# Patient Record
Sex: MALE | Race: WHITE | Employment: OTHER | URBAN - METROPOLITAN AREA
[De-identification: names, ages, dates, MRNs, and addresses within clinical notes are randomized per-mention and may not be internally consistent; named-entity substitution may affect disease eponyms.]

---

## 2017-01-10 ENCOUNTER — ANTICOAGULATION THERAPY VISIT (OUTPATIENT)
Dept: NURSING | Facility: CLINIC | Age: 82
End: 2017-01-10
Payer: COMMERCIAL

## 2017-01-10 DIAGNOSIS — I26.99 PE (PULMONARY THROMBOEMBOLISM) (H): ICD-10-CM

## 2017-01-10 DIAGNOSIS — Z79.01 LONG-TERM (CURRENT) USE OF ANTICOAGULANTS: Primary | ICD-10-CM

## 2017-01-10 LAB — INR POINT OF CARE: 2.9 (ref 0.86–1.14)

## 2017-01-10 PROCEDURE — 85610 PROTHROMBIN TIME: CPT | Mod: QW

## 2017-01-10 PROCEDURE — 36416 COLLJ CAPILLARY BLOOD SPEC: CPT

## 2017-01-10 PROCEDURE — 99207 ZZC NO CHARGE NURSE ONLY: CPT

## 2017-01-10 NOTE — MR AVS SNAPSHOT
Dimitri Neves   1/10/2017 10:15 AM   Anticoagulation Therapy Visit    Description:  81 year old male   Provider:  BE ANTI COAG   Department:  Be Nurse           INR as of 1/10/2017     Selected INR 2.9 (1/10/2017)      Anticoagulation Summary as of 1/10/2017     INR goal 2.0-3.0   Selected INR 2.9 (1/10/2017)   Full instructions 5 mg on Sun, Tue, Thu; 2.5 mg all other days   Next INR check 2/7/2017    Indications   Long-term (current) use of anticoagulants [Z79.01] [Z79.01]  PE (pulmonary thromboembolism) (H) [I26.99]         Your next Anticoagulation Clinic appointment(s)     Dedrick 10, 2017 10:15 AM   Anticoagulation Visit with BE ANTI COAG   [facility] ([facility] [place])    [address]  [place] [address]   [phone]            Feb 07, 2017  9:30 AM   Anticoagulation Visit with BE ANTI COAG   [facility] ([facility] [place])    [address]  [place] [address]   [phone]              Contact Numbers     [facility]  Please call [phone] with any problems or questions regarding your therapy, or to cancel or reschedule your appointment.          January 2017 Details    Sun Mon Tue Wed Thu Fri Sat     1               2               3               4               5               6               7                 8               9               10      5 mg   See details      11      2.5 mg         12      5 mg         13      2.5 mg         14      2.5 mg           15      5 mg         16      2.5 mg         17      5 mg         18      2.5 mg         19      5 mg         20      2.5 mg         21      2.5 mg           22      5 mg         23      2.5 mg         24      5 mg         25      2.5 mg         26      5 mg         27      2.5 mg         28      2.5 mg           29      5 mg         30      2.5 mg         31      5 mg              Date Details   01/10 This INR check               How to take your warfarin dose     To  take:  2.5 mg Take 0.5 of a 5 mg tablet.    To take:  5 mg Take 1 of the 5 mg tablets.           February 2017 Details    Sun Mon Tue Wed Thu Fri Sat        1      2.5 mg         2      5 mg         3      2.5 mg         4      2.5 mg           5      5 mg         6      2.5 mg         7            8               9               10               11                 12               13               14               15               16               17               18                 19               20               21               22               23               24               25                 26               27               28                    Date Details   No additional details    Date of next INR:  2/7/2017         How to take your warfarin dose     To take:  2.5 mg Take 0.5 of a 5 mg tablet.    To take:  5 mg Take 1 of the 5 mg tablets.

## 2017-01-10 NOTE — PROGRESS NOTES
ANTICOAGULATION FOLLOW-UP CLINIC VISIT    Patient Name:  Dimitri Neves  Date:  1/10/2017  Contact Type:  Face to Face    SUBJECTIVE:     Patient Findings     Positives No Problem Findings           OBJECTIVE    INR PROTIME   Date Value Ref Range Status   01/10/2017 2.9* 0.86 - 1.14 Final       ASSESSMENT / PLAN  INR assessment THER    Recheck INR In: 4 WEEKS    INR Location Clinic      Anticoagulation Summary as of 1/10/2017     INR goal 2.0-3.0   Selected INR 2.9 (1/10/2017)   Maintenance plan 5 mg (5 mg x 1) on Sun, Tue, Thu; 2.5 mg (5 mg x 0.5) all other days   Full instructions 5 mg on Sun, Tue, Thu; 2.5 mg all other days   Weekly total 25 mg   No change documented Madiha Souza   Plan last modified Madiha Souza (11/22/2016)   Next INR check 2/7/2017   Target end date Indefinite    Indications   Long-term (current) use of anticoagulants [Z79.01] [Z79.01]  PE (pulmonary thromboembolism) (H) [I26.99]         Anticoagulation Episode Summary     INR check location Clinic Lab    Preferred lab     Send INR reminders to [facility]    Comments       Anticoagulation Care Providers     Provider Role Specialty Phone number    Dennis Tejada MD [facility] Internal Medicine [phone]            See the Encounter Report to view Anticoagulation Flowsheet and Dosing Calendar (Go to Encounters tab in chart review, and find the Anticoagulation Therapy Visit)        Madiha Souza

## 2017-01-12 ENCOUNTER — OFFICE VISIT (OUTPATIENT)
Dept: INTERNAL MEDICINE | Facility: CLINIC | Age: 82
End: 2017-01-12
Payer: COMMERCIAL

## 2017-01-12 VITALS
SYSTOLIC BLOOD PRESSURE: 117 MMHG | OXYGEN SATURATION: 96 % | HEIGHT: 72 IN | BODY MASS INDEX: 25.47 KG/M2 | DIASTOLIC BLOOD PRESSURE: 68 MMHG | WEIGHT: 188 LBS | HEART RATE: 72 BPM | TEMPERATURE: 97.6 F

## 2017-01-12 DIAGNOSIS — Z79.01 LONG-TERM (CURRENT) USE OF ANTICOAGULANTS: ICD-10-CM

## 2017-01-12 DIAGNOSIS — C61 PROSTATE CANCER (H): ICD-10-CM

## 2017-01-12 DIAGNOSIS — I26.99 PE (PULMONARY THROMBOEMBOLISM) (H): ICD-10-CM

## 2017-01-12 DIAGNOSIS — E78.5 HYPERLIPIDEMIA LDL GOAL <130: ICD-10-CM

## 2017-01-12 DIAGNOSIS — I10 HTN (HYPERTENSION), BENIGN: ICD-10-CM

## 2017-01-12 DIAGNOSIS — G47.33 OSA (OBSTRUCTIVE SLEEP APNEA): ICD-10-CM

## 2017-01-12 PROCEDURE — 99214 OFFICE O/P EST MOD 30 MIN: CPT | Performed by: INTERNAL MEDICINE

## 2017-01-12 NOTE — PATIENT INSTRUCTIONS
- try Icy hot or Bengay or Aspercream to affected area/ knee for pain  - Over the counter  - try icing the area off-on

## 2017-01-12 NOTE — NURSING NOTE
Chief Complaint   Patient presents with     Hypertension     recheck       Initial /68 mmHg  Pulse 72  Temp(Src) 97.6  F (36.4  C) (Oral)  Ht 6' (1.829 m)  Wt 188 lb (85.276 kg)  BMI 25.49 kg/m2  SpO2 96% Estimated body mass index is 25.49 kg/(m^2) as calculated from the following:    Height as of this encounter: 6' (1.829 m).    Weight as of this encounter: 188 lb (85.276 kg).  BP completed using cuff size: emanuel Boland LPN

## 2017-01-12 NOTE — PROGRESS NOTES
INTERNAL MEDICINE PROGRESS NOTE    HISTORY OF PRESENT ILLNESS:                                                    Dimitri Neves is a 79 year old male, with history of hypertension, hyperlipidemia, HUDSON using CPAP, chronic back pain after injury in 1978 with spinal cord stimulator implant and total 4 back surgeries, prostate cancer with possible bony metastases diagnosis in Dec 2014, on Leuprolide, presents to clinic today with concerns  For: constipation, hypertension, PE , hyperlipidemia, meds/labs review    On coumadin and INR is therapeutic   Breathing is better, able to clear his driveway with snowblower    constipation has resolved with PRN OTC dulcolax   Losing weight gradually   Had cold symptoms about 2 weeks ago, feels better     Additional history: as documented     Labs reviewed in EPIC    OBJECTIVE:                                                    Vitals: /68 mmHg  Pulse 72  Temp(Src) 97.6  F (36.4  C) (Oral)  Ht 6' (1.829 m)  Wt 188 lb (85.276 kg)  BMI 25.49 kg/m2  SpO2 96% BMI= Body mass index is 25.49 kg/(m^2).  GENERAL: pleasant, healthy, alert and no distress  EYES: Eyes grossly normal to inspection, and conjunctivae and sclerae normal  RESP: lungs clear to auscultation - no rales, rhonchi or wheezes  CV: regular rate and rhythm, normal S1 S2, no murmur, right leg is mildly edematous  ABDOMEN: soft, nontender, no hepatosplenomegaly, no masses and bowel sounds normal  MS: no gross musculoskeletal defects noted, no edema  SKIN: no rashes   NEURO: Normal strength and tone, mentation intact and speech normal  PSYCH: mentation appears normal, affect normal    Labs: reviewed      ASSESSMENT AND PLAN:                                                      1. Pulmonary embolism ( 7/12/16) - symptomatically better  - cardiac stress test - negative  - CT chest PE protocol - 2 PE - right lung new compared to CT chest in Nov 2015   - stop Lovenox today and continue coumadin. INR today 3.1  INR  clinic referral. Target INR 2-3  - second episode of PE, once post back surgery many years ago  - PFT 8/10/16    The FEV1 and FVC are reduced, the FEV1/FVC ratio is reduced. The inspiratory flow rates are within normal limits.  Lung volumes are within normal limits.  TLC is low-normal. Following administration of bronchodilators, there is no significant response.    The diffusing capacity is normal.  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     - albuterol inhaler PRN   - Plan: to repeat CT in 6 months after therapy and consider 6 months therapy as per hematologist Dr Campbell's suggestion. But, also informed patient that cancer can increase risk of thrombosis      2. Hypertension - goal < 140/90   - cut down on salt, advised on  heart healthy diet   - advise to check BP and weight regularly     3. Right knee post RKA 10/13/16   - right leg - edematous   - minimal bleeding   - no signs/symptoms of infection   - has appt with ortho today     4. Hyperlipidemia LDL < 130   - controlled, on simvastatin     5. Obstructive sleep apnea - uses CPAP regularly     6. Prostate cancer with possible bony metastases  - Prostatic adenocarcinoma Scotland score: 8   - CT abd/pelvise 12/29/14 - No evidence of metastatic disease in the abdomen or pelvis. Several basilar pulmonary nodules measuring up to 5 mm as described  above. Comparison with prior studies, or followup to document 2 years of stability is recommended per Fleischner criteria. Prostatomegaly. Diverticulosis.  - Bone scan 12/29/14 - Metastatic foci in the upper cervical vertebrae on the left, right posterior 3rd rib and right ischium. There is also focal uptake in the posterior left 11th rib with corresponding lytic expansile appearance on CT, suspicious for metastasis.   - was on Casodex for 30 days then switched to Leuprolide injection ( had received 1 dose of 45 mg)   , on Eligard   -  follows with Dr Taylor (Urologist)     7.  Chronic pain - mainly in lower back   - controlled   - involved in an accident in 1978 s/p 4 back surgeries and has a spinal cord stimulator implant   - takes oxicodone PRN (very rarely)   - follows with pain specialist     8. Colonoscopy -  in 2009    - diverticulosis - asymptomatic     9. Lung nodules   - Several basilar pulmonary nodules. 5 mm lingular(series 3, image 54) 4 mm left lower lobe (series 3, image 4) 3 mm right middle lobe (series 3, image 21)   - CT chest 11/4/15 - 1. Stable pulmonary nodules previously visualized by CT abdomen on 12/29/2014. An additional nodule at the right lower lobe is noted on this exam that was not included for imaging. If there are risk factors for pulmonary malignancy, one year followup CT is recommended. 2. No acute finding.  - CT chest angio 7/12/16 - Small pulmonary embolus right midlung zone axial image 93. Probable right middle lobe pulmonary embolus axial image 102 as well. No left pulmonary emboli. These are in secondary branch vessels. No main pulmonary artery emboli. Prominent right anterior pleural calcification likely related to previous asbestos exposure. This is unchanged. Mild right middle lobe and right lower lobe atelectasis in the right lung base. Very small 3 mm nodular density anterior right lower lobe unchanged on axial series 9 image 78. No other lung nodules identified. No mediastinal or hilar adenopathy.                                                                 IMPRESSION:  1. Two pulmonary emboli identified in the right mid and lower lung. No central pulmonary emboli.  2. Prominent anterior pleural calcification consistent with previous asbestos exposure.    - annual CT     9. Arthritis   - bilateral knee- Right worse than left, plan for surgery later this year     10. Bleeding /bruises - improved , no recurrence  - aspirin discontinued   - now on coumadin     12. History of daily alcohol use   - quit alcohol     13.  Constipation   - improved .  stool softener , miralax daily and dulcolax OTC PRN      14. Hyperglycemia  -  HbA1c = 5.5 (7/19/16)       16. Hypoalbuminemia  - monitor  - increase protein/ nutritional supplement  intake     Discussed management plan and recommends that he establishes care with an internal medicine physician after I leave this practice on Jan 12,2017   Reviewed meds/labs     Dennis Tejada MD, M.Med.Sc.   INTERNAL MEDICINE  [facility]

## 2017-01-12 NOTE — MR AVS SNAPSHOT
After Visit Summary   1/12/2017    Dimitri Neves    MRN: [id number]           Patient Information     Date Of Birth          1935        Visit Information        Provider Department      1/12/2017 9:15 AM Dennis Tejada MD [facility]        Care Instructions    - try Icy hot or Bengay or Aspercream to affected area/ knee for pain  - Over the counter  - try icing the area off-on           Follow-ups after your visit        Your next 10 appointments already scheduled     Feb 07, 2017  9:30 AM   Anticoagulation Visit with BE ANTI COAG   [facility] ([facility])    [address] [address]   [phone]            Feb 14, 2017  9:00 AM   Return Visit with Senthil Taylor MD   [facility]    [address] [place] [address]   [phone]            Jun 14, 2017  9:15 AM   Return Visit with Senthil Jhaveri MD   [facility]    [address] [address]   [phone]              Who to contact     Normal or non-critical lab and imaging results will be communicated to you by MyChart, letter or phone within 4 business days after the clinic has received the results. If you do not hear from us within 7 days, please contact the clinic through MyChart or phone. If you have a critical or abnormal lab result, we will notify you by phone as soon as possible.  Submit refill requests through [employer] or call your pharmacy and they will forward the refill request to us. Please allow 3 business days for your refill to be completed.          If you need to speak with a  for additional information , please call: [phone]             Additional Information About Your Visit        [employer] Information     [employer] gives you secure access to your electronic health record. If you see a primary care provider, you can  also send messages to your care team and make appointments. If you have questions, please call your primary care clinic.  If you do not have a primary care provider, please call [phone] and they will assist you.        Care EveryWhere ID     This is your Care EveryWhere ID. This could be used by other organizations to access your [facility] medical records  [id number]        Your Vitals Were     Pulse Temperature Height BMI (Body Mass Index) Pulse Oximetry       72 97.6  F (36.4  C) (Oral) 6' (1.829 m) 25.49 kg/m2 96%        Blood Pressure from Last 3 Encounters:   01/12/17 117/68   11/08/16 104/70   10/04/16 128/74    Weight from Last 3 Encounters:   01/12/17 188 lb (85.276 kg)   11/08/16 199 lb (90.266 kg)   10/07/16 209 lb (94.802 kg)              Today, you had the following     No orders found for display       Primary Care Provider    None Specified       No primary provider on file.        Thank you!     Thank you for choosing [facility]  for your care. Our goal is always to provide you with excellent care. Hearing back from our patients is one way we can continue to improve our services. Please take a few minutes to complete the written survey that you may receive in the mail after your visit with us. Thank you!             Your Updated Medication List - Protect others around you: Learn how to safely use, store and throw away your medicines at www.disposemymeds.org.          This list is accurate as of: 1/12/17  9:57 AM.  Always use your most recent med list.                   Brand Name Dispense Instructions for use    B-12 1000 MCG Tbcr     30 tablet    Take 1,000 mcg by mouth daily       leuprolide 45 MG kit    ELIGARD     Inject 45 mg Subcutaneous every 6 months       metoprolol 25 MG 24 hr tablet    TOPROL-XL    90 tablet    Take 1 tablet (25 mg) by mouth daily New dose 8/25/11       oxyCODONE 5 MG IR tablet    ROXICODONE    30 tablet    Take 1 tablet by mouth every 6 hours as needed  for pain.       quinapril 40 MG Tab    ACCUPRIL    90 tablet    Take 1 tablet (40 mg) by mouth daily       sildenafil 100 MG cap/tab    REVATIO/VIAGRA    10 tablet    Take 1 tablet (100 mg) by mouth daily as needed for erectile dysfunction       simvastatin 40 MG tablet    ZOCOR    45 tablet    Take 0.5 tablets (20 mg) by mouth daily       vitamin D 2000 UNITS tablet      Take 1 tablet by mouth daily       warfarin 5 MG tablet    COUMADIN    30 tablet    Take 1 tablet (5 mg) by mouth daily

## 2017-02-06 DIAGNOSIS — R79.89 LOW VITAMIN B12 LEVEL: ICD-10-CM

## 2017-02-06 DIAGNOSIS — I10 HTN (HYPERTENSION), BENIGN: Primary | ICD-10-CM

## 2017-02-06 RX ORDER — QUINAPRIL 40 MG/1
40 TABLET ORAL DAILY
Qty: 90 TABLET | Refills: 0 | Status: SHIPPED | OUTPATIENT
Start: 2017-02-06 | End: 2017-05-04

## 2017-02-06 RX ORDER — METOPROLOL SUCCINATE 25 MG/1
25 TABLET, EXTENDED RELEASE ORAL DAILY
Qty: 90 TABLET | Refills: 0 | Status: SHIPPED | OUTPATIENT
Start: 2017-02-06 | End: 2017-05-04

## 2017-02-07 ENCOUNTER — ANTICOAGULATION THERAPY VISIT (OUTPATIENT)
Dept: NURSING | Facility: CLINIC | Age: 82
End: 2017-02-07
Payer: COMMERCIAL

## 2017-02-07 DIAGNOSIS — C61 MALIGNANT NEOPLASM OF PROSTATE (H): ICD-10-CM

## 2017-02-07 DIAGNOSIS — Z79.01 LONG-TERM (CURRENT) USE OF ANTICOAGULANTS: Primary | ICD-10-CM

## 2017-02-07 DIAGNOSIS — I26.99 PE (PULMONARY THROMBOEMBOLISM) (H): ICD-10-CM

## 2017-02-07 LAB
INR POINT OF CARE: 1.7 (ref 0.86–1.14)
PSA SERPL-MCNC: 19.33 UG/L (ref 0–4)

## 2017-02-07 PROCEDURE — 84153 ASSAY OF PSA TOTAL: CPT | Performed by: UROLOGY

## 2017-02-07 PROCEDURE — 85610 PROTHROMBIN TIME: CPT | Mod: QW

## 2017-02-07 PROCEDURE — 99207 ZZC NO CHARGE NURSE ONLY: CPT

## 2017-02-07 PROCEDURE — 36416 COLLJ CAPILLARY BLOOD SPEC: CPT

## 2017-02-07 NOTE — MR AVS SNAPSHOT
Dimitri Neves   2/7/2017 9:30 AM   Anticoagulation Therapy Visit    Description:  81 year old male   Provider:  BE ANTI COAG   Department:  Be Nurse           INR as of 2/7/2017     Selected INR 1.7! (2/7/2017)      Anticoagulation Summary as of 2/7/2017     INR goal 2.0-3.0   Selected INR 1.7! (2/7/2017)   Full instructions 5 mg on Sun, Tue, Thu; 2.5 mg all other days   Next INR check 2/21/2017    Indications   Long-term (current) use of anticoagulants [Z79.01] [Z79.01]  PE (pulmonary thromboembolism) (H) [I26.99]         Your next Anticoagulation Clinic appointment(s)     Feb 07, 2017  9:30 AM   Anticoagulation Visit with BE ANTI COAG   [facility] [place] ([facility])    [address]  [place] [address]   [phone]            Feb 21, 2017  9:30 AM   Anticoagulation Visit with BE ANTI COAG   [facility] [place] ([facility] [place])    [address]  [place] [address]   [phone]              Contact Numbers     [facility]  Please call [phone] with any problems or questions regarding your therapy, or to cancel or reschedule your appointment.          February 2017 Details    Sun Mon Tue Wed Thu Fri Sat        1               2               3               4                 5               6               7      5 mg   See details      8      2.5 mg         9      5 mg         10      2.5 mg         11      2.5 mg           12      5 mg         13      2.5 mg         14      5 mg         15      2.5 mg         16      5 mg         17      2.5 mg         18      2.5 mg           19      5 mg         20      2.5 mg         21            22               23               24               25                 26               27               28                    Date Details   02/07 This INR check       Date of next INR:  2/21/2017         How to take your warfarin dose     To take:  2.5 mg Take 0.5 of a 5 mg tablet.    To take:  5 mg Take 1  of the 5 mg tablets.

## 2017-02-07 NOTE — PROGRESS NOTES
ANTICOAGULATION FOLLOW-UP CLINIC VISIT    Patient Name:  Dimitri Neves  Date:  2/7/2017  Contact Type:  Face to Face    SUBJECTIVE:     Patient Findings     Positives No Problem Findings           OBJECTIVE    INR PROTIME   Date Value Ref Range Status   02/07/2017 1.7* 0.86 - 1.14 Final       ASSESSMENT / PLAN  INR assessment SUB    Recheck INR In: 2 WEEKS    INR Location Clinic      Anticoagulation Summary as of 2/7/2017     INR goal 2.0-3.0   Selected INR 1.7! (2/7/2017)   Maintenance plan 5 mg (5 mg x 1) on Sun, Tue, Thu; 2.5 mg (5 mg x 0.5) all other days   Full instructions 5 mg on Sun, Tue, Thu; 2.5 mg all other days   Weekly total 25 mg   No change documented Madiha Souza   Plan last modified Madiha Souza (11/22/2016)   Next INR check 2/21/2017   Target end date Indefinite    Indications   Long-term (current) use of anticoagulants [Z79.01] [Z79.01]  PE (pulmonary thromboembolism) (H) [I26.99]         Anticoagulation Episode Summary     INR check location Clinic Lab    Preferred lab     Send INR reminders to [facility]    Comments       Anticoagulation Care Providers     Provider Role Specialty Phone number    Dennis Tejada MD [facility] Internal Medicine [phone]            See the Encounter Report to view Anticoagulation Flowsheet and Dosing Calendar (Go to Encounters tab in chart review, and find the Anticoagulation Therapy Visit)        Madiha Souza

## 2017-02-08 DIAGNOSIS — C61 PROSTATE CANCER (H): Primary | ICD-10-CM

## 2017-02-09 DIAGNOSIS — C61 PROSTATE CANCER (H): ICD-10-CM

## 2017-02-09 PROCEDURE — 36415 COLL VENOUS BLD VENIPUNCTURE: CPT | Performed by: UROLOGY

## 2017-02-09 PROCEDURE — 84270 ASSAY OF SEX HORMONE GLOBUL: CPT | Performed by: UROLOGY

## 2017-02-09 PROCEDURE — 84403 ASSAY OF TOTAL TESTOSTERONE: CPT | Performed by: UROLOGY

## 2017-02-10 LAB
SHBG SERPL-SCNC: 34 NMOL/L (ref 11–80)
TESTOST FREE SERPL-MCNC: 0.15 NG/DL (ref 4.7–24.4)
TESTOST SERPL-MCNC: 9 NG/DL (ref 240–950)

## 2017-02-14 ENCOUNTER — OFFICE VISIT (OUTPATIENT)
Dept: UROLOGY | Facility: CLINIC | Age: 82
End: 2017-02-14
Payer: COMMERCIAL

## 2017-02-14 VITALS — HEART RATE: 68 BPM | RESPIRATION RATE: 16 BRPM | DIASTOLIC BLOOD PRESSURE: 60 MMHG | SYSTOLIC BLOOD PRESSURE: 110 MMHG

## 2017-02-14 DIAGNOSIS — C61 MALIGNANT NEOPLASM OF PROSTATE (H): Primary | ICD-10-CM

## 2017-02-14 PROCEDURE — 96402 CHEMO HORMON ANTINEOPL SQ/IM: CPT | Performed by: UROLOGY

## 2017-02-14 RX ORDER — BICALUTAMIDE 50 MG/1
50 TABLET, FILM COATED ORAL DAILY
Qty: 90 TABLET | Refills: 3 | Status: SHIPPED | OUTPATIENT
Start: 2017-02-14 | End: 2017-10-24

## 2017-02-14 NOTE — NURSING NOTE
Chief Complaint   Patient presents with     RECHECK     recheck PSA       Initial /60 (Cuff Size: Adult Regular)  Pulse 68  Resp 16 Estimated body mass index is 25.5 kg/(m^2) as calculated from the following:    Height as of 1/12/17: 1.829 m (6').    Weight as of 1/12/17: 85.3 kg (188 lb).  Medication Reconciliation: complete   Pooja Fatima, CMA

## 2017-02-14 NOTE — MR AVS SNAPSHOT
After Visit Summary   2/14/2017    Dimitri Neves    MRN: [id number]           Patient Information     Date Of Birth          1935        Visit Information        Provider Department      2/14/2017 9:00 AM Senthil Taylor MD [facility]        Today's Diagnoses     Malignant neoplasm of prostate (H)    -  1       Follow-ups after your visit        Follow-up notes from your care team     Return in about 3 months (around 5/14/2017) for Lab Work, Routine Visit.      Your next 10 appointments already scheduled     Feb 21, 2017  9:30 AM CST   Anticoagulation Visit with BE ANTI COAG   [facility] [place] ([facility])    [address]  [place] [address] [phone]            Feb 27, 2017 10:30 AM CST   Office Visit with Reginaldo Muir MD   [facility] [place] ([facility] [place])    [address]  [place] [address] [phone]           Bring a current list of meds and any records pertaining to this visit.  For Physicals, please bring immunization records and any forms needing to be filled out.  Please arrive 10 minutes early to complete paperwork.            Jun 14, 2017  9:15 AM CDT   Return Visit with Senthil Jhaveri MD   [facility]    [address] [place] [address]   [phone]              Future tests that were ordered for you today     Open Future Orders        Priority Expected Expires Ordered    PSA tumor marker Routine 5/16/2017 2/15/2018 2/14/2017    Liver Panel ([facility]) Routine 5/14/2017 2/14/2018 2/14/2017            Who to contact     If you have questions or need follow up information about today's clinic visit or your schedule please contact [facility] [place] directly at [phone].  Normal or non-critical lab and imaging results will be communicated to you by MyChart, letter or phone within 4 business days after the clinic has received  the results. If you do not hear from us within 7 days, please contact the clinic through [facility] or phone. If you have a critical or abnormal lab result, we will notify you by phone as soon as possible.  Submit refill requests through [facility] or call your pharmacy and they will forward the refill request to us. Please allow 3 business days for your refill to be completed.          Additional Information About Your Visit        [employer] Information     [facility] gives you secure access to your electronic health record. If you see a primary care provider, you can also send messages to your care team and make appointments. If you have questions, please call your primary care clinic.  If you do not have a primary care provider, please call [phone] and they will assist you.        Care EveryWhere ID     This is your Care EveryWhere ID. This could be used by other organizations to access your [place] medical records  [id number]        Your Vitals Were     Pulse Respirations                68 16           Blood Pressure from Last 3 Encounters:   02/14/17 110/60   01/12/17 117/68   11/08/16 104/70    Weight from Last 3 Encounters:   01/12/17 85.3 kg (188 lb)   11/08/16 90.3 kg (199 lb)   10/07/16 94.8 kg (209 lb)              We Performed the Following     INJECTION INTRAMUSCULAR OR SUB-Q     LEUPROLIDE ACETATE 7.5MG          Today's Medication Changes          These changes are accurate as of: 2/14/17  9:23 AM.  If you have any questions, ask your nurse or doctor.               Start taking these medicines.        Dose/Directions    bicalutamide 50 MG tablet   Commonly known as:  CASODEX   Used for:  Malignant neoplasm of prostate (H)   Started by:  Senthil Taylor MD        Dose:  50 mg   Take 1 tablet (50 mg) by mouth daily   Quantity:  90 tablet   Refills:  3            Where to get your medicines      These medications were sent to [facility]pharmacy #[id number] - [place], [place] - [address],   [address] , [place] [address]     Phone:  [phone]     bicalutamide 50 MG tablet                Primary Care Provider    None Specified       No primary provider on file.        Thank you!     Thank you for choosing [facility] [place]  for your care. Our goal is always to provide you with excellent care. Hearing back from our patients is one way we can continue to improve our services. Please take a few minutes to complete the written survey that you may receive in the mail after your visit with us. Thank you!             Your Updated Medication List - Protect others around you: Learn how to safely use, store and throw away your medicines at www.disposemymeds.org.          This list is accurate as of: 2/14/17  9:23 AM.  Always use your most recent med list.                   Brand Name Dispense Instructions for use    B-12 1000 MCG Tbcr     30 tablet    Take 1,000 mcg by mouth daily       bicalutamide 50 MG tablet    CASODEX    90 tablet    Take 1 tablet (50 mg) by mouth daily       leuprolide 45 MG kit    ELIGARD     Inject 45 mg Subcutaneous every 6 months       metoprolol 25 MG 24 hr tablet    TOPROL-XL    90 tablet    Take 1 tablet (25 mg) by mouth daily       oxyCODONE 5 MG IR tablet    ROXICODONE    30 tablet    Take 1 tablet by mouth every 6 hours as needed for pain.       quinapril 40 MG Tab    ACCUPRIL    90 tablet    Take 1 tablet (40 mg) by mouth daily - NEEDS TO FOLLOW UP       sildenafil 100 MG cap/tab    REVATIO/VIAGRA    10 tablet    Take 1 tablet (100 mg) by mouth daily as needed for erectile dysfunction       simvastatin 40 MG tablet    ZOCOR    45 tablet    Take 0.5 tablets (20 mg) by mouth daily       vitamin D 2000 UNITS tablet      Take 1 tablet by mouth daily       warfarin 5 MG tablet    COUMADIN    30 tablet    Take 1 tablet (5 mg) by mouth daily

## 2017-02-14 NOTE — PROGRESS NOTES
Chief Complaint   Patient presents with     RECHECK     recheck PSA       Dimirti Neves is a 81 year old male who presents today for follow up of   Chief Complaint   Patient presents with     RECHECK     recheck PSA   f/u for prostate cancer eileen 8.  Bone scan showed some mets.  He was started on eligard.  He feels fine.  His energy level is lower but he maintains an active life style.  He has been on calcium/vit D.  He denies any bone pain.  Hot flashes are tolerable.  Recent psa is 19's.  Current Outpatient Prescriptions   Medication Sig Dispense Refill     bicalutamide (CASODEX) 50 MG tablet Take 1 tablet (50 mg) by mouth daily 90 tablet 3     quinapril (ACCUPRIL) 40 MG Tab Take 1 tablet (40 mg) by mouth daily - NEEDS TO FOLLOW UP 90 tablet 0     metoprolol (TOPROL-XL) 25 MG 24 hr tablet Take 1 tablet (25 mg) by mouth daily 90 tablet 0     Cyanocobalamin (B-12) 1000 MCG TBCR Take 1,000 mcg by mouth daily 30 tablet 0     warfarin (COUMADIN) 5 MG tablet Take 1 tablet (5 mg) by mouth daily 30 tablet 3     simvastatin (ZOCOR) 40 MG tablet Take 0.5 tablets (20 mg) by mouth daily 45 tablet 3     sildenafil (VIAGRA) 100 MG tablet Take 1 tablet (100 mg) by mouth daily as needed for erectile dysfunction 10 tablet 3     Cholecalciferol (VITAMIN D) 2000 UNITS tablet Take 1 tablet by mouth daily       leuprolide (ELIGARD) 45 MG injection Inject 45 mg Subcutaneous every 6 months       oxyCODONE (ROXICODONE) 5 MG immediate release tablet Take 1 tablet by mouth every 6 hours as needed for pain. 30 tablet 0     Allergies   Allergen Reactions     Morphine Sulfate GI Disturbance     vomiting     Vicodin [Hydrocodone-Acetaminophen] Nausea and Vomiting      Past Medical History   Diagnosis Date     Actinic keratosis      AK (actinic keratosis) 7/9/2012     Cataract cortical, senile right eye 4/17/2012     DDD (degenerative disc disease), lumbar 1979     s/p 4 back surgeries, spinal cord stimulator implant      Diverticulosis       ED (erectile dysfunction) 2009     GERD (gastroesophageal reflux disease) ~1980     HTN (hypertension), benign ~2000     Macular degeneration, dry, mild, ou 7/23/2016     Multiple lung nodules      Several basilar pulmonary nodules <5 mm     HUDSON (obstructive sleep apnea) 10/2005     on CPAP     Other abnormal glucose 01/14/2009     PE (pulmonary thromboembolism) (H) 1981     after back surgery      Pure hypercholesterolemia ~2000     Squamous cell carcinoma (H) 07/2012     L frontal scalp     Past Surgical History   Procedure Laterality Date     C lumbar spine fusion,anter apprch  8/2007     four times total, latest 8/07     Rotator cuff repair rt/lt  ~1995     right     Arthroscopy knee rt/lt  ~1995     Right     Phacoemulsification clear cornea with standard intraocular lens implant  6-18-12/7-30-12     right/left eye     Cataract iol, rt/lt       Laser yag capsulotomy       both eyes     Family History   Problem Relation Age of Onset     CANCER Father      Lung 64y     DIABETES Brother      Hypertension Brother      CANCER Mother      Liver     CEREBROVASCULAR DISEASE Mother      Eye Disorder Mother      Glaucoma Mother      CEREBROVASCULAR DISEASE Maternal Grandmother      C.A.D. No family hx of      Thyroid Disease No family hx of      Macular Degeneration No family hx of      History     Social History     Marital Status:      Spouse Name: Tatiana     Number of Children: 2     Years of Education: N/A     Occupational History      Retired     Social History Main Topics     Smoking status: Former Smoker -- 1.00 packs/day for 25 years     Types: Cigarettes     Quit date: 01/02/1976     Smokeless tobacco: Never Used      Comment: lives in smoke free household     Alcohol Use: 7.0 oz/week     14 drink(s) per week      Comment: 2-3 drinks every night     Drug Use: No      Comment: tatoos- sterile     Sexual Activity:     Partners: Female     Other Topics Concern      Service Yes     [employer]  x8 years     Blood Transfusions No     Caffeine Concern No     Hobby Hazards No     Sleep Concern No     Stress Concern No     Weight Concern Yes     Special Diet No     Back Care Yes     Exercise Yes     Seat Belt Yes     Self-Exams No     Parent/Sibling W/ Cabg, Mi Or Angioplasty Before 65f 55m? No     Social History Narrative       REVIEW OF SYSTEMS  =================  C: NEGATIVE for fever, chills, change in weight  I: NEGATIVE for worrisome rashes, moles or lesions  E/M: NEGATIVE for ear, mouth and throat problems  R: NEGATIVE for significant cough or SHORTNESS OF BREATH,   CV: NEGATIVE for chest pain, palpitations or peripheral edema  GI: NEGATIVE for nausea, abdominal pain, heartburn, or change in bowel habits  NEURO: NEGATIVE any motor/sensory changes  PSYCH: NEGATIVE for recent mood disorder    Physical Exam:  /60 (Cuff Size: Adult Regular)  Pulse 68  Resp 16   Patient is pleasant, in no acute distress, good general condition.  Lung: no evidence of respiratory distress    Abdomen: Soft, nondistended, non tender. No masses. No rebound or guarding.   Exam: no cva tenderness  Skin: Warm and dry.  No redness.  Psych: normal mood and affect  Neuro: alert and oriented  CT abdomen and pelvis with contrast 12/29/2014    Comparison: None    History: Prostate cancer    Technique: Volumetric CT acquisition through the abdomen and pelvis:  The administration of 123 mL Isovue-370 intravenous contrast,  reformatted in axial, coronal and sagittal planes.    Findings: Liver, spleen and pancreas are unremarkable.  Cholecystectomy. Mild nodular thickening of both adrenal glands.    Bilateral renal cortical cysts measuring up to 2.2 cm on the right. No  hydronephrosis or stone. Bladder is unremarkable. Prostate is  heterogeneous and enlarged measuring 4.2 x 5.1 cm. No free fluid in  the pelvis.    Sigmoid diverticulosis without diverticulitis. No abnormal bowel wall  thickening or enhancement. Moderate stool  burden.    No lymphadenopathy in the abdomen or pelvis.    Mild atherosclerotic calcifications of the aorta and iliofemoral  arteries which are all normal in caliber. Other major vasculature is  patent and normal in caliber.    Several basilar pulmonary nodules. For example:  5 mm lingular(series 3, image 54)  4 mm left lower lobe (series 3, image 4)  3 mm right middle lobe (series 3, image 21)    Bibasilar fibroatelectasis. No pleural effusion or consolidation.    Posterior instrumentation of L2-3. No suspicious osseous lesions.          Impression           Impression:  1. No evidence of metastatic disease in the abdomen or pelvis.  2. Several basilar pulmonary nodules measuring up to 5 mm as described  above. Comparison with prior studies, or followup to document 2 years  of stability is recommended per Fleischner criteria.  3. Prostatomegaly.  4. Diverticulosis.        EXAMINATION: NM BONE SCAN WHOLE BODY  Whole-body bone scan, 12/29/2014 3:02 PM     HISTORY: Malignant neoplasm of prostate     ADDITIONAL INFORMATION: none    COMPARISON: Same day CT of the abdomen and pelvis.    TECHNIQUE: The patient received 25.6 mCi of Tc-99m MDP intravenously.  Whole body bone images were obtained at 3 hours.    FINDINGS: Foci of radiotracer uptake in the upper cervical vertebrae  on the left, right posterior 3rd rib and right ischium suggestive of  osteoblastic metastases. Another focus of radiotracer uptake noted in  the posterior left 11th rib. There is corresponding focal expansile  lytic lesion on CT images. Also seen are degenerative changes in the  bilateral shoulder, wrist and knee joints as well as the lumbar  vertebrae.          Impression           IMPRESSION: Metastatic foci in the upper cervical vertebrae on the  left, right posterior 3rd rib and right ischium. There is also focal  uptake in the posterior left 11th rib with corresponding lytic  expansile appearance on CT, suspicious for metastasis.     I have  personally reviewed the examination and initial interpretation  and I agree with the findings.    TREMAINE ROSSI MD          Assessment/Plan:   (185) Malignant neoplasm of prostate  (primary encounter diagnosis)  Comment: psa elevation    Plan: add casodex            Cont with negin           Recheck psa/LFTs in 3 months

## 2017-02-15 ENCOUNTER — MYC MEDICAL ADVICE (OUTPATIENT)
Dept: UROLOGY | Facility: CLINIC | Age: 82
End: 2017-02-15

## 2017-02-15 NOTE — TELEPHONE ENCOUNTER
Called and spoke to patient.  Patient requesting results from previous bone scan.  Sent via my chart. Lisa Esquivel RN

## 2017-02-21 ENCOUNTER — ANTICOAGULATION THERAPY VISIT (OUTPATIENT)
Dept: NURSING | Facility: CLINIC | Age: 82
End: 2017-02-21
Payer: COMMERCIAL

## 2017-02-21 DIAGNOSIS — Z79.01 LONG-TERM (CURRENT) USE OF ANTICOAGULANTS: ICD-10-CM

## 2017-02-21 DIAGNOSIS — I26.99 PE (PULMONARY THROMBOEMBOLISM) (H): ICD-10-CM

## 2017-02-21 LAB — INR POINT OF CARE: 2.4 (ref 0.86–1.14)

## 2017-02-21 PROCEDURE — 36416 COLLJ CAPILLARY BLOOD SPEC: CPT

## 2017-02-21 PROCEDURE — 85610 PROTHROMBIN TIME: CPT | Mod: QW

## 2017-02-21 PROCEDURE — 99207 ZZC NO CHARGE NURSE ONLY: CPT

## 2017-02-21 NOTE — MR AVS SNAPSHOT
Dimitri Neves   2/21/2017 9:30 AM   Anticoagulation Therapy Visit    Description:  81 year old male   Provider:  SANDRA ANTI COAG   Department:  [facility] Nurse           INR as of 2/21/2017     Today's INR 2.4      Anticoagulation Summary as of 2/21/2017     INR goal 2.0-3.0   Today's INR 2.4   Full instructions 5 mg on Sun, Tue, Thu; 2.5 mg all other days   Next INR check 3/21/2017    Indications   Long-term (current) use of anticoagulants [Z79.01] [Z79.01]  PE (pulmonary thromboembolism) (H) [I26.99]         Your next Anticoagulation Clinic appointment(s)     Mar 21, 2017  9:30 AM CDT   Anticoagulation Visit with SANDRA ANTI JOSE LUIS   [facility] ([facility] [place])    [address]  [place] [address] [phone]              Contact Numbers     [facility]  Please call [phone] with any problems or questions regarding your therapy, or to cancel or reschedule your appointment.          February 2017 Details    Sun Mon Tue Wed Thu Fri Sat        1               2               3               4                 5               6               7               8               9               10               11                 12               13               14               15               16               17               18                 19               20               21      5 mg   See details      22      2.5 mg         23      5 mg         24      2.5 mg         25      2.5 mg           26      5 mg         27      2.5 mg         28      5 mg              Date Details   02/21 This INR check               How to take your warfarin dose     To take:  2.5 mg Take 0.5 of a 5 mg tablet.    To take:  5 mg Take 1 of the 5 mg tablets.           March 2017 Details    Sun Mon Tue Wed Thu Fri Sat        1      2.5 mg         2      5 mg         3      2.5 mg         4      2.5 mg           5      5 mg         6      2.5 mg         7      5 mg         8      2.5 mg         9      5  mg         10      2.5 mg         11      2.5 mg           12      5 mg         13      2.5 mg         14      5 mg         15      2.5 mg         16      5 mg         17      2.5 mg         18      2.5 mg           19      5 mg         20      2.5 mg         21            22               23               24               25                 26               27               28               29               30               31                 Date Details   No additional details    Date of next INR:  3/21/2017         How to take your warfarin dose     To take:  2.5 mg Take 0.5 of a 5 mg tablet.    To take:  5 mg Take 1 of the 5 mg tablets.

## 2017-02-21 NOTE — PROGRESS NOTES
ANTICOAGULATION FOLLOW-UP CLINIC VISIT    Patient Name:  Dimitri Neves  Date:  2/21/2017  Contact Type:  Face to Face    SUBJECTIVE:     Patient Findings     Positives No Problem Findings           OBJECTIVE    INR Protime   Date Value Ref Range Status   02/21/2017 2.4 (A) 0.86 - 1.14 Final       ASSESSMENT / PLAN  INR assessment THER    Recheck INR In: 4 WEEKS    INR Location Clinic      Anticoagulation Summary as of 2/21/2017     INR goal 2.0-3.0   Today's INR 2.4   Maintenance plan 5 mg (5 mg x 1) on Sun, Tue, Thu; 2.5 mg (5 mg x 0.5) all other days   Full instructions 5 mg on Sun, Tue, Thu; 2.5 mg all other days   Weekly total 25 mg   No change documented Madiha Souza   Plan last modified Madiha Souza (11/22/2016)   Next INR check 3/21/2017   Target end date Indefinite    Indications   Long-term (current) use of anticoagulants [Z79.01] [Z79.01]  PE (pulmonary thromboembolism) (H) [I26.99]         Anticoagulation Episode Summary     INR check location Clinic Lab    Preferred lab     Send INR reminders to [facility]    Comments       Anticoagulation Care Providers     Provider Role Specialty Phone number    Dennis Tejada MD [facility] Internal Medicine [phone]            See the Encounter Report to view Anticoagulation Flowsheet and Dosing Calendar (Go to Encounters tab in chart review, and find the Anticoagulation Therapy Visit)        Madiha Souza

## 2017-02-27 ENCOUNTER — OFFICE VISIT (OUTPATIENT)
Dept: FAMILY MEDICINE | Facility: CLINIC | Age: 82
End: 2017-02-27
Payer: COMMERCIAL

## 2017-02-27 VITALS
OXYGEN SATURATION: 95 % | SYSTOLIC BLOOD PRESSURE: 128 MMHG | BODY MASS INDEX: 26.01 KG/M2 | HEIGHT: 72 IN | HEART RATE: 80 BPM | TEMPERATURE: 97.5 F | DIASTOLIC BLOOD PRESSURE: 66 MMHG | WEIGHT: 192 LBS

## 2017-02-27 DIAGNOSIS — C61 PROSTATE CANCER (H): ICD-10-CM

## 2017-02-27 DIAGNOSIS — G47.33 OSA (OBSTRUCTIVE SLEEP APNEA): ICD-10-CM

## 2017-02-27 DIAGNOSIS — I10 HTN (HYPERTENSION), BENIGN: Primary | ICD-10-CM

## 2017-02-27 DIAGNOSIS — I26.99 PE (PULMONARY THROMBOEMBOLISM) (H): ICD-10-CM

## 2017-02-27 DIAGNOSIS — Z79.01 LONG-TERM (CURRENT) USE OF ANTICOAGULANTS: ICD-10-CM

## 2017-02-27 PROCEDURE — 99213 OFFICE O/P EST LOW 20 MIN: CPT | Performed by: INTERNAL MEDICINE

## 2017-02-27 NOTE — PATIENT INSTRUCTIONS
[facility]    If you have any questions regarding to your visit please contact your care team:     Team Pink:   Clinic Hours Telephone Number   Internal Medicine:  Dr. Deborah Taylor NP       7am-7pm  Monday - Thursday   7am-5pm  Fridays  [phone]  (Appointment scheduling available 24/7)    Questions about your visit?  Team Line  [phone]   Urgent Care - [place] [facility] - 11am-9pm Monday-Friday Saturday-Sunday- 9am-5pm   [place] - 5pm-9pm Monday-Friday Saturday-Sunday- 9am-5pm  [phone] - [place]   [phone] - [place]       What options do I have for visits at the clinic other than the traditional office visit?  To expand how we care for you, many of our providers are utilizing electronic visits (e-visits) and telephone visits, when medically appropriate, for interactions with their patients rather than a visit in the clinic.   We also offer nurse visits for many medical concerns. Just like any other service, we will bill your insurance company for this type of visit based on time spent on the phone with your provider. Not all insurance companies cover these visits. Please check with your medical insurance if this type of visit is covered. You will be responsible for any charges that are not paid by your insurance.      E-visits via [employer]:  generally incur a $35.00 fee.  Telephone visits:  Time spent on the phone: *charged based on time that is spent on the phone in increments of 10 minutes. Estimated cost:   5-10 mins $30.00   11-20 mins. $59.00   21-30 mins. $85.00   Use [employer] (secure email communication and access to your chart) to send your primary care provider a message or make an appointment. Ask someone on your Team how to sign up for [employer].    For a Price Quote for your services, please call our Consumer Price Line at [phone].    As always, Thank you for trusting us with your health care  needs!    Discharged by Anaid PERSAUD CMA ([facility])

## 2017-02-27 NOTE — NURSING NOTE
Chief Complaint   Patient presents with     Establish Care     Referral     CT scan       Initial /66 (BP Location: Left arm, Patient Position: Chair, Cuff Size: Adult Regular)  Pulse 80  Temp 97.5  F (36.4  C) (Oral)  Ht 6' (1.829 m)  Wt 192 lb (87.1 kg)  SpO2 95%  BMI 26.04 kg/m2 Estimated body mass index is 26.04 kg/(m^2) as calculated from the following:    Height as of this encounter: 6' (1.829 m).    Weight as of this encounter: 192 lb (87.1 kg).  Medication Reconciliation: complete   Anaid PERSAUD CMA ([facility])

## 2017-02-27 NOTE — PROGRESS NOTES
SUBJECTIVE:                                                    Dimitri Neves is a 81 year old male who presents to clinic today for the following health issues:      Patient presents with:  Establish Care  Referral: CT scan    This is a new patient to me. It's a get acquainted visit with a new patient appointment today. He had a refill of all medications and isn't due for anything today beyond a meet and greet.    Dr. Vidal was his first [facility] doctor until roughly 2002 then Dr Juan Diego Pizarro here at [facility] and then followed him up to [facility] and then on to Dr. Tejada, who has recently left her practice. Seeing me today as patient likes an general internal medicine MD !    He had a right total knee replacement October 2016 and this has been slow to recover but generally doing well with this.    Has prostate cancer  And has ongoing follow up with Dr. Senthil Taylor with [facility]     He has a history of 2 pulmonary embolisms, 1979 and then a year ago and is on coumadin longterm. He asks does he need a redo CT pulmonary angiogram or other evaluations to see if the pulmonary embolism has resolved or not ? But he has no particular pulmonary symptoms at this point and we wouldn't change his coumadin prescription whether or not there were scars or other evidence of old pulmonary embolism found with a CT pulmonary angiogram , etc. So further follow up with radiographic images is not really indicated in my opinion.    No other history of lung disease. Quit smoking in the mid 1970's     He has chronic pain with a history of 4 different back surgeries and currently has a  neuro-stimulator device in place, chronic pain issues . Medications for chronic pain , oxyCODONE (ROXICODONE) for knee pain, because of the neuro-stimulator device has used percocet 5/325 tablets , but this is more of a back up plan , 1-2 times a week. He's not asking for  medications today.    We discussed in detail his history with some problems of the Enoxaparin (Lovenox) dosing around the time of his surgery, saw Dr. Dipesh Campbell with [facility] at one point but this is now straightened out.    His orthopedist surgeon has been Dr. Jb Weeks with [facility].    He's on hormone replacement therapy with ELIGARD (leuprolide acetate for injectable suspension) 6 months apart. Prostate specific antigen being followed. The initial diagnosis was 2014. No history of prostatectomy. No radiation therapy , just the hormone replacement therapy at this point along with CASODEX  (bicalutamide) .      Problem list and histories reviewed & adjusted, as indicated.  Additional history: as documented    Patient Active Problem List   Diagnosis     HTN (Hypertension), Benign goal <140/90     Pure hypercholesterolemia     Narcotic dependence, episodic use (H)     ED (erectile dysfunction)     HUDSON (obstructive sleep apnea)     PE (pulmonary thromboembolism) (H)     HYPERLIPIDEMIA LDL GOAL <130     Advanced directives, counseling/discussion     Abnormal glucose     AK (actinic keratosis)     SNHL (sensorineural hearing loss)     Endolymphatic hydrops     History of squamous cell carcinoma     Pseudophakia, Yag Caps, ou     Venous (peripheral) insufficiency     Malignant neoplasm of prostate (H)     Prostate cancer (H)     Dyspnea on exertion     Long-term (current) use of anticoagulants [Z79.01]     Posterior vitreous detachment, bilateral     Iris nevus, ou     Dry eye syndrome, bilateral     Macular degeneration, dry, mild, ou     Past Surgical History   Procedure Laterality Date     C lumbar spine fusion,anter apprch  8/2007     four times total, latest 8/07     Rotator cuff repair rt/lt  ~1995     right     Arthroscopy knee rt/lt  ~1995     Right     Phacoemulsification clear cornea with standard intraocular lens implant  6-18-12/7-30-12     right/left eye     Cataract iol, rt/lt        Laser yag capsulotomy       both eyes       Social History   Substance Use Topics     Smoking status: Former Smoker     Packs/day: 1.00     Years: 25.00     Types: Cigarettes     Quit date: 1/2/1976     Smokeless tobacco: Never Used      Comment: lives in smoke free household     Alcohol use 7.0 oz/week     14 Standard drinks or equivalent per week      Comment: 2-3 drinks every night     Family History   Problem Relation Age of Onset     CANCER Father      Lung 64y     DIABETES Brother      Hypertension Brother      CANCER Mother      Liver     CEREBROVASCULAR DISEASE Mother      Eye Disorder Mother      Glaucoma Mother      CEREBROVASCULAR DISEASE Maternal Grandmother      C.A.D. No family hx of      Thyroid Disease No family hx of      Macular Degeneration No family hx of          Current Outpatient Prescriptions   Medication Sig Dispense Refill     bicalutamide (CASODEX) 50 MG tablet Take 1 tablet (50 mg) by mouth daily 90 tablet 3     quinapril (ACCUPRIL) 40 MG Tab Take 1 tablet (40 mg) by mouth daily - NEEDS TO FOLLOW UP 90 tablet 0     metoprolol (TOPROL-XL) 25 MG 24 hr tablet Take 1 tablet (25 mg) by mouth daily 90 tablet 0     Cyanocobalamin (B-12) 1000 MCG TBCR Take 1,000 mcg by mouth daily 30 tablet 0     warfarin (COUMADIN) 5 MG tablet Take 1 tablet (5 mg) by mouth daily 30 tablet 3     simvastatin (ZOCOR) 40 MG tablet Take 0.5 tablets (20 mg) by mouth daily 45 tablet 3     sildenafil (VIAGRA) 100 MG tablet Take 1 tablet (100 mg) by mouth daily as needed for erectile dysfunction 10 tablet 3     Cholecalciferol (VITAMIN D) 2000 UNITS tablet Take 1 tablet by mouth daily       leuprolide (ELIGARD) 45 MG injection Inject 45 mg Subcutaneous every 6 months       oxyCODONE (ROXICODONE) 5 MG immediate release tablet Take 1 tablet by mouth every 6 hours as needed for pain. 30 tablet 0     Allergies   Allergen Reactions     Morphine Sulfate GI Disturbance     vomiting     Vicodin [Hydrocodone-Acetaminophen]  Nausea and Vomiting     Recent Labs   Lab Test  10/04/16   0950  07/19/16   0817  06/13/16   1607  04/21/16   0851  04/07/15   0914   05/29/12   1328   04/05/11   0846  06/16/10   1338   A1C   --   5.5   --    --    --    --   5.4   --    --   5.6   LDL   --   86   --   78  61   < >   --    < >  96   --    HDL   --   71   --   93  55   < >   --    < >  49   --    TRIG   --   98   --   71  92   < >   --    < >  73   --    ALT  25  43  32  40   --    --   25   < >  24   --    CR  0.84  0.90   --   0.96  0.98   < >  0.90   < >  1.05  0.99   GFRESTIMATED  88  81   --   76  74   < >  83   < >  69  74   GFRESTBLACK  >90   GFR Calc    >90   GFR Calc     --   >90   GFR Calc    90   < >  >90   < >  84  90   POTASSIUM  4.3  4.4   --   4.5  4.3   < >  4.1   < >  4.2  4.2   TSH   --    --   1.60   --    --    --    --    --   3.73   --     < > = values in this interval not displayed.      BP Readings from Last 3 Encounters:   02/27/17 128/66   02/14/17 110/60   01/12/17 117/68    Wt Readings from Last 3 Encounters:   02/27/17 192 lb (87.1 kg)   01/12/17 188 lb (85.3 kg)   11/08/16 199 lb (90.3 kg)                  Labs reviewed in EPIC  Problem list, Medication list, Allergies, and Medical/Social/Surgical histories reviewed in [facility] and updated as appropriate.    ROS:  Constitutional, HEENT, cardiovascular, pulmonary, gi and gu systems are negative, except as otherwise noted.    OBJECTIVE:                                                    /66 (BP Location: Left arm, Patient Position: Chair, Cuff Size: Adult Regular)  Pulse 80  Temp 97.5  F (36.4  C) (Oral)  Ht 6' (1.829 m)  Wt 192 lb (87.1 kg)  SpO2 95%  BMI 26.04 kg/m2  Body mass index is 26.04 kg/(m^2).  GENERAL APPEARANCE: healthy, alert and no distress  EYES: Eyes grossly normal to inspection, PERRL and conjunctivae and sclerae normal  RESP: lungs clear to auscultation - no rales, rhonchi or wheezes  CV: regular rates  and rhythm, normal S1 S2, no S3 or S4 and no murmur, click or rub  NEURO: Normal strength and tone, mentation intact and speech normal  PSYCH: mentation appears normal and affect normal/bright    Diagnostic test results:  Diagnostic Test Results:  No orders of the defined types were placed in this encounter.         ASSESSMENT/PLAN:                                                      (I10) HTN (Hypertension), Benign goal <140/90  (primary encounter diagnosis)  Comment: a get acquainted visit with a new patient , problems reviewed and plan of follow up care arranged.  Plan: things are in a stable phase of chronic illness and biannual office visits are recommended. Patient follow up in 6 months     (G47.33) HUDSON (obstructive sleep apnea)  Comment: a get acquainted visit with a new patient , problems reviewed and plan of follow up care arranged.  Plan: things are in a stable phase of chronic illness and biannual office visits are recommended. Patient follow up in 6 months     (C61) Prostate cancer (H)  Comment: a get acquainted visit with a new patient , problems reviewed and plan of follow up care arranged.  Plan: things are in a stable phase of chronic illness and biannual office visits are recommended. Patient follow up in 6 months     (Z79.01) Long-term (current) use of anticoagulants [Z79.01]  Comment: a get acquainted visit with a new patient , problems reviewed and plan of follow up care arranged.  Plan: things are in a stable phase of chronic illness and biannual office visits are recommended. Patient follow up in 6 months     (I26.99) PE (pulmonary thromboembolism) (H)  Comment: a get acquainted visit with a new patient , problems reviewed and plan of follow up care arranged.  Plan: things are in a stable phase of chronic illness and biannual office visits are recommended. Patient follow up in 6 months     Follow up with Provider - as above      Reginaldo Muir MD  [facility]

## 2017-02-27 NOTE — MR AVS SNAPSHOT
After Visit Summary   2/27/2017    Dimitri Neves    MRN: [id number]           Patient Information     Date Of Birth          1935        Visit Information        Provider Department      2/27/2017 10:30 AM Reginaldo Muir MD [facility] Instructions    [facility]    If you have any questions regarding to your visit please contact your care team:     Team Pink:   Clinic Hours Telephone Number   Internal Medicine:  Dr. Deborah Taylor NP       7am-7pm  Monday - Thursday   7am-5pm  Fridays  [phone]  (Appointment scheduling available 24/7)    Questions about your visit?  Team Line  [phone]   Urgent Care - [place] and [place] - 11am-9pm Monday-Friday Saturday-Sunday- 9am-5pm   [place] - 5pm-9pm Monday-Friday Saturday-Sunday- 9am-5pm  [phone] - [place]   [phone] - [place]       What options do I have for visits at the clinic other than the traditional office visit?  To expand how we care for you, many of our providers are utilizing electronic visits (e-visits) and telephone visits, when medically appropriate, for interactions with their patients rather than a visit in the clinic.   We also offer nurse visits for many medical concerns. Just like any other service, we will bill your insurance company for this type of visit based on time spent on the phone with your provider. Not all insurance companies cover these visits. Please check with your medical insurance if this type of visit is covered. You will be responsible for any charges that are not paid by your insurance.      E-visits via [employer]:  generally incur a $35.00 fee.  Telephone visits:  Time spent on the phone: *charged based on time that is spent on the phone in increments of 10 minutes. Estimated cost:   5-10 mins $30.00   11-20 mins. $59.00   21-30 mins. $85.00   Use [employer] (secure email communication and access to your  chart) to send your primary care provider a message or make an appointment. Ask someone on your Team how to sign up for [employer].    For a Price Quote for your services, please call our Consumer Price Line at [phone].    As always, Thank you for trusting us with your health care needs!    Discharged by Anaid PERSAUD CMA ([facility])          Follow-ups after your visit        Your next 10 appointments already scheduled     Mar 21, 2017  9:30 AM CDT   Anticoagulation Visit with BE ANTI COAG   [facility] [place] ([facility])    [address]  [place] [id number] [phone]            May 19, 2017  9:30 AM CDT   Return Visit with Senthil Taylor MD   [facility]    [address] [place] [id number] [phone]            Jun 14, 2017  9:15 AM CDT   Return Visit with Senthil Jhaveri MD   [facility]    [address] [address] [phone]              Who to contact     If you have questions or need follow up information about today's clinic visit or your schedule please contact [facility] directly at [phone].  Normal or non-critical lab and imaging results will be communicated to you by [employer], letter or phone within 4 business days after the clinic has received the results. If you do not hear from us within 7 days, please contact the clinic through MyChart or phone. If you have a critical or abnormal lab result, we will notify you by phone as soon as possible.  Submit refill requests through [employer] or call your pharmacy and they will forward the refill request to us. Please allow 3 business days for your refill to be completed.          Additional Information About Your Visit        [employer] Information     [employer] gives you secure access to your electronic health record. If you see a primary care provider, you can also send messages to your  care team and make appointments. If you have questions, please call your primary care clinic.  If you do not have a primary care provider, please call [phone] and they will assist you.        Care EveryWhere ID     This is your Care EveryWhere ID. This could be used by other organizations to access your [place] medical records  [id number]        Your Vitals Were     Pulse Temperature Height Pulse Oximetry BMI (Body Mass Index)       80 97.5  F (36.4  C) (Oral) 6' (1.829 m) 95% 26.04 kg/m2        Blood Pressure from Last 3 Encounters:   02/27/17 128/66   02/14/17 110/60   01/12/17 117/68    Weight from Last 3 Encounters:   02/27/17 192 lb (87.1 kg)   01/12/17 188 lb (85.3 kg)   11/08/16 199 lb (90.3 kg)              Today, you had the following     No orders found for display       Primary Care Provider    None Specified       No primary provider on file.        Thank you!     Thank you for choosing [facility] [place]  for your care. Our goal is always to provide you with excellent care. Hearing back from our patients is one way we can continue to improve our services. Please take a few minutes to complete the written survey that you may receive in the mail after your visit with us. Thank you!             Your Updated Medication List - Protect others around you: Learn how to safely use, store and throw away your medicines at www.disposemymeds.org.          This list is accurate as of: 2/27/17 10:59 AM.  Always use your most recent med list.                   Brand Name Dispense Instructions for use    B-12 1000 MCG Tbcr     30 tablet    Take 1,000 mcg by mouth daily       bicalutamide 50 MG tablet    CASODEX    90 tablet    Take 1 tablet (50 mg) by mouth daily       leuprolide 45 MG kit    ELIGARD     Inject 45 mg Subcutaneous every 6 months       metoprolol 25 MG 24 hr tablet    TOPROL-XL    90 tablet    Take 1 tablet (25 mg) by mouth daily       oxyCODONE 5 MG IR tablet    ROXICODONE    30 tablet     Take 1 tablet by mouth every 6 hours as needed for pain.       quinapril 40 MG Tab    ACCUPRIL    90 tablet    Take 1 tablet (40 mg) by mouth daily - NEEDS TO FOLLOW UP       sildenafil 100 MG cap/tab    REVATIO/VIAGRA    10 tablet    Take 1 tablet (100 mg) by mouth daily as needed for erectile dysfunction       simvastatin 40 MG tablet    ZOCOR    45 tablet    Take 0.5 tablets (20 mg) by mouth daily       vitamin D 2000 UNITS tablet      Take 1 tablet by mouth daily       warfarin 5 MG tablet    COUMADIN    30 tablet    Take 1 tablet (5 mg) by mouth daily

## 2017-03-06 DIAGNOSIS — R79.89 LOW VITAMIN B12 LEVEL: ICD-10-CM

## 2017-03-06 NOTE — TELEPHONE ENCOUNTER
Routing refill request to provider for review/approval because:  Patient of Dr Tejada who no longer works for [employer].  Patient has established care with Dr Muir.  Will send to his team to address.

## 2017-03-06 NOTE — TELEPHONE ENCOUNTER
VITAMIN B-12 [facility]      Last Written Prescription Date: 2-6-17  Last Fill Quantity: 30,  # refills: 0   Last Office Visit with G, P or [facility] prescribing provider: 2-27-16                                         Next 5 appointments (look out 90 days)     May 19, 2017  9:30 AM CDT   Return Visit with Senthil Taylor MD   [facility]    [address] [place] [address]  [place] [id number] [phone]

## 2017-03-06 NOTE — TELEPHONE ENCOUNTER
Routing refill request to provider for review/approval because:  Shante given x1 and patient did not follow up, please advise  Patient needs to be seen because it has been more than 1 year since last office visit.    John RICHARD RN, BSN

## 2017-03-21 ENCOUNTER — ANTICOAGULATION THERAPY VISIT (OUTPATIENT)
Dept: NURSING | Facility: CLINIC | Age: 82
End: 2017-03-21
Payer: COMMERCIAL

## 2017-03-21 DIAGNOSIS — Z79.01 LONG-TERM (CURRENT) USE OF ANTICOAGULANTS: ICD-10-CM

## 2017-03-21 DIAGNOSIS — I26.99 PE (PULMONARY THROMBOEMBOLISM) (H): ICD-10-CM

## 2017-03-21 LAB — INR POINT OF CARE: 2.6 (ref 0.86–1.14)

## 2017-03-21 PROCEDURE — 85610 PROTHROMBIN TIME: CPT | Mod: QW

## 2017-03-21 PROCEDURE — 36416 COLLJ CAPILLARY BLOOD SPEC: CPT

## 2017-03-21 PROCEDURE — 99207 ZZC NO CHARGE NURSE ONLY: CPT

## 2017-03-21 NOTE — MR AVS SNAPSHOT
Dimitri Neves   3/21/2017 9:30 AM   Anticoagulation Therapy Visit    Description:  81 year old male   Provider:  BE ANTI COAG   Department:  Be Nurse           INR as of 3/21/2017     Today's INR 2.6      Anticoagulation Summary as of 3/21/2017     INR goal 2.0-3.0   Today's INR 2.6   Full instructions 5 mg on Sun, Tue, Thu; 2.5 mg all other days   Next INR check 4/18/2017    Indications   Long-term (current) use of anticoagulants [Z79.01] [Z79.01]  PE (pulmonary thromboembolism) (H) [I26.99]         Your next Anticoagulation Clinic appointment(s)     Mar 21, 2017  9:30 AM CDT   Anticoagulation Visit with BE ANTI COAG   [facility] [place] ([facility])    [address]  [place] [address]   [phone]            Apr 18, 2017  9:30 AM CDT   Anticoagulation Visit with BE ANTI COAG   [facility] [place] ([facility])    [address]  [place] [address]   [phone]              Contact Numbers     [facility]  Please call [phone] with any problems or questions regarding your therapy, or to cancel or reschedule your appointment.          March 2017 Details    Sun Mon Tue Wed Thu Fri Sat        1               2               3               4                 5               6               7               8               9               10               11                 12               13               14               15               16               17               18                 19               20               21      5 mg   See details      22      2.5 mg         23      5 mg         24      2.5 mg         25      2.5 mg           26      5 mg         27      2.5 mg         28      5 mg         29      2.5 mg         30      5 mg         31      2.5 mg           Date Details   03/21 This INR check               How to take your warfarin dose     To take:  2.5 mg Take 0.5 of a 5 mg tablet.    To take:  5 mg Take 1 of the 5 mg  tablets.           April 2017 Details    Sun Mon Tue Wed Thu Fri Sat           1      2.5 mg           2      5 mg         3      2.5 mg         4      5 mg         5      2.5 mg         6      5 mg         7      2.5 mg         8      2.5 mg           9      5 mg         10      2.5 mg         11      5 mg         12      2.5 mg         13      5 mg         14      2.5 mg         15      2.5 mg           16      5 mg         17      2.5 mg         18            19               20               21               22                 23               24               25               26               27               28               29                 30                      Date Details   No additional details    Date of next INR:  4/18/2017         How to take your warfarin dose     To take:  2.5 mg Take 0.5 of a 5 mg tablet.    To take:  5 mg Take 1 of the 5 mg tablets.

## 2017-03-21 NOTE — PROGRESS NOTES
ANTICOAGULATION FOLLOW-UP CLINIC VISIT    Patient Name:  Dimitri Neves  Date:  3/21/2017  Contact Type:  Face to Face    SUBJECTIVE:     Patient Findings     Positives No Problem Findings           OBJECTIVE    INR Protime   Date Value Ref Range Status   03/21/2017 2.6 (A) 0.86 - 1.14 Final       ASSESSMENT / PLAN  INR assessment THER    Recheck INR In: 4 WEEKS    INR Location Clinic      Anticoagulation Summary as of 3/21/2017     INR goal 2.0-3.0   Today's INR 2.6   Maintenance plan 5 mg (5 mg x 1) on Sun, Tue, Thu; 2.5 mg (5 mg x 0.5) all other days   Full instructions 5 mg on Sun, Tue, Thu; 2.5 mg all other days   Weekly total 25 mg   No change documented Madiha Souza   Plan last modified Madiha Souza (11/22/2016)   Next INR check 4/18/2017   Target end date Indefinite    Indications   Long-term (current) use of anticoagulants [Z79.01] [Z79.01]  PE (pulmonary thromboembolism) (H) [I26.99]         Anticoagulation Episode Summary     INR check location Clinic Lab    Preferred lab     Send INR reminders to [facility] CLINIC    Comments       Anticoagulation Care Providers     Provider Role Specialty Phone number    Dennis Tejada MD [facility] [phone]            See the Encounter Report to view Anticoagulation Flowsheet and Dosing Calendar (Go to Encounters tab in chart review, and find the Anticoagulation Therapy Visit)        Madiha Souza

## 2017-03-27 ENCOUNTER — TRANSFERRED RECORDS (OUTPATIENT)
Dept: HEALTH INFORMATION MANAGEMENT | Facility: CLINIC | Age: 82
End: 2017-03-27

## 2017-04-10 ENCOUNTER — TRANSFERRED RECORDS (OUTPATIENT)
Dept: HEALTH INFORMATION MANAGEMENT | Facility: CLINIC | Age: 82
End: 2017-04-10

## 2017-04-10 ENCOUNTER — TELEPHONE (OUTPATIENT)
Dept: FAMILY MEDICINE | Facility: CLINIC | Age: 82
End: 2017-04-10

## 2017-04-10 NOTE — TELEPHONE ENCOUNTER
Patient notified of Provider's message as written.  Patient verbalized understanding.  He will go to [facility]  Pablo Cheatham RN

## 2017-04-10 NOTE — TELEPHONE ENCOUNTER
I think patient needs to be seen today and I think that the ED or UC would be most appropriate, as I think he will need urgent imaging.    April Taylor, CNP

## 2017-04-10 NOTE — TELEPHONE ENCOUNTER
RN spoke with patient and states he would prefer to be seen today instead of tomorrow because his flickering in the eyes is getting worst and he continues to bradshaw and puff.  Patient states his eyes flickering like he is watching a real movie.   Patient states he had blood clots issues in his lungs and he is wondering if his huffing and puffing has to do with that.  Patient would like to know if April Taylor CNP will be able to see him today instead of tomorrow afternoon.  RN advised patient to be seen over UC/ER with worsening symptoms, but patient prefers to be seen in the clinic today instead of tomorrow.  Please review and advise.     John RICHARD RN, BSN

## 2017-04-10 NOTE — TELEPHONE ENCOUNTER
Reason for Call:  Other appointment    Detailed comments: Patient is scheduled for 04/11/17 but would like to schedule an appointment to be seen today due to flickering in the eyes, please contact the patient to discuss his next steps    Phone Number Patient can be reached at: Home number on file [phone] (home)    Best Time: today    Can we leave a detailed message on this number? YES    Call taken on 4/10/2017 at 9:00 AM by Chente Casillas

## 2017-04-11 ENCOUNTER — TELEPHONE (OUTPATIENT)
Dept: FAMILY MEDICINE | Facility: CLINIC | Age: 82
End: 2017-04-11

## 2017-04-11 NOTE — TELEPHONE ENCOUNTER
Patient called.  Dr. Muir is out of the office until Thursday.  Appointment scheduled for Thursday, April 13th at 3:10 p.m. Yuli Michael,

## 2017-04-11 NOTE — TELEPHONE ENCOUNTER
Patient called requesting a call back to schedule a hospital follow up(was at [facility]) with , no one else, due to shortness of breath. Patient has not had any loss of consciousness, no falls/head injuries, no chest pain or dizziness. He was scanned for blood clots and his lungs were clear. He was instructed to follow up with  as soon as possible.    Please call the patient at home. Thank you.

## 2017-04-12 ENCOUNTER — OFFICE VISIT (OUTPATIENT)
Dept: OPHTHALMOLOGY | Facility: CLINIC | Age: 82
End: 2017-04-12
Payer: COMMERCIAL

## 2017-04-12 DIAGNOSIS — H04.123 DRY EYE SYNDROME, BILATERAL: ICD-10-CM

## 2017-04-12 DIAGNOSIS — H53.123 TRANSIENT VISUAL LOSS, BILATERAL: Primary | ICD-10-CM

## 2017-04-12 DIAGNOSIS — H43.813 POSTERIOR VITREOUS DETACHMENT, BILATERAL: ICD-10-CM

## 2017-04-12 DIAGNOSIS — H35.30 MACULAR DEGENERATION: ICD-10-CM

## 2017-04-12 DIAGNOSIS — D31.40 IRIS NEVUS, UNSPECIFIED LATERALITY: ICD-10-CM

## 2017-04-12 DIAGNOSIS — Z96.1 PSEUDOPHAKIA: ICD-10-CM

## 2017-04-12 PROCEDURE — 92014 COMPRE OPH EXAM EST PT 1/>: CPT | Performed by: OPHTHALMOLOGY

## 2017-04-12 ASSESSMENT — TONOMETRY
IOP_METHOD: APPLANATION
OS_IOP_MMHG: 16
OD_IOP_MMHG: 16

## 2017-04-12 ASSESSMENT — VISUAL ACUITY
OS_CC: 20/25
OD_CC: 20/25+2
OD_CC: 20/25-2
METHOD: SNELLEN - LINEAR
OS_CC: 20/40-1

## 2017-04-12 ASSESSMENT — SLIT LAMP EXAM - LIDS
COMMENTS: 2+ SCLERAL SHOW
COMMENTS: 2+ SCLERAL SHOW

## 2017-04-12 ASSESSMENT — CUP TO DISC RATIO
OD_RATIO: 0.0
OS_RATIO: 0.0

## 2017-04-12 NOTE — PROGRESS NOTES
Current Eye Medications:  no     Subjective:  Flickering light both eyes    Pt reports has had 2- 3 episodes a day for the past 6 day of  this flickering light that will obscure his center vision for a few minutes.    Episodes last 30-60 seconds.    Objective:  See Ophthalmology Exam.       Assessment:  Transient visual disturbances of indeterminate etiology.  Possibly spasm of periocular or extraocular muscles versus central etiology.      ICD-10-CM    1. Transient visual loss, bilateral H53.123    2. Pseudophakia, Yag Caps, ou Z96.1    3. Posterior vitreous detachment, bilateral H43.813    4. Macular degeneration, dry, mild, ou H35.30    5. Dry eye syndrome, bilateral H04.123    6. 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 Muir  If episodes persist might consider brain MRI.  Keep scheduled appointment in June 2017    Josiah Alicea M.D.

## 2017-04-12 NOTE — MR AVS SNAPSHOT
After Visit Summary   4/12/2017    Dimitri Neves    MRN: [id number]           Patient Information     Date Of Birth          1935        Visit Information        Provider Department      4/12/2017 10:45 AM Josiah Alicea MD [facility]        Today's Diagnoses     Pseudophakia, Yag Caps, ou    -  1    Posterior vitreous detachment, bilateral        Macular degeneration, dry, mild, ou        Dry eye syndrome, bilateral        Iris nevus, ou          Care Instructions    Follow up with Dr. Muir  Keep scheduled appointment in June 2017    Josiah Alicea M.D.          Follow-ups after your visit        Your next 10 appointments already scheduled     Apr 13, 2017  3:10 PM CDT   Office Visit with Reginaldo Muir MD   [facility] ([facility])    [address] [address]   [phone]           Bring a current list of meds and any records pertaining to this visit.  For Physicals, please bring immunization records and any forms needing to be filled out.  Please arrive 10 minutes early to complete paperwork.            Apr 18, 2017  9:30 AM CDT   Anticoagulation Visit with BE ANTI COAG   [facility]    [address] [address]   [phone]            May 19, 2017  9:30 AM CDT   Return Visit with Senthil Taylor MD   [facility] ([facility])    [address] [address]   [phone]            Jun 09, 2017  9:00 AM CDT   New Visit with Josiah Alicea MD   [facility] ([facility])    [address] [address]   [phone]            Jun 14, 2017  9:15 AM CDT   Return Visit with Senthil Jhaveri MD   [facility]    [address] [place] [address]   [phone]              Who to contact     If you have questions or need  follow up information about today's clinic visit or your schedule please contact [facility] directly at [phone].  Normal or non-critical lab and imaging results will be communicated to you by [employer], letter or phone within 4 business days after the clinic has received the results. If you do not hear from us within 7 days, please contact the clinic through [employer] or phone. If you have a critical or abnormal lab result, we will notify you by phone as soon as possible.  Submit refill requests through [employer] or call your pharmacy and they will forward the refill request to us. Please allow 3 business days for your refill to be completed.          Additional Information About Your Visit        [employer] Information     [employer] gives you secure access to your electronic health record. If you see a primary care provider, you can also send messages to your care team and make appointments. If you have questions, please call your primary care clinic.  If you do not have a primary care provider, please call [phone] and they will assist you.        Care EveryWhere ID     This is your Care EveryWhere ID. This could be used by other organizations to access your [facility] medical records  [id number]         Blood Pressure from Last 3 Encounters:   02/27/17 128/66   02/14/17 110/60   01/12/17 117/68    Weight from Last 3 Encounters:   02/27/17 87.1 kg (192 lb)   01/12/17 85.3 kg (188 lb)   11/08/16 90.3 kg (199 lb)              Today, you had the following     No orders found for display       Primary Care Provider    None Specified       No primary provider on file.        Thank you!     Thank you for choosing [facility]  for your care. Our goal is always to provide you with excellent care. Hearing back from our patients is one way we can continue to improve our services. Please take a few minutes to complete the written survey that you may receive in the mail after your visit with us. Thank  you!             Your Updated Medication List - Protect others around you: Learn how to safely use, store and throw away your medicines at www.disposemymeds.org.          This list is accurate as of: 4/12/17 11:55 AM.  Always use your most recent med list.                   Brand Name Dispense Instructions for use    B-12 1000 MCG Tbcr     30 tablet    Take 1,000 mcg by mouth daily       bicalutamide 50 MG tablet    CASODEX    90 tablet    Take 1 tablet (50 mg) by mouth daily       leuprolide 45 MG kit    ELIGARD     Inject 45 mg Subcutaneous every 6 months       metoprolol 25 MG 24 hr tablet    TOPROL-XL    90 tablet    Take 1 tablet (25 mg) by mouth daily       oxyCODONE 5 MG IR tablet    ROXICODONE    30 tablet    Take 1 tablet by mouth every 6 hours as needed for pain.       quinapril 40 MG Tab    ACCUPRIL    90 tablet    Take 1 tablet (40 mg) by mouth daily - NEEDS TO FOLLOW UP       sildenafil 100 MG cap/tab    REVATIO/VIAGRA    10 tablet    Take 1 tablet (100 mg) by mouth daily as needed for erectile dysfunction       simvastatin 40 MG tablet    ZOCOR    45 tablet    Take 0.5 tablets (20 mg) by mouth daily       vitamin D 2000 UNITS tablet      Take 1 tablet by mouth daily       warfarin 5 MG tablet    COUMADIN    30 tablet    Take 1 tablet (5 mg) by mouth daily

## 2017-04-13 ENCOUNTER — OFFICE VISIT (OUTPATIENT)
Dept: FAMILY MEDICINE | Facility: CLINIC | Age: 82
End: 2017-04-13
Payer: COMMERCIAL

## 2017-04-13 VITALS
BODY MASS INDEX: 25.06 KG/M2 | OXYGEN SATURATION: 98 % | WEIGHT: 185 LBS | RESPIRATION RATE: 14 BRPM | HEART RATE: 58 BPM | DIASTOLIC BLOOD PRESSURE: 70 MMHG | TEMPERATURE: 98.3 F | HEIGHT: 72 IN | SYSTOLIC BLOOD PRESSURE: 144 MMHG

## 2017-04-13 DIAGNOSIS — R53.81 PHYSICAL DECONDITIONING: ICD-10-CM

## 2017-04-13 DIAGNOSIS — G47.33 OSA (OBSTRUCTIVE SLEEP APNEA): ICD-10-CM

## 2017-04-13 DIAGNOSIS — C61 MALIGNANT NEOPLASM OF PROSTATE (H): ICD-10-CM

## 2017-04-13 DIAGNOSIS — Z96.651 STATUS POST TOTAL KNEE REPLACEMENT, RIGHT: ICD-10-CM

## 2017-04-13 DIAGNOSIS — H53.9 VISUAL DISTURBANCE: ICD-10-CM

## 2017-04-13 DIAGNOSIS — I26.99 PE (PULMONARY THROMBOEMBOLISM) (H): ICD-10-CM

## 2017-04-13 DIAGNOSIS — R06.09 DYSPNEA ON EXERTION: Primary | ICD-10-CM

## 2017-04-13 LAB
FEF 25/75: NORMAL
FEV-1: NORMAL
FEV1/FVC: NORMAL
FVC: NORMAL

## 2017-04-13 PROCEDURE — 94010 BREATHING CAPACITY TEST: CPT | Performed by: INTERNAL MEDICINE

## 2017-04-13 PROCEDURE — 99214 OFFICE O/P EST MOD 30 MIN: CPT | Mod: 25 | Performed by: INTERNAL MEDICINE

## 2017-04-13 RX ORDER — GABAPENTIN 300 MG/1
900 CAPSULE ORAL AT BEDTIME
COMMUNITY

## 2017-04-13 ASSESSMENT — PAIN SCALES - GENERAL: PAINLEVEL: NO PAIN (0)

## 2017-04-13 NOTE — PATIENT INSTRUCTIONS
We will follow up with you if we feel you need to have more testing    I will also follow up with you regarding pulmonary function test (spirometry)    Work on reconditioning post-knee replacement, consider physical therapy    Consider following up with allergy specialist or pulmonology      [facility]    If you have any questions regarding to your visit please contact your care team:     Team Pink:   Clinic Hours Telephone Number   Internal Medicine:  Dr. Deborah Taylor, NP       7am-7pm  Monday - Thursday   7am-5pm  Fridays  [phone]  (Appointment scheduling available 24/7)    Questions about your visit?  Team Line  [phone]   [facility]gent [facility] - [place] - 11am-9pm Monday-Friday Saturday-Sunday- 9am-5pm   [place] - 5pm-9pm Monday-Friday Saturday-Sunday- 9am-5pm  [phone] - [place]   [phone] - [place]       What options do I have for visits at the clinic other than the traditional office visit?  To expand how we care for you, many of our providers are utilizing electronic visits (e-visits) and telephone visits, when medically appropriate, for interactions with their patients rather than a visit in the clinic.   We also offer nurse visits for many medical concerns. Just like any other service, we will bill your insurance company for this type of visit based on time spent on the phone with your provider. Not all insurance companies cover these visits. Please check with your medical insurance if this type of visit is covered. You will be responsible for any charges that are not paid by your insurance.      E-visits via [employer]:  generally incur a $35.00 fee.  Telephone visits:  Time spent on the phone: *charged based on time that is spent on the phone in increments of 10 minutes. Estimated cost:   5-10 mins $30.00   11-20 mins. $59.00   21-30 mins. $85.00   Use [employer] (secure email communication and access to your  chart) to send your primary care provider a message or make an appointment. Ask someone on your Team how to sign up for in2appshart.    For a Price Quote for your services, please call our Consumer Price Line at [phone].    As always, Thank you for trusting us with your health care needs!    Modesta Laguna CMA

## 2017-04-13 NOTE — PROGRESS NOTES
"  SUBJECTIVE:                                                    Dimitri Neves is a 81 year old male who presents to clinic today for the following health issues:    ED/UC Followup:    Facility:  [facility]   Date of visit: 4/10/17  Reason for visit: SOB  Current Status: still concerned     Dyspnea -- Dimitri reports persistent \"huffing and puffing\" with an onset of a year ago, which has been increasing steadily. No hx of intrinsic lung disease. Dimitri stated that the last time this happened, he had blood clots. 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 He remains on coumadin     He stated that he used to frequent the gym, up to 3 times a week. He stated that after right total knee replacement 10/13/16 , this has caused him to not be able to exercise, and maybe goes once a week.     Urology (Taylor) -- Injection every 6 months -- casodex and Eligard, told to recheck PSA/LFTs in 3 months.  He has prostate cancer      A-fib/Cardiology -- PAF only, BNP was normal at recent emergency room evaluation and felt to have no evidence of congestive heart failure / cardiomyopathy     Vision -- Dimitri reported brief changes in vision while driving recently, that he described as \"like fluttering 8 mm film\" -- not sure if one eye or both. Exam was done, and no ophthalmalgic reasoning for symptoms per Dr. Josiah Alicea with [facility]      Anxiety -- Dimitri denied any possibility of anxiety that could be causing his symptoms.     CBC -- In ER, Hgb was 11.2 g/dL and so his anemia is technically unexplained although I would be most suspicious of anemia of chronic disease   Hemoglobin   Date Value Ref Range Status   12/23/2016 11.3 (L) 13.3 - 17.7 g/dL Final   10/04/2016 12.4 (L) 13.3 - 17.7 g/dL Final       ED Note 4/10/17   HPI   Dimitri Neves is a 81 year old male with a history of PE currently on coumadin, prostate cancer, and atrial fibrillation who presents to the " "emergency department today for evaluation of shortness of breath and vision changes. The patient reports about a one year history of shortness of breath that has recently worsened. He additionally reports a three day history of intermittent episodes of visual \"light flickering\" with a clicking sound in his right ear. His visual flickering occurred while driving and playing solitaire on his phone. Of note, the patient had knee surgery done in October 2016, and was administering 6 tablets Oxycodone per day. He has been weaning himself off, and currently is administering 1 tablet Oxycodone. The patient denies headache, congestion, eye pain, chest pain, leg swelling, or dizziness. No further complaints or concerns are voiced at this time.     Impression and Plan:  Dimitri Neves is a 81 year old male presenting to the emergency department concerned about what he describes as \"huffing and puffing\" for over a year that seems to be noticeable as well as over the last three days some episodes of flashing lights and clicking when he has bright lights exposed. No other vision changes, no headaches, or neurological changes. Today he is primarily concerned that he might have another blood clot given his symptoms. He is on coumadin, and reportedly was therapeutic two weeks ago. Today, his INR was 1.9. So a repeat chest CT was done that was negative for any PE. He will follow up with a coumadin clinic to get his coumadin adjusted to become therapeutic again. Also I stressed the need for follow up with opthalmology for his vision changes today. No visual field cuts. Visual acuity is normal. tonometry is normal and given his symptoms, I do not have concern for central process and do not feel advanced imaging is needed. He will also speak to his primary care about his long term symtpoms of huffing and puffing and may need to see pulmonary, as he may have underlying lung disease. Discharged home.       Problem list and histories " reviewed & adjusted, as indicated.  Additional history: as documented    Patient Active Problem List   Diagnosis     HTN (Hypertension), Benign goal <140/90     Pure hypercholesterolemia     Narcotic dependence, episodic use (H)     ED (erectile dysfunction)     HUDSON (obstructive sleep apnea)     PE (pulmonary thromboembolism) (H)     HYPERLIPIDEMIA LDL GOAL <130     Advanced directives, counseling/discussion     Abnormal glucose     AK (actinic keratosis)     SNHL (sensorineural hearing loss)     Endolymphatic hydrops     History of squamous cell carcinoma     Pseudophakia, Yag Caps, ou     Venous (peripheral) insufficiency     Malignant neoplasm of prostate (H)     Prostate cancer (H)     Dyspnea on exertion     Long-term (current) use of anticoagulants [Z79.01]     Posterior vitreous detachment, bilateral     Iris nevus, ou     Dry eye syndrome, bilateral     Macular degeneration, dry, mild, ou     Past Surgical History:   Procedure Laterality Date     ARTHROSCOPY KNEE RT/LT  ~1995    Right     C LUMBAR SPINE FUSION,ANTER APPRCH  8/2007    four times total, latest 8/07     CATARACT IOL, RT/LT       LASER YAG CAPSULOTOMY      both eyes     PHACOEMULSIFICATION CLEAR CORNEA WITH STANDARD INTRAOCULAR LENS IMPLANT  6-18-12/7-30-12    right/left eye     ROTATOR CUFF REPAIR RT/LT  ~1995    right       Social History   Substance Use Topics     Smoking status: Former Smoker     Packs/day: 1.00     Years: 25.00     Types: Cigarettes     Quit date: 1/2/1976     Smokeless tobacco: Never Used      Comment: lives in smoke free household     Alcohol use 7.0 oz/week     14 Standard drinks or equivalent per week      Comment: 2-3 drinks every night     Family History   Problem Relation Age of Onset     CANCER Father      Lung 64y     DIABETES Brother      Hypertension Brother      CANCER Mother      Liver     CEREBROVASCULAR DISEASE Mother      Eye Disorder Mother      Glaucoma Mother      CEREBROVASCULAR DISEASE Maternal  Grandmother      ELVIE No family hx of      Thyroid Disease No family hx of      Macular Degeneration No family hx of          Current Outpatient Prescriptions   Medication Sig Dispense Refill     gabapentin (NEURONTIN) 300 MG capsule Take 900 mg by mouth At Bedtime       Cyanocobalamin (B-12) 1000 MCG TBCR Take 1,000 mcg by mouth daily 30 tablet 5     bicalutamide (CASODEX) 50 MG tablet Take 1 tablet (50 mg) by mouth daily 90 tablet 3     quinapril (ACCUPRIL) 40 MG Tab Take 1 tablet (40 mg) by mouth daily - NEEDS TO FOLLOW UP 90 tablet 0     metoprolol (TOPROL-XL) 25 MG 24 hr tablet Take 1 tablet (25 mg) by mouth daily 90 tablet 0     warfarin (COUMADIN) 5 MG tablet Take 1 tablet (5 mg) by mouth daily 30 tablet 3     simvastatin (ZOCOR) 40 MG tablet Take 0.5 tablets (20 mg) by mouth daily 45 tablet 3     sildenafil (VIAGRA) 100 MG tablet Take 1 tablet (100 mg) by mouth daily as needed for erectile dysfunction 10 tablet 3     Cholecalciferol (VITAMIN D) 2000 UNITS tablet Take 1 tablet by mouth daily       leuprolide (ELIGARD) 45 MG injection Inject 45 mg Subcutaneous every 6 months       oxyCODONE (ROXICODONE) 5 MG immediate release tablet Take 1 tablet by mouth every 6 hours as needed for pain. 30 tablet 0       Reviewed and updated as needed this visit by clinical staff  Tobacco  Allergies  Meds  Med Hx  Surg Hx  Fam Hx  Soc Hx      Reviewed and updated as needed this visit by Provider         ROS:  Constitutional, HEENT, cardiovascular, pulmonary, GI, , musculoskeletal, neuro, skin, endocrine and psych systems are negative, except as otherwise noted.    This document serves as a record of the services and decisions personally performed and made by Reginaldo Muir MD. It was created on their behalf by Carlos A Parsons, a trained medical scribe. The creation of this document is based the provider's statements to the medical scribe.  Carlos A Parsons April 13, 2017 3:09 PM      OBJECTIVE:                                                     /70  Pulse 58  Temp 98.3  F (36.8  C) (Oral)  Resp 14  Ht 1.829 m (6')  Wt 83.9 kg (185 lb)  SpO2 98%  BMI 25.09 kg/m2  Body mass index is 25.09 kg/(m^2).  GENERAL: healthy, alert and no distress  EYES: Eyes grossly normal to inspection, PERRL and conjunctivae and sclerae normal  RESP: lungs clear to auscultation - no rales, rhonchi or wheezes  CV: regular rate and rhythm, normal S1 S2, no S3 or S4, no murmur, click or rub, no peripheral edema and peripheral pulses strong  SKIN: no suspicious lesions or rashes to visible skin  NEURO: mentation intact and speech normal  PSYCH: mentation appears normal, affect normal/bright/anxious    Diagnostic Test Results:  Results for orders placed or performed in visit on 03/21/17   INR point of care   Result Value Ref Range    INR Protime 2.6 (A) 0.86 - 1.14     Impression Chest CT 4/10/17 --  Impression:  1. No evidence of pulmonary embolus.  2. No focal airspace consolidation. There are some areas of scarring and focal calcified pleural plaquing with some dependent microatelectasis.       ASSESSMENT/PLAN:                                                      (R06.09) Dyspnea on exertion  (primary encounter diagnosis)  Comment: fundamentally his dyspnea on exertion remains unexplained but from the sound of things I suspect aging and muscle deconditioning   Plan: one could pursue cardiopulmonary stress testing (CPX) or a pulmonologist , patient agrees with a wait and see approach and a spirometry as a logical starting point , further follow up not entirely clear to me as of yet     (Z96.651) Status post total knee replacement, right  Comment: noted as a point of historical importance   Plan: was not currently in problem list until today     (H53.9) Visual disturbance  Comment: serious eye pathological process ruled out    Plan: the possibility of a brain MRI was discussed . Evidence for this is limited but will review with Dr. Alicea    (G47.33)  HUDSON (obstructive sleep apnea)  Comment: this is noted as a point of historical importance   Plan: Spirometry, Breathing Capacity: Normal Order,         Clinic Performed            (I26.99) PE (pulmonary thromboembolism) (H)  Comment: as above - no pulmonary embolism at this point   Plan: Spirometry, Breathing Capacity: Normal Order,         Clinic Performed            (C61) Malignant neoplasm of prostate (H)  Comment: noted as a point of historical importance   Plan: see office visit notes with urology     (R53.81) Physical deconditioning  Comment: consideration towards [facility] referral , patient declines so far  Plan: Spirometry, Breathing Capacity: Normal Order,         Clinic Performed          Anemia - based on review of available data including care everywhere with [facility], [employer] location data , a recheck hemoglobin is suggested in 3 months       Patient Instructions   We will follow up with you if we feel you need to have more testing    I will also follow up with you regarding pulmonary function test (spirometry)    Work on reconditioning post-knee replacement, consider physical therapy    Consider following up with allergy specialist or pulmonology      The information in this document, created by the medical scribe for me, accurately reflects the services I personally performed and the decisions made by me. I have reviewed and approved this document for accuracy.   MD Reginaldo Méndez MD  [facility]

## 2017-04-13 NOTE — MR AVS SNAPSHOT
After Visit Summary   4/13/2017    Dimitri Neves    MRN: [id number]           Patient Information     Date Of Birth          1935        Visit Information        Provider Department      4/13/2017 3:10 PM Reginaldo Muir MD [facility]        Today's Diagnoses     Dyspnea on exertion    -  1    Status post total knee replacement, right        Visual disturbance        HUDSON (obstructive sleep apnea)        PE (pulmonary thromboembolism) (H)        Malignant neoplasm of prostate (H)        Physical deconditioning          Care Instructions    We will follow up with you if we feel you need to have more testing    I will also follow up with you regarding pulmonary function test (spirometry)    Work on reconditioning post-knee replacement, consider physical therapy    Consider following up with allergy specialist or pulmonology      [facility]    If you have any questions regarding to your visit please contact your care team:     Team Pink:   Clinic Hours Telephone Number   Internal Medicine:  Dr. Deborah Taylor NP       7am-7pm  Monday - Thursday   7am-5pm  Fridays  [phone]  (Appointment scheduling available 24/7)    Questions about your visit?  Team Line  [phone]   Urgent Care - [place] - 11am-9pm Monday-Friday Saturday-Sunday- 9am-5pm   [place] - 5pm-9pm Monday-Friday Saturday-Sunday- 9am-5pm  [phone] - [place]   [phone] - [place]       What options do I have for visits at the clinic other than the traditional office visit?  To expand how we care for you, many of our providers are utilizing electronic visits (e-visits) and telephone visits, when medically appropriate, for interactions with their patients rather than a visit in the clinic.   We also offer nurse visits for many medical concerns. Just like any other service, we will bill your insurance company for this type of visit  based on time spent on the phone with your provider. Not all insurance companies cover these visits. Please check with your medical insurance if this type of visit is covered. You will be responsible for any charges that are not paid by your insurance.      E-visits via [employer]:  generally incur a $35.00 fee.  Telephone visits:  Time spent on the phone: *charged based on time that is spent on the phone in increments of 10 minutes. Estimated cost:   5-10 mins $30.00   11-20 mins. $59.00   21-30 mins. $85.00   Use [employer] (secure email communication and access to your chart) to send your primary care provider a message or make an appointment. Ask someone on your Team how to sign up for [employer]enc[employer].    For a Price Quote for your services, please call our [employer] Price Line at [phone].    As always, Thank you for trusting us with your health care needs!    Modesta Laguna CMA          Follow-ups after your visit        Your next 10 appointments already scheduled     Apr 18, 2017  9:30 AM CDT   Anticoagulation Visit with SANDRA ANTI COAG   [facility]    [address] [place] [address]   [phone]            May 19, 2017  9:30 AM CDT   Return Visit with Senthil Taylor MD   [facility]    [address] [address]   [phone]            Jun 09, 2017  9:00 AM CDT   New Visit with [facility]a MD   [facility]    [address] [address]   [phone]            Jun 14, 2017  9:15 AM CDT   Return Visit with Senthil Jhaveri MD   [facility]    [address] [address] [phone]              Who to contact     If you have questions or need follow up information about today's clinic visit or your schedule please contact [facility] directly at  [phone].  Normal or non-critical lab and imaging results will be communicated to you by Hot Hotelshart, letter or phone within 4 business days after the clinic has received the results. If you do not hear from us within 7 days, please contact the clinic through Hot Hotelshart or phone. If you have a critical or abnormal lab result, we will notify you by phone as soon as possible.  Submit refill requests through Vine or call your pharmacy and they will forward the refill request to us. Please allow 3 business days for your refill to be completed.          Additional Information About Your Visit        Hot Hotelshart Information     Vine gives you secure access to your electronic health record. If you see a primary care provider, you can also send messages to your care team and make appointments. If you have questions, please call your primary care clinic.  If you do not have a primary care provider, please call [phone] and they will assist you.        Care EveryWhere ID     This is your Care EveryWhere ID. This could be used by other organizations to access your [place] medical records  [id number]        Your Vitals Were     Pulse Temperature Respirations Height Pulse Oximetry BMI (Body Mass Index)    58 98.3  F (36.8  C) (Oral) 14 6' (1.829 m) 98% 25.09 kg/m2       Blood Pressure from Last 3 Encounters:   04/13/17 144/70   02/27/17 128/66   02/14/17 110/60    Weight from Last 3 Encounters:   04/13/17 185 lb (83.9 kg)   02/27/17 192 lb (87.1 kg)   01/12/17 188 lb (85.3 kg)              We Performed the Following     Spirometry, Breathing Capacity: Normal Order, Clinic Performed        Primary Care Provider    None Specified       No primary provider on file.        Thank you!     Thank you for choosing [facility]  for your care. Our goal is always to provide you with excellent care. Hearing back from our patients is one way we can continue to improve our services. Please take a few minutes to complete the  written survey that you may receive in the mail after your visit with us. Thank you!             Your Updated Medication List - Protect others around you: Learn how to safely use, store and throw away your medicines at www.disposemymeds.org.          This list is accurate as of: 4/13/17  3:42 PM.  Always use your most recent med list.                   Brand Name Dispense Instructions for use    B-12 1000 MCG Tbcr     30 tablet    Take 1,000 mcg by mouth daily       bicalutamide 50 MG tablet    CASODEX    90 tablet    Take 1 tablet (50 mg) by mouth daily       gabapentin 300 MG capsule    NEURONTIN     Take 900 mg by mouth At Bedtime       leuprolide 45 MG kit    ELIGARD     Inject 45 mg Subcutaneous every 6 months       metoprolol 25 MG 24 hr tablet    TOPROL-XL    90 tablet    Take 1 tablet (25 mg) by mouth daily       oxyCODONE 5 MG IR tablet    ROXICODONE    30 tablet    Take 1 tablet by mouth every 6 hours as needed for pain.       quinapril 40 MG Tab    ACCUPRIL    90 tablet    Take 1 tablet (40 mg) by mouth daily - NEEDS TO FOLLOW UP       sildenafil 100 MG cap/tab    REVATIO/VIAGRA    10 tablet    Take 1 tablet (100 mg) by mouth daily as needed for erectile dysfunction       simvastatin 40 MG tablet    ZOCOR    45 tablet    Take 0.5 tablets (20 mg) by mouth daily       vitamin D 2000 UNITS tablet      Take 1 tablet by mouth daily       warfarin 5 MG tablet    COUMADIN    30 tablet    Take 1 tablet (5 mg) by mouth daily

## 2017-04-13 NOTE — NURSING NOTE
Chief Complaint   Patient presents with     Hospital F/U     [place] 4/10/17       Initial /70  Pulse 58  Temp 98.3  F (36.8  C) (Oral)  Resp 14  Ht 6' (1.829 m)  Wt 185 lb (83.9 kg)  SpO2 98%  BMI 25.09 kg/m2 Estimated body mass index is 25.09 kg/(m^2) as calculated from the following:    Height as of this encounter: 6' (1.829 m).    Weight as of this encounter: 185 lb (83.9 kg).  Medication Reconciliation: complete   Dunia Merrill CMA (AAMA)

## 2017-04-14 ENCOUNTER — TELEPHONE (OUTPATIENT)
Dept: FAMILY MEDICINE | Facility: CLINIC | Age: 82
End: 2017-04-14

## 2017-04-14 DIAGNOSIS — R06.09 DYSPNEA ON EXERTION: Primary | ICD-10-CM

## 2017-04-14 RX ORDER — ALBUTEROL SULFATE 90 UG/1
2 AEROSOL, METERED RESPIRATORY (INHALATION) EVERY 6 HOURS PRN
Qty: 1 INHALER | Refills: 3 | Status: SHIPPED | OUTPATIENT
Start: 2017-04-14 | End: 2017-07-13

## 2017-04-14 NOTE — TELEPHONE ENCOUNTER
Patient notified of Provider's message as written.  Patient verbalized understanding.    He agreed to try the albuterol inhaler  Advised that he ask pharmacist how to use the inhaler or schedule with an RN for inhaler teaching if needed.  He agreed    Result note below does not have a strength or dose instructions.  Dr. Muir, please clarify or send in a prescription    Pharmacy: [facility]    Patient does not need a return call    Pablo Cheatham RN

## 2017-04-18 ENCOUNTER — ANTICOAGULATION THERAPY VISIT (OUTPATIENT)
Dept: NURSING | Facility: CLINIC | Age: 82
End: 2017-04-18
Payer: COMMERCIAL

## 2017-04-18 DIAGNOSIS — I26.99 PE (PULMONARY THROMBOEMBOLISM) (H): ICD-10-CM

## 2017-04-18 DIAGNOSIS — Z79.01 LONG-TERM (CURRENT) USE OF ANTICOAGULANTS: ICD-10-CM

## 2017-04-18 DIAGNOSIS — M25.269: Primary | ICD-10-CM

## 2017-04-18 LAB
CRP SERPL-MCNC: 14.1 MG/L (ref 0–8)
ERYTHROCYTE [SEDIMENTATION RATE] IN BLOOD BY WESTERGREN METHOD: 47 MM/H (ref 0–20)
INR POINT OF CARE: 2.2 (ref 0.86–1.14)

## 2017-04-18 PROCEDURE — 36416 COLLJ CAPILLARY BLOOD SPEC: CPT

## 2017-04-18 PROCEDURE — 86140 C-REACTIVE PROTEIN: CPT | Performed by: ORTHOPAEDIC SURGERY

## 2017-04-18 PROCEDURE — 85610 PROTHROMBIN TIME: CPT | Mod: QW

## 2017-04-18 PROCEDURE — 85652 RBC SED RATE AUTOMATED: CPT | Performed by: ORTHOPAEDIC SURGERY

## 2017-04-18 PROCEDURE — 99207 ZZC NO CHARGE NURSE ONLY: CPT

## 2017-04-18 NOTE — MR AVS SNAPSHOT
Dimitri Neves   4/18/2017 9:30 AM   Anticoagulation Therapy Visit    Description:  81 year old male   Provider:  BE ANTI COAG   Department:  Be Nurse           INR as of 4/18/2017     Today's INR 2.2      Anticoagulation Summary as of 4/18/2017     INR goal 2.0-3.0   Today's INR 2.2   Full instructions 5 mg on Sun, Tue, Thu; 2.5 mg all other days   Next INR check 5/16/2017    Indications   Long-term (current) use of anticoagulants [Z79.01] [Z79.01]  PE (pulmonary thromboembolism) (H) [I26.99]         Your next Anticoagulation Clinic appointment(s)     Apr 18, 2017  9:30 AM CDT   Anticoagulation Visit with BE ANTI COAG   [facility] [place] ([facility]    [address]  [place] [address] [phone]            May 16, 2017  9:30 AM CDT   Anticoagulation Visit with BE ANTI COAG   [facility] [place] ([facility]    [address]  [place] [address]   [phone]              Contact Numbers     [facility]  Please call [phone] with any problems or questions regarding your therapy, or to cancel or reschedule your appointment.          April 2017 Details    Sun Mon Tue Wed Thu Fri Sat           1                 2               3               4               5               6               7               8                 9               10               11               12               13               14               15                 16               17               18      5 mg   See details      19      2.5 mg         20      5 mg         21      2.5 mg         22      2.5 mg           23      5 mg         24      2.5 mg         25      5 mg         26      2.5 mg         27      5 mg         28      2.5 mg         29      2.5 mg           30      5 mg                Date Details   04/18 This INR check               How to take your warfarin dose     To take:  2.5 mg Take 0.5 of a 5 mg tablet.    To take:  5 mg Take 1 of the 5  mg tablets.           May 2017 Details    Sun Mon Tue Wed Thu Fri Sat      1      2.5 mg         2      5 mg         3      2.5 mg         4      5 mg         5      2.5 mg         6      2.5 mg           7      5 mg         8      2.5 mg         9      5 mg         10      2.5 mg         11      5 mg         12      2.5 mg         13      2.5 mg           14      5 mg         15      2.5 mg         16            17               18               19               20                 21               22               23               24               25               26               27                 28               29               30               31                   Date Details   No additional details    Date of next INR:  5/16/2017         How to take your warfarin dose     To take:  2.5 mg Take 0.5 of a 5 mg tablet.    To take:  5 mg Take 1 of the 5 mg tablets.

## 2017-04-18 NOTE — PROGRESS NOTES
ANTICOAGULATION FOLLOW-UP CLINIC VISIT    Patient Name:  Dimitri Neves  Date:  4/18/2017  Contact Type:  Face to Face    SUBJECTIVE:     Patient Findings     Positives No Problem Findings           OBJECTIVE    INR Protime   Date Value Ref Range Status   04/18/2017 2.2 (A) 0.86 - 1.14 Final       ASSESSMENT / PLAN  INR assessment THER    Recheck INR In: 4 WEEKS    INR Location Clinic      Anticoagulation Summary as of 4/18/2017     INR goal 2.0-3.0   Today's INR 2.2   Maintenance plan 5 mg (5 mg x 1) on Sun, Tue, Thu; 2.5 mg (5 mg x 0.5) all other days   Full instructions 5 mg on Sun, Tue, Thu; 2.5 mg all other days   Weekly total 25 mg   No change documented Madiha Souza   Plan last modified Madiha Souza (11/22/2016)   Next INR check 5/16/2017   Target end date Indefinite    Indications   Long-term (current) use of anticoagulants [Z79.01] [Z79.01]  PE (pulmonary thromboembolism) (H) [I26.99]         Anticoagulation Episode Summary     INR check location Clinic Lab    Preferred lab     Send INR reminders to [facility]    Comments       Anticoagulation Care Providers     Provider Role Specialty Phone number    Dennis Tejada MD [facility] Internal Medicine [phone]            See the Encounter Report to view Anticoagulation Flowsheet and Dosing Calendar (Go to Encounters tab in chart review, and find the Anticoagulation Therapy Visit)        Madiha Souza

## 2017-04-24 ENCOUNTER — MEDICAL CORRESPONDENCE (OUTPATIENT)
Dept: HEALTH INFORMATION MANAGEMENT | Facility: CLINIC | Age: 82
End: 2017-04-24

## 2017-05-04 DIAGNOSIS — I10 HTN (HYPERTENSION), BENIGN: ICD-10-CM

## 2017-05-04 RX ORDER — METOPROLOL SUCCINATE 25 MG/1
TABLET, EXTENDED RELEASE ORAL
Qty: 90 TABLET | Refills: 0 | Status: SHIPPED | OUTPATIENT
Start: 2017-05-04 | End: 2017-12-26

## 2017-05-04 RX ORDER — QUINAPRIL 40 MG/1
TABLET ORAL
Qty: 90 TABLET | Refills: 0 | Status: SHIPPED | OUTPATIENT
Start: 2017-05-04 | End: 2017-07-13

## 2017-05-04 NOTE — TELEPHONE ENCOUNTER
Routing refill request to provider for review/approval because:  Can these 2 meds be signed under Dr. Muir's? Since patient have been seeing Dr. Muir the past two visit.    John RICHARD RN, BSN

## 2017-05-04 NOTE — TELEPHONE ENCOUNTER
metoprolol      Last Written Prescription Date: 2/6/17  Last Fill Quantity: 90, # refills: 0    Last Office Visit with [facility], [facility] or [facility] prescribing provider:  4/13/17   Future Office Visit:    Next 5 appointments (look out 90 days)     May 08, 2017  2:10 PM CDT   Pre-Op physical with Reginaldo [facility]ir [facility]   [facility]    [address] [address]   [phone]            Jun 14, 2017  9:15 AM CDT   Return Visit with Senthil Jhaveri MD   [facility]    [address] [address]   [phone]            Jun 16, 2017 10:00 AM CDT   Return Visit with Senthil Taylor MD   [facility]    [address] [address]   [phone]                    BP Readings from Last 3 Encounters:   04/13/17 144/70   02/27/17 128/66   02/14/17 110/60     quinapril      Last Written Prescription Date: 2/6/17  Last Fill Quantity: 90, # refills: 0  Last Office Visit with [facility], [facility] or [facility] prescribing provider: 4/13/17  Next 5 appointments (look out 90 days)     May 08, 2017  2:10 PM CDT   Pre-Op physical with Reginaldo Muir MD   [facility]    [address] [address]   [phone]            Jun 14, 2017  9:15 AM CDT   Return Visit with Senthil Jhaveri MD   [facility]    [address] [address]   [phone]            Jun 16, 2017 10:00 AM CDT   Return Visit with Senthil Taylor MD   [facility]    [address] [address]   [phone]                   Potassium   Date Value Ref Range Status   10/04/2016 4.3 3.4 - 5.3 mmol/L Final     Creatinine   Date Value Ref Range Status   10/04/2016 0.84 0.66 - 1.25 mg/dL Final     BP Readings from Last 3  Encounters:   04/13/17 144/70   02/27/17 128/66   02/14/17 110/60

## 2017-05-04 NOTE — TELEPHONE ENCOUNTER
Looks to have established care with Dr. Muir at [facility] on 2/27. Will route to the [facility].  Cherelle Burkett RN

## 2017-05-07 DIAGNOSIS — I10 HTN (HYPERTENSION), BENIGN: ICD-10-CM

## 2017-05-07 NOTE — TELEPHONE ENCOUNTER
Metoprolol      Last Written Prescription Date: 02/06/17  Last Fill Quantity: 90, # refills: 0    Last Office Visit with [facility], [facility] or [facility] prescribing provider:  01/12/17   Future Office Visit:    Next 5 appointments (look out 90 days)     May 08, 2017  2:10 PM CDT   Pre-Op physical with Reginaldo Muir MD   [facility]    [address] [address]   [phone]            Jun 14, 2017  9:15 AM CDT   Return Visit with Senthil Jhaveri MD   [facility]    [address] [address]   [phone]            Jun 16, 2017 10:00 AM CDT   Return Visit with Senthil Taylor MD   [facility]    [address] [address]   [phone]                    BP Readings from Last 3 Encounters:   04/13/17 144/70   02/27/17 128/66   02/14/17 110/60         Quinapril      Last Written Prescription Date: 02/06/17  Last Fill Quantity: 90, # refills: 0  Last Office Visit with [facility], [facility] or [facility] prescribing provider: 01/12/17  Next 5 appointments (look out 90 days)     May 08, 2017  2:10 PM CDT   Pre-Op physical with Reginaldo Muir MD   [facility]    [address] [address]   [phone]            Jun 14, 2017  9:15 AM CDT   Return Visit with Senthil Jhaveri MD   [facility]    [address] [address]   [phone]            Jun 16, 2017 10:00 AM CDT   Return Visit with Senthil Taylor MD   [facility]    [address] [address]   [phone]                   Potassium   Date Value Ref Range Status   10/04/2016 4.3 3.4 - 5.3 mmol/L Final     Creatinine   Date Value Ref Range Status   10/04/2016 0.84 0.66 - 1.25 mg/dL Final     BP Readings from Last  3 Encounters:   04/13/17 144/70   02/27/17 128/66   02/14/17 110/60

## 2017-05-08 ENCOUNTER — OFFICE VISIT (OUTPATIENT)
Dept: FAMILY MEDICINE | Facility: CLINIC | Age: 82
End: 2017-05-08
Payer: COMMERCIAL

## 2017-05-08 VITALS
WEIGHT: 183 LBS | HEIGHT: 72 IN | OXYGEN SATURATION: 97 % | TEMPERATURE: 98 F | SYSTOLIC BLOOD PRESSURE: 122 MMHG | DIASTOLIC BLOOD PRESSURE: 66 MMHG | RESPIRATION RATE: 14 BRPM | BODY MASS INDEX: 24.79 KG/M2 | HEART RATE: 69 BPM

## 2017-05-08 DIAGNOSIS — I26.99 PE (PULMONARY THROMBOEMBOLISM) (H): ICD-10-CM

## 2017-05-08 DIAGNOSIS — Z01.818 PREOP GENERAL PHYSICAL EXAM: Primary | ICD-10-CM

## 2017-05-08 PROCEDURE — 93000 ELECTROCARDIOGRAM COMPLETE: CPT | Performed by: INTERNAL MEDICINE

## 2017-05-08 PROCEDURE — 99215 OFFICE O/P EST HI 40 MIN: CPT | Performed by: INTERNAL MEDICINE

## 2017-05-08 RX ORDER — QUINAPRIL 40 MG/1
TABLET ORAL
Qty: 90 TABLET | Refills: 0 | Status: SHIPPED | OUTPATIENT
Start: 2017-05-08 | End: 2017-08-17

## 2017-05-08 RX ORDER — METOPROLOL SUCCINATE 25 MG/1
TABLET, EXTENDED RELEASE ORAL
Qty: 90 TABLET | Refills: 0 | Status: SHIPPED | OUTPATIENT
Start: 2017-05-08 | End: 2017-08-26

## 2017-05-08 ASSESSMENT — PAIN SCALES - GENERAL: PAINLEVEL: MODERATE PAIN (4)

## 2017-05-08 NOTE — PATIENT INSTRUCTIONS
- Hold Coumadin 5 days prior to surgery    Before Your Surgery      Call your surgeon if there is any change in your health. This includes signs of a cold or flu (such as a sore throat, runny nose, cough, rash or fever).    Do not smoke, drink alcohol or take over the counter medicine (unless your surgeon or primary care doctor tells you to) for the 24 hours before and after surgery.    If you take prescribed drugs: Follow your doctor s orders about which medicines to take and which to stop until after surgery.    Eating and drinking prior to surgery: follow the instructions from your surgeon    Take a shower or bath the night before surgery. Use the soap your surgeon gave you to gently clean your skin. If you do not have soap from your surgeon, use your regular soap. Do not shave or scrub the surgery site.  Wear clean pajamas and have clean sheets on your bed.     [facility]    If you have any questions regarding to your visit please contact your care team:     Team Pink:   Clinic Hours Telephone Number   Internal Medicine:  Dr. Deborah Taylor NP       7am-7pm  Monday - Thursday   7am-5pm  Fridays  [phone]  (Appointment scheduling available 24/7)    Questions about your visit?  Team Line  [phone]   Urgent Care - [place] - 11am-9pm Monday-Friday Saturday-Sunday- 9am-5pm   [place] - 5pm-9pm Monday-Friday Saturday-Sunday- 9am-5pm  [phone] - [place]   [phone] - [place]       What options do I have for visits at the clinic other than the traditional office visit?  To expand how we care for you, many of our providers are utilizing electronic visits (e-visits) and telephone visits, when medically appropriate, for interactions with their patients rather than a visit in the clinic.   We also offer nurse visits for many medical concerns. Just like any other service, we will bill your insurance company for this type of  visit based on time spent on the phone with your provider. Not all insurance companies cover these visits. Please check with your medical insurance if this type of visit is covered. You will be responsible for any charges that are not paid by your insurance.      E-visits via [employer]:  generally incur a $35.00 fee.  Telephone visits:  Time spent on the phone: *charged based on time that is spent on the phone in increments of 10 minutes. Estimated cost:   5-10 mins $30.00   11-20 mins. $59.00   21-30 mins. $85.00   Use [employer] (secure email communication and access to your chart) to send your primary care provider a message or make an appointment. Ask someone on your Team how to sign up for [employer].    For a Price Quote for your services, please call our Consumer Price Line at [phone].    As always, Thank you for trusting us with your health care needs!    Discharged by JAME Nguyễn

## 2017-05-08 NOTE — MR AVS SNAPSHOT
After Visit Summary   5/8/2017    Dimitri Neves    MRN: [id number]           Patient Information     Date Of Birth          1935        Visit Information        Provider Department      5/8/2017 2:10 PM Reginaldo Muir MD [facility]        Today's Diagnoses     Preop general physical exam    -  1    PE (pulmonary thromboembolism) (H)          Care Instructions    - Hold Coumadin 5 days prior to surgery    Before Your Surgery      Call your surgeon if there is any change in your health. This includes signs of a cold or flu (such as a sore throat, runny nose, cough, rash or fever).    Do not smoke, drink alcohol or take over the counter medicine (unless your surgeon or primary care doctor tells you to) for the 24 hours before and after surgery.    If you take prescribed drugs: Follow your doctor s orders about which medicines to take and which to stop until after surgery.    Eating and drinking prior to surgery: follow the instructions from your surgeon    Take a shower or bath the night before surgery. Use the soap your surgeon gave you to gently clean your skin. If you do not have soap from your surgeon, use your regular soap. Do not shave or scrub the surgery site.  Wear clean pajamas and have clean sheets on your bed.     [facility]    If you have any questions regarding to your visit please contact your care team:     Team Pink:   Clinic Hours Telephone Number   Internal Medicine:  Dr. Deborah Taylor NP       7am-7pm  Monday - Thursday   7am-5pm  Fridays  [phone]  (Appointment scheduling available 24/7)    Questions about your visit?  Team Line  [phone]   Urgent Care - [place] - 11am-9pm Monday-Friday Saturday-Sunday- 9am-5pm   [place] - 5pm-9pm Monday-Friday Saturday-Sunday- 9am-5pm  [phone] - Alma NGUYỄN  [phone] - [place] G       What options do I have for visits at the  clinic other than the traditional office visit?  To expand how we care for you, many of our providers are utilizing electronic visits (e-visits) and telephone visits, when medically appropriate, for interactions with their patients rather than a visit in the clinic.   We also offer nurse visits for many medical concerns. Just like any other service, we will bill your insurance company for this type of visit based on time spent on the phone with your provider. Not all insurance companies cover these visits. Please check with your medical insurance if this type of visit is covered. You will be responsible for any charges that are not paid by your insurance.      E-visits via [employer]:  generally incur a $35.00 fee.  Telephone visits:  Time spent on the phone: *charged based on time that is spent on the phone in increments of 10 minutes. Estimated cost:   5-10 mins $30.00   11-20 mins. $59.00   21-30 mins. $85.00   Use [employer] (secure email communication and access to your chart) to send your primary care provider a message or make an appointment. Ask someone on your Team how to sign up for [employer].    For a Price Quote for your services, please call our Consumer Price Line at [phone].    As always, Thank you for trusting us with your health care needs!    Discharged by JAME Nguyễn          Follow-ups after your visit        Your next 10 appointments already scheduled     May 16, 2017  9:30 AM CDT   Anticoagulation Visit with BE ANTI COAG   [facility]    [address] [place] [address]   [phone]            Jun 09, 2017  9:00 AM CDT   New Visit with Josiah Alicea MD   [facility]    [address] [place] [place]  [place] [address]   [phone]            Jun 14, 2017  9:15 AM CDT   Return Visit with Senthil Jhaveri MD   [facility]    [address] [place]  [place] [address]   [phone]            Jun 16, 2017 10:00 AM CDT   Return Visit with Senthil Taylor MD   [facility] [place] [facility]  [place] [id number] [phone]              Who to contact     If you have questions or need follow up information about today's clinic visit or your schedule please contact [facility] [place] directly at [phone].  Normal or non-critical lab and imaging results will be communicated to you by [employer], letter or phone within 4 business days after the clinic has received the results. If you do not hear from us within 7 days, please contact the clinic through [employer] or phone. If you have a critical or abnormal lab result, we will notify you by phone as soon as possible.  Submit refill requests through [employer] or call your pharmacy and they will forward the refill request to us. Please allow 3 business days for your refill to be completed.          Additional Information About Your Visit        [employer] Information     [employer] gives you secure access to your electronic health record. If you see a primary care provider, you can also send messages to your care team and make appointments. If you have questions, please call your primary care clinic.  If you do not have a primary care provider, please call [phone] and they will assist you.        Care EveryWhere ID     This is your Care EveryWhere ID. This could be used by other organizations to access your [facility] medical records  [id number]        Your Vitals Were     Pulse Temperature Respirations Height Pulse Oximetry BMI (Body Mass Index)    69 98  F (36.7  C) (Oral) 14 6' (1.829 m) 97% 24.82 kg/m2       Blood Pressure from Last 3 Encounters:   05/08/17 122/66   04/13/17 144/70   02/27/17 128/66    Weight from Last 3 Encounters:   05/08/17 183 lb (83 kg)   04/13/17 185 lb (83.9 kg)   02/27/17 192 lb (87.1 kg)              We Performed the Following     EKG  12-lead complete w/read - Clinics        Primary Care Provider    None Specified       No primary provider on file.        Thank you!     Thank you for choosing [facility]  for your care. Our goal is always to provide you with excellent care. Hearing back from our patients is one way we can continue to improve our services. Please take a few minutes to complete the written survey that you may receive in the mail after your visit with us. Thank you!             Your Updated Medication List - Protect others around you: Learn how to safely use, store and throw away your medicines at www.disposemymeds.org.          This list is accurate as of: 5/8/17  3:01 PM.  Always use your most recent med list.                   Brand Name Dispense Instructions for use    albuterol 108 (90 BASE) MCG/ACT Inhaler    PROAIR HFA/PROVENTIL HFA/VENTOLIN HFA    1 Inhaler    Inhale 2 puffs into the lungs every 6 hours as needed for shortness of breath / dyspnea or wheezing       B-12 1000 MCG Tbcr     30 tablet    Take 1,000 mcg by mouth daily       bicalutamide 50 MG tablet    CASODEX    90 tablet    Take 1 tablet (50 mg) by mouth daily       gabapentin 300 MG capsule    NEURONTIN     Take 900 mg by mouth At Bedtime       leuprolide 45 MG kit    ELIGARD     Inject 45 mg Subcutaneous every 6 months       metoprolol 25 MG 24 hr tablet    TOPROL-XL    90 tablet    TAKE 1 TABLET (25 MG) BY MOUTH DAILY       oxyCODONE 5 MG IR tablet    ROXICODONE    30 tablet    Take 1 tablet by mouth every 6 hours as needed for pain.       quinapril 40 MG Tab    ACCUPRIL    90 tablet    TAKE 1 TABLET (40 MG) BY MOUTH DAILY - NEEDS TO FOLLOW UP       sildenafil 100 MG cap/tab    REVATIO/VIAGRA    10 tablet    Take 1 tablet (100 mg) by mouth daily as needed for erectile dysfunction       simvastatin 40 MG tablet    ZOCOR    45 tablet    Take 0.5 tablets (20 mg) by mouth daily       vitamin D 2000 UNITS tablet      Take 1 tablet by mouth daily        warfarin 5 MG tablet    COUMADIN    30 tablet    Take 1 tablet (5 mg) by mouth daily

## 2017-05-08 NOTE — NURSING NOTE
Chief Complaint   Patient presents with     Pre-Op Exam       Initial /66  Pulse 69  Temp 98  F (36.7  C) (Oral)  Resp 14  Ht 6' (1.829 m)  Wt 183 lb (83 kg)  SpO2 97%  BMI 24.82 kg/m2 Estimated body mass index is 24.82 kg/(m^2) as calculated from the following:    Height as of this encounter: 6' (1.829 m).    Weight as of this encounter: 183 lb (83 kg).  Medication Reconciliation: complete   Dunia Merrill CMA (AAMA)

## 2017-05-08 NOTE — PROGRESS NOTES
[facility]  [address] [place] [address]  [place] [address]  [phone]  Dept: [phone]    PRE-OP EVALUATION:  Today's date: 2017    Dimitri Neves (: 1935) presents for pre-operative evaluation assessment as requested by Dr. Weeks.  He requires evaluation and anesthesia risk assessment prior to undergoing surgery/procedure for treatment of right knee .  Proposed procedure: right total knee replacement     Date of Surgery/ Procedure: 17  Time of Surgery/ Procedure: 3:15pm  Hospital/Surgical Facility: [facility]  Primary Physician: No primary care provider on file.  Type of Anesthesia Anticipated: to be determined    Patient has a Health Care Directive or Living Will:  YES at home    1. NO - Do you have a history of heart attack, stroke, stent, bypass or surgery on an artery in the head, neck, heart or legs?  2. NO - Do you ever have any pain or discomfort in your chest?  3. NO - Do you have a history of  Heart Failure?  4. YES - ARE YOUR TROUBLED BY SHORTNESS OF BREATH WHEN WALKING ON THE LEVEL, UP A SLIGHT HILL OR AT NIGHT? Dyspnea on exertion, stable  5. NO - Do you currently have a cold, bronchitis or other respiratory infection?  6. NO - Do you have a cough, shortness of breath or wheezing?  7. NO - Do you sometimes get pains in the calves of your legs when you walk?  8. YES - DO YOU OR ANYONE IN YOUR FAMILY HAVE PREVIOUS HISTORY OF BLOOD CLOTS? Pulmonary thromboembolism, DVT and currently treated with coumadin   9. NO - Do you or does anyone in your family have a serious bleeding problem such as prolonged bleeding following surgeries or cuts?  10. NO - Have you ever had problems with anemia or been told to take iron pills?  11. NO - Have you had any abnormal blood loss such as black, tarry or bloody stools, or abnormal vaginal bleeding?  12. NO - Have you ever had a blood transfusion?  13. NO - Have you or any of your relatives ever had problems with anesthesia?  14.  YES - DO YOU HAVE SLEEP APNEA, EXCESSIVE SNORING OR DAYTIME DROWSINESS? Sleep apnea  15. NO - Do you have any prosthetic heart valves?  16. YES - DO YOU HAVE PROSTHETIC JOINTS? Right knee arthoplasty  17. NO - Is there any chance that you may be pregnant?    HPI:                                                      Brief HPI related to upcoming procedure: Dimitri Neves is a 81 year old male who presents to the clinic for a pre op exam for a right sided total knee replacement. He states he saw Dr. Weeks and had x-rays of his knee. They found synovial calcifications and soft tissue calcifications. He notes he has SOB.    We need to Notify Dr. Weeks about Coumadin having only been stopped only 3 days before surgery, no coumadin taken today Monday, tomorrow Teusday , and next day Wednesday , with surgery planned for Thursday     See problem list for active medical problems.  Problems all longstanding and stable, except as noted/documented.  See ROS for pertinent symptoms related to these conditions.                                                                                                  .    MEDICAL HISTORY:                                                      Patient Active Problem List    Diagnosis Date Noted     Iris nevus, ou 07/23/2016     Priority: Medium     Dry eye syndrome, bilateral 07/23/2016     Priority: Medium     Macular degeneration, dry, mild, ou 07/23/2016     Priority: Medium     Posterior vitreous detachment, bilateral 07/22/2016     Priority: Medium     Long-term (current) use of anticoagulants [Z79.01] 07/15/2016     Priority: Medium     Dyspnea on exertion 07/12/2016     Priority: Medium     Prostate cancer (H) 06/22/2016     Priority: Medium     HTN (Hypertension), Benign goal <140/90      Priority: Medium     Narcotic dependence, episodic use (H)      Priority: Medium     HUDSON (obstructive sleep apnea) 10/01/2005     Priority: Medium     on CPAP       Advanced directives,  counseling/discussion 04/24/2012     Priority: Low     Patient states has Advance Directive and will bring in a copy to clinic. 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 Provider to review       HYPERLIPIDEMIA LDL GOAL <130 10/31/2010     Pure hypercholesterolemia      ED (erectile dysfunction)      PE (pulmonary thromboembolism) (H)      1980s after back surgery        Past Medical History:   Diagnosis Date     Actinic keratosis      AK (actinic keratosis) 7/9/2012     Cataract cortical, senile right eye 4/17/2012     DDD (degenerative disc disease), lumbar 1979    s/p 4 back surgeries, spinal cord stimulator implant      Diverticulosis      ED (erectile dysfunction) 2009     GERD (gastroesophageal reflux disease) ~1980     HTN (hypertension), benign ~2000     Macular degeneration, dry, mild, ou 7/23/2016     Multiple lung nodules     Several basilar pulmonary nodules <5 mm     HUDSON (obstructive sleep apnea) 10/2005    on CPAP     Other abnormal glucose 01/14/2009     PE (pulmonary thromboembolism) (H) 1981    after back surgery      Pure hypercholesterolemia ~2000     Squamous cell carcinoma (H) 07/2012    L frontal scalp     Past Surgical History:   Procedure Laterality Date     ARTHROSCOPY KNEE RT/LT  ~1995    Right     C LUMBAR SPINE FUSION,ANTER APPRCH  8/2007    four times total, latest 8/07     CATARACT IOL, RT/LT       LASER YAG CAPSULOTOMY      both eyes     PHACOEMULSIFICATION CLEAR CORNEA WITH STANDARD INTRAOCULAR LENS IMPLANT  6-18-12/7-30-12    right/left eye     ROTATOR CUFF REPAIR RT/LT  ~1995    right     Current Outpatient  Prescriptions   Medication Sig Dispense Refill     metoprolol (TOPROL-XL) 25 MG 24 hr tablet TAKE 1 TABLET (25 MG) BY MOUTH DAILY 90 tablet 0     quinapril (ACCUPRIL) 40 MG Tab TAKE 1 TABLET (40 MG) BY MOUTH DAILY - NEEDS TO FOLLOW UP 90 tablet 0     albuterol (PROAIR HFA/PROVENTIL HFA/VENTOLIN HFA) 108 (90 BASE) MCG/ACT Inhaler Inhale 2 puffs into the lungs every 6 hours as needed for shortness of breath / dyspnea or wheezing 1 Inhaler 3     gabapentin (NEURONTIN) 300 MG capsule Take 900 mg by mouth At Bedtime       Cyanocobalamin (B-12) 1000 MCG TBCR Take 1,000 mcg by mouth daily 30 tablet 5     bicalutamide (CASODEX) 50 MG tablet Take 1 tablet (50 mg) by mouth daily 90 tablet 3     warfarin (COUMADIN) 5 MG tablet Take 1 tablet (5 mg) by mouth daily 30 tablet 3     simvastatin (ZOCOR) 40 MG tablet Take 0.5 tablets (20 mg) by mouth daily 45 tablet 3     sildenafil (VIAGRA) 100 MG tablet Take 1 tablet (100 mg) by mouth daily as needed for erectile dysfunction 10 tablet 3     Cholecalciferol (VITAMIN D) 2000 UNITS tablet Take 1 tablet by mouth daily       leuprolide (ELIGARD) 45 MG injection Inject 45 mg Subcutaneous every 6 months       oxyCODONE (ROXICODONE) 5 MG immediate release tablet Take 1 tablet by mouth every 6 hours as needed for pain. 30 tablet 0     metoprolol (TOPROL-XL) 25 MG 24 hr tablet TAKE 1 TABLET (25 MG) BY MOUTH DAILY 90 tablet 0     quinapril (ACCUPRIL) 40 MG Tab TAKE 1 TABLET (40 MG) BY MOUTH DAILY - NEEDS TO FOLLOW UP 90 tablet 0     OTC products: None, except as noted above    Allergies   Allergen Reactions     Morphine Sulfate GI Disturbance     vomiting     Vicodin [Hydrocodone-Acetaminophen] Nausea and Vomiting      Latex Allergy: NO    Social History   Substance Use Topics     Smoking status: Former Smoker     Packs/day: 1.00     Years: 25.00     Types: Cigarettes     Quit date: 1/2/1976     Smokeless tobacco: Never Used      Comment: lives in smoke free household     Alcohol use 7.0  oz/week     14 Standard drinks or equivalent per week      Comment: 2-3 drinks every night     History   Drug Use No     Comment: tatoos- sterile     REVIEW OF SYSTEMS:                                                    Constitutional, HEENT, cardiovascular, pulmonary, GI, , musculoskeletal, neuro, skin, endocrine and psych systems are negative, except as in HPI or otherwise noted     This document serves as a record of the services and decisions personally performed and made by Reginaldo Muir MD. It was created on their behalf by Senthil Guardado, a trained medical scribe. The creation of this document is based the provider's statements to the medical scribe.  Senthil Guardado May 8, 2017 2:36 PM    EXAM:                                                    /66  Pulse 69  Temp 98  F (36.7  C) (Oral)  Resp 14  Ht 1.829 m (6')  Wt 83 kg (183 lb)  SpO2 97%  BMI 24.82 kg/m2    GENERAL APPEARANCE: healthy, alert and no distress     EYES: EOMI,  PERRL     HENT: ear canals and TM's normal and nose and mouth without ulcers or lesions     NECK: no adenopathy, no asymmetry, masses, or scars and thyroid normal to palpation     RESP: lungs clear to auscultation - no rales, rhonchi or wheezes     CV: regular rates and rhythm, normal S1 S2, no S3 or S4 and no murmur, click or rub     ABDOMEN:  soft, nontender, no HSM or masses and bowel sounds normal     MS: extremities normal- no gross deformities noted, 1-2+ bilateral lowe extremity edema     SKIN: no suspicious lesions or rashes to visible skin     NEURO: mentation intact and speech normal     PSYCH: mentation appears normal. and affect normal/bright     LYMPHATICS: No axillary, cervical, or supraclavicular nodes    DIAGNOSTICS:                                                      Orders Placed This Encounter   Procedures     EKG 12-lead complete w/read - Clinics     Laboratory studies measured at [facility] 4/10/2017  Sodium 143  Potassium 4.5  Chloride  107  Bicarbonate 27  Anion gap 9  Glucose [ sugar test ] 98  Calcium 9.2  Bun 14  Creatinine 0.79  Uric acid level 4.8    And from 5/1/2017    Hemoglobin 11.9  Hematocrit 36.4  Platelet count 228  White blood cell count 5.6      Recent Labs   Lab Test 04/18/17 03/21/17 12/23/16   1417   10/04/16   0950   07/19/16   0817   05/29/12   1328   HGB   --    --    --   11.3*   --   12.4*   < >  14.2   < >  14.3   PLT   --    --    --   337   --   188   --   222   < >  256   INR  2.2*  2.6*   < >   --    < >   --    < >   --    < >   --    NA   --    --    --    --    --   139   --   140   < >  137   POTASSIUM   --    --    --    --    --   4.3   --   4.4   < >  4.1   CR   --    --    --    --    --   0.84   --   0.90   < >  0.90   A1C   --    --    --    --    --    --    --   5.5   --   5.4    < > = values in this interval not displayed.     IMPRESSION:                                                    Reason for surgery/procedure: right knee calcifcations  Diagnosis/reason for consult: assess and minimize preoperative risk per consulting surgeon     The proposed surgical procedure is considered INTERMEDIATE risk.    REVISED CARDIAC RISK INDEX  The patient has the following serious cardiovascular risks for perioperative complications such as (MI, PE, VFib and 3  AV Block):  No serious cardiac risks  INTERPRETATION: 1 risks: Class II (low risk - 0.9% complication rate)    The patient has the following additional risks for perioperative complications:  No identified additional risks      ICD-10-CM    1. Preop general physical exam Z01.818 EKG 12-lead complete w/read - Clinics   2. PE (pulmonary thromboembolism) (H) I26.99        RECOMMENDATIONS:                                                      --Consult hospital rounder / IM to assist post-op medical management  --Because of DVT or PE history, patient should be on low molecular weight heparin and wear compression hose after surgery; Enoxaparin (Lovenox) 1 milligram /  kilogram 2 times a day until INR is 2.0    --Patient is to take all scheduled medications on the day of surgery EXCEPT for modifications listed below.    Anticoagulant or Antiplatelet Medication Use  WARFARIN: to be held prior to surgery; * it will only have been 3 days without coumadin as of the date of the planned surgery         APPROVAL GIVEN to proceed with proposed procedure, without further diagnostic evaluation, given agreement on coumadin by Dr. bJ Weeks with [facility]      The information in this document, created by the medical scribe for me, accurately reflects the services I personally performed and the decisions made by me. I have reviewed and approved this document for accuracy.   Reginaldo Muir MD     Signed Electronically by: Reginaldo Muir MD    Copy of this evaluation report is provided to requesting physician.    [facility] Preop Guidelines

## 2017-05-09 ENCOUNTER — TELEPHONE (OUTPATIENT)
Dept: FAMILY MEDICINE | Facility: CLINIC | Age: 82
End: 2017-05-09

## 2017-05-09 NOTE — TELEPHONE ENCOUNTER
We need to notify Dr. Weeks about Coumadin having only been stopped only 3 days before surgery, no coumadin taken today Monday, tomorrow Tuesday , and next day Wednesday , with surgery planned for Thursday.  Reginaldo Muir MD

## 2017-05-09 NOTE — TELEPHONE ENCOUNTER
Called [facility] and spoke with Dr. Micky Sun's .  Updated her with Dr. Muir's message as written below.   She will relay message to Dr. Weeks and reschedule surgery if needed.     Pablo Cheatham RN

## 2017-05-09 NOTE — TELEPHONE ENCOUNTER
Spoke with pt after reviewing note from visit yesterday. Note did not mention having to get INR checked here pre surgery. Informed pt that they usually do that right before surgery at the hospital. Only orders were to hold coumadin until surgery (3 days). See Below.  Dimitri verbalized understanding and appreciation for the call.     --Consult hospital rounder / IM to assist post-op medical management  --Because of DVT or PE history, patient should be on low molecular weight heparin and wear compression hose after surgery; Enoxaparin (Lovenox) 1 milligram / kilogram 2 times a day until INR is 2.0   --Patient is to take all scheduled medications on the day of surgery EXCEPT for modifications listed below.   Anticoagulant or Antiplatelet Medication Use  WARFARIN: to be held prior to surgery; * it will only have been 3 days without coumadin as of the date of the planned surgery     APPROVAL GIVEN to proceed with proposed procedure, without further diagnostic evaluation, given agreement on coumadin by Dr. Jb Weeks with [facility]     The information in this document, created by the medical scribe for me, accurately reflects the services I personally performed and the decisions made by me. I have reviewed and approved this document for accuracy.   Reginaldo Muir MD    Signed Electronically by: Reginaldo Muir MD

## 2017-05-09 NOTE — TELEPHONE ENCOUNTER
Patient states he is having knee surgery at [facility] this Thursday at 3:15 and would like to know if he should have an INR first.  Please call.    Thank you.

## 2017-05-15 ENCOUNTER — TELEPHONE (OUTPATIENT)
Dept: FAMILY MEDICINE | Facility: CLINIC | Age: 82
End: 2017-05-15

## 2017-05-15 NOTE — TELEPHONE ENCOUNTER
Patient notified of Provider's message as written.  Patient verbalized understanding.  He will monitor BP at home and call in 2-4 days    Pablo Cheatham RN

## 2017-05-15 NOTE — TELEPHONE ENCOUNTER
If there's a question about low blood pressure,     1. See if there's any clear reason for this [ was he sick recently, decreased PO intake or diarrhea / increased sweating ?]  2. Lets stay off all blood pressure medications and recheck him in 2-4 days     Reginaldo Muir MD

## 2017-05-15 NOTE — TELEPHONE ENCOUNTER
Per patient, his BP was low while in the hospital yesterday.   See readings below.  Patient stated that he was advised to hold his BP meds this morning  He has skipped his Metoprolol this morning and usually takes Quinapril in the evening.  Asymptomatic    Advised patient to monitor for symptoms, continue to hold off on BP meds until further advised.  Please advise further  Should he skip the quinapril this evening as well?  Any med changes?    Pablo Cheatham RN

## 2017-05-15 NOTE — TELEPHONE ENCOUNTER
Left message on voicemail for patient to return call to RN hotline at # [phone].  Which BP meds is he still taking?  Does he have a pulse reading?  Any symptoms?    Pablo Cheatham RN

## 2017-05-15 NOTE — TELEPHONE ENCOUNTER
Reason for Call:  Other     Detailed comments:Per patient his blood pressure was taken yesterday at [facility] and the reading the 1st time was 90/50, and second time it read 66/44. However patient states today it reads 121/55.  Patient would like a call back for next steps. Patient also states the earlier reading was prior to his infusion.     Phone Number Patient can be reached at: Home number on file [phone] (home)    Best Time: anytime    Can we leave a detailed message on this number? YES    Call taken on 5/15/2017 at 9:03 AM by Margie Reyes

## 2017-05-18 ENCOUNTER — ANTICOAGULATION THERAPY VISIT (OUTPATIENT)
Dept: NURSING | Facility: CLINIC | Age: 82
End: 2017-05-18
Payer: COMMERCIAL

## 2017-05-18 DIAGNOSIS — Z79.01 LONG-TERM (CURRENT) USE OF ANTICOAGULANTS: ICD-10-CM

## 2017-05-18 DIAGNOSIS — I26.99 PE (PULMONARY THROMBOEMBOLISM) (H): ICD-10-CM

## 2017-05-18 DIAGNOSIS — I26.99 PE (PULMONARY THROMBOEMBOLISM) (H): Primary | ICD-10-CM

## 2017-05-18 LAB — INR POINT OF CARE: 1.3 (ref 0.86–1.14)

## 2017-05-18 PROCEDURE — 99207 ZZC NO CHARGE NURSE ONLY: CPT

## 2017-05-18 PROCEDURE — 36416 COLLJ CAPILLARY BLOOD SPEC: CPT

## 2017-05-18 PROCEDURE — 85610 PROTHROMBIN TIME: CPT | Mod: QW

## 2017-05-18 NOTE — PROGRESS NOTES
ANTICOAGULATION FOLLOW-UP CLINIC VISIT    Patient Name:  Dimitri Neves  Date:  5/18/2017  Contact Type:  Face to Face    SUBJECTIVE:     Patient Findings     Positives Intentional hold of therapy (had knee surgery)           OBJECTIVE    INR Protime   Date Value Ref Range Status   05/18/2017 1.3 (A) 0.86 - 1.14 Final       ASSESSMENT / PLAN  INR assessment SUB intentional hold, back on coumadin x 7 days per pt   Recheck INR In: 4 DAYS    INR Location Clinic      Anticoagulation Summary as of 5/18/2017     INR goal 2.0-3.0   Today's INR 1.3!   Maintenance plan 5 mg (5 mg x 1) on Sun, Tue, Thu; 2.5 mg (5 mg x 0.5) all other days   Full instructions 5/18: 7.5 mg; 5/19: 5 mg; 5/20: 5 mg; Otherwise 5 mg on Sun, Tue, Thu; 2.5 mg all other days   Weekly total 25 mg   Plan last modified Madiha Souza (11/22/2016)   Next INR check 5/22/2017   Target end date Indefinite    Indications   Long-term (current) use of anticoagulants [Z79.01] [Z79.01]  PE (pulmonary thromboembolism) (H) [I26.99]         Anticoagulation Episode Summary     INR check location Clinic Lab    Preferred lab     Send INR reminders to [facility]    Comments       Anticoagulation Care Providers     Provider Role Specialty Phone number    Reginaldo Muir MD Referring Internal Medicine [phone]    Dennis Tejada MD Responsible Internal Medicine [phone]            See the Encounter Report to view Anticoagulation Flowsheet and Dosing Calendar (Go to Encounters tab in chart review, and find the Anticoagulation Therapy Visit)    See telephone encounter, per pt and from hospital record, patient was given Lovenox x 4 days, but INR today below goal, surgeon in [facility] system,   INR RN did given dosing bumps to get INR in goal per protocol. INR recheck on 5/22/17  Sent telephone call to PCP asking if patient should stay on Lovenox until in goal range? Script pended for providers approval.  Patient will call surgeon's office and PCP to ask if  he is to continue the Lovenox? So all bases will be covered.    RODERICK CHAPARRO

## 2017-05-18 NOTE — MR AVS SNAPSHOT
Dimitri Neves   5/18/2017 2:30 PM   Anticoagulation Therapy Visit    Description:  81 year old male   Provider:  SANDRA ANTI COAG   Department:  Be Nurse           INR as of 5/18/2017     Today's INR 1.3!      Anticoagulation Summary as of 5/18/2017     INR goal 2.0-3.0   Today's INR 1.3!   Full instructions 5/18: 7.5 mg; 5/19: 5 mg; 5/20: 5 mg; Otherwise 5 mg on Sun, Tue, Thu; 2.5 mg all other days   Next INR check 5/22/2017    Indications   Long-term (current) use of anticoagulants [Z79.01] [Z79.01]  PE (pulmonary thromboembolism) (H) [I26.99]         Your next Anticoagulation Clinic appointment(s)     May 22, 2017  2:45 PM CDT   Anticoagulation Visit with BE ANTI COAG   [place] [facility] ([facility] [place])    [address]  [place] [id number] [phone]              Contact Numbers     [facility]  Please call [phone] with any problems or questions regarding your therapy, or to cancel or reschedule your appointment.          May 2017 Details    Sun Mon Tue Wed Thu Fri Sat      1               2               3               4               5               6                 7               8               9               10               11               12               13                 14               15               16               17               18      7.5 mg   See details      19      5 mg         20      5 mg           21      5 mg         22            23               24               25               26               27                 28               29               30               31                   Date Details   05/18 This INR check       Date of next INR:  5/22/2017         How to take your warfarin dose     To take:  2.5 mg Take 0.5 of a 5 mg tablet.    To take:  5 mg Take 1 of the 5 mg tablets.    To take:  7.5 mg Take 1.5 of the 5 mg tablets.

## 2017-05-18 NOTE — TELEPHONE ENCOUNTER
Patient was in for INR today.  Surgery 17. Per patient he was given Lovenox to take for 4 days, then stop per hospital.  INR=1.3 today, should patient stay on Lovenox until back in goal range?  He is out of lovenox, he would need a script.  He will call his surgeon ([facility] system) to check on continuing lovenox and he will also call PCP care team.  Below is copy from hospital record on his dose.  Script pended for approval if provider feels should continue Lovenox until at goal    enoxaparin (LOVENOX) 60 mg/0.6 mL injection    Indications: History of pulmonary embolism Inject 60 mg subcutaneous every 12 hours for 4 days. 4.8 mL   0 2017

## 2017-05-18 NOTE — TELEPHONE ENCOUNTER
Lets put him back on enoxaparin (LOVENOX) for 3 more days , at the very least until his INR is up to 1.8    Reginaldo Muir MD

## 2017-05-19 NOTE — TELEPHONE ENCOUNTER
Spoke with patient and  Gave below information and instructions, he did  the lovenox last night, so he is taking lovenox, but he only took 5 mg coumadin last night, so he will take 7.5 mg today, switching the 5/18 dose with the 5/19 dose.  He repeated instructions correctly. Will RTC on 5/22/17 for INR recheck.  Madonna Snow RN

## 2017-05-22 ENCOUNTER — ANTICOAGULATION THERAPY VISIT (OUTPATIENT)
Dept: NURSING | Facility: CLINIC | Age: 82
End: 2017-05-22
Payer: COMMERCIAL

## 2017-05-22 DIAGNOSIS — I26.99 PE (PULMONARY THROMBOEMBOLISM) (H): ICD-10-CM

## 2017-05-22 DIAGNOSIS — Z79.01 LONG-TERM (CURRENT) USE OF ANTICOAGULANTS: ICD-10-CM

## 2017-05-22 LAB — INR POINT OF CARE: 2.2 (ref 0.86–1.14)

## 2017-05-22 PROCEDURE — 99207 ZZC NO CHARGE NURSE ONLY: CPT

## 2017-05-22 PROCEDURE — 85610 PROTHROMBIN TIME: CPT | Mod: QW

## 2017-05-22 PROCEDURE — 36416 COLLJ CAPILLARY BLOOD SPEC: CPT

## 2017-05-22 NOTE — PROGRESS NOTES
ANTICOAGULATION FOLLOW-UP CLINIC VISIT    Patient Name:  Dimitri Neves  Date:  5/22/2017  Contact Type:  Face to Face    SUBJECTIVE:     Patient Findings     Positives No Problem Findings (ok to stop lovenox)           OBJECTIVE    INR Protime   Date Value Ref Range Status   05/22/2017 2.2 (A) 0.86 - 1.14 Final       ASSESSMENT / PLAN  INR assessment THER    Recheck INR In: 2 WEEKS    INR Location Clinic      Anticoagulation Summary as of 5/22/2017     INR goal 2.0-3.0   Today's INR 2.2   Maintenance plan 5 mg (5 mg x 1) on Sun, Tue, Thu; 2.5 mg (5 mg x 0.5) all other days   Full instructions 5 mg on Sun, Tue, Thu; 2.5 mg all other days   Weekly total 25 mg   No change documented Madonna Snow   Plan last modified Madiha Souza (11/22/2016)   Next INR check 6/7/2017   Target end date Indefinite    Indications   Long-term (current) use of anticoagulants [Z79.01] [Z79.01]  PE (pulmonary thromboembolism) (H) [I26.99]         Anticoagulation Episode Summary     INR check location Clinic Lab    Preferred lab     Send INR reminders to [facility]    Comments       Anticoagulation Care Providers     Provider Role Specialty Phone number    Reginaldo Muir MD Responsible Internal Medicine [phone]            See the Encounter Report to view Anticoagulation Flowsheet and Dosing Calendar (Go to Encounters tab in chart review, and find the Anticoagulation Therapy Visit)        MADONNA SNOW

## 2017-06-07 ENCOUNTER — ANTICOAGULATION THERAPY VISIT (OUTPATIENT)
Dept: NURSING | Facility: CLINIC | Age: 82
End: 2017-06-07
Payer: COMMERCIAL

## 2017-06-07 DIAGNOSIS — C61 MALIGNANT NEOPLASM OF PROSTATE (H): ICD-10-CM

## 2017-06-07 DIAGNOSIS — I26.99 PE (PULMONARY THROMBOEMBOLISM) (H): ICD-10-CM

## 2017-06-07 DIAGNOSIS — Z79.01 LONG-TERM (CURRENT) USE OF ANTICOAGULANTS: ICD-10-CM

## 2017-06-07 DIAGNOSIS — I26.99 OTHER ACUTE PULMONARY EMBOLISM WITHOUT ACUTE COR PULMONALE (H): ICD-10-CM

## 2017-06-07 LAB
ALBUMIN SERPL-MCNC: 3.3 G/DL (ref 3.4–5)
ALP SERPL-CCNC: 84 U/L (ref 40–150)
ALT SERPL W P-5'-P-CCNC: 16 U/L (ref 0–70)
AST SERPL W P-5'-P-CCNC: 18 U/L (ref 0–45)
BILIRUB DIRECT SERPL-MCNC: <0.1 MG/DL (ref 0–0.2)
BILIRUB SERPL-MCNC: 0.3 MG/DL (ref 0.2–1.3)
INR POINT OF CARE: 1.8 (ref 0.86–1.14)
PROT SERPL-MCNC: 6.7 G/DL (ref 6.8–8.8)
PSA SERPL-MCNC: 43.9 UG/L (ref 0–4)

## 2017-06-07 PROCEDURE — 99207 ZZC NO CHARGE NURSE ONLY: CPT

## 2017-06-07 PROCEDURE — 36416 COLLJ CAPILLARY BLOOD SPEC: CPT

## 2017-06-07 PROCEDURE — 80076 HEPATIC FUNCTION PANEL: CPT | Performed by: UROLOGY

## 2017-06-07 PROCEDURE — 85610 PROTHROMBIN TIME: CPT | Mod: QW

## 2017-06-07 PROCEDURE — 84153 ASSAY OF PSA TOTAL: CPT | Performed by: UROLOGY

## 2017-06-07 RX ORDER — WARFARIN SODIUM 5 MG/1
TABLET ORAL
Qty: 70 TABLET | Refills: 0 | Status: SHIPPED | OUTPATIENT
Start: 2017-06-07 | End: 2017-08-21

## 2017-06-07 NOTE — TELEPHONE ENCOUNTER
Warfarin    Last Written Prescription Date: 04/28/17  Last Fill Qty: 30, # refills: 0  Last Office Visit with G, P or [facility] prescribing provider: 05/08/17  Next 5 appointments (look out 90 days)     Jun 14, 2017  9:15 AM CDT   Return Visit with Senthil Jhaveri MD   [facility]    [address] [address]   [phone]            Jun 16, 2017 10:00 AM CDT   Return Visit with Senthil Taylor MD   [facility] ([facility])    [address] [place] [address]   [phone]                   Date and Result of Last PT/INR:   Lab Results   Component Value Date    INR 1.8 06/07/2017    INR 2.2 05/22/2017    INR 0.92 06/10/2016

## 2017-06-07 NOTE — PROGRESS NOTES
ANTICOAGULATION FOLLOW-UP CLINIC VISIT    Patient Name:  Dimitri Neves  Date:  6/7/2017  Contact Type:  Face to Face    SUBJECTIVE:     Patient Findings     Comments No longer on IV antibiotic's.  Now on Keflex orally for the next month.           OBJECTIVE    INR Protime   Date Value Ref Range Status   06/07/2017 1.8 (A) 0.86 - 1.14 Final       ASSESSMENT / PLAN  INR assessment SUB    Recheck INR In: 2 WEEKS    INR Location Clinic      Anticoagulation Summary as of 6/7/2017     INR goal 2.0-3.0   Today's INR 1.8!   Maintenance plan 5 mg (5 mg x 1) on Sun, Tue, Thu; 2.5 mg (5 mg x 0.5) all other days   Full instructions 6/7: 5 mg; Otherwise 5 mg on Sun, Tue, Thu; 2.5 mg all other days   Weekly total 25 mg   Plan last modified Madiha Souza (11/22/2016)   Next INR check 6/21/2017   Target end date Indefinite    Indications   Long-term (current) use of anticoagulants [Z79.01] [Z79.01]  PE (pulmonary thromboembolism) (H) [I26.99]         Anticoagulation Episode Summary     INR check location Clinic Lab    Preferred lab     Send INR reminders to [facility]    Comments       Anticoagulation Care Providers     Provider Role Specialty Phone number    Reginaldo Muir MD [facility] Internal Medicine [phone]            See the Encounter Report to view Anticoagulation Flowsheet and Dosing Calendar (Go to Encounters tab in chart review, and find the Anticoagulation Therapy Visit)        Nadya Martinez RN

## 2017-06-07 NOTE — MR AVS SNAPSHOT
Dimitri Neves   6/7/2017 1:30 PM   Anticoagulation Therapy Visit    Description:  81 year old male   Provider:  SANDRA ANTI COAG   Department:  Be Nurse           INR as of 6/7/2017     Today's INR 1.8!      Anticoagulation Summary as of 6/7/2017     INR goal 2.0-3.0   Today's INR 1.8!   Full instructions 6/7: 5 mg; Otherwise 5 mg on Sun, Tue, Thu; 2.5 mg all other days   Next INR check 6/21/2017    Indications   Long-term (current) use of anticoagulants [Z79.01] [Z79.01]  PE (pulmonary thromboembolism) (H) [I26.99]         Description     Will do a slight bump tonight for INR 1.8.      Your next Anticoagulation Clinic appointment(s)     Jun 21, 2017 10:45 AM CDT   Anticoagulation Visit with BE ANTI COAG   [place] [facility] ([facility] [place])    [address]  [place] [address] [phone]              Contact Numbers     [facility] ([facility])  Please call [phone] with any problems or questions regarding your therapy, or to cancel or reschedule your appointment.          June 2017 Details    Sun Mon Tue Wed Thu Fri Sat         1               2               3                 4               5               6               7      5 mg   See details      8      5 mg         9      2.5 mg         10      2.5 mg           11      5 mg         12      2.5 mg         13      5 mg         14      2.5 mg         15      5 mg         16      2.5 mg         17      2.5 mg           18      5 mg         19      2.5 mg         20      5 mg         21            22               23               24                 25               26               27               28               29               30                 Date Details   06/07 This INR check       Date of next INR:  6/21/2017         How to take your warfarin dose     To take:  2.5 mg Take 0.5 of a 5 mg tablet.    To take:  5 mg Take 1 of the 5 mg tablets.

## 2017-06-09 ENCOUNTER — OFFICE VISIT (OUTPATIENT)
Dept: OPHTHALMOLOGY | Facility: CLINIC | Age: 82
End: 2017-06-09
Payer: COMMERCIAL

## 2017-06-09 DIAGNOSIS — Z96.1 PSEUDOPHAKIA: ICD-10-CM

## 2017-06-09 DIAGNOSIS — H35.30 MACULAR DEGENERATION: ICD-10-CM

## 2017-06-09 DIAGNOSIS — D31.40 IRIS NEVUS, UNSPECIFIED LATERALITY: ICD-10-CM

## 2017-06-09 DIAGNOSIS — H52.4 PRESBYOPIA: ICD-10-CM

## 2017-06-09 DIAGNOSIS — H43.813 POSTERIOR VITREOUS DETACHMENT, BILATERAL: ICD-10-CM

## 2017-06-09 DIAGNOSIS — Z01.01 ENCOUNTER FOR EXAMINATION OF EYES AND VISION WITH ABNORMAL FINDINGS: Primary | ICD-10-CM

## 2017-06-09 DIAGNOSIS — H04.123 DRY EYE SYNDROME, BILATERAL: ICD-10-CM

## 2017-06-09 DIAGNOSIS — H35.81 COTTON WOOL SPOTS: ICD-10-CM

## 2017-06-09 PROCEDURE — 92014 COMPRE OPH EXAM EST PT 1/>: CPT | Performed by: OPHTHALMOLOGY

## 2017-06-09 PROCEDURE — 92015 DETERMINE REFRACTIVE STATE: CPT | Performed by: OPHTHALMOLOGY

## 2017-06-09 ASSESSMENT — CONF VISUAL FIELD
OD_NORMAL: 1
OS_NORMAL: 1

## 2017-06-09 ASSESSMENT — VISUAL ACUITY
OS_CC: 8
CORRECTION_TYPE: GLASSES
OS_PH_CC: 40-1
OD_CC: 20/30
METHOD: SNELLEN - LINEAR
OS_CC: 20/60
OD_CC: 8
OD_CC+: -1

## 2017-06-09 ASSESSMENT — REFRACTION_MANIFEST
OS_CYLINDER: +0.50
OS_SPHERE: PLANO
OS_ADD: +2.75
OS_AXIS: 083
OD_CYLINDER: +0.50
OD_AXIS: 168
OD_SPHERE: +0.50
OD_ADD: +2.75

## 2017-06-09 ASSESSMENT — TONOMETRY
IOP_METHOD: APPLANATION
OS_IOP_MMHG: 14
OD_IOP_MMHG: 15

## 2017-06-09 ASSESSMENT — REFRACTION_WEARINGRX
OS_SPHERE: -0.50
OD_CYLINDER: +0.25
OD_ADD: +3.00
SPECS_TYPE: PAL
OD_SPHERE: PLANO
OS_CYLINDER: +0.25
OS_AXIS: 049
OS_ADD: +3.00
OD_AXIS: 050

## 2017-06-09 ASSESSMENT — CUP TO DISC RATIO
OD_RATIO: 0.0
OS_RATIO: 0.0

## 2017-06-09 ASSESSMENT — SLIT LAMP EXAM - LIDS
COMMENTS: 2+ SCLERAL SHOW
COMMENTS: 2+ SCLERAL SHOW

## 2017-06-09 NOTE — MR AVS SNAPSHOT
"              After Visit Summary   6/9/2017    Dimitri Neves    MRN: [id number]           Patient Information     Date Of Birth          1935        Visit Information        Provider Department      6/9/2017 9:00 AM Josiah Alicea MD [facility]        Today's Diagnoses     Encounter for examination of eyes and vision with abnormal findings    -  1    Presbyopia        Astigmatism, bilateral        Hypermetropia, right        Iris nevus, ou        Dry eye syndrome, bilateral        Macular degeneration, dry, mild, ou        Posterior vitreous detachment, bilateral        Pseudophakia, Yag Caps, ou        Cotton wool spots, od          Care Instructions    Glasses Rx given - optional   Use artificial tears up to 4 times daily both eyes. (Refresh Tears or Systane Ultra/Balance)   Take a multiple vitamin or \"eye vitamin\" daily.  Protect your eyes outdoors from ultraviolet rays with sunglasses and/or brimmed hat.  Have spinach (cooked or raw), colorful fruits, walnuts, hazelnuts, almonds in your diet.  Monitor the vision in each eye weekly - call if any sudden persistent changes.   Call in February 2018 for an appointment in June 2018 for Complete Exam    Dr. Alicea [phone]          Follow-ups after your visit        Your next 10 appointments already scheduled     Jun 14, 2017  9:15 AM CDT   Return Visit with Senthil Jhaveri MD   [facility]    [address] [address]   [phone]            Jun 16, 2017 10:00 AM CDT   Return Visit with Senthil Taylor MD   [facility] ([facility]    [address] [place] [address]  [place] [address] [phone]            Jun 21, 2017 10:45 AM CDT   Anticoagulation Visit with BE ANTI COAG   [facility] [place] ([facility] [place])    [address]  [place] [address] [phone]              Who to contact     If you have " questions or need follow up information about today's clinic visit or your schedule please contact [facility] directly at [phone].  Normal or non-critical lab and imaging results will be communicated to you by MyChart, letter or phone within 4 business days after the clinic has received the results. If you do not hear from us within 7 days, please contact the clinic through [employer] or phone. If you have a critical or abnormal lab result, we will notify you by phone as soon as possible.  Submit refill requests through [employer] or call your pharmacy and they will forward the refill request to us. Please allow 3 business days for your refill to be completed.          Additional Information About Your Visit        [employer] Information     [employer] gives you secure access to your electronic health record. If you see a primary care provider, you can also send messages to your care team and make appointments. If you have questions, please call your primary care clinic.  If you do not have a primary care provider, please call [phone] and they will assist you.        Care EveryWhere ID     This is your Care EveryWhere ID. This could be used by other organizations to access your [facility] medical records  [id number]         Blood Pressure from Last 3 Encounters:   05/08/17 122/66   04/13/17 144/70   02/27/17 128/66    Weight from Last 3 Encounters:   05/08/17 83 kg (183 lb)   04/13/17 83.9 kg (185 lb)   02/27/17 87.1 kg (192 lb)              We Performed the Following     EYE EXAM (SIMPLE-NONBILLABLE)     REFRACTIVE STATUS        Primary Care Provider Office Phone # Fax #    Reginaldo Muir [phone]       [facility] [address] [address]        Thank you!     Thank you for choosing [facility]  for your care. Our goal is always to provide you with excellent care. Hearing back from our patients is one way we can continue to improve our  services. Please take a few minutes to complete the written survey that you may receive in the mail after your visit with us. Thank you!             Your Updated Medication List - Protect others around you: Learn how to safely use, store and throw away your medicines at www.disposemymeds.org.          This list is accurate as of: 6/9/17 10:01 AM.  Always use your most recent med list.                   Brand Name Dispense Instructions for use    albuterol 108 (90 BASE) MCG/ACT Inhaler    PROAIR HFA/PROVENTIL HFA/VENTOLIN HFA    1 Inhaler    Inhale 2 puffs into the lungs every 6 hours as needed for shortness of breath / dyspnea or wheezing       B-12 1000 MCG Tbcr     30 tablet    Take 1,000 mcg by mouth daily       bicalutamide 50 MG tablet    CASODEX    90 tablet    Take 1 tablet (50 mg) by mouth daily       enoxaparin 60 MG/0.6ML injection    LOVENOX    6 Syringe    Inject 0.6 mLs (60 mg) Subcutaneous every 12 hours       gabapentin 300 MG capsule    NEURONTIN     Take 900 mg by mouth At Bedtime       leuprolide 45 MG kit    ELIGARD     Inject 45 mg Subcutaneous every 6 months       * metoprolol 25 MG 24 hr tablet    TOPROL-XL    90 tablet    TAKE 1 TABLET (25 MG) BY MOUTH DAILY       * metoprolol 25 MG 24 hr tablet    TOPROL-XL    90 tablet    TAKE 1 TABLET (25 MG) BY MOUTH DAILY       oxyCODONE 5 MG IR tablet    ROXICODONE    30 tablet    Take 1 tablet by mouth every 6 hours as needed for pain.       * quinapril 40 MG Tab    ACCUPRIL    90 tablet    TAKE 1 TABLET (40 MG) BY MOUTH DAILY - NEEDS TO FOLLOW UP       * quinapril 40 MG Tab    ACCUPRIL    90 tablet    TAKE 1 TABLET (40 MG) BY MOUTH DAILY - NEEDS TO FOLLOW UP       sildenafil 100 MG cap/tab    REVATIO/VIAGRA    10 tablet    Take 1 tablet (100 mg) by mouth daily as needed for erectile dysfunction       simvastatin 40 MG tablet    ZOCOR    45 tablet    Take 0.5 tablets (20 mg) by mouth daily       vitamin D 2000 UNITS tablet      Take 1 tablet by mouth  daily       warfarin 5 MG tablet    COUMADIN    70 tablet    Take 1 tablet (5 mg) on Sunday, Tuesday, Thursdays and 1/2 tablet (2.5 mg) all other days OR as directed by INR clinic       * Notice:  This list has 4 medication(s) that are the same as other medications prescribed for you. Read the directions carefully, and ask your doctor or other care provider to review them with you.

## 2017-06-09 NOTE — PROGRESS NOTES
" Current Eye Medications:  none     Subjective:  Comprehensive Eye Exam.  He is still seeing flickering lights in his vision, but is less frequent.  He feels his vision overall is getting worse, distance and near.  When watching TV, his vision is clearer without correction.       Objective:  See Ophthalmology Exam.       Assessment:  Stable eye exam.  Cotton wool spot of indeterminate etiology right eye - possibly from hypertension.      ICD-10-CM    1. Encounter for examination of eyes and vision with abnormal findings Z01.01 REFRACTIVE STATUS   2. Presbyopia H52.4 REFRACTIVE STATUS   3. Pseudophakia, Yag Caps, ou Z96.1 EYE EXAM (SIMPLE-NONBILLABLE)   4. Macular degeneration, dry, mild, ou H35.30    5. Dry eye syndrome, bilateral H04.123    6. Cotton wool spots, od H35.81    7. Iris nevus, ou D31.40    8. Posterior vitreous detachment, bilateral H43.813         Plan: Glasses Rx given - optional   Use artificial tears up to 4 times daily both eyes. (Refresh Tears or Systane Ultra/Balance)   Take a multiple vitamin or \"eye vitamin\" daily.  Protect your eyes outdoors from ultraviolet rays with sunglasses and/or brimmed hat.  Have spinach (cooked or raw), colorful fruits, walnuts, hazelnuts, almonds in your diet.  Monitor the vision in each eye weekly - call if any sudden persistent changes.   Call in February 2018 for an appointment in June 2018 for Complete Exam    Dr. Alicea [phone]         "

## 2017-06-09 NOTE — PATIENT INSTRUCTIONS
"Glasses Rx given - optional   Use artificial tears up to 4 times daily both eyes. (Refresh Tears or Systane Ultra/Balance)   Take a multiple vitamin or \"eye vitamin\" daily.  Protect your eyes outdoors from ultraviolet rays with sunglasses and/or brimmed hat.  Have spinach (cooked or raw), colorful fruits, walnuts, hazelnuts, almonds in your diet.  Monitor the vision in each eye weekly - call if any sudden persistent changes.   Call in February 2018 for an appointment in June 2018 for Complete Exam    Dr. Alicea [phone]  "

## 2017-06-14 ENCOUNTER — OFFICE VISIT (OUTPATIENT)
Dept: DERMATOLOGY | Facility: CLINIC | Age: 82
End: 2017-06-14
Payer: COMMERCIAL

## 2017-06-14 VITALS — HEART RATE: 67 BPM | DIASTOLIC BLOOD PRESSURE: 57 MMHG | SYSTOLIC BLOOD PRESSURE: 124 MMHG | OXYGEN SATURATION: 96 %

## 2017-06-14 DIAGNOSIS — L82.1 SK (SEBORRHEIC KERATOSIS): ICD-10-CM

## 2017-06-14 DIAGNOSIS — Z85.828 HISTORY OF SKIN CANCER: Primary | ICD-10-CM

## 2017-06-14 DIAGNOSIS — L57.0 AK (ACTINIC KERATOSIS): ICD-10-CM

## 2017-06-14 DIAGNOSIS — C44.42 SQUAMOUS CELL CARCINOMA, SCALP/NECK: ICD-10-CM

## 2017-06-14 DIAGNOSIS — L81.4 LENTIGO: ICD-10-CM

## 2017-06-14 PROCEDURE — 17312 MOHS ADDL STAGE: CPT | Performed by: DERMATOLOGY

## 2017-06-14 PROCEDURE — 17311 MOHS 1 STAGE H/N/HF/G: CPT | Mod: 59 | Performed by: DERMATOLOGY

## 2017-06-14 PROCEDURE — 11101 ZZHC BIOPSY SKIN/SUBQ/MUC MEM, EACH ADDTL LESION: CPT | Performed by: DERMATOLOGY

## 2017-06-14 PROCEDURE — 99213 OFFICE O/P EST LOW 20 MIN: CPT | Mod: 25 | Performed by: DERMATOLOGY

## 2017-06-14 PROCEDURE — 17003 DESTRUCT PREMALG LES 2-14: CPT | Performed by: DERMATOLOGY

## 2017-06-14 PROCEDURE — 88331 PATH CONSLTJ SURG 1 BLK 1SPC: CPT | Mod: 59 | Performed by: DERMATOLOGY

## 2017-06-14 PROCEDURE — 17000 DESTRUCT PREMALG LESION: CPT | Performed by: DERMATOLOGY

## 2017-06-14 PROCEDURE — 17311 MOHS 1 STAGE H/N/HF/G: CPT | Performed by: DERMATOLOGY

## 2017-06-14 PROCEDURE — 11100 ZZHC BIOPSY SKIN/SUBQ/MUC MEM, SINGLE LESION: CPT | Mod: 59 | Performed by: DERMATOLOGY

## 2017-06-14 NOTE — PROGRESS NOTES
Dimitri Neves is a 81 year old year old male patient here today for f/u hx of nmcs.  Today he notes rough spot son face, and growths on scalp.  Scalp lesions have been treated with cryo multiple times and keep coming back.   .  Patient states this has been present for months.  Patient reports the following symptoms:  painful.  Patient reports the following previous treatments none.  Patient reports the following modifying factors none.  Associated symptoms: none.  Patient has no other skin complaints today.  Remainder of the HPI, Meds, PMH, Allergies, FH, and SH was reviewed in chart.    Pertinent Hx:   Non-melanoma skin cancer   Past Medical History:   Diagnosis Date     Actinic keratosis      AK (actinic keratosis) 7/9/2012     Cataract cortical, senile right eye 4/17/2012     DDD (degenerative disc disease), lumbar 1979    s/p 4 back surgeries, spinal cord stimulator implant      Diverticulosis      ED (erectile dysfunction) 2009     GERD (gastroesophageal reflux disease) ~1980     HTN (hypertension), benign ~2000     Macular degeneration, dry, mild, ou 7/23/2016     Multiple lung nodules     Several basilar pulmonary nodules <5 mm     HUDSON (obstructive sleep apnea) 10/2005    on CPAP     Other abnormal glucose 01/14/2009     PE (pulmonary thromboembolism) (H) 1981    after back surgery      Pure hypercholesterolemia ~2000     Squamous cell carcinoma (H) 07/2012    L frontal scalp       Past Surgical History:   Procedure Laterality Date     ARTHROSCOPY KNEE RT/LT  ~1995    Right     C LUMBAR SPINE FUSION,ANTER APPRCH  8/2007    four times total, latest 8/07     CATARACT IOL, RT/LT       LASER YAG CAPSULOTOMY      both eyes     PHACOEMULSIFICATION CLEAR CORNEA WITH STANDARD INTRAOCULAR LENS IMPLANT  6-18-12/7-30-12    right/left eye     ROTATOR CUFF REPAIR RT/LT  ~1995    right        Family History   Problem Relation Age of Onset     CANCER Father      Lung 64y     DIABETES Brother      Hypertension Brother       CANCER Mother      Liver     CEREBROVASCULAR DISEASE Mother      Eye Disorder Mother      Glaucoma Mother      CEREBROVASCULAR DISEASE Maternal Grandmother      C.A.D. No family hx of      Thyroid Disease No family hx of      Macular Degeneration No family hx of        Social History     Social History     Marital status:      Spouse name: Tatiana     Number of children: 2     Years of education: N/A     Occupational History      Retired     Social History Main Topics     Smoking status: Former Smoker     Packs/day: 1.00     Years: 25.00     Types: Cigarettes     Quit date: 1/2/1976     Smokeless tobacco: Never Used      Comment: lives in smoke free household     Alcohol use 7.0 oz/week     14 Standard drinks or equivalent per week      Comment: 2-3 drinks every night     Drug use: No      Comment: tatoos- sterile     Sexual activity: Yes     Partners: Female     Other Topics Concern      Service Yes     [employer] x8 years     Blood Transfusions No     Caffeine Concern No     Hobby Hazards No     Sleep Concern No     Stress Concern No     Weight Concern Yes     Special Diet No     Back Care Yes     Exercise Yes     Seat Belt Yes     Self-Exams No     Parent/Sibling W/ Cabg, Mi Or Angioplasty Before 65f 55m? No     Social History Narrative       Outpatient Encounter Prescriptions as of 6/14/2017   Medication Sig Dispense Refill     warfarin (COUMADIN) 5 MG tablet Take 1 tablet (5 mg) on Sunday, Tuesday, Thursdays and 1/2 tablet (2.5 mg) all other days OR as directed by INR clinic 70 tablet 0     enoxaparin (LOVENOX) 60 MG/0.6ML injection Inject 0.6 mLs (60 mg) Subcutaneous every 12 hours 6 Syringe 0     metoprolol (TOPROL-XL) 25 MG 24 hr tablet TAKE 1 TABLET (25 MG) BY MOUTH DAILY 90 tablet 0     quinapril (ACCUPRIL) 40 MG Tab TAKE 1 TABLET (40 MG) BY MOUTH DAILY - NEEDS TO FOLLOW UP 90 tablet 0     metoprolol (TOPROL-XL) 25 MG 24 hr tablet TAKE 1 TABLET (25 MG) BY MOUTH DAILY 90 tablet 0      quinapril (ACCUPRIL) 40 MG Tab TAKE 1 TABLET (40 MG) BY MOUTH DAILY - NEEDS TO FOLLOW UP 90 tablet 0     albuterol (PROAIR HFA/PROVENTIL HFA/VENTOLIN HFA) 108 (90 BASE) MCG/ACT Inhaler Inhale 2 puffs into the lungs every 6 hours as needed for shortness of breath / dyspnea or wheezing 1 Inhaler 3     gabapentin (NEURONTIN) 300 MG capsule Take 900 mg by mouth At Bedtime       Cyanocobalamin (B-12) 1000 MCG TBCR Take 1,000 mcg by mouth daily 30 tablet 5     bicalutamide (CASODEX) 50 MG tablet Take 1 tablet (50 mg) by mouth daily 90 tablet 3     simvastatin (ZOCOR) 40 MG tablet Take 0.5 tablets (20 mg) by mouth daily 45 tablet 3     sildenafil (VIAGRA) 100 MG tablet Take 1 tablet (100 mg) by mouth daily as needed for erectile dysfunction 10 tablet 3     Cholecalciferol (VITAMIN D) 2000 UNITS tablet Take 1 tablet by mouth daily       leuprolide (ELIGARD) 45 MG injection Inject 45 mg Subcutaneous every 6 months       oxyCODONE (ROXICODONE) 5 MG immediate release tablet Take 1 tablet by mouth every 6 hours as needed for pain. 30 tablet 0     No facility-administered encounter medications on file as of 6/14/2017.              Review Of Systems  Skin: As above  Eyes: negative  Ears/Nose/Throat: negative  Respiratory: No shortness of breath, dyspnea on exertion, cough, or hemoptysis  Cardiovascular: negative  Gastrointestinal: negative  Genitourinary: negative  Musculoskeletal: negative  Neurologic: negative  Psychiatric: negative  Hematologic/Lymphatic/Immunologic: negative  Endocrine: negative      O:   NAD, WDWN, Alert & Oriented, Mood & Affect wnl, Vitals stable   Here today alone   /57  Pulse 67  SpO2 96%   General appearance normal   Vitals stable   Alert, oriented and in no acute distress      Following lymph nodes palpated: Occipital, Cervical, Supraclavicular no lad   Gritty papules on temples   rR knee swollen from knee joint infection   Stuck on papules and brown macules on trunk and ext    R frontal scalp  post 9mm scaly papule    R frontal scalp anterior 1.1cm scaly papule    Mid frontal scalp 1.2cm scaly papule    L frontal scalp 6mm scaly papule    L frontal scalp lateral 1.3cm scaly papule         The remainder of the full exam was unremarkable; the following areas were examined:  conjunctiva/lids, oral mucosa, neck, peripheral vascular system, abdomen, lymph nodes, digits/nails, eccrine and apocrine glands, scalp/hair, face, neck, chest, abdomen, buttocks, back, RUE, LUE, RLE, LLE       Eyes: Conjunctivae/lids:Normal     ENT: Lips, buccal mucosa, tongue: normal    MSK:Normal    Cardiovascular: peripheral edema none    Pulm: Breathing Normal    Lymph Nodes: No Head and Neck Lymphadenopathy     Neuro/Psych: Orientation:Normal; Mood/Affect:Normal      MICRO:    R frontal scalp post 9mm scaly papule :There is hyperkeratosis & parakeratosis of the epidermis, with full thickness epidermal involvement by atypical keratinocytes with rare pale vacuolated cells invading dermis.      R frontal scalp anterior 1.1cm scaly papule :There is hyperkeratosis & parakeratosis of the epidermis, with full thickness epidermal involvement by atypical keratinocytes with rare pale vacuolated cells invading dermis.      Mid frontal scalp 1.2cm scaly papule :There is hyperkeratosis & parakeratosis of the epidermis, with full thickness epidermal involvement by atypical keratinocytes with rare pale vacuolated cells invading dermis.      L frontal scalp 6mm scaly papule :There is hyperkeratosis & parakeratosis of the epidermis, with full thickness epidermal involvement by atypical keratinocytes with rare pale vacuolated cells invading dermis.      L frontal scalp lateral 1.3cm scaly papule  :There is hyperkeratosis & parakeratosis of the epidermis, with full thickness epidermal involvement by atypical keratinocytes with rare pale vacuolated cells invading dermis.    A/P:  1. Actinic keratosis   R temple x3, L templex2  LN2:  Treated with LN2  for 5s for 1-2 cycles. Warned risks of blistering, pain, pigment change, scarring, and incomplete resolution.  Advised patient to return if lesions do not completely resolve.  Wound care sheet given.  2. Hx of non-melanoma skin cancer, seborrheic keratosis, lentigo  3. R/o squamous cell carcinoma   TANGENTIAL BIOPSY IN HOUSE:  After consent, anesthesia with LEC and prep, tangential excision performed and dx above confirmed with frozen section histology.  No complications and routine wound care.  Patient told result   R frontal scalp post 9mm scaly papule squamous cell carcinoma   R frontal scalp anterior 1.1cm scaly papule squamous cell carcinoma   Mid frontal scalp 1.2cm scaly papule squamous cell carcinoma   L frontal scalp 6mm scaly papule squamous cell carcinoma   L frontal scalp lateral 1.3cm scaly papule squamous cell carcinoma     BENIGN LESIONS DISCUSSED WITH PATIENT:  I discussed the specifics of tumor, prognosis, and genetics of benign lesions.  I explained that treatment of these lesions would be purely cosmetic and not medically neccessary.  I discussed with patient different removal options including excision, cautery and /or laser.      Nature and genetics of benign skin lesions dicussed with patient.  Signs and Symptoms of skin cancer discussed with patient.  Patient encouraged to perform monthly skin exams.  UV precautions reviewed with patient.  Skin care regimen reviewed with patient: Eliminate harsh soaps, i.e. Dial, zest, irsih spring; Mild soaps such as Cetaphil or Dove sensitive skin, avoid hot or cold showers, aggressive use of emollients including vanicream, cetaphil or cerave discussed with patient.    Risks of non-melanoma skin cancer discussed with patient   Return to clinic 6 months    PROCEDURE NOTE      R frontal scalp post 9mm scaly papule squamous cell carcinoma   MOHS:   Location    After PGACAC discussed with patient, decision for Mohs surgery was made. Indication for Mohs was  Location. Patient confirmed the site with Dr. Jhaveri.  After anesthesia with LEC, the tumor was excised using standard Mohs technique in 1 stages(s).  CLEAR MARGINS OBTAINED and Final defect size was 1.4 cm.       REPAIR SECOND INTENT: We discussed the options for wound management in full with the patient including risks/benefits/possible outcomes. Decision made to allow the wound to heal by second intention. EBL minimal; complications none; wound care routine.  The patient was discharged in good condition and will return in one month or prn for wound evaluation.    R frontal scalp anterior 1.1cm scaly papule squamous cell carcinoma   MOHS:   Location    After PGACAC discussed with patient, decision for Mohs surgery was made. Indication for Mohs was Location. Patient confirmed the site with Dr. Jhaveri.  After anesthesia with LEC, the tumor was excised using standard Mohs technique in 1 stages(s).  CLEAR MARGINS OBTAINED and Final defect size was 1.5 cm.       REPAIR SECOND INTENT: We discussed the options for wound management in full with the patient including risks/benefits/possible outcomes. Decision made to allow the wound to heal by second intention. EBL minimal; complications none; wound care routine.  The patient was discharged in good condition and will return in one month or prn for wound evaluation.    Mid frontal scalp 1.2cm scaly papule squamous cell carcinoma   MOHS:   Location    After PGACAC discussed with patient, decision for Mohs surgery was made. Indication for Mohs was Location. Patient confirmed the site with Dr. Jhaveri.  After anesthesia with LEC, the tumor was excised using standard Mohs technique in 1 stages(s).  CLEAR MARGINS OBTAINED and Final defect size was 1.5 cm.       REPAIR SECOND INTENT: We discussed the options for wound management in full with the patient including risks/benefits/possible outcomes. Decision made to allow the wound to heal by second intention. EBL minimal;  complications none; wound care routine.  The patient was discharged in good condition and will return in one month or prn for wound evaluation.    L frontal scalp 6mm scaly papule squamous cell carcinoma   MOHS:   Location    After PGACAC discussed with patient, decision for Mohs surgery was made. Indication for Mohs was Location. Patient confirmed the site with Dr. Jhaveri.  After anesthesia with LEC, the tumor was excised using standard Mohs technique in 1 stages(s).  CLEAR MARGINS OBTAINED and Final defect size was 1 cm.       REPAIR SECOND INTENT: We discussed the options for wound management in full with the patient including risks/benefits/possible outcomes. Decision made to allow the wound to heal by second intention. EBL minimal; complications none; wound care routine.  The patient was discharged in good condition and will return in one month or prn for wound evaluation.      L frontal scalp lateral 1.3cm scaly papule squamous cell carcinoma   MOHS:   Location    After PGACAC discussed with patient, decision for Mohs surgery was made. Indication for Mohs was Location. Patient confirmed the site with Dr. Jhaveri.  After anesthesia with LEC, the tumor was excised using standard Mohs technique in 2 stages(s).  CLEAR MARGINS OBTAINED and Final defect size was 2cm.       REPAIR SECOND INTENT: We discussed the options for wound management in full with the patient including risks/benefits/possible outcomes. Decision made to allow the wound to heal by second intention. EBL minimal; complications none; wound care routine.  The patient was discharged in good condition and will return in one month or prn for wound evaluation.

## 2017-06-14 NOTE — MR AVS SNAPSHOT
After Visit Summary   2017    Dimitri Neves    MRN: [id number]           Patient Information     Date Of Birth          1935        Visit Information        Provider Department      2017 9:15 AM Senthil Jhaveri MD [facility]        Care Instructions    Open Wound Care     for ____Scalp/Forehead__________        ? No strenuous activity for 48 hours    ? Take Tylenol as needed for discomfort.                                                .         ? Do not drink alcoholic beverages for 48 hours.    ? Keep the pressure bandage in place for 24 hours. If the bandage becomes blood tinged or loose, reinforce it with gauze and tape.        (Refer to the reverse side of this page for management of bleeding).    ? Remove bandage in 24 hours and begin wound care as follows:     1. Clean area with tap water using a Q tip or gauze pad, (shower / bathe normally)  2. Dry wound with Q tip or gauze pad  3. Apply Aquaphor, Vaseline, Polysporin or Bacitracin Ointment with a Q tip    Do NOT use Neosporin Ointment *  4. Cover the wound with a band-aid or nonstick gauze pad and paper tape.  5. Repeat wound care once a day until wound is completely healed.    It is an old wives tale that a wound heals better when it is exposed to air and allowed to dry out. The wound will heal faster with a better cosmetic result if it is kept moist with ointment and covered with a bandage.  Do not let the wound dry out.      Supplies Needed:                Qtips or gauze pads                Polysporin or Bacitracin Ointment                Bandaids or nonstick gauze pads and paper tape    Wound care kits and brown paper tape are available for purchase at   the pharmacy.    BLEEDIN. Use tightly rolled up gauze or cloth to apply direct pressure over the bandage for 20   minutes.  2. Reapply pressure for an additional 20 minutes if necessary  3. Call the office or go to the nearest emergency room  if pressure fails to stop the bleeding.  4. Use additional gauze and tape to maintain pressure once the bleeding has stopped.  5. Begin wound care 24 hours after surgery as directed.                  WOUND HEALING    1. One week after surgery a pink / red halo will form around the outside of the wound.   This is new skin.  2. The center of the wound will appear yellowish white and produce some drainage.  3. The pink halo will slowly migrate in toward the center of the wound until the wound is covered with new shiny pink skin.  4. There will be no more drainage when the wound is completely healed.  5. It will take six months to one year for the redness to fade.  6. The scar may be itchy, tight and sensitive to extreme temperatures for a year after the surgery.  7. Massaging the area several times a day for several minutes after the wound is completely healed will help the scar soften and normalize faster. Begin massage only after healing is complete.      In case of emergency call: Dr Jhaveri: [phone]     [facility]: [phone]    [facility]: [phone]              Follow-ups after your visit        Follow-up notes from your care team     Return in about 6 months (around 2017) for Skin Check.      Your next 10 appointments already scheduled     2017 10:00 AM CDT   Return Visit with Senthil Taylor MD   [facility]    [address] [place] [address] [place] [address] [phone]            2017 10:45 AM CDT   Anticoagulation Visit with BE ANTI COAG   [facility]    [address] [facility] [place] [id number] [phone]              Who to contact     If you have questions or need follow up information about today's clinic visit or your schedule please contact [facility] directly at [phone].  Normal or non-critical lab and imaging results will be communicated to  you by MyChart, letter or phone within 4 business days after the clinic has received the results. If you do not hear from us within 7 days, please contact the clinic through [facility] or phone. If you have a critical or abnormal lab result, we will notify you by phone as soon as possible.  Submit refill requests through [facility] or call your pharmacy and they will forward the refill request to us. Please allow 3 business days for your refill to be completed.          Additional Information About Your Visit        [facility] Records Information     [facility] gives you secure access to your electronic health record. If you see a primary care provider, you can also send messages to your care team and make appointments. If you have questions, please call your primary care clinic.  If you do not have a primary care provider, please call [phone] and they will assist you.        Care EveryWhere ID     This is your Care EveryWhere ID. This could be used by other organizations to access your [facility] medical records  [id number]        Your Vitals Were     Pulse Pulse Oximetry                67 96%           Blood Pressure from Last 3 Encounters:   06/14/17 124/57   05/08/17 122/66   04/13/17 144/70    Weight from Last 3 Encounters:   05/08/17 83 kg (183 lb)   04/13/17 83.9 kg (185 lb)   02/27/17 87.1 kg (192 lb)              Today, you had the following     No orders found for display       Primary Care Provider Office Phone # Fax #    Reginaldo Muir [phone]       [address] [address]        Thank you!     Thank you for choosing [facility]  for your care. Our goal is always to provide you with excellent care. Hearing back from our patients is one way we can continue to improve our services. Please take a few minutes to complete the written survey that you may receive in the mail after your visit with us. Thank you!             Your Updated Medication List -  Protect others around you: Learn how to safely use, store and throw away your medicines at www.disposemymeds.org.          This list is accurate as of: 6/14/17 10:52 AM.  Always use your most recent med list.                   Brand Name Dispense Instructions for use    albuterol 108 (90 BASE) MCG/ACT Inhaler    PROAIR HFA/PROVENTIL HFA/VENTOLIN HFA    1 Inhaler    Inhale 2 puffs into the lungs every 6 hours as needed for shortness of breath / dyspnea or wheezing       B-12 1000 MCG Tbcr     30 tablet    Take 1,000 mcg by mouth daily       bicalutamide 50 MG tablet    CASODEX    90 tablet    Take 1 tablet (50 mg) by mouth daily       enoxaparin 60 MG/0.6ML injection    LOVENOX    6 Syringe    Inject 0.6 mLs (60 mg) Subcutaneous every 12 hours       gabapentin 300 MG capsule    NEURONTIN     Take 900 mg by mouth At Bedtime       leuprolide 45 MG kit    ELIGARD     Inject 45 mg Subcutaneous every 6 months       * metoprolol 25 MG 24 hr tablet    TOPROL-XL    90 tablet    TAKE 1 TABLET (25 MG) BY MOUTH DAILY       * metoprolol 25 MG 24 hr tablet    TOPROL-XL    90 tablet    TAKE 1 TABLET (25 MG) BY MOUTH DAILY       oxyCODONE 5 MG IR tablet    ROXICODONE    30 tablet    Take 1 tablet by mouth every 6 hours as needed for pain.       * quinapril 40 MG Tab    ACCUPRIL    90 tablet    TAKE 1 TABLET (40 MG) BY MOUTH DAILY - NEEDS TO FOLLOW UP       * quinapril 40 MG Tab    ACCUPRIL    90 tablet    TAKE 1 TABLET (40 MG) BY MOUTH DAILY - NEEDS TO FOLLOW UP       sildenafil 100 MG cap/tab    REVATIO/VIAGRA    10 tablet    Take 1 tablet (100 mg) by mouth daily as needed for erectile dysfunction       simvastatin 40 MG tablet    ZOCOR    45 tablet    Take 0.5 tablets (20 mg) by mouth daily       vitamin D 2000 UNITS tablet      Take 1 tablet by mouth daily       warfarin 5 MG tablet    COUMADIN    70 tablet    Take 1 tablet (5 mg) on Sunday, Tuesday, Thursdays and 1/2 tablet (2.5 mg) all other days OR as directed by INR clinic        * Notice:  This list has 4 medication(s) that are the same as other medications prescribed for you. Read the directions carefully, and ask your doctor or other care provider to review them with you.

## 2017-06-14 NOTE — NURSING NOTE
Chief Complaint   Patient presents with     Derm Problem     skin check       Vitals:    06/14/17 0907   BP: (!) 130/100   Pulse: 67   SpO2: 96%     Wt Readings from Last 1 Encounters:   05/08/17 83 kg (183 lb)       Nishi Calderon LPN.................6/14/2017

## 2017-06-14 NOTE — PATIENT INSTRUCTIONS
Open Wound Care     for ____Scalp/Forehead__________        ? No strenuous activity for 48 hours    ? Take Tylenol as needed for discomfort.                                                .         ? Do not drink alcoholic beverages for 48 hours.    ? Keep the pressure bandage in place for 24 hours. If the bandage becomes blood tinged or loose, reinforce it with gauze and tape.        (Refer to the reverse side of this page for management of bleeding).    ? Remove bandage in 24 hours and begin wound care as follows:     1. Clean area with tap water using a Q tip or gauze pad, (shower / bathe normally)  2. Dry wound with Q tip or gauze pad  3. Apply Aquaphor, Vaseline, Polysporin or Bacitracin Ointment with a Q tip    Do NOT use Neosporin Ointment *  4. Cover the wound with a band-aid or nonstick gauze pad and paper tape.  5. Repeat wound care once a day until wound is completely healed.    It is an old wives tale that a wound heals better when it is exposed to air and allowed to dry out. The wound will heal faster with a better cosmetic result if it is kept moist with ointment and covered with a bandage.  Do not let the wound dry out.      Supplies Needed:                Qtips or gauze pads                Polysporin or Bacitracin Ointment                Bandaids or nonstick gauze pads and paper tape    Wound care kits and brown paper tape are available for purchase at   the pharmacy.    BLEEDIN. Use tightly rolled up gauze or cloth to apply direct pressure over the bandage for 20   minutes.  2. Reapply pressure for an additional 20 minutes if necessary  3. Call the office or go to the nearest emergency room if pressure fails to stop the bleeding.  4. Use additional gauze and tape to maintain pressure once the bleeding has stopped.  5. Begin wound care 24 hours after surgery as directed.                  WOUND HEALING    1. One week after surgery a pink / red halo will form around the outside of the wound.    This is new skin.  2. The center of the wound will appear yellowish white and produce some drainage.  3. The pink halo will slowly migrate in toward the center of the wound until the wound is covered with new shiny pink skin.  4. There will be no more drainage when the wound is completely healed.  5. It will take six months to one year for the redness to fade.  6. The scar may be itchy, tight and sensitive to extreme temperatures for a year after the surgery.  7. Massaging the area several times a day for several minutes after the wound is completely healed will help the scar soften and normalize faster. Begin massage only after healing is complete.      In case of emergency call: Dr Jhaveri: [phone]     [facility]: [phone]    [facility]: [phone]

## 2017-06-16 ENCOUNTER — OFFICE VISIT (OUTPATIENT)
Dept: UROLOGY | Facility: CLINIC | Age: 82
End: 2017-06-16
Payer: COMMERCIAL

## 2017-06-16 VITALS — SYSTOLIC BLOOD PRESSURE: 132 MMHG | HEART RATE: 64 BPM | RESPIRATION RATE: 16 BRPM | DIASTOLIC BLOOD PRESSURE: 60 MMHG

## 2017-06-16 DIAGNOSIS — C61 MALIGNANT NEOPLASM OF PROSTATE (H): Primary | ICD-10-CM

## 2017-06-16 PROCEDURE — 99214 OFFICE O/P EST MOD 30 MIN: CPT | Performed by: UROLOGY

## 2017-06-16 NOTE — MR AVS SNAPSHOT
After Visit Summary   6/16/2017    Dimitri Neves    MRN: [id number]           Patient Information     Date Of Birth          1935        Visit Information        Provider Department      6/16/2017 10:00 AM Senthil Taylor MD [facility]        Today's Diagnoses     Malignant neoplasm of prostate (H)    -  1       Follow-ups after your visit        Additional Services     ONC/HEME ADULT REFERRAL       Your provider has referred you to: [facility]: [facility] - [place] [phone]   [email or web address]    Please be aware that coverage of these services is subject to the terms and limitations of your health insurance plan.  Call member services at your health plan with any benefit or coverage questions.      Please bring the following with you to your appointment:    (1) Any X-Rays, CTs or MRIs which have been performed.  Contact the facility where they were done to arrange for  prior to your scheduled appointment.   (2) List of current medications  (3) This referral request   (4) Any documents/labs given to you for this referral                  Your next 10 appointments already scheduled     Jun 21, 2017 10:45 AM CDT   Anticoagulation Visit with BE ANTI COAG   [facility] [place] ([facility])    [address] [address] [address] [phone]            Dec 11, 2017 11:00 AM CST   Return Visit with Senthil Jhaveri MD   [facility]    [address] [address] [phone]              Who to contact     If you have questions or need follow up information about today's clinic visit or your schedule please contact [facility] directly at [phone].  Normal or non-critical lab and imaging results will be communicated to you by MyChart, letter or phone within 4 business  days after the clinic has received the results. If you do not hear from us within 7 days, please contact the clinic through Carma or phone. If you have a critical or abnormal lab result, we will notify you by phone as soon as possible.  Submit refill requests through Carma or call your pharmacy and they will forward the refill request to us. Please allow 3 business days for your refill to be completed.          Additional Information About Your Visit        Carma Information     Carma gives you secure access to your electronic health record. If you see a primary care provider, you can also send messages to your care team and make appointments. If you have questions, please call your primary care clinic.  If you do not have a primary care provider, please call [phone] and they will assist you.        Care EveryWhere ID     This is your Care EveryWhere ID. This could be used by other organizations to access your [facility] medical records  [id number]        Your Vitals Were     Pulse Respirations                64 16           Blood Pressure from Last 3 Encounters:   06/16/17 132/60   06/14/17 124/57   05/08/17 122/66    Weight from Last 3 Encounters:   05/08/17 83 kg (183 lb)   04/13/17 83.9 kg (185 lb)   02/27/17 87.1 kg (192 lb)              We Performed the Following     ONC/HEME ADULT REFERRAL        Primary Care Provider Office Phone # Fax #    Reginaldo Muir [phone]       [address] [address]        Thank you!     Thank you for choosing [facility]  for your care. Our goal is always to provide you with excellent care. Hearing back from our patients is one way we can continue to improve our services. Please take a few minutes to complete the written survey that you may receive in the mail after your visit with us. Thank you!             Your Updated Medication List - Protect others around you: Learn how to safely use, store  and throw away your medicines at www.disposemymeds.org.          This list is accurate as of: 6/16/17 10:45 AM.  Always use your most recent med list.                   Brand Name Dispense Instructions for use    albuterol 108 (90 BASE) MCG/ACT Inhaler    PROAIR HFA/PROVENTIL HFA/VENTOLIN HFA    1 Inhaler    Inhale 2 puffs into the lungs every 6 hours as needed for shortness of breath / dyspnea or wheezing       B-12 1000 MCG Tbcr     30 tablet    Take 1,000 mcg by mouth daily       bicalutamide 50 MG tablet    CASODEX    90 tablet    Take 1 tablet (50 mg) by mouth daily       enoxaparin 60 MG/0.6ML injection    LOVENOX    6 Syringe    Inject 0.6 mLs (60 mg) Subcutaneous every 12 hours       gabapentin 300 MG capsule    NEURONTIN     Take 900 mg by mouth At Bedtime       leuprolide 45 MG kit    ELIGARD     Inject 45 mg Subcutaneous every 6 months       * metoprolol 25 MG 24 hr tablet    TOPROL-XL    90 tablet    TAKE 1 TABLET (25 MG) BY MOUTH DAILY       * metoprolol 25 MG 24 hr tablet    TOPROL-XL    90 tablet    TAKE 1 TABLET (25 MG) BY MOUTH DAILY       oxyCODONE 5 MG IR tablet    ROXICODONE    30 tablet    Take 1 tablet by mouth every 6 hours as needed for pain.       * quinapril 40 MG Tab    ACCUPRIL    90 tablet    TAKE 1 TABLET (40 MG) BY MOUTH DAILY - NEEDS TO FOLLOW UP       * quinapril 40 MG Tab    ACCUPRIL    90 tablet    TAKE 1 TABLET (40 MG) BY MOUTH DAILY - NEEDS TO FOLLOW UP       sildenafil 100 MG cap/tab    REVATIO/VIAGRA    10 tablet    Take 1 tablet (100 mg) by mouth daily as needed for erectile dysfunction       simvastatin 40 MG tablet    ZOCOR    45 tablet    Take 0.5 tablets (20 mg) by mouth daily       vitamin D 2000 UNITS tablet      Take 1 tablet by mouth daily       warfarin 5 MG tablet    COUMADIN    70 tablet    Take 1 tablet (5 mg) on Sunday, Tuesday, Thursdays and 1/2 tablet (2.5 mg) all other days OR as directed by INR clinic       * Notice:  This list has 4 medication(s) that are the  same as other medications prescribed for you. Read the directions carefully, and ask your doctor or other care provider to review them with you.

## 2017-06-16 NOTE — PROGRESS NOTES
Chief Complaint   Patient presents with     PSA Recheck     psa       Dimitri Neves is a 81 year old male who presents today for follow up of   Chief Complaint   Patient presents with     PSA Recheck     psa   f/u for prostate cancer eileen 8.  Bone scan showed some mets.  He was started on eligard.  He feels fine.  His energy level is lower but he maintains an active life style.  He has been on calcium/vit D.  He denies any bone pain.  Hot flashes are tolerable.  Recent psa has gone up again despite starting on casodex previously.  He has right knee infection and is getting abx for it now.  Current Outpatient Prescriptions   Medication Sig Dispense Refill     warfarin (COUMADIN) 5 MG tablet Take 1 tablet (5 mg) on Sunday, Tuesday, Thursdays and 1/2 tablet (2.5 mg) all other days OR as directed by INR clinic 70 tablet 0     enoxaparin (LOVENOX) 60 MG/0.6ML injection Inject 0.6 mLs (60 mg) Subcutaneous every 12 hours 6 Syringe 0     metoprolol (TOPROL-XL) 25 MG 24 hr tablet TAKE 1 TABLET (25 MG) BY MOUTH DAILY 90 tablet 0     quinapril (ACCUPRIL) 40 MG Tab TAKE 1 TABLET (40 MG) BY MOUTH DAILY - NEEDS TO FOLLOW UP 90 tablet 0     metoprolol (TOPROL-XL) 25 MG 24 hr tablet TAKE 1 TABLET (25 MG) BY MOUTH DAILY 90 tablet 0     quinapril (ACCUPRIL) 40 MG Tab TAKE 1 TABLET (40 MG) BY MOUTH DAILY - NEEDS TO FOLLOW UP 90 tablet 0     albuterol (PROAIR HFA/PROVENTIL HFA/VENTOLIN HFA) 108 (90 BASE) MCG/ACT Inhaler Inhale 2 puffs into the lungs every 6 hours as needed for shortness of breath / dyspnea or wheezing 1 Inhaler 3     gabapentin (NEURONTIN) 300 MG capsule Take 900 mg by mouth At Bedtime       Cyanocobalamin (B-12) 1000 MCG TBCR Take 1,000 mcg by mouth daily 30 tablet 5     bicalutamide (CASODEX) 50 MG tablet Take 1 tablet (50 mg) by mouth daily 90 tablet 3     simvastatin (ZOCOR) 40 MG tablet Take 0.5 tablets (20 mg) by mouth daily 45 tablet 3     sildenafil (VIAGRA) 100 MG tablet Take 1 tablet (100 mg) by mouth  daily as needed for erectile dysfunction 10 tablet 3     Cholecalciferol (VITAMIN D) 2000 UNITS tablet Take 1 tablet by mouth daily       leuprolide (ELIGARD) 45 MG injection Inject 45 mg Subcutaneous every 6 months       oxyCODONE (ROXICODONE) 5 MG immediate release tablet Take 1 tablet by mouth every 6 hours as needed for pain. 30 tablet 0     Allergies   Allergen Reactions     Morphine Sulfate GI Disturbance     vomiting     Vicodin [Hydrocodone-Acetaminophen] Nausea and Vomiting      Past Medical History:   Diagnosis Date     Actinic keratosis      AK (actinic keratosis) 7/9/2012     Cataract cortical, senile right eye 4/17/2012     DDD (degenerative disc disease), lumbar 1979    s/p 4 back surgeries, spinal cord stimulator implant      Diverticulosis      ED (erectile dysfunction) 2009     GERD (gastroesophageal reflux disease) ~1980     HTN (hypertension), benign ~2000     Macular degeneration, dry, mild, ou 7/23/2016     Multiple lung nodules     Several basilar pulmonary nodules <5 mm     HUDSON (obstructive sleep apnea) 10/2005    on CPAP     Other abnormal glucose 01/14/2009     PE (pulmonary thromboembolism) (H) 1981    after back surgery      Pure hypercholesterolemia ~2000     Squamous cell carcinoma (H) 07/2012    L frontal scalp     Past Surgical History:   Procedure Laterality Date     ARTHROSCOPY KNEE RT/LT  ~1995    Right     C LUMBAR SPINE FUSION,ANTER APPRCH  8/2007    four times total, latest 8/07     CATARACT IOL, RT/LT       LASER YAG CAPSULOTOMY      both eyes     PHACOEMULSIFICATION CLEAR CORNEA WITH STANDARD INTRAOCULAR LENS IMPLANT  6-18-12/7-30-12    right/left eye     ROTATOR CUFF REPAIR RT/LT  ~1995    right     Family History   Problem Relation Age of Onset     CANCER Father      Lung 64y     DIABETES Brother      Hypertension Brother      CANCER Mother      Liver     CEREBROVASCULAR DISEASE Mother      Eye Disorder Mother      Glaucoma Mother      CEREBROVASCULAR DISEASE Maternal  Grandmother      ELVIE No family hx of      Thyroid Disease No family hx of      Macular Degeneration No family hx of      History     Social History     Marital Status:      Spouse Name: Tatiana     Number of Children: 2     Years of Education: N/A     Occupational History      Retired     Social History Main Topics     Smoking status: Former Smoker -- 1.00 packs/day for 25 years     Types: Cigarettes     Quit date: 01/02/1976     Smokeless tobacco: Never Used      Comment: lives in smoke free household     Alcohol Use: 7.0 oz/week     14 drink(s) per week      Comment: 2-3 drinks every night     Drug Use: No      Comment: tatoos- sterile     Sexual Activity:     Partners: Female     Other Topics Concern      Service Yes     [employer] x8 years     Blood Transfusions No     Caffeine Concern No     Hobby Hazards No     Sleep Concern No     Stress Concern No     Weight Concern Yes     Special Diet No     Back Care Yes     Exercise Yes     Seat Belt Yes     Self-Exams No     Parent/Sibling W/ Cabg, Mi Or Angioplasty Before 65f 55m? No     Social History Narrative       REVIEW OF SYSTEMS  =================  C: NEGATIVE for fever, chills, change in weight  I: NEGATIVE for worrisome rashes, moles or lesions  E/M: NEGATIVE for ear, mouth and throat problems  R: NEGATIVE for significant cough or SHORTNESS OF BREATH,   CV: NEGATIVE for chest pain, palpitations or peripheral edema  GI: NEGATIVE for nausea, abdominal pain, heartburn, or change in bowel habits  NEURO: NEGATIVE any motor/sensory changes  PSYCH: NEGATIVE for recent mood disorder    Physical Exam:  /60 (BP Location: Right arm, Patient Position: Chair, Cuff Size: Adult Regular)  Pulse 64  Resp 16   Patient is pleasant, in no acute distress, good general condition.  Lung: no evidence of respiratory distress    Abdomen: Soft, nondistended, non tender. No masses. No rebound or guarding.   Exam: no cva tenderness  Skin: Warm and dry.  No  redness.  Psych: normal mood and affect  Neuro: alert and oriented  CT abdomen and pelvis with contrast 12/29/2014    Comparison: None    History: Prostate cancer    Technique: Volumetric CT acquisition through the abdomen and pelvis:  The administration of 123 mL Isovue-370 intravenous contrast,  reformatted in axial, coronal and sagittal planes.    Findings: Liver, spleen and pancreas are unremarkable.  Cholecystectomy. Mild nodular thickening of both adrenal glands.    Bilateral renal cortical cysts measuring up to 2.2 cm on the right. No  hydronephrosis or stone. Bladder is unremarkable. Prostate is  heterogeneous and enlarged measuring 4.2 x 5.1 cm. No free fluid in  the pelvis.    Sigmoid diverticulosis without diverticulitis. No abnormal bowel wall  thickening or enhancement. Moderate stool burden.    No lymphadenopathy in the abdomen or pelvis.    Mild atherosclerotic calcifications of the aorta and iliofemoral  arteries which are all normal in caliber. Other major vasculature is  patent and normal in caliber.    Several basilar pulmonary nodules. For example:  5 mm lingular(series 3, image 54)  4 mm left lower lobe (series 3, image 4)  3 mm right middle lobe (series 3, image 21)    Bibasilar fibroatelectasis. No pleural effusion or consolidation.    Posterior instrumentation of L2-3. No suspicious osseous lesions.          Impression           Impression:  1. No evidence of metastatic disease in the abdomen or pelvis.  2. Several basilar pulmonary nodules measuring up to 5 mm as described  above. Comparison with prior studies, or followup to document 2 years  of stability is recommended per Fleischner criteria.  3. Prostatomegaly.  4. Diverticulosis.        EXAMINATION: NM BONE SCAN WHOLE BODY  Whole-body bone scan, 12/29/2014 3:02 PM     HISTORY: Malignant neoplasm of prostate     ADDITIONAL INFORMATION: none    COMPARISON: Same day CT of the abdomen and pelvis.    TECHNIQUE: The patient received 25.6 mCi  of Tc-99m MDP intravenously.  Whole body bone images were obtained at 3 hours.    FINDINGS: Foci of radiotracer uptake in the upper cervical vertebrae  on the left, right posterior 3rd rib and right ischium suggestive of  osteoblastic metastases. Another focus of radiotracer uptake noted in  the posterior left 11th rib. There is corresponding focal expansile  lytic lesion on CT images. Also seen are degenerative changes in the  bilateral shoulder, wrist and knee joints as well as the lumbar  vertebrae.          Impression           IMPRESSION: Metastatic foci in the upper cervical vertebrae on the  left, right posterior 3rd rib and right ischium. There is also focal  uptake in the posterior left 11th rib with corresponding lytic  expansile appearance on CT, suspicious for metastasis.     I have personally reviewed the examination and initial interpretation  and I agree with the findings.    TREMAINE ROSSI MD          Assessment/Plan:   (185) Malignant neoplasm of prostate  (primary encounter diagnosis)  Comment:    Plan: cancer progressing - d/c casodex           Discussed eligard - referral made to Dr. Valenzuela           Will discuss with his ID MD about using zytiga/prednisone with ongoing knee infection.  xtendi is another option but recent data showed it might not be as good as zytiga.

## 2017-06-16 NOTE — NURSING NOTE
Chief Complaint   Patient presents with     PSA Recheck     psa       Initial /60 (BP Location: Right arm, Patient Position: Chair, Cuff Size: Adult Regular)  Pulse 64  Resp 16 Estimated body mass index is 24.82 kg/(m^2) as calculated from the following:    Height as of 5/8/17: 1.829 m (6').    Weight as of 5/8/17: 83 kg (183 lb).  Medication Reconciliation: complete   Pooja Fatima, CMA

## 2017-06-17 ENCOUNTER — MYC MEDICAL ADVICE (OUTPATIENT)
Dept: UROLOGY | Facility: CLINIC | Age: 82
End: 2017-06-17

## 2017-06-17 DIAGNOSIS — C61 MALIGNANT NEOPLASM OF PROSTATE (H): Primary | ICD-10-CM

## 2017-06-19 DIAGNOSIS — E78.5 HYPERLIPIDEMIA LDL GOAL <130: ICD-10-CM

## 2017-06-19 RX ORDER — ABIRATERONE ACETATE 250 MG/1
1000 TABLET ORAL DAILY
Qty: 120 TABLET | Refills: 11 | Status: SHIPPED | OUTPATIENT
Start: 2017-06-19 | End: 2017-12-11

## 2017-06-19 RX ORDER — PREDNISONE 5 MG/1
5 TABLET ORAL 2 TIMES DAILY
Qty: 60 TABLET | Refills: 11 | Status: SHIPPED | OUTPATIENT
Start: 2017-06-19 | End: 2017-12-11

## 2017-06-19 NOTE — TELEPHONE ENCOUNTER
Simvastatin     Last Written Prescription Date: 03/19/17  Last Fill Quantity: 45, # refills: 2  Last Office Visit with [facility] or [facility] prescribing provider: 05/08/17       Lab Results   Component Value Date    CHOL 177 07/19/2016     Lab Results   Component Value Date    HDL 71 07/19/2016     Lab Results   Component Value Date    LDL 86 07/19/2016     Lab Results   Component Value Date    TRIG 98 07/19/2016     Lab Results   Component Value Date    CHOLHDLRATIO 2.4 04/07/2015

## 2017-06-19 NOTE — TELEPHONE ENCOUNTER
Called and spoke to patient. Updated about result numbers. As well as information about Zytiga. Lisa Esquivel RN

## 2017-06-20 RX ORDER — SIMVASTATIN 40 MG
20 TABLET ORAL DAILY
Qty: 45 TABLET | Refills: 0 | Status: SHIPPED | OUTPATIENT
Start: 2017-06-20 | End: 2017-09-17

## 2017-06-20 NOTE — TELEPHONE ENCOUNTER
Medication is being filled for 1 time refill only due to:  Patient needs labs FLP. Future labs ordered FLP.     Signed Prescriptions:                        Disp   Refills    simvastatin (ZOCOR) 40 MG tablet           45 tab*0        Sig: Take 0.5 tablets (20 mg) by mouth daily  Authorizing Provider: BLANE ZHENG  Ordering User: CONRADO ABURTO, RN, BSN

## 2017-06-21 ENCOUNTER — TELEPHONE (OUTPATIENT)
Dept: FAMILY MEDICINE | Facility: CLINIC | Age: 82
End: 2017-06-21

## 2017-06-21 ENCOUNTER — ANTICOAGULATION THERAPY VISIT (OUTPATIENT)
Dept: NURSING | Facility: CLINIC | Age: 82
End: 2017-06-21
Payer: COMMERCIAL

## 2017-06-21 ENCOUNTER — TELEPHONE (OUTPATIENT)
Dept: UROLOGY | Facility: CLINIC | Age: 82
End: 2017-06-21

## 2017-06-21 DIAGNOSIS — Z79.01 LONG-TERM (CURRENT) USE OF ANTICOAGULANTS: ICD-10-CM

## 2017-06-21 DIAGNOSIS — I26.99 PE (PULMONARY THROMBOEMBOLISM) (H): ICD-10-CM

## 2017-06-21 LAB — INR POINT OF CARE: 2 (ref 0.86–1.14)

## 2017-06-21 PROCEDURE — 99207 ZZC NO CHARGE NURSE ONLY: CPT

## 2017-06-21 PROCEDURE — 36416 COLLJ CAPILLARY BLOOD SPEC: CPT

## 2017-06-21 PROCEDURE — 85610 PROTHROMBIN TIME: CPT | Mod: QW

## 2017-06-21 NOTE — PROGRESS NOTES
ANTICOAGULATION FOLLOW-UP CLINIC VISIT    Patient Name:  Dimitri Neves  Date:  6/21/2017  Contact Type:  Face to Face    SUBJECTIVE:     Patient Findings     Positives No Problem Findings    Comments Will be starting new med's for prostate, one med being prednisone.  Will bring back in 10 days.           OBJECTIVE    INR Protime   Date Value Ref Range Status   06/21/2017 2.0 (A) 0.86 - 1.14 Final       ASSESSMENT / PLAN  INR assessment THER    Recheck INR In: 10 DAYS starting prednisone   INR Location Clinic      Anticoagulation Summary as of 6/21/2017     INR goal 2.0-3.0   Today's INR 2.0   Maintenance plan 5 mg (5 mg x 1) on Sun, Tue, Thu; 2.5 mg (5 mg x 0.5) all other days   Full instructions 5 mg on Sun, Tue, Thu; 2.5 mg all other days   Weekly total 25 mg   No change documented Nadya Martinez, RN   Plan last modified Madiha Souza (11/22/2016)   Next INR check 7/3/2017   Target end date Indefinite    Indications   Long-term (current) use of anticoagulants [Z79.01] [Z79.01]  PE (pulmonary thromboembolism) (H) [I26.99]         Anticoagulation Episode Summary     INR check location Clinic Lab    Preferred lab     Send INR reminders to [facility]    Comments       Anticoagulation Care Providers     Provider Role Specialty Phone number    Reginaldo Muir MD [facility] [phone]            See the Encounter Report to view Anticoagulation Flowsheet and Dosing Calendar (Go to Encounters tab in chart review, and find the Anticoagulation Therapy Visit)        Nadya Martinez RN

## 2017-06-21 NOTE — MR AVS SNAPSHOT
Dimitri Neves   6/21/2017 10:45 AM   Anticoagulation Therapy Visit    Description:  81 year old male   Provider:  SANDRA ANTI COAG   Department:  [facility]           INR as of 6/21/2017     Today's INR 2.0      Anticoagulation Summary as of 6/21/2017     INR goal 2.0-3.0   Today's INR 2.0   Full instructions 5 mg on Sun, Tue, Thu; 2.5 mg all other days   Next INR check 7/3/2017    Indications   Long-term (current) use of anticoagulants [Z79.01] [Z79.01]  PE (pulmonary thromboembolism) (H) [I26.99]         Your next Anticoagulation Clinic appointment(s)     Jul 03, 2017  9:30 AM CDT   Anticoagulation Visit with SANDRA ANTI JOSE LUIS   [place] [facility] ([facility] [place])    [address]  [place] [id number] [phone]              Contact Numbers     [facility]  Please call [phone] with any problems or questions regarding your therapy, or to cancel or reschedule your appointment.          June 2017 Details    Sun Mon Tue Wed Thu Fri Sat         1               2               3                 4               5               6               7               8               9               10                 11               12               13               14               15               16               17                 18               19               20               21      2.5 mg   See details      22      5 mg         23      2.5 mg         24      2.5 mg           25      5 mg         26      2.5 mg         27      5 mg         28      2.5 mg         29      5 mg         30      2.5 mg           Date Details   06/21 This INR check               How to take your warfarin dose     To take:  2.5 mg Take 0.5 of a 5 mg tablet.    To take:  5 mg Take 1 of the 5 mg tablets.           July 2017 Details    Sun Mon Tue Wed Thu Fri Sat           1      2.5 mg           2      5 mg         3            4               5               6               7               8                 9                10               11               12               13               14               15                 16               17               18               19               20               21               22                 23               24               25               26               27               28               29                 30               31                     Date Details   No additional details    Date of next INR:  7/3/2017         How to take your warfarin dose     To take:  2.5 mg Take 0.5 of a 5 mg tablet.    To take:  5 mg Take 1 of the 5 mg tablets.

## 2017-06-21 NOTE — TELEPHONE ENCOUNTER
Reason for call:  Please call with information for drug program  Phone number to reach patient:  Other phone number:  [phone]    Best Time:  anytime    Can we leave a detailed message on this number?  YES

## 2017-06-21 NOTE — TELEPHONE ENCOUNTER
Reason for Call:  Other prescription    Detailed comments:  Patient calling. He was given a rx of zytiga. It is very expensive. Would like to discuss it further. Please call to discuss. He does not see the dr at the  for 3 weeks. What to do in the meantime?     Phone Number Patient can be reached at: Home number on file [phone] (home)    Best Time:  Any     Can we leave a detailed message on this number? YES    Call taken on 6/21/2017 at 3:51 PM by Brittani Lyons

## 2017-06-21 NOTE — TELEPHONE ENCOUNTER
This should have gone to Dr. Taylor but called pharmacy and gave them his NPI and diagnoses code.    Modesta Laguna, CMA

## 2017-06-22 NOTE — TELEPHONE ENCOUNTER
Called and spoke to patient.  Patient is questioning weather or not Dr. Valenzuela will want him to be taking Zytiga.  Explained that more then likely yes, however if he wants to wait until his up coming appointment that would be fine.  Patient agrees. Lisa Esquivel RN

## 2017-06-26 NOTE — TELEPHONE ENCOUNTER
Prudence with [employer] called to confirm if we have received Care of Benefits form . Just an FYI..

## 2017-06-30 NOTE — TELEPHONE ENCOUNTER
Paperwork received. Provider is working on it and will fax back when complete.  Pooja Fatima, CMA

## 2017-07-03 ENCOUNTER — ANTICOAGULATION THERAPY VISIT (OUTPATIENT)
Dept: NURSING | Facility: CLINIC | Age: 82
End: 2017-07-03
Payer: COMMERCIAL

## 2017-07-03 DIAGNOSIS — I26.99 PE (PULMONARY THROMBOEMBOLISM) (H): ICD-10-CM

## 2017-07-03 DIAGNOSIS — Z79.01 LONG-TERM (CURRENT) USE OF ANTICOAGULANTS: ICD-10-CM

## 2017-07-03 LAB — INR POINT OF CARE: 1.7 (ref 0.86–1.14)

## 2017-07-03 PROCEDURE — 36416 COLLJ CAPILLARY BLOOD SPEC: CPT

## 2017-07-03 PROCEDURE — 99207 ZZC NO CHARGE NURSE ONLY: CPT

## 2017-07-03 PROCEDURE — 85610 PROTHROMBIN TIME: CPT | Mod: QW

## 2017-07-03 NOTE — MR AVS SNAPSHOT
Dimitri Neves   7/3/2017 9:30 AM   Anticoagulation Therapy Visit    Description:  81 year old male   Provider:  SANDRA ANTI COAG   Department:  Be Nurse           INR as of 7/3/2017     Today's INR 1.7!      Anticoagulation Summary as of 7/3/2017     INR goal 2.0-3.0   Today's INR 1.7!   Full instructions 7/3: 5 mg; Otherwise 2.5 mg on Mon, Wed, Sat; 5 mg all other days   Next INR check 7/17/2017    Indications   Long-term (current) use of anticoagulants [Z79.01] [Z79.01]  PE (pulmonary thromboembolism) (H) [I26.99]         Your next Anticoagulation Clinic appointment(s)     Jul 17, 2017  9:15 AM CDT   Anticoagulation Visit with BE ANTI COAG   [facility] [place])    [address] [facility]  [place] [id number] [phone]              Contact Numbers     [facility]  Please call [phone] with any problems or questions regarding your therapy, or to cancel or reschedule your appointment.          July 2017 Details    Sun Mon Tue Wed Thu Fri Sat           1                 2               3      5 mg   See details      4      5 mg         5      2.5 mg         6      5 mg         7      5 mg         8      2.5 mg           9      5 mg         10      2.5 mg         11      5 mg         12      2.5 mg         13      5 mg         14      5 mg         15      2.5 mg           16      5 mg         17            18               19               20               21               22                 23               24               25               26               27               28               29                 30               31                     Date Details   07/03 This INR check       Date of next INR:  7/17/2017         How to take your warfarin dose     To take:  2.5 mg Take 0.5 of a 5 mg tablet.    To take:  5 mg Take 1 of the 5 mg tablets.

## 2017-07-03 NOTE — PROGRESS NOTES
ANTICOAGULATION FOLLOW-UP CLINIC VISIT    Patient Name:  Dimitri Neves  Date:  7/3/2017  Contact Type:  Face to Face    SUBJECTIVE:     Patient Findings     Positives Unexplained INR or factor level change           OBJECTIVE    INR Protime   Date Value Ref Range Status   07/03/2017 1.7 (A) 0.86 - 1.14 Final       ASSESSMENT / PLAN  INR assessment SUB    Recheck INR In: 2 WEEKS    INR Location Clinic      Anticoagulation Summary as of 7/3/2017     INR goal 2.0-3.0   Today's INR 1.7!   Maintenance plan 2.5 mg (5 mg x 0.5) on Mon, Wed, Sat; 5 mg (5 mg x 1) all other days   Full instructions 7/3: 5 mg; Otherwise 2.5 mg on Mon, Wed, Sat; 5 mg all other days   Weekly total 27.5 mg   Plan last modified Roderick Chaparro (7/3/2017)   Next INR check 7/17/2017   Target end date Indefinite    Indications   Long-term (current) use of anticoagulants [Z79.01] [Z79.01]  PE (pulmonary thromboembolism) (H) [I26.99]         Anticoagulation Episode Summary     INR check location Clinic Lab    Preferred lab     Send INR reminders to [facility]AG CLINIC    Comments       Anticoagulation Care Providers     Provider Role Specialty Phone number    Reginaldo Muir MD [facility] Internal Medicine [phone]            See the Encounter Report to view Anticoagulation Flowsheet and Dosing Calendar (Go to Encounters tab in chart review, and find the Anticoagulation Therapy Visit)        RODERICK CHAPARRO

## 2017-07-10 ENCOUNTER — TELEPHONE (OUTPATIENT)
Dept: PEDIATRICS | Facility: CLINIC | Age: 82
End: 2017-07-10

## 2017-07-10 ENCOUNTER — ONCOLOGY VISIT (OUTPATIENT)
Dept: ONCOLOGY | Facility: CLINIC | Age: 82
End: 2017-07-10
Payer: COMMERCIAL

## 2017-07-10 ENCOUNTER — DOCUMENTATION ONLY (OUTPATIENT)
Dept: PHARMACY | Facility: CLINIC | Age: 82
End: 2017-07-10

## 2017-07-10 VITALS
RESPIRATION RATE: 16 BRPM | DIASTOLIC BLOOD PRESSURE: 67 MMHG | TEMPERATURE: 97.2 F | HEART RATE: 59 BPM | WEIGHT: 175.3 LBS | SYSTOLIC BLOOD PRESSURE: 126 MMHG | HEIGHT: 72 IN | BODY MASS INDEX: 23.74 KG/M2

## 2017-07-10 DIAGNOSIS — C79.51 BONE METASTASIS: ICD-10-CM

## 2017-07-10 DIAGNOSIS — C79.51 BONE METASTASIS: Primary | ICD-10-CM

## 2017-07-10 DIAGNOSIS — C61 MALIGNANT NEOPLASM OF PROSTATE (H): ICD-10-CM

## 2017-07-10 DIAGNOSIS — C61 MALIGNANT NEOPLASM OF PROSTATE (H): Primary | ICD-10-CM

## 2017-07-10 PROCEDURE — 99205 OFFICE O/P NEW HI 60 MIN: CPT | Performed by: INTERNAL MEDICINE

## 2017-07-10 RX ORDER — ZOLEDRONIC ACID 0.04 MG/ML
4 INJECTION, SOLUTION INTRAVENOUS ONCE
Status: CANCELLED | OUTPATIENT
Start: 2018-06-28 | End: 2017-10-30

## 2017-07-10 RX ORDER — ZOLEDRONIC ACID 0.04 MG/ML
4 INJECTION, SOLUTION INTRAVENOUS ONCE
Status: CANCELLED | OUTPATIENT
Start: 2018-08-09 | End: 2017-11-27

## 2017-07-10 RX ORDER — PROCHLORPERAZINE MALEATE 10 MG
5 TABLET ORAL EVERY 6 HOURS PRN
Qty: 30 TABLET | Refills: 2 | Status: SHIPPED | OUTPATIENT
Start: 2017-07-10 | End: 2017-09-20

## 2017-07-10 RX ORDER — ZOLEDRONIC ACID 0.04 MG/ML
4 INJECTION, SOLUTION INTRAVENOUS ONCE
Status: CANCELLED | OUTPATIENT
Start: 2017-11-27 | End: 2017-10-02

## 2017-07-10 RX ORDER — ABIRATERONE ACETATE 250 MG/1
1000 TABLET ORAL DAILY
Qty: 120 TABLET | Refills: 0 | Status: SHIPPED | OUTPATIENT
Start: 2017-07-10 | End: 2017-08-09

## 2017-07-10 RX ORDER — ZOLEDRONIC ACID 0.04 MG/ML
4 INJECTION, SOLUTION INTRAVENOUS ONCE
Status: CANCELLED | OUTPATIENT
Start: 2017-08-10 | End: 2017-08-07

## 2017-07-10 RX ORDER — LORAZEPAM 0.5 MG/1
0.5 TABLET ORAL EVERY 4 HOURS PRN
Qty: 30 TABLET | Refills: 2 | Status: SHIPPED | OUTPATIENT
Start: 2017-07-10 | End: 2018-04-10

## 2017-07-10 RX ORDER — PREDNISONE 5 MG/1
5 TABLET ORAL 2 TIMES DAILY
Qty: 60 TABLET | Refills: 0 | Status: SHIPPED | OUTPATIENT
Start: 2017-07-10 | End: 2017-08-31

## 2017-07-10 RX ORDER — ZOLEDRONIC ACID 0.04 MG/ML
4 INJECTION, SOLUTION INTRAVENOUS ONCE
Status: CANCELLED | OUTPATIENT
Start: 2017-10-16 | End: 2017-09-04

## 2017-07-10 RX ORDER — ZOLEDRONIC ACID 0.04 MG/ML
4 INJECTION, SOLUTION INTRAVENOUS ONCE
Status: CANCELLED | OUTPATIENT
Start: 2017-07-13 | End: 2017-07-10

## 2017-07-10 NOTE — MR AVS SNAPSHOT
After Visit Summary   7/10/2017    Dimitri Neves    MRN: [id number]           Patient Information     Date Of Birth          1935        Visit Information        Provider Department      7/10/2017 11:00 AM Rafael Valenzuela MD [facility]        Today's Diagnoses     Malignant neoplasm of prostate (H)    -  1    Bone metastasis (H)           Follow-ups after your visit        Your next 10 appointments already scheduled     Jul 13, 2017  9:30 AM CDT   LAB with LAB ONC [facility])    [address] [facility] [address] [phone]           Patient must bring picture ID.  Patient should be prepared to give a urine specimen  Please do not eat 10-12 hours before your appointment if you are coming in fasting for labs on lipids, cholesterol, or glucose (sugar).  Pregnant women should follow their Care Team instructions. Water with medications is okay. Do not drink coffee or other fluids.   If you have concerns about taking  your medications, please ask at office or if scheduling via [employer], send a message by clicking on Secure Messaging, Message Your Care Team.            Jul 13, 2017 10:00 AM CDT   Level O with BAY 7 INFUSION   [facility])    [address] [facility] [id number] [phone]            Jul 17, 2017  9:00 AM CDT   NM INJECTION with MGNMINJ1   [facility])    [address] [facility] [id number] [phone]            Jul 17, 2017  9:15 AM CDT   Anticoagulation Visit with BE ANTI COAG   [facility] [place] ([facility] [place])    [address] [facility] [address] [facility]  [place] [address] [phone]            Jul 17, 2017 10:30 AM CDT   CT CHEST/ABDOMEN/PELVIS W CONTRAST with MGCT1   [facility])    [address] [facility]  [place] [id number] [phone]           Please bring any scans or X-rays taken at other hospitals, if similar tests were done. Also bring a list of your medicines, including vitamins, minerals and over-the-counter drugs. It is safest to leave personal items at home.  Be sure to tell your doctor:   If you have any allergies.   If there s any chance you are pregnant.   If you are breastfeeding.   If you have any special needs.  You may have contrast for this exam. To prepare:   Do not eat or drink for 2 hours before your exam. If you need to take medicine, you may take it with small sips of water. (We may ask you to take liquid medicine as well.)   The day before your exam, drink extra fluids at least six 8-ounce glasses (unless your doctor tells you to restrict your fluids).  Patients over 70 or patients with diabetes or kidney problems:   If you haven t had a blood test (creatinine test) within the last 30 days, go to your clinic or Diagnostic Imaging Department for this test.  If you have diabetes:   If your kidney function is normal, continue taking your metformin (Avandamet, Glucophage, Glucovance, Metaglip) on the day of your exam.   If your kidney function is abnormal, wait 48 hours before restarting this medicine.  You will have oral contrast for this exam:   You will drink the contrast at home. Get this from your clinic or Diagnostic Imaging Department. Please follow the directions given.  Please wear loose clothing, such as a sweat suit or jogging clothes. Avoid snaps, zippers and other metal. We may ask you to undress and put on a hospital gown.  If you have any questions, please call the Imaging Department where you will have your exam.            Jul 17, 2017 12:00 PM CDT   NM BONE SCAN WHOLE BODY with MGNM1   [facility])    [address] [facility] [address] [phone]           Please bring a list of your medicines to the exam. (Include  vitamins, minerals and over-the-counter drugs.) You should wear comfortable clothes. Leave your valuables at home. Please bring related prior results and films. Tell your doctor:   If you are breastfeeding or may be pregnant.   If you have had a barium test within the past few days. Barium may change the results of certain exams.   If you think you may need sedation (medicine to help you relax).  You may eat and drink as normal.  Drink plenty of fluids and empty bladder frequently between injection and scans.            Aug 21, 2017  9:15 AM CDT   LAB with LAB ONC [facility] ([facility])    [address] [place] [id number] [phone]           Patient must bring picture ID.  Patient should be prepared to give a urine specimen  Please do not eat 10-12 hours before your appointment if you are coming in fasting for labs on lipids, cholesterol, or glucose (sugar).  Pregnant women should follow their Care Team instructions. Water with medications is okay. Do not drink coffee or other fluids.   If you have concerns about taking  your medications, please ask at office or if scheduling via [employer], send a message by clicking on Secure Messaging, Message Your Care Team.            Aug 21, 2017 10:00 AM CDT   Return Visit with Rafael Valenzuela MD   [facility] ([facility])    [address] [place] [id number] [phone]            Dec 11, 2017 11:00 AM CST   Return Visit with Senthil Jhaveri MD   [facility] ([facility])    [address] [place] [address] [phone]              Future tests that were ordered for you today     Open Standing Orders        Priority Remaining Interval Expires Ordered    CBC with platelets differential Routine 25/25  7/10/2018 7/10/2017    Comprehensive metabolic panel Routine 25/25  7/10/2018 7/10/2017    PSA tumor marker  Routine 25/25  7/10/2018 7/10/2017          Open Future Orders        Priority Expected Expires Ordered    CT Chest/Abdomen/Pelvis w Contrast Routine 7/10/2017 8/10/2017 7/10/2017    NM Bone Scan Whole Body Routine 7/10/2017 8/10/2017 7/10/2017            Who to contact     If you have questions or need follow up information about today's clinic visit or your schedule please contact [facility] directly at [phone].  Normal or non-critical lab and imaging results will be communicated to you by [employer]ile[employer], letter or phone within 4 business days after the clinic has received the results. If you do not hear from us within 7 days, please contact the clinic through [employer] or phone. If you have a critical or abnormal lab result, we will notify you by phone as soon as possible.  Submit refill requests through [employer] or call your pharmacy and they will forward the refill request to us. Please allow 3 business days for your refill to be completed.          Additional Information About Your Visit        [employer]ile[employer] Information     [employer] gives you secure access to your electronic health record. If you see a primary care provider, you can also send messages to your care team and make appointments. If you have questions, please call your primary care clinic.  If you do not have a primary care provider, please call [phone] and they will assist you.      [employer] is an electronic gateway that provides easy, online access to your medical records. With [employer], you can request a clinic appointment, read your test results, renew a prescription or communicate with your care team.     To access your existing account, please contact your [facility] or call [phone] for assistance.        Care EveryWhere ID     This is your Care EveryWhere ID. This could be used by other organizations to access your [place] medical records  [id number]        Your Vitals Were     Pulse  Temperature Respirations Height BMI (Body Mass Index)       59 97.2  F (36.2  C) (Oral) 16 1.829 m (6') 23.77 kg/m2        Blood Pressure from Last 3 Encounters:   07/10/17 126/67   06/16/17 132/60   06/14/17 124/57    Weight from Last 3 Encounters:   07/10/17 79.5 kg (175 lb 4.8 oz)   05/08/17 83 kg (183 lb)   04/13/17 83.9 kg (185 lb)               Primary Care Provider    Aliya Olvera, RN       No address on file        Equal Access to Services     [facility]: Hadii nahid martins hadasho Soomaali, waaxda luqadaha, qaybta kaalmada kishan, naty willis . So [facility] [phone].    ATENCIÓN: Si habla español, tiene a roberts disposición servicios gratuitos de asistencia lingüística. Llame al [phone].    We comply with applicable federal civil rights laws and [place] laws. We do not discriminate on the basis of race, color, national origin, age, disability sex, sexual orientation or gender identity.            Thank you!     Thank you for choosing [facility]  for your care. Our goal is always to provide you with excellent care. Hearing back from our patients is one way we can continue to improve our services. Please take a few minutes to complete the written survey that you may receive in the mail after your visit with us. Thank you!             Your Updated Medication List - Protect others around you: Learn how to safely use, store and throw away your medicines at www.disposemymeds.org.          This list is accurate as of: 7/10/17  2:04 PM.  Always use your most recent med list.                   Brand Name Dispense Instructions for use Diagnosis    abiraterone 250 MG tablet    ZYTIGA    120 tablet    Take 4 tablets (1,000 mg) by mouth daily    Malignant neoplasm of prostate (H)       albuterol 108 (90 BASE) MCG/ACT Inhaler    PROAIR HFA/PROVENTIL HFA/VENTOLIN HFA    1 Inhaler    Inhale 2 puffs into the lungs every 6 hours as needed for shortness of breath / dyspnea or  wheezing    Dyspnea on exertion       B-12 1000 MCG Tbcr     30 tablet    Take 1,000 mcg by mouth daily    Low vitamin B12 level       bicalutamide 50 MG tablet    CASODEX    90 tablet    Take 1 tablet (50 mg) by mouth daily    Malignant neoplasm of prostate (H)       enoxaparin 60 MG/0.6ML injection    LOVENOX    6 Syringe    Inject 0.6 mLs (60 mg) Subcutaneous every 12 hours    Long-term (current) use of anticoagulants, PE (pulmonary thromboembolism) (H)       gabapentin 300 MG capsule    NEURONTIN     Take 900 mg by mouth At Bedtime        leuprolide 45 MG kit    ELIGARD     Inject 45 mg Subcutaneous every 6 months        * metoprolol 25 MG 24 hr tablet    TOPROL-XL    90 tablet    TAKE 1 TABLET (25 MG) BY MOUTH DAILY    HTN (hypertension), benign       * metoprolol 25 MG 24 hr tablet    TOPROL-XL    90 tablet    TAKE 1 TABLET (25 MG) BY MOUTH DAILY    HTN (hypertension), benign       oxyCODONE 5 MG IR tablet    ROXICODONE    30 tablet    Take 1 tablet by mouth every 6 hours as needed for pain.    Narcotic dependence, episodic use (H)       predniSONE 5 MG tablet    DELTASONE    60 tablet    Take 1 tablet (5 mg) by mouth 2 times daily    Malignant neoplasm of prostate (H)       * quinapril 40 MG Tab    ACCUPRIL    90 tablet    TAKE 1 TABLET (40 MG) BY MOUTH DAILY - NEEDS TO FOLLOW UP    HTN (hypertension), benign       * quinapril 40 MG Tab    ACCUPRIL    90 tablet    TAKE 1 TABLET (40 MG) BY MOUTH DAILY - NEEDS TO FOLLOW UP    HTN (hypertension), benign       sildenafil 100 MG cap/tab    REVATIO/VIAGRA    10 tablet    Take 1 tablet (100 mg) by mouth daily as needed for erectile dysfunction    ED (erectile dysfunction)       simvastatin 40 MG tablet    ZOCOR    45 tablet    Take 0.5 tablets (20 mg) by mouth daily    Hyperlipidemia LDL goal <130       vitamin D 2000 UNITS tablet      Take 1 tablet by mouth daily        warfarin 5 MG tablet    COUMADIN    70 tablet    Take 1 tablet (5 mg) on Sunday, Tuesday,  Thursdays and 1/2 tablet (2.5 mg) all other days OR as directed by INR clinic    Other acute pulmonary embolism without acute cor pulmonale (H)       * Notice:  This list has 4 medication(s) that are the same as other medications prescribed for you. Read the directions carefully, and ask your doctor or other care provider to review them with you.
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
## 2017-07-10 NOTE — TELEPHONE ENCOUNTER
Hello,    Prior Authorization Retail Medication Request  Medication/Dose: ativan   Diagnosis and ICD code: C79.51  New/Renewal/Insurance Change PA:NEW    Insurance ID (if provided): [id number]  Insurance Phone (if provided):[phone]  Any additional info from fax request:     If you received a fax notification from an outside Pharmacy:  Pharmacy Name:[facility]   Pharmacy #:[phone]  Pharmacy Fax:[phone]    Thank you,    Gerri Mcdowell  [facility]   [phone]
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
## 2017-07-10 NOTE — NURSING NOTE
Oncology Rooming Note    July 10, 2017 11:24 AM   Dimitri Neves is a 81 year old male who presents for:    Chief Complaint   Patient presents with     Oncology Clinic Visit     Initial Vitals: /67 (BP Location: Left arm, Patient Position: Chair, Cuff Size: Adult Regular)  Pulse 59  Temp 97.2  F (36.2  C) (Oral)  Resp 16  Ht 1.829 m (6')  Wt 79.5 kg (175 lb 4.8 oz)  BMI 23.77 kg/m2 Estimated body mass index is 23.77 kg/(m^2) as calculated from the following:    Height as of this encounter: 1.829 m (6').    Weight as of this encounter: 79.5 kg (175 lb 4.8 oz). Body surface area is 2.01 meters squared.  Data Unavailable Comment: Data Unavailable   No LMP for male patient.  Allergies reviewed: Yes  Medications reviewed: Yes    Medications: Medication refills not needed today.  Pharmacy name entered into [employer]: [id number] - [place], [place] - [address] [facility]    Clinical concerns: Here to discuss options for metastatic prostate cancer - referred by Dr. Taylor, Urologist.  Patient was switched to Zytiga/Prednisone but couldn't affort the $3,000/month payment.      10  minutes for nursing intake (face to face time)     Aliya Olvera RN

## 2017-07-10 NOTE — PROGRESS NOTES
Oral Chemotherapy Monitoring Program    Primary Oncologist: Dr. Rafael Valenzuela  Primary Oncology Clinic: [facility]  Cancer Diagnosis: Metastatic Prostate Cancer    Abiraterone 1000 mg PO daily with Prednisone 5 mg PO BID  Start Date: TBD, pending insurance coverage    Drug Interaction Assessment:     Abiraterone will decrease Warfarin metabolism, potentially raising INR; patient is aware and will inquire about more frequent INR monitoring at the start of therapy    Abiraterone inhibits Metoprolol metabolism with a slight risk of causing bradycardia    Lab Monitoring Plan:  C1D1+   CMP, BP C2D1+ Call, CMP, BP C3D1+ Call, CMP, BP C4D1+ Call, CMP, BP C5D1+ Call, CMP, BP C6D1+ Call, CMP, BP   C1D8+  C2D8+  C3D8+  C4D8+  C5D8+  C6D8+    C1D15+ Call, CMP C2D15+ Call, CMP C3D15+  C4D15+  C5D15+  C6D15+    C1D22+  C2D22+  C3D22+  C4D22+  C5D22+  C6D22+        Subjective/Objective:  Dimitri Neves is a 81 year old male seen in clinic to initiate Zytiga/Prednisone, Zometa, and possible continuation of Lupron (pending decision about surgery as an alternative).    Vitals  BP:   BP Readings from Last 1 Encounters:   07/10/17 126/67     Wt Readings from Last 1 Encounters:   07/10/17 79.5 kg (175 lb 4.8 oz)     Estimated body surface area is 2.01 meters squared as calculated from the following:    Height as of an earlier encounter on 7/10/17: 1.829 m (6').    Weight as of an earlier encounter on 7/10/17: 79.5 kg (175 lb 4.8 oz).    Labs (Obtained from Care Everywhere)          Liver Function Studies  Recent Labs   Lab Test  06/07/17   1341   PROTTOTAL  6.7*   ALBUMIN  3.3*   BILITOTAL  0.3   ALKPHOS  84   AST  18   ALT  16       Assessment/Plan:  Starting Zytiga/Prednisone. I counseled Mr. Neves and his wife on this new medication and provided them with materials for Zometa, as well. He was prescribed Zytiga by Dr. Senthil Taylor (Urologist) earlier this month-- the copay was over $3,000/month. He will start taking  Zytiga/Prednisone with a new prescription written by Dr. Valenzuela, filled through [facility], as soon as an affordable copay is arranged.    Follow-Up:  7/17/2017 at 9:15 AM: INR/Anticoagulation appointment  7/17/2017 at 12:00 PM: CT Chest/Abdomen/Pelvis and Bone Scan  8/21/2017 at 10:00 AM: Follow-up appointment with Dr. Valenzuela    Thank you for the opportunity to participate in this patient's care.  Amber Evangelista, PharmD, pager #[phone]  PGY-2 Heme/Onc Pharmacy Resident

## 2017-07-13 ENCOUNTER — INFUSION THERAPY VISIT (OUTPATIENT)
Dept: INFUSION THERAPY | Facility: CLINIC | Age: 82
End: 2017-07-13
Payer: COMMERCIAL

## 2017-07-13 VITALS
RESPIRATION RATE: 18 BRPM | TEMPERATURE: 96.9 F | WEIGHT: 177.8 LBS | HEART RATE: 55 BPM | BODY MASS INDEX: 24.11 KG/M2 | SYSTOLIC BLOOD PRESSURE: 137 MMHG | OXYGEN SATURATION: 97 % | DIASTOLIC BLOOD PRESSURE: 70 MMHG

## 2017-07-13 DIAGNOSIS — C61 MALIGNANT NEOPLASM OF PROSTATE (H): ICD-10-CM

## 2017-07-13 DIAGNOSIS — C79.51 BONE METASTASIS: ICD-10-CM

## 2017-07-13 DIAGNOSIS — C79.51 BONE METASTASIS: Primary | ICD-10-CM

## 2017-07-13 LAB
ALBUMIN SERPL-MCNC: 3.1 G/DL (ref 3.4–5)
ALP SERPL-CCNC: 82 U/L (ref 40–150)
ALT SERPL W P-5'-P-CCNC: 23 U/L (ref 0–70)
ANION GAP SERPL CALCULATED.3IONS-SCNC: 5 MMOL/L (ref 3–14)
AST SERPL W P-5'-P-CCNC: 20 U/L (ref 0–45)
BASOPHILS # BLD AUTO: 0 10E9/L (ref 0–0.2)
BASOPHILS NFR BLD AUTO: 0.2 %
BILIRUB SERPL-MCNC: 0.3 MG/DL (ref 0.2–1.3)
BUN SERPL-MCNC: 12 MG/DL (ref 7–30)
CALCIUM SERPL-MCNC: 9.1 MG/DL (ref 8.5–10.1)
CHLORIDE SERPL-SCNC: 107 MMOL/L (ref 94–109)
CO2 SERPL-SCNC: 30 MMOL/L (ref 20–32)
CREAT SERPL-MCNC: 0.71 MG/DL (ref 0.66–1.25)
DIFFERENTIAL METHOD BLD: ABNORMAL
EOSINOPHIL # BLD AUTO: 0.1 10E9/L (ref 0–0.7)
EOSINOPHIL NFR BLD AUTO: 1.1 %
ERYTHROCYTE [DISTWIDTH] IN BLOOD BY AUTOMATED COUNT: 14.3 % (ref 10–15)
GFR SERPL CREATININE-BSD FRML MDRD: ABNORMAL ML/MIN/1.7M2
GLUCOSE SERPL-MCNC: 96 MG/DL (ref 70–99)
HCT VFR BLD AUTO: 35.8 % (ref 40–53)
HGB BLD-MCNC: 11.6 G/DL (ref 13.3–17.7)
LYMPHOCYTES # BLD AUTO: 1.7 10E9/L (ref 0.8–5.3)
LYMPHOCYTES NFR BLD AUTO: 35.7 %
MCH RBC QN AUTO: 29.6 PG (ref 26.5–33)
MCHC RBC AUTO-ENTMCNC: 32.4 G/DL (ref 31.5–36.5)
MCV RBC AUTO: 91 FL (ref 78–100)
MONOCYTES # BLD AUTO: 0.5 10E9/L (ref 0–1.3)
MONOCYTES NFR BLD AUTO: 9.7 %
NEUTROPHILS # BLD AUTO: 2.5 10E9/L (ref 1.6–8.3)
NEUTROPHILS NFR BLD AUTO: 53.3 %
PLATELET # BLD AUTO: 201 10E9/L (ref 150–450)
POTASSIUM SERPL-SCNC: 4.6 MMOL/L (ref 3.4–5.3)
PROT SERPL-MCNC: 6.6 G/DL (ref 6.8–8.8)
PSA SERPL-MCNC: 64 UG/L (ref 0–4)
RBC # BLD AUTO: 3.92 10E12/L (ref 4.4–5.9)
SODIUM SERPL-SCNC: 142 MMOL/L (ref 133–144)
WBC # BLD AUTO: 4.6 10E9/L (ref 4–11)

## 2017-07-13 PROCEDURE — 85025 COMPLETE CBC W/AUTO DIFF WBC: CPT | Performed by: INTERNAL MEDICINE

## 2017-07-13 PROCEDURE — 96374 THER/PROPH/DIAG INJ IV PUSH: CPT | Performed by: NURSE PRACTITIONER

## 2017-07-13 PROCEDURE — 84153 ASSAY OF PSA TOTAL: CPT | Performed by: INTERNAL MEDICINE

## 2017-07-13 PROCEDURE — 80053 COMPREHEN METABOLIC PANEL: CPT | Performed by: INTERNAL MEDICINE

## 2017-07-13 PROCEDURE — 99207 ZZC NO CHARGE LOS: CPT

## 2017-07-13 PROCEDURE — 36415 COLL VENOUS BLD VENIPUNCTURE: CPT | Performed by: INTERNAL MEDICINE

## 2017-07-13 RX ORDER — ZOLEDRONIC ACID 0.04 MG/ML
4 INJECTION, SOLUTION INTRAVENOUS ONCE
Status: COMPLETED | OUTPATIENT
Start: 2017-07-13 | End: 2017-07-13

## 2017-07-13 RX ADMIN — ZOLEDRONIC ACID 4 MG: 0.04 INJECTION, SOLUTION INTRAVENOUS at 10:43

## 2017-07-13 RX ADMIN — Medication 100 ML: at 10:42

## 2017-07-13 NOTE — PROGRESS NOTES
Infusion Nursing Note:  Dimitri Neves presents today for Zometa.    Patient seen by provider today: No   present during visit today: Not Applicable.    Note: N/A.    Intravenous Access:  Peripheral IV placed.    Treatment Conditions:  Lab Results   Component Value Date     07/13/2017                   Lab Results   Component Value Date    POTASSIUM 4.6 07/13/2017           No results found for: MAG         Lab Results   Component Value Date    CR 0.71 07/13/2017                   Lab Results   Component Value Date    ALETHA 9.1 07/13/2017                Lab Results   Component Value Date    BILITOTAL 0.3 07/13/2017           Lab Results   Component Value Date    ALBUMIN 3.1 07/13/2017                    Lab Results   Component Value Date    ALT 23 07/13/2017           Lab Results   Component Value Date    AST 20 07/13/2017         Post Infusion Assessment:  Patient tolerated infusion without incident.  Site patent and intact, free from redness, edema or discomfort.  Access discontinued per protocol.    Discharge Plan:   Copy of AVS reviewed with patient and/or family.  Patient will return 8/10/17 for next appointment.  Patient discharged in stable condition accompanied by: wife.  Departure Mode: Ambulatory.    Olga Lidia Bonilla RN

## 2017-07-13 NOTE — MR AVS SNAPSHOT
After Visit Summary   7/13/2017    Dimitri Neves    MRN: [id number]           Patient Information     Date Of Birth          1935        Visit Information        Provider Department      7/13/2017 10:00 AM [place] [facility]        Today's Diagnoses     Bone metastasis (H)    -  1    Malignant neoplasm of prostate (H)           Follow-ups after your visit        Your next 10 appointments already scheduled     Jul 17, 2017  9:00 AM CDT   NM INJECTION with [id number]   [facility])    [address] [place] [address]   [phone]            Jul 17, 2017 10:30 AM CDT   CT CHEST/ABDOMEN/PELVIS W CONTRAST with MGCT1   [facility])    [address] [place] [address]   [phone]           Please bring any scans or X-rays taken at other hospitals, if similar tests were done. Also bring a list of your medicines, including vitamins, minerals and over-the-counter drugs. It is safest to leave personal items at home.  Be sure to tell your doctor:   If you have any allergies.   If there s any chance you are pregnant.   If you are breastfeeding.   If you have any special needs.  You may have contrast for this exam. To prepare:   Do not eat or drink for 2 hours before your exam. If you need to take medicine, you may take it with small sips of water. (We may ask you to take liquid medicine as well.)   The day before your exam, drink extra fluids at least six 8-ounce glasses (unless your doctor tells you to restrict your fluids).  Patients over 70 or patients with diabetes or kidney problems:   If you haven t had a blood test (creatinine test) within the last 30 days, go to your clinic or Diagnostic Imaging Department for this test.  If you have diabetes:   If your kidney function is normal, continue taking your metformin (Avandamet, Glucophage, Glucovance,  Metaglip) on the day of your exam.   If your kidney function is abnormal, wait 48 hours before restarting this medicine.  You will have oral contrast for this exam:   You will drink the contrast at home. Get this from your clinic or Diagnostic Imaging Department. Please follow the directions given.  Please wear loose clothing, such as a sweat suit or jogging clothes. Avoid snaps, zippers and other metal. We may ask you to undress and put on a hospital gown.  If you have any questions, please call the Imaging Department where you will have your exam.            Jul 17, 2017 12:00 PM CDT   NM BONE SCAN WHOLE BODY with [facility]   [facility]    [address] [place] [address] [phone]           Please bring a list of your medicines to the exam. (Include vitamins, minerals and over-the-counter drugs.) You should wear comfortable clothes. Leave your valuables at home. Please bring related prior results and films. Tell your doctor:   If you are breastfeeding or may be pregnant.   If you have had a barium test within the past few days. Barium may change the results of certain exams.   If you think you may need sedation (medicine to help you relax).  You may eat and drink as normal.  Drink plenty of fluids and empty bladder frequently between injection and scans.            Jul 18, 2017  9:15 AM CDT   Anticoagulation Visit with BE ANTI COAG   [facility] [place] ([facility] [place])    [address] [facility]  [place] [address] [phone]            Aug 10, 2017 10:00 AM CDT   LAB with LAB ONC [facility] ([facility])    [address] [place] [address] [phone]           Patient must bring picture ID.  Patient should be prepared to give a urine specimen  Please do not eat 10-12 hours before your appointment if you are coming in fasting for labs on lipids, cholesterol, or glucose  (sugar).  Pregnant women should follow their Care Team instructions. Water with medications is okay. Do not drink coffee or other fluids.   If you have concerns about taking  your medications, please ask at office or if scheduling via [email or web address], send a message by clicking on Secure Messaging, Message Your Care Team.            Aug 10, 2017 10:30 AM CDT   Level O with BAY 9 [facility] ([facility])    [address] [facility] [address] [phone]            Aug 21, 2017  9:15 AM CDT   LAB with LAB ONC [facility] ([facility])    [address] [facility] [address] [phone]           Patient must bring picture ID.  Patient should be prepared to give a urine specimen  Please do not eat 10-12 hours before your appointment if you are coming in fasting for labs on lipids, cholesterol, or glucose (sugar).  Pregnant women should follow their Care Team instructions. Water with medications is okay. Do not drink coffee or other fluids.   If you have concerns about taking  your medications, please ask at office or if scheduling via [email or web address], send a message by clicking on Secure Messaging, Message Your Care Team.            Aug 21, 2017 10:00 AM CDT   Return Visit with Rafael Valenzuela MD   [facility])    [address] [facility] [address]   [phone]            Sep 07, 2017  9:00 AM CDT   LAB with LAB ONC [facility] ([facility])    [address] [facility] [address] [phone]           Patient must bring picture ID.  Patient should be prepared to give a urine specimen  Please do not eat 10-12 hours before your appointment if you are coming in fasting for labs on lipids, cholesterol, or glucose (sugar).  Pregnant women should follow their Care Team instructions. Water with medications is  okay. Do not drink coffee or other fluids.   If you have concerns about taking  your medications, please ask at office or if scheduling via [employer], send a message by clicking on Secure Messaging, Message Your Care Team.            Sep 07, 2017  9:30 AM CDT   Level O with BAY 9 INFUSION   [facility] ([facility])    [address] [place] [address] [phone]              Who to contact     If you have questions or need follow up information about today's clinic visit or your schedule please contact [facility] directly at [phone].  Normal or non-critical lab and imaging results will be communicated to you by [employer], letter or phone within 4 business days after the clinic has received the results. If you do not hear from us within 7 days, please contact the clinic through [employer] or phone. If you have a critical or abnormal lab result, we will notify you by phone as soon as possible.  Submit refill requests through [employer] or call your pharmacy and they will forward the refill request to us. Please allow 3 business days for your refill to be completed.          Additional Information About Your Visit        Ubersnap Information     U[employer]nap gives you secure access to your electronic health record. If you see a primary care provider, you can also send messages to your care team and make appointments. If you have questions, please call your primary care clinic.  If you do not have a primary care provider, please call [phone] and they will assist you.      U[employer]nap is an electronic gateway that provides easy, online access to your medical records. With [employer]nap, you can request a clinic appointment, read your test results, renew a prescription or communicate with your care team.     To access your existing account, please contact your [facility] or call [phone] for assistance.        Care EveryWhere ID      This is your Care EveryWhere ID. This could be used by other organizations to access your [place] medical records  [id number]        Your Vitals Were     Pulse Temperature Respirations Pulse Oximetry BMI (Body Mass Index)       55 96.9  F (36.1  C) (Oral) 18 97% 24.11 kg/m2        Blood Pressure from Last 3 Encounters:   07/13/17 137/70   07/10/17 126/67   06/16/17 132/60    Weight from Last 3 Encounters:   07/13/17 80.6 kg (177 lb 12.8 oz)   07/10/17 79.5 kg (175 lb 4.8 oz)   05/08/17 83 kg (183 lb)              Today, you had the following     No orders found for display       Primary Care Provider Office Phone # Fax #    Reginaldo Muir [phone]       [address] [address]        Equal Access to Services     DAVIDSON LEBLANC : Hadii aad ku hadasho Soomaali, waaxda luqadaha, qaybta kaalmada adeegyada, waxay kelseyin hayanjanan ashlyn willis . So [facility] [phone].    ATENCIÓN: Si habla español, tiene a roberts disposición servicios gratuitos de asistencia lingüística. Llame al [phone].    We comply with applicable federal civil rights laws and [place] laws. We do not discriminate on the basis of race, color, national origin, age, disability sex, sexual orientation or gender identity.            Thank you!     Thank you for choosing [facility] [[email or web address]]  for your care. Our goal is always to provide you with excellent care. Hearing back from our patients is one way we can continue to improve our services. Please take a few minutes to complete the written survey that you may receive in the mail after your visit with us. Thank you!             Your Updated Medication List - Protect others around you: Learn how to safely use, store and throw away your medicines at www.disposemymeds.org.          This list is accurate as of: 7/13/17  4:41 PM.  Always use your most recent med list.                   Brand Name Dispense Instructions for use Diagnosis     * abiraterone 250 MG tablet    ZYTIGA    120 tablet    Take 4 tablets (1,000 mg) by mouth daily    Malignant neoplasm of prostate (H)       * abiraterone 250 MG tablet    ZYTIGA    120 tablet    Take 4 tablets (1,000 mg) by mouth daily for 30 doses Take on empty stomach.    Bone metastasis (H), Malignant neoplasm of prostate (H)       B-12 1000 MCG Tbcr     30 tablet    Take 1,000 mcg by mouth daily    Low vitamin B12 level       bicalutamide 50 MG tablet    CASODEX    90 tablet    Take 1 tablet (50 mg) by mouth daily    Malignant neoplasm of prostate (H)       gabapentin 300 MG capsule    NEURONTIN     Take 900 mg by mouth At Bedtime        leuprolide 45 MG kit    ELIGARD     Inject 45 mg Subcutaneous every 6 months        LORazepam 0.5 MG tablet    ATIVAN    30 tablet    Take 1 tablet (0.5 mg) by mouth every 4 hours as needed (Anxiety, Nausea/Vomiting or Sleep)    Bone metastasis (H), Malignant neoplasm of prostate (H)       * metoprolol 25 MG 24 hr tablet    TOPROL-XL    90 tablet    TAKE 1 TABLET (25 MG) BY MOUTH DAILY    HTN (hypertension), benign       * metoprolol 25 MG 24 hr tablet    TOPROL-XL    90 tablet    TAKE 1 TABLET (25 MG) BY MOUTH DAILY    HTN (hypertension), benign       oxyCODONE 5 MG IR tablet    ROXICODONE    30 tablet    Take 1 tablet by mouth every 6 hours as needed for pain.    Narcotic dependence, episodic use (H)       * predniSONE 5 MG tablet    DELTASONE    60 tablet    Take 1 tablet (5 mg) by mouth 2 times daily    Malignant neoplasm of prostate (H)       * predniSONE 5 MG tablet    DELTASONE    60 tablet    Take 1 tablet (5 mg) by mouth 2 times daily    Bone metastasis (H), Malignant neoplasm of prostate (H)       prochlorperazine 10 MG tablet    COMPAZINE    30 tablet    Take 0.5 tablets (5 mg) by mouth every 6 hours as needed (Nausea/Vomiting)    Bone metastasis (H), Malignant neoplasm of prostate (H)       quinapril 40 MG Tab    ACCUPRIL    90 tablet    TAKE 1 TABLET (40 MG) BY  MOUTH DAILY - NEEDS TO FOLLOW UP    HTN (hypertension), benign       sildenafil 100 MG cap/tab    REVATIO/VIAGRA    10 tablet    Take 1 tablet (100 mg) by mouth daily as needed for erectile dysfunction    ED (erectile dysfunction)       simvastatin 40 MG tablet    ZOCOR    45 tablet    Take 0.5 tablets (20 mg) by mouth daily    Hyperlipidemia LDL goal <130       vitamin D 2000 UNITS tablet      Take 1 tablet by mouth daily        warfarin 5 MG tablet    COUMADIN    70 tablet    Take 1 tablet (5 mg) on Sunday, Tuesday, Thursdays and 1/2 tablet (2.5 mg) all other days OR as directed by INR clinic    Other acute pulmonary embolism without acute cor pulmonale (H)       * Notice:  This list has 6 medication(s) that are the same as other medications prescribed for you. Read the directions carefully, and ask your doctor or other care provider to review them with you.

## 2017-07-14 NOTE — TELEPHONE ENCOUNTER
[facility] Prior Authorization Team   Phone: [phone]  Fax: [phone]    PA Initiation    Medication: ativan   Insurance Company: [employer] - Phone [phone] Fax [phone]  Pharmacy Filling the Rx: [place], [place] [facility] [address] [address]  Filling Pharmacy Phone: [phone]  Filling Pharmacy Fax: [phone]  Start Date: 7/14/2017

## 2017-07-14 NOTE — TELEPHONE ENCOUNTER
Insurance called with additional questions, these were answered and PA is being sent for further review. Will document when a response is received.

## 2017-07-14 NOTE — TELEPHONE ENCOUNTER
Prior Authorization Approval    Authorization Effective Date: 6/14/2017  Authorization Expiration Date: 7/14/2018  Medication: ativan - approved  Approved Dose/Quantity:   Reference #: [id number]   Insurance Company: [employer] - Phone [phone] Fax [phone]  Expected CoPay: n/a     CoPay Card Available:      Foundation Assistance Needed:    Which Pharmacy is filling the prescription (Not needed for infusion/clinic administered): [facility] - [place], [place] - [address], [address]  Pharmacy Notified: Yes  Patient Notified: Yes      Pharmacy was not able to process script at the moment. They will call back with the co-pay

## 2017-07-17 ENCOUNTER — RADIANT APPOINTMENT (OUTPATIENT)
Dept: NUCLEAR MEDICINE | Facility: CLINIC | Age: 82
End: 2017-07-17
Attending: INTERNAL MEDICINE
Payer: COMMERCIAL

## 2017-07-17 ENCOUNTER — RADIANT APPOINTMENT (OUTPATIENT)
Dept: GENERAL RADIOLOGY | Facility: CLINIC | Age: 82
End: 2017-07-17
Payer: COMMERCIAL

## 2017-07-17 ENCOUNTER — RADIANT APPOINTMENT (OUTPATIENT)
Dept: CT IMAGING | Facility: CLINIC | Age: 82
End: 2017-07-17
Attending: INTERNAL MEDICINE
Payer: COMMERCIAL

## 2017-07-17 DIAGNOSIS — C61 MALIGNANT NEOPLASM OF PROSTATE (H): ICD-10-CM

## 2017-07-17 DIAGNOSIS — C79.51 BONE METASTASIS: ICD-10-CM

## 2017-07-17 LAB
CREAT BLD-MCNC: 0.7 MG/DL (ref 0.66–1.25)
GFR SERPL CREATININE-BSD FRML MDRD: >90 ML/MIN/1.7M2

## 2017-07-17 PROCEDURE — 82565 ASSAY OF CREATININE: CPT | Performed by: FAMILY MEDICINE

## 2017-07-17 PROCEDURE — 78306 BONE IMAGING WHOLE BODY: CPT

## 2017-07-17 PROCEDURE — A9503 TC99M MEDRONATE: HCPCS

## 2017-07-17 PROCEDURE — 74177 CT ABD & PELVIS W/CONTRAST: CPT | Performed by: RADIOLOGY

## 2017-07-17 PROCEDURE — 73560 X-RAY EXAM OF KNEE 1 OR 2: CPT | Mod: RT | Performed by: RADIOLOGY

## 2017-07-17 PROCEDURE — 36415 COLL VENOUS BLD VENIPUNCTURE: CPT | Performed by: FAMILY MEDICINE

## 2017-07-17 PROCEDURE — 71260 CT THORAX DX C+: CPT | Performed by: RADIOLOGY

## 2017-07-17 RX ORDER — IOPAMIDOL 755 MG/ML
108 INJECTION, SOLUTION INTRAVASCULAR ONCE
Status: COMPLETED | OUTPATIENT
Start: 2017-07-17 | End: 2017-07-17

## 2017-07-17 RX ORDER — TC 99M MEDRONATE 20 MG/10ML
20-30 INJECTION, POWDER, LYOPHILIZED, FOR SOLUTION INTRAVENOUS ONCE
Status: COMPLETED | OUTPATIENT
Start: 2017-07-17 | End: 2017-07-17

## 2017-07-17 RX ADMIN — TC 99M MEDRONATE 25.5 MCI.: 20 INJECTION, POWDER, LYOPHILIZED, FOR SOLUTION INTRAVENOUS at 09:04

## 2017-07-17 RX ADMIN — IOPAMIDOL 108 ML: 755 INJECTION, SOLUTION INTRAVASCULAR at 09:28

## 2017-07-18 ENCOUNTER — ANTICOAGULATION THERAPY VISIT (OUTPATIENT)
Dept: NURSING | Facility: CLINIC | Age: 82
End: 2017-07-18
Payer: COMMERCIAL

## 2017-07-18 ENCOUNTER — TELEPHONE (OUTPATIENT)
Dept: FAMILY MEDICINE | Facility: CLINIC | Age: 82
End: 2017-07-18

## 2017-07-18 DIAGNOSIS — I26.99 PE (PULMONARY THROMBOEMBOLISM) (H): ICD-10-CM

## 2017-07-18 DIAGNOSIS — Z79.01 LONG-TERM (CURRENT) USE OF ANTICOAGULANTS: ICD-10-CM

## 2017-07-18 LAB — INR POINT OF CARE: 2.2 (ref 0.86–1.14)

## 2017-07-18 PROCEDURE — 36416 COLLJ CAPILLARY BLOOD SPEC: CPT

## 2017-07-18 PROCEDURE — 99207 ZZC NO CHARGE NURSE ONLY: CPT

## 2017-07-18 PROCEDURE — 85610 PROTHROMBIN TIME: CPT | Mod: QW

## 2017-07-18 NOTE — TELEPHONE ENCOUNTER
Spoke with pharmacist Gavino at [facility].  Gavino states there is a minor drug interaction/ almost zero interaction.  Nothing to worry about.  Noted patient is also on prednisone which can interact.  Patient scheduled to follow up on 7-25-17 one week.

## 2017-07-18 NOTE — PROGRESS NOTES
ANTICOAGULATION FOLLOW-UP CLINIC VISIT    Patient Name:  Dimitri Neves  Date:  7/18/2017  Contact Type:  Face to Face    SUBJECTIVE:     Patient Findings     Positives No Problem Findings           OBJECTIVE    INR Protime   Date Value Ref Range Status   07/18/2017 2.2 (A) 0.86 - 1.14 Final       ASSESSMENT / PLAN  INR assessment THER    Recheck INR In: 4 WEEKS    INR Location Clinic      Anticoagulation Summary as of 7/18/2017     INR goal 2.0-3.0   Today's INR 2.2   Maintenance plan 2.5 mg (5 mg x 0.5) on Mon, Wed, Sat; 5 mg (5 mg x 1) all other days   Full instructions 2.5 mg on Mon, Wed, Sat; 5 mg all other days   Weekly total 27.5 mg   No change documented Roderick Chaparro   Plan last modified Roderick Chaparro (7/3/2017)   Next INR check 8/14/2017   Target end date Indefinite    Indications   Long-term (current) use of anticoagulants [Z79.01] [Z79.01]  PE (pulmonary thromboembolism) (H) [I26.99]         Anticoagulation Episode Summary     INR check location Clinic Lab    Preferred lab     Send INR reminders to [facility]    Comments       Anticoagulation Care Providers     Provider Role Specialty Phone number    Reginaldo Muir MD [facility] Internal Medicine [phone]            See the Encounter Report to view Anticoagulation Flowsheet and Dosing Calendar (Go to Encounters tab in chart review, and find the Anticoagulation Therapy Visit)        RODERICK CHAPARRO

## 2017-07-18 NOTE — MR AVS SNAPSHOT
Dimitri Neves   7/18/2017 9:15 AM   Anticoagulation Therapy Visit    Description:  81 year old male   Provider:  SANDRA ANTI COAG   Department:  Be Nurse           INR as of 7/18/2017     Today's INR 2.2      Anticoagulation Summary as of 7/18/2017     INR goal 2.0-3.0   Today's INR 2.2   Full instructions 2.5 mg on Mon, Wed, Sat; 5 mg all other days   Next INR check 8/14/2017    Indications   Long-term (current) use of anticoagulants [Z79.01] [Z79.01]  PE (pulmonary thromboembolism) (H) [I26.99]         Your next Anticoagulation Clinic appointment(s)     Aug 14, 2017  9:00 AM CDT   Anticoagulation Visit with SANDRA ANTI JOSE LUIS   [facility] [place] ([facility] [place])    [address] [facility]  [place] [address] [phone]              Contact Numbers     [facility] ([facility])  Please call [phone] with any problems or questions regarding your therapy, or to cancel or reschedule your appointment.          July 2017 Details    Sun Mon Tue Wed Thu Fri Sat           1                 2               3               4               5               6               7               8                 9               10               11               12               13               14               15                 16               17               18      5 mg   See details      19      2.5 mg         20      5 mg         21      5 mg         22      2.5 mg           23      5 mg         24      2.5 mg         25      5 mg         26      2.5 mg         27      5 mg         28      5 mg         29      2.5 mg           30      5 mg         31      2.5 mg               Date Details   07/18 This INR check               How to take your warfarin dose     To take:  2.5 mg Take 0.5 of a 5 mg tablet.    To take:  5 mg Take 1 of the 5 mg tablets.           August 2017 Details    Sun Mon Tue Wed Thu Fri Sat       1      5 mg         2      2.5 mg         3      5 mg         4      5 mg         5      2.5 mg            6      5 mg         7      2.5 mg         8      5 mg         9      2.5 mg         10      5 mg         11      5 mg         12      2.5 mg           13      5 mg         14            15               16               17               18               19                 20               21               22               23               24               25               26                 27               28               29               30               31                  Date Details   No additional details    Date of next INR:  8/14/2017         How to take your warfarin dose     To take:  2.5 mg Take 0.5 of a 5 mg tablet.    To take:  5 mg Take 1 of the 5 mg tablets.

## 2017-07-18 NOTE — TELEPHONE ENCOUNTER
Patient is starting a new prescription  tomorrow called Zytiga for prostate cancer. He was told to check with his INR nurse to see if his warfarin needs adjusting.

## 2017-07-25 ENCOUNTER — ANTICOAGULATION THERAPY VISIT (OUTPATIENT)
Dept: NURSING | Facility: CLINIC | Age: 82
End: 2017-07-25
Payer: COMMERCIAL

## 2017-07-25 DIAGNOSIS — I26.99 PE (PULMONARY THROMBOEMBOLISM) (H): ICD-10-CM

## 2017-07-25 DIAGNOSIS — Z79.01 LONG-TERM (CURRENT) USE OF ANTICOAGULANTS: ICD-10-CM

## 2017-07-25 LAB — INR POINT OF CARE: 1.6 (ref 0.86–1.14)

## 2017-07-25 PROCEDURE — 85610 PROTHROMBIN TIME: CPT | Mod: QW

## 2017-07-25 PROCEDURE — 36416 COLLJ CAPILLARY BLOOD SPEC: CPT

## 2017-07-25 PROCEDURE — 99207 ZZC NO CHARGE NURSE ONLY: CPT

## 2017-07-25 NOTE — PROGRESS NOTES
ANTICOAGULATION FOLLOW-UP CLINIC VISIT    Patient Name:  Dimitri Neves  Date:  7/25/2017  Contact Type:  Face to Face    SUBJECTIVE:     Patient Findings     Positives Unexplained INR or factor level change           OBJECTIVE    INR Protime   Date Value Ref Range Status   07/25/2017 1.6 (A) 0.86 - 1.14 Final       ASSESSMENT / PLAN  INR assessment SUB    Recheck INR In: 10 DAYS on new med and prednisone   INR Location Clinic      Anticoagulation Summary as of 7/25/2017     INR goal 2.0-3.0   Today's INR 1.6!   Maintenance plan 2.5 mg (5 mg x 0.5) on Mon, Wed, Sat; 5 mg (5 mg x 1) all other days   Full instructions 7/25: 7.5 mg; Otherwise 2.5 mg on Mon, Wed, Sat; 5 mg all other days   Weekly total 27.5 mg   Plan last modified Roderick Chaparro (7/3/2017)   Next INR check 8/3/2017   Target end date Indefinite    Indications   Long-term (current) use of anticoagulants [Z79.01] [Z79.01]  PE (pulmonary thromboembolism) (H) [I26.99]         Anticoagulation Episode Summary     INR check location Clinic Lab    Preferred lab     Send INR reminders to [facility]    Comments       Anticoagulation Care Providers     Provider Role Specialty Phone number    Reginaldo Muir MD [facility] Internal Medicine [phone]            See the Encounter Report to view Anticoagulation Flowsheet and Dosing Calendar (Go to Encounters tab in chart review, and find the Anticoagulation Therapy Visit)        RODERICK CHAPARRO

## 2017-07-25 NOTE — MR AVS SNAPSHOT
Dimitri Neves   7/25/2017 8:45 AM   Anticoagulation Therapy Visit    Description:  81 year old male   Provider:  BE ANTI COAG   Department:  Be Nurse           INR as of 7/25/2017     Today's INR 1.6!      Anticoagulation Summary as of 7/25/2017     INR goal 2.0-3.0   Today's INR 1.6!   Full instructions 7/25: 7.5 mg; Otherwise 2.5 mg on Mon, Wed, Sat; 5 mg all other days   Next INR check 8/3/2017    Indications   Long-term (current) use of anticoagulants [Z79.01] [Z79.01]  PE (pulmonary thromboembolism) (H) [I26.99]         Your next Anticoagulation Clinic appointment(s)     Aug 03, 2017  8:45 AM CDT   Anticoagulation Visit with BE ANTI COAG   [facility] ([facility] [place])    [address] [facility]  [place] [address]   [phone]            Aug 14, 2017  9:00 AM CDT   Anticoagulation Visit with BE ANTI COAG   [facility] ([facility] [place])    [address] [facility]  [place] [address]   [phone]              Contact Numbers     [facility]  Please call [phone] with any problems or questions regarding your therapy, or to cancel or reschedule your appointment.          July 2017 Details    Sun Mon Tue Wed Thu Fri Sat           1                 2               3               4               5               6               7               8                 9               10               11               12               13               14               15                 16               17               18               19               20               21               22                 23               24               25      7.5 mg   See details      26      2.5 mg         27      5 mg         28      5 mg         29      2.5 mg           30      5 mg         31      2.5 mg               Date Details   07/25 This INR check               How to take your warfarin dose     To take:  2.5 mg Take 0.5 of a 5 mg tablet.    To take:  5 mg Take  1 of the 5 mg tablets.    To take:  7.5 mg Take 1.5 of the 5 mg tablets.           August 2017 Details    Sun Mon Tue Wed Thu Fri Sat       1      5 mg         2      2.5 mg         3            4               5                 6               7               8               9               10               11               12                 13               14               15               16               17               18               19                 20               21               22               23               24               25               26                 27               28               29               30               31                  Date Details   No additional details    Date of next INR:  8/3/2017         How to take your warfarin dose     To take:  2.5 mg Take 0.5 of a 5 mg tablet.    To take:  5 mg Take 1 of the 5 mg tablets.

## 2017-07-26 ENCOUNTER — TELEPHONE (OUTPATIENT)
Dept: DERMATOLOGY | Facility: CLINIC | Age: 82
End: 2017-07-26

## 2017-07-26 DIAGNOSIS — C61 PROSTATE CANCER (H): Primary | ICD-10-CM

## 2017-07-26 NOTE — PROGRESS NOTES
Oral Chemotherapy Monitoring Program     Primary Oncologist: Dr. Rafael Valenzuela  Primary Oncology Clinic: [facility]  Cancer Diagnosis: Metastatic Prostate Cancer     Abiraterone 1000 mg PO daily with Prednisone 5 mg PO BID  Start Date: 7/19/17     Drug Interaction Assessment:     Abiraterone will decrease Warfarin metabolism, potentially raising INR; patient is aware and will inquire about more frequent INR monitoring at the start of therapy    Abiraterone inhibits Metoprolol metabolism with a slight risk of causing bradycardia     Lab Monitoring Plan:  C1D1+   CMP, BP C2D1+ Call, CMP, BP C3D1+ Call, CMP, BP C4D1+ Call, CMP, BP C5D1+ Call, CMP, BP C6D1+ Call, CMP, BP   C1D8+   C2D8+   C3D8+   C4D8+   C5D8+   C6D8+     C1D15+ Call, CMP C2D15+ Call, CMP C3D15+   C4D15+   C5D15+   C6D15+     C1D22+   C2D22+   C3D22+   C4D22+   C5D22+   C6D22+           Subjective/Objective:  Dimitri Neves is a 81 year old male called from clinic, for follow up  Zytiga/Prednisone. Dimitri verified that he started his Zytiga on 7/19/17.  He has had no issues at this time      ORAL CHEMOTHERAPY 7/26/2017   Drug Name Zytiga (Abiraterone)   Current Dosage 1000mg   Current Schedule Daily   Cycle Details Continuous   Start Date of Last Cycle 7/19/2017   Doses missed in last 2 weeks 0   Adherence Assessment Adherent   Adverse Effects No AE identified during assessment   Is the dose as ordered appropriate for the patient? Yes   Is the patient currently in pain? Assessed in last 30 days.   Has the patient been assessed within the past 6 months for depression? Yes   Has the patient missed any days of school, work, or other routine activity? No       Vitals:  BP:   BP Readings from Last 1 Encounters:   07/13/17 137/70     Wt Readings from Last 1 Encounters:   07/13/17 80.6 kg (177 lb 12.8 oz)     Estimated body surface area is 2.02 meters squared as calculated from the following:    Height as of 7/10/17: 1.829 m (6').    Weight as  of 7/13/17: 80.6 kg (177 lb 12.8 oz).    Labs:  Lab Results   Component Value Date     07/13/2017      Lab Results   Component Value Date    POTASSIUM 4.6 07/13/2017     Lab Results   Component Value Date    ALETHA 9.1 07/13/2017     Lab Results   Component Value Date    ALBUMIN 3.1 07/13/2017     No results found for: MAG  Lab Results   Component Value Date    PHOS 3.1 12/28/2011     Lab Results   Component Value Date    BUN 12 07/13/2017     Lab Results   Component Value Date    CR 0.71 07/13/2017       Lab Results   Component Value Date    AST 20 07/13/2017     Lab Results   Component Value Date    ALT 23 07/13/2017     Lab Results   Component Value Date    BILITOTAL 0.3 07/13/2017       Lab Results   Component Value Date    WBC 4.6 07/13/2017     Lab Results   Component Value Date    HGB 11.6 07/13/2017     Lab Results   Component Value Date     07/13/2017     Lab Results   Component Value Date    ANEU 2.5 07/13/2017     Assessment/Plan:  Continue same dose as ordered    Follow-Up:  Labs and Zometa infusion 8/10/17    Refill Due:  8/10/17 [facility]    Dipesh Pepe, PharmD  July 26, 2017

## 2017-07-26 NOTE — TELEPHONE ENCOUNTER
Spoke with pt and he reported that he had spots still that are scabbed from his biopsy sites on 6/14. Reviewed open wound care with pt and he had not been keeping them moist. Discussed using bacitracin on them and keeping them moist. Pt also reported slight redness around 3 spots. He denied fever, drainage, or warmth to the touch. Again reviewed discharge instructions and wound care. Pt verbalized understanding and stated he would keep the sites moist with Vaseline and call if signs of infections.   Jennie MAGDALENO RN BSN PHN  Specialty Clinics

## 2017-07-26 NOTE — TELEPHONE ENCOUNTER
Reason for Call:  Other call back    Detailed comments: pt calling stating had biopsies done on 7/14, still has some scabs would like to now if there is any way to help heal them faster?    Phone Number Patient can be reached at: Home number on file [phone] (home)    Best Time: any     Can we leave a detailed message on this number? YES    Call taken on 7/26/2017 at 11:48 AM by Makeda Hernandez

## 2017-08-03 ENCOUNTER — ANTICOAGULATION THERAPY VISIT (OUTPATIENT)
Dept: NURSING | Facility: CLINIC | Age: 82
End: 2017-08-03
Payer: COMMERCIAL

## 2017-08-03 ENCOUNTER — TELEPHONE (OUTPATIENT)
Dept: NURSING | Facility: CLINIC | Age: 82
End: 2017-08-03

## 2017-08-03 DIAGNOSIS — Z79.01 LONG-TERM (CURRENT) USE OF ANTICOAGULANTS: Primary | ICD-10-CM

## 2017-08-03 DIAGNOSIS — I26.99 PE (PULMONARY THROMBOEMBOLISM) (H): ICD-10-CM

## 2017-08-03 DIAGNOSIS — Z79.01 LONG-TERM (CURRENT) USE OF ANTICOAGULANTS: ICD-10-CM

## 2017-08-03 LAB — INR POINT OF CARE: 1.3 (ref 0.86–1.14)

## 2017-08-03 PROCEDURE — 85610 PROTHROMBIN TIME: CPT | Mod: QW

## 2017-08-03 PROCEDURE — 99207 ZZC NO CHARGE NURSE ONLY: CPT

## 2017-08-03 PROCEDURE — 36416 COLLJ CAPILLARY BLOOD SPEC: CPT

## 2017-08-03 NOTE — TELEPHONE ENCOUNTER
Inr clinic referral signed. Please change primary care physician designation to Reginaldo Muir MD

## 2017-08-03 NOTE — PROGRESS NOTES
ANTICOAGULATION FOLLOW-UP CLINIC VISIT    Patient Name:  Dimitri Neves  Date:  8/3/2017  Contact Type:  Face to Face    SUBJECTIVE:     Patient Findings     Positives Missed doses           OBJECTIVE    INR Protime   Date Value Ref Range Status   08/03/2017 1.3 (A) 0.86 - 1.14 Final       ASSESSMENT / PLAN  INR assessment SUB 1 missed dose, but still trending down, maintenence increased   Recheck INR In: 10 DAYS    INR Location Clinic      Anticoagulation Summary as of 8/3/2017     INR goal 2.0-3.0   Today's INR 1.3!   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

## 2017-08-03 NOTE — MR AVS SNAPSHOT
Dimitri Neves   8/3/2017 8:45 AM   Anticoagulation Therapy Visit    Description:  81 year old male   Provider:  SANDRA ANTI COAG   Department:  Be Nurse           INR as of 8/3/2017     Today's INR 1.3!      Anticoagulation Summary as of 8/3/2017     INR goal 2.0-3.0   Today's INR 1.3!   Full instructions 8/3: 7.5 mg; Otherwise 2.5 mg on Mon, Wed; 5 mg all other days   Next INR check 8/14/2017    Indications   Long-term (current) use of anticoagulants [Z79.01] [Z79.01]  PE (pulmonary thromboembolism) (H) [I26.99]         Your next Anticoagulation Clinic appointment(s)     Aug 14, 2017  9:00 AM CDT   Anticoagulation Visit with BE ANTI COAG   [facility] ([facility] [place])    [address] [facility]  [place] [address] [phone]              Contact Numbers     [facility]  Please call [phone] with any problems or questions regarding your therapy, or to cancel or reschedule your appointment.          August 2017 Details    Sun Mon Tue Wed Thu Fri Sat       1               2               3      7.5 mg   See details      4      5 mg         5      5 mg           6      5 mg         7      2.5 mg         8      5 mg         9      2.5 mg         10      5 mg         11      5 mg         12      5 mg           13      5 mg         14            15               16               17               18               19                 20               21               22               23               24               25               26                 27               28               29               30               31                  Date Details   08/03 This INR check       Date of next INR:  8/14/2017         How to take your warfarin dose     To take:  2.5 mg Take 0.5 of a 5 mg tablet.    To take:  5 mg Take 1 of the 5 mg tablets.    To take:  7.5 mg Take 1.5 of the 5 mg tablets.

## 2017-08-10 ENCOUNTER — INFUSION THERAPY VISIT (OUTPATIENT)
Dept: INFUSION THERAPY | Facility: CLINIC | Age: 82
End: 2017-08-10
Payer: COMMERCIAL

## 2017-08-10 VITALS
RESPIRATION RATE: 16 BRPM | OXYGEN SATURATION: 97 % | HEART RATE: 51 BPM | SYSTOLIC BLOOD PRESSURE: 125 MMHG | DIASTOLIC BLOOD PRESSURE: 63 MMHG | TEMPERATURE: 97 F

## 2017-08-10 DIAGNOSIS — C79.51 BONE METASTASIS: ICD-10-CM

## 2017-08-10 DIAGNOSIS — C61 MALIGNANT NEOPLASM OF PROSTATE (H): ICD-10-CM

## 2017-08-10 DIAGNOSIS — C61 MALIGNANT NEOPLASM OF PROSTATE (H): Primary | ICD-10-CM

## 2017-08-10 DIAGNOSIS — C79.51 BONE METASTASIS: Primary | ICD-10-CM

## 2017-08-10 LAB
ALBUMIN SERPL-MCNC: 2.8 G/DL (ref 3.4–5)
CALCIUM SERPL-MCNC: 9.4 MG/DL (ref 8.5–10.1)
CREAT SERPL-MCNC: 0.75 MG/DL (ref 0.66–1.25)
GFR SERPL CREATININE-BSD FRML MDRD: NORMAL ML/MIN/1.7M2

## 2017-08-10 PROCEDURE — 96374 THER/PROPH/DIAG INJ IV PUSH: CPT | Performed by: NURSE PRACTITIONER

## 2017-08-10 PROCEDURE — 99207 ZZC NO CHARGE LOS: CPT

## 2017-08-10 PROCEDURE — 36415 COLL VENOUS BLD VENIPUNCTURE: CPT | Performed by: NURSE PRACTITIONER

## 2017-08-10 PROCEDURE — 82040 ASSAY OF SERUM ALBUMIN: CPT | Performed by: INTERNAL MEDICINE

## 2017-08-10 PROCEDURE — 82310 ASSAY OF CALCIUM: CPT | Performed by: NURSE PRACTITIONER

## 2017-08-10 PROCEDURE — 82565 ASSAY OF CREATININE: CPT | Performed by: INTERNAL MEDICINE

## 2017-08-10 RX ORDER — ZOLEDRONIC ACID 0.04 MG/ML
4 INJECTION, SOLUTION INTRAVENOUS ONCE
Status: COMPLETED | OUTPATIENT
Start: 2017-08-10 | End: 2017-08-10

## 2017-08-10 RX ADMIN — Medication 250 ML: at 11:16

## 2017-08-10 RX ADMIN — ZOLEDRONIC ACID 4 MG: 0.04 INJECTION, SOLUTION INTRAVENOUS at 11:16

## 2017-08-10 NOTE — MR AVS SNAPSHOT
After Visit Summary   8/10/2017    Dimitri Neves    MRN: [id number]           Patient Information     Date Of Birth          1935        Visit Information        Provider Department      8/10/2017 10:30 AM [address]         Follow-ups after your visit        Your next 10 appointments already scheduled     Aug 10, 2017 10:00 AM CDT   LAB with LAB ONC [facility] ([facility])    [address] [place] [address]   [phone]           Patient must bring picture ID. Patient should be prepared to give a urine specimen  Please do not eat 10-12 hours before your appointment if you are coming in fasting for labs on lipids, cholesterol, or glucose (sugar). Pregnant women should follow their Care Team instructions. Water with medications is okay. Do not drink coffee or other fluids. If you have concerns about taking  your medications, please ask at office or if scheduling via [employer], send a message by clicking on Secure Messaging, Message Your Care Team.            Aug 10, 2017 10:30 AM CDT   Level O with [address] ([facility])    [address] [place] [address]   [phone]            Aug 14, 2017  9:00 AM CDT   Anticoagulation Visit with BE ANTI COAG   [facility] [place] ([facility])    [address] [place] [address]   [phone]            Aug 21, 2017  9:15 AM CDT   LAB with LAB ONC [facility])    [address] [place] [address]   [phone]           Patient must bring picture ID. Patient should be prepared to give a urine specimen  Please do not eat 10-12 hours before your appointment if you are coming in fasting for labs on lipids, cholesterol, or glucose (sugar). Pregnant women should follow their Care Team instructions. Water  with medications is okay. Do not drink coffee or other fluids. If you have concerns about taking  your medications, please ask at office or if scheduling via [employer], send a message by clicking on Secure Messaging, Message Your Care Team.            Aug 21, 2017 10:00 AM CDT   Return Visit with Rafael Valenzuela MD   [facility]    [address] [place] [address]   [phone]            Aug 25, 2017  9:00 AM CDT   New Sleep Patient with SAMANTA Booth   [facility] ([facility])    [address] [facility] [address]  [facility] [address] [phone]            Sep 07, 2017  9:00 AM CDT   LAB with LAB ONC [facility] ([facility])    [address] [place] [address] [phone]           Patient must bring picture ID. Patient should be prepared to give a urine specimen  Please do not eat 10-12 hours before your appointment if you are coming in fasting for labs on lipids, cholesterol, or glucose (sugar). Pregnant women should follow their Care Team instructions. Water with medications is okay. Do not drink coffee or other fluids. If you have concerns about taking  your medications, please ask at office or if scheduling via [employer], send a message by clicking on Secure Messaging, Message Your Care Team.            Sep 07, 2017  9:30 AM CDT   Level O with [address] INFUSION   [facility] ([facility])    [address] [place] [id number] [phone]            Dec 11, 2017 11:00 AM CST   Return Visit with Senthil Jhaveri MD   [facility] ([facility])    [address] [facility] [address] [phone]              Who to contact     If you have questions or need follow up information about today's clinic visit or your schedule please contact [facility] directly at [phone].  Normal or non-critical lab and imaging results will be communicated to you by [employer], letter or phone within 4 business days after the clinic has received the results. If you do not hear from us within 7 days, please contact the clinic through [employer] or phone. If you have a critical or abnormal lab result, we will notify you by phone as soon as possible.  Submit refill requests through [employer] or call your pharmacy and they will forward the refill request to us. Please allow 3 business days for your refill to be completed.          Additional Information About Your Visit        [employer] Information     [employer] gives you secure access to your electronic health record. If you see a primary care provider, you can also send messages to your care team and make appointments. If you have questions, please call your primary care clinic.  If you do not have a primary care provider, please call [phone] and they will assist you.      [employer] is an electronic gateway that provides easy, online access to your medical records. With [employer], you can request a clinic appointment, read your test results, renew a prescription or communicate with your care team.     To access your existing account, please contact your [facility] or call [phone] for assistance.        Care EveryWhere ID     This is your Care EveryWhere ID. This could be used by other organizations to access your [facility] medical records  [id number]         Blood Pressure from Last 3 Encounters:   07/13/17 137/70   07/10/17 126/67   06/16/17 132/60    Weight from Last 3 Encounters:   07/13/17 80.6 kg (177 lb 12.8 oz)   07/10/17 79.5 kg (175 lb 4.8 oz)   05/08/17 83 kg (183 lb)              Today, you had the following     No orders found for display       Primary Care Provider Office Phone # Fax #    Reginaldo Muir [phone] [address]  [place]  [place] [address]        Equal Access to Services     [facility] : Hadii aad eliezer tomas Rodriguez, watimada luqadaha, qaybta kaalmada ashlynrebeccalester, naty jacobsrileyofelia russo. So [facility] [phone].    ATENCIÓN: Si habla español, tiene a roberts disposición servicios gratuitos de asistencia lingüística. Rosalindame al [phone].    We comply with applicable federal civil rights laws and [place] laws. We do not discriminate on the basis of race, color, national origin, age, disability sex, sexual orientation or gender identity.            Thank you!     Thank you for choosing [facility]  for your care. Our goal is always to provide you with excellent care. Hearing back from our patients is one way we can continue to improve our services. Please take a few minutes to complete the written survey that you may receive in the mail after your visit with us. Thank you!             Your Updated Medication List - Protect others around you: Learn how to safely use, store and throw away your medicines at www.disposemymeds.org.          This list is accurate as of: 8/10/17  9:30 AM.  Always use your most recent med list.                   Brand Name Dispense Instructions for use Diagnosis    abiraterone 250 MG tablet    ZYTIGA    120 tablet    Take 4 tablets (1,000 mg) by mouth daily    Malignant neoplasm of prostate (H)       B-12 1000 MCG Tbcr     30 tablet    Take 1,000 mcg by mouth daily    Low vitamin B12 level       bicalutamide 50 MG tablet    CASODEX    90 tablet    Take 1 tablet (50 mg) by mouth daily    Malignant neoplasm of prostate (H)       gabapentin 300 MG capsule    NEURONTIN     Take 900 mg by mouth At Bedtime        leuprolide 45 MG kit    ELIGARD     Inject 45 mg Subcutaneous every 6 months        LORazepam 0.5 MG tablet    ATIVAN    30 tablet    Take 1 tablet (0.5 mg) by mouth every 4 hours as needed (Anxiety, Nausea/Vomiting or Sleep)    Bone metastasis (H), Malignant neoplasm of prostate (H)        * metoprolol 25 MG 24 hr tablet    TOPROL-XL    90 tablet    TAKE 1 TABLET (25 MG) BY MOUTH DAILY    HTN (hypertension), benign       * metoprolol 25 MG 24 hr tablet    TOPROL-XL    90 tablet    TAKE 1 TABLET (25 MG) BY MOUTH DAILY    HTN (hypertension), benign       oxyCODONE 5 MG IR tablet    ROXICODONE    30 tablet    Take 1 tablet by mouth every 6 hours as needed for pain.    Narcotic dependence, episodic use (H)       * predniSONE 5 MG tablet    DELTASONE    60 tablet    Take 1 tablet (5 mg) by mouth 2 times daily    Malignant neoplasm of prostate (H)       * predniSONE 5 MG tablet    DELTASONE    60 tablet    Take 1 tablet (5 mg) by mouth 2 times daily    Bone metastasis (H), Malignant neoplasm of prostate (H)       prochlorperazine 10 MG tablet    COMPAZINE    30 tablet    Take 0.5 tablets (5 mg) by mouth every 6 hours as needed (Nausea/Vomiting)    Bone metastasis (H), Malignant neoplasm of prostate (H)       quinapril 40 MG Tab    ACCUPRIL    90 tablet    TAKE 1 TABLET (40 MG) BY MOUTH DAILY - NEEDS TO FOLLOW UP    HTN (hypertension), benign       sildenafil 100 MG cap/tab    REVATIO/VIAGRA    10 tablet    Take 1 tablet (100 mg) by mouth daily as needed for erectile dysfunction    ED (erectile dysfunction)       simvastatin 40 MG tablet    ZOCOR    45 tablet    Take 0.5 tablets (20 mg) by mouth daily    Hyperlipidemia LDL goal <130       vitamin D 2000 UNITS tablet      Take 1 tablet by mouth daily        warfarin 5 MG tablet    COUMADIN    70 tablet    Take 1 tablet (5 mg) on Sunday, Tuesday, Thursdays and 1/2 tablet (2.5 mg) all other days OR as directed by INR clinic    Other acute pulmonary embolism without acute cor pulmonale (H)       * Notice:  This list has 4 medication(s) that are the same as other medications prescribed for you. Read the directions carefully, and ask your doctor or other care provider to review them with you.

## 2017-08-10 NOTE — PROGRESS NOTES
"Infusion Nursing Note:  Dimitri SAMIA Neves presents today for ***.    Patient seen by provider today: {YES (EXPLAIN)/NO:836401}   present during visit today: {UMHLANGUAGE:787431}    Note: {Not Applicable or free text:156425:s:\"N/A\"}.    Intravenous Access:  {UMHIVACCESS:911451}    Treatment Conditions:  {UMHTXCONDITIONS:965968}      Post Infusion Assessment:  {UMHPOSTINFUSION:756323}    Discharge Plan:   {UMHDISCHARGE:914501}    Romy Méndez RN                        "

## 2017-08-14 ENCOUNTER — ANTICOAGULATION THERAPY VISIT (OUTPATIENT)
Dept: NURSING | Facility: CLINIC | Age: 82
End: 2017-08-14
Payer: COMMERCIAL

## 2017-08-14 DIAGNOSIS — I26.99 PE (PULMONARY THROMBOEMBOLISM) (H): ICD-10-CM

## 2017-08-14 DIAGNOSIS — Z79.01 LONG-TERM (CURRENT) USE OF ANTICOAGULANTS: ICD-10-CM

## 2017-08-14 LAB — INR POINT OF CARE: 1.6 (ref 0.86–1.14)

## 2017-08-14 PROCEDURE — 36416 COLLJ CAPILLARY BLOOD SPEC: CPT

## 2017-08-14 PROCEDURE — 85610 PROTHROMBIN TIME: CPT | Mod: QW

## 2017-08-14 PROCEDURE — 99207 ZZC NO CHARGE NURSE ONLY: CPT

## 2017-08-14 NOTE — MR AVS SNAPSHOT
Dimitri Neves   8/14/2017 9:00 AM   Anticoagulation Therapy Visit    Description:  82 year old male   Provider:  SANDRA ANTI COAG   Department:  Be Nurse           INR as of 8/14/2017     Today's INR 1.6!      Anticoagulation Summary as of 8/14/2017     INR goal 2.0-3.0   Today's INR 1.6!   Full instructions 5 mg every day   Next INR check 8/28/2017    Indications   Long-term (current) use of anticoagulants [Z79.01] [Z79.01]  PE (pulmonary thromboembolism) (H) [I26.99]         Your next Anticoagulation Clinic appointment(s)     Aug 28, 2017  9:45 AM CDT   Anticoagulation Visit with BE ANTI COAG   [facility] [place] [facility])    [address] [facility]  [place] [address] [phone]              Contact Numbers     [facility]  Please call [phone] with any problems or questions regarding your therapy, or to cancel or reschedule your appointment.          August 2017 Details    Sun Mon Tue Wed Thu Fri Sat       1               2               3               4               5                 6               7               8               9               10               11               12                 13               14      5 mg   See details      15      5 mg         16      5 mg         17      5 mg         18      5 mg         19      5 mg           20      5 mg         21      5 mg         22      5 mg         23      5 mg         24      5 mg         25      5 mg         26      5 mg           27      5 mg         28            29               30               31                  Date Details   08/14 This INR check       Date of next INR:  8/28/2017         How to take your warfarin dose     To take:  5 mg Take 1 of the 5 mg tablets.

## 2017-08-14 NOTE — PROGRESS NOTES
ANTICOAGULATION FOLLOW-UP CLINIC VISIT    Patient Name:  Dimitri Neves  Date:  8/14/2017  Contact Type:  Face to Face    SUBJECTIVE:     Patient Findings     Positives No Problem Findings, Unexplained INR or factor level change           OBJECTIVE    INR Protime   Date Value Ref Range Status   08/14/2017 1.6 (A) 0.86 - 1.14 Final       ASSESSMENT / PLAN  INR assessment SUB unknown   Recheck INR In: 2 WEEKS    INR Location Clinic      Anticoagulation Summary as of 8/14/2017     INR goal 2.0-3.0   Today's INR 1.6!   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

## 2017-08-16 DIAGNOSIS — C61 MALIGNANT NEOPLASM OF PROSTATE (H): ICD-10-CM

## 2017-08-16 DIAGNOSIS — C79.51 BONE METASTASIS: Primary | ICD-10-CM

## 2017-08-16 RX ORDER — PREDNISONE 5 MG/1
5 TABLET ORAL 2 TIMES DAILY
Qty: 60 TABLET | Refills: 0 | Status: SHIPPED | OUTPATIENT
Start: 2017-08-16 | End: 2017-09-15

## 2017-08-16 RX ORDER — ABIRATERONE ACETATE 250 MG/1
1000 TABLET ORAL DAILY
Qty: 120 TABLET | Refills: 0 | Status: SHIPPED | OUTPATIENT
Start: 2017-08-16 | End: 2017-09-15

## 2017-08-17 DIAGNOSIS — I10 HTN (HYPERTENSION), BENIGN: ICD-10-CM

## 2017-08-17 RX ORDER — QUINAPRIL 40 MG/1
TABLET ORAL
Qty: 90 TABLET | Refills: 0 | Status: SHIPPED | OUTPATIENT
Start: 2017-08-17 | End: 2017-11-13

## 2017-08-17 NOTE — TELEPHONE ENCOUNTER
Routing refill request to provider for review/approval because:  Will send to PCP to sign, does not see Cinthya Cole at the [facility].

## 2017-08-17 NOTE — TELEPHONE ENCOUNTER
QUINAPRIL      Last Written Prescription Date: 5-8-17  Last Fill Quantity: 90, # refills: 0  Last Office Visit with YURI, GALO or [facility] prescribing provider: 6-16-17  Next 5 appointments (look out 90 days)     Aug 21, 2017 10:00 AM CDT   Return Visit with Rafael Valenzuela MD   [facility]    [address] [place] [address] [phone]                   Potassium   Date Value Ref Range Status   07/13/2017 4.6 3.4 - 5.3 mmol/L Final     Creatinine   Date Value Ref Range Status   08/10/2017 0.75 0.66 - 1.25 mg/dL Final     BP Readings from Last 3 Encounters:   08/10/17 125/63   07/13/17 137/70   07/10/17 126/67

## 2017-08-21 ENCOUNTER — DOCUMENTATION ONLY (OUTPATIENT)
Dept: PHARMACY | Facility: CLINIC | Age: 82
End: 2017-08-21

## 2017-08-21 ENCOUNTER — ONCOLOGY VISIT (OUTPATIENT)
Dept: ONCOLOGY | Facility: CLINIC | Age: 82
End: 2017-08-21
Payer: COMMERCIAL

## 2017-08-21 VITALS
BODY MASS INDEX: 24.11 KG/M2 | DIASTOLIC BLOOD PRESSURE: 69 MMHG | RESPIRATION RATE: 15 BRPM | SYSTOLIC BLOOD PRESSURE: 146 MMHG | TEMPERATURE: 97.3 F | OXYGEN SATURATION: 98 % | WEIGHT: 178 LBS | HEART RATE: 56 BPM | HEIGHT: 72 IN

## 2017-08-21 DIAGNOSIS — C79.51 BONE METASTASIS: ICD-10-CM

## 2017-08-21 DIAGNOSIS — C61 MALIGNANT NEOPLASM OF PROSTATE (H): ICD-10-CM

## 2017-08-21 DIAGNOSIS — C61 MALIGNANT NEOPLASM OF PROSTATE (H): Primary | ICD-10-CM

## 2017-08-21 LAB
ALBUMIN SERPL-MCNC: 3.1 G/DL (ref 3.4–5)
ALP SERPL-CCNC: 76 U/L (ref 40–150)
ALT SERPL W P-5'-P-CCNC: 17 U/L (ref 0–70)
ANION GAP SERPL CALCULATED.3IONS-SCNC: 4 MMOL/L (ref 3–14)
AST SERPL W P-5'-P-CCNC: 16 U/L (ref 0–45)
BASOPHILS # BLD AUTO: 0 10E9/L (ref 0–0.2)
BASOPHILS NFR BLD AUTO: 0 %
BILIRUB SERPL-MCNC: 0.5 MG/DL (ref 0.2–1.3)
BUN SERPL-MCNC: 13 MG/DL (ref 7–30)
CALCIUM SERPL-MCNC: 9 MG/DL (ref 8.5–10.1)
CHLORIDE SERPL-SCNC: 104 MMOL/L (ref 94–109)
CO2 SERPL-SCNC: 31 MMOL/L (ref 20–32)
CREAT SERPL-MCNC: 0.69 MG/DL (ref 0.66–1.25)
DIFFERENTIAL METHOD BLD: ABNORMAL
EOSINOPHIL # BLD AUTO: 0.1 10E9/L (ref 0–0.7)
EOSINOPHIL NFR BLD AUTO: 1.1 %
ERYTHROCYTE [DISTWIDTH] IN BLOOD BY AUTOMATED COUNT: 15.9 % (ref 10–15)
GFR SERPL CREATININE-BSD FRML MDRD: >90 ML/MIN/1.7M2
GLUCOSE SERPL-MCNC: 94 MG/DL (ref 70–99)
HCT VFR BLD AUTO: 38.4 % (ref 40–53)
HGB BLD-MCNC: 12.6 G/DL (ref 13.3–17.7)
LYMPHOCYTES # BLD AUTO: 1.9 10E9/L (ref 0.8–5.3)
LYMPHOCYTES NFR BLD AUTO: 34.8 %
MCH RBC QN AUTO: 29.7 PG (ref 26.5–33)
MCHC RBC AUTO-ENTMCNC: 32.8 G/DL (ref 31.5–36.5)
MCV RBC AUTO: 91 FL (ref 78–100)
MONOCYTES # BLD AUTO: 0.5 10E9/L (ref 0–1.3)
MONOCYTES NFR BLD AUTO: 10.2 %
NEUTROPHILS # BLD AUTO: 2.9 10E9/L (ref 1.6–8.3)
NEUTROPHILS NFR BLD AUTO: 53.9 %
PLATELET # BLD AUTO: 239 10E9/L (ref 150–450)
POTASSIUM SERPL-SCNC: 4.1 MMOL/L (ref 3.4–5.3)
PROT SERPL-MCNC: 6.8 G/DL (ref 6.8–8.8)
PSA SERPL-MCNC: 30.8 UG/L (ref 0–4)
RBC # BLD AUTO: 4.24 10E12/L (ref 4.4–5.9)
SODIUM SERPL-SCNC: 139 MMOL/L (ref 133–144)
WBC # BLD AUTO: 5.3 10E9/L (ref 4–11)

## 2017-08-21 PROCEDURE — 36415 COLL VENOUS BLD VENIPUNCTURE: CPT | Performed by: INTERNAL MEDICINE

## 2017-08-21 PROCEDURE — 84153 ASSAY OF PSA TOTAL: CPT | Performed by: INTERNAL MEDICINE

## 2017-08-21 PROCEDURE — 85025 COMPLETE CBC W/AUTO DIFF WBC: CPT | Performed by: INTERNAL MEDICINE

## 2017-08-21 PROCEDURE — 99215 OFFICE O/P EST HI 40 MIN: CPT | Performed by: INTERNAL MEDICINE

## 2017-08-21 PROCEDURE — 80053 COMPREHEN METABOLIC PANEL: CPT | Performed by: INTERNAL MEDICINE

## 2017-08-21 ASSESSMENT — PAIN SCALES - GENERAL: PAINLEVEL: MILD PAIN (2)

## 2017-08-21 NOTE — NURSING NOTE
"Oncology Rooming Note    August 21, 2017 9:57 AM   Dimitri Neves is a 82 year old male who presents for:    Chief Complaint   Patient presents with     Oncology Clinic Visit     follow up      Initial Vitals: /69  Pulse 56  Temp 97.3  F (36.3  C)  Resp 15  Ht 1.829 m (6' 0.01\")  Wt 80.7 kg (178 lb)  SpO2 98%  BMI 24.14 kg/m2 Estimated body mass index is 24.14 kg/(m^2) as calculated from the following:    Height as of this encounter: 1.829 m (6' 0.01\").    Weight as of this encounter: 80.7 kg (178 lb). Body surface area is 2.02 meters squared.  Mild Pain (2) Comment: Right knee pain from knee surgery.    No LMP for male patient.  Allergies reviewed: Yes  Medications reviewed: Yes    Medications: Medication refills not needed today.  Pharmacy name entered into [employer]:    [id number] - [place], [place] - [address] [facility] - [place], [place] - [address] [place] [address] [address] [facility] [place], [place]        5 minutes for nursing intake (face to face time)     Cheryl Rodriguez LPN              "

## 2017-08-21 NOTE — PROGRESS NOTES
[facility]  HEMATOLOGY AND ONCOLOGY    FOLLOW-UP VISIT NOTE    PATIENT NAME: Dimitri Neves MRN # [id number]  DATE OF VISIT: Aug 21, 2017 YOB: 1935    REFERRING PROVIDER: No referring provider defined for this encounter.    CANCER TYPE: Prostate adenocarcinoma  STAGE: IV    TREATMENT SUMMARY:  Dimitri was followed by his PCP on 6/13/13 and was elevated at 32 as noted below. He was referred to Dr. Taylor. He was treated with a 10 day course of ciprofloxacin and followed again in 6 weeks on 8/10. His PSA drawn prior to this visit was unchanged and remained elevated at 32. He had a TRUS biopsy on 8/25/14 which was negative for prostate adenocarcinoma. His PSA was repeated in 3 months and now increased to 67.9. He had a repeat biopsy of prostate on 12/16/14 which now confirmed prostate adenocarcinoma with eileen 4+4 disease involving 5 of the cores and Corona 3+3 disease involving remainder of cores. He was staged with a CT scan and bone scan done on 12/29/14 which revealed metastatic foci in the upper cervical vertebrae on the left, right posterior 3rd rib and right ischium, focal uptake in the posterior left 11th rib with corresponding lytic expansile appearance on CT, suspicious for metastasis.            4/5/2011 08:46 6/13/2014 08:03 7/25/2014 09:47 12/5/2014 09:00 4/7/2015 09:14   PSA 2.77 32.70 (H) 32.00 (H) 67.88 (H) 1.92      He was started on androgen deprivation therapy in Jan 2015 with a good initial response. His PSA has been increasing recently and he was started on bicalutamide in February with no response. He has continued to have rising PSA and was prescribed abiraterone with prednisone. He had a huge copay with this and has been referred to Medical Oncology to review other options.     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 He has a list of question for me. He wanted to understand his scans. He is little concerned about the cost of his abiraterone. He is travelling to [place] in January for 2 months. He is little concerned about ability to get medications including - his zometa and abiraterone.     He is tolerating his abiraterone without any difficulty. He is recovering from an infection in his knee.       PAST MEDICAL HISTORY     Past Medical History:   Diagnosis Date     Actinic keratosis      AK (actinic keratosis) 7/9/2012     Cataract cortical, senile right eye 4/17/2012     DDD (degenerative disc disease), lumbar 1979    s/p 4 back surgeries, spinal cord stimulator implant      Diverticulosis      ED (erectile dysfunction) 2009     GERD (gastroesophageal reflux disease) ~1980     HTN (hypertension), benign ~2000     Macular degeneration, dry, mild, ou 7/23/2016     Multiple lung nodules     Several basilar pulmonary nodules <5 mm     HUDSON (obstructive sleep apnea) 10/2005    on CPAP     Other abnormal glucose 01/14/2009     PE (pulmonary thromboembolism) (H) 1981    after back surgery      Pure hypercholesterolemia ~2000     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         CURRENT OUTPATIENT MEDICATIONS     Current Outpatient Prescriptions   Medication Sig     CEPHALEXIN PO      quinapril (ACCUPRIL) 40 MG Tab TAKE 1 TABLET (40 MG) BY MOUTH DAILY - NEEDS TO FOLLOW UP     predniSONE (DELTASONE) 5 MG tablet Take 1 tablet (5 mg) by mouth 2 times daily     abiraterone (ZYTIGA) 250 MG tablet Take 4 tablets (1,000 mg) by mouth daily for 30 doses Take on empty stomach.     prochlorperazine (COMPAZINE) 10 MG tablet Take 0.5 tablets (5 mg) by mouth every 6 hours as needed (Nausea/Vomiting)     LORazepam (ATIVAN) 0.5 MG tablet Take 1 tablet (0.5 mg) by mouth every 4 hours as needed (Anxiety, Nausea/Vomiting or Sleep)     predniSONE  "(DELTASONE) 5 MG tablet Take 1 tablet (5 mg) by mouth 2 times daily     simvastatin (ZOCOR) 40 MG tablet Take 0.5 tablets (20 mg) by mouth daily     abiraterone (ZYTIGA) 250 MG tablet Take 4 tablets (1,000 mg) by mouth daily     predniSONE (DELTASONE) 5 MG tablet Take 1 tablet (5 mg) by mouth 2 times daily     warfarin (COUMADIN) 5 MG tablet Take 1 tablet (5 mg) on Sunday, Tuesday, Thursdays and 1/2 tablet (2.5 mg) all other days OR as directed by INR clinic     metoprolol (TOPROL-XL) 25 MG 24 hr tablet TAKE 1 TABLET (25 MG) BY MOUTH DAILY     metoprolol (TOPROL-XL) 25 MG 24 hr tablet TAKE 1 TABLET (25 MG) BY MOUTH DAILY     gabapentin (NEURONTIN) 300 MG capsule Take 900 mg by mouth At Bedtime     Cyanocobalamin (B-12) 1000 MCG TBCR Take 1,000 mcg by mouth daily     sildenafil (VIAGRA) 100 MG tablet Take 1 tablet (100 mg) by mouth daily as needed for erectile dysfunction     Cholecalciferol (VITAMIN D) 2000 UNITS tablet Take 1 tablet by mouth daily     leuprolide (ELIGARD) 45 MG injection Inject 45 mg Subcutaneous every 6 months     oxyCODONE (ROXICODONE) 5 MG immediate release tablet Take 1 tablet by mouth every 6 hours as needed for pain.     bicalutamide (CASODEX) 50 MG tablet Take 1 tablet (50 mg) by mouth daily (Patient not taking: Reported on 8/21/2017)     No current facility-administered medications for this visit.         ALLERGIES      Allergies   Allergen Reactions     Morphine Sulfate GI Disturbance     vomiting     Vicodin [Hydrocodone-Acetaminophen] Nausea and Vomiting        REVIEW OF SYSTEMS   As above in the HPI, o/w complete 12-point ROS was negative.     PHYSICAL EXAM   /69  Pulse 56  Temp 97.3  F (36.3  C)  Resp 15  Ht 1.829 m (6' 0.01\")  Wt 80.7 kg (178 lb)  SpO2 98%  BMI 24.14 kg/m2  GEN: NAD  HEENT: PERRL, EOMI, no icterus, injection or pallor. Oropharynx is clear.  NECK: no cervical or supraclavicular lymphadenopathy  LUNGS: clear bilaterally  CV: regular, no murmurs, rubs, or " gallops  ABDOMEN: soft, non-tender, non-distended, normal bowel sounds, no hepatosplenomegaly by percussion or palpation  EXT: warm, well perfused, no edema  NEURO: alert  SKIN: no rashes     LABORATORY AND IMAGING STUDIES     Recent Labs   Lab Test  08/21/17   0911  08/10/17   1010  07/13/17   0934  10/04/16   0950  07/19/16   0817  04/21/16   0851   NA  139   --   142  139  140  142   POTASSIUM  4.1   --   4.6  4.3  4.4  4.5   CHLORIDE  104   --   107  106  105  104   CO2  31   --   30  26  26  32   ANIONGAP  4   --   5  7  9  6   BUN  13   --   12  16  16  19   CR  0.69  0.75  0.71  0.84  0.90  0.96   GLC  94   --   96  97  114*  114*   ALETHA  9.0  9.4  9.1  9.1  9.7  9.7     Recent Labs   Lab Test  12/28/11   0857  04/05/11   0846  06/16/10   1338  01/05/10   1134  06/15/09   0759   PHOS  3.1  3.4  2.5  2.2*  2.6     Recent Labs   Lab Test  08/21/17   0911  07/13/17   0934  12/23/16   1417  10/04/16   0950  08/10/16   1141  07/19/16   0817   06/13/16   1607   WBC  5.3  4.6  6.2  4.6   --   5.9   < >  5.0   HGB  12.6*  11.6*  11.3*  12.4*  13.2*  14.2   < >  12.8*   PLT  239  201  337  188   --   222   < >  161   MCV  91  91  94  100   --   100   < >  99   NEUTROPHIL  53.9  53.3  62.9   --    --   52.0   --   58.7    < > = values in this interval not displayed.     Recent Labs   Lab Test  08/21/17   0911  08/10/17   1010  07/13/17   0934  06/07/17   1341   BILITOTAL  0.5   --   0.3  0.3   ALKPHOS  76   --   82  84   ALT  17   --   23  16   AST  16   --   20  18   ALBUMIN  3.1*  2.8*  3.1*  3.3*     TSH   Date Value Ref Range Status   06/13/2016 1.60 0.40 - 4.00 mU/L Final   04/05/2011 3.73 0.4 - 5.0 mU/L Final     Recent Labs   Lab Test  08/21/17   0911  07/13/17   0934  06/07/17   1341  02/09/17   0823  02/07/17   0851  07/12/16   0844   PSA  30.80*  64.00*  43.90*   --   19.33*  2.41   TESTOSTTOTAL   --    --    --   9*   --    --           ASSESSMENT AND PLAN   1. High grade (eileen 4+4) prostate adenocarcinoma  - castration resistant progressing within 2 years of androgen deprivation  2. No response to addition of bicalutamide  3. PE diagnosed few weeks after initiation of megestrol acetate for hot flashes on GnRH agonist  4. No significant medical comorbidities    I had a lengthy discussion with patient in presence of his wife where we reviewed a number of topic including typical disease course, prognosis, treatment options and answered several specific questions from him.     I ended up again reviewing things reviewed at the last visit. Abiraterone inhibits CYP 17 alpha hydroxylase:lyase enzyme which is a rate limiting (crucial enzyme) in steroid and therefore androgen biosynthesis. Abiraterone inhibits androgen including testosterone synthesis in adrenal glands, testes and possibly also at tumor site. Prednisone is administered as a physiologic replacement as abiraterone suppresses endogenous steroid synthesis.     Zometa (zoledronic acid) is a calcium bisphosphonate with a different structure as compared to endogenous calcium phosphate which binds in the bone (in place of endogenous calcium phosphate). Bisphosphonates cannot be removed from the bone by our osteoclast cells unlike calcium phosphate. This might impair bone healing where dead and dying bone tissue has to be chewed off by osteoclasts. The tumor cells use our osteoclasts to make room for it to grow and this is interfered by bisphosphonate use.     He had questions on prognosis.  He is being treated as per the COUGAR II study ( (N Engl J Med. Dedrick 10, 2013; 368(2): 138-148)) where chemotherapy naive patients were treated with abiraterone with prednisone. The survival in placebo arm was 27 months and clearly not reached in the treatment arm. Since then 4 additional therapies with clear survival advantage have been approved which were not available for patients on the COUGAR studies. I would expect him to easily make it to 3 yrs and preferably beyond 5 yrs.      He would like to travel to [place] on a trip in February and it should not be a problem at all. I would fill in his prescription prior to his trip if he continues to respond and he could continue.     I will hold his zometa at this time given his right knee infection which continues to heal. I would consider treatment only if there is no evidence of wound infection and all necessary healing has occurred.      I again reviewed option of surgical castration as an alternative to chemical castration with GnRH agonist. Surgical castration is effective and less expensive. I do encourage my patients to encourage this option. I explained him surgical castration and he had questions and concerns with this procedure. It is very well tolerated and would not have a prolonged healing time unlike his knee surgery. We do not have to take decision any time soon. He would not need GnRH agonist during abiraterone therapy as above.     He had a provoked clot few weeks after starting megestrol acetate. I would be open to the idea of holding his anticoagulation as he has been anticoagulated for over a year now. This should make it easier for minimizing postoperative bleeding risk. I would encourage early ambulation and DVT prophylaxis post procedures. Usually orchiectomy is a brief outpatient procedure.     Over 45 min of direct face to face time spent with patient with more than 50% time spent in counseling and coordinating care.

## 2017-08-21 NOTE — PROGRESS NOTES
Oral Chemotherapy Monitoring Program      Primary Oncologist: Dr. Rafael Valenzuela  Primary Oncology Clinic: [facility]  Cancer Diagnosis: Metastatic Prostate Cancer      Abiraterone 1000 mg PO daily with Prednisone 5 mg PO BID  Start Date: 8/19/17      Drug Interaction Assessment:     Abiraterone will decrease Warfarin metabolism, potentially raising INR; patient is aware and will inquire about more frequent INR monitoring at the start of therapy    Abiraterone inhibits Metoprolol metabolism with a slight risk of causing bradycardia      Lab Monitoring Plan:  C1D1+   CMP, BP C2D1+ Call, CMP, BP C3D1+ Call, CMP, BP C4D1+ Call, CMP, BP C5D1+ Call, CMP, BP C6D1+ Call, CMP, BP   C1D8+    C2D8+    C3D8+    C4D8+    C5D8+    C6D8+      C1D15+ Call, CMP C2D15+ Call, CMP C3D15+    C4D15+    C5D15+    C6D15+      C1D22+    C2D22+    C3D22+    C4D22+    C5D22+    C6D22+          Subjective/Objective:  Dimitri Neves is a 82 year old male seen in clinic for a follow-up visit for oral chemotherapy.  Dimitri started a new cycle on ~8/19/17 per his recollection. He has not missed any doses since starting the medication. He has noticed some increased fatigue but he is continuing with his normal activity and exercise as able with his right knee infection.    ORAL CHEMOTHERAPY 7/26/2017 8/21/2017   Drug Name Zytiga (Abiraterone) Zytiga (Abiraterone)   Current Dosage 1000mg 1000mg   Current Schedule Daily Daily   Cycle Details Continuous Continuous   Start Date of Last Cycle 7/19/2017 8/19/2017   Doses missed in last 2 weeks 0 0   Adherence Assessment Adherent Adherent   Adverse Effects No AE identified during assessment No AE identified during assessment   Any new drug interactions? - No   Is the dose as ordered appropriate for the patient? Yes Yes   Is the patient currently in pain? Assessed in last 30 days. Assessed in last 30 days.   Has the patient been assessed within the past 6 months for depression? Yes Yes  "  Has the patient missed any days of school, work, or other routine activity? No No       Vitals:  BP:   BP Readings from Last 1 Encounters:   08/21/17 146/69     Wt Readings from Last 1 Encounters:   08/21/17 80.7 kg (178 lb)     Estimated body surface area is 2.02 meters squared as calculated from the following:    Height as of an earlier encounter on 8/21/17: 1.829 m (6' 0.01\").    Weight as of an earlier encounter on 8/21/17: 80.7 kg (178 lb).    Labs:  Lab Results   Component Value Date     08/21/2017      Lab Results   Component Value Date    POTASSIUM 4.1 08/21/2017     Lab Results   Component Value Date    ALETHA 9.0 08/21/2017     Lab Results   Component Value Date    ALBUMIN 3.1 08/21/2017     No results found for: MAG  Lab Results   Component Value Date    PHOS 3.1 12/28/2011     Lab Results   Component Value Date    BUN 13 08/21/2017     Lab Results   Component Value Date    CR 0.69 08/21/2017       Lab Results   Component Value Date    AST 16 08/21/2017     Lab Results   Component Value Date    ALT 17 08/21/2017     Lab Results   Component Value Date    BILITOTAL 0.5 08/21/2017       Lab Results   Component Value Date    WBC 5.3 08/21/2017     Lab Results   Component Value Date    HGB 12.6 08/21/2017     Lab Results   Component Value Date     08/21/2017     Lab Results   Component Value Date    ANEU 2.9 08/21/2017       Assessment:  Dimitri is tolerating the medication well without noticeable side effects. He has some fatigue at baseline but noticed it increased slightly with starting the Zytiga. It does not interfere with his activity of daily living.     Plan:  Continue zytiga    Follow-Up:  Dimitri needs a CMP in one month. We will plan to call after that is complete to discuss any changes with his medications.     Refill Due:  9/18/2017    Rohit Hardwick, ManishaD, MS  PGY2 Oncology/Hematology Pharmacy Resident      "

## 2017-08-21 NOTE — MR AVS SNAPSHOT
After Visit Summary   8/21/2017    Dimitri Neves    MRN: [id number]           Patient Information     Date Of Birth          1935        Visit Information        Provider Department      8/21/2017 10:00 AM Rafael Valenzuela MD [facility]         Follow-ups after your visit        Your next 10 appointments already scheduled     Oct 16, 2017  8:15 AM CDT   LAB with LAB ONC [facility] ([facility])    [address] [address] [place] [id number] [phone]           Patient must bring picture ID. Patient should be prepared to give a urine specimen  Please do not eat 10-12 hours before your appointment if you are coming in fasting for labs on lipids, cholesterol, or glucose (sugar). Pregnant women should follow their Care Team instructions. Water with medications is okay. Do not drink coffee or other fluids. If you have concerns about taking  your medications, please ask at office or if scheduling via [employer], send a message by clicking on Secure Messaging, Message Your Care Team.            Oct 16, 2017  9:00 AM CDT   Return Visit with Rafael Valenzuela MD   [facility]    [address] [place] [id number] [phone]            Oct 16, 2017  9:30 AM CDT   Level O with [address] ([facility])    [address] [place] [id number] [phone]            Dec 11, 2017 11:00 AM CST   Return Visit with Senthil Jhaveri MD   [facility] ([facility])    [address] [place] [id number] [phone]              Who to contact     If you have questions or need follow up information about today's clinic visit or your schedule please contact [facility] directly at [phone].  Normal or non-critical lab and imaging results will be  "communicated to you by [employer], letter or phone within 4 business days after the clinic has received the results. If you do not hear from us within 7 days, please contact the clinic through [employer] or phone. If you have a critical or abnormal lab result, we will notify you by phone as soon as possible.  Submit refill requests through [employer] or call your pharmacy and they will forward the refill request to us. Please allow 3 business days for your refill to be completed.          Additional Information About Your Visit        [employer] Information     [employer] gives you secure access to your electronic health record. If you see a primary care provider, you can also send messages to your care team and make appointments. If you have questions, please call your primary care clinic.  If you do not have a primary care provider, please call [phone] and they will assist you.      [employer] is an electronic gateway that provides easy, online access to your medical records. With [employer], you can request a clinic appointment, read your test results, renew a prescription or communicate with your care team.     To access your existing account, please contact your [facility] or call [phone] for assistance.        Care EveryWhere ID     This is your Care EveryWhere ID. This could be used by other organizations to access your [place] medical records  [id number]        Your Vitals Were     Pulse Temperature Respirations Height Pulse Oximetry BMI (Body Mass Index)    56 97.3  F (36.3  C) 15 1.829 m (6' 0.01\") 98% 24.14 kg/m2       Blood Pressure from Last 3 Encounters:   08/21/17 146/69   08/10/17 125/63   07/13/17 137/70    Weight from Last 3 Encounters:   08/21/17 80.7 kg (178 lb)   07/13/17 80.6 kg (177 lb 12.8 oz)   07/10/17 79.5 kg (175 lb 4.8 oz)              Today, you had the following     No orders found for display         Today's Medication Changes          These changes are accurate " as of: 8/21/17 11:04 AM.  If you have any questions, ask your nurse or doctor.               Stop taking these medicines if you haven't already. Please contact your care team if you have questions.     warfarin 5 MG tablet   Commonly known as:  COUMADIN   Stopped by:  Rafael Valenzuela MD                    Primary Care Provider Office Phone # Fax #    Reginaldo Muir [phone] [address] [facility] [address]        Equal Access to Services     [facility] AH: Hadii aad ku hadasho Soomaali, waaxda luqadaha, qaybta kaalmada adeegyada, waxay idiin hayaan adeeg khmaxi willis . So [facility] [phone].    ATENCIÓN: Si habla español, tiene a roberts disposición servicios gratuitos de asistencia lingüística. Llame al [phone].    We comply with applicable federal civil rights laws and [place] laws. We do not discriminate on the basis of race, color, national origin, age, disability sex, sexual orientation or gender identity.            Thank you!     Thank you for choosing [facility]  for your care. Our goal is always to provide you with excellent care. Hearing back from our patients is one way we can continue to improve our services. Please take a few minutes to complete the written survey that you may receive in the mail after your visit with us. Thank you!             Your Updated Medication List - Protect others around you: Learn how to safely use, store and throw away your medicines at www.disposemymeds.org.          This list is accurate as of: 8/21/17 11:04 AM.  Always use your most recent med list.                   Brand Name Dispense Instructions for use Diagnosis    * abiraterone 250 MG tablet    ZYTIGA    120 tablet    Take 4 tablets (1,000 mg) by mouth daily    Malignant neoplasm of prostate (H)       * abiraterone 250 MG tablet    ZYTIGA    120 tablet    Take 4 tablets (1,000 mg) by mouth daily for 30 doses Take on empty stomach.    Bone metastasis (H),  Malignant neoplasm of prostate (H)       B-12 1000 MCG Tbcr     30 tablet    Take 1,000 mcg by mouth daily    Low vitamin B12 level       bicalutamide 50 MG tablet    CASODEX    90 tablet    Take 1 tablet (50 mg) by mouth daily    Malignant neoplasm of prostate (H)       CEPHALEXIN PO           gabapentin 300 MG capsule    NEURONTIN     Take 900 mg by mouth At Bedtime        leuprolide 45 MG kit    ELIGARD     Inject 45 mg Subcutaneous every 6 months        LORazepam 0.5 MG tablet    ATIVAN    30 tablet    Take 1 tablet (0.5 mg) by mouth every 4 hours as needed (Anxiety, Nausea/Vomiting or Sleep)    Bone metastasis (H), Malignant neoplasm of prostate (H)       * metoprolol 25 MG 24 hr tablet    TOPROL-XL    90 tablet    TAKE 1 TABLET (25 MG) BY MOUTH DAILY    HTN (hypertension), benign       * metoprolol 25 MG 24 hr tablet    TOPROL-XL    90 tablet    TAKE 1 TABLET (25 MG) BY MOUTH DAILY    HTN (hypertension), benign       oxyCODONE 5 MG IR tablet    ROXICODONE    30 tablet    Take 1 tablet by mouth every 6 hours as needed for pain.    Narcotic dependence, episodic use (H)       * predniSONE 5 MG tablet    DELTASONE    60 tablet    Take 1 tablet (5 mg) by mouth 2 times daily    Malignant neoplasm of prostate (H)       * predniSONE 5 MG tablet    DELTASONE    60 tablet    Take 1 tablet (5 mg) by mouth 2 times daily    Bone metastasis (H), Malignant neoplasm of prostate (H)       * predniSONE 5 MG tablet    DELTASONE    60 tablet    Take 1 tablet (5 mg) by mouth 2 times daily    Bone metastasis (H), Malignant neoplasm of prostate (H)       prochlorperazine 10 MG tablet    COMPAZINE    30 tablet    Take 0.5 tablets (5 mg) by mouth every 6 hours as needed (Nausea/Vomiting)    Bone metastasis (H), Malignant neoplasm of prostate (H)       quinapril 40 MG Tab    ACCUPRIL    90 tablet    TAKE 1 TABLET (40 MG) BY MOUTH DAILY - NEEDS TO FOLLOW UP    HTN (hypertension), benign       sildenafil 100 MG cap/tab    REVATIO/VIAGRA     10 tablet    Take 1 tablet (100 mg) by mouth daily as needed for erectile dysfunction    ED (erectile dysfunction)       simvastatin 40 MG tablet    ZOCOR    45 tablet    Take 0.5 tablets (20 mg) by mouth daily    Hyperlipidemia LDL goal <130       vitamin D 2000 UNITS tablet      Take 1 tablet by mouth daily        * Notice:  This list has 7 medication(s) that are the same as other medications prescribed for you. Read the directions carefully, and ask your doctor or other care provider to review them with you.

## 2017-08-23 ENCOUNTER — TRANSFERRED RECORDS (OUTPATIENT)
Dept: HEALTH INFORMATION MANAGEMENT | Facility: CLINIC | Age: 82
End: 2017-08-23

## 2017-08-26 DIAGNOSIS — I10 HTN (HYPERTENSION), BENIGN: ICD-10-CM

## 2017-08-27 NOTE — TELEPHONE ENCOUNTER
Metoprolol      Last Written Prescription Date: 05/08/17  Last Fill Quantity: 90, # refills: 0    Last Office Visit with [facility], [facility] or [facility] prescribing provider:  ?   Future Office Visit:    Next 5 appointments (look out 90 days)     Sep 18, 2017 10:15 AM CDT   Return Visit with [facility]   [facility] ([facility])    [address] [place] [address] [id number] [phone]            Oct 16, 2017  9:00 AM CDT   Return Visit with Rafael Valenzuela MD   [facility] ([facility])    [address] [place] [address] [phone]                    BP Readings from Last 3 Encounters:   08/21/17 146/69   08/10/17 125/63   07/13/17 137/70

## 2017-08-28 RX ORDER — METOPROLOL SUCCINATE 25 MG/1
TABLET, EXTENDED RELEASE ORAL
Qty: 90 TABLET | Refills: 1 | Status: SHIPPED | OUTPATIENT
Start: 2017-08-28 | End: 2017-09-15

## 2017-08-28 NOTE — TELEPHONE ENCOUNTER
Routing refill request to provider for review/approval because:  Will route to PCP, does not see Cinthya arce [place].

## 2017-08-31 ENCOUNTER — OFFICE VISIT (OUTPATIENT)
Dept: PODIATRY | Facility: CLINIC | Age: 82
End: 2017-08-31
Payer: COMMERCIAL

## 2017-08-31 VITALS — SYSTOLIC BLOOD PRESSURE: 126 MMHG | DIASTOLIC BLOOD PRESSURE: 68 MMHG | BODY MASS INDEX: 24.41 KG/M2 | WEIGHT: 180 LBS

## 2017-08-31 DIAGNOSIS — B35.1 DERMATOPHYTOSIS OF NAIL: Primary | ICD-10-CM

## 2017-08-31 DIAGNOSIS — M79.675 PAIN OF TOE OF LEFT FOOT: ICD-10-CM

## 2017-08-31 PROCEDURE — 11720 DEBRIDE NAIL 1-5: CPT | Performed by: PODIATRIST

## 2017-08-31 PROCEDURE — 99203 OFFICE O/P NEW LOW 30 MIN: CPT | Mod: 25 | Performed by: PODIATRIST

## 2017-08-31 NOTE — MR AVS SNAPSHOT
After Visit Summary   8/31/2017    Dimitri Neves    MRN: [id number]           Patient Information     Date Of Birth          1935        Visit Information        Provider Department      8/31/2017 4:30 PM Piotr Hassan DPM [facility]        Care Instructions    We wish you continued good healing. If you have any questions or concerns, please do not hesitate to contact us at [phone]      Please remember to call and schedule a follow up appointment if one was recommended at your earliest convenience.   PODIATRY CLINIC HOURS  TELEPHONE NUMBER    Dr. Piotr WHITEPLEONARDA FAC FAS    Clinics:  [facility]        Shanti Stone MA  Medical Assistant  Tuesday 1PM-6PM  [place]  Wednesday 7AM-2PM  [place]  Thursday 10AM-6PM  [place]  Friday 7AM-345PM  [place]  Specialty schedulers:   [phone] to make an appointment with any Specialty Provider.        Urgent Care locations:    [facility] Monday-Friday 5 pm - 9 pm. Saturday-Sunday 9 am -5pm    Monday-Friday 11 am - 9 pm Saturday 9 am - 5 pm     Monday-Sunday 12 noon-8PM [phone]     If you need a medication refill, please contact us you may need lab work and/or a follow up visit prior to your refill (i.e. Antifungal medications).    Poshmarkhart (secure e-mail communication and access to your chart) to send a message or to make an appointment.    If MRI needed please call [place] [facility] at [phone]        Weight management plan: Patient was referred to their PCP to discuss a diet and exercise plan.            Follow-ups after your visit        Your next 10 appointments already scheduled     Aug 31, 2017  4:30 PM CDT   New Visit with Piotr Hassan DPM   [facility] ([facility])    [address] [facility] [phone]             Sep 12, 2017  2:45 PM CDT   Return Visit with Senthil Taylor MD   [facility] ([facility])    [address] [place] [address]  [place] [address]   [phone]            Sep 18, 2017 10:00 AM CDT   LAB with LAB ONC [facility] ([facility])    [address] [place] [address] [phone]           Patient must bring picture ID. Patient should be prepared to give a urine specimen  Please do not eat 10-12 hours before your appointment if you are coming in fasting for labs on lipids, cholesterol, or glucose (sugar). Pregnant women should follow their Care Team instructions. Water with medications is okay. Do not drink coffee or other fluids. If you have concerns about taking  your medications, please ask at office or if scheduling via CellSpin, send a message by clicking on Secure Messaging, Message Your Care Team.            Sep 18, 2017 10:15 AM CDT   Return Visit with [facility] [place] [facility]   [facility] ([facility])    [address] [place] [address]   [phone]            Oct 16, 2017  8:15 AM CDT   LAB with LAB ONC [facility] ([facility])    [address] [place] [address] [id number] [phone]           Patient must bring picture ID. Patient should be prepared to give a urine specimen  Please do not eat 10-12 hours before your appointment if you are coming in fasting for labs on lipids, cholesterol, or glucose (sugar). Pregnant women should follow their Care Team instructions. Water with medications is okay. Do not drink coffee or other fluids. If you have concerns about taking  your medications, please ask at office or if scheduling via CellSpin, send a message by clicking on Secure Messaging, Message Your Care Team.            Oct 16, 2017  9:00 AM CDT   Return Visit with Rafael Valenzuela MD   [facility] ([facility])    [address] [facility] [address]   [phone]            Oct 16, 2017  9:30 AM CDT   Level O with BAY 9 INFUSION   [facility] ([facility])    [address] [place] [address]   [phone]            Dec 11, 2017 11:00 AM CST   Return Visit with Senthil Jhaveri MD   [facility] ([facility])    [address] [facility] [address] [phone]              Who to contact     If you have questions or need follow up information about today's clinic visit or your schedule please contact [facility] directly at [phone].  Normal or non-critical lab and imaging results will be communicated to you by MyChart, letter or phone within 4 business days after the clinic has received the results. If you do not hear from us within 7 days, please contact the clinic through Eventfulhart or phone. If you have a critical or abnormal lab result, we will notify you by phone as soon as possible.  Submit refill requests through Mixpanel or call your pharmacy and they will forward the refill request to us. Please allow 3 business days for your refill to be completed.          Additional Information About Your Visit        MyChart Information     Mixpanel gives you secure access to your electronic health record. If you see a primary care provider, you can also send messages to your care team and make appointments. If you have questions, please call your primary care clinic.  If you do not have a primary care provider, please call [phone] and they will assist you.        Care EveryWhere ID     This is your Care EveryWhere ID. This could be used by other organizations to access your [place] medical records  [id number]        Your Vitals Were     BMI (Body Mass Index)                   24.41 kg/m2            Blood Pressure from Last 3 Encounters:   08/31/17 126/68    08/21/17 146/69   08/10/17 125/63    Weight from Last 3 Encounters:   08/31/17 81.6 kg (180 lb)   08/21/17 80.7 kg (178 lb)   07/13/17 80.6 kg (177 lb 12.8 oz)              Today, you had the following     No orders found for display       Primary Care Provider Office Phone # Fax #    Reginaldo Muir [phone] [address] [facility] [place] [facility] [address]        Equal Access to Services     [facility]: Hadii aad ku hadasho Soomaali, waaxda luqadaha, qaybta kaalmada adeegyada, waxroselia rocha haykemal willis . So [facility] [phone].    ATENCIÓN: Si habla español, tiene a roberts disposición servicios gratuitos de asistencia lingüística. [facility] [phone].    We comply with applicable federal civil rights laws and [place] laws. We do not discriminate on the basis of race, color, national origin, age, disability sex, sexual orientation or gender identity.            Thank you!     Thank you for choosing [facility]  for your care. Our goal is always to provide you with excellent care. Hearing back from our patients is one way we can continue to improve our services. Please take a few minutes to complete the written survey that you may receive in the mail after your visit with us. Thank you!             Your Updated Medication List - Protect others around you: Learn how to safely use, store and throw away your medicines at www.disposemymeds.org.          This list is accurate as of: 8/31/17  4:30 PM.  Always use your most recent med list.                   Brand Name Dispense Instructions for use Diagnosis    * abiraterone 250 MG tablet    ZYTIGA    120 tablet    Take 4 tablets (1,000 mg) by mouth daily    Malignant neoplasm of prostate (H)       * abiraterone 250 MG tablet    ZYTIGA    120 tablet    Take 4 tablets (1,000 mg) by mouth daily for 30 doses Take on empty stomach.    Bone metastasis (H), Malignant neoplasm of prostate (H)       B-12 1000 MCG Tbcr     30 tablet     Take 1,000 mcg by mouth daily    Low vitamin B12 level       bicalutamide 50 MG tablet    CASODEX    90 tablet    Take 1 tablet (50 mg) by mouth daily    Malignant neoplasm of prostate (H)       CEPHALEXIN PO           gabapentin 300 MG capsule    NEURONTIN     Take 900 mg by mouth At Bedtime        leuprolide 45 MG kit    ELIGARD     Inject 45 mg Subcutaneous every 6 months        LORazepam 0.5 MG tablet    ATIVAN    30 tablet    Take 1 tablet (0.5 mg) by mouth every 4 hours as needed (Anxiety, Nausea/Vomiting or Sleep)    Bone metastasis (H), Malignant neoplasm of prostate (H)       * metoprolol 25 MG 24 hr tablet    TOPROL-XL    90 tablet    TAKE 1 TABLET (25 MG) BY MOUTH DAILY    HTN (hypertension), benign       * metoprolol 25 MG 24 hr tablet    TOPROL-XL    90 tablet    TAKE 1 TABLET (25 MG) BY MOUTH DAILY    HTN (hypertension), benign       oxyCODONE 5 MG IR tablet    ROXICODONE    30 tablet    Take 1 tablet by mouth every 6 hours as needed for pain.    Narcotic dependence, episodic use (H)       * predniSONE 5 MG tablet    DELTASONE    60 tablet    Take 1 tablet (5 mg) by mouth 2 times daily    Malignant neoplasm of prostate (H)       * predniSONE 5 MG tablet    DELTASONE    60 tablet    Take 1 tablet (5 mg) by mouth 2 times daily    Bone metastasis (H), Malignant neoplasm of prostate (H)       prochlorperazine 10 MG tablet    COMPAZINE    30 tablet    Take 0.5 tablets (5 mg) by mouth every 6 hours as needed (Nausea/Vomiting)    Bone metastasis (H), Malignant neoplasm of prostate (H)       quinapril 40 MG Tab    ACCUPRIL    90 tablet    TAKE 1 TABLET (40 MG) BY MOUTH DAILY - NEEDS TO FOLLOW UP    HTN (hypertension), benign       sildenafil 100 MG cap/tab    REVATIO/VIAGRA    10 tablet    Take 1 tablet (100 mg) by mouth daily as needed for erectile dysfunction    ED (erectile dysfunction)       simvastatin 40 MG tablet    ZOCOR    45 tablet    Take 0.5 tablets (20 mg) by mouth daily    Hyperlipidemia LDL goal  <130       vitamin D 2000 UNITS tablet      Take 1 tablet by mouth daily        * Notice:  This list has 6 medication(s) that are the same as other medications prescribed for you. Read the directions carefully, and ask your doctor or other care provider to review them with you.

## 2017-08-31 NOTE — PATIENT INSTRUCTIONS
We wish you continued good healing. If you have any questions or concerns, please do not hesitate to contact us at [phone]      Please remember to call and schedule a follow up appointment if one was recommended at your earliest convenience.   PODIATRY CLINIC HOURS  TELEPHONE NUMBER    Dr. Piotr Hassan D.P.[facility]    Clinics:  [facility]        Shanti Stone MA  Medical Assistant  Tuesday 1PM-6PM  [facility]  Wednesday 7AM-2PM  [place]  Thursday 10AM-6PM  [place] Friday 7AM-345PM  [place]  Specialty schedulers:   [phone] to make an appointment with any Specialty Provider.        Urgent Care locations:    [facility] Monday-Friday 5 pm - 9 pm. Saturday-Sunday 9 am -5pm    Monday-Friday 11 am - 9 pm Saturday 9 am - 5 pm     Monday-Sunday 12 noon-8PM [phone]     If you need a medication refill, please contact us you may need lab work and/or a follow up visit prior to your refill (i.e. Antifungal medications).    Healthboxt (secure e-mail communication and access to your chart) to send a message or to make an appointment.    If MRI needed please call [facility] at [phone]        Weight management plan: Patient was referred to their PCP to discuss a diet and exercise plan.

## 2017-08-31 NOTE — LETTER
8/31/2017         RE: Dimitri Neves  [address] [place] [address]        Dear Colleague,    Thank you for referring your patient, Dimitri Neves, to the [facility]. Please see a copy of my visit note below.    Patient complains of thick nail on left fifth toe.  Has had this for 2 months .  It is slowly getting worse.  Has had pain in the past which is aggravated by activity and relieved by rest.  Describes as a burning pain.  Hard to walk in shoes now because of the pain. Tried over the counter medications without success.  Does not like the look of the nail and is wondering about options.    ROS:  No hx of erythema, edema, drainage.  Denies numbness or tingling in feet.       Allergies   Allergen Reactions     Morphine Sulfate GI Disturbance     vomiting     Vicodin [Hydrocodone-Acetaminophen] Nausea and Vomiting       Current Outpatient Prescriptions   Medication Sig Dispense Refill     metoprolol (TOPROL-XL) 25 MG 24 hr tablet TAKE 1 TABLET (25 MG) BY MOUTH DAILY 90 tablet 1     CEPHALEXIN PO        quinapril (ACCUPRIL) 40 MG Tab TAKE 1 TABLET (40 MG) BY MOUTH DAILY - NEEDS TO FOLLOW UP 90 tablet 0     predniSONE (DELTASONE) 5 MG tablet Take 1 tablet (5 mg) by mouth 2 times daily 60 tablet 0     abiraterone (ZYTIGA) 250 MG tablet Take 4 tablets (1,000 mg) by mouth daily for 30 doses Take on empty stomach. 120 tablet 0     prochlorperazine (COMPAZINE) 10 MG tablet Take 0.5 tablets (5 mg) by mouth every 6 hours as needed (Nausea/Vomiting) 30 tablet 2     LORazepam (ATIVAN) 0.5 MG tablet Take 1 tablet (0.5 mg) by mouth every 4 hours as needed (Anxiety, Nausea/Vomiting or Sleep) 30 tablet 2     simvastatin (ZOCOR) 40 MG tablet Take 0.5 tablets (20 mg) by mouth daily 45 tablet 0     abiraterone (ZYTIGA) 250 MG tablet Take 4 tablets (1,000 mg) by mouth daily 120 tablet 11     predniSONE (DELTASONE) 5 MG tablet Take 1 tablet (5 mg) by mouth 2 times daily 60 tablet 11     metoprolol  (TOPROL-XL) 25 MG 24 hr tablet TAKE 1 TABLET (25 MG) BY MOUTH DAILY 90 tablet 0     gabapentin (NEURONTIN) 300 MG capsule Take 900 mg by mouth At Bedtime       Cyanocobalamin (B-12) 1000 MCG TBCR Take 1,000 mcg by mouth daily 30 tablet 5     bicalutamide (CASODEX) 50 MG tablet Take 1 tablet (50 mg) by mouth daily 90 tablet 3     sildenafil (VIAGRA) 100 MG tablet Take 1 tablet (100 mg) by mouth daily as needed for erectile dysfunction 10 tablet 3     Cholecalciferol (VITAMIN D) 2000 UNITS tablet Take 1 tablet by mouth daily       leuprolide (ELIGARD) 45 MG injection Inject 45 mg Subcutaneous every 6 months       oxyCODONE (ROXICODONE) 5 MG immediate release tablet Take 1 tablet by mouth every 6 hours as needed for pain. 30 tablet 0       Patient Active Problem List   Diagnosis     HTN (Hypertension), Benign goal <140/90     Narcotic dependence, episodic use (H)     ED (erectile dysfunction)     HUDSON (obstructive sleep apnea)     PE (pulmonary thromboembolism) (H)     HYPERLIPIDEMIA LDL GOAL <130     Advanced directives, counseling/discussion     Abnormal glucose     AK (actinic keratosis)     SNHL (sensorineural hearing loss)     Endolymphatic hydrops     History of squamous cell carcinoma     Pseudophakia, Yag Caps, ou     Venous (peripheral) insufficiency     Malignant neoplasm of prostate (H)     Prostate cancer (H)     Dyspnea on exertion     Long-term (current) use of anticoagulants [Z79.01]     Posterior vitreous detachment, bilateral     Iris nevus, ou     Dry eye syndrome, bilateral     Macular degeneration, dry, mild, ou     Cotton wool spots, od     Bone metastasis (H)       Past Medical History:   Diagnosis Date     Actinic keratosis      AK (actinic keratosis) 7/9/2012     Cataract cortical, senile right eye 4/17/2012     DDD (degenerative disc disease), lumbar 1979    s/p 4 back surgeries, spinal cord stimulator implant      Diverticulosis      ED (erectile dysfunction) 2009     GERD (gastroesophageal  reflux disease) ~1980     HTN (hypertension), benign ~2000     Macular degeneration, dry, mild, ou 7/23/2016     Multiple lung nodules     Several basilar pulmonary nodules <5 mm     HUDSON (obstructive sleep apnea) 10/2005    on CPAP     Other abnormal glucose 01/14/2009     PE (pulmonary thromboembolism) (H) 1981    after back surgery      Pure hypercholesterolemia ~2000     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       Past Surgical History:   Procedure Laterality Date     ARTHROSCOPY KNEE RT/LT  ~1995    Right     C LUMBAR SPINE FUSION,ANTER APPRCH  8/2007    four times total, latest 8/07     CATARACT IOL, RT/LT       LASER YAG CAPSULOTOMY      both eyes     PHACOEMULSIFICATION CLEAR CORNEA WITH STANDARD INTRAOCULAR LENS IMPLANT  6-18-12/7-30-12    right/left eye     ROTATOR CUFF REPAIR RT/LT  ~1995    right       Family History   Problem Relation Age of Onset     CANCER Father      Lung 64y     DIABETES Brother      Hypertension Brother      CANCER Mother      Liver     CEREBROVASCULAR DISEASE Mother      Eye Disorder Mother      Glaucoma Mother      CEREBROVASCULAR DISEASE Maternal Grandmother      MARIA ALEJANDRAAUZAIR No family hx of      Thyroid Disease No family hx of      Macular Degeneration No family hx of        Social History   Substance Use Topics     Smoking status: Former Smoker     Packs/day: 1.00     Years: 25.00     Types: Cigarettes     Quit date: 1/2/1976     Smokeless tobacco: Never Used      Comment: lives in smoke free household     Alcohol use 7.0 oz/week     14 Standard drinks or equivalent per week      Comment: 2-3 drinks every night         /68 (BP Location: Right arm, Patient Position: Sitting, Cuff Size: Adult Regular)  Wt 81.6 kg (180 lb)  BMI 24.41 kg/m2.  A&O X 3.  Good historian.  Pulses DP, PT 2/4 b/l.  CRT < 3 seconds X 10 digits.  Bilateral ankle edema or varicosities noted.  Sensation to light touch intact b/l.  Reflexes 2/4 b/l.  Skin has normal texture and turgor b/l.   Normal arch with weightbearing.  No forefoot or rear foot deformities noted.  MS 5/5 all compartments.  Normal ROM all fore foot and rearfoot joints.  No equinus.  left fifth nail thickened, elongated, discolored, with subungual debris.  No erythema, edema, drainage, pain on palpation.  No signs of subungual masses or exostosis and nailbed healthy.    A/P  Onychomycosis          pain    Discussed all options with patient.  Mycotic nail manually debrided with a .      Patient will keep this debrided/filed down.  Discussed with patient that medicare will not pay for this service in the future and that I do not do this in my practice unless patient at significant risk of limb loss.  Will refer to [facility].  RETURN TO CLINIC prn.    Piotr Hassan DPM, FACFAS      Again, thank you for allowing me to participate in the care of your patient.        Sincerely,        Piotr Hassan DPM

## 2017-09-01 NOTE — PROGRESS NOTES
Patient complains of thick nail on left fifth toe.  Has had this for 2 months .  It is slowly getting worse.  Has had pain in the past which is aggravated by activity and relieved by rest.  Describes as a burning pain.  Hard to walk in shoes now because of the pain. Tried over the counter medications without success.  Does not like the look of the nail and is wondering about options.    ROS:  No hx of erythema, edema, drainage.  Denies numbness or tingling in feet.       Allergies   Allergen Reactions     Morphine Sulfate GI Disturbance     vomiting     Vicodin [Hydrocodone-Acetaminophen] Nausea and Vomiting       Current Outpatient Prescriptions   Medication Sig Dispense Refill     metoprolol (TOPROL-XL) 25 MG 24 hr tablet TAKE 1 TABLET (25 MG) BY MOUTH DAILY 90 tablet 1     CEPHALEXIN PO        quinapril (ACCUPRIL) 40 MG Tab TAKE 1 TABLET (40 MG) BY MOUTH DAILY - NEEDS TO FOLLOW UP 90 tablet 0     predniSONE (DELTASONE) 5 MG tablet Take 1 tablet (5 mg) by mouth 2 times daily 60 tablet 0     abiraterone (ZYTIGA) 250 MG tablet Take 4 tablets (1,000 mg) by mouth daily for 30 doses Take on empty stomach. 120 tablet 0     prochlorperazine (COMPAZINE) 10 MG tablet Take 0.5 tablets (5 mg) by mouth every 6 hours as needed (Nausea/Vomiting) 30 tablet 2     LORazepam (ATIVAN) 0.5 MG tablet Take 1 tablet (0.5 mg) by mouth every 4 hours as needed (Anxiety, Nausea/Vomiting or Sleep) 30 tablet 2     simvastatin (ZOCOR) 40 MG tablet Take 0.5 tablets (20 mg) by mouth daily 45 tablet 0     abiraterone (ZYTIGA) 250 MG tablet Take 4 tablets (1,000 mg) by mouth daily 120 tablet 11     predniSONE (DELTASONE) 5 MG tablet Take 1 tablet (5 mg) by mouth 2 times daily 60 tablet 11     metoprolol (TOPROL-XL) 25 MG 24 hr tablet TAKE 1 TABLET (25 MG) BY MOUTH DAILY 90 tablet 0     gabapentin (NEURONTIN) 300 MG capsule Take 900 mg by mouth At Bedtime       Cyanocobalamin (B-12) 1000 MCG TBCR Take 1,000 mcg by mouth daily 30 tablet 5      bicalutamide (CASODEX) 50 MG tablet Take 1 tablet (50 mg) by mouth daily 90 tablet 3     sildenafil (VIAGRA) 100 MG tablet Take 1 tablet (100 mg) by mouth daily as needed for erectile dysfunction 10 tablet 3     Cholecalciferol (VITAMIN D) 2000 UNITS tablet Take 1 tablet by mouth daily       leuprolide (ELIGARD) 45 MG injection Inject 45 mg Subcutaneous every 6 months       oxyCODONE (ROXICODONE) 5 MG immediate release tablet Take 1 tablet by mouth every 6 hours as needed for pain. 30 tablet 0       Patient Active Problem List   Diagnosis     HTN (Hypertension), Benign goal <140/90     Narcotic dependence, episodic use (H)     ED (erectile dysfunction)     HUDSON (obstructive sleep apnea)     PE (pulmonary thromboembolism) (H)     HYPERLIPIDEMIA LDL GOAL <130     Advanced directives, counseling/discussion     Abnormal glucose     AK (actinic keratosis)     SNHL (sensorineural hearing loss)     Endolymphatic hydrops     History of squamous cell carcinoma     Pseudophakia, Yag Caps, ou     Venous (peripheral) insufficiency     Malignant neoplasm of prostate (H)     Prostate cancer (H)     Dyspnea on exertion     Long-term (current) use of anticoagulants [Z79.01]     Posterior vitreous detachment, bilateral     Iris nevus, ou     Dry eye syndrome, bilateral     Macular degeneration, dry, mild, ou     Cotton wool spots, od     Bone metastasis (H)       Past Medical History:   Diagnosis Date     Actinic keratosis      AK (actinic keratosis) 7/9/2012     Cataract cortical, senile right eye 4/17/2012     DDD (degenerative disc disease), lumbar 1979    s/p 4 back surgeries, spinal cord stimulator implant      Diverticulosis      ED (erectile dysfunction) 2009     GERD (gastroesophageal reflux disease) ~1980     HTN (hypertension), benign ~2000     Macular degeneration, dry, mild, ou 7/23/2016     Multiple lung nodules     Several basilar pulmonary nodules <5 mm     HUDSON (obstructive sleep apnea) 10/2005    on CPAP     Other  abnormal glucose 01/14/2009     PE (pulmonary thromboembolism) (H) 1981    after back surgery      Pure hypercholesterolemia ~2000     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       Past Surgical History:   Procedure Laterality Date     ARTHROSCOPY KNEE RT/LT  ~1995    Right     C LUMBAR SPINE FUSION,ANTER APPRCH  8/2007    four times total, latest 8/07     CATARACT IOL, RT/LT       LASER YAG CAPSULOTOMY      both eyes     PHACOEMULSIFICATION CLEAR CORNEA WITH STANDARD INTRAOCULAR LENS IMPLANT  6-18-12/7-30-12    right/left eye     ROTATOR CUFF REPAIR RT/LT  ~1995    right       Family History   Problem Relation Age of Onset     CANCER Father      Lung 64y     DIABETES Brother      Hypertension Brother      CANCER Mother      Liver     CEREBROVASCULAR DISEASE Mother      Eye Disorder Mother      Glaucoma Mother      CEREBROVASCULAR DISEASE Maternal Grandmother      C.A.D. No family hx of      Thyroid Disease No family hx of      Macular Degeneration No family hx of        Social History   Substance Use Topics     Smoking status: Former Smoker     Packs/day: 1.00     Years: 25.00     Types: Cigarettes     Quit date: 1/2/1976     Smokeless tobacco: Never Used      Comment: lives in smoke free household     Alcohol use 7.0 oz/week     14 Standard drinks or equivalent per week      Comment: 2-3 drinks every night         /68 (BP Location: Right arm, Patient Position: Sitting, Cuff Size: Adult Regular)  Wt 81.6 kg (180 lb)  BMI 24.41 kg/m2.  A&O X 3.  Good historian.  Pulses DP, PT 2/4 b/l.  CRT < 3 seconds X 10 digits.  Bilateral ankle edema or varicosities noted.  Sensation to light touch intact b/l.  Reflexes 2/4 b/l.  Skin has normal texture and turgor b/l.  Normal arch with weightbearing.  No forefoot or rear foot deformities noted.  MS 5/5 all compartments.  Normal ROM all fore foot and rearfoot joints.  No equinus.  left fifth nail thickened, elongated, discolored, with subungual debris.  No  erythema, edema, drainage, pain on palpation.  No signs of subungual masses or exostosis and nailbed healthy.    A/P  Onychomycosis          pain    Discussed all options with patient.  Mycotic nail manually debrided with a .      Patient will keep this debrided/filed down.  Discussed with patient that medicare will not pay for this service in the future and that I do not do this in my practice unless patient at significant risk of limb loss.  Will [facility] to [facility].  RETURN TO CLINIC prn.    Piotr Hassan DPM, FACFAS

## 2017-09-07 DIAGNOSIS — C79.51 BONE METASTASIS: Primary | ICD-10-CM

## 2017-09-07 DIAGNOSIS — C61 MALIGNANT NEOPLASM OF PROSTATE (H): ICD-10-CM

## 2017-09-07 RX ORDER — ABIRATERONE ACETATE 250 MG/1
1000 TABLET ORAL DAILY
Qty: 120 TABLET | Refills: 0 | Status: SHIPPED | OUTPATIENT
Start: 2017-09-07 | End: 2017-10-07

## 2017-09-07 RX ORDER — PREDNISONE 5 MG/1
5 TABLET ORAL 2 TIMES DAILY
Qty: 60 TABLET | Refills: 0 | Status: SHIPPED | OUTPATIENT
Start: 2017-09-07 | End: 2017-09-15

## 2017-09-12 ENCOUNTER — TELEPHONE (OUTPATIENT)
Dept: FAMILY MEDICINE | Facility: CLINIC | Age: 82
End: 2017-09-12

## 2017-09-12 ENCOUNTER — TELEPHONE (OUTPATIENT)
Dept: NURSING | Facility: CLINIC | Age: 82
End: 2017-09-12

## 2017-09-12 ENCOUNTER — OFFICE VISIT (OUTPATIENT)
Dept: UROLOGY | Facility: CLINIC | Age: 82
End: 2017-09-12
Payer: COMMERCIAL

## 2017-09-12 VITALS — RESPIRATION RATE: 16 BRPM | SYSTOLIC BLOOD PRESSURE: 128 MMHG | HEART RATE: 56 BPM | DIASTOLIC BLOOD PRESSURE: 68 MMHG

## 2017-09-12 DIAGNOSIS — C61 MALIGNANT NEOPLASM OF PROSTATE (H): Primary | ICD-10-CM

## 2017-09-12 DIAGNOSIS — I10 HTN (HYPERTENSION), BENIGN: ICD-10-CM

## 2017-09-12 DIAGNOSIS — R79.89 LOW VITAMIN B12 LEVEL: ICD-10-CM

## 2017-09-12 PROCEDURE — 99213 OFFICE O/P EST LOW 20 MIN: CPT | Performed by: UROLOGY

## 2017-09-12 RX ORDER — METOPROLOL SUCCINATE 25 MG/1
TABLET, EXTENDED RELEASE ORAL
Qty: 90 TABLET | Refills: 0
Start: 2017-09-12

## 2017-09-12 RX ORDER — OMEGA-3/DHA/EPA/FISH OIL 35-113.5MG
TABLET,CHEWABLE ORAL
Qty: 30 TABLET | Refills: 5 | Status: SHIPPED | OUTPATIENT
Start: 2017-09-12 | End: 2018-03-20

## 2017-09-12 NOTE — PROGRESS NOTES
Chief Complaint   Patient presents with     RECHECK     PSA follow up       Dimitri Neves is a 82 year old male who presents today for follow up of   Chief Complaint   Patient presents with     RECHECK     PSA follow up    f/u to discuss orchiectomy as option instead of GnRH agonist.  He is currently being treated by Dr. Valenzuela for his mets prostate cancer.    Current Outpatient Prescriptions   Medication Sig Dispense Refill     [facility] VITAMIN  B12 1000 MCG TABS TAKE 1 TABLET BY MOUTH EVERY DAY 30 tablet 5     predniSONE (DELTASONE) 5 MG tablet Take 1 tablet (5 mg) by mouth 2 times daily 60 tablet 0     abiraterone (ZYTIGA) 250 MG tablet Take 4 tablets (1,000 mg) by mouth daily for 30 doses Take on empty stomach. 120 tablet 0     metoprolol (TOPROL-XL) 25 MG 24 hr tablet TAKE 1 TABLET (25 MG) BY MOUTH DAILY 90 tablet 1     CEPHALEXIN PO        quinapril (ACCUPRIL) 40 MG Tab TAKE 1 TABLET (40 MG) BY MOUTH DAILY - NEEDS TO FOLLOW UP 90 tablet 0     predniSONE (DELTASONE) 5 MG tablet Take 1 tablet (5 mg) by mouth 2 times daily 60 tablet 0     abiraterone (ZYTIGA) 250 MG tablet Take 4 tablets (1,000 mg) by mouth daily for 30 doses Take on empty stomach. 120 tablet 0     prochlorperazine (COMPAZINE) 10 MG tablet Take 0.5 tablets (5 mg) by mouth every 6 hours as needed (Nausea/Vomiting) 30 tablet 2     LORazepam (ATIVAN) 0.5 MG tablet Take 1 tablet (0.5 mg) by mouth every 4 hours as needed (Anxiety, Nausea/Vomiting or Sleep) 30 tablet 2     simvastatin (ZOCOR) 40 MG tablet Take 0.5 tablets (20 mg) by mouth daily 45 tablet 0     abiraterone (ZYTIGA) 250 MG tablet Take 4 tablets (1,000 mg) by mouth daily 120 tablet 11     predniSONE (DELTASONE) 5 MG tablet Take 1 tablet (5 mg) by mouth 2 times daily 60 tablet 11     metoprolol (TOPROL-XL) 25 MG 24 hr tablet TAKE 1 TABLET (25 MG) BY MOUTH DAILY 90 tablet 0     gabapentin (NEURONTIN) 300 MG capsule Take 900 mg by mouth At Bedtime       bicalutamide (CASODEX) 50 MG tablet Take 1  tablet (50 mg) by mouth daily 90 tablet 3     sildenafil (VIAGRA) 100 MG tablet Take 1 tablet (100 mg) by mouth daily as needed for erectile dysfunction 10 tablet 3     Cholecalciferol (VITAMIN D) 2000 UNITS tablet Take 1 tablet by mouth daily       leuprolide (ELIGARD) 45 MG injection Inject 45 mg Subcutaneous every 6 months       oxyCODONE (ROXICODONE) 5 MG immediate release tablet Take 1 tablet by mouth every 6 hours as needed for pain. 30 tablet 0     Allergies   Allergen Reactions     Morphine Sulfate GI Disturbance     vomiting     Vicodin [Hydrocodone-Acetaminophen] Nausea and Vomiting      Past Medical History:   Diagnosis Date     Actinic keratosis      AK (actinic keratosis) 7/9/2012     Cataract cortical, senile right eye 4/17/2012     DDD (degenerative disc disease), lumbar 1979    s/p 4 back surgeries, spinal cord stimulator implant      Diverticulosis      ED (erectile dysfunction) 2009     GERD (gastroesophageal reflux disease) ~1980     HTN (hypertension), benign ~2000     Macular degeneration, dry, mild, ou 7/23/2016     Multiple lung nodules     Several basilar pulmonary nodules <5 mm     HUDSON (obstructive sleep apnea) 10/2005    on CPAP     Other abnormal glucose 01/14/2009     PE (pulmonary thromboembolism) (H) 1981    after back surgery      Pure hypercholesterolemia ~2000     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     Past Surgical History:   Procedure Laterality Date     ARTHROSCOPY KNEE RT/LT  ~1995    Right     C LUMBAR SPINE FUSION,ANTER APPRCH  8/2007    four times total, latest 8/07     CATARACT IOL, RT/LT       LASER YAG CAPSULOTOMY      both eyes     PHACOEMULSIFICATION CLEAR CORNEA WITH STANDARD INTRAOCULAR LENS IMPLANT  6-18-12/7-30-12    right/left eye     ROTATOR CUFF REPAIR RT/LT  ~1995    right     Family History   Problem Relation Age of Onset     CANCER Father      Lung 64y     DIABETES Brother      Hypertension Brother      CANCER Mother      Liver      CEREBROVASCULAR DISEASE Mother      Eye Disorder Mother      Glaucoma Mother      CEREBROVASCULAR DISEASE Maternal Grandmother      C.A.D. No family hx of      Thyroid Disease No family hx of      Macular Degeneration No family hx of      Social History     Social History     Marital status:      Spouse name: Tatiana     Number of children: 2     Years of education: N/A     Occupational History      Retired     Social History Main Topics     Smoking status: Former Smoker     Packs/day: 1.00     Years: 25.00     Types: Cigarettes     Quit date: 1/2/1976     Smokeless tobacco: Never Used      Comment: lives in smoke free household     Alcohol use 7.0 oz/week     14 Standard drinks or equivalent per week      Comment: 2-3 drinks every night     Drug use: No      Comment: tatoos- sterile     Sexual activity: Yes     Partners: Female     Other Topics Concern      Service Yes     [employer] x8 years     Blood Transfusions No     Caffeine Concern No     Hobby Hazards No     Sleep Concern No     Stress Concern No     Weight Concern Yes     Special Diet No     Back Care Yes     Exercise Yes     Seat Belt Yes     Self-Exams No     Parent/Sibling W/ Cabg, Mi Or Angioplasty Before 65f 55m? No     Social History Narrative       REVIEW OF SYSTEMS  =================  C: NEGATIVE for fever, chills, change in weight  I: NEGATIVE for worrisome rashes, moles or lesions  E/M: NEGATIVE for ear, mouth and throat problems  R: NEGATIVE for significant cough or SHORTNESS OF BREATH,   CV: NEGATIVE for chest pain, palpitations or peripheral edema  GI: NEGATIVE for nausea, abdominal pain, heartburn, or change in bowel habits  NEURO: NEGATIVE any motor/sensory changes  PSYCH: NEGATIVE for recent mood disorder    Physical Exam:  /68 (BP Location: Right arm, Patient Position: Chair, Cuff Size: Adult Regular)  Pulse 56  Resp 16   Patient is pleasant, in no acute distress, good general condition.  Lung: no evidence of  respiratory distress    Abdomen: Soft, nondistended, non tender. No masses. No rebound or guarding.   Exam: no cva tenderness  Skin: Warm and dry.  No redness.  Psych: normal mood and affect  Neuro: alert and oriented    Assessment/Plan:   (C61) Malignant neoplasm of prostate (H)  (primary encounter diagnosis)  Comment:    Plan:  Pros and cons discussed             Schedule for elective bilateral simple orchiectomy

## 2017-09-12 NOTE — TELEPHONE ENCOUNTER
Called and verified with pharmacy on duplicate prescription. Please disregard. Nishi Francois MA

## 2017-09-12 NOTE — MR AVS SNAPSHOT
After Visit Summary   9/12/2017    Dimitri Neves    MRN: [id number]           Patient Information     Date Of Birth          1935        Visit Information        Provider Department      9/12/2017 2:45 PM Senthil Taylor MD [facility]        Today's Diagnoses     Malignant neoplasm of prostate (H)    -  1       Follow-ups after your visit        Your next 10 appointments already scheduled     Sep 18, 2017 10:00 AM CDT   LAB with LAB ONC [facility] ([facility])    [address] [facility] [id number] [phone]           Patient must bring picture ID. Patient should be prepared to give a urine specimen  Please do not eat 10-12 hours before your appointment if you are coming in fasting for labs on lipids, cholesterol, or glucose (sugar). Pregnant women should follow their Care Team instructions. Water with medications is okay. Do not drink coffee or other fluids. If you have concerns about taking  your medications, please ask at office or if scheduling via [employer], send a message by clicking on Secure Messaging, Message Your Care Team.            Sep 18, 2017 10:15 AM CDT   Return Visit with NURSE ONLY CANCER CENTER   [facility] ([facility]    [address] [facility] [address] [phone]            Oct 16, 2017  8:15 AM CDT   LAB with LAB ONC [facility])    [address] [facility] [address] [phone]           Patient must bring picture ID. Patient should be prepared to give a urine specimen  Please do not eat 10-12 hours before your appointment if you are coming in fasting for labs on lipids, cholesterol, or glucose (sugar). Pregnant women should follow their Care Team instructions. Water with medications is okay. Do not drink coffee or other fluids. If you have concerns about taking  your  medications, please ask at office or if scheduling via [employer], send a message by clicking on Secure Messaging, Message Your Care Team.            Oct 16, 2017  9:00 AM CDT   Return Visit with Rafael Valenzuela MD   [facility] ([facility]    [address] [place] [id number] [phone]            Oct 16, 2017  9:30 AM CDT   Level O with BAY 9 INFUSION   [facility] ([facility])    [address] [address] [place] [address]   [phone]            Dec 11, 2017 11:00 AM CST   Return Visit with Senthil Jhaveri MD   [facility] ([facility])    [address] [facility] [address] [phone]              Who to contact     If you have questions or need follow up information about today's clinic visit or your schedule please contact [facility] directly at [phone].  Normal or non-critical lab and imaging results will be communicated to you by [employer], letter or phone within 4 business days after the clinic has received the results. If you do not hear from us within 7 days, please contact the clinic through eZWayt or phone. If you have a critical or abnormal lab result, we will notify you by phone as soon as possible.  Submit refill requests through Rocker[employer] or call your pharmacy and they will forward the refill request to us. Please allow 3 business days for your refill to be completed.          Additional Information About Your Visit        [employer] Information     [employer] gives you secure access to your electronic health record. If you see a primary care provider, you can also send messages to your care team and make appointments. If you have questions, please call your primary care clinic.  If you do not have a primary care provider, please call [phone] and they will assist you.        Care EveryWhere ID     This is your Care EveryWhere ID. This could be  used by other organizations to access your [place] medical records  [id number]        Your Vitals Were     Pulse Respirations                56 16           Blood Pressure from Last 3 Encounters:   09/12/17 128/68   08/31/17 126/68   08/21/17 146/69    Weight from Last 3 Encounters:   08/31/17 81.6 kg (180 lb)   08/21/17 80.7 kg (178 lb)   07/13/17 80.6 kg (177 lb 12.8 oz)              Today, you had the following     No orders found for display         Today's Medication Changes          These changes are accurate as of: 9/12/17  3:12 PM.  If you have any questions, ask your nurse or doctor.               Start taking these medicines.        Dose/Directions    [facility]S vitamin  B12 1000 MCG Tabs   Used for:  Low vitamin B12 level   Generic drug:  cyanocobalamin   Replaces:  B-12 1000 MCG Tbcr   Started by:  Reginaldo Muir MD        TAKE 1 TABLET BY MOUTH EVERY DAY   Quantity:  30 tablet   Refills:  5         Stop taking these medicines if you haven't already. Please contact your care team if you have questions.     B-12 1000 MCG Tbcr   Replaced by:  [facility]S vitamin  B12 1000 MCG Tabs   Stopped by:  Reginaldo Muir MD                Where to get your medicines      These medications were sent to [facility]/pharmacy #[id number] - [place], [place] - [address] [address],   [address] [address], , [facility] [address]     Phone:  [phone]     [facility]S vitamin  B12 1000 MCG Tabs                Primary Care Provider Office Phone # Fax #    Reginaldo Muir [phone] [address] [facility] [address]        Equal Access to Services     [facility] AH: Hadii aad ku hadasho Soomaali, waaxda luqadaha, qaybta kaalmada adeegeloise, naty russo. So [facility] [phone].    ATENCIÓN: Si habla español, tiene a roberts disposición servicios gratuitos de asistencia lingüística. Llame al [phone].    We comply with applicable federal civil rights laws and [place] laws. We do not discriminate on  the basis of race, color, national origin, age, disability sex, sexual orientation or gender identity.            Thank you!     Thank you for choosing [facility]  for your care. Our goal is always to provide you with excellent care. Hearing back from our patients is one way we can continue to improve our services. Please take a few minutes to complete the written survey that you may receive in the mail after your visit with us. Thank you!             Your Updated Medication List - Protect others around you: Learn how to safely use, store and throw away your medicines at www.disposemymeds.org.          This list is accurate as of: 9/12/17  3:12 PM.  Always use your most recent med list.                   Brand Name Dispense Instructions for use Diagnosis    * abiraterone 250 MG tablet    ZYTIGA    120 tablet    Take 4 tablets (1,000 mg) by mouth daily    Malignant neoplasm of prostate (H)       * abiraterone 250 MG tablet    ZYTIGA    120 tablet    Take 4 tablets (1,000 mg) by mouth daily for 30 doses Take on empty stomach.    Bone metastasis (H), Malignant neoplasm of prostate (H)       * abiraterone 250 MG tablet    ZYTIGA    120 tablet    Take 4 tablets (1,000 mg) by mouth daily for 30 doses Take on empty stomach.    Bone metastasis (H), Malignant neoplasm of prostate (H)       bicalutamide 50 MG tablet    CASODEX    90 tablet    Take 1 tablet (50 mg) by mouth daily    Malignant neoplasm of prostate (H)       CEPHALEXIN PO           CVS vitamin  B12 1000 MCG Tabs   Generic drug:  cyanocobalamin     30 tablet    TAKE 1 TABLET BY MOUTH EVERY DAY    Low vitamin B12 level       gabapentin 300 MG capsule    NEURONTIN     Take 900 mg by mouth At Bedtime        leuprolide 45 MG kit    ELIGARD     Inject 45 mg Subcutaneous every 6 months        LORazepam 0.5 MG tablet    ATIVAN    30 tablet    Take 1 tablet (0.5 mg) by mouth every 4 hours as needed (Anxiety, Nausea/Vomiting or Sleep)    Bone metastasis (H),  Malignant neoplasm of prostate (H)       * metoprolol 25 MG 24 hr tablet    TOPROL-XL    90 tablet    TAKE 1 TABLET (25 MG) BY MOUTH DAILY    HTN (hypertension), benign       * metoprolol 25 MG 24 hr tablet    TOPROL-XL    90 tablet    TAKE 1 TABLET (25 MG) BY MOUTH DAILY    HTN (hypertension), benign       oxyCODONE 5 MG IR tablet    ROXICODONE    30 tablet    Take 1 tablet by mouth every 6 hours as needed for pain.    Narcotic dependence, episodic use (H)       * predniSONE 5 MG tablet    DELTASONE    60 tablet    Take 1 tablet (5 mg) by mouth 2 times daily    Malignant neoplasm of prostate (H)       * predniSONE 5 MG tablet    DELTASONE    60 tablet    Take 1 tablet (5 mg) by mouth 2 times daily    Bone metastasis (H), Malignant neoplasm of prostate (H)       * predniSONE 5 MG tablet    DELTASONE    60 tablet    Take 1 tablet (5 mg) by mouth 2 times daily    Bone metastasis (H), Malignant neoplasm of prostate (H)       prochlorperazine 10 MG tablet    COMPAZINE    30 tablet    Take 0.5 tablets (5 mg) by mouth every 6 hours as needed (Nausea/Vomiting)    Bone metastasis (H), Malignant neoplasm of prostate (H)       quinapril 40 MG Tab    ACCUPRIL    90 tablet    TAKE 1 TABLET (40 MG) BY MOUTH DAILY - NEEDS TO FOLLOW UP    HTN (hypertension), benign       sildenafil 100 MG cap/tab    REVATIO/VIAGRA    10 tablet    Take 1 tablet (100 mg) by mouth daily as needed for erectile dysfunction    ED (erectile dysfunction)       simvastatin 40 MG tablet    ZOCOR    45 tablet    Take 0.5 tablets (20 mg) by mouth daily    Hyperlipidemia LDL goal <130       vitamin D 2000 UNITS tablet      Take 1 tablet by mouth daily        * Notice:  This list has 8 medication(s) that are the same as other medications prescribed for you. Read the directions carefully, and ask your doctor or other care provider to review them with you.

## 2017-09-12 NOTE — NURSING NOTE
"Chief Complaint   Patient presents with     RECHECK     PSA follow up       Initial /68 (BP Location: Right arm, Patient Position: Chair, Cuff Size: Adult Regular)  Pulse 56  Resp 16 Estimated body mass index is 24.41 kg/(m^2) as calculated from the following:    Height as of 8/21/17: 1.829 m (6' 0.01\").    Weight as of 8/31/17: 81.6 kg (180 lb).  Medication Reconciliation: complete   Joey Whatley CMA      "

## 2017-09-12 NOTE — TELEPHONE ENCOUNTER
Prescription approved per [facility] Refill Protocol.  Pablo Cheatham RN

## 2017-09-12 NOTE — TELEPHONE ENCOUNTER
Cyanocobalamin (B-12) 1000 MCG TBCR    Last Written Prescription Date: 03/06/2017  Last Fill Quantity: 30,    # refills: 5  Last Office Visit with [facility], P or [facility] prescribing provider:  05/08/2017   Next 5 appointments (look out 90 days)     Sep 12, 2017  2:45 PM CDT   Return Visit with Senthil Taylor MD   [facility] ([address] [address]   [phone]            Sep 18, 2017 10:15 AM CDT   Return Visit with [facility]   [facility] ([facility])    [id number] [place] [id number] [phone]            Oct 16, 2017  9:00 AM CDT   Return Visit with Rafael Valenzuela MD   [facility] ([facility])    [id number] [place] [id number] [phone]            Dec 11, 2017 11:00 AM CST   Return Visit with Senthil Jhaveri MD   [facility] ([facility])    [address] [place] [id number] [phone]                   Lab Results   Component Value Date    WBC 5.3 08/21/2017     Lab Results   Component Value Date    RBC 4.24 08/21/2017     Lab Results   Component Value Date    HGB 12.6 08/21/2017     Lab Results   Component Value Date    HCT 38.4 08/21/2017     No components found for: MCT  Lab Results   Component Value Date    MCV 91 08/21/2017     Lab Results   Component Value Date    MCH 29.7 08/21/2017     Lab Results   Component Value Date    MCHC 32.8 08/21/2017     Lab Results   Component Value Date    RDW 15.9 08/21/2017     Lab Results   Component Value Date     08/21/2017     Lab Results   Component Value Date    AST 16 08/21/2017     Lab Results   Component Value Date    ALT 17 08/21/2017     Creatinine   Date Value Ref Range Status   08/21/2017 0.69 0.66 - 1.25 mg/dL Final     Nishi Francois MA

## 2017-09-12 NOTE — TELEPHONE ENCOUNTER
Per patient, oncology took him off coumadin, see 8/21/17 visit.  OK to discharge patient from INR clinic.  Madonna Snow RN

## 2017-09-12 NOTE — TELEPHONE ENCOUNTER
METOPROLOL      Last Written Prescription Date: 5-8-17  Last Fill Quantity: 90, # refills: 0    Last Office Visit with G, P or [facility] prescribing provider:  9-7-17   Future Office Visit:    Next 5 appointments (look out 90 days)     Sep 12, 2017  2:45 PM CDT   Return Visit with Senthil Taylor MD   [facility]    [address] [place] [id number]   [phone]            Sep 18, 2017 10:15 AM CDT   Return Visit with [facility]   [facility] ([facility]    [address] [place] [id number] [phone]            Oct 16, 2017  9:00 AM CDT   Return Visit with Rafael Valenzuela MD   [facility] ([facility])    [address] [place] [id number] [phone]            Dec 11, 2017 11:00 AM CST   Return Visit with Senthil Jhaveri MD   [facility] ([facility])    [address] [place] [address]   [phone]                    BP Readings from Last 3 Encounters:   08/31/17 126/68   08/21/17 146/69   08/10/17 125/63

## 2017-09-14 NOTE — PROGRESS NOTES
[address] [address]  [phone]  Dept: [phone]    PRE-OP EVALUATION:  Today's date: 9/15/2017    Dimitri Neves (: 1935) presents for pre-operative evaluation assessment as requested by Dr. Senthil Taylor.  He requires evaluation and anesthesia risk assessment prior to undergoing surgery/procedure for treatment of testicles.  Proposed procedure: Bilateral orchiectomy scrotal approach    Date of Surgery/ Procedure: 2017  Time of Surgery/ Procedure: 12:30 pm  Hospital/Surgical Facility: [facility]  Fax number for surgical facility:   Primary Physician: Reginaldo Muir  Type of Anesthesia Anticipated: General    Patient has a Health Care Directive or Living Will:  YES    Preop Questions 9/15/2017   1.  Do you have a history of heart attack, stroke, stent, bypass or surgery on an artery in the head, neck, heart or legs? No   2.  Do you ever have any pain or discomfort in your chest? No   3.  Do you have a history of  Heart Failure? No   4.   Are you troubled by shortness of breath when:  walking on a level surface, or up a slight hill, or at night? No   5.  Do you currently have a cold, bronchitis or other respiratory infection? No   6.  Do you have a cough, shortness of breath, or wheezing? No   7.  Do you sometimes get pains in the calves of your legs when you walk? No   8. Do you or anyone in your family have previous history of blood clots? YES - Patient has a history of 2 blood clots. He was prescribed warfarin after his second blood clot about 2 years ago. His cancer specialist has since taken him off his warfarin medication. He has not been on Warfarin for a little over a year. He was given apparently the same recommendations by a second oncologist about a year ago   9.  Do you or does anyone in your family have a serious bleeding problem such as prolonged bleeding following surgeries or cuts? No   10. Have you ever had  problems with anemia or been told to take iron pills? No   11. Have you had any abnormal blood loss such as black, tarry or bloody stools? No   12. Have you ever had a blood transfusion? YES    13. Have you or any of your relatives ever had problems with anesthesia? No   14. Do you have sleep apnea, excessive snoring or daytime drowsiness? YES    15. Do you have any prosthetic heart valves? No   16. Do you have prosthetic joints? YES - His right knee was replaced.          HPI:                                                      Brief HPI related to upcoming procedure: Dimitri Neves is a 82 year old male who presents to the clinic for a preoperative exam prior to a  Bilateral orchiectomy scrotal approach.      See problem list for active medical problems.  Problems all longstanding and stable, except as noted/documented.  See ROS for pertinent symptoms related to these conditions.                                                                                                  .    MEDICAL HISTORY:                                                    Patient Active Problem List    Diagnosis Date Noted     Bone metastasis (H) 07/10/2017     Priority: Medium     Cotton wool spots, od 06/09/2017     Priority: Medium     Iris nevus, ou 07/23/2016     Priority: Medium     Dry eye syndrome, bilateral 07/23/2016     Priority: Medium     Macular degeneration, dry, mild, ou 07/23/2016     Priority: Medium     Posterior vitreous detachment, bilateral 07/22/2016     Priority: Medium     Dyspnea on exertion 07/12/2016     Priority: Medium     Prostate cancer (H) 06/22/2016     Priority: Medium     Malignant neoplasm of prostate (H) 01/05/2015     Priority: Medium     Venous (peripheral) insufficiency 06/19/2014     Priority: Medium     Pseudophakia, Yag Caps, ou 08/07/2013     Priority: Medium     History of squamous cell carcinoma 12/10/2012     Priority: Medium     SNHL (sensorineural hearing loss) 10/16/2012     Priority:  Medium     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 Provider to review       HYPERLIPIDEMIA LDL GOAL <130 10/31/2010     Priority: Medium     HTN (Hypertension), Benign goal <140/90      Priority: Medium     Narcotic dependence, episodic use (H)      Priority: Medium     ED (erectile dysfunction)      Priority: Medium     PE (pulmonary thromboembolism) (H)      Priority: Medium     1980s after back surgery       HUDSON (obstructive sleep apnea) 10/01/2005     Priority: Medium     on CPAP       Advanced directives, counseling/discussion 04/24/2012     Priority: Low     Patient states has Advance Directive and will bring in a copy to clinic. 4/24/2012           Past Medical History:   Diagnosis Date     Actinic keratosis      AK (actinic keratosis) 7/9/2012     Cataract cortical, senile right eye 4/17/2012     DDD (degenerative disc disease), lumbar 1979    s/p 4 back surgeries, spinal cord stimulator implant      Diverticulosis      ED (erectile dysfunction) 2009     GERD (gastroesophageal reflux disease) ~1980     HTN (hypertension), benign ~2000     Macular degeneration, dry, mild, ou 7/23/2016     Multiple lung nodules     Several basilar pulmonary nodules <5 mm     HUDSON (obstructive sleep apnea) 10/2005    on CPAP     Other abnormal glucose 01/14/2009     PE (pulmonary thromboembolism) (H) 1981    after back surgery      Pure hypercholesterolemia ~2000     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     Past Surgical History:   Procedure Laterality Date     ARTHROSCOPY KNEE RT/LT  ~1995    Right     C LUMBAR SPINE FUSION,ANTER APPRCH  8/2007    four times total, latest 8/07     CATARACT IOL, RT/LT       LASER YAG CAPSULOTOMY      both eyes     PHACOEMULSIFICATION CLEAR CORNEA WITH STANDARD INTRAOCULAR LENS IMPLANT  6-18-12/7-30-12     right/left eye     ROTATOR CUFF REPAIR RT/LT  ~1995    right     Current Outpatient Prescriptions   Medication Sig Dispense Refill     Calcium Carbonate (CALCIUM 600 PO)        [facility] VITAMIN  B12 1000 MCG TABS TAKE 1 TABLET BY MOUTH EVERY DAY 30 tablet 5     abiraterone (ZYTIGA) 250 MG tablet Take 4 tablets (1,000 mg) by mouth daily for 30 doses Take on empty stomach. 120 tablet 0     CEPHALEXIN PO        quinapril (ACCUPRIL) 40 MG Tab TAKE 1 TABLET (40 MG) BY MOUTH DAILY - NEEDS TO FOLLOW UP 90 tablet 0     abiraterone (ZYTIGA) 250 MG tablet Take 4 tablets (1,000 mg) by mouth daily for 30 doses Take on empty stomach. 120 tablet 0     prochlorperazine (COMPAZINE) 10 MG tablet Take 0.5 tablets (5 mg) by mouth every 6 hours as needed (Nausea/Vomiting) 30 tablet 2     LORazepam (ATIVAN) 0.5 MG tablet Take 1 tablet (0.5 mg) by mouth every 4 hours as needed (Anxiety, Nausea/Vomiting or Sleep) 30 tablet 2     simvastatin (ZOCOR) 40 MG tablet Take 0.5 tablets (20 mg) by mouth daily 45 tablet 0     abiraterone (ZYTIGA) 250 MG tablet Take 4 tablets (1,000 mg) by mouth daily 120 tablet 11     predniSONE (DELTASONE) 5 MG tablet Take 1 tablet (5 mg) by mouth 2 times daily 60 tablet 11     metoprolol (TOPROL-XL) 25 MG 24 hr tablet TAKE 1 TABLET (25 MG) BY MOUTH DAILY 90 tablet 0     gabapentin (NEURONTIN) 300 MG capsule Take 900 mg by mouth At Bedtime       bicalutamide (CASODEX) 50 MG tablet Take 1 tablet (50 mg) by mouth daily 90 tablet 3     sildenafil (VIAGRA) 100 MG tablet Take 1 tablet (100 mg) by mouth daily as needed for erectile dysfunction 10 tablet 3     Cholecalciferol (VITAMIN D) 2000 UNITS tablet Take 1 tablet by mouth daily       oxyCODONE (ROXICODONE) 5 MG immediate release tablet Take 1 tablet by mouth every 6 hours as needed for pain. 30 tablet 0     [DISCONTINUED] metoprolol (TOPROL-XL) 25 MG 24 hr tablet TAKE 1 TABLET (25 MG) BY MOUTH DAILY (Patient not taking: Reported on 9/15/2017) 90 tablet 1     OTC  products: None, except as noted above    Allergies   Allergen Reactions     Morphine Sulfate GI Disturbance     vomiting     Vicodin [Hydrocodone-Acetaminophen] Nausea and Vomiting      Latex Allergy: NO    Social History   Substance Use Topics     Smoking status: Former Smoker     Packs/day: 1.00     Years: 25.00     Types: Cigarettes     Quit date: 1/2/1976     Smokeless tobacco: Never Used      Comment: lives in smoke free household     Alcohol use 7.0 oz/week     14 Standard drinks or equivalent per week      Comment: 2-3 drinks every night     History   Drug Use No     Comment: tatoos- sterile       REVIEW OF SYSTEMS:                                                      ROS:  Constitutional, HEENT, cardiovascular, pulmonary, GI, , musculoskeletal, neuro, skin, endocrine and psych systems are negative, except as otherwise noted.    This document serves as a record of the services and decisions personally performed and made by Reginaldo Muir MD. It was created on their behalf by Radha Cazares, a trained medical scribe. The creation of this document is based the provider's statements to the medical scribe.  Radha Cazares  September 15, 2017 1:25 PM    EXAM:                                                    /60 (BP Location: Left arm, Cuff Size: Adult Regular)  Pulse 75  Temp 97  F (36.1  C) (Oral)  Wt 82.1 kg (181 lb)  SpO2 98%  BMI 24.54 kg/m2    GENERAL APPEARANCE: healthy, alert and no distress     EYES: EOMI,  PERRL     HENT: ear canals and TM's normal and nose and mouth without ulcers or lesions     NECK: no adenopathy, no asymmetry, masses, or scars and thyroid normal to palpation     RESP: lungs clear to auscultation - no rales, rhonchi or wheezes     CV: regular rates and rhythm, normal S1 S2, no S3 or S4 and no murmur, click or rub     ABDOMEN:  soft, nontender, no HSM or masses and bowel sounds normal     MS: no gross deformities noted,  FROM in all extremities. 2+ pitting edema       SKIN: no suspicious lesions or rashes to visible skin     NEURO: Normal strength and tone, sensory exam grossly normal, mentation intact and speech normal     PSYCH: mentation appears normal. and affect normal/bright     LYMPHATICS: No axillary, cervical, or supraclavicular nodes    DIAGNOSTICS:                                                      Orders Placed This Encounter   Procedures     PAF COMPLETED     Lipid panel reflex to direct LDL     Hemoglobin A1c     Basic metabolic panel     CBC with platelets differential       Recent Labs   Lab Test  08/21/17   0911 08/14/17  08/10/17   1010 08/03/17 07/13/17   0934   07/19/16   0817   05/29/12   1328   HGB  12.6*   --    --    --    --   11.6*   < >  14.2   < >  14.3   PLT  239   --    --    --    --   201   < >  222   < >  256   INR   --   1.6*   --   1.3*   < >   --    < >   --    < >   --    NA  139   --    --    --    --   142   < >  140   < >  137   POTASSIUM  4.1   --    --    --    --   4.6   < >  4.4   < >  4.1   CR  0.69   --   0.75   --    --   0.71   < >  0.90   < >  0.90   A1C   --    --    --    --    --    --    --   5.5   --   5.4    < > = values in this interval not displayed.        IMPRESSION:                                                    Reason for surgery/procedure: Treatment for testicles.  Diagnosis/reason for consult: Assess and minimize preoperative risk per consulting surgeon     The proposed surgical procedure is considered LOW risk.    REVISED CARDIAC RISK INDEX  The patient has the following serious cardiovascular risks for perioperative complications such as (MI, PE, VFib and 3  AV Block):  No serious cardiac risks  INTERPRETATION: 0 risks: Class I (very low risk - 0.4% complication rate)    The patient has the following additional risks for perioperative complications:  No identified additional risks      ICD-10-CM    1. Preop general physical exam Z01.818 PAF COMPLETED   2. Need for prophylactic vaccination and inoculation  against influenza Z23    3. Malignant neoplasm of prostate (H) C61    4. HTN (Hypertension), Benign goal <140/90 I10    5. PE (pulmonary thromboembolism) (H) I26.99    6. Hyperlipidemia LDL goal <130 E78.5 Lipid panel reflex to direct LDL   7. Abnormal glucose R73.09        RECOMMENDATIONS:                                                        --Patient is to take all scheduled medications on the day of surgery EXCEPT for modifications listed below.    APPROVAL GIVEN to proceed with proposed procedure, without further diagnostic evaluation     The information in this document, created by the medical scribe for me, accurately reflects the services I personally performed and the decisions made by me. I have reviewed and approved this document for accuracy.   Reginaldo Muir MD     Signed Electronically by: Reginaldo Muir MD    Copy of this evaluation report is provided to requesting physician.    [facility] Preop Guidelines

## 2017-09-14 NOTE — PATIENT INSTRUCTIONS
Before Your Surgery      Call your surgeon if there is any change in your health. This includes signs of a cold or flu (such as a sore throat, runny nose, cough, rash or fever).    Do not smoke, drink alcohol or take over the counter medicine (unless your surgeon or primary care doctor tells you to) for the 24 hours before and after surgery.    If you take prescribed drugs: Follow your doctor s orders about which medicines to take and which to stop until after surgery.    Eating and drinking prior to surgery: follow the instructions from your surgeon    Take a shower or bath the night before surgery. Use the soap your surgeon gave you to gently clean your skin. If you do not have soap from your surgeon, use your regular soap. Do not shave or scrub the surgery site.  Wear clean pajamas and have clean sheets on your bed.     --- Take all medication as usual. Take medication with water on the morning of surgery     [facility]    If you have any questions regarding to your visit please contact your care team:     Team Pink:   Clinic Hours Telephone Number   Internal Medicine:  Dr. Deborah Taylor NP       7am-7pm  Monday - Thursday   7am-5pm  Fridays  [phone]  (Appointment scheduling available 24/7)    Questions about your visit?  Team Line  [phone]   [facility] [place] [facility] [place] [facility] - 11am-9pm Monday-Friday Saturday-Sunday- 9am-5pm   [place] - 5pm-9pm Monday-Friday Saturday-Sunday- 9am-5pm  [phone] - [place]   [phone] - [place]       What options do I have for visits at the clinic other than the traditional office visit?  To expand how we care for you, many of our providers are utilizing electronic visits (e-visits) and telephone visits, when medically appropriate, for interactions with their patients rather than a visit in the clinic.   We also offer nurse visits for many medical concerns. Just like any other service,  we will bill your insurance company for this type of visit based on time spent on the phone with your provider. Not all insurance companies cover these visits. Please check with your medical insurance if this type of visit is covered. You will be responsible for any charges that are not paid by your insurance.      E-visits via [employer]:  generally incur a $35.00 fee.  Telephone visits:  Time spent on the phone: *charged based on time that is spent on the phone in increments of 10 minutes. Estimated cost:   5-10 mins $30.00   11-20 mins. $59.00   21-30 mins. $85.00   Use [employer] (secure email communication and access to your chart) to send your primary care provider a message or make an appointment. Ask someone on your Team how to sign up for [employer].    For a Price Quote for your services, please call our Consumer Price Line at [phone].    As always, Thank you for trusting us with your health care needs!    Modesta Laguna, CMA

## 2017-09-15 ENCOUNTER — OFFICE VISIT (OUTPATIENT)
Dept: INTERNAL MEDICINE | Facility: CLINIC | Age: 82
End: 2017-09-15
Payer: COMMERCIAL

## 2017-09-15 VITALS
BODY MASS INDEX: 24.54 KG/M2 | OXYGEN SATURATION: 98 % | DIASTOLIC BLOOD PRESSURE: 60 MMHG | SYSTOLIC BLOOD PRESSURE: 102 MMHG | TEMPERATURE: 97 F | WEIGHT: 181 LBS | HEART RATE: 75 BPM

## 2017-09-15 DIAGNOSIS — Z23 NEED FOR PROPHYLACTIC VACCINATION AND INOCULATION AGAINST INFLUENZA: ICD-10-CM

## 2017-09-15 DIAGNOSIS — I10 HTN (HYPERTENSION), BENIGN: ICD-10-CM

## 2017-09-15 DIAGNOSIS — C61 MALIGNANT NEOPLASM OF PROSTATE (H): ICD-10-CM

## 2017-09-15 DIAGNOSIS — Z01.818 PREOP GENERAL PHYSICAL EXAM: Primary | ICD-10-CM

## 2017-09-15 DIAGNOSIS — E78.5 HYPERLIPIDEMIA LDL GOAL <130: ICD-10-CM

## 2017-09-15 DIAGNOSIS — R73.09 ABNORMAL GLUCOSE: ICD-10-CM

## 2017-09-15 DIAGNOSIS — I26.99 PE (PULMONARY THROMBOEMBOLISM) (H): ICD-10-CM

## 2017-09-15 LAB
ANION GAP SERPL CALCULATED.3IONS-SCNC: 7 MMOL/L (ref 3–14)
BASOPHILS # BLD AUTO: 0 10E9/L (ref 0–0.2)
BASOPHILS NFR BLD AUTO: 0.2 %
BUN SERPL-MCNC: 16 MG/DL (ref 7–30)
CALCIUM SERPL-MCNC: 8.6 MG/DL (ref 8.5–10.1)
CHLORIDE SERPL-SCNC: 104 MMOL/L (ref 94–109)
CHOLEST SERPL-MCNC: 145 MG/DL
CO2 SERPL-SCNC: 27 MMOL/L (ref 20–32)
CREAT SERPL-MCNC: 0.78 MG/DL (ref 0.66–1.25)
DIFFERENTIAL METHOD BLD: ABNORMAL
EOSINOPHIL # BLD AUTO: 0.1 10E9/L (ref 0–0.7)
EOSINOPHIL NFR BLD AUTO: 0.8 %
ERYTHROCYTE [DISTWIDTH] IN BLOOD BY AUTOMATED COUNT: 16.3 % (ref 10–15)
GFR SERPL CREATININE-BSD FRML MDRD: >90 ML/MIN/1.7M2
GLUCOSE SERPL-MCNC: 130 MG/DL (ref 70–99)
HBA1C MFR BLD: 5.7 % (ref 4.3–6)
HCT VFR BLD AUTO: 35.1 % (ref 40–53)
HDLC SERPL-MCNC: 54 MG/DL
HGB BLD-MCNC: 11.6 G/DL (ref 13.3–17.7)
LDLC SERPL CALC-MCNC: 61 MG/DL
LYMPHOCYTES # BLD AUTO: 1.5 10E9/L (ref 0.8–5.3)
LYMPHOCYTES NFR BLD AUTO: 17 %
MCH RBC QN AUTO: 30.9 PG (ref 26.5–33)
MCHC RBC AUTO-ENTMCNC: 33 G/DL (ref 31.5–36.5)
MCV RBC AUTO: 93 FL (ref 78–100)
MONOCYTES # BLD AUTO: 0.6 10E9/L (ref 0–1.3)
MONOCYTES NFR BLD AUTO: 7.4 %
NEUTROPHILS # BLD AUTO: 6.4 10E9/L (ref 1.6–8.3)
NEUTROPHILS NFR BLD AUTO: 74.6 %
NONHDLC SERPL-MCNC: 91 MG/DL
PLATELET # BLD AUTO: 229 10E9/L (ref 150–450)
POTASSIUM SERPL-SCNC: 4.1 MMOL/L (ref 3.4–5.3)
RBC # BLD AUTO: 3.76 10E12/L (ref 4.4–5.9)
SODIUM SERPL-SCNC: 138 MMOL/L (ref 133–144)
TRIGL SERPL-MCNC: 150 MG/DL
WBC # BLD AUTO: 8.6 10E9/L (ref 4–11)

## 2017-09-15 PROCEDURE — 83036 HEMOGLOBIN GLYCOSYLATED A1C: CPT | Performed by: INTERNAL MEDICINE

## 2017-09-15 PROCEDURE — 80048 BASIC METABOLIC PNL TOTAL CA: CPT | Performed by: INTERNAL MEDICINE

## 2017-09-15 PROCEDURE — 36415 COLL VENOUS BLD VENIPUNCTURE: CPT | Performed by: INTERNAL MEDICINE

## 2017-09-15 PROCEDURE — 85025 COMPLETE CBC W/AUTO DIFF WBC: CPT | Performed by: INTERNAL MEDICINE

## 2017-09-15 PROCEDURE — 99214 OFFICE O/P EST MOD 30 MIN: CPT | Performed by: INTERNAL MEDICINE

## 2017-09-15 PROCEDURE — 99207 C PAF COMPLETED  NO CHARGE: CPT | Performed by: INTERNAL MEDICINE

## 2017-09-15 PROCEDURE — 80061 LIPID PANEL: CPT | Performed by: INTERNAL MEDICINE

## 2017-09-15 ASSESSMENT — PAIN SCALES - GENERAL: PAINLEVEL: SEVERE PAIN (6)

## 2017-09-15 NOTE — NURSING NOTE
"Chief Complaint   Patient presents with     Pre-Op Exam     testicle removed, 9/27/2017     Health Maintenance     PHQ-2 Fall risk, lipid, flu vaccine       Initial /60 (BP Location: Left arm, Cuff Size: Adult Regular)  Pulse 75  Temp 97  F (36.1  C) (Oral)  Wt 181 lb (82.1 kg)  SpO2 98%  BMI 24.54 kg/m2 Estimated body mass index is 24.54 kg/(m^2) as calculated from the following:    Height as of 8/21/17: 6' 0.01\" (1.829 m).    Weight as of this encounter: 181 lb (82.1 kg).  Medication Reconciliation: complete       Modesta Laguna, JAME      "

## 2017-09-15 NOTE — LETTER
[address] [place] [address]    September 19, 2017    Dimitri Neves  [address]  [place] [address]          Dear Dimitri,    All of these tests are within acceptable limits , things are stable !     Results for orders placed or performed in visit on 09/15/17   Lipid panel reflex to direct LDL   Result Value Ref Range    Cholesterol 145 <200 mg/dL    Triglycerides 150 (H) <150 mg/dL    HDL Cholesterol 54 >39 mg/dL    LDL Cholesterol Calculated 61 <100 mg/dL    Non HDL Cholesterol 91 <130 mg/dL   Hemoglobin A1c   Result Value Ref Range    Hemoglobin A1C 5.7 4.3 - 6.0 %   Basic metabolic panel   Result Value Ref Range    Sodium 138 133 - 144 mmol/L    Potassium 4.1 3.4 - 5.3 mmol/L    Chloride 104 94 - 109 mmol/L    Carbon Dioxide 27 20 - 32 mmol/L    Anion Gap 7 3 - 14 mmol/L    Glucose 130 (H) 70 - 99 mg/dL    Urea Nitrogen 16 7 - 30 mg/dL    Creatinine 0.78 0.66 - 1.25 mg/dL    GFR Estimate >90 >60 mL/min/1.7m2    GFR Estimate If Black >90 >60 mL/min/1.7m2    Calcium 8.6 8.5 - 10.1 mg/dL   CBC with platelets differential   Result Value Ref Range    WBC 8.6 4.0 - 11.0 10e9/L    RBC Count 3.76 (L) 4.4 - 5.9 10e12/L    Hemoglobin 11.6 (L) 13.3 - 17.7 g/dL    Hematocrit 35.1 (L) 40.0 - 53.0 %    MCV 93 78 - 100 fl    MCH 30.9 26.5 - 33.0 pg    MCHC 33.0 31.5 - 36.5 g/dL    RDW 16.3 (H) 10.0 - 15.0 %    Platelet Count 229 150 - 450 10e9/L    Diff Method Automated Method     % Neutrophils 74.6 %    % Lymphocytes 17.0 %    % Monocytes 7.4 %    % Eosinophils 0.8 %    % Basophils 0.2 %    Absolute Neutrophil 6.4 1.6 - 8.3 10e9/L    Absolute Lymphocytes 1.5 0.8 - 5.3 10e9/L    Absolute Monocytes 0.6 0.0 - 1.3 10e9/L    Absolute Eosinophils 0.1 0.0 - 0.7 10e9/L    Absolute Basophils 0.0 0.0 - 0.2 10e9/L       If you have any questions or concerns, please me or my clinic team at [phone].      Sincerely,        Reginaldo  Isadora COLLIER/bt

## 2017-09-15 NOTE — MR AVS SNAPSHOT
After Visit Summary   9/15/2017    Dimitri Neves    MRN: [id number]           Patient Information     Date Of Birth          1935        Visit Information        Provider Department      9/15/2017 10:50 AM Reginaldo Muir MD [facility]        Today's Diagnoses     Preop general physical exam    -  1    Need for prophylactic vaccination and inoculation against influenza        Malignant neoplasm of prostate (H)        HTN (Hypertension), Benign goal <140/90        PE (pulmonary thromboembolism) (H)        Hyperlipidemia LDL goal <130        Abnormal glucose          Care Instructions        Before Your Surgery      Call your surgeon if there is any change in your health. This includes signs of a cold or flu (such as a sore throat, runny nose, cough, rash or fever).    Do not smoke, drink alcohol or take over the counter medicine (unless your surgeon or primary care doctor tells you to) for the 24 hours before and after surgery.    If you take prescribed drugs: Follow your doctor s orders about which medicines to take and which to stop until after surgery.    Eating and drinking prior to surgery: follow the instructions from your surgeon    Take a shower or bath the night before surgery. Use the soap your surgeon gave you to gently clean your skin. If you do not have soap from your surgeon, use your regular soap. Do not shave or scrub the surgery site.  Wear clean pajamas and have clean sheets on your bed.     --- Take all medication as usual. Take medication with water on the morning of surgery     [facility]    If you have any questions regarding to your visit please contact your care team:     Team Pink:   Clinic Hours Telephone Number   Internal Medicine:  Dr. Deborah Taylor NP       7am-7pm  Monday - Thursday   7am-5pm  Fridays  [phone]  (Appointment scheduling available 24/7)    Questions about your visit?  Team Line  ([phone])  [phone]   Urgent Care - [place] - 11am-9pm Monday-Friday Saturday-Sunday- 9am-5pm   [place] - 5pm-9pm Monday-Friday Saturday-Sunday- 9am-5pm  [phone] - [place]  [phone] - [place]       What options do I have for visits at the clinic other than the traditional office visit?  To expand how we care for you, many of our providers are utilizing electronic visits (e-visits) and telephone visits, when medically appropriate, for interactions with their patients rather than a visit in the clinic.   We also offer nurse visits for many medical concerns. Just like any other service, we will bill your insurance company for this type of visit based on time spent on the phone with your provider. Not all insurance companies cover these visits. Please check with your medical insurance if this type of visit is covered. You will be responsible for any charges that are not paid by your insurance.      E-visits via [employer]:  generally incur a $35.00 fee.  Telephone visits:  Time spent on the phone: *charged based on time that is spent on the phone in increments of 10 minutes. Estimated cost:   5-10 mins $30.00   11-20 mins. $59.00   21-30 mins. $85.00   Use [employer] (secure email communication and access to your chart) to send your primary care provider a message or make an appointment. Ask someone on your Team how to sign up for [employer].    For a Price Quote for your services, please call our Consumer Price Line at [phone].    As always, Thank you for trusting us with your health care needs!    Modesta Laguna CMA            Follow-ups after your visit        Your next 10 appointments already scheduled     Sep 18, 2017 10:00 AM CDT   LAB with LAB ONC [facility] ([facility])    [address] [place] [address] [phone]           Patient must bring picture ID. Patient should be prepared to give a urine specimen  Please do not  eat 10-12 hours before your appointment if you are coming in fasting for labs on lipids, cholesterol, or glucose (sugar). Pregnant women should follow their Care Team instructions. Water with medications is okay. Do not drink coffee or other fluids. If you have concerns about taking  your medications, please ask at office or if scheduling via ByAllAccountshart, send a message by clicking on Secure Messaging, Message Your Care Team.            Sep 18, 2017 10:15 AM CDT   Return Visit with NURSE ONLY CANCER CENTER   [facility]    [address] [facility] [address] [phone]            Sep 27, 2017   Procedure with Senthil Taylor MD   [facility] (--)    [address] [facility] [address] [phone]            Oct 16, 2017  8:15 AM CDT   LAB with LAB ONC [facility])    [address] [facility] [address] [phone]           Patient must bring picture ID. Patient should be prepared to give a urine specimen  Please do not eat 10-12 hours before your appointment if you are coming in fasting for labs on lipids, cholesterol, or glucose (sugar). Pregnant women should follow their Care Team instructions. Water with medications is okay. Do not drink coffee or other fluids. If you have concerns about taking  your medications, please ask at office or if scheduling via [employer], send a message by clicking on Secure Messaging, Message Your Care Team.            Oct 16, 2017  9:00 AM CDT   Return Visit with Rafael Valenzuela MD   [facility])    [address] [facility] [address] [phone]            Oct 16, 2017  9:30 AM CDT   Level O with [address] 9 INFUSION   [facility])    [address] [facility] [address] [phone]            Oct 24, 2017 11:15  AM CDT   [facility] Visit with Senthil Taylor MD   [facility]    [address] [facility]  [place] [address] [phone]            Dec 11, 2017 11:00 AM CST   Return Visit with Senthil Jhaveri MD   [facility] ([facility])    [address]  [facility] [address] [phone]              Who to contact     If you have questions or need follow up information about today's clinic visit or your schedule please contact [facility] directly at [phone].  Normal or non-critical lab and imaging results will be communicated to you by MyChart, letter or phone within 4 business days after the clinic has received the results. If you do not hear from us within 7 days, please contact the clinic through Attune Systemshart or phone. If you have a critical or abnormal lab result, we will notify you by phone as soon as possible.  Submit refill requests through Nopsec or call your pharmacy and they will forward the refill request to us. Please allow 3 business days for your refill to be completed.          Additional Information About Your Visit        MyChart Information     Nopsec gives you secure access to your electronic health record. If you see a primary care provider, you can also send messages to your care team and make appointments. If you have questions, please call your primary care clinic.  If you do not have a primary care provider, please call [phone] and they will assist you.        Care EveryWhere ID     This is your Care EveryWhere ID. This could be used by other organizations to access your [place] medical records  [id number]        Your Vitals Were     Pulse Temperature Pulse Oximetry BMI (Body Mass Index)          75 97  F (36.1  C) (Oral) 98% 24.54 kg/m2         Blood Pressure from Last 3 Encounters:   09/15/17 102/60   09/12/17 128/68   08/31/17 126/68    Weight from Last 3 Encounters:   09/15/17 181 lb  (82.1 kg)   08/31/17 180 lb (81.6 kg)   08/21/17 178 lb (80.7 kg)              We Performed the Following     Basic metabolic panel     CBC with platelets differential     Hemoglobin A1c     Lipid panel reflex to direct LDL     PAF COMPLETED        Primary Care Provider Office Phone # Fax #    Reginaldo Muir [phone] [address] [facility] [address]        Equal Access to Services     DAVIDSON LEBLANC : Hadii aad ku hadasho Soomaali, waaxda luqadaha, qaybta kaalmada adeegyada, waxay idiin hayaan adeeg kharash lamagdalena . So [facility] [phone].    ATENCIÓN: Si habla español, tiene a roberts disposición servicios gratuitos de asistencia lingüística. Llame al [phone].    We comply with applicable federal civil rights laws and [place] laws. We do not discriminate on the basis of race, color, national origin, age, disability sex, sexual orientation or gender identity.            Thank you!     Thank you for choosing [facility]  for your care. Our goal is always to provide you with excellent care. Hearing back from our patients is one way we can continue to improve our services. Please take a few minutes to complete the written survey that you may receive in the mail after your visit with us. Thank you!             Your Updated Medication List - Protect others around you: Learn how to safely use, store and throw away your medicines at www.disposemymeds.org.          This list is accurate as of: 9/15/17 11:28 AM.  Always use your most recent med list.                   Brand Name Dispense Instructions for use Diagnosis    * abiraterone 250 MG tablet    ZYTIGA    120 tablet    Take 4 tablets (1,000 mg) by mouth daily    Malignant neoplasm of prostate (H)       * abiraterone 250 MG tablet    ZYTIGA    120 tablet    Take 4 tablets (1,000 mg) by mouth daily for 30 doses Take on empty stomach.    Bone metastasis (H), Malignant neoplasm of prostate (H)       * abiraterone 250 MG tablet     ZYTIGA    120 tablet    Take 4 tablets (1,000 mg) by mouth daily for 30 doses Take on empty stomach.    Bone metastasis (H), Malignant neoplasm of prostate (H)       bicalutamide 50 MG tablet    CASODEX    90 tablet    Take 1 tablet (50 mg) by mouth daily    Malignant neoplasm of prostate (H)       CALCIUM 600 PO           CEPHALEXIN PO           CV[employer] vitamin  B12 1000 MCG Tabs   Generic drug:  cyanocobalamin     30 tablet    TAKE 1 TABLET BY MOUTH EVERY DAY    Low vitamin B12 level       gabapentin 300 MG capsule    NEURONTIN     Take 900 mg by mouth At Bedtime        LORazepam 0.5 MG tablet    ATIVAN    30 tablet    Take 1 tablet (0.5 mg) by mouth every 4 hours as needed (Anxiety, Nausea/Vomiting or Sleep)    Bone metastasis (H), Malignant neoplasm of prostate (H)       metoprolol 25 MG 24 hr tablet    TOPROL-XL    90 tablet    TAKE 1 TABLET (25 MG) BY MOUTH DAILY    HTN (hypertension), benign       oxyCODONE 5 MG IR tablet    ROXICODONE    30 tablet    Take 1 tablet by mouth every 6 hours as needed for pain.    Narcotic dependence, episodic use (H)       predniSONE 5 MG tablet    DELTASONE    60 tablet    Take 1 tablet (5 mg) by mouth 2 times daily    Malignant neoplasm of prostate (H)       prochlorperazine 10 MG tablet    COMPAZINE    30 tablet    Take 0.5 tablets (5 mg) by mouth every 6 hours as needed (Nausea/Vomiting)    Bone metastasis (H), Malignant neoplasm of prostate (H)       quinapril 40 MG Tab    ACCUPRIL    90 tablet    TAKE 1 TABLET (40 MG) BY MOUTH DAILY - NEEDS TO FOLLOW UP    HTN (hypertension), benign       sildenafil 100 MG tablet    VIAGRA    10 tablet    Take 1 tablet (100 mg) by mouth daily as needed for erectile dysfunction    ED (erectile dysfunction)       simvastatin 40 MG tablet    ZOCOR    45 tablet    Take 0.5 tablets (20 mg) by mouth daily    Hyperlipidemia LDL goal <130       vitamin D 2000 UNITS tablet      Take 1 tablet by mouth daily        * Notice:  This list has 3  medication(s) that are the same as other medications prescribed for you. Read the directions carefully, and ask your doctor or other care provider to review them with you.

## 2017-09-17 DIAGNOSIS — E78.5 HYPERLIPIDEMIA LDL GOAL <130: ICD-10-CM

## 2017-09-18 ENCOUNTER — MEDICAL CORRESPONDENCE (OUTPATIENT)
Dept: HEALTH INFORMATION MANAGEMENT | Facility: CLINIC | Age: 82
End: 2017-09-18

## 2017-09-18 ENCOUNTER — TELEPHONE (OUTPATIENT)
Dept: PHARMACY | Facility: CLINIC | Age: 82
End: 2017-09-18

## 2017-09-18 ENCOUNTER — ONCOLOGY VISIT (OUTPATIENT)
Dept: ONCOLOGY | Facility: CLINIC | Age: 82
End: 2017-09-18
Payer: COMMERCIAL

## 2017-09-18 VITALS — DIASTOLIC BLOOD PRESSURE: 58 MMHG | SYSTOLIC BLOOD PRESSURE: 128 MMHG

## 2017-09-18 DIAGNOSIS — C61 MALIGNANT NEOPLASM OF PROSTATE (H): Primary | ICD-10-CM

## 2017-09-18 DIAGNOSIS — C61 MALIGNANT NEOPLASM OF PROSTATE (H): ICD-10-CM

## 2017-09-18 DIAGNOSIS — C79.51 BONE METASTASIS: ICD-10-CM

## 2017-09-18 LAB
ALBUMIN SERPL-MCNC: 3 G/DL (ref 3.4–5)
ALP SERPL-CCNC: 69 U/L (ref 40–150)
ALT SERPL W P-5'-P-CCNC: 15 U/L (ref 0–70)
ANION GAP SERPL CALCULATED.3IONS-SCNC: 4 MMOL/L (ref 3–14)
AST SERPL W P-5'-P-CCNC: 16 U/L (ref 0–45)
BASOPHILS # BLD AUTO: 0 10E9/L (ref 0–0.2)
BASOPHILS NFR BLD AUTO: 0.2 %
BILIRUB SERPL-MCNC: 0.5 MG/DL (ref 0.2–1.3)
BUN SERPL-MCNC: 12 MG/DL (ref 7–30)
CALCIUM SERPL-MCNC: 8.8 MG/DL (ref 8.5–10.1)
CHLORIDE SERPL-SCNC: 104 MMOL/L (ref 94–109)
CO2 SERPL-SCNC: 32 MMOL/L (ref 20–32)
CREAT SERPL-MCNC: 0.76 MG/DL (ref 0.66–1.25)
DIFFERENTIAL METHOD BLD: ABNORMAL
EOSINOPHIL # BLD AUTO: 0.1 10E9/L (ref 0–0.7)
EOSINOPHIL NFR BLD AUTO: 0.9 %
ERYTHROCYTE [DISTWIDTH] IN BLOOD BY AUTOMATED COUNT: 16.1 % (ref 10–15)
GFR SERPL CREATININE-BSD FRML MDRD: >90 ML/MIN/1.7M2
GLUCOSE SERPL-MCNC: 103 MG/DL (ref 70–99)
HCT VFR BLD AUTO: 35.3 % (ref 40–53)
HGB BLD-MCNC: 11.7 G/DL (ref 13.3–17.7)
LYMPHOCYTES # BLD AUTO: 1.7 10E9/L (ref 0.8–5.3)
LYMPHOCYTES NFR BLD AUTO: 26.2 %
MCH RBC QN AUTO: 30 PG (ref 26.5–33)
MCHC RBC AUTO-ENTMCNC: 33.1 G/DL (ref 31.5–36.5)
MCV RBC AUTO: 91 FL (ref 78–100)
MONOCYTES # BLD AUTO: 0.6 10E9/L (ref 0–1.3)
MONOCYTES NFR BLD AUTO: 8.6 %
NEUTROPHILS # BLD AUTO: 4.2 10E9/L (ref 1.6–8.3)
NEUTROPHILS NFR BLD AUTO: 64.1 %
PLATELET # BLD AUTO: 219 10E9/L (ref 150–450)
POTASSIUM SERPL-SCNC: 4.2 MMOL/L (ref 3.4–5.3)
PROT SERPL-MCNC: 6.5 G/DL (ref 6.8–8.8)
PSA SERPL-MCNC: 18.9 UG/L (ref 0–4)
RBC # BLD AUTO: 3.9 10E12/L (ref 4.4–5.9)
SODIUM SERPL-SCNC: 140 MMOL/L (ref 133–144)
WBC # BLD AUTO: 6.5 10E9/L (ref 4–11)

## 2017-09-18 PROCEDURE — 85025 COMPLETE CBC W/AUTO DIFF WBC: CPT | Performed by: INTERNAL MEDICINE

## 2017-09-18 PROCEDURE — 36415 COLL VENOUS BLD VENIPUNCTURE: CPT | Performed by: INTERNAL MEDICINE

## 2017-09-18 PROCEDURE — 99207 ZZC NO CHARGE NURSE ONLY: CPT

## 2017-09-18 PROCEDURE — 80053 COMPREHEN METABOLIC PANEL: CPT | Performed by: INTERNAL MEDICINE

## 2017-09-18 PROCEDURE — 84153 ASSAY OF PSA TOTAL: CPT | Performed by: INTERNAL MEDICINE

## 2017-09-18 NOTE — MR AVS SNAPSHOT
After Visit Summary   9/18/2017    Dimitri Neves    MRN: [id number]           Patient Information     Date Of Birth          1935        Visit Information        Provider Department      9/18/2017 10:15 AM NURSE ONLY CANCER CENTER [facility]        Today's Diagnoses     Malignant neoplasm of prostate (H)    -  1       Follow-ups after your visit        Your next 10 appointments already scheduled     Sep 27, 2017   Procedure with Senthil Taylor MD   [facility] (--)    [address] [facility] [address] [phone]            Oct 16, 2017  8:15 AM CDT   LAB with LAB ONC [facility] ([facility])    [address] [facility] [address] [phone]           Patient must bring picture ID. Patient should be prepared to give a urine specimen  Please do not eat 10-12 hours before your appointment if you are coming in fasting for labs on lipids, cholesterol, or glucose (sugar). Pregnant women should follow their Care Team instructions. Water with medications is okay. Do not drink coffee or other fluids. If you have concerns about taking  your medications, please ask at office or if scheduling via [employer]omming[employer], send a message by clicking on Secure Messaging, Message Your Care Team.            Oct 16, 2017  9:00 AM CDT   Return Visit with Rafael Valenzuela MD   [facility] ([facility])    [address] [facility] [address] [phone]            Oct 16, 2017  9:30 AM CDT   Level O with [place] [facility] ([facility])    [address] [facility] [address] [phone]            Oct 24, 2017 11:15 AM CDT   Return Visit with Senthil Taylor MD   [facility] ([facility])    [address] [place] [address]  [place] [address]   [phone]            Dec 11, 2017 11:00 AM CST    Return Visit with Senthil [facility]i MD   [facility]    [address] [facility] [address] [phone]              Who to contact     If you have questions or need follow up information about today's clinic visit or your schedule please contact [facility] directly at [phone].  Normal or non-critical lab and imaging results will be communicated to you by Estechhart, letter or phone within 4 business days after the clinic has received the results. If you do not hear from us within 7 days, please contact the clinic through Estechhart or phone. If you have a critical or abnormal lab result, we will notify you by phone as soon as possible.  Submit refill requests through Zaplee or call your pharmacy and they will forward the refill request to us. Please allow 3 business days for your refill to be completed.          Additional Information About Your Visit        Estechhart Information     Zaplee gives you secure access to your electronic health record. If you see a primary care provider, you can also send messages to your care team and make appointments. If you have questions, please call your primary care clinic.  If you do not have a primary care provider, please call [phone] and they will assist you.      Zaplee is an electronic gateway that provides easy, online access to your medical records. With Zaplee, you can request a clinic appointment, read your test results, renew a prescription or communicate with your care team.     To access your existing account, please contact your [facility] or call [phone] for assistance.        Care EveryWhere ID     This is your Care EveryWhere ID. This could be used by other organizations to access your [place] medical records  [id number]         Blood Pressure from Last 3 Encounters:   09/18/17 128/58   09/15/17 102/60   09/12/17 128/68    Weight from Last  3 Encounters:   09/15/17 82.1 kg (181 lb)   08/31/17 81.6 kg (180 lb)   08/21/17 80.7 kg (178 lb)              Today, you had the following     No orders found for display       Primary Care Provider Office Phone # Fax #    Reginaldo Muir [phone] [address] [facility] [address]        Equal Access to Services     DAVIDSON LEBLANC : Hadii aad ku hadasho Soomaali, waaxda luqadaha, qaybta kaalmada adeegyada, waxay idiin hayaan adeeg kharash lamagdalena . So [facility] [phone].    ATENCIÓN: Si habla español, tiene a roberts disposición servicios gratuitos de asistencia lingüística. Llame al [phone].    We comply with applicable federal civil rights laws and [place] laws. We do not discriminate on the basis of race, color, national origin, age, disability sex, sexual orientation or gender identity.            Thank you!     Thank you for choosing [facility]  for your care. Our goal is always to provide you with excellent care. Hearing back from our patients is one way we can continue to improve our services. Please take a few minutes to complete the written survey that you may receive in the mail after your visit with us. Thank you!             Your Updated Medication List - Protect others around you: Learn how to safely use, store and throw away your medicines at www.disposemymeds.org.          This list is accurate as of: 9/18/17 10:24 AM.  Always use your most recent med list.                   Brand Name Dispense Instructions for use Diagnosis    * abiraterone 250 MG tablet    ZYTIGA    120 tablet    Take 4 tablets (1,000 mg) by mouth daily    Malignant neoplasm of prostate (H)       * abiraterone 250 MG tablet    ZYTIGA    120 tablet    Take 4 tablets (1,000 mg) by mouth daily for 30 doses Take on empty stomach.    Bone metastasis (H), Malignant neoplasm of prostate (H)       bicalutamide 50 MG tablet    CASODEX    90 tablet    Take 1 tablet (50 mg) by mouth daily     Malignant neoplasm of prostate (H)       CALCIUM 600 PO           CEPHALEXIN PO           CVS vitamin  B12 1000 MCG Tabs   Generic drug:  cyanocobalamin     30 tablet    TAKE 1 TABLET BY MOUTH EVERY DAY    Low vitamin B12 level       gabapentin 300 MG capsule    NEURONTIN     Take 900 mg by mouth At Bedtime        LORazepam 0.5 MG tablet    ATIVAN    30 tablet    Take 1 tablet (0.5 mg) by mouth every 4 hours as needed (Anxiety, Nausea/Vomiting or Sleep)    Bone metastasis (H), Malignant neoplasm of prostate (H)       metoprolol 25 MG 24 hr tablet    TOPROL-XL    90 tablet    TAKE 1 TABLET (25 MG) BY MOUTH DAILY    HTN (hypertension), benign       oxyCODONE 5 MG IR tablet    ROXICODONE    30 tablet    Take 1 tablet by mouth every 6 hours as needed for pain.    Narcotic dependence, episodic use (H)       predniSONE 5 MG tablet    DELTASONE    60 tablet    Take 1 tablet (5 mg) by mouth 2 times daily    Malignant neoplasm of prostate (H)       prochlorperazine 10 MG tablet    COMPAZINE    30 tablet    Take 0.5 tablets (5 mg) by mouth every 6 hours as needed (Nausea/Vomiting)    Bone metastasis (H), Malignant neoplasm of prostate (H)       quinapril 40 MG Tab    ACCUPRIL    90 tablet    TAKE 1 TABLET (40 MG) BY MOUTH DAILY - NEEDS TO FOLLOW UP    HTN (hypertension), benign       sildenafil 100 MG tablet    VIAGRA    10 tablet    Take 1 tablet (100 mg) by mouth daily as needed for erectile dysfunction    ED (erectile dysfunction)       simvastatin 40 MG tablet    ZOCOR    45 tablet    Take 0.5 tablets (20 mg) by mouth daily    Hyperlipidemia LDL goal <130       vitamin D 2000 UNITS tablet      Take 1 tablet by mouth daily        * Notice:  This list has 2 medication(s) that are the same as other medications prescribed for you. Read the directions carefully, and ask your doctor or other care provider to review them with you.

## 2017-09-18 NOTE — PROGRESS NOTES
Pt here today for lab draw and blood pressure check. Denies low blood pressure or dizziness. States he is unsure why he need a BP check. He has an orchiectomy scheduled for 9/27/17 with Dr. Muir. Xochilt Ruiz RN

## 2017-09-18 NOTE — TELEPHONE ENCOUNTER
simvastatin (ZOCOR) 40 MG tablet     Last Written Prescription Date: 6/20/2017  Last Fill Quantity: 45, # refills: 0  Last Office Visit with [facility], [facility] or [place] prescribing provider: 9/15/2017  Next 5 appointments (look out 90 days)     Sep 18, 2017 10:15 AM CDT   Return Visit with NURSE ONLY CANCER CENTER   [facility]    [address] [place] [address]   [phone]            Oct 16, 2017  9:00 AM CDT   Return Visit with Rafael Valenzuela MD   [facility] ([facility])    [address] [place] [address]   [phone]            Oct 24, 2017 11:15 AM CDT   Return Visit with Senthil Taylor MD   [facility] ([address] [address]   [phone]            Dec 11, 2017 11:00 AM CST   Return Visit with Senthil Jhaveri MD   [facility] ([facility])    [address] [place] [address]   [phone]                   Lab Results   Component Value Date    CHOL 145 09/15/2017     Lab Results   Component Value Date    HDL 54 09/15/2017     Lab Results   Component Value Date    LDL 61 09/15/2017     Lab Results   Component Value Date    TRIG 150 09/15/2017     Lab Results   Component Value Date    CHOLHDLRATIO 2.4 04/07/2015

## 2017-09-18 NOTE — TELEPHONE ENCOUNTER
Oral Chemotherapy Monitoring Program      Primary Oncologist: Dr. Rafael Valenzuela  Primary Oncology Clinic: [facility]  Cancer Diagnosis: Metastatic Prostate Cancer      Abiraterone 1000 mg PO daily with Prednisone 5 mg PO BID  Start Date: 8/19/17  C2 9/19/17      Drug Interaction Assessment:     Abiraterone will decrease Warfarin metabolism, potentially raising INR; patient is aware and will inquire about more frequent INR monitoring at the start of therapy    Abiraterone inhibits Metoprolol metabolism with a slight risk of causing bradycardia      Lab Monitoring Plan:  C1D1+   CMP, BP C2D1+ Call, CMP, BP C3D1+ Call, CMP, BP C4D1+ Call, CMP, BP C5D1+ Call, CMP, BP C6D1+ Call, CMP, BP   C1D8+    C2D8+    C3D8+    C4D8+    C5D8+    C6D8+      C1D15+ Call, CMP C2D15+ Call, CMP C3D15+    C4D15+    C5D15+    C6D15+      C1D22+    C2D22+    C3D22+    C4D22+    C5D22+    C6D22+          Subjective/Objective:  Dimitri Neves is a 82 year old male contacted by phone for a follow-up visit for oral chemotherapy Abiraterone/Prednisone.  He had labs done today and I did give him the results.   He is doing weill with no new complaints. He has not missed any doses. He has surgery scheduled on 9/27/17. BP checked today.     ORAL CHEMOTHERAPY 7/26/2017 8/21/2017 9/18/2017   Drug Name Zytiga (Abiraterone) Zytiga (Abiraterone) Zytiga (Abiraterone)   Current Dosage 1000mg 1000mg 1000mg   Current Schedule Daily Daily Daily   Cycle Details Continuous Continuous Continuous   Start Date of Last Cycle 7/19/2017 8/19/2017 9/19/2017   Doses missed in last 2 weeks 0 0 0   Adherence Assessment Adherent Adherent Adherent   Adverse Effects No AE identified during assessment No AE identified during assessment No AE identified during assessment   Any new drug interactions? - No No   Is the dose as ordered appropriate for the patient? Yes Yes Yes   Is the patient currently in pain? Assessed in last 30 days. Assessed in last 30 days.  "Assessed in last 30 days.   Has the patient been assessed within the past 6 months for depression? Yes Yes Yes   Has the patient missed any days of school, work, or other routine activity? No No No     Vitals:  BP:   BP Readings from Last 1 Encounters:   09/18/17 128/58     Wt Readings from Last 1 Encounters:   09/15/17 82.1 kg (181 lb)     Estimated body surface area is 2.04 meters squared as calculated from the following:    Height as of 8/21/17: 1.829 m (6' 0.01\").    Weight as of 9/15/17: 82.1 kg (181 lb).    Labs:  Lab Results   Component Value Date     09/18/2017      Lab Results   Component Value Date    POTASSIUM 4.2 09/18/2017     Lab Results   Component Value Date    ALETHA 8.8 09/18/2017     Lab Results   Component Value Date    ALBUMIN 3.0 09/18/2017     No results found for: MAG  Lab Results   Component Value Date    PHOS 3.1 12/28/2011     Lab Results   Component Value Date    BUN 12 09/18/2017     Lab Results   Component Value Date    CR 0.76 09/18/2017       Lab Results   Component Value Date    AST 16 09/18/2017     Lab Results   Component Value Date    ALT 15 09/18/2017     Lab Results   Component Value Date    BILITOTAL 0.5 09/18/2017       Lab Results   Component Value Date    WBC 6.5 09/18/2017     Lab Results   Component Value Date    HGB 11.7 09/18/2017     Lab Results   Component Value Date     09/18/2017     Lab Results   Component Value Date    ANEU 4.2 09/18/2017     PSA 18.9    Assessment:  Dimitri is tolerating regimen well. BP good at 128/58 and PSA is down     Plan:  Continue therapy    Follow-Up:  10/16 at 9AM [facility] with labs and Zometa to follow.     Refill Due:  10/11 to [facility]  Rebecca J. Fahrenbruch, PharmD, BCOP  September 18, 2017      "

## 2017-09-19 RX ORDER — SIMVASTATIN 40 MG
TABLET ORAL
Qty: 45 TABLET | Refills: 3 | Status: SHIPPED | OUTPATIENT
Start: 2017-09-19 | End: 2018-09-24

## 2017-09-19 NOTE — TELEPHONE ENCOUNTER
Prescription approved per [facility] Refill Protocol.  Pablo Cheatham RN

## 2017-09-20 ENCOUNTER — TELEPHONE (OUTPATIENT)
Dept: UROLOGY | Facility: CLINIC | Age: 82
End: 2017-09-20

## 2017-09-20 NOTE — TELEPHONE ENCOUNTER
Reason for Call:  Other    Detailed comments: [facility] states patient is closing his account with them and switching to [facility].     Phone Number Patient can be reached at: Other phone number: [phone]    Best Time: any    Can we leave a detailed message on this number? YES    Call taken on 9/20/2017 at 8:39 AM by Randolph Walker

## 2017-09-26 ENCOUNTER — ANESTHESIA EVENT (OUTPATIENT)
Dept: SURGERY | Facility: AMBULATORY SURGERY CENTER | Age: 82
End: 2017-09-26

## 2017-09-27 ENCOUNTER — ANESTHESIA (OUTPATIENT)
Dept: SURGERY | Facility: AMBULATORY SURGERY CENTER | Age: 82
End: 2017-09-27

## 2017-09-27 ENCOUNTER — HOSPITAL ENCOUNTER (OUTPATIENT)
Facility: AMBULATORY SURGERY CENTER | Age: 82
Discharge: HOME | End: 2017-09-27
Attending: UROLOGY | Admitting: UROLOGY
Payer: COMMERCIAL

## 2017-09-27 VITALS
SYSTOLIC BLOOD PRESSURE: 131 MMHG | TEMPERATURE: 97.5 F | RESPIRATION RATE: 18 BRPM | DIASTOLIC BLOOD PRESSURE: 54 MMHG | OXYGEN SATURATION: 97 %

## 2017-09-27 DIAGNOSIS — F11.20 NARCOTIC DEPENDENCE, EPISODIC USE (H): ICD-10-CM

## 2017-09-27 DIAGNOSIS — G89.18 POST-OP PAIN: Primary | ICD-10-CM

## 2017-09-27 PROCEDURE — 88302 TISSUE EXAM BY PATHOLOGIST: CPT | Performed by: UROLOGY

## 2017-09-27 PROCEDURE — 54520 REMOVAL OF TESTIS: CPT | Mod: LT

## 2017-09-27 PROCEDURE — G8918 PT W/O PREOP ORDER IV AB PRO: HCPCS

## 2017-09-27 PROCEDURE — 54520 REMOVAL OF TESTIS: CPT | Mod: 50 | Performed by: UROLOGY

## 2017-09-27 PROCEDURE — G8907 PT DOC NO EVENTS ON DISCHARG: HCPCS

## 2017-09-27 RX ORDER — MEPERIDINE HYDROCHLORIDE 25 MG/ML
12.5 INJECTION INTRAMUSCULAR; INTRAVENOUS; SUBCUTANEOUS
Status: DISCONTINUED | OUTPATIENT
Start: 2017-09-27 | End: 2017-09-28 | Stop reason: HOSPADM

## 2017-09-27 RX ORDER — NALOXONE HYDROCHLORIDE 0.4 MG/ML
.1-.4 INJECTION, SOLUTION INTRAMUSCULAR; INTRAVENOUS; SUBCUTANEOUS
Status: DISCONTINUED | OUTPATIENT
Start: 2017-09-27 | End: 2017-09-28 | Stop reason: HOSPADM

## 2017-09-27 RX ORDER — ONDANSETRON 4 MG/1
4 TABLET, ORALLY DISINTEGRATING ORAL EVERY 30 MIN PRN
Status: DISCONTINUED | OUTPATIENT
Start: 2017-09-27 | End: 2017-09-28 | Stop reason: HOSPADM

## 2017-09-27 RX ORDER — FENTANYL CITRATE 50 UG/ML
25-50 INJECTION, SOLUTION INTRAMUSCULAR; INTRAVENOUS
Status: DISCONTINUED | OUTPATIENT
Start: 2017-09-27 | End: 2017-09-28 | Stop reason: HOSPADM

## 2017-09-27 RX ORDER — ACETAMINOPHEN 325 MG/1
975 TABLET ORAL ONCE
Status: COMPLETED | OUTPATIENT
Start: 2017-09-27 | End: 2017-09-27

## 2017-09-27 RX ORDER — HYDROMORPHONE HYDROCHLORIDE 1 MG/ML
.3-.5 INJECTION, SOLUTION INTRAMUSCULAR; INTRAVENOUS; SUBCUTANEOUS EVERY 10 MIN PRN
Status: DISCONTINUED | OUTPATIENT
Start: 2017-09-27 | End: 2017-09-28 | Stop reason: HOSPADM

## 2017-09-27 RX ORDER — PHYSOSTIGMINE SALICYLATE 1 MG/ML
1.2 INJECTION INTRAVENOUS
Status: DISCONTINUED | OUTPATIENT
Start: 2017-09-27 | End: 2017-09-28 | Stop reason: HOSPADM

## 2017-09-27 RX ORDER — ALBUTEROL SULFATE 0.83 MG/ML
2.5 SOLUTION RESPIRATORY (INHALATION) EVERY 4 HOURS PRN
Status: DISCONTINUED | OUTPATIENT
Start: 2017-09-27 | End: 2017-09-28 | Stop reason: HOSPADM

## 2017-09-27 RX ORDER — PROPOFOL 10 MG/ML
INJECTION, EMULSION INTRAVENOUS PRN
Status: DISCONTINUED | OUTPATIENT
Start: 2017-09-27 | End: 2017-09-27

## 2017-09-27 RX ORDER — SODIUM CHLORIDE, SODIUM LACTATE, POTASSIUM CHLORIDE, CALCIUM CHLORIDE 600; 310; 30; 20 MG/100ML; MG/100ML; MG/100ML; MG/100ML
INJECTION, SOLUTION INTRAVENOUS CONTINUOUS
Status: DISCONTINUED | OUTPATIENT
Start: 2017-09-27 | End: 2017-09-28 | Stop reason: HOSPADM

## 2017-09-27 RX ORDER — GINSENG 100 MG
CAPSULE ORAL PRN
Status: DISCONTINUED | OUTPATIENT
Start: 2017-09-27 | End: 2017-09-27 | Stop reason: HOSPADM

## 2017-09-27 RX ORDER — ONDANSETRON 2 MG/ML
INJECTION INTRAMUSCULAR; INTRAVENOUS PRN
Status: DISCONTINUED | OUTPATIENT
Start: 2017-09-27 | End: 2017-09-27

## 2017-09-27 RX ORDER — OXYCODONE HYDROCHLORIDE 5 MG/1
5 TABLET ORAL EVERY 6 HOURS PRN
Qty: 30 TABLET | Refills: 0 | Status: SHIPPED | OUTPATIENT
Start: 2017-09-27 | End: 2019-01-01

## 2017-09-27 RX ORDER — DEXAMETHASONE SODIUM PHOSPHATE 4 MG/ML
4 INJECTION, SOLUTION INTRA-ARTICULAR; INTRALESIONAL; INTRAMUSCULAR; INTRAVENOUS; SOFT TISSUE EVERY 10 MIN PRN
Status: DISCONTINUED | OUTPATIENT
Start: 2017-09-27 | End: 2017-09-28 | Stop reason: HOSPADM

## 2017-09-27 RX ORDER — METOPROLOL TARTRATE 1 MG/ML
1-2 INJECTION, SOLUTION INTRAVENOUS EVERY 5 MIN PRN
Status: DISCONTINUED | OUTPATIENT
Start: 2017-09-27 | End: 2017-09-28 | Stop reason: HOSPADM

## 2017-09-27 RX ORDER — CEFAZOLIN SODIUM 1 G/3ML
1 INJECTION, POWDER, FOR SOLUTION INTRAMUSCULAR; INTRAVENOUS SEE ADMIN INSTRUCTIONS
Status: DISCONTINUED | OUTPATIENT
Start: 2017-09-27 | End: 2017-09-28 | Stop reason: HOSPADM

## 2017-09-27 RX ORDER — HYDRALAZINE HYDROCHLORIDE 20 MG/ML
2.5-5 INJECTION INTRAMUSCULAR; INTRAVENOUS EVERY 10 MIN PRN
Status: DISCONTINUED | OUTPATIENT
Start: 2017-09-27 | End: 2017-09-28 | Stop reason: HOSPADM

## 2017-09-27 RX ORDER — LIDOCAINE 40 MG/G
CREAM TOPICAL
Status: DISCONTINUED | OUTPATIENT
Start: 2017-09-27 | End: 2017-09-28 | Stop reason: HOSPADM

## 2017-09-27 RX ORDER — CEFAZOLIN SODIUM 2 G/100ML
2 INJECTION, SOLUTION INTRAVENOUS
Status: COMPLETED | OUTPATIENT
Start: 2017-09-27 | End: 2017-09-27

## 2017-09-27 RX ORDER — BUPIVACAINE HYDROCHLORIDE AND EPINEPHRINE 2.5; 5 MG/ML; UG/ML
INJECTION, SOLUTION INFILTRATION; PERINEURAL PRN
Status: DISCONTINUED | OUTPATIENT
Start: 2017-09-27 | End: 2017-09-27 | Stop reason: HOSPADM

## 2017-09-27 RX ORDER — EPHEDRINE SULFATE 50 MG/ML
INJECTION, SOLUTION INTRAMUSCULAR; INTRAVENOUS; SUBCUTANEOUS PRN
Status: DISCONTINUED | OUTPATIENT
Start: 2017-09-27 | End: 2017-09-27

## 2017-09-27 RX ORDER — OXYCODONE HYDROCHLORIDE 5 MG/1
5 TABLET ORAL EVERY 4 HOURS PRN
Status: DISCONTINUED | OUTPATIENT
Start: 2017-09-27 | End: 2017-09-28 | Stop reason: HOSPADM

## 2017-09-27 RX ORDER — FENTANYL CITRATE 50 UG/ML
INJECTION, SOLUTION INTRAMUSCULAR; INTRAVENOUS PRN
Status: DISCONTINUED | OUTPATIENT
Start: 2017-09-27 | End: 2017-09-27

## 2017-09-27 RX ORDER — LIDOCAINE HYDROCHLORIDE 20 MG/ML
INJECTION, SOLUTION INFILTRATION; PERINEURAL PRN
Status: DISCONTINUED | OUTPATIENT
Start: 2017-09-27 | End: 2017-09-27

## 2017-09-27 RX ORDER — ONDANSETRON 2 MG/ML
4 INJECTION INTRAMUSCULAR; INTRAVENOUS EVERY 30 MIN PRN
Status: DISCONTINUED | OUTPATIENT
Start: 2017-09-27 | End: 2017-09-28 | Stop reason: HOSPADM

## 2017-09-27 RX ORDER — GABAPENTIN 300 MG/1
300 CAPSULE ORAL ONCE
Status: COMPLETED | OUTPATIENT
Start: 2017-09-27 | End: 2017-09-27

## 2017-09-27 RX ADMIN — FENTANYL CITRATE 25 MCG: 50 INJECTION, SOLUTION INTRAMUSCULAR; INTRAVENOUS at 12:51

## 2017-09-27 RX ADMIN — SODIUM CHLORIDE, SODIUM LACTATE, POTASSIUM CHLORIDE, CALCIUM CHLORIDE: 600; 310; 30; 20 INJECTION, SOLUTION INTRAVENOUS at 12:47

## 2017-09-27 RX ADMIN — EPHEDRINE SULFATE 5 MG: 50 INJECTION, SOLUTION INTRAMUSCULAR; INTRAVENOUS; SUBCUTANEOUS at 13:02

## 2017-09-27 RX ADMIN — GABAPENTIN 300 MG: 300 CAPSULE ORAL at 11:45

## 2017-09-27 RX ADMIN — ONDANSETRON 4 MG: 2 INJECTION INTRAMUSCULAR; INTRAVENOUS at 13:29

## 2017-09-27 RX ADMIN — PROPOFOL 50 MG: 10 INJECTION, EMULSION INTRAVENOUS at 12:53

## 2017-09-27 RX ADMIN — LIDOCAINE HYDROCHLORIDE 60 MG: 20 INJECTION, SOLUTION INFILTRATION; PERINEURAL at 12:51

## 2017-09-27 RX ADMIN — PROPOFOL 100 MG: 10 INJECTION, EMULSION INTRAVENOUS at 12:51

## 2017-09-27 RX ADMIN — CEFAZOLIN SODIUM 2 G: 2 INJECTION, SOLUTION INTRAVENOUS at 12:53

## 2017-09-27 RX ADMIN — ACETAMINOPHEN 975 MG: 325 TABLET ORAL at 11:45

## 2017-09-27 NOTE — ANESTHESIA CARE TRANSFER NOTE
Patient: Dimitri Neves    Procedure(s):  Bilateral orchiectomy scrotal approach - Wound Class: I-Clean    Diagnosis: prostate cancer  Diagnosis Additional Information: No value filed.    Anesthesia Type:   General, LMA     Note:  Airway :Nasal Cannula  Patient transferred to:PACU  Comments: To PACU, awake, comfortable, exchanging well, sats 100%, NC, Report to RN.      Vitals: (Last set prior to Anesthesia Care Transfer)    CRNA VITALS  9/27/2017 1303 - 9/27/2017 1337      9/27/2017             Pulse: 59    SpO2: 99 %    Resp Rate (observed): (!)  7                Electronically Signed By: NI Villa CRNA  September 27, 2017  1:37 PM

## 2017-09-27 NOTE — ANESTHESIA POSTPROCEDURE EVALUATION
Patient: Dimitri Neves    Procedure(s):  Bilateral orchiectomy scrotal approach - Wound Class: I-Clean    Diagnosis:prostate cancer  Diagnosis Additional Information: No value filed.    Anesthesia Type:  General, LMA    Note:  Anesthesia Post Evaluation    Patient location during evaluation: PACU  Patient participation: Able to fully participate in evaluation  Level of consciousness: awake  Pain management: adequate  Airway patency: patent  Cardiovascular status: acceptable  Respiratory status: acceptable  Hydration status: acceptable  PONV: none     Anesthetic complications: None    Comments: Awake and alert.  Stable recovery noted.        Last vitals:  Vitals:    09/27/17 1345 09/27/17 1350 09/27/17 1355   BP: 118/64 108/61 131/54   Resp: 19 18 18   Temp:   97.5  F (36.4  C)   SpO2:  97% 97%         Electronically Signed By: Dipesh Queen MD  September 27, 2017  2:01 PM

## 2017-09-27 NOTE — IP AVS SNAPSHOT
MRN:[id number]                      After Visit Summary   9/27/2017    Dimitri Neves    MRN: [id number]           Thank you!     Thank you for choosing [facility] for your care. Our goal is always to provide you with excellent care. Hearing back from our patients is one way we can continue to improve our services. Please take a few minutes to complete the written survey that you may receive in the mail after you visit with us. Thank you!        Patient Information     Date Of Birth          1935        About your hospital stay     You were admitted on:  September 27, 2017 You last received care in the:  [facility]    You were discharged on:  September 27, 2017       Who to Call     For medical emergencies, please call 911.  For non-urgent questions about your medical care, please call your primary care provider or clinic, [phone]  For questions related to your surgery, please call your surgery clinic        Attending Provider     Provider Senthil Webster MD Urology       Primary Care Provider Office Phone # Fax #    Reginaldo Muir [phone]      After Care Instructions     Diet Instructions       Resume pre procedure diet            Dressing       Keep dressing clean and dry, change as instructed by Surgeon or RN            No Alcohol       for 24 hours post procedure            No driving or operating machinery        until the day after procedure                  Your next 10 appointments already scheduled     Oct 16, 2017  8:15 AM CDT   LAB with LAB ONC Replaced by [facility] ([facility])    [id number] [place] [address] [phone]           Patient must bring picture ID. Patient should be prepared to give a urine specimen  Please do not eat 10-12 hours before your appointment if you are coming in fasting for labs on lipids, cholesterol, or glucose (sugar). Pregnant women should  follow their Care Team instructions. Water with medications is okay. Do not drink coffee or other fluids. If you have concerns about taking  your medications, please ask at office or if scheduling via [employer], send a message by clicking on Secure Messaging, Message Your Care Team.            Oct 16, 2017  9:00 AM CDT   Return Visit with Rafael Valenzuela MD   [facility] ([facility])    [address] [facility] [address]   [phone]            Oct 16, 2017  9:30 AM CDT   Level O with BAY 9 INFUSION   [facility] ([facility])    [address] [facility] [address]   [phone]            Oct 24, 2017 11:15 AM CDT   Return Visit with Senthil Taylor MD   [facility] ([facility])    [address] [place] [address]   [phone]            Dec 11, 2017 11:00 AM CST   Return Visit with Senthil Jhaveri MD   [facility] ([facility]    [address] [facility] [address]   [phone]              Further instructions from your care team       [facility]  Same-Day Surgery   Adult Discharge Orders & Instructions   For 24 hours after surgery  1. Get plenty of rest.  A responsible adult must stay with you for at least 24 hours after you leave the hospital.   2. Do not drive or use heavy equipment.  If you have weakness or tingling, don't drive or use heavy equipment until this feeling goes away.  3. Do not drink alcohol.  4. Avoid strenuous or risky activities.  Ask for help when climbing stairs.   5. You may feel lightheaded.  IF so, sit for a few minutes before standing.  Have someone help you get up.   6. If you have nausea (feel sick to your stomach): Drink only clear liquids such as apple juice, ginger ale, broth or 7-Up.  Rest may also help.  Be sure to drink enough fluids.  Move to a regular diet as you  feel able.  7. You may have a slight fever. Call the doctor if your fever is over 100 F (37.7 C) (taken under the tongue) or lasts longer than 24 hours.  8. You may have a dry mouth, a sore throat, muscle aches or trouble sleeping.  These should go away after 24 hours.  9. Do not make important or legal decisions.   Call your doctor for any of the followin.  Signs of infection (fever, growing tenderness at the surgery site, a large amount of drainage or bleeding, severe pain, foul-smelling drainage, redness, swelling).    2. It has been over 8 to 10 hours since surgery and you are still not able to urinate (pass water).    3.  Headache for over 24 hours.      To contact a doctor call:  [phone]      Tylenol was given at 11:45 am.      Remove dressing tomorrow am      Pending Results     Date and Time Order Name Status Description    2017 1315 Surgical pathology exam In process             Admission Information     Date & Time Provider Department Dept. Phone    2017 Senthil Taylor MD [facility] [phone]      Your Vitals Were     Blood Pressure Temperature Respirations Pulse Oximetry          106/58 97.2  F (36.2  C) (Temporal) 18 97%        MyChart Information     [employer] gives you secure access to your electronic health record. If you see a primary care provider, you can also send messages to your care team and make appointments. If you have questions, please call your primary care clinic.  If you do not have a primary care provider, please call [phone] and they will assist you.        Care EveryWhere ID     This is your Care EveryWhere ID. This could be used by other organizations to access your [facility] medical records  [id number]        Equal Access to Services     CARLITA LEBLANC : Ramso Rodriguez, hugh mendez, naty garner. So [facility] [phone].    ATENCIÓN: adela Lima roberts  disposición servicios gratuitos de asistencia lingüística. Kelsey romero [phone].    We comply with applicable federal civil rights laws and [place] laws. We do not discriminate on the basis of race, color, national origin, age, disability sex, sexual orientation or gender identity.               Review of your medicines      UNREVIEWED medicines. Ask your doctor about these medicines        Dose / Directions    * abiraterone 250 MG tablet   Commonly known as:  ZYTIGA   Used for:  Malignant neoplasm of prostate (H)        Dose:  1000 mg   Take 4 tablets (1,000 mg) by mouth daily   Quantity:  120 tablet   Refills:  11       * abiraterone 250 MG tablet   Commonly known as:  ZYTIGA   Used for:  Bone metastasis (H), Malignant neoplasm of prostate (H)        Dose:  1000 mg   Take 4 tablets (1,000 mg) by mouth daily for 30 doses Take on empty stomach.   Quantity:  120 tablet   Refills:  0       bicalutamide 50 MG tablet   Commonly known as:  CASODEX   Used for:  Malignant neoplasm of prostate (H)        Dose:  50 mg   Take 1 tablet (50 mg) by mouth daily   Quantity:  90 tablet   Refills:  3       CALCIUM 600 PO        Refills:  0       CEPHALEXIN PO        Refills:  0       [employer] vitamin  B12 1000 MCG Tabs   Used for:  Low vitamin B12 level   Generic drug:  cyanocobalamin        TAKE 1 TABLET BY MOUTH EVERY DAY   Quantity:  30 tablet   Refills:  5       gabapentin 300 MG capsule   Commonly known as:  NEURONTIN        Dose:  900 mg   Take 900 mg by mouth At Bedtime   Refills:  0       LORazepam 0.5 MG tablet   Commonly known as:  ATIVAN   Used for:  Bone metastasis (H), Malignant neoplasm of prostate (H)        Dose:  0.5 mg   Take 1 tablet (0.5 mg) by mouth every 4 hours as needed (Anxiety, Nausea/Vomiting or Sleep)   Quantity:  30 tablet   Refills:  2       metoprolol 25 MG 24 hr tablet   Commonly known as:  TOPROL-XL   Used for:  HTN (hypertension), benign        TAKE 1 TABLET (25 MG) BY MOUTH DAILY   Quantity:  90  tablet   Refills:  0       predniSONE 5 MG tablet   Commonly known as:  DELTASONE   Used for:  Malignant neoplasm of prostate (H)        Dose:  5 mg   Take 1 tablet (5 mg) by mouth 2 times daily   Quantity:  60 tablet   Refills:  11       quinapril 40 MG Tab   Commonly known as:  ACCUPRIL   Used for:  HTN (hypertension), benign        TAKE 1 TABLET (40 MG) BY MOUTH DAILY - NEEDS TO FOLLOW UP   Quantity:  90 tablet   Refills:  0       sildenafil 100 MG tablet   Commonly known as:  VIAGRA   Used for:  ED (erectile dysfunction)        Dose:  100 mg   Take 1 tablet (100 mg) by mouth daily as needed for erectile dysfunction   Quantity:  10 tablet   Refills:  3       simvastatin 40 MG tablet   Commonly known as:  ZOCOR   Used for:  Hyperlipidemia LDL goal <130        TAKE 0.5 TABLETS (20 MG) BY MOUTH DAILY   Quantity:  45 tablet   Refills:  3       vitamin D 2000 UNITS tablet        Dose:  1 tablet   Take 1 tablet by mouth daily   Refills:  0       * Notice:  This list has 2 medication(s) that are the same as other medications prescribed for you. Read the directions carefully, and ask your doctor or other care provider to review them with you.      CONTINUE these medicines which have NOT CHANGED        Dose / Directions    oxyCODONE 5 MG IR tablet   Commonly known as:  ROXICODONE   Used for:  Narcotic dependence, episodic use (H)        Dose:  5 mg   Take 1 tablet (5 mg) by mouth every 6 hours as needed for pain   Quantity:  30 tablet   Refills:  0            Where to get your medicines      Some of these will need a paper prescription and others can be bought over the counter. Ask your nurse if you have questions.     Bring a paper prescription for each of these medications     oxyCODONE 5 MG IR tablet                Protect others around you: Learn how to safely use, store and throw away your medicines at www.disposemymeds.org.             Medication List: This is a list of all your medications and when to take them.  Check marks below indicate your daily home schedule. Keep this list as a reference.      Medications           Morning Afternoon Evening Bedtime As Needed    * abiraterone 250 MG tablet   Commonly known as:  ZYTIGA   Take 4 tablets (1,000 mg) by mouth daily                                * abiraterone 250 MG tablet   Commonly known as:  ZYTIGA   Take 4 tablets (1,000 mg) by mouth daily for 30 doses Take on empty stomach.                                bicalutamide 50 MG tablet   Commonly known as:  CASODEX   Take 1 tablet (50 mg) by mouth daily                                CALCIUM 600 PO                                CEPHALEXIN PO                                [employer] vitamin  B12 1000 MCG Tabs   TAKE 1 TABLET BY MOUTH EVERY DAY   Generic drug:  cyanocobalamin                                gabapentin 300 MG capsule   Commonly known as:  NEURONTIN   Take 900 mg by mouth At Bedtime   Last time this was given:  300 mg on 9/27/2017 11:45 AM                                LORazepam 0.5 MG tablet   Commonly known as:  ATIVAN   Take 1 tablet (0.5 mg) by mouth every 4 hours as needed (Anxiety, Nausea/Vomiting or Sleep)                                metoprolol 25 MG 24 hr tablet   Commonly known as:  TOPROL-XL   TAKE 1 TABLET (25 MG) BY MOUTH DAILY                                oxyCODONE 5 MG IR tablet   Commonly known as:  ROXICODONE   Take 1 tablet (5 mg) by mouth every 6 hours as needed for pain                                predniSONE 5 MG tablet   Commonly known as:  DELTASONE   Take 1 tablet (5 mg) by mouth 2 times daily                                quinapril 40 MG Tab   Commonly known as:  ACCUPRIL   TAKE 1 TABLET (40 MG) BY MOUTH DAILY - NEEDS TO FOLLOW UP                                sildenafil 100 MG tablet   Commonly known as:  VIAGRA   Take 1 tablet (100 mg) by mouth daily as needed for erectile dysfunction                                simvastatin 40 MG tablet   Commonly known as:  ZOCOR   TAKE 0.5  TABLETS (20 MG) BY MOUTH DAILY                                vitamin D 2000 UNITS tablet   Take 1 tablet by mouth daily                                * Notice:  This list has 2 medication(s) that are the same as other medications prescribed for you. Read the directions carefully, and ask your doctor or other care provider to review them with you.

## 2017-09-27 NOTE — IP AVS SNAPSHOT
[facility]    [address] [place] [address]    Phone:  [phone]                                       After Visit Summary   9/27/2017    Dimitri Neves    MRN: [id number]           After Visit Summary Signature Page     I have received my discharge instructions, and my questions have been answered. I have discussed any challenges I see with this plan with the nurse or doctor.    ..........................................................................................................................................  Patient/Patient Representative Signature      ..........................................................................................................................................  Patient Representative Print Name and Relationship to Patient    ..................................................               ................................................  Date                                            Time    ..........................................................................................................................................  Reviewed by Signature/Title    ...................................................              ..............................................  Date                                                            Time

## 2017-09-27 NOTE — DISCHARGE INSTRUCTIONS
[facility]  Same-Day Surgery   Adult Discharge Orders & Instructions   For 24 hours after surgery  1. Get plenty of rest.  A responsible adult must stay with you for at least 24 hours after you leave the hospital.   2. Do not drive or use heavy equipment.  If you have weakness or tingling, don't drive or use heavy equipment until this feeling goes away.  3. Do not drink alcohol.  4. Avoid strenuous or risky activities.  Ask for help when climbing stairs.   5. You may feel lightheaded.  IF so, sit for a few minutes before standing.  Have someone help you get up.   6. If you have nausea (feel sick to your stomach): Drink only clear liquids such as apple juice, ginger ale, broth or 7-Up.  Rest may also help.  Be sure to drink enough fluids.  Move to a regular diet as you feel able.  7. You may have a slight fever. Call the doctor if your fever is over 100 F (37.7 C) (taken under the tongue) or lasts longer than 24 hours.  8. You may have a dry mouth, a sore throat, muscle aches or trouble sleeping.  These should go away after 24 hours.  9. Do not make important or legal decisions.   Call your doctor for any of the followin.  Signs of infection (fever, growing tenderness at the surgery site, a large amount of drainage or bleeding, severe pain, foul-smelling drainage, redness, swelling).    2. It has been over 8 to 10 hours since surgery and you are still not able to urinate (pass water).    3.  Headache for over 24 hours.      To contact a doctor call:  [phone]      Tylenol was given at 11:45 am.      Remove dressing tomorrow am

## 2017-09-27 NOTE — BRIEF OP NOTE
[facility]  Brief Operative Note    Pre-operative diagnosis: Mets prostate cancer   Post-operative diagnosis same   Procedure: Procedure(s):  ORCHIECTOMY SCROTAL   Surgeon: Senthil Taylor MD   Assistants(s): None   Anesthesia: General    Estimated blood loss: minimal                   Specimens: sent   Implants: None       Complications: None   Condition on discharge: Stable   Comments: See dictated operative report for full details

## 2017-09-28 NOTE — OP NOTE
DATE OF PROCEDURE:  2017      PREOPERATIVE DIAGNOSIS:  Metastatic prostate cancer.      POSTOPERATIVE DIAGNOSIS:  Metastatic prostate cancer.      PROCEDURE:  Bilateral simple orchiectomy.      DESCRIPTION OF PROCEDURE:  Dimitri Neves was brought to the operating room and after general anesthesia was induced, he was placed supine.  He was draped and prepped in the usual surgical fashion.  Preop antibiotic was given.  0.25% Marcaine with epinephrine was injected to the median raphe of the scrotum consistent with it made over the median raphe through the dartos fascia until the testis was identified.  The right testis was identified first.  This was preop from surrounding tissue.  The vas deferens was dissected off from the spermatic cord and ligated tied off with 0 silk suture.  The spermatic cord was then ligated and the testes sent to surgical pathology.  This was tied using a 2-0 silk suture and also tied off using 0 silk suture.  The same thing was done on the left side.  After the testes had been removed all bleeding was then controlled.  The dartos fascia was then reapproximated using 3-0 chromic suture.  The skin edges were also reapproximated using 3-0 chromic suture.  Bacitracin was applied on top of the incision followed by surgical dressing.  The patient tolerated the procedure well.  There were no complications identified during the procedure.  There was minimal bleeding during the operation.         PANTERA OKEEFE MD             D: 2017 13:36   T: 2017 21:52   MT: [id number]      Name:     DIMITRI NEVES   MRN:      [id number]        Account:        [id number]   :      1935           Procedure Date: 2017      Document: [id number]

## 2017-10-05 LAB — COPATH REPORT: NORMAL

## 2017-10-09 ENCOUNTER — ALLIED HEALTH/NURSE VISIT (OUTPATIENT)
Dept: NURSING | Facility: CLINIC | Age: 82
End: 2017-10-09
Payer: COMMERCIAL

## 2017-10-09 DIAGNOSIS — Z23 NEED FOR PROPHYLACTIC VACCINATION AND INOCULATION AGAINST INFLUENZA: Primary | ICD-10-CM

## 2017-10-09 PROCEDURE — 99207 ZZC NO CHARGE NURSE ONLY: CPT

## 2017-10-09 PROCEDURE — G0008 ADMIN INFLUENZA VIRUS VAC: HCPCS

## 2017-10-09 PROCEDURE — 90662 IIV NO PRSV INCREASED AG IM: CPT

## 2017-10-09 NOTE — MR AVS SNAPSHOT
After Visit Summary   10/9/2017    Dimitri Neves    MRN: [id number]           Patient Information     Date Of Birth          1935        Visit Information        Provider Department      10/9/2017 10:30 AM BE ANCILLARY [facility]        Today's Diagnoses     Need for prophylactic vaccination and inoculation against influenza    -  1       Follow-ups after your visit        Your next 10 appointments already scheduled     Oct 16, 2017  8:15 AM CDT   LAB with LAB ONC [facility] ([facility])    [address] [facility] [address] [phone]           Patient must bring picture ID. Patient should be prepared to give a urine specimen  Please do not eat 10-12 hours before your appointment if you are coming in fasting for labs on lipids, cholesterol, or glucose (sugar). Pregnant women should follow their Care Team instructions. Water with medications is okay. Do not drink coffee or other fluids. If you have concerns about taking  your medications, please ask at office or if scheduling via [employer], send a message by clicking on Secure Messaging, Message Your Care Team.            Oct 16, 2017  9:00 AM CDT   Return Visit with Rafael Valenzuela MD   [facility] ([facility])    [address] [facility] [address] [phone]            Oct 16, 2017  9:30 AM CDT   Level O with [address] ([facility])    [address] [facility] [address] [phone]            Oct 24, 2017 11:15 AM CDT   Return Visit with Senthil Taylor MD   [facility] ([facility])    [address] [place] [address]   [phone]            Nov 07, 2017 10:30 AM CST   Return Visit with Senthil Jhaveri MD   [facility] ([facility])    [address] [place] [address]  [address]   [phone]            Dec 11, 2017 11:00 AM CST   Return Visit with Senthil Jhaveri MD   [facility] ([facility])    [address] [place]  [facility] [address]   [phone]              Who to contact     If you have questions or need follow up information about today's clinic visit or your schedule please contact [facility] directly at [phone].  Normal or non-critical lab and imaging results will be communicated to you by [employer], letter or phone within 4 business days after the clinic has received the results. If you do not hear from us within 7 days, please contact the clinic through [employer] or phone. If you have a critical or abnormal lab result, we will notify you by phone as soon as possible.  Submit refill requests through [employer] or call your pharmacy and they will forward the refill request to us. Please allow 3 business days for your refill to be completed.          Additional Information About Your Visit        [employer] Information     [employer] gives you secure access to your electronic health record. If you see a primary care provider, you can also send messages to your care team and make appointments. If you have questions, please call your primary care clinic.  If you do not have a primary care provider, please call [phone] and they will assist you.        Care EveryWhere ID     This is your Care EveryWhere ID. This could be used by other organizations to access your [facility] medical records  [id number]         Blood Pressure from Last 3 Encounters:   09/27/17 131/54   09/18/17 128/58   09/15/17 102/60    Weight from Last 3 Encounters:   09/15/17 181 lb (82.1 kg)   08/31/17 180 lb (81.6 kg)   08/21/17 178 lb (80.7 kg)              We Performed the Following     ADMIN INFLUENZA (For MEDICARE Patients ONLY) []     FLU VACCINE, INCREASED ANTIGEN, PRESV FREE, AGE 65+ [[address]]        Primary Care Provider Office Phone # Fax #     Reginaldo Muir [phone]       [address] [facility]  [place] [address]        Equal Access to Services     YESENIADAVIDSON DEANA : Ramos nahid martins tomas Rubioali, watimada bernardhay, qakrystalta kajeisonda kishan, naty kelseyin hayaan miltongraham michaels layulisylvia karan. So [facility] [phone].    ATENCIÓN: Si habla español, tiene a roberts disposición servicios gratuitos de asistencia lingüística. Llame al [phone].    We comply with applicable federal civil rights laws and [place] laws. We do not discriminate on the basis of race, color, national origin, age, disability, sex, sexual orientation, or gender identity.            Thank you!     Thank you for choosing [facility]  for your care. Our goal is always to provide you with excellent care. Hearing back from our patients is one way we can continue to improve our services. Please take a few minutes to complete the written survey that you may receive in the mail after your visit with us. Thank you!             Your Updated Medication List - Protect others around you: Learn how to safely use, store and throw away your medicines at www.disposemymeds.org.          This list is accurate as of: 10/9/17 10:39 AM.  Always use your most recent med list.                   Brand Name Dispense Instructions for use Diagnosis    abiraterone 250 MG tablet    ZYTIGA    120 tablet    Take 4 tablets (1,000 mg) by mouth daily    Malignant neoplasm of prostate (H)       bicalutamide 50 MG tablet    CASODEX    90 tablet    Take 1 tablet (50 mg) by mouth daily    Malignant neoplasm of prostate (H)       CALCIUM 600 PO           CEPHALEXIN PO           [facility] vitamin  B12 1000 MCG Tabs   Generic drug:  cyanocobalamin     30 tablet    TAKE 1 TABLET BY MOUTH EVERY DAY    Low vitamin B12 level       gabapentin 300 MG capsule    NEURONTIN     Take 900 mg by mouth At Bedtime        LORazepam 0.5 MG tablet    ATIVAN    30 tablet    Take 1 tablet (0.5 mg) by mouth every 4 hours as needed  (Anxiety, Nausea/Vomiting or Sleep)    Bone metastasis (H), Malignant neoplasm of prostate (H)       metoprolol 25 MG 24 hr tablet    TOPROL-XL    90 tablet    TAKE 1 TABLET (25 MG) BY MOUTH DAILY    HTN (hypertension), benign       oxyCODONE 5 MG IR tablet    ROXICODONE    30 tablet    Take 1 tablet (5 mg) by mouth every 6 hours as needed for pain    Narcotic dependence, episodic use (H)       predniSONE 5 MG tablet    DELTASONE    60 tablet    Take 1 tablet (5 mg) by mouth 2 times daily    Malignant neoplasm of prostate (H)       quinapril 40 MG Tab    ACCUPRIL    90 tablet    TAKE 1 TABLET (40 MG) BY MOUTH DAILY - NEEDS TO FOLLOW UP    HTN (hypertension), benign       sildenafil 100 MG tablet    VIAGRA    10 tablet    Take 1 tablet (100 mg) by mouth daily as needed for erectile dysfunction    ED (erectile dysfunction)       simvastatin 40 MG tablet    ZOCOR    45 tablet    TAKE 0.5 TABLETS (20 MG) BY MOUTH DAILY    Hyperlipidemia LDL goal <130       vitamin D 2000 UNITS tablet      Take 1 tablet by mouth daily

## 2017-10-09 NOTE — PROGRESS NOTES
Injectable Influenza Immunization Documentation    1.  Is the person to be vaccinated sick today?   No    2. Does the person to be vaccinated have an allergy to a component   of the vaccine?   No    3. Has the person to be vaccinated ever had a serious reaction   to influenza vaccine in the past?   No    4. Has the person to be vaccinated ever had Guillain-Barré syndrome?   No    Form completed by Estefani Rubalcava [facility]

## 2017-10-11 DIAGNOSIS — C61 MALIGNANT NEOPLASM OF PROSTATE (H): ICD-10-CM

## 2017-10-11 DIAGNOSIS — C79.51 BONE METASTASIS: Primary | ICD-10-CM

## 2017-10-11 RX ORDER — ABIRATERONE ACETATE 250 MG/1
1000 TABLET ORAL DAILY
Qty: 120 TABLET | Refills: 0 | Status: SHIPPED | OUTPATIENT
Start: 2017-10-11 | End: 2017-10-30

## 2017-10-11 RX ORDER — PREDNISONE 5 MG/1
5 TABLET ORAL 2 TIMES DAILY
Qty: 60 TABLET | Refills: 0 | Status: SHIPPED | OUTPATIENT
Start: 2017-10-11 | End: 2017-10-30

## 2017-10-16 ENCOUNTER — ONCOLOGY VISIT (OUTPATIENT)
Dept: ONCOLOGY | Facility: CLINIC | Age: 82
End: 2017-10-16
Payer: COMMERCIAL

## 2017-10-16 ENCOUNTER — TELEPHONE (OUTPATIENT)
Dept: PHARMACY | Facility: CLINIC | Age: 82
End: 2017-10-16

## 2017-10-16 ENCOUNTER — INFUSION THERAPY VISIT (OUTPATIENT)
Dept: INFUSION THERAPY | Facility: CLINIC | Age: 82
End: 2017-10-16
Payer: COMMERCIAL

## 2017-10-16 VITALS
DIASTOLIC BLOOD PRESSURE: 75 MMHG | TEMPERATURE: 97.7 F | SYSTOLIC BLOOD PRESSURE: 146 MMHG | HEIGHT: 72 IN | OXYGEN SATURATION: 98 % | RESPIRATION RATE: 15 BRPM | WEIGHT: 186 LBS | BODY MASS INDEX: 25.19 KG/M2 | HEART RATE: 62 BPM

## 2017-10-16 DIAGNOSIS — C61 MALIGNANT NEOPLASM OF PROSTATE (H): ICD-10-CM

## 2017-10-16 DIAGNOSIS — C79.51 BONE METASTASIS: Primary | ICD-10-CM

## 2017-10-16 DIAGNOSIS — C79.51 BONE METASTASIS: ICD-10-CM

## 2017-10-16 DIAGNOSIS — C61 MALIGNANT NEOPLASM OF PROSTATE (H): Primary | ICD-10-CM

## 2017-10-16 LAB
ALBUMIN SERPL-MCNC: 3 G/DL (ref 3.4–5)
ALP SERPL-CCNC: 71 U/L (ref 40–150)
ALT SERPL W P-5'-P-CCNC: 16 U/L (ref 0–70)
ANION GAP SERPL CALCULATED.3IONS-SCNC: 4 MMOL/L (ref 3–14)
AST SERPL W P-5'-P-CCNC: 19 U/L (ref 0–45)
BASOPHILS # BLD AUTO: 0 10E9/L (ref 0–0.2)
BASOPHILS NFR BLD AUTO: 0.2 %
BILIRUB SERPL-MCNC: 0.5 MG/DL (ref 0.2–1.3)
BUN SERPL-MCNC: 10 MG/DL (ref 7–30)
CALCIUM SERPL-MCNC: 8.2 MG/DL (ref 8.5–10.1)
CHLORIDE SERPL-SCNC: 108 MMOL/L (ref 94–109)
CO2 SERPL-SCNC: 28 MMOL/L (ref 20–32)
CREAT SERPL-MCNC: 0.71 MG/DL (ref 0.66–1.25)
DIFFERENTIAL METHOD BLD: ABNORMAL
EOSINOPHIL # BLD AUTO: 0.1 10E9/L (ref 0–0.7)
EOSINOPHIL NFR BLD AUTO: 1.9 %
ERYTHROCYTE [DISTWIDTH] IN BLOOD BY AUTOMATED COUNT: 15.7 % (ref 10–15)
GFR SERPL CREATININE-BSD FRML MDRD: >90 ML/MIN/1.7M2
GLUCOSE SERPL-MCNC: 100 MG/DL (ref 70–99)
HCT VFR BLD AUTO: 36.7 % (ref 40–53)
HGB BLD-MCNC: 12.1 G/DL (ref 13.3–17.7)
LYMPHOCYTES # BLD AUTO: 1.6 10E9/L (ref 0.8–5.3)
LYMPHOCYTES NFR BLD AUTO: 32.8 %
MCH RBC QN AUTO: 31.3 PG (ref 26.5–33)
MCHC RBC AUTO-ENTMCNC: 33 G/DL (ref 31.5–36.5)
MCV RBC AUTO: 95 FL (ref 78–100)
MONOCYTES # BLD AUTO: 0.5 10E9/L (ref 0–1.3)
MONOCYTES NFR BLD AUTO: 11.2 %
NEUTROPHILS # BLD AUTO: 2.6 10E9/L (ref 1.6–8.3)
NEUTROPHILS NFR BLD AUTO: 53.9 %
PLATELET # BLD AUTO: 211 10E9/L (ref 150–450)
POTASSIUM SERPL-SCNC: 3.9 MMOL/L (ref 3.4–5.3)
PROT SERPL-MCNC: 6.5 G/DL (ref 6.8–8.8)
PSA SERPL-MCNC: 11.9 UG/L (ref 0–4)
RBC # BLD AUTO: 3.87 10E12/L (ref 4.4–5.9)
SODIUM SERPL-SCNC: 140 MMOL/L (ref 133–144)
WBC # BLD AUTO: 4.8 10E9/L (ref 4–11)

## 2017-10-16 PROCEDURE — 36415 COLL VENOUS BLD VENIPUNCTURE: CPT | Performed by: INTERNAL MEDICINE

## 2017-10-16 PROCEDURE — 99207 ZZC NO CHARGE LOS: CPT

## 2017-10-16 PROCEDURE — 85025 COMPLETE CBC W/AUTO DIFF WBC: CPT | Performed by: INTERNAL MEDICINE

## 2017-10-16 PROCEDURE — 96374 THER/PROPH/DIAG INJ IV PUSH: CPT | Performed by: INTERNAL MEDICINE

## 2017-10-16 PROCEDURE — 99214 OFFICE O/P EST MOD 30 MIN: CPT | Mod: 25 | Performed by: INTERNAL MEDICINE

## 2017-10-16 PROCEDURE — 84153 ASSAY OF PSA TOTAL: CPT | Performed by: INTERNAL MEDICINE

## 2017-10-16 PROCEDURE — 80053 COMPREHEN METABOLIC PANEL: CPT | Performed by: INTERNAL MEDICINE

## 2017-10-16 RX ORDER — ZOLEDRONIC ACID 0.04 MG/ML
4 INJECTION, SOLUTION INTRAVENOUS ONCE
Status: COMPLETED | OUTPATIENT
Start: 2017-10-16 | End: 2017-10-16

## 2017-10-16 RX ORDER — TAMSULOSIN HYDROCHLORIDE 0.4 MG/1
0.4 CAPSULE ORAL DAILY
Qty: 30 CAPSULE | Refills: 1 | Status: SHIPPED | OUTPATIENT
Start: 2017-10-16 | End: 2017-12-26

## 2017-10-16 RX ADMIN — Medication 250 ML: at 10:22

## 2017-10-16 RX ADMIN — ZOLEDRONIC ACID 4 MG: 0.04 INJECTION, SOLUTION INTRAVENOUS at 10:22

## 2017-10-16 ASSESSMENT — PAIN SCALES - GENERAL: PAINLEVEL: MILD PAIN (3)

## 2017-10-16 NOTE — NURSING NOTE
"Oncology Rooming Note    October 16, 2017 9:03 AM   Dimitri Neves is a 82 year old male who presents for:    Chief Complaint   Patient presents with     Oncology Clinic Visit     follow up      Initial Vitals: /75  Pulse 62  Temp 97.7  F (36.5  C)  Resp 15  Ht 1.829 m (6' 0.01\")  Wt 84.4 kg (186 lb)  SpO2 98%  BMI 25.22 kg/m2 Estimated body mass index is 25.22 kg/(m^2) as calculated from the following:    Height as of this encounter: 1.829 m (6' 0.01\").    Weight as of this encounter: 84.4 kg (186 lb). Body surface area is 2.07 meters squared.  Mild Pain (3) Comment: Right knee pain. Oxy as needed.    No LMP for male patient.  Allergies reviewed: Yes  Medications reviewed: Yes    Medications: Medication refills not needed today.  Pharmacy name entered into [employer]:    [id number] - [place], [place] - [address], [facility] - [place], [place] - [address] [place] [address] [facility] [place], [place]      5 minutes for nursing intake (face to face time)     Cheryl Rodriguez LPN              "

## 2017-10-16 NOTE — PROGRESS NOTES
[facility]  HEMATOLOGY AND ONCOLOGY    FOLLOW-UP VISIT NOTE    PATIENT NAME: Dimitri Neves MRN # [id number]  DATE OF VISIT: Oct 16, 2017 YOB: 1935    REFERRING PROVIDER: No referring provider defined for this encounter.    CANCER TYPE: Prostate adenocarcinoma  STAGE: IV    TREATMENT SUMMARY:  Dimitri was followed by his PCP on 6/13/13 and was elevated at 32 as noted below. He was referred to Dr. Taylor. He was treated with a 10 day course of ciprofloxacin and followed again in 6 weeks on 8/10. His PSA drawn prior to this visit was unchanged and remained elevated at 32. He had a TRUS biopsy on 8/25/14 which was negative for prostate adenocarcinoma. His PSA was repeated in 3 months and now increased to 67.9. He had a repeat biopsy of prostate on 12/16/14 which now confirmed prostate adenocarcinoma with eileen 4+4 disease involving 5 of the cores and [place] 3+3 disease involving remainder of cores. He was staged with a CT scan and bone scan done on 12/29/14 which revealed metastatic foci in the upper cervical vertebrae on the left, right posterior 3rd rib and right ischium, focal uptake in the posterior left 11th rib with corresponding lytic expansile appearance on CT, suspicious for metastasis.            4/5/2011 08:46 6/13/2014 08:03 7/25/2014 09:47 12/5/2014 09:00 4/7/2015 09:14   PSA 2.77 32.70 (H) 32.00 (H) 67.88 (H) 1.92      He was started on androgen deprivation therapy in Jan 2015 with a good initial response. His PSA has been increasing recently and he was started on bicalutamide in February with no response. He has continued to have rising PSA and was prescribed abiraterone with prednisone. He had a huge copay with this and has been referred to Medical Oncology to review other options.     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 He is tolerating his abiraterone without any difficulty. He is still recovering from an infection in his knee. He has been antibiotics for nearly a year and is fed up with this. He notes that he is going to bring this up with his infectious disease specialist that he is done taking antibiotics 3 times a day.     He does have hot flushes, fatigue, mood swings on androgen deprivation therapy.     He did have bilateral orchiectomies since last visit.       PAST MEDICAL HISTORY     Past Medical History:   Diagnosis Date     Actinic keratosis      AK (actinic keratosis) 7/9/2012     Cataract cortical, senile right eye 4/17/2012     DDD (degenerative disc disease), lumbar 1979    s/p 4 back surgeries, spinal cord stimulator implant      Diverticulosis      ED (erectile dysfunction) 2009     GERD (gastroesophageal reflux disease) ~1980     HTN (hypertension), benign ~2000     Macular degeneration, dry, mild, ou 7/23/2016     Multiple lung nodules     Several basilar pulmonary nodules <5 mm     HUDSON (obstructive sleep apnea) 10/2005    on CPAP     Other abnormal glucose 01/14/2009     PE (pulmonary thromboembolism) (H) 1981    after back surgery      Pure hypercholesterolemia ~2000     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         CURRENT OUTPATIENT MEDICATIONS     Current Outpatient Prescriptions   Medication Sig     abiraterone (ZYTIGA) 250 MG tablet Take 4 tablets (1,000 mg) by mouth daily for 30 doses Take on empty stomach.     predniSONE (DELTASONE) 5 MG tablet Take 1 tablet (5 mg) by mouth 2 times daily     oxyCODONE (ROXICODONE) 5 MG IR tablet Take 1 tablet (5 mg) by mouth every 6 hours as needed for pain     simvastatin (ZOCOR) 40 MG tablet TAKE 0.5 TABLETS (20 MG) BY MOUTH DAILY     Calcium Carbonate (CALCIUM 600 PO)      [facility]S VITAMIN  B12 1000 MCG  "TABS TAKE 1 TABLET BY MOUTH EVERY DAY     CEPHALEXIN PO      quinapril (ACCUPRIL) 40 MG Tab TAKE 1 TABLET (40 MG) BY MOUTH DAILY - NEEDS TO FOLLOW UP     LORazepam (ATIVAN) 0.5 MG tablet Take 1 tablet (0.5 mg) by mouth every 4 hours as needed (Anxiety, Nausea/Vomiting or Sleep)     abiraterone (ZYTIGA) 250 MG tablet Take 4 tablets (1,000 mg) by mouth daily     predniSONE (DELTASONE) 5 MG tablet Take 1 tablet (5 mg) by mouth 2 times daily     metoprolol (TOPROL-XL) 25 MG 24 hr tablet TAKE 1 TABLET (25 MG) BY MOUTH DAILY     gabapentin (NEURONTIN) 300 MG capsule Take 900 mg by mouth At Bedtime     Cholecalciferol (VITAMIN D) 2000 UNITS tablet Take 1 tablet by mouth daily     bicalutamide (CASODEX) 50 MG tablet Take 1 tablet (50 mg) by mouth daily (Patient not taking: Reported on 10/16/2017)     sildenafil (VIAGRA) 100 MG tablet Take 1 tablet (100 mg) by mouth daily as needed for erectile dysfunction (Patient not taking: Reported on 10/16/2017)     No current facility-administered medications for this visit.         ALLERGIES      Allergies   Allergen Reactions     Morphine Sulfate GI Disturbance     vomiting     Vicodin [Hydrocodone-Acetaminophen] Nausea and Vomiting        REVIEW OF SYSTEMS   As above in the HPI, o/w complete 12-point ROS was negative.     PHYSICAL EXAM   /75  Pulse 62  Temp 97.7  F (36.5  C)  Resp 15  Ht 1.829 m (6' 0.01\")  Wt 84.4 kg (186 lb)  SpO2 98%  BMI 25.22 kg/m2  GEN: NAD  HEENT: PERRL, EOMI, no icterus, injection or pallor. Oropharynx is clear.  NECK: no cervical or supraclavicular lymphadenopathy  LUNGS: clear bilaterally  CV: regular, no murmurs, rubs, or gallops  ABDOMEN: soft, non-tender, non-distended, normal bowel sounds, no hepatosplenomegaly by percussion or palpation  EXT: warm, well perfused, no edema  NEURO: alert  SKIN: no rashes     LABORATORY AND IMAGING STUDIES     Labs remain stable. Corrected calcium is within the normal range or low normal  Mild normocytic " anemia     Recent Labs   Lab Test  10/16/17   0840  09/18/17   0949  09/15/17   1131  08/21/17   0911  08/10/17   1010  07/13/17   0934   NA  140  140  138  139   --   142   POTASSIUM  3.9  4.2  4.1  4.1   --   4.6   CHLORIDE  108  104  104  104   --   107   CO2  28  32  27  31   --   30   ANIONGAP  4  4  7  4   --   5   BUN  10  12  16  13   --   12   CR  0.71  0.76  0.78  0.69  0.75  0.71   GLC  100*  103*  130*  94   --   96   ALETHA  8.2*  8.8  8.6  9.0  9.4  9.1     Recent Labs   Lab Test  12/28/11   0857  04/05/11   0846  06/16/10   1338  01/05/10   1134   PHOS  3.1  3.4  2.5  2.2*     Recent Labs   Lab Test  10/16/17   0840  09/18/17   0949  09/15/17   1131  08/21/17   0911  07/13/17   0934   WBC  4.8  6.5  8.6  5.3  4.6   HGB  12.1*  11.7*  11.6*  12.6*  11.6*   PLT  211  219  229  239  201   MCV  95  91  93  91  91   NEUTROPHIL  53.9  64.1  74.6  53.9  53.3     Recent Labs   Lab Test  10/16/17   0840  09/18/17   0949  08/21/17   0911   BILITOTAL  0.5  0.5  0.5   ALKPHOS  71  69  76   ALT  16  15  17   AST  19  16  16   ALBUMIN  3.0*  3.0*  3.1*     TSH   Date Value Ref Range Status   06/13/2016 1.60 0.40 - 4.00 mU/L Final   04/05/2011 3.73 0.4 - 5.0 mU/L Final     Recent Labs   Lab Test  10/16/17   0840  09/18/17   0949  08/21/17   0911  07/13/17   0934  06/07/17   1341  02/09/17   0823   PSA  11.90*  18.90*  30.80*  64.00*  43.90*   --    TESTOSTTOTAL   --    --    --    --    --   9*         ASSESSMENT AND PLAN   1. 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 No response to addition of bicalutamide  3. PE diagnosed few weeks after initiation of megestrol acetate for hot flashes on GnRH agonist  4. No significant medical comorbidities    He is tolerating his abiraterone well and has no complains. His PSA has been declining nicely as expected. We would plan to continue as current. There are no immediate concerns.     He did get  bilateral orchiectomies done last month 9/27/17. He would not need any more lupron or other GnRH directed therapies.     He has hot flashes, mood swings, fatigue - from androgen deprivation. These are no different between either orchiectomy or GnRH therapy as they come with low levels of testosterone. There is no good way around.     He has nocturia. I will try tamsulosin for this. I have prescribed 0.4 mg qhs.     We would continue with zometa every 6-8 weeks. I could see him in 3 months. But if he wants we could meet every 6-8 weeks when he is here for his zometa.     Over 25 min of direct face to face time spent with patient with more than 50% time spent in counseling and coordinating care.

## 2017-10-16 NOTE — MR AVS SNAPSHOT
After Visit Summary   10/16/2017    Dimitri Neves    MRN: [id number]           Patient Information     Date Of Birth          1935        Visit Information        Provider Department      10/16/2017 9:30 AM [address]        Today's Diagnoses     Bone metastasis (H)    -  1    Malignant neoplasm of prostate (H)           Follow-ups after your visit        Your next 10 appointments already scheduled     Oct 24, 2017 11:15 AM CDT   Return Visit with Senthil Taylor MD   [facility] ([address] [address]   [phone]            Nov 07, 2017 10:30 AM CST   Return Visit with Senthil Jhaveri MD   [facility] ([facility])    [address] [place] [address]   [phone]            Nov 27, 2017  9:00 AM CST   LAB with LAB ONC [facility] ([facility])    [address] [place] [id number] [phone]           Patient must bring picture ID. Patient should be prepared to give a urine specimen  Please do not eat 10-12 hours before your appointment if you are coming in fasting for labs on lipids, cholesterol, or glucose (sugar). Pregnant women should follow their Care Team instructions. Water with medications is okay. Do not drink coffee or other fluids. If you have concerns about taking  your medications, please ask at office or if scheduling via [employer], send a message by clicking on Secure Messaging, Message Your Care Team.            Nov 27, 2017  9:30 AM CST   Level O with [address] ([facility])    [address] [address] [place] [id number] [phone]            Dec 11, 2017 11:00 AM CST   Return Visit with Senthil Jhaveri MD   [facility] ([facility])    [address] [place] [address]  [address]   [phone]            Dec 18, 2017  9:15 AM CST   LAB with LAB ONC [facility] ([facility])    [address] [address] [place] [address] [phone]           Patient must bring picture ID. Patient should be prepared to give a urine specimen  Please do not eat 10-12 hours before your appointment if you are coming in fasting for labs on lipids, cholesterol, or glucose (sugar). Pregnant women should follow their Care Team instructions. Water with medications is okay. Do not drink coffee or other fluids. If you have concerns about taking  your medications, please ask at office or if scheduling via [employer], send a message by clicking on Secure Messaging, Message Your Care Team.            Dec 18, 2017 10:00 AM CST   Return Visit with Rafael Valenzuela MD   [facility] ([facility])    [address] [address] [place] [address] [phone]              Who to contact     If you have questions or need follow up information about today's clinic visit or your schedule please contact [facility] directly at [phone].  Normal or non-critical lab and imaging results will be communicated to you by MyChart, letter or phone within 4 business days after the clinic has received the results. If you do not hear from us within 7 days, please contact the clinic through [employer] or phone. If you have a critical or abnormal lab result, we will notify you by phone as soon as possible.  Submit refill requests through [employer] or call your pharmacy and they will forward the refill request to us. Please allow 3 business days for your refill to be completed.          Additional Information About Your Visit        [employer] Information     [employer] gives you secure access to your electronic health record. If you see a primary care provider, you can also send messages to your care team and make appointments. If you have  questions, please call your primary care clinic.  If you do not have a primary care provider, please call [phone] and they will assist you.      [employer] is an electronic gateway that provides easy, online access to your medical records. With [employer], you can request a clinic appointment, read your test results, renew a prescription or communicate with your care team.     To access your existing account, please contact your [facility] or call [phone] for assistance.        Care EveryWhere ID     This is your Care EveryWhere ID. This could be used by other organizations to access your [place] medical records  [id number]         Blood Pressure from Last 3 Encounters:   10/16/17 146/75   09/27/17 131/54   09/18/17 128/58    Weight from Last 3 Encounters:   10/16/17 84.4 kg (186 lb)   09/15/17 82.1 kg (181 lb)   08/31/17 81.6 kg (180 lb)              Today, you had the following     No orders found for display         Today's Medication Changes          These changes are accurate as of: 10/16/17 12:32 PM.  If you have any questions, ask your nurse or doctor.               Start taking these medicines.        Dose/Directions    tamsulosin 0.4 MG capsule   Commonly known as:  FLOMAX   Used for:  Malignant neoplasm of prostate (H)   Started by:  Rafael Valenzuela MD        Dose:  0.4 mg   Take 1 capsule (0.4 mg) by mouth daily   Quantity:  30 capsule   Refills:  1            Where to get your medicines      These medications were sent to [facility] - [place], [place] - [address] [place], [place] [address]   [address] [facility] [address]    Hours:  Mon-Fri 8:30am-5:00pm Toll Free [phone] Phone:  [phone]     tamsulosin 0.4 MG capsule                Primary Care Provider Office Phone # Fax #    Reginaldo Muir [phone] [address] [facility] [place]  [facility] [address]        Equal Access to Services     CARLITA LEBLANC AH: Ramos martins  tomas Rodriguez, waaxda luqadaha, qaybta kaalmada kishan, naty yeung miltongraham arlenemaxi layulisylvia karan. So [facility] [phone].    ATENCIÓN: Si habla ted, tiene a roberts disposición servicios gratuitos de asistencia lingüística. Kelsey al [phone].    We comply with applicable federal civil rights laws and [place] laws. We do not discriminate on the basis of race, color, national origin, age, disability, sex, sexual orientation, or gender identity.            Thank you!     Thank you for choosing [facility]  for your care. Our goal is always to provide you with excellent care. Hearing back from our patients is one way we can continue to improve our services. Please take a few minutes to complete the written survey that you may receive in the mail after your visit with us. Thank you!             Your Updated Medication List - Protect others around you: Learn how to safely use, store and throw away your medicines at www.disposemymeds.org.          This list is accurate as of: 10/16/17 12:32 PM.  Always use your most recent med list.                   Brand Name Dispense Instructions for use Diagnosis    * abiraterone 250 MG tablet    ZYTIGA    120 tablet    Take 4 tablets (1,000 mg) by mouth daily    Malignant neoplasm of prostate (H)       * abiraterone 250 MG tablet    ZYTIGA    120 tablet    Take 4 tablets (1,000 mg) by mouth daily for 30 doses Take on empty stomach.    Bone metastasis (H), Malignant neoplasm of prostate (H)       bicalutamide 50 MG tablet    CASODEX    90 tablet    Take 1 tablet (50 mg) by mouth daily    Malignant neoplasm of prostate (H)       CALCIUM 600 PO           CEPHALEXIN PO           [facility] vitamin  B12 1000 MCG Tabs   Generic drug:  cyanocobalamin     30 tablet    TAKE 1 TABLET BY MOUTH EVERY DAY    Low vitamin B12 level       gabapentin 300 MG capsule    NEURONTIN     Take 900 mg by mouth At Bedtime        LORazepam 0.5 MG tablet    ATIVAN    30 tablet    Take 1  tablet (0.5 mg) by mouth every 4 hours as needed (Anxiety, Nausea/Vomiting or Sleep)    Bone metastasis (H), Malignant neoplasm of prostate (H)       metoprolol 25 MG 24 hr tablet    TOPROL-XL    90 tablet    TAKE 1 TABLET (25 MG) BY MOUTH DAILY    HTN (hypertension), benign       oxyCODONE 5 MG IR tablet    ROXICODONE    30 tablet    Take 1 tablet (5 mg) by mouth every 6 hours as needed for pain    Narcotic dependence, episodic use (H)       * predniSONE 5 MG tablet    DELTASONE    60 tablet    Take 1 tablet (5 mg) by mouth 2 times daily    Malignant neoplasm of prostate (H)       * predniSONE 5 MG tablet    DELTASONE    60 tablet    Take 1 tablet (5 mg) by mouth 2 times daily    Bone metastasis (H), Malignant neoplasm of prostate (H)       quinapril 40 MG Tab    ACCUPRIL    90 tablet    TAKE 1 TABLET (40 MG) BY MOUTH DAILY - NEEDS TO FOLLOW UP    HTN (hypertension), benign       sildenafil 100 MG tablet    VIAGRA    10 tablet    Take 1 tablet (100 mg) by mouth daily as needed for erectile dysfunction    ED (erectile dysfunction)       simvastatin 40 MG tablet    ZOCOR    45 tablet    TAKE 0.5 TABLETS (20 MG) BY MOUTH DAILY    Hyperlipidemia LDL goal <130       tamsulosin 0.4 MG capsule    FLOMAX    30 capsule    Take 1 capsule (0.4 mg) by mouth daily    Malignant neoplasm of prostate (H)       vitamin D 2000 UNITS tablet      Take 1 tablet by mouth daily        * Notice:  This list has 4 medication(s) that are the same as other medications prescribed for you. Read the directions carefully, and ask your doctor or other care provider to review them with you.

## 2017-10-16 NOTE — TELEPHONE ENCOUNTER
Oral Chemotherapy Monitoring Program      Primary Oncologist: Dr. Rafael Valenzuela  Primary Oncology Clinic: [facility]  Cancer Diagnosis: Metastatic Prostate Cancer      Abiraterone 1000 mg PO daily with Prednisone 5 mg PO BID  Start Date: 8/19/17  C2 9/19/17  C3 10/16/17      Drug Interaction Assessment:     Abiraterone will decrease Warfarin metabolism, potentially raising INR; patient is aware and will inquire about more frequent INR monitoring at the start of therapy    Abiraterone inhibits Metoprolol metabolism with a slight risk of causing bradycardia      Lab Monitoring Plan:  C1D1+   CMP, BP C2D1+ Call, CMP, BP C3D1+ Call, CMP, BP C4D1+ Call, CMP, BP C5D1+ Call, CMP, BP C6D1+ Call, CMP, BP   C1D8+    C2D8+    C3D8+    C4D8+    C5D8+    C6D8+      C1D15+ Call, CMP C2D15+ Call, CMP C3D15+    C4D15+    C5D15+    C6D15+      C1D22+    C2D22+    C3D22+    C4D22+    C5D22+    C6D22+          Subjective/Objective:  Dimitri Neves is a 82 year old male seen in clinic for a follow-up visit for oral chemotherapy Abiraterone/Prednisone.     He is doing weill with no new complaints. He has not missed any doses.        ORAL CHEMOTHERAPY 7/26/2017 8/21/2017 9/18/2017 10/16/2017   Drug Name Zytiga (Abiraterone) Zytiga (Abiraterone) Zytiga (Abiraterone) Zytiga (Abiraterone)   Current Dosage 1000mg 1000mg 1000mg 1000mg   Current Schedule Daily Daily Daily Daily   Cycle Details Continuous Continuous Continuous Continuous   Start Date of Last Cycle 7/19/2017 8/19/2017 9/19/2017 10/16/2017   Doses missed in last 2 weeks 0 0 0 0   Adherence Assessment Adherent Adherent Adherent Adherent   Adverse Effects No AE identified during assessment No AE identified during assessment No AE identified during assessment No AE identified during assessment   Any new drug interactions? - No No No   Is the dose as ordered appropriate for the patient? Yes Yes Yes Yes   Is the patient currently in pain? Assessed in last 30 days.  "Assessed in last 30 days. Assessed in last 30 days. Assessed in last 30 days.   Has the patient been assessed within the past 6 months for depression? Yes Yes Yes Yes   Has the patient missed any days of school, work, or other routine activity? No No No No       Vitals:  BP:   BP Readings from Last 1 Encounters:   10/16/17 146/75     Wt Readings from Last 1 Encounters:   10/16/17 84.4 kg (186 lb)     Estimated body surface area is 2.07 meters squared as calculated from the following:    Height as of an earlier encounter on 10/16/17: 1.829 m (6' 0.01\").    Weight as of an earlier encounter on 10/16/17: 84.4 kg (186 lb).    Labs:  Lab Results   Component Value Date     10/16/2017      Lab Results   Component Value Date    POTASSIUM 3.9 10/16/2017     Lab Results   Component Value Date    ALETHA 8.2 10/16/2017     Lab Results   Component Value Date    ALBUMIN 3.0 10/16/2017     No results found for: MAG  Lab Results   Component Value Date    PHOS 3.1 12/28/2011     Lab Results   Component Value Date    BUN 10 10/16/2017     Lab Results   Component Value Date    CR 0.71 10/16/2017       Lab Results   Component Value Date    AST 19 10/16/2017     Lab Results   Component Value Date    ALT 16 10/16/2017     Lab Results   Component Value Date    BILITOTAL 0.5 10/16/2017       Lab Results   Component Value Date    WBC 4.8 10/16/2017     Lab Results   Component Value Date    HGB 12.1 10/16/2017     Lab Results   Component Value Date     10/16/2017     Lab Results   Component Value Date    ANEU 2.6 10/16/2017     Assessment/Plan:  Continue same dose as ordered    Follow-Up:  11/27/17- with Zometa infusion    Refill Due:  11/10/17 [facility]    Dipesh Pepe, PharmD  October 16, 2017    "

## 2017-10-16 NOTE — PROGRESS NOTES
Infusion Nursing Note:  Dimitri SAMIA Neves presents today for Zometa.    Patient seen by provider today: Yes:   Dr. Valenzuela   present during visit today: Not Applicable.    Note: Xometa stopped at 1038.    Intravenous Access:  Peripheral IV placed.    Treatment Conditions:  Lab Results   Component Value Date    HGB 12.1 10/16/2017     Lab Results   Component Value Date    WBC 4.8 10/16/2017      Lab Results   Component Value Date    ANEU 2.6 10/16/2017     Lab Results   Component Value Date     10/16/2017      Lab Results   Component Value Date     10/16/2017                   Lab Results   Component Value Date    POTASSIUM 3.9 10/16/2017           No results found for: MAG         Lab Results   Component Value Date    CR 0.71 10/16/2017                   Lab Results   Component Value Date    ALETHA 8.2 10/16/2017                Lab Results   Component Value Date    BILITOTAL 0.5 10/16/2017           Lab Results   Component Value Date    ALBUMIN 3.0 10/16/2017                    Lab Results   Component Value Date    ALT 16 10/16/2017           Lab Results   Component Value Date    AST 19 10/16/2017     Results reviewed, labs MET treatment parameters, ok to proceed with treatment.    Post Infusion Assessment:  Patient tolerated infusion without incident.  Blood return noted pre and post infusion.  Site patent and intact, free from redness, edema or discomfort.  Access discontinued per protocol.    Discharge Plan:   Patient will return 11/27/17 for next appointment.   Patient discharged in stable condition accompanied by: wife.  Departure Mode: Ambulatory.    Jyoti Sanchez RN

## 2017-10-16 NOTE — MR AVS SNAPSHOT
After Visit Summary   10/16/2017    Dimitri Neves    MRN: [id number]           Patient Information     Date Of Birth          1935        Visit Information        Provider Department      10/16/2017 9:00 AM Rafael Valenzuela MD [facility]        Today's Diagnoses     Malignant neoplasm of prostate (H)    -  1       Follow-ups after your visit        Your next 10 appointments already scheduled     Oct 24, 2017 11:15 AM CDT   Return Visit with Senthil Taylor MD   [facility] ([address] [address]   [phone]            Nov 07, 2017 10:30 AM CST   Return Visit with Senthil Jhaveri MD   [facility] ([facility])    [address] [place] [address]   [phone]            Nov 27, 2017  9:00 AM CST   LAB with LAB ONC [facility])    [address] [place] [id number] [phone]           Patient must bring picture ID. Patient should be prepared to give a urine specimen  Please do not eat 10-12 hours before your appointment if you are coming in fasting for labs on lipids, cholesterol, or glucose (sugar). Pregnant women should follow their Care Team instructions. Water with medications is okay. Do not drink coffee or other fluids. If you have concerns about taking  your medications, please ask at office or if scheduling via [employer], send a message by clicking on Secure Messaging, Message Your Care Team.            Nov 27, 2017  9:30 AM CST   Level O with [address] ([facility])    [address] [place] [address] [phone]            Dec 11, 2017 11:00 AM CST   Return Visit with Senthil Jhaveri MD   [facility] ([facility])    [address] [place] [address]   [phone]             Dec 18, 2017  9:15 AM CST   LAB with LAB ONC [facility] ([facility])    [address] [address] [place] [address] [phone]           Patient must bring picture ID. Patient should be prepared to give a urine specimen  Please do not eat 10-12 hours before your appointment if you are coming in fasting for labs on lipids, cholesterol, or glucose (sugar). Pregnant women should follow their Care Team instructions. Water with medications is okay. Do not drink coffee or other fluids. If you have concerns about taking  your medications, please ask at office or if scheduling via [employer], send a message by clicking on Secure Messaging, Message Your Care Team.            Dec 18, 2017 10:00 AM CST   Return Visit with Rafael Valenzuela MD   [facility] ([facility])    [address] [address] [facility] [id number] [phone]              Who to contact     If you have questions or need follow up information about today's clinic visit or your schedule please contact [facility] directly at [phone].  Normal or non-critical lab and imaging results will be communicated to you by MyChart, letter or phone within 4 business days after the clinic has received the results. If you do not hear from us within 7 days, please contact the clinic through [employer] or phone. If you have a critical or abnormal lab result, we will notify you by phone as soon as possible.  Submit refill requests through [employer] or call your pharmacy and they will forward the refill request to us. Please allow 3 business days for your refill to be completed.          Additional Information About Your Visit        [employer] Information     [employer] gives you secure access to your electronic health record. If you see a primary care provider, you can also send messages to your care team and make appointments. If you have questions, please call your  "primary care clinic.  If you do not have a primary care provider, please call [phone] and they will assist you.      upad is an electronic gateway that provides easy, online access to your medical records. With upad, you can request a clinic appointment, read your test results, renew a prescription or communicate with your care team.     To access your existing account, please contact your [facility] or call [phone] for assistance.        Care EveryWhere ID     This is your Care EveryWhere ID. This could be used by other organizations to access your [facility] medical records  [id number]        Your Vitals Were     Pulse Temperature Respirations Height Pulse Oximetry BMI (Body Mass Index)    62 97.7  F (36.5  C) 15 1.829 m (6' 0.01\") 98% 25.22 kg/m2       Blood Pressure from Last 3 Encounters:   10/16/17 146/75   09/27/17 131/54   09/18/17 128/58    Weight from Last 3 Encounters:   10/16/17 84.4 kg (186 lb)   09/15/17 82.1 kg (181 lb)   08/31/17 81.6 kg (180 lb)              Today, you had the following     No orders found for display         Today's Medication Changes          These changes are accurate as of: 10/16/17 10:54 AM.  If you have any questions, ask your nurse or doctor.               Start taking these medicines.        Dose/Directions    tamsulosin 0.4 MG capsule   Commonly known as:  FLOMAX   Used for:  Malignant neoplasm of prostate (H)   Started by:  Rafael Valenzuela MD        Dose:  0.4 mg   Take 1 capsule (0.4 mg) by mouth daily   Quantity:  30 capsule   Refills:  1            Where to get your medicines      These medications were sent to [facility] - [place], [place] - [address] [place] [address]  [address] [facility], [facility] [address]    Hours:  Mon-Fri 8:30am-5:00pm Toll Free [phone] Phone:  [phone]     tamsulosin 0.4 MG capsule                Primary Care Provider Office Phone # Fax #    Reginaldo Muir MD " [phone]       [address] [facility]  [place] [address]        Equal Access to Services     YESENIADAVIDSON DEANA : Hadii nahid martins tomas Rodriguez, watimada luqhay, qakrystalta kaalmada kishan, naty russo. So [facility] [phone].    ATENCIÓN: Si habla español, tiene a roberts disposición servicios gratuitos de asistencia lingüística. Llame al [phone].    We comply with applicable federal civil rights laws and [place] laws. We do not discriminate on the basis of race, color, national origin, age, disability, sex, sexual orientation, or gender identity.            Thank you!     Thank you for choosing [facility]  for your care. Our goal is always to provide you with excellent care. Hearing back from our patients is one way we can continue to improve our services. Please take a few minutes to complete the written survey that you may receive in the mail after your visit with us. Thank you!             Your Updated Medication List - Protect others around you: Learn how to safely use, store and throw away your medicines at www.disposemymeds.org.          This list is accurate as of: 10/16/17 10:54 AM.  Always use your most recent med list.                   Brand Name Dispense Instructions for use Diagnosis    * abiraterone 250 MG tablet    ZYTIGA    120 tablet    Take 4 tablets (1,000 mg) by mouth daily    Malignant neoplasm of prostate (H)       * abiraterone 250 MG tablet    ZYTIGA    120 tablet    Take 4 tablets (1,000 mg) by mouth daily for 30 doses Take on empty stomach.    Bone metastasis (H), Malignant neoplasm of prostate (H)       bicalutamide 50 MG tablet    CASODEX    90 tablet    Take 1 tablet (50 mg) by mouth daily    Malignant neoplasm of prostate (H)       CALCIUM 600 PO           CEPHALEXIN PO           [facility] vitamin  B12 1000 MCG Tabs   Generic drug:  cyanocobalamin     30 tablet    TAKE 1 TABLET BY MOUTH EVERY DAY    Low vitamin B12 level        gabapentin 300 MG capsule    NEURONTIN     Take 900 mg by mouth At Bedtime        LORazepam 0.5 MG tablet    ATIVAN    30 tablet    Take 1 tablet (0.5 mg) by mouth every 4 hours as needed (Anxiety, Nausea/Vomiting or Sleep)    Bone metastasis (H), Malignant neoplasm of prostate (H)       metoprolol 25 MG 24 hr tablet    TOPROL-XL    90 tablet    TAKE 1 TABLET (25 MG) BY MOUTH DAILY    HTN (hypertension), benign       oxyCODONE 5 MG IR tablet    ROXICODONE    30 tablet    Take 1 tablet (5 mg) by mouth every 6 hours as needed for pain    Narcotic dependence, episodic use (H)       * predniSONE 5 MG tablet    DELTASONE    60 tablet    Take 1 tablet (5 mg) by mouth 2 times daily    Malignant neoplasm of prostate (H)       * predniSONE 5 MG tablet    DELTASONE    60 tablet    Take 1 tablet (5 mg) by mouth 2 times daily    Bone metastasis (H), Malignant neoplasm of prostate (H)       quinapril 40 MG Tab    ACCUPRIL    90 tablet    TAKE 1 TABLET (40 MG) BY MOUTH DAILY - NEEDS TO FOLLOW UP    HTN (hypertension), benign       sildenafil 100 MG tablet    VIAGRA    10 tablet    Take 1 tablet (100 mg) by mouth daily as needed for erectile dysfunction    ED (erectile dysfunction)       simvastatin 40 MG tablet    ZOCOR    45 tablet    TAKE 0.5 TABLETS (20 MG) BY MOUTH DAILY    Hyperlipidemia LDL goal <130       tamsulosin 0.4 MG capsule    FLOMAX    30 capsule    Take 1 capsule (0.4 mg) by mouth daily    Malignant neoplasm of prostate (H)       vitamin D 2000 UNITS tablet      Take 1 tablet by mouth daily        * Notice:  This list has 4 medication(s) that are the same as other medications prescribed for you. Read the directions carefully, and ask your doctor or other care provider to review them with you.

## 2017-10-24 ENCOUNTER — OFFICE VISIT (OUTPATIENT)
Dept: UROLOGY | Facility: CLINIC | Age: 82
End: 2017-10-24
Payer: COMMERCIAL

## 2017-10-24 VITALS — RESPIRATION RATE: 12 BRPM | HEART RATE: 72 BPM | SYSTOLIC BLOOD PRESSURE: 138 MMHG | DIASTOLIC BLOOD PRESSURE: 68 MMHG

## 2017-10-24 DIAGNOSIS — Z98.890 POSTOPERATIVE STATE: Primary | ICD-10-CM

## 2017-10-24 PROCEDURE — 99024 POSTOP FOLLOW-UP VISIT: CPT | Performed by: UROLOGY

## 2017-10-24 NOTE — MR AVS SNAPSHOT
After Visit Summary   10/24/2017    Dimitri Neves    MRN: [id number]           Patient Information     Date Of Birth          1935        Visit Information        Provider Department      10/24/2017 11:15 AM Senthil Taylor MD [facility]        Today's Diagnoses     Postoperative state    -  1       Follow-ups after your visit        Your next 10 appointments already scheduled     Nov 07, 2017 10:30 AM CST   Return Visit with Senthil Jhaveri MD   [facility] ([facility])    [address] [place] [address]   [phone]            Nov 27, 2017  9:00 AM CST   LAB with LAB ONC [facility] ([facility])    [address] [place] [id number] [phone]           Patient must bring picture ID. Patient should be prepared to give a urine specimen  Please do not eat 10-12 hours before your appointment if you are coming in fasting for labs on lipids, cholesterol, or glucose (sugar). Pregnant women should follow their Care Team instructions. Water with medications is okay. Do not drink coffee or other fluids. If you have concerns about taking  your medications, please ask at office or if scheduling via [email or web address], send a message by clicking on Secure Messaging, Message Your Care Team.            Nov 27, 2017  9:30 AM CST   Level O with [facility] [address] [place] [address])    [id number] [place] [phone] [phone]            Dec 11, 2017 11:00 AM CST   Return Visit with Senthil Jhaveri MD   [facility] ([facility])    [address] [place] [address]   [phone]            Dec 18, 2017  9:15 AM CST   LAB with LAB ONC [facility] ([facility])    [address] [address] [place] [address] [phone]           Patient must bring  picture ID. Patient should be prepared to give a urine specimen  Please do not eat 10-12 hours before your appointment if you are coming in fasting for labs on lipids, cholesterol, or glucose (sugar). Pregnant women should follow their Care Team instructions. Water with medications is okay. Do not drink coffee or other fluids. If you have concerns about taking  your medications, please ask at office or if scheduling via [employer], send a message by clicking on Secure Messaging, Message Your Care Team.            Dec 18, 2017 10:00 AM CST   Return Visit with Rafael Valenzuela MD   [facility] ([facility])    [address] [address] [place] [address] [phone]              Who to contact     If you have questions or need follow up information about today's clinic visit or your schedule please contact [facility] directly at [phone].  Normal or non-critical lab and imaging results will be communicated to you by [employer]hart, letter or phone within 4 business days after the clinic has received the results. If you do not hear from us within 7 days, please contact the clinic through [employer] or phone. If you have a critical or abnormal lab result, we will notify you by phone as soon as possible.  Submit refill requests through [employer] or call your pharmacy and they will forward the refill request to us. Please allow 3 business days for your refill to be completed.          Additional Information About Your Visit        [employer] Information     [employer] gives you secure access to your electronic health record. If you see a primary care provider, you can also send messages to your care team and make appointments. If you have questions, please call your primary care clinic.  If you do not have a primary care provider, please call [phone] and they will assist you.        Care EveryWhere ID     This is your Care EveryWhere ID. This could be used by other organizations  to access your [facility] medical records  [id number]        Your Vitals Were     Pulse Respirations                72 12           Blood Pressure from Last 3 Encounters:   10/24/17 138/68   10/16/17 146/75   09/27/17 131/54    Weight from Last 3 Encounters:   10/16/17 84.4 kg (186 lb)   09/15/17 82.1 kg (181 lb)   08/31/17 81.6 kg (180 lb)              Today, you had the following     No orders found for display         Today's Medication Changes          These changes are accurate as of: 10/24/17 11:34 AM.  If you have any questions, ask your nurse or doctor.               Stop taking these medicines if you haven't already. Please contact your care team if you have questions.     bicalutamide 50 MG tablet   Commonly known as:  CASODEX   Stopped by:  Senthil Taylor MD                    Primary Care Provider Office Phone # Fax #    Reginaldo Muir [phone] [address] [facility] [address]        Equal Access to Services     [facility]: Hadii aad ku hadasho Soomaali, waaxda luqadaha, qaybta kaalmada adeegyada, waxay idiin haykemal goldberg khmaxi willis . So [facility] [phone].    ATENCIÓN: Si habla español, tiene a roberts disposición servicios gratuitos de asistencia lingüística. Llame al [phone].    We comply with applicable federal civil rights laws and [place] laws. We do not discriminate on the basis of race, color, national origin, age, disability, sex, sexual orientation, or gender identity.            Thank you!     Thank you for choosing [facility]  for your care. Our goal is always to provide you with excellent care. Hearing back from our patients is one way we can continue to improve our services. Please take a few minutes to complete the written survey that you may receive in the mail after your visit with us. Thank you!             Your Updated Medication List - Protect others around you: Learn how to safely use, store and throw away your medicines at  www.disposemymeds.org.          This list is accurate as of: 10/24/17 11:34 AM.  Always use your most recent med list.                   Brand Name Dispense Instructions for use Diagnosis    * abiraterone 250 MG tablet    ZYTIGA    120 tablet    Take 4 tablets (1,000 mg) by mouth daily    Malignant neoplasm of prostate (H)       * abiraterone 250 MG tablet    ZYTIGA    120 tablet    Take 4 tablets (1,000 mg) by mouth daily for 30 doses Take on empty stomach.    Bone metastasis (H), Malignant neoplasm of prostate (H)       CALCIUM 600 PO           CEPHALEXIN PO           CVS vitamin  B12 1000 MCG Tabs   Generic drug:  cyanocobalamin     30 tablet    TAKE 1 TABLET BY MOUTH EVERY DAY    Low vitamin B12 level       gabapentin 300 MG capsule    NEURONTIN     Take 900 mg by mouth At Bedtime        LORazepam 0.5 MG tablet    ATIVAN    30 tablet    Take 1 tablet (0.5 mg) by mouth every 4 hours as needed (Anxiety, Nausea/Vomiting or Sleep)    Bone metastasis (H), Malignant neoplasm of prostate (H)       metoprolol 25 MG 24 hr tablet    TOPROL-XL    90 tablet    TAKE 1 TABLET (25 MG) BY MOUTH DAILY    HTN (hypertension), benign       oxyCODONE 5 MG IR tablet    ROXICODONE    30 tablet    Take 1 tablet (5 mg) by mouth every 6 hours as needed for pain    Narcotic dependence, episodic use (H)       * predniSONE 5 MG tablet    DELTASONE    60 tablet    Take 1 tablet (5 mg) by mouth 2 times daily    Malignant neoplasm of prostate (H)       * predniSONE 5 MG tablet    DELTASONE    60 tablet    Take 1 tablet (5 mg) by mouth 2 times daily    Bone metastasis (H), Malignant neoplasm of prostate (H)       quinapril 40 MG Tab    ACCUPRIL    90 tablet    TAKE 1 TABLET (40 MG) BY MOUTH DAILY - NEEDS TO FOLLOW UP    HTN (hypertension), benign       sildenafil 100 MG tablet    VIAGRA    10 tablet    Take 1 tablet (100 mg) by mouth daily as needed for erectile dysfunction    ED (erectile dysfunction)       simvastatin 40 MG tablet    ZOCOR     45 tablet    TAKE 0.5 TABLETS (20 MG) BY MOUTH DAILY    Hyperlipidemia LDL goal <130       tamsulosin 0.4 MG capsule    FLOMAX    30 capsule    Take 1 capsule (0.4 mg) by mouth daily    Malignant neoplasm of prostate (H)       vitamin D 2000 UNITS tablet      Take 1 tablet by mouth daily        * Notice:  This list has 4 medication(s) that are the same as other medications prescribed for you. Read the directions carefully, and ask your doctor or other care provider to review them with you.

## 2017-10-24 NOTE — NURSING NOTE
"Chief Complaint   Patient presents with     RECHECK       Initial /68 (BP Location: Right arm, Patient Position: Chair, Cuff Size: Adult Regular)  Pulse 72  Resp 12 Estimated body mass index is 25.22 kg/(m^2) as calculated from the following:    Height as of 10/16/17: 1.829 m (6' 0.01\").    Weight as of 10/16/17: 84.4 kg (186 lb).  Medication Reconciliation: complete   Pooja Fatima CMA      "

## 2017-10-24 NOTE — PROGRESS NOTES
Chief Complaint   Patient presents with     RECHECK       Dimitri Neves is a 82 year old male who presents today for follow up of   Chief Complaint   Patient presents with     RECHECK    f/u post orchiectomy.  He is doing well without any complaints.    Current Outpatient Prescriptions   Medication Sig Dispense Refill     tamsulosin (FLOMAX) 0.4 MG capsule Take 1 capsule (0.4 mg) by mouth daily 30 capsule 1     abiraterone (ZYTIGA) 250 MG tablet Take 4 tablets (1,000 mg) by mouth daily for 30 doses Take on empty stomach. 120 tablet 0     predniSONE (DELTASONE) 5 MG tablet Take 1 tablet (5 mg) by mouth 2 times daily 60 tablet 0     oxyCODONE (ROXICODONE) 5 MG IR tablet Take 1 tablet (5 mg) by mouth every 6 hours as needed for pain 30 tablet 0     simvastatin (ZOCOR) 40 MG tablet TAKE 0.5 TABLETS (20 MG) BY MOUTH DAILY 45 tablet 3     Calcium Carbonate (CALCIUM 600 PO)        [facility] VITAMIN  B12 1000 MCG TABS TAKE 1 TABLET BY MOUTH EVERY DAY 30 tablet 5     CEPHALEXIN PO        quinapril (ACCUPRIL) 40 MG Tab TAKE 1 TABLET (40 MG) BY MOUTH DAILY - NEEDS TO FOLLOW UP 90 tablet 0     LORazepam (ATIVAN) 0.5 MG tablet Take 1 tablet (0.5 mg) by mouth every 4 hours as needed (Anxiety, Nausea/Vomiting or Sleep) 30 tablet 2     abiraterone (ZYTIGA) 250 MG tablet Take 4 tablets (1,000 mg) by mouth daily 120 tablet 11     predniSONE (DELTASONE) 5 MG tablet Take 1 tablet (5 mg) by mouth 2 times daily 60 tablet 11     metoprolol (TOPROL-XL) 25 MG 24 hr tablet TAKE 1 TABLET (25 MG) BY MOUTH DAILY 90 tablet 0     gabapentin (NEURONTIN) 300 MG capsule Take 900 mg by mouth At Bedtime       Cholecalciferol (VITAMIN D) 2000 UNITS tablet Take 1 tablet by mouth daily       sildenafil (VIAGRA) 100 MG tablet Take 1 tablet (100 mg) by mouth daily as needed for erectile dysfunction (Patient not taking: Reported on 10/16/2017) 10 tablet 3     Allergies   Allergen Reactions     Morphine Sulfate GI Disturbance     vomiting     Vicodin  [Hydrocodone-Acetaminophen] Nausea and Vomiting      Past Medical History:   Diagnosis Date     Actinic keratosis      AK (actinic keratosis) 7/9/2012     Cataract cortical, senile right eye 4/17/2012     DDD (degenerative disc disease), lumbar 1979    s/p 4 back surgeries, spinal cord stimulator implant      Diverticulosis      ED (erectile dysfunction) 2009     GERD (gastroesophageal reflux disease) ~1980     HTN (hypertension), benign ~2000     Macular degeneration, dry, mild, ou 7/23/2016     Multiple lung nodules     Several basilar pulmonary nodules <5 mm     HUDSON (obstructive sleep apnea) 10/2005    on CPAP     Other abnormal glucose 01/14/2009     PE (pulmonary thromboembolism) (H) 1981    after back surgery      Pure hypercholesterolemia ~2000     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     Past Surgical History:   Procedure Laterality Date     ARTHROSCOPY KNEE RT/LT  ~1995    Right     C LUMBAR SPINE FUSION,ANTER APPRCH  8/2007    four times total, latest 8/07     CATARACT IOL, RT/LT       LASER YAG CAPSULOTOMY      both eyes     ORCHIECTOMY SCROTAL Bilateral 9/27/2017    Procedure: ORCHIECTOMY SCROTAL;  Bilateral orchiectomy scrotal approach;  Surgeon: Senthil Taylor MD;  Location: [facility]     PHACOEMULSIFICATION CLEAR CORNEA WITH STANDARD INTRAOCULAR LENS IMPLANT  6-18-12/7-30-12    right/left eye     ROTATOR CUFF REPAIR RT/LT  ~1995    right     Family History   Problem Relation Age of Onset     CANCER Father      Lung 64y     DIABETES Brother      Hypertension Brother      CANCER Mother      Liver     CEREBROVASCULAR DISEASE Mother      Eye Disorder Mother      Glaucoma Mother      CEREBROVASCULAR DISEASE Maternal Grandmother      C.A.D. No family hx of      Thyroid Disease No family hx of      Macular Degeneration No family hx of      Social History     Social History     Marital status:      Spouse name: Tatiana     Number of children: 2     Years of education: N/A     Occupational  History      Retired     Social History Main Topics     Smoking status: Former Smoker     Packs/day: 1.00     Years: 25.00     Types: Cigarettes     Quit date: 1/2/1976     Smokeless tobacco: Never Used      Comment: lives in smoke free household     Alcohol use 7.0 oz/week     14 Standard drinks or equivalent per week      Comment: 2-3 drinks every night     Drug use: No      Comment: tatoos- sterile     Sexual activity: Yes     Partners: Female     Other Topics Concern      Service Yes     [employer] x8 years     Blood Transfusions No     Caffeine Concern No     Hobby Hazards No     Sleep Concern No     Stress Concern No     Weight Concern Yes     Special Diet No     Back Care Yes     Exercise Yes     Seat Belt Yes     Self-Exams No     Parent/Sibling W/ Cabg, Mi Or Angioplasty Before 65f 55m? No     Social History Narrative       REVIEW OF SYSTEMS  =================  C: NEGATIVE for fever, chills, change in weight  I: NEGATIVE for worrisome rashes, moles or lesions  E/M: NEGATIVE for ear, mouth and throat problems  R: NEGATIVE for significant cough or SHORTNESS OF BREATH,   CV: NEGATIVE for chest pain, palpitations or peripheral edema  GI: NEGATIVE for nausea, abdominal pain, heartburn, or change in bowel habits  NEURO: NEGATIVE any motor/sensory changes  PSYCH: NEGATIVE for recent mood disorder    Physical Exam:  /68 (BP Location: Right arm, Patient Position: Chair, Cuff Size: Adult Regular)  Pulse 72  Resp 12   Patient is pleasant, in no acute distress, good general condition.  Lung: no evidence of respiratory distress    Abdomen: Soft, nondistended, non tender. No masses. No rebound or guarding.   Exam: incision well healed.  Skin: Warm and dry.  No redness.  Psych: normal mood and affect  Neuro: alert and oriented    Assessment/Plan:   (Z98.890) Postoperative state  (primary encounter diagnosis)  Comment:    Plan: f/u as needed.

## 2017-10-25 ENCOUNTER — TELEPHONE (OUTPATIENT)
Dept: NURSING | Facility: CLINIC | Age: 82
End: 2017-10-25

## 2017-10-25 NOTE — TELEPHONE ENCOUNTER
Spoke with patient and per him his oncologist Dr Valenzuela had patient stop his coumadin.  Patient states PCP is aware.  Will send to PCP to advise.  ? Remove from INR Clinic?  Is patient staying off of coumadin?         Disp Refills Start End ANSHU      warfarin (COUMADIN) 5 MG tablet (Discontinued) 70 tablet 0 6/7/2017 8/21/2017 No     Sig: Take 1 tablet (5 mg) on Sunday, Tuesday, Thursdays and 1/2 tablet (2.5 mg) all other days OR as directed by INR clinic     Class: E-Prescribe     Order: [id number]     E-Prescribing Status: Receipt confirmed by pharmacy (6/7/2017  3:43 PM CDT)       Printout Tracking      External Result Report       Medication Administration Instructions      Take 1 tablet (5 mg) on Sunday, Tuesday, Thursdays and 1/2 tablet (2.5 mg) all other days OR as directed by INR clinic       Pharmacy      [id number] - [place], [place] - [address] [address]       This Order Has Been Discontinued      Order Status By On Reason     Discontinued Rafael Valenzuela MD 8/21/17 1054 None

## 2017-10-26 NOTE — PROGRESS NOTES
SUBJECTIVE:   Dimitri Neves is a 82 year old male who presents to clinic today for the following health issues:    History of Pulmonary Embolism/SOB -- The INR nurse called him and was wondering what the plan was. He had a PE in 1979 which he was on Coumadin for about 6 months. He then had two pulmonary emboli last year in July. Patient states his oncologist, Dr. Bhavana Valenzuela, took him off of Coumadin [Warfarin]. Says he was told that the oncological medications he is on provides anticoagulation. He has been having some SOB which he relates is similar to when he had PE's in the past. Patient reports his SOB was onset about 3 months ago, right around when he stopped Coumadin. Denies chest tightness or pain. He is very focused and concerned that with cessation of his coumadin he has a recurrent pulmonary embolism right now    Problem list and histories reviewed & adjusted, as indicated.  Additional history: as documented    Patient Active Problem List   Diagnosis     HTN (Hypertension), Benign goal <140/90     Narcotic dependence, episodic use (H)     ED (erectile dysfunction)     HUDSON (obstructive sleep apnea)     PE (pulmonary thromboembolism) (H)     HYPERLIPIDEMIA LDL GOAL <130     Advanced directives, counseling/discussion     Abnormal glucose     AK (actinic keratosis)     SNHL (sensorineural hearing loss)     Endolymphatic hydrops     History of squamous cell carcinoma     Pseudophakia, Yag Caps, ou     Venous (peripheral) insufficiency     Malignant neoplasm of prostate (H)     Prostate cancer (H)     Dyspnea on exertion     Posterior vitreous detachment, bilateral     Iris nevus, ou     Dry eye syndrome, bilateral     Macular degeneration, dry, mild, ou     Cotton wool spots, od     Bone metastasis (H)     Past Surgical History:   Procedure Laterality Date     ARTHROSCOPY KNEE RT/LT  ~1995    Right     C LUMBAR SPINE FUSION,ANTER APPRCH  8/2007    four times total, latest 8/07     CATARACT IOL, RT/LT        LASER YAG CAPSULOTOMY      both eyes     ORCHIECTOMY SCROTAL Bilateral 9/27/2017    Procedure: ORCHIECTOMY SCROTAL;  Bilateral orchiectomy scrotal approach;  Surgeon: Senthil Taylor MD;  Location: [facility]     PHACOEMULSIFICATION CLEAR CORNEA WITH STANDARD INTRAOCULAR LENS IMPLANT  6-18-12/7-30-12    right/left eye     ROTATOR CUFF REPAIR RT/LT  ~1995    right       Social History   Substance Use Topics     Smoking status: Former Smoker     Packs/day: 1.00     Years: 25.00     Types: Cigarettes     Quit date: 1/2/1976     Smokeless tobacco: Never Used      Comment: lives in smoke free household     Alcohol use 7.0 oz/week     14 Standard drinks or equivalent per week      Comment: 2-3 drinks every night     Family History   Problem Relation Age of Onset     CANCER Father      Lung 64y     DIABETES Brother      Hypertension Brother      CANCER Mother      Liver     CEREBROVASCULAR DISEASE Mother      Eye Disorder Mother      Glaucoma Mother      CEREBROVASCULAR DISEASE Maternal Grandmother      C.A.D. No family hx of      Thyroid Disease No family hx of      Macular Degeneration No family hx of          Current Outpatient Prescriptions   Medication Sig Dispense Refill     tamsulosin (FLOMAX) 0.4 MG capsule Take 1 capsule (0.4 mg) by mouth daily 30 capsule 1     oxyCODONE (ROXICODONE) 5 MG IR tablet Take 1 tablet (5 mg) by mouth every 6 hours as needed for pain 30 tablet 0     simvastatin (ZOCOR) 40 MG tablet TAKE 0.5 TABLETS (20 MG) BY MOUTH DAILY 45 tablet 3     Calcium Carbonate (CALCIUM 600 PO)        [facility] VITAMIN  B12 1000 MCG TABS TAKE 1 TABLET BY MOUTH EVERY DAY 30 tablet 5     CEPHALEXIN PO        quinapril (ACCUPRIL) 40 MG Tab TAKE 1 TABLET (40 MG) BY MOUTH DAILY - NEEDS TO FOLLOW UP 90 tablet 0     LORazepam (ATIVAN) 0.5 MG tablet Take 1 tablet (0.5 mg) by mouth every 4 hours as needed (Anxiety, Nausea/Vomiting or Sleep) 30 tablet 2     abiraterone (ZYTIGA) 250 MG tablet Take 4 tablets (1,000 mg) by mouth  daily 120 tablet 11     predniSONE (DELTASONE) 5 MG tablet Take 1 tablet (5 mg) by mouth 2 times daily 60 tablet 11     metoprolol (TOPROL-XL) 25 MG 24 hr tablet TAKE 1 TABLET (25 MG) BY MOUTH DAILY 90 tablet 0     gabapentin (NEURONTIN) 300 MG capsule Take 900 mg by mouth At Bedtime       sildenafil (VIAGRA) 100 MG tablet Take 1 tablet (100 mg) by mouth daily as needed for erectile dysfunction 10 tablet 3     Cholecalciferol (VITAMIN D) 2000 UNITS tablet Take 1 tablet by mouth daily       Labs reviewed in EPIC      Reviewed and updated as needed this visit by clinical staff  Tobacco  Allergies  Meds  Med Hx  Surg Hx  Fam Hx  Soc Hx      Reviewed and updated as needed this visit by Provider         ROS:  Constitutional, HEENT, cardiovascular, pulmonary, GI, , musculoskeletal, neuro, skin, endocrine and psych systems are negative, except as otherwise noted.    This document serves as a record of the services and decisions personally performed and made by Reginaldo Muir MD. It was created on their behalf by Senthil Guardado, a trained medical scribe. The creation of this document is based the provider's statements to the medical scribe.  Senthil Guardado October 30, 2017 11:01 AM    OBJECTIVE:   /68  Pulse 69  Temp 97  F (36.1  C) (Oral)  Wt 83.9 kg (185 lb)  SpO2 99%  BMI 25.08 kg/m2  Body mass index is 25.08 kg/(m^2).  GENERAL: healthy, alert and no distress, answers all questions appropriately, appropriate grooming and affect, appears stated age   EYES: Eyes grossly normal to inspection, EOMI, conjunctivae and sclerae normal  RESP: lungs clear to auscultation - no rales, rhonchi or wheezes  CV: regular rate and rhythm, normal S1 S2, no S3 or S4, no murmur, click or rub, no peripheral edema and peripheral pulses strong  SKIN: no suspicious lesions or rashes to visible skin   NEURO: mentation intact and speech normal  PSYCH: mentation appears normal, affect normal/bright    Diagnostic Test  Results:  Results for orders placed or performed in visit on 10/16/17   CBC with platelets differential   Result Value Ref Range    WBC 4.8 4.0 - 11.0 10e9/L    RBC Count 3.87 (L) 4.4 - 5.9 10e12/L    Hemoglobin 12.1 (L) 13.3 - 17.7 g/dL    Hematocrit 36.7 (L) 40.0 - 53.0 %    MCV 95 78 - 100 fl    MCH 31.3 26.5 - 33.0 pg    MCHC 33.0 31.5 - 36.5 g/dL    RDW 15.7 (H) 10.0 - 15.0 %    Platelet Count 211 150 - 450 10e9/L    Diff Method Automated Method     % Neutrophils 53.9 %    % Lymphocytes 32.8 %    % Monocytes 11.2 %    % Eosinophils 1.9 %    % Basophils 0.2 %    Absolute Neutrophil 2.6 1.6 - 8.3 10e9/L    Absolute Lymphocytes 1.6 0.8 - 5.3 10e9/L    Absolute Monocytes 0.5 0.0 - 1.3 10e9/L    Absolute Eosinophils 0.1 0.0 - 0.7 10e9/L    Absolute Basophils 0.0 0.0 - 0.2 10e9/L   Comprehensive metabolic panel   Result Value Ref Range    Sodium 140 133 - 144 mmol/L    Potassium 3.9 3.4 - 5.3 mmol/L    Chloride 108 94 - 109 mmol/L    Carbon Dioxide 28 20 - 32 mmol/L    Anion Gap 4 3 - 14 mmol/L    Glucose 100 (H) 70 - 99 mg/dL    Urea Nitrogen 10 7 - 30 mg/dL    Creatinine 0.71 0.66 - 1.25 mg/dL    GFR Estimate >90 >60 mL/min/1.7m2    GFR Estimate If Black >90 >60 mL/min/1.7m2    Calcium 8.2 (L) 8.5 - 10.1 mg/dL    Bilirubin Total 0.5 0.2 - 1.3 mg/dL    Albumin 3.0 (L) 3.4 - 5.0 g/dL    Protein Total 6.5 (L) 6.8 - 8.8 g/dL    Alkaline Phosphatase 71 40 - 150 U/L    ALT 16 0 - 70 U/L    AST 19 0 - 45 U/L   PSA tumor marker   Result Value Ref Range    PSA 11.90 (H) 0 - 4 ug/L     ASSESSMENT/PLAN:     (R06.02) SOB (shortness of breath)  (primary encounter diagnosis)  Comment: his symptoms are rather nonspecific however given all the circumstances of his past medical history, pulmonary embolism is certainly high on the differential diagnosis and a CT pulmonary angiogram is an appropriate choice to exclude new pulmonary emboli  Plan: CT Chest Pulmonary Embolism w Contrast,         CANCELED: CT Chest w Contrast             (I26.99) PE (pulmonary thromboembolism) (H)  Comment: past medical history   Plan: CT Chest Pulmonary Embolism w Contrast,         CANCELED: CT Chest w Contrast              Patient Instructions   - I will discuss your case with Dr. Valenzuela and follow up with you.    - Follow up for a chest CT scan.    The information in this document, created by the medical scribe for me, accurately reflects the services I personally performed and the decisions made by me. I have reviewed and approved this document for accuracy.   MD Reginaldo Méndez MD  [facility]

## 2017-10-30 ENCOUNTER — OFFICE VISIT (OUTPATIENT)
Dept: INTERNAL MEDICINE | Facility: CLINIC | Age: 82
End: 2017-10-30
Payer: COMMERCIAL

## 2017-10-30 ENCOUNTER — ANTICOAGULATION THERAPY VISIT (OUTPATIENT)
Dept: NURSING | Facility: CLINIC | Age: 82
End: 2017-10-30

## 2017-10-30 ENCOUNTER — RADIANT APPOINTMENT (OUTPATIENT)
Dept: CT IMAGING | Facility: CLINIC | Age: 82
End: 2017-10-30
Attending: INTERNAL MEDICINE
Payer: COMMERCIAL

## 2017-10-30 VITALS
OXYGEN SATURATION: 99 % | DIASTOLIC BLOOD PRESSURE: 68 MMHG | SYSTOLIC BLOOD PRESSURE: 140 MMHG | BODY MASS INDEX: 25.08 KG/M2 | HEART RATE: 69 BPM | WEIGHT: 185 LBS | TEMPERATURE: 97 F

## 2017-10-30 DIAGNOSIS — I26.99 PE (PULMONARY THROMBOEMBOLISM) (H): ICD-10-CM

## 2017-10-30 DIAGNOSIS — R06.02 SOB (SHORTNESS OF BREATH): Primary | ICD-10-CM

## 2017-10-30 DIAGNOSIS — R06.02 SOB (SHORTNESS OF BREATH): ICD-10-CM

## 2017-10-30 PROCEDURE — 71260 CT THORAX DX C+: CPT | Mod: TC

## 2017-10-30 PROCEDURE — 99214 OFFICE O/P EST MOD 30 MIN: CPT | Performed by: INTERNAL MEDICINE

## 2017-10-30 RX ORDER — IOPAMIDOL 755 MG/ML
79 INJECTION, SOLUTION INTRAVASCULAR ONCE
Status: COMPLETED | OUTPATIENT
Start: 2017-10-30 | End: 2017-10-30

## 2017-10-30 RX ADMIN — IOPAMIDOL 69 ML: 755 INJECTION, SOLUTION INTRAVASCULAR at 12:56

## 2017-10-30 NOTE — PATIENT INSTRUCTIONS
- I will discuss your case with Dr. Valenzuela and follow up with you.    - Follow up for a chest CT scan.      [facility]    If you have any questions regarding to your visit please contact your care team:     Team Pink:   Clinic Hours Telephone Number   Internal Medicine:  Dr. Deborah Taylor, NP       7am-7pm  Monday - Thursday   7am-5pm  Fridays  [phone]  (Appointment scheduling available 24/7)    Questions about your visit?  Team Line  [phone]   Urgent Care - [place] - 11am-9pm Monday-Friday Saturday-Sunday- 9am-5pm   [place] - 5pm-9pm Monday-Friday Saturday-Sunday- 9am-5pm  [phone] - [place]   [phone] - [place]       What options do I have for visits at the clinic other than the traditional office visit?  To expand how we care for you, many of our providers are utilizing electronic visits (e-visits) and telephone visits, when medically appropriate, for interactions with their patients rather than a visit in the clinic.   We also offer nurse visits for many medical concerns. Just like any other service, we will bill your insurance company for this type of visit based on time spent on the phone with your provider. Not all insurance companies cover these visits. Please check with your medical insurance if this type of visit is covered. You will be responsible for any charges that are not paid by your insurance.      E-visits via [employer]:  generally incur a $35.00 fee.  Telephone visits:  Time spent on the phone: *charged based on time that is spent on the phone in increments of 10 minutes. Estimated cost:   5-10 mins $30.00   11-20 mins. $59.00   21-30 mins. $85.00   Use [employer] (secure email communication and access to your chart) to send your primary care provider a message or make an appointment. Ask someone on your Team how to sign up for [employer].    For a Price Quote for your services, please call our Consumer  [employer] Line at [phone].    As always, Thank you for trusting us with your health care needs!    Discharged by Anaid PERSAUD CMA ([facility])

## 2017-10-30 NOTE — MR AVS SNAPSHOT
After Visit Summary   10/30/2017    Dimitri Neves    MRN: [id number]           Patient Information     Date Of Birth          1935        Visit Information        Provider Department      10/30/2017 10:30 AM Reginaldo Muir MD [facility]        Today's Diagnoses     SOB (shortness of breath)    -  1    PE (pulmonary thromboembolism) (H)          Care Instructions    - I will discuss your case with Dr. Valenzuela and follow up with you.    - Follow up for a chest CT scan.      [facility]    If you have any questions regarding to your visit please contact your care team:     Team Pink:   Clinic Hours Telephone Number   Internal Medicine:  Dr. Deborah Taylor NP       7am-7pm  Monday - Thursday   7am-5pm  Fridays  [phone]  (Appointment scheduling available 24/7)    Questions about your visit?  Team Line  [phone]   Urgent Care - [place] and [place] - 11am-9pm Monday-Friday Saturday-Sunday- 9am-5pm   [place] - 5pm-9pm Monday-Friday Saturday-Sunday- 9am-5pm  [phone] - [place]   [phone] - [place]       What options do I have for visits at the clinic other than the traditional office visit?  To expand how we care for you, many of our providers are utilizing electronic visits (e-visits) and telephone visits, when medically appropriate, for interactions with their patients rather than a visit in the clinic.   We also offer nurse visits for many medical concerns. Just like any other service, we will bill your insurance company for this type of visit based on time spent on the phone with your provider. Not all insurance companies cover these visits. Please check with your medical insurance if this type of visit is covered. You will be responsible for any charges that are not paid by your insurance.      E-visits via [employer]:  generally incur a $35.00 fee.  Telephone visits:  Time spent on the phone: *charged  based on time that is spent on the phone in increments of 10 minutes. Estimated cost:   5-10 mins $30.00   11-20 mins. $59.00   21-30 mins. $85.00   Use Vi[employer] (secure email communication and access to your chart) to send your primary care provider a message or make an appointment. Ask someone on your Team how to sign up for PhaseRxt.    For a Price Quote for your services, please call our Consumer Price Line at [phone].    As always, Thank you for trusting us with your health care needs!    Discharged by Anaid PERSAUD CMA ([facility])            Follow-ups after your visit        Your next 10 appointments already scheduled     Nov 07, 2017 10:30 AM CST   Return Visit with Senthil Jhaveri MD   [facility]    [address] [place] [id number]   [phone]            Nov 27, 2017  9:00 AM CST   LAB with LAB ONC [facility])    [address] [place] [id number] [phone]           Patient must bring picture ID. Patient should be prepared to give a urine specimen  Please do not eat 10-12 hours before your appointment if you are coming in fasting for labs on lipids, cholesterol, or glucose (sugar). Pregnant women should follow their Care Team instructions. Water with medications is okay. Do not drink coffee or other fluids. If you have concerns about taking  your medications, please ask at office or if scheduling via [employer], send a message by clicking on Secure Messaging, Message Your Care Team.            Nov 27, 2017  9:30 AM CST   Level O with [address] ([facility])    [address] [address] [place] [id number] [phone]            Dec 11, 2017 11:00 AM CST   Return Visit with Senthil Jhaveri MD   [facility]    [address] [place] [id number] [phone]             Dec 18, 2017  9:15 AM CST   LAB with LAB ONC [facility] ([facility])    [address] [place] [id number] [phone]           Patient must bring picture ID. Patient should be prepared to give a urine specimen  Please do not eat 10-12 hours before your appointment if you are coming in fasting for labs on lipids, cholesterol, or glucose (sugar). Pregnant women should follow their Care Team instructions. Water with medications is okay. Do not drink coffee or other fluids. If you have concerns about taking  your medications, please ask at office or if scheduling via [employer], send a message by clicking on Secure Messaging, Message Your Care Team.            Dec 18, 2017 10:00 AM CST   Return Visit with Rafael Valenzuela MD   [facility] ([facility])    [address] [address] [place] [id number] [phone]              Future tests that were ordered for you today     Open Future Orders        Priority Expected Expires Ordered    CT Chest w Contrast STAT  10/30/2018 10/30/2017            Who to contact     If you have questions or need follow up information about today's clinic visit or your schedule please contact [facility] directly at [phone].  Normal or non-critical lab and imaging results will be communicated to you by MyChart, letter or phone within 4 business days after the clinic has received the results. If you do not hear from us within 7 days, please contact the clinic through MyChart or phone. If you have a critical or abnormal lab result, we will notify you by phone as soon as possible.  Submit refill requests through [employer] or call your pharmacy and they will forward the refill request to us. Please allow 3 business days for your refill to be completed.          Additional Information About Your Visit        [employer] Information     [employer] gives you secure access to your electronic  health record. If you see a primary care provider, you can also send messages to your care team and make appointments. If you have questions, please call your primary care clinic.  If you do not have a primary care provider, please call [phone] and they will assist you.        Care EveryWhere ID     This is your Care EveryWhere ID. This could be used by other organizations to access your [facility] medical records  [id number]        Your Vitals Were     Pulse Temperature Pulse Oximetry BMI (Body Mass Index)          69 97  F (36.1  C) (Oral) 99% 25.08 kg/m2         Blood Pressure from Last 3 Encounters:   10/30/17 140/68   10/24/17 138/68   10/16/17 146/75    Weight from Last 3 Encounters:   10/30/17 185 lb (83.9 kg)   10/16/17 186 lb (84.4 kg)   09/15/17 181 lb (82.1 kg)               Primary Care Provider Office Phone # Fax #    Reginaldo Muir [phone]       [address] [facility] [address]        Equal Access to Services     [facility]: Hadii aad ku hadasho Soomaali, waaxda luqadaha, qaybta kaalmada adegrahamyalester, naty willis . So [facility] [place] [phone].    ATENCIÓN: Si habla español, tiene a roberts disposición servicios gratuitos de asistencia lingüística. Kelsey al [phone].    We comply with applicable federal civil rights laws and [place] laws. We do not discriminate on the basis of race, color, national origin, age, disability, sex, sexual orientation, or gender identity.            Thank you!     Thank you for choosing [facility]  for your care. Our goal is always to provide you with excellent care. Hearing back from our patients is one way we can continue to improve our services. Please take a few minutes to complete the written survey that you may receive in the mail after your visit with us. Thank you!             Your Updated Medication List - Protect others around you: Learn how to safely use, store and throw away your medicines at  www.disposemymeds.org.          This list is accurate as of: 10/30/17 11:11 AM.  Always use your most recent med list.                   Brand Name Dispense Instructions for use Diagnosis    abiraterone 250 MG tablet    ZYTIGA    120 tablet    Take 4 tablets (1,000 mg) by mouth daily    Malignant neoplasm of prostate (H)       CALCIUM 600 PO           CEPHALEXIN PO           [facility] vitamin  B12 1000 MCG Tabs   Generic drug:  cyanocobalamin     30 tablet    TAKE 1 TABLET BY MOUTH EVERY DAY    Low vitamin B12 level       gabapentin 300 MG capsule    NEURONTIN     Take 900 mg by mouth At Bedtime        LORazepam 0.5 MG tablet    ATIVAN    30 tablet    Take 1 tablet (0.5 mg) by mouth every 4 hours as needed (Anxiety, Nausea/Vomiting or Sleep)    Bone metastasis (H), Malignant neoplasm of prostate (H)       metoprolol 25 MG 24 hr tablet    TOPROL-XL    90 tablet    TAKE 1 TABLET (25 MG) BY MOUTH DAILY    HTN (hypertension), benign       oxyCODONE 5 MG IR tablet    ROXICODONE    30 tablet    Take 1 tablet (5 mg) by mouth every 6 hours as needed for pain    Narcotic dependence, episodic use (H)       predniSONE 5 MG tablet    DELTASONE    60 tablet    Take 1 tablet (5 mg) by mouth 2 times daily    Malignant neoplasm of prostate (H)       quinapril 40 MG Tab    ACCUPRIL    90 tablet    TAKE 1 TABLET (40 MG) BY MOUTH DAILY - NEEDS TO FOLLOW UP    HTN (hypertension), benign       sildenafil 100 MG tablet    VIAGRA    10 tablet    Take 1 tablet (100 mg) by mouth daily as needed for erectile dysfunction    ED (erectile dysfunction)       simvastatin 40 MG tablet    ZOCOR    45 tablet    TAKE 0.5 TABLETS (20 MG) BY MOUTH DAILY    Hyperlipidemia LDL goal <130       tamsulosin 0.4 MG capsule    FLOMAX    30 capsule    Take 1 capsule (0.4 mg) by mouth daily    Malignant neoplasm of prostate (H)       vitamin D 2000 UNITS tablet      Take 1 tablet by mouth daily

## 2017-10-30 NOTE — MR AVS SNAPSHOT
Dimitri Neves   10/30/2017 1:15 PM   Anticoagulation Therapy Visit    Description:  82 year old male   Provider:  [facility]   Department:  Be Nurse           Contact Numbers     [facility]  Please call [phone] with any problems or questions regarding your therapy, or to cancel or reschedule your appointment.

## 2017-10-30 NOTE — NURSING NOTE
Patient came in to talk about coumadin therapy and give us an update on what is happening. He did have a Ct waiting for results and once  Results back that will determine POC.  Madonna Snow RN

## 2017-10-30 NOTE — NURSING NOTE
"Chief Complaint   Patient presents with     Recheck Medication     recheck coumadin, patient not taking coumadin right now     Breathing Problem     Patient having SOB right after being taking off coumadin, this is how blood clots started from previous problem       Initial /68  Pulse 69  Temp 97  F (36.1  C) (Oral)  Wt 185 lb (83.9 kg)  SpO2 99%  BMI 25.08 kg/m2 Estimated body mass index is 25.08 kg/(m^2) as calculated from the following:    Height as of 10/16/17: 6' 0.01\" (1.829 m).    Weight as of this encounter: 185 lb (83.9 kg).  Medication Reconciliation: complete   Anaid PERSAUD CMA ([facility])      "

## 2017-11-07 ENCOUNTER — OFFICE VISIT (OUTPATIENT)
Dept: DERMATOLOGY | Facility: CLINIC | Age: 82
End: 2017-11-07
Payer: COMMERCIAL

## 2017-11-07 ENCOUNTER — TELEPHONE (OUTPATIENT)
Dept: DERMATOLOGY | Facility: CLINIC | Age: 82
End: 2017-11-07

## 2017-11-07 VITALS — HEART RATE: 69 BPM | DIASTOLIC BLOOD PRESSURE: 73 MMHG | OXYGEN SATURATION: 98 % | SYSTOLIC BLOOD PRESSURE: 146 MMHG

## 2017-11-07 DIAGNOSIS — L81.4 LENTIGO: ICD-10-CM

## 2017-11-07 DIAGNOSIS — L82.1 SK (SEBORRHEIC KERATOSIS): ICD-10-CM

## 2017-11-07 DIAGNOSIS — Z85.828 HISTORY OF SKIN CANCER: Primary | ICD-10-CM

## 2017-11-07 DIAGNOSIS — C44.42 SQUAMOUS CELL CARCINOMA, SCALP/NECK: ICD-10-CM

## 2017-11-07 PROCEDURE — 17311 MOHS 1 STAGE H/N/HF/G: CPT | Performed by: DERMATOLOGY

## 2017-11-07 PROCEDURE — 99213 OFFICE O/P EST LOW 20 MIN: CPT | Mod: 25 | Performed by: DERMATOLOGY

## 2017-11-07 PROCEDURE — 11101 HC BIOPSY SKIN/SUBQ/MUC MEM, EACH ADDTL LESION: CPT | Performed by: DERMATOLOGY

## 2017-11-07 PROCEDURE — 11100 HC BIOPSY SKIN/SUBQ/MUC MEM, SINGLE LESION: CPT | Mod: 59 | Performed by: DERMATOLOGY

## 2017-11-07 PROCEDURE — 88331 PATH CONSLTJ SURG 1 BLK 1SPC: CPT | Mod: 59 | Performed by: DERMATOLOGY

## 2017-11-07 NOTE — LETTER
11/7/2017         RE: Dimitri Neves  [address]  [place] [address]        Dear Colleague,    Thank you for referring your patient, Dimitri Neves, to the [facility]. Please see a copy of my visit note below.    Dimitri Neves is a 82 year old year old male patient here today for growing spot on scalp.   .  Patient states this has been present for months.  Patient reports the following symptoms:  bleeding.  Patient reports the following previous treatments none.  Patient reports the following modifying factors none.  Associated symptoms: none.  Patient has no other skin complaints today.  Remainder of the HPI, Meds, PMH, Allergies, FH, and SH was reviewed in chart.    Pertinent Hx:   Non-melanoma skin cancer   Past Medical History:   Diagnosis Date     Actinic keratosis      AK (actinic keratosis) 7/9/2012     Cataract cortical, senile right eye 4/17/2012     DDD (degenerative disc disease), lumbar 1979    s/p 4 back surgeries, spinal cord stimulator implant      Diverticulosis      ED (erectile dysfunction) 2009     GERD (gastroesophageal reflux disease) ~1980     HTN (hypertension), benign ~2000     Macular degeneration, dry, mild, ou 7/23/2016     Multiple lung nodules     Several basilar pulmonary nodules <5 mm     HUDSON (obstructive sleep apnea) 10/2005    on CPAP     Other abnormal glucose 01/14/2009     PE (pulmonary thromboembolism) (H) 1981    after back surgery      Pure hypercholesterolemia ~2000     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       Past Surgical History:   Procedure Laterality Date     ARTHROSCOPY KNEE RT/LT  ~1995    Right     C LUMBAR SPINE FUSION,ANTER APPRCH  8/2007    four times total, latest 8/07     CATARACT IOL, RT/LT       LASER YAG CAPSULOTOMY      both eyes     ORCHIECTOMY SCROTAL Bilateral 9/27/2017    Procedure: ORCHIECTOMY SCROTAL;  Bilateral orchiectomy scrotal approach;  Surgeon: Senthil Taylor MD;  Location:  OR      PHACOEMULSIFICATION CLEAR CORNEA WITH STANDARD INTRAOCULAR LENS IMPLANT  6-18-12/7-30-12    right/left eye     ROTATOR CUFF REPAIR RT/LT  ~1995    right        Family History   Problem Relation Age of Onset     CANCER Father      Lung 64y     DIABETES Brother      Hypertension Brother      CANCER Mother      Liver     CEREBROVASCULAR DISEASE Mother      Eye Disorder Mother      Glaucoma Mother      CEREBROVASCULAR DISEASE Maternal Grandmother      C.A.D. No family hx of      Thyroid Disease No family hx of      Macular Degeneration No family hx of        Social History     Social History     Marital status:      Spouse name: Tatiana     Number of children: 2     Years of education: N/A     Occupational History      Retired     Social History Main Topics     Smoking status: Former Smoker     Packs/day: 1.00     Years: 25.00     Types: Cigarettes     Quit date: 1/2/1976     Smokeless tobacco: Never Used      Comment: lives in smoke free household     Alcohol use 7.0 oz/week     14 Standard drinks or equivalent per week      Comment: 2-3 drinks every night     Drug use: No      Comment: tatoos- sterile     Sexual activity: Yes     Partners: Female     Other Topics Concern      Service Yes     [employer] x8 years     Blood Transfusions No     Caffeine Concern No     Hobby Hazards No     Sleep Concern No     Stress Concern No     Weight Concern Yes     Special Diet No     Back Care Yes     Exercise Yes     Seat Belt Yes     Self-Exams No     Parent/Sibling W/ Cabg, Mi Or Angioplasty Before 65f 55m? No     Social History Narrative       Outpatient Encounter Prescriptions as of 11/7/2017   Medication Sig Dispense Refill     tamsulosin (FLOMAX) 0.4 MG capsule Take 1 capsule (0.4 mg) by mouth daily 30 capsule 1     oxyCODONE (ROXICODONE) 5 MG IR tablet Take 1 tablet (5 mg) by mouth every 6 hours as needed for pain 30 tablet 0     simvastatin (ZOCOR) 40 MG tablet TAKE 0.5 TABLETS (20 MG) BY MOUTH DAILY 45  tablet 3     Calcium Carbonate (CALCIUM 600 PO)        CVS VITAMIN  B12 1000 MCG TABS TAKE 1 TABLET BY MOUTH EVERY DAY 30 tablet 5     CEPHALEXIN PO        quinapril (ACCUPRIL) 40 MG Tab TAKE 1 TABLET (40 MG) BY MOUTH DAILY - NEEDS TO FOLLOW UP 90 tablet 0     LORazepam (ATIVAN) 0.5 MG tablet Take 1 tablet (0.5 mg) by mouth every 4 hours as needed (Anxiety, Nausea/Vomiting or Sleep) 30 tablet 2     abiraterone (ZYTIGA) 250 MG tablet Take 4 tablets (1,000 mg) by mouth daily 120 tablet 11     predniSONE (DELTASONE) 5 MG tablet Take 1 tablet (5 mg) by mouth 2 times daily 60 tablet 11     metoprolol (TOPROL-XL) 25 MG 24 hr tablet TAKE 1 TABLET (25 MG) BY MOUTH DAILY 90 tablet 0     gabapentin (NEURONTIN) 300 MG capsule Take 900 mg by mouth At Bedtime       sildenafil (VIAGRA) 100 MG tablet Take 1 tablet (100 mg) by mouth daily as needed for erectile dysfunction 10 tablet 3     Cholecalciferol (VITAMIN D) 2000 UNITS tablet Take 1 tablet by mouth daily       No facility-administered encounter medications on file as of 11/7/2017.              Review Of Systems  Skin: As above  Eyes: negative  Ears/Nose/Throat: negative  Respiratory: No shortness of breath, dyspnea on exertion, cough, or hemoptysis  Cardiovascular: negative  Gastrointestinal: negative  Genitourinary: negative  Musculoskeletal: negative  Neurologic: negative  Psychiatric: negative  Hematologic/Lymphatic/Immunologic: negative  Endocrine: negative      O:   NAD, WDWN, Alert & Oriented, Mood & Affect wnl, Vitals stable   Here today alone   /73  Pulse 69  SpO2 98%   General appearance normal   Vitals stable   Alert, oriented and in no acute distress      Following lymph nodes palpated: Occipital, Cervical, Supraclavicular no lad   L lat frontal scalp 1cm keratotic nodule    L frontal scalp ant 1.6cm red plaque   L frontal scalp post 9mm scaly papule    R frontal scalp ant 1.2cm red plaque   R frotnal scalp post 1cm keratotic nodule    Stuck on papules  and brown macules on trunk and ext         The remainder of expanded problem focused exam was unremarkable; the following areas were examined:  scalp/hair, conjunctiva/lids, face, neck, lips, chest, digits/nails, RUE, LUE.      Eyes: Conjunctivae/lids:Normal     ENT: Lips, buccal mucosa, tongue: normal    MSK:Normal    Cardiovascular: peripheral edema none    Pulm: Breathing Normal    Lymph Nodes: No Head and Neck Lymphadenopathy     Neuro/Psych: Orientation:Normal; Mood/Affect:Normal      MICRO:     L lat frontal scalp 1cm keratotic nodule :There is hyperkeratosis & parakeratosis of the epidermis, with full thickness epidermal involvement by atypical keratinocytes with rare pale vacuolated cells invading dermis.    L frontal scalp ant 1.6cm red plaque :There is hyperkeratosis & parakeratosis of the epidermis, with full thickness epidermal involvement by atypical keratinocytes with rare pale vacuolated cells invading dermis.    L frontal scalp post 9mm scaly papule  :There is hyperkeratosis & parakeratosis of the epidermis, with full thickness epidermal involvement by atypical keratinocytes with rare pale vacuolated cells invading dermis.    R frontal scalp ant 1.2cm red plaque :There is hyperkeratosis & parakeratosis of the epidermis, with full thickness epidermal involvement by atypical keratinocytes with rare pale vacuolated cells invading dermis.    R frotnal scalp post 1cm keratotic nodule :There is hyperkeratosis & parakeratosis of the epidermis, with full thickness epidermal involvement by atypical keratinocytes with rare pale vacuolated cells invading dermis.    A/P:  1. Hx of non-melanoma skin cancer, seborrheic keratosis, lentigo  2. R/o squamous cell carcinoma   TANGENTIAL BIOPSY IN HOUSE:  After consent, anesthesia with LEC and prep, tangential excision performed and dx above confirmed with frozen section histology.  No complications and routine wound care.  Patient told result     L lat frontal scalp  1cm keratotic nodule squamous cell carcinoma TREATED TODAY   L frontal scalp ant 1.6cm red plaque squamous cell carcinoma   L frontal scalp post 9mm scaly papule squamous cell carcinoma   R frontal scalp ant 1.2cm red plaque squamous cell carcinoma   R frotnal scalp post 1cm keratotic nodule squamous cell carcinoma     SCHEDULE TWO APPTS    BENIGN LESIONS DISCUSSED WITH PATIENT:  I discussed the specifics of tumor, prognosis, and genetics of benign lesions.  I explained that treatment of these lesions would be purely cosmetic and not medically neccessary.  I discussed with patient different removal options including excision, cautery and /or laser.      Nature and genetics of benign skin lesions dicussed with patient.  Signs and Symptoms of skin cancer discussed with patient.  Patient encouraged to perform monthly skin exams.  UV precautions reviewed with patient.  Skin care regimen reviewed with patient: Eliminate harsh soaps, i.e. Dial, zest, irsih spring; Mild soaps such as Cetaphil or Dove sensitive skin, avoid hot or cold showers, aggressive use of emollients including vanicream, cetaphil or cerave discussed with patient.    Risks of non-melanoma skin cancer discussed with patient   Return to clinic 6 months    NICATINAMIDE discussed with patient     PROCEDURE NOTE  L lat frontal scalp squamous cell carcinoma   MOHS:   Location    After PGACAC discussed with patient, decision for Mohs surgery was made. Indication for Mohs was Location. Patient confirmed the site with Dr. Jhaveri.  After anesthesia with LEC, the tumor was excised using standard Mohs technique in 1 stages(s).  CLEAR MARGINS OBTAINED and Final defect size was 1.4 cm.       REPAIR SECOND INTENT: We discussed the options for wound management in full with the patient including risks/benefits/possible outcomes. Decision made to allow the wound to heal by second intention. EBL minimal; complications none; wound care routine.  The patient was discharged  in good condition and will return in one month or prn for wound evaluation.        Again, thank you for allowing me to participate in the care of your patient.        Sincerely,        Senthil Jhaveri MD

## 2017-11-07 NOTE — PATIENT INSTRUCTIONS
Open Wound Care     for __Scalp x 5__      ? No strenuous activity for 48 hours    ? Take Tylenol as needed for discomfort.                                                .         ? Do not drink alcoholic beverages for 48 hours.    ? Keep the pressure bandage in place for 24 hours. If the bandage becomes blood tinged or loose, reinforce it with gauze and tape.        (Refer to the reverse side of this page for management of bleeding).    ? Remove bandage in 24 hours and begin wound care as follows:     1. Clean area with tap water using a Q tip or gauze pad, (shower / bathe normally)  2. Dry wound with Q tip or gauze pad  3. Apply Aquaphor, Vaseline, Polysporin or Bacitracin Ointment with a Q tip    Do NOT use Neosporin Ointment *  4. Cover the wound with a band-aid or nonstick gauze pad and paper tape.  5. Repeat wound care once a day until wound is completely healed.    It is an old wives tale that a wound heals better when it is exposed to air and allowed to dry out. The wound will heal faster with a better cosmetic result if it is kept moist with ointment and covered with a bandage.  Do not let the wound dry out.      Supplies Needed:                Qtips or gauze pads                Polysporin or Bacitracin Ointment                Bandaids or nonstick gauze pads and paper tape    Wound care kits and brown paper tape are available for purchase at   the pharmacy.    BLEEDIN. Use tightly rolled up gauze or cloth to apply direct pressure over the bandage for 20   minutes.  2. Reapply pressure for an additional 20 minutes if necessary  3. Call the office or go to the nearest emergency room if pressure fails to stop the bleeding.  4. Use additional gauze and tape to maintain pressure once the bleeding has stopped.  5. Begin wound care 24 hours after surgery as directed.                  WOUND HEALING    1. One week after surgery a pink / red halo will form around the outside of the wound.   This is new  skin.  2. The center of the wound will appear yellowish white and produce some drainage.  3. The pink halo will slowly migrate in toward the center of the wound until the wound is covered with new shiny pink skin.  4. There will be no more drainage when the wound is completely healed.  5. It will take six months to one year for the redness to fade.  6. The scar may be itchy, tight and sensitive to extreme temperatures for a year after the surgery.  7. Massaging the area several times a day for several minutes after the wound is completely healed will help the scar soften and normalize faster. Begin massage only after healing is complete.      In case of emergency call: Dr Jhaveri: [phone]     [facility]: [phone]    [facility]: [phone]

## 2017-11-07 NOTE — NURSING NOTE
Chief Complaint   Patient presents with     Derm Problem     spots on head       Vitals:    11/07/17 1022   BP: 146/73   Pulse: 69   SpO2: 98%     Wt Readings from Last 1 Encounters:   10/30/17 83.9 kg (185 lb)       Nishi Calderon LPN.................11/7/2017

## 2017-11-07 NOTE — MR AVS SNAPSHOT
After Visit Summary   2017    Dimitri Neves    MRN: [id number]           Patient Information     Date Of Birth          1935        Visit Information        Provider Department      2017 10:30 AM Senthil Jhaveri MD [facility]        Care Instructions    Open Wound Care     for __Scalp x 5__      ? No strenuous activity for 48 hours    ? Take Tylenol as needed for discomfort.                                                .         ? Do not drink alcoholic beverages for 48 hours.    ? Keep the pressure bandage in place for 24 hours. If the bandage becomes blood tinged or loose, reinforce it with gauze and tape.        (Refer to the reverse side of this page for management of bleeding).    ? Remove bandage in 24 hours and begin wound care as follows:     1. Clean area with tap water using a Q tip or gauze pad, (shower / bathe normally)  2. Dry wound with Q tip or gauze pad  3. Apply Aquaphor, Vaseline, Polysporin or Bacitracin Ointment with a Q tip    Do NOT use Neosporin Ointment *  4. Cover the wound with a band-aid or nonstick gauze pad and paper tape.  5. Repeat wound care once a day until wound is completely healed.    It is an old wives tale that a wound heals better when it is exposed to air and allowed to dry out. The wound will heal faster with a better cosmetic result if it is kept moist with ointment and covered with a bandage.  Do not let the wound dry out.      Supplies Needed:                Qtips or gauze pads                Polysporin or Bacitracin Ointment                Bandaids or nonstick gauze pads and paper tape    Wound care kits and brown paper tape are available for purchase at   the pharmacy.    BLEEDIN. Use tightly rolled up gauze or cloth to apply direct pressure over the bandage for 20   minutes.  2. Reapply pressure for an additional 20 minutes if necessary  3. Call the office or go to the nearest emergency room if pressure fails  to stop the bleeding.  4. Use additional gauze and tape to maintain pressure once the bleeding has stopped.  5. Begin wound care 24 hours after surgery as directed.                  WOUND HEALING    1. One week after surgery a pink / red halo will form around the outside of the wound.   This is new skin.  2. The center of the wound will appear yellowish white and produce some drainage.  3. The pink halo will slowly migrate in toward the center of the wound until the wound is covered with new shiny pink skin.  4. There will be no more drainage when the wound is completely healed.  5. It will take six months to one year for the redness to fade.  6. The scar may be itchy, tight and sensitive to extreme temperatures for a year after the surgery.  7. Massaging the area several times a day for several minutes after the wound is completely healed will help the scar soften and normalize faster. Begin massage only after healing is complete.      In case of emergency call: Dr Jhaveri: [phone]     [facility]: [phone]    [facility]: [phone]            Follow-ups after your visit        Your next 10 appointments already scheduled     2017  9:00 AM CST   LAB with LAB ONC [facility])    [address] [place] [address] [phone]           Please do not eat 10-12 hours before your appointment if you are coming in fasting for labs on lipids, cholesterol, or glucose (sugar). This does not apply to pregnant women. Water, hot tea and black coffee (with nothing added) are okay. Do not drink other fluids, diet soda or chew gum.            2017  9:30 AM CST   Level O with BAY 7 [facility] ([facility])    [address] [place] [address] [phone]            Dec 11, 2017 11:00 AM CST   Return Visit with Senthil Jhaveri MD   [facility]  [place] ([facility])    [address]  [facility] [address]   [phone]            Dec 18, 2017  9:15 AM CST   LAB with LAB ONC [facility] ([facility])    [address] [facility] [address] [phone]           Please do not eat 10-12 hours before your appointment if you are coming in fasting for labs on lipids, cholesterol, or glucose (sugar). This does not apply to pregnant women. Water, hot tea and black coffee (with nothing added) are okay. Do not drink other fluids, diet soda or chew gum.            Dec 18, 2017 10:00 AM CST   Return Visit with Rafael Valenzuela MD   [facility]    [address] [facility] [address] [phone]              Who to contact     If you have questions or need follow up information about today's clinic visit or your schedule please contact [facility] directly at [phone].  Normal or non-critical lab and imaging results will be communicated to you by MyChart, letter or phone within 4 business days after the clinic has received the results. If you do not hear from us within 7 days, please contact the clinic through [employer] or phone. If you have a critical or abnormal lab result, we will notify you by phone as soon as possible.  Submit refill requests through [employer] or call your pharmacy and they will forward the refill request to us. Please allow 3 business days for your refill to be completed.          Additional Information About Your Visit        MyChart Information     [employer] gives you secure access to your electronic health record. If you see a primary care provider, you can also send messages to your care team and make appointments. If you have questions, please call your primary care clinic.  If you do not have a primary care provider, please call [phone] and they will assist you.        Care EveryWhere  ID     This is your Care EveryWhere ID. This could be used by other organizations to access your [place] medical records  [id number]        Your Vitals Were     Pulse Pulse Oximetry                69 98%           Blood Pressure from Last 3 Encounters:   11/07/17 146/73   10/30/17 140/68   10/24/17 138/68    Weight from Last 3 Encounters:   10/30/17 83.9 kg (185 lb)   10/16/17 84.4 kg (186 lb)   09/15/17 82.1 kg (181 lb)              Today, you had the following     No orders found for display       Primary Care Provider Office Phone # Fax #    Reginaldo Muir [phone]       [address] [facility]  [facility] [address]        Equal Access to Services     CARLITA LEBLANC : Hadii nahid martins hadasho Soomaali, waaxda luqadaha, qaybta kaalmada adeegyada, naty willis . So [facility] [phone].    ATENCIÓN: Si habla español, tiene a roberts disposición servicios gratuitos de asistencia lingüística. [facility] [phone].    We comply with applicable federal civil rights laws and [place] laws. We do not discriminate on the basis of race, color, national origin, age, disability, sex, sexual orientation, or gender identity.            Thank you!     Thank you for choosing [facility]  for your care. Our goal is always to provide you with excellent care. Hearing back from our patients is one way we can continue to improve our services. Please take a few minutes to complete the written survey that you may receive in the mail after your visit with us. Thank you!             Your Updated Medication List - Protect others around you: Learn how to safely use, store and throw away your medicines at www.disposemymeds.org.          This list is accurate as of: 11/7/17 10:52 AM.  Always use your most recent med list.                   Brand Name Dispense Instructions for use Diagnosis    abiraterone 250 MG tablet    ZYTIGA    120 tablet    Take 4 tablets (1,000 mg) by mouth daily     Malignant neoplasm of prostate (H)       CALCIUM 600 PO           CEPHALEXIN PO           [facility] vitamin  B12 1000 MCG Tabs   Generic drug:  cyanocobalamin     30 tablet    TAKE 1 TABLET BY MOUTH EVERY DAY    Low vitamin B12 level       gabapentin 300 MG capsule    NEURONTIN     Take 900 mg by mouth At Bedtime        LORazepam 0.5 MG tablet    ATIVAN    30 tablet    Take 1 tablet (0.5 mg) by mouth every 4 hours as needed (Anxiety, Nausea/Vomiting or Sleep)    Bone metastasis (H), Malignant neoplasm of prostate (H)       metoprolol 25 MG 24 hr tablet    TOPROL-XL    90 tablet    TAKE 1 TABLET (25 MG) BY MOUTH DAILY    HTN (hypertension), benign       oxyCODONE IR 5 MG tablet    ROXICODONE    30 tablet    Take 1 tablet (5 mg) by mouth every 6 hours as needed for pain    Narcotic dependence, episodic use (H)       predniSONE 5 MG tablet    DELTASONE    60 tablet    Take 1 tablet (5 mg) by mouth 2 times daily    Malignant neoplasm of prostate (H)       quinapril 40 MG Tab    ACCUPRIL    90 tablet    TAKE 1 TABLET (40 MG) BY MOUTH DAILY - NEEDS TO FOLLOW UP    HTN (hypertension), benign       sildenafil 100 MG tablet    VIAGRA    10 tablet    Take 1 tablet (100 mg) by mouth daily as needed for erectile dysfunction    ED (erectile dysfunction)       simvastatin 40 MG tablet    ZOCOR    45 tablet    TAKE 0.5 TABLETS (20 MG) BY MOUTH DAILY    Hyperlipidemia LDL goal <130       tamsulosin 0.4 MG capsule    FLOMAX    30 capsule    Take 1 capsule (0.4 mg) by mouth daily    Malignant neoplasm of prostate (H)       vitamin D 2000 UNITS tablet      Take 1 tablet by mouth daily

## 2017-11-07 NOTE — TELEPHONE ENCOUNTER
----- Message from Senthil Jhaveri MD sent at 11/7/2017 12:41 PM CST -----  L lat frontal scalp 1cm keratotic nodule squamous cell carcinoma TREATED TODAY   L frontal scalp ant 1.6cm red plaque squamous cell carcinoma   L frontal scalp post 9mm scaly papule squamous cell carcinoma   R frontal scalp ant 1.2cm red plaque squamous cell carcinoma   R frotnal scalp post 1cm keratotic nodule squamous cell carcinoma     SCHEDULE TWO APPTS

## 2017-11-07 NOTE — LETTER
[facility] [place]  [address]  [facility] [address]  Phone: [phone]    November 8, 2017    Dimitri Neves                                                                                                                   [address] [place] [address]            Dear Mr. Neves,    You are scheduled for Mohs Surgery on:    Wednesday December 13 th at 7:45 am.   Wednesday December 20 th at 7:45 am.    Please check in at Dermatology Clinic.   (2nd Floor, last  Clinic on right up staircase or elevator -past OB/GYN clinic)    You don't need to arrive more than 5-10 minutes prior to your appointment time.     Be sure to eat a good breakfast and bathe and wash your hair prior to Surgery.    If you are taking any anti-coagulants that are prescribed by your Doctor (such as Coumadin/warfarin, Plavix, Aspirin, Ibuprofen), please continue taking them.     However, If you are taking anti-coagulants over the counter without  a Doctor's order for a Medical condition, please discontinue them 10 days prior to Surgery.      Please wear loose comfortable clothing as it could possibly be 4-6 hours until your surgery is completed depending upon how many layers of tissue need to be removed.     Wi-fi access is available.     Thank you,      Senthil Jhaveri MD/ Marlin French RN

## 2017-11-07 NOTE — PROGRESS NOTES
Dimitri Neves is a 82 year old year old male patient here today for growing spot on scalp.   .  Patient states this has been present for months.  Patient reports the following symptoms:  bleeding.  Patient reports the following previous treatments none.  Patient reports the following modifying factors none.  Associated symptoms: none.  Patient has no other skin complaints today.  Remainder of the HPI, Meds, PMH, Allergies, FH, and SH was reviewed in chart.    Pertinent Hx:   Non-melanoma skin cancer   Past Medical History:   Diagnosis Date     Actinic keratosis      AK (actinic keratosis) 7/9/2012     Cataract cortical, senile right eye 4/17/2012     DDD (degenerative disc disease), lumbar 1979    s/p 4 back surgeries, spinal cord stimulator implant      Diverticulosis      ED (erectile dysfunction) 2009     GERD (gastroesophageal reflux disease) ~1980     HTN (hypertension), benign ~2000     Macular degeneration, dry, mild, ou 7/23/2016     Multiple lung nodules     Several basilar pulmonary nodules <5 mm     HUDSON (obstructive sleep apnea) 10/2005    on CPAP     Other abnormal glucose 01/14/2009     PE (pulmonary thromboembolism) (H) 1981    after back surgery      Pure hypercholesterolemia ~2000     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       Past Surgical History:   Procedure Laterality Date     ARTHROSCOPY KNEE RT/LT  ~1995    Right     C LUMBAR SPINE FUSION,ANTER APPRCH  8/2007    four times total, latest 8/07     CATARACT IOL, RT/LT       LASER YAG CAPSULOTOMY      both eyes     ORCHIECTOMY SCROTAL Bilateral 9/27/2017    Procedure: ORCHIECTOMY SCROTAL;  Bilateral orchiectomy scrotal approach;  Surgeon: Senthil Taylor MD;  Location: [facility] OR     PHACOEMULSIFICATION CLEAR CORNEA WITH STANDARD INTRAOCULAR LENS IMPLANT  6-18-12/7-30-12    right/left eye     ROTATOR CUFF REPAIR RT/LT  ~1995    right        Family History   Problem Relation Age of Onset     CANCER Father      Lung 64y     DIABETES  Brother      Hypertension Brother      CANCER Mother      Liver     CEREBROVASCULAR DISEASE Mother      Eye Disorder Mother      Glaucoma Mother      CEREBROVASCULAR DISEASE Maternal Grandmother      C.A.D. No family hx of      Thyroid Disease No family hx of      Macular Degeneration No family hx of        Social History     Social History     Marital status:      Spouse name: Tatiana     Number of children: 2     Years of education: N/A     Occupational History      Retired     Social History Main Topics     Smoking status: Former Smoker     Packs/day: 1.00     Years: 25.00     Types: Cigarettes     Quit date: 1/2/1976     Smokeless tobacco: Never Used      Comment: lives in smoke free household     Alcohol use 7.0 oz/week     14 Standard drinks or equivalent per week      Comment: 2-3 drinks every night     Drug use: No      Comment: tatoos- sterile     Sexual activity: Yes     Partners: Female     Other Topics Concern      Service Yes     [employer] x8 years     Blood Transfusions No     Caffeine Concern No     Hobby Hazards No     Sleep Concern No     Stress Concern No     Weight Concern Yes     Special Diet No     Back Care Yes     Exercise Yes     Seat Belt Yes     Self-Exams No     Parent/Sibling W/ Cabg, Mi Or Angioplasty Before 65f 55m? No     Social History Narrative       Outpatient Encounter Prescriptions as of 11/7/2017   Medication Sig Dispense Refill     tamsulosin (FLOMAX) 0.4 MG capsule Take 1 capsule (0.4 mg) by mouth daily 30 capsule 1     oxyCODONE (ROXICODONE) 5 MG IR tablet Take 1 tablet (5 mg) by mouth every 6 hours as needed for pain 30 tablet 0     simvastatin (ZOCOR) 40 MG tablet TAKE 0.5 TABLETS (20 MG) BY MOUTH DAILY 45 tablet 3     Calcium Carbonate (CALCIUM 600 PO)        [facility]S VITAMIN  B12 1000 MCG TABS TAKE 1 TABLET BY MOUTH EVERY DAY 30 tablet 5     CEPHALEXIN PO        quinapril (ACCUPRIL) 40 MG Tab TAKE 1 TABLET (40 MG) BY MOUTH DAILY - NEEDS TO FOLLOW UP 90 tablet  0     LORazepam (ATIVAN) 0.5 MG tablet Take 1 tablet (0.5 mg) by mouth every 4 hours as needed (Anxiety, Nausea/Vomiting or Sleep) 30 tablet 2     abiraterone (ZYTIGA) 250 MG tablet Take 4 tablets (1,000 mg) by mouth daily 120 tablet 11     predniSONE (DELTASONE) 5 MG tablet Take 1 tablet (5 mg) by mouth 2 times daily 60 tablet 11     metoprolol (TOPROL-XL) 25 MG 24 hr tablet TAKE 1 TABLET (25 MG) BY MOUTH DAILY 90 tablet 0     gabapentin (NEURONTIN) 300 MG capsule Take 900 mg by mouth At Bedtime       sildenafil (VIAGRA) 100 MG tablet Take 1 tablet (100 mg) by mouth daily as needed for erectile dysfunction 10 tablet 3     Cholecalciferol (VITAMIN D) 2000 UNITS tablet Take 1 tablet by mouth daily       No facility-administered encounter medications on file as of 11/7/2017.              Review Of Systems  Skin: As above  Eyes: negative  Ears/Nose/Throat: negative  Respiratory: No shortness of breath, dyspnea on exertion, cough, or hemoptysis  Cardiovascular: negative  Gastrointestinal: negative  Genitourinary: negative  Musculoskeletal: negative  Neurologic: negative  Psychiatric: negative  Hematologic/Lymphatic/Immunologic: negative  Endocrine: negative      O:   NAD, WDWN, Alert & Oriented, Mood & Affect wnl, Vitals stable   Here today alone   /73  Pulse 69  SpO2 98%   General appearance normal   Vitals stable   Alert, oriented and in no acute distress      Following lymph nodes palpated: Occipital, Cervical, Supraclavicular no lad   L lat frontal scalp 1cm keratotic nodule    L frontal scalp ant 1.6cm red plaque   L frontal scalp post 9mm scaly papule    R frontal scalp ant 1.2cm red plaque   R frotnal scalp post 1cm keratotic nodule    Stuck on papules and brown macules on trunk and ext         The remainder of expanded problem focused exam was unremarkable; the following areas were examined:  scalp/hair, conjunctiva/lids, face, neck, lips, chest, digits/nails, RUE, LUE.      Eyes:  Conjunctivae/lids:Normal     ENT: Lips, buccal mucosa, tongue: normal    MSK:Normal    Cardiovascular: peripheral edema none    Pulm: Breathing Normal    Lymph Nodes: No Head and Neck Lymphadenopathy     Neuro/Psych: Orientation:Normal; Mood/Affect:Normal      MICRO:     L lat frontal scalp 1cm keratotic nodule :There is hyperkeratosis & parakeratosis of the epidermis, with full thickness epidermal involvement by atypical keratinocytes with rare pale vacuolated cells invading dermis.    L frontal scalp ant 1.6cm red plaque :There is hyperkeratosis & parakeratosis of the epidermis, with full thickness epidermal involvement by atypical keratinocytes with rare pale vacuolated cells invading dermis.    L frontal scalp post 9mm scaly papule  :There is hyperkeratosis & parakeratosis of the epidermis, with full thickness epidermal involvement by atypical keratinocytes with rare pale vacuolated cells invading dermis.    R frontal scalp ant 1.2cm red plaque :There is hyperkeratosis & parakeratosis of the epidermis, with full thickness epidermal involvement by atypical keratinocytes with rare pale vacuolated cells invading dermis.    R frotnal scalp post 1cm keratotic nodule :There is hyperkeratosis & parakeratosis of the epidermis, with full thickness epidermal involvement by atypical keratinocytes with rare pale vacuolated cells invading dermis.    A/P:  1. Hx of non-melanoma skin cancer, seborrheic keratosis, lentigo  2. R/o squamous cell carcinoma   TANGENTIAL BIOPSY IN HOUSE:  After consent, anesthesia with LEC and prep, tangential excision performed and dx above confirmed with frozen section histology.  No complications and routine wound care.  Patient told result     L lat frontal scalp 1cm keratotic nodule squamous cell carcinoma TREATED TODAY   L frontal scalp ant 1.6cm red plaque squamous cell carcinoma   L frontal scalp post 9mm scaly papule squamous cell carcinoma   R frontal scalp ant 1.2cm red plaque squamous  cell carcinoma   R frotnal scalp post 1cm keratotic nodule squamous cell carcinoma     SCHEDULE TWO APPTS    BENIGN LESIONS DISCUSSED WITH PATIENT:  I discussed the specifics of tumor, prognosis, and genetics of benign lesions.  I explained that treatment of these lesions would be purely cosmetic and not medically neccessary.  I discussed with patient different removal options including excision, cautery and /or laser.      Nature and genetics of benign skin lesions dicussed with patient.  Signs and Symptoms of skin cancer discussed with patient.  Patient encouraged to perform monthly skin exams.  UV precautions reviewed with patient.  Skin care regimen reviewed with patient: Eliminate harsh soaps, i.e. Dial, zest, irsih spring; Mild soaps such as Cetaphil or Dove sensitive skin, avoid hot or cold showers, aggressive use of emollients including vanicream, cetaphil or cerave discussed with patient.    Risks of non-melanoma skin cancer discussed with patient   Return to clinic 6 months    NICATINAMIDE discussed with patient     PROCEDURE NOTE  L lat frontal scalp squamous cell carcinoma   MOHS:   Location    After PGACAC discussed with patient, decision for Mohs surgery was made. Indication for Mohs was Location. Patient confirmed the site with Dr. Jhaveri.  After anesthesia with LEC, the tumor was excised using standard Mohs technique in 1 stages(s).  CLEAR MARGINS OBTAINED and Final defect size was 1.4 cm.       REPAIR SECOND INTENT: We discussed the options for wound management in full with the patient including risks/benefits/possible outcomes. Decision made to allow the wound to heal by second intention. EBL minimal; complications none; wound care routine.  The patient was discharged in good condition and will return in one month or prn for wound evaluation.

## 2017-11-08 NOTE — TELEPHONE ENCOUNTER
Patient notified. Patient verbalized understanding.  He will call us back but advised he will need two Mohs surgery appointments.   Marlin French RN

## 2017-11-08 NOTE — TELEPHONE ENCOUNTER
Patient called back.  Ready to schedule for two Mohs procedure.  Transferred to derm staff for scheduling.    Anne-Marie Mendez   Ob/Gyn Clinic  RN

## 2017-11-08 NOTE — TELEPHONE ENCOUNTER
Scheduled for MOHS Surgery. Mohs Pre-op letter sent and added to wait list for sooner appt as he stated he is going to [place] in January and would like to have surgery asap . Marlin French RN

## 2017-11-10 DIAGNOSIS — C79.51 BONE METASTASIS: Primary | ICD-10-CM

## 2017-11-10 DIAGNOSIS — C61 MALIGNANT NEOPLASM OF PROSTATE (H): ICD-10-CM

## 2017-11-10 RX ORDER — PREDNISONE 5 MG/1
5 TABLET ORAL 2 TIMES DAILY
Qty: 60 TABLET | Refills: 0 | Status: SHIPPED | OUTPATIENT
Start: 2017-11-10 | End: 2017-12-11

## 2017-11-10 RX ORDER — ABIRATERONE ACETATE 250 MG/1
1000 TABLET ORAL DAILY
Qty: 120 TABLET | Refills: 0 | Status: SHIPPED | OUTPATIENT
Start: 2017-11-10 | End: 2017-12-10

## 2017-11-14 DIAGNOSIS — I10 HTN (HYPERTENSION), BENIGN: ICD-10-CM

## 2017-11-15 RX ORDER — QUINAPRIL 40 MG/1
TABLET ORAL
Qty: 90 TABLET | Refills: 0
Start: 2017-11-15

## 2017-11-15 NOTE — TELEPHONE ENCOUNTER
Prescription(s) that was daniela'd up has now been removed from encounter.    Closing encounter.    Pablo Cheatham RN

## 2017-11-27 ENCOUNTER — DOCUMENTATION ONLY (OUTPATIENT)
Dept: PHARMACY | Facility: CLINIC | Age: 82
End: 2017-11-27

## 2017-11-27 ENCOUNTER — INFUSION THERAPY VISIT (OUTPATIENT)
Dept: INFUSION THERAPY | Facility: CLINIC | Age: 82
End: 2017-11-27
Payer: COMMERCIAL

## 2017-11-27 VITALS
HEART RATE: 61 BPM | OXYGEN SATURATION: 95 % | RESPIRATION RATE: 18 BRPM | SYSTOLIC BLOOD PRESSURE: 157 MMHG | TEMPERATURE: 98.4 F | DIASTOLIC BLOOD PRESSURE: 76 MMHG | WEIGHT: 183.7 LBS | BODY MASS INDEX: 24.91 KG/M2

## 2017-11-27 DIAGNOSIS — C61 MALIGNANT NEOPLASM OF PROSTATE (H): ICD-10-CM

## 2017-11-27 DIAGNOSIS — C79.51 BONE METASTASIS: Primary | ICD-10-CM

## 2017-11-27 LAB
ALBUMIN SERPL-MCNC: 3 G/DL (ref 3.4–5)
ALP SERPL-CCNC: 69 U/L (ref 40–150)
ALT SERPL W P-5'-P-CCNC: 15 U/L (ref 0–70)
ANION GAP SERPL CALCULATED.3IONS-SCNC: 7 MMOL/L (ref 3–14)
AST SERPL W P-5'-P-CCNC: 18 U/L (ref 0–45)
BASOPHILS # BLD AUTO: 0 10E9/L (ref 0–0.2)
BASOPHILS NFR BLD AUTO: 0.2 %
BILIRUB SERPL-MCNC: 0.5 MG/DL (ref 0.2–1.3)
BUN SERPL-MCNC: 13 MG/DL (ref 7–30)
CALCIUM SERPL-MCNC: 8.7 MG/DL (ref 8.5–10.1)
CHLORIDE SERPL-SCNC: 105 MMOL/L (ref 94–109)
CO2 SERPL-SCNC: 30 MMOL/L (ref 20–32)
CREAT SERPL-MCNC: 0.75 MG/DL (ref 0.66–1.25)
DIFFERENTIAL METHOD BLD: ABNORMAL
EOSINOPHIL # BLD AUTO: 0.1 10E9/L (ref 0–0.7)
EOSINOPHIL NFR BLD AUTO: 1.8 %
ERYTHROCYTE [DISTWIDTH] IN BLOOD BY AUTOMATED COUNT: 13.8 % (ref 10–15)
GFR SERPL CREATININE-BSD FRML MDRD: >90 ML/MIN/1.7M2
GLUCOSE SERPL-MCNC: 93 MG/DL (ref 70–99)
HCT VFR BLD AUTO: 36.2 % (ref 40–53)
HGB BLD-MCNC: 12 G/DL (ref 13.3–17.7)
LYMPHOCYTES # BLD AUTO: 1.9 10E9/L (ref 0.8–5.3)
LYMPHOCYTES NFR BLD AUTO: 37.6 %
MCH RBC QN AUTO: 31.3 PG (ref 26.5–33)
MCHC RBC AUTO-ENTMCNC: 33.1 G/DL (ref 31.5–36.5)
MCV RBC AUTO: 95 FL (ref 78–100)
MONOCYTES # BLD AUTO: 0.4 10E9/L (ref 0–1.3)
MONOCYTES NFR BLD AUTO: 8.2 %
NEUTROPHILS # BLD AUTO: 2.6 10E9/L (ref 1.6–8.3)
NEUTROPHILS NFR BLD AUTO: 52.2 %
PLATELET # BLD AUTO: 198 10E9/L (ref 150–450)
POTASSIUM SERPL-SCNC: 3.9 MMOL/L (ref 3.4–5.3)
PROT SERPL-MCNC: 6.2 G/DL (ref 6.8–8.8)
PSA SERPL-MCNC: 12.3 UG/L (ref 0–4)
RBC # BLD AUTO: 3.83 10E12/L (ref 4.4–5.9)
SODIUM SERPL-SCNC: 142 MMOL/L (ref 133–144)
WBC # BLD AUTO: 5 10E9/L (ref 4–11)

## 2017-11-27 PROCEDURE — 84153 ASSAY OF PSA TOTAL: CPT | Performed by: INTERNAL MEDICINE

## 2017-11-27 PROCEDURE — 80053 COMPREHEN METABOLIC PANEL: CPT | Performed by: INTERNAL MEDICINE

## 2017-11-27 PROCEDURE — 99207 ZZC NO CHARGE LOS: CPT

## 2017-11-27 PROCEDURE — 96365 THER/PROPH/DIAG IV INF INIT: CPT | Performed by: INTERNAL MEDICINE

## 2017-11-27 PROCEDURE — 36415 COLL VENOUS BLD VENIPUNCTURE: CPT | Performed by: INTERNAL MEDICINE

## 2017-11-27 PROCEDURE — 85025 COMPLETE CBC W/AUTO DIFF WBC: CPT | Performed by: INTERNAL MEDICINE

## 2017-11-27 RX ORDER — ZOLEDRONIC ACID 0.04 MG/ML
4 INJECTION, SOLUTION INTRAVENOUS ONCE
Status: DISCONTINUED | OUTPATIENT
Start: 2017-11-27 | End: 2017-11-27

## 2017-11-27 ASSESSMENT — PAIN SCALES - GENERAL: PAINLEVEL: MODERATE PAIN (4)

## 2017-11-27 NOTE — PROGRESS NOTES
Primary Oncologist: Dr. Rafael Valenzuela  Primary Oncology Clinic: [facility]  Cancer Diagnosis: Metastatic Prostate Cancer      Abiraterone 1000 mg PO daily with Prednisone 5 mg PO BID  Start Date: 8/19/17  C2 9/19/17  C3 10/19/17  C4 11/19/17      Drug Interaction Assessment:     Abiraterone will decrease Warfarin metabolism, potentially raising INR; patient is aware and will inquire about more frequent INR monitoring at the start of therapy    Abiraterone inhibits Metoprolol metabolism with a slight risk of causing bradycardia      Lab Monitoring Plan:  C1D1+   CMP, BP C2D1+ Call, CMP, BP C3D1+ Call, CMP, BP C4D1+ Call, CMP, BP C5D1+ Call, CMP, BP C6D1+ Call, CMP, BP   C1D8+    C2D8+    C3D8+    C4D8+    C5D8+    C6D8+      C1D15+ Call, CMP C2D15+ Call, CMP C3D15+    C4D15+    C5D15+    C6D15+      C1D22+    C2D22+    C3D22+    C4D22+    C5D22+    C6D22+          Subjective/Objective:  Dimitri Neves is a 82 year old male seen in infusion for a follow-up visit for oral chemotherapy Abiraterone/Prednisone.     He is doing weill with no new complaints. He has not missed any doses of either medication.     ORAL CHEMOTHERAPY 7/26/2017 8/21/2017 9/18/2017 10/16/2017 11/27/2017   Drug Name Zytiga (Abiraterone) Zytiga (Abiraterone) Zytiga (Abiraterone) Zytiga (Abiraterone) Zytiga (Abiraterone)   Current Dosage 1000mg 1000mg 1000mg 1000mg 1000mg   Current Schedule Daily Daily Daily Daily Daily   Cycle Details Continuous Continuous Continuous Continuous Continuous   Start Date of Last Cycle 7/19/2017 8/19/2017 9/19/2017 10/16/2017 11/19/2017   Planned next cycle start date - - - - 12/19/2017   Doses missed in last 2 weeks 0 0 0 0 0   Adherence Assessment Adherent Adherent Adherent Adherent Adherent   Adverse Effects No AE identified during assessment No AE identified during assessment No AE identified during assessment No AE identified during assessment No AE identified during assessment   Any new drug  "interactions? - No No No No   Is the dose as ordered appropriate for the patient? Yes Yes Yes Yes Yes   Is the patient currently in pain? Assessed in last 30 days. Assessed in last 30 days. Assessed in last 30 days. Assessed in last 30 days. Assessed in last 30 days.   Has the patient been assessed within the past 6 months for depression? Yes Yes Yes Yes -   Has the patient missed any days of school, work, or other routine activity? No No No No No       Vitals:  BP:   BP Readings from Last 1 Encounters:   11/27/17 157/76     Wt Readings from Last 1 Encounters:   11/27/17 83.3 kg (183 lb 11.2 oz)     Estimated body surface area is 2.06 meters squared as calculated from the following:    Height as of 10/16/17: 1.829 m (6' 0.01\").    Weight as of an earlier encounter on 11/27/17: 83.3 kg (183 lb 11.2 oz).    Labs:  Lab Results   Component Value Date     11/27/2017      Lab Results   Component Value Date    POTASSIUM 3.9 11/27/2017     Lab Results   Component Value Date    ALETHA 8.7 11/27/2017     Lab Results   Component Value Date    ALBUMIN 3.0 11/27/2017     Lab Results   Component Value Date    BUN 13 11/27/2017     Lab Results   Component Value Date    CR 0.75 11/27/2017       Lab Results   Component Value Date    AST 18 11/27/2017     Lab Results   Component Value Date    ALT 15 11/27/2017     Lab Results   Component Value Date    BILITOTAL 0.5 11/27/2017       Lab Results   Component Value Date    WBC 5.0 11/27/2017     Lab Results   Component Value Date    HGB 12.0 11/27/2017     Lab Results   Component Value Date     11/27/2017     Lab Results   Component Value Date    ANEU 2.6 11/27/2017     PSA 12.3 11/27    Assessment:  Pt BP is up slightly today 157/76.  Will continue to watch.  Pt will be in [place], [place] from Jan 27-April 1st.      Plan:  Continue treatment.     Follow-Up:  12/18 Nicolas 10AM, labs/BP/adherence/SEs    Refill Due:  12/11/17 abiraterone/pred to [facility].  Of note, [place], [place] from Jan " 27-April 1st.  Pt will provide address on where the medication will need to be delivered.     Rebecca J. Fahrenbruch, PharmD, BCOP  November 27, 2017

## 2017-11-27 NOTE — MR AVS SNAPSHOT
After Visit Summary   11/27/2017    Dimitri Neves    MRN: [id number]           Patient Information     Date Of Birth          1935        Visit Information        Provider Department      11/27/2017 9:30 AM [address]        Today's Diagnoses     Bone metastasis (H)    -  1    Malignant neoplasm of prostate (H)           Follow-ups after your visit        Your next 10 appointments already scheduled     Dec 05, 2017  8:00 AM CST   [facility] with Senthil [facility]veri MD   [facility]    [address] [facility] [address]   [phone]            Dec 13, 2017  7:45 AM CST   MOHS with Senthil Jhaveri MD   [facility]    [address] [address]   [phone]            Dec 18, 2017  9:15 AM CST   LAB with LAB ONC [facility])    [address] [place] [address]   [phone]           Please do not eat 10-12 hours before your appointment if you are coming in fasting for labs on lipids, cholesterol, or glucose (sugar). This does not apply to pregnant women. Water, hot tea and black coffee (with nothing added) are okay. Do not drink other fluids, diet soda or chew gum.            Dec 18, 2017 10:00 AM CST   Return Visit with Rafael Valenzuela MD   [facility])    [address] [address]   [phone]            Jan 08, 2018  8:30 AM CST   LAB with LAB ONC [facility] ([facility])    [address] [facility] [address] [phone]           Please do not eat 10-12 hours before your appointment if you are coming in fasting for labs on lipids, cholesterol, or glucose (sugar). This does not apply to pregnant women. Water, hot tea and black coffee (with nothing  added) are okay. Do not drink other fluids, diet soda or chew gum.            Jan 08, 2018  9:00 AM CST   Level O with BAY 7 INFUSION   [facility])    [address] [place] [address] [phone]              Who to contact     If you have questions or need follow up information about today's clinic visit or your schedule please contact [facility] directly at [phone].  Normal or non-critical lab and imaging results will be communicated to you by [employer], letter or phone within 4 business days after the clinic has received the results. If you do not hear from us within 7 days, please contact the clinic through [employer] or phone. If you have a critical or abnormal lab result, we will notify you by phone as soon as possible.  Submit refill requests through [employer] or call your pharmacy and they will forward the refill request to us. Please allow 3 business days for your refill to be completed.          Additional Information About Your Visit        [employer] Information     [employer] gives you secure access to your electronic health record. If you see a primary care provider, you can also send messages to your care team and make appointments. If you have questions, please call your primary care clinic.  If you do not have a primary care provider, please call [phone] and they will assist you.      [employer] is an electronic gateway that provides easy, online access to your medical records. With [employer], you can request a clinic appointment, read your test results, renew a prescription or communicate with your care team.     To access your existing account, please contact your [facility] or call [phone] for assistance.        Care EveryWhere ID     This is your Care EveryWhere ID. This could be used by other organizations to access your [place] medical records  [id number]        Your Vitals Were      Pulse Temperature Respirations Pulse Oximetry BMI (Body Mass Index)       61 98.4  F (36.9  C) (Oral) 18 95% 24.91 kg/m2        Blood Pressure from Last 3 Encounters:   11/27/17 157/76   11/07/17 146/73   10/30/17 140/68    Weight from Last 3 Encounters:   11/27/17 83.3 kg (183 lb 11.2 oz)   10/30/17 83.9 kg (185 lb)   10/16/17 84.4 kg (186 lb)              Today, you had the following     No orders found for display       Primary Care Provider Office Phone # Fax #    Reginaldo Muir [phone] [address]  [facility] [address]        Equal Access to Services     DAVIDSON LEBLANC : Hadii aad ku hadasho Soomaali, waaxda luqadaha, qaybta kaalmada adeegyada, naty willis . So [facility] [phone].    ATENCIÓN: Si habla español, tiene a roberts disposición servicios gratuitos de asistencia lingüística. [facility] [phone].    We comply with applicable federal civil rights laws and [place] laws. We do not discriminate on the basis of race, color, national origin, age, disability, sex, sexual orientation, or gender identity.            Thank you!     Thank you for choosing [facility]  for your care. Our goal is always to provide you with excellent care. Hearing back from our patients is one way we can continue to improve our services. Please take a few minutes to complete the written survey that you may receive in the mail after your visit with us. Thank you!             Your Updated Medication List - Protect others around you: Learn how to safely use, store and throw away your medicines at www.disposemymeds.org.          This list is accurate as of: 11/27/17 11:59 PM.  Always use your most recent med list.                   Brand Name Dispense Instructions for use Diagnosis    * abiraterone 250 MG tablet    ZYTIGA    120 tablet    Take 4 tablets (1,000 mg) by mouth daily    Malignant neoplasm of prostate (H)       * abiraterone 250 MG tablet    ZYTIGA     120 tablet    Take 4 tablets (1,000 mg) by mouth daily for 30 doses Take on empty stomach.    Bone metastasis (H), Malignant neoplasm of prostate (H)       CALCIUM 600 PO           CEPHALEXIN PO           [facility] vitamin  B12 1000 MCG Tabs   Generic drug:  cyanocobalamin     30 tablet    TAKE 1 TABLET BY MOUTH EVERY DAY    Low vitamin B12 level       gabapentin 300 MG capsule    NEURONTIN     Take 900 mg by mouth At Bedtime        LORazepam 0.5 MG tablet    ATIVAN    30 tablet    Take 1 tablet (0.5 mg) by mouth every 4 hours as needed (Anxiety, Nausea/Vomiting or Sleep)    Bone metastasis (H), Malignant neoplasm of prostate (H)       metoprolol 25 MG 24 hr tablet    TOPROL-XL    90 tablet    TAKE 1 TABLET (25 MG) BY MOUTH DAILY    HTN (hypertension), benign       oxyCODONE IR 5 MG tablet    ROXICODONE    30 tablet    Take 1 tablet (5 mg) by mouth every 6 hours as needed for pain    Narcotic dependence, episodic use (H)       * predniSONE 5 MG tablet    DELTASONE    60 tablet    Take 1 tablet (5 mg) by mouth 2 times daily    Malignant neoplasm of prostate (H)       * predniSONE 5 MG tablet    DELTASONE    60 tablet    Take 1 tablet (5 mg) by mouth 2 times daily    Bone metastasis (H), Malignant neoplasm of prostate (H)       quinapril 40 MG Tab    ACCUPRIL    90 tablet    TAKE 1 TABLET (40 MG) BY MOUTH DAILY - NEEDS TO FOLLOW UP    HTN (hypertension), benign       sildenafil 100 MG tablet    VIAGRA    10 tablet    Take 1 tablet (100 mg) by mouth daily as needed for erectile dysfunction    ED (erectile dysfunction)       simvastatin 40 MG tablet    ZOCOR    45 tablet    TAKE 0.5 TABLETS (20 MG) BY MOUTH DAILY    Hyperlipidemia LDL goal <130       tamsulosin 0.4 MG capsule    FLOMAX    30 capsule    Take 1 capsule (0.4 mg) by mouth daily    Malignant neoplasm of prostate (H)       vitamin D 2000 UNITS tablet      Take 1 tablet by mouth daily        * Notice:  This list has 4 medication(s) that are the same as other  medications prescribed for you. Read the directions carefully, and ask your doctor or other care provider to review them with you.

## 2017-11-28 ENCOUNTER — TRANSFERRED RECORDS (OUTPATIENT)
Dept: HEALTH INFORMATION MANAGEMENT | Facility: CLINIC | Age: 82
End: 2017-11-28

## 2017-11-28 NOTE — PROGRESS NOTES
Infusion Nursing Note:  Dimitri Neves presents today for Zometa.    Patient seen by provider today: No   present during visit today: Not Applicable.    Note: N/A.    Intravenous Access:  Peripheral IV placed.    Treatment Conditions:  Results reviewed, labs MET treatment parameters, ok to proceed with treatment.      Post Infusion Assessment:  Patient tolerated infusion without incident.    Discharge Plan:   RTC in 1 mo.    CRISTIANO WILLOUGHBY RN

## 2017-12-04 ENCOUNTER — CARE COORDINATION (OUTPATIENT)
Dept: ONCOLOGY | Facility: CLINIC | Age: 82
End: 2017-12-04

## 2017-12-04 NOTE — PROGRESS NOTES
Per staff message from Md, no changes at this time in patient's POC.    Gene Ramirez, RN, BSN, OCN  [facility]  Patient Care Coordinator

## 2017-12-05 ENCOUNTER — OFFICE VISIT (OUTPATIENT)
Dept: DERMATOLOGY | Facility: CLINIC | Age: 82
End: 2017-12-05
Payer: COMMERCIAL

## 2017-12-05 VITALS — HEIGHT: 72 IN | SYSTOLIC BLOOD PRESSURE: 162 MMHG | HEART RATE: 51 BPM | DIASTOLIC BLOOD PRESSURE: 69 MMHG

## 2017-12-05 DIAGNOSIS — C44.42 SQUAMOUS CELL CARCINOMA, SCALP/NECK: Primary | ICD-10-CM

## 2017-12-05 PROCEDURE — 17311 MOHS 1 STAGE H/N/HF/G: CPT | Performed by: DERMATOLOGY

## 2017-12-05 PROCEDURE — 17312 MOHS ADDL STAGE: CPT | Performed by: DERMATOLOGY

## 2017-12-05 PROCEDURE — 17311 MOHS 1 STAGE H/N/HF/G: CPT | Mod: 59 | Performed by: DERMATOLOGY

## 2017-12-05 NOTE — PATIENT INSTRUCTIONS
Open Wound Care     for __left scalp x 2___      ? No strenuous activity for 48 hours    ? Take Tylenol as needed for discomfort.                                                .         ? Do not drink alcoholic beverages for 48 hours.    ? Keep the pressure bandage in place for 24 hours. If the bandage becomes blood tinged or loose, reinforce it with gauze and tape.        (Refer to the reverse side of this page for management of bleeding).    ? Remove bandage in 24 hours and begin wound care as follows:     1. Clean area with tap water using a Q tip or gauze pad, (shower / bathe normally)  2. Dry wound with Q tip or gauze pad  3. Apply Aquaphor, Vaseline, Polysporin or Bacitracin Ointment with a Q tip    Do NOT use Neosporin Ointment *  4. Cover the wound with a band-aid or nonstick gauze pad and paper tape.  5. Repeat wound care once a day until wound is completely healed.    It is an old wives tale that a wound heals better when it is exposed to air and allowed to dry out. The wound will heal faster with a better cosmetic result if it is kept moist with ointment and covered with a bandage.  Do not let the wound dry out.      Supplies Needed:                Qtips or gauze pads                Polysporin or Bacitracin Ointment                Bandaids or nonstick gauze pads and paper tape    Wound care kits and brown paper tape are available for purchase at   the pharmacy.    BLEEDIN. Use tightly rolled up gauze or cloth to apply direct pressure over the bandage for 20   minutes.  2. Reapply pressure for an additional 20 minutes if necessary  3. Call the office or go to the nearest emergency room if pressure fails to stop the bleeding.  4. Use additional gauze and tape to maintain pressure once the bleeding has stopped.  5. Begin wound care 24 hours after surgery as directed.                  WOUND HEALING    1. One week after surgery a pink / red halo will form around the outside of the wound.   This is new  skin.  2. The center of the wound will appear yellowish white and produce some drainage.  3. The pink halo will slowly migrate in toward the center of the wound until the wound is covered with new shiny pink skin.  4. There will be no more drainage when the wound is completely healed.  5. It will take six months to one year for the redness to fade.  6. The scar may be itchy, tight and sensitive to extreme temperatures for a year after the surgery.  7. Massaging the area several times a day for several minutes after the wound is completely healed will help the scar soften and normalize faster. Begin massage only after healing is complete.      In case of emergency call: Dr Jhaveri: [phone]     [facility]: [phone]    [facility]: [phone]

## 2017-12-05 NOTE — MR AVS SNAPSHOT
After Visit Summary   2017    Dimitri Neves    MRN: [id number]           Patient Information     Date Of Birth          1935        Visit Information        Provider Department      2017 8:00 AM Senthil Jhaveri MD [facility]        Care Instructions    Open Wound Care     for __left scalp x 2___      ? No strenuous activity for 48 hours    ? Take Tylenol as needed for discomfort.                                                .         ? Do not drink alcoholic beverages for 48 hours.    ? Keep the pressure bandage in place for 24 hours. If the bandage becomes blood tinged or loose, reinforce it with gauze and tape.        (Refer to the reverse side of this page for management of bleeding).    ? Remove bandage in 24 hours and begin wound care as follows:     1. Clean area with tap water using a Q tip or gauze pad, (shower / bathe normally)  2. Dry wound with Q tip or gauze pad  3. Apply Aquaphor, Vaseline, Polysporin or Bacitracin Ointment with a Q tip    Do NOT use Neosporin Ointment *  4. Cover the wound with a band-aid or nonstick gauze pad and paper tape.  5. Repeat wound care once a day until wound is completely healed.    It is an old wives tale that a wound heals better when it is exposed to air and allowed to dry out. The wound will heal faster with a better cosmetic result if it is kept moist with ointment and covered with a bandage.  Do not let the wound dry out.      Supplies Needed:                Qtips or gauze pads                Polysporin or Bacitracin Ointment                Bandaids or nonstick gauze pads and paper tape    Wound care kits and brown paper tape are available for purchase at   the pharmacy.    BLEEDIN. Use tightly rolled up gauze or cloth to apply direct pressure over the bandage for 20   minutes.  2. Reapply pressure for an additional 20 minutes if necessary  3. Call the office or go to the nearest emergency room if pressure  fails to stop the bleeding.  4. Use additional gauze and tape to maintain pressure once the bleeding has stopped.  5. Begin wound care 24 hours after surgery as directed.                  WOUND HEALING    1. One week after surgery a pink / red halo will form around the outside of the wound.   This is new skin.  2. The center of the wound will appear yellowish white and produce some drainage.  3. The pink halo will slowly migrate in toward the center of the wound until the wound is covered with new shiny pink skin.  4. There will be no more drainage when the wound is completely healed.  5. It will take six months to one year for the redness to fade.  6. The scar may be itchy, tight and sensitive to extreme temperatures for a year after the surgery.  7. Massaging the area several times a day for several minutes after the wound is completely healed will help the scar soften and normalize faster. Begin massage only after healing is complete.      In case of emergency call: Dr Jhaveri: [phone]     [facility] [place]: [phone]    [facility]: [phone]            Follow-ups after your visit        Your next 10 appointments already scheduled     Dec 13, 2017  7:45 AM CST   MOHS with Senthil Jhaveri MD   [facility]    [address] [place] [id number] [phone]            Dec 18, 2017  9:15 AM CST   LAB with LAB ONC [facility] ([facility])    [address] [place] [address] [phone]           Please do not eat 10-12 hours before your appointment if you are coming in fasting for labs on lipids, cholesterol, or glucose (sugar). This does not apply to pregnant women. Water, hot tea and black coffee (with nothing added) are okay. Do not drink other fluids, diet soda or chew gum.            Dec 18, 2017 10:00 AM CST   Return Visit with Rafael Valenzuela MD   [facility] ([facility])    [address] [address] [place] [id number] [phone]            Jan 08, 2018  8:30 AM CST   LAB with LAB ONC [facility] ([facility])    [address] [place] [id number] [phone]           Please do not eat 10-12 hours before your appointment if you are coming in fasting for labs on lipids, cholesterol, or glucose (sugar). This does not apply to pregnant women. Water, hot tea and black coffee (with nothing added) are okay. Do not drink other fluids, diet soda or chew gum.            Jan 08, 2018  9:00 AM CST   Level O with BAY 7 INFUSION   [facility] ([facility])    [address] [place] [id number] [phone]              Who to contact     If you have questions or need follow up information about today's clinic visit or your schedule please contact [facility] directly at [phone].  Normal or non-critical lab and imaging results will be communicated to you by [employer], letter or phone within 4 business days after the clinic has received the results. If you do not hear from us within 7 days, please contact the clinic through [employer] or phone. If you have a critical or abnormal lab result, we will notify you by phone as soon as possible.  Submit refill requests through [employer] or call your pharmacy and they will forward the refill request to us. Please allow 3 business days for your refill to be completed.          Additional Information About Your Visit        [employer] Information     [employer] gives you secure access to your electronic health record. If you see a primary care provider, you can also send messages to your care team and make appointments. If you have questions, please call your primary care clinic.  If you do not have a primary care provider, please call [phone] and they will assist you.        Care EveryWhere ID   "   This is your Care EveryWhere ID. This could be used by other organizations to access your [facility] medical records  [id number]        Your Vitals Were     Pulse Height                51 1.816 m (5' 11.5\")           Blood Pressure from Last 3 Encounters:   12/05/17 162/69   11/27/17 157/76   11/07/17 146/73    Weight from Last 3 Encounters:   11/27/17 83.3 kg (183 lb 11.2 oz)   10/30/17 83.9 kg (185 lb)   10/16/17 84.4 kg (186 lb)              Today, you had the following     No orders found for display       Primary Care Provider Office Phone # Fax #    Reginaldo Muir [phone] [address] [facility] [address]        Equal Access to Services     [facility]: Hadii nahid martins hadasho Soomaali, waaxda luqadaha, qaybta kaalmada adeegyada, naty willis . So [facility] [phone].    ATENCIÓN: Si habla español, tiene a roberts disposición servicios gratuitos de asistencia lingüística. Kelsey al [phone].    We comply with applicable federal civil rights laws and [place] laws. We do not discriminate on the basis of race, color, national origin, age, disability, sex, sexual orientation, or gender identity.            Thank you!     Thank you for choosing [facility]  for your care. Our goal is always to provide you with excellent care. Hearing back from our patients is one way we can continue to improve our services. Please take a few minutes to complete the written survey that you may receive in the mail after your visit with us. Thank you!             Your Updated Medication List - Protect others around you: Learn how to safely use, store and throw away your medicines at www.disposemymeds.org.          This list is accurate as of: 12/5/17 10:34 AM.  Always use your most recent med list.                   Brand Name Dispense Instructions for use Diagnosis    * abiraterone 250 MG tablet    ZYTIGA    120 tablet    Take 4 tablets (1,000 mg) by mouth " daily    Malignant neoplasm of prostate (H)       * abiraterone 250 MG tablet    ZYTIGA    120 tablet    Take 4 tablets (1,000 mg) by mouth daily for 30 doses Take on empty stomach.    Bone metastasis (H), Malignant neoplasm of prostate (H)       CALCIUM 600 PO           CEPHALEXIN PO           [facility] vitamin  B12 1000 MCG Tabs   Generic drug:  cyanocobalamin     30 tablet    TAKE 1 TABLET BY MOUTH EVERY DAY    Low vitamin B12 level       gabapentin 300 MG capsule    NEURONTIN     Take 900 mg by mouth At Bedtime        LORazepam 0.5 MG tablet    ATIVAN    30 tablet    Take 1 tablet (0.5 mg) by mouth every 4 hours as needed (Anxiety, Nausea/Vomiting or Sleep)    Bone metastasis (H), Malignant neoplasm of prostate (H)       metoprolol 25 MG 24 hr tablet    TOPROL-XL    90 tablet    TAKE 1 TABLET (25 MG) BY MOUTH DAILY    HTN (hypertension), benign       MINOCYCLINE HCL PO      Take 100 mg by mouth daily        oxyCODONE IR 5 MG tablet    ROXICODONE    30 tablet    Take 1 tablet (5 mg) by mouth every 6 hours as needed for pain    Narcotic dependence, episodic use (H)       * predniSONE 5 MG tablet    DELTASONE    60 tablet    Take 1 tablet (5 mg) by mouth 2 times daily    Malignant neoplasm of prostate (H)       * predniSONE 5 MG tablet    DELTASONE    60 tablet    Take 1 tablet (5 mg) by mouth 2 times daily    Bone metastasis (H), Malignant neoplasm of prostate (H)       quinapril 40 MG Tab    ACCUPRIL    90 tablet    TAKE 1 TABLET (40 MG) BY MOUTH DAILY - NEEDS TO FOLLOW UP    HTN (hypertension), benign       sildenafil 100 MG tablet    VIAGRA    10 tablet    Take 1 tablet (100 mg) by mouth daily as needed for erectile dysfunction    ED (erectile dysfunction)       simvastatin 40 MG tablet    ZOCOR    45 tablet    TAKE 0.5 TABLETS (20 MG) BY MOUTH DAILY    Hyperlipidemia LDL goal <130       tamsulosin 0.4 MG capsule    FLOMAX    30 capsule    Take 1 capsule (0.4 mg) by mouth daily    Malignant neoplasm of prostate (H)        vitamin D 2000 UNITS tablet      Take 1 tablet by mouth daily        * Notice:  This list has 4 medication(s) that are the same as other medications prescribed for you. Read the directions carefully, and ask your doctor or other care provider to review them with you.

## 2017-12-05 NOTE — LETTER
12/5/2017         RE: Dimitri Neves  [address]  [place] [address]        Dear Colleague,    Thank you for referring your patient, Dimitri Neves, to the [facility]. Please see a copy of my visit note below.    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 Associated symptoms: none.  Patient has no other skin complaints today.  Remainder of the HPI, Meds, PMH, Allergies, FH, and SH was reviewed in chart.      Past Medical History:   Diagnosis Date     Actinic keratosis      AK (actinic keratosis) 7/9/2012     Cataract cortical, senile right eye 4/17/2012     DDD (degenerative disc disease), lumbar 1979    s/p 4 back surgeries, spinal cord stimulator implant      Diverticulosis      ED (erectile dysfunction) 2009     GERD (gastroesophageal reflux disease) ~1980     HTN (hypertension), benign ~2000     Macular degeneration, dry, mild, ou 7/23/2016     Multiple lung nodules     Several basilar pulmonary nodules <5 mm     HUDSON (obstructive sleep apnea) 10/2005    on CPAP     Other abnormal glucose 01/14/2009     PE (pulmonary thromboembolism) (H) 1981    after back surgery      Pure hypercholesterolemia ~2000     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       Past Surgical History:   Procedure Laterality Date     ARTHROSCOPY KNEE RT/LT  ~1995    Right     C LUMBAR SPINE FUSION,ANTER APPRCH  8/2007    four times total, latest 8/07     CATARACT IOL, RT/LT       LASER YAG CAPSULOTOMY      both eyes     ORCHIECTOMY SCROTAL Bilateral 9/27/2017    Procedure: ORCHIECTOMY SCROTAL;  Bilateral orchiectomy scrotal approach;  Surgeon: Senthil Taylor MD;  Location: [facility] OR     PHACOEMULSIFICATION CLEAR CORNEA WITH STANDARD INTRAOCULAR LENS IMPLANT  6-18-12/7-30-12    right/left eye     ROTATOR CUFF REPAIR RT/LT  ~1995    right        Family History   Problem Relation Age of Onset     CANCER Father       Lung 64y     DIABETES Brother      Hypertension Brother      CANCER Mother      Liver     CEREBROVASCULAR DISEASE Mother      Eye Disorder Mother      Glaucoma Mother      CEREBROVASCULAR DISEASE Maternal Grandmother      C.A.D. No family hx of      Thyroid Disease No family hx of      Macular Degeneration No family hx of        Social History     Social History     Marital status:      Spouse name: Tatiana     Number of children: 2     Years of education: N/A     Occupational History      Retired     Social History Main Topics     Smoking status: Former Smoker     Packs/day: 1.00     Years: 25.00     Types: Cigarettes     Quit date: 1/2/1976     Smokeless tobacco: Never Used      Comment: lives in smoke free household     Alcohol use 7.0 oz/week     14 Standard drinks or equivalent per week      Comment: 2-3 drinks every night     Drug use: No      Comment: tatoos- sterile     Sexual activity: Yes     Partners: Female     Other Topics Concern      Service Yes     [employer] x8 years     Blood Transfusions No     Caffeine Concern No     Hobby Hazards No     Sleep Concern No     Stress Concern No     Weight Concern Yes     Special Diet No     Back Care Yes     Exercise Yes     Seat Belt Yes     Self-Exams No     Parent/Sibling W/ Cabg, Mi Or Angioplasty Before 65f 55m? No     Social History Narrative       Outpatient Encounter Prescriptions as of 12/5/2017   Medication Sig Dispense Refill     MINOCYCLINE HCL PO Take 100 mg by mouth daily       quinapril (ACCUPRIL) 40 MG Tab TAKE 1 TABLET (40 MG) BY MOUTH DAILY - NEEDS TO FOLLOW UP 90 tablet 0     predniSONE (DELTASONE) 5 MG tablet Take 1 tablet (5 mg) by mouth 2 times daily 60 tablet 0     abiraterone (ZYTIGA) 250 MG tablet Take 4 tablets (1,000 mg) by mouth daily for 30 doses Take on empty stomach. 120 tablet 0     tamsulosin (FLOMAX) 0.4 MG capsule Take 1 capsule (0.4 mg) by mouth daily 30 capsule 1     oxyCODONE (ROXICODONE) 5 MG IR tablet Take 1  "tablet (5 mg) by mouth every 6 hours as needed for pain 30 tablet 0     simvastatin (ZOCOR) 40 MG tablet TAKE 0.5 TABLETS (20 MG) BY MOUTH DAILY 45 tablet 3     Calcium Carbonate (CALCIUM 600 PO)        [facility]S VITAMIN  B12 1000 MCG TABS TAKE 1 TABLET BY MOUTH EVERY DAY 30 tablet 5     CEPHALEXIN PO        LORazepam (ATIVAN) 0.5 MG tablet Take 1 tablet (0.5 mg) by mouth every 4 hours as needed (Anxiety, Nausea/Vomiting or Sleep) 30 tablet 2     abiraterone (ZYTIGA) 250 MG tablet Take 4 tablets (1,000 mg) by mouth daily 120 tablet 11     predniSONE (DELTASONE) 5 MG tablet Take 1 tablet (5 mg) by mouth 2 times daily 60 tablet 11     metoprolol (TOPROL-XL) 25 MG 24 hr tablet TAKE 1 TABLET (25 MG) BY MOUTH DAILY 90 tablet 0     gabapentin (NEURONTIN) 300 MG capsule Take 900 mg by mouth At Bedtime       sildenafil (VIAGRA) 100 MG tablet Take 1 tablet (100 mg) by mouth daily as needed for erectile dysfunction 10 tablet 3     Cholecalciferol (VITAMIN D) 2000 UNITS tablet Take 1 tablet by mouth daily       No facility-administered encounter medications on file as of 12/5/2017.              Review Of Systems  Skin: As above  Eyes: negative  Ears/Nose/Throat: negative  Respiratory: No shortness of breath, dyspnea on exertion, cough, or hemoptysis  Cardiovascular: negative  Gastrointestinal: negative  Genitourinary: negative  Musculoskeletal: negative  Neurologic: negative  Psychiatric: negative  Hematologic/Lymphatic/Immunologic: negative  Endocrine: negative      O:   NAD, WDWN, Alert & Oriented, Mood & Affect wnl, Vitals stable   Here today alone   /69  Pulse 51  Ht 1.816 m (5' 11.5\")   General appearance normal   Vitals stable   Alert, oriented and in no acute distress      Following lymph nodes palpated: Occipital, Cervical, Supraclavicular no lad   L frontal scalp ant 1.6cm red scaly papule    L frontal scalp post 9mm scaly papule       Eyes: Conjunctivae/lids:Normal     ENT: Lips, buccal mucosa, tongue: " normal    MSK:Normal    Cardiovascular: peripheral edema none    Pulm: Breathing Normal    Lymph Nodes: No Head and Neck Lymphadenopathy     Neuro/Psych: Orientation:Normal; Mood/Affect:Normal      A/P:  1. L frontal scalp ant squamous cell carcinoma   MOHS:   Location    After PGACAC discussed with patient, decision for Mohs surgery was made. Indication for Mohs was Location. Patient confirmed the site with Dr. Jhaveri.  After anesthesia with LEC, the tumor was excised using standard Mohs technique in 2 stages(s).  CLEAR MARGINS OBTAINED and Final defect size was 2.4 cm.       REPAIR SECOND INTENT: We discussed the options for wound management in full with the patient including risks/benefits/possible outcomes. Decision made to allow the wound to heal by second intention. EBL minimal; complications none; wound care routine.  The patient was discharged in good condition and will return in one month or prn for wound evaluation.    2. L frontal scalp post squamous cell carcinoma   MOHS:   Location    After PGACAC discussed with patient, decision for Mohs surgery was made. Indication for Mohs was Location. Patient confirmed the site with Dr. Jhaveri.  After anesthesia with LEC, the tumor was excised using standard Mohs technique in 1 stages(s).  CLEAR MARGINS OBTAINED and Final defect size was 1.4 cm.       REPAIR SECOND INTENT: We discussed the options for wound management in full with the patient including risks/benefits/possible outcomes. Decision made to allow the wound to heal by second intention. EBL minimal; complications none; wound care routine.  The patient was discharged in good condition and will return in one month or prn for wound evaluation.      BENIGN LESIONS DISCUSSED WITH PATIENT:  I discussed the specifics of tumor, prognosis, and genetics of benign lesions.  I explained that treatment of these lesions would be purely cosmetic and not medically neccessary.  I discussed with patient different removal  options including excision, cautery and /or laser.      Nature and genetics of benign skin lesions dicussed with patient.  Signs and Symptoms of skin cancer discussed with patient.  Patient encouraged to perform monthly skin exams.  UV precautions reviewed with patient.  Skin care regimen reviewed with patient: Eliminate harsh soaps, i.e. Dial, zest, irsih spring; Mild soaps such as Cetaphil or Dove sensitive skin, avoid hot or cold showers, aggressive use of emollients including vanicream, cetaphil or cerave discussed with patient.    Risks of non-melanoma skin cancer discussed with patient   Return to clinic 6 months      Again, thank you for allowing me to participate in the care of your patient.        Sincerely,        Senthil Jhaveri MD

## 2017-12-05 NOTE — PROGRESS NOTES
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 Associated symptoms: none.  Patient has no other skin complaints today.  Remainder of the HPI, Meds, PMH, Allergies, FH, and SH was reviewed in chart.      Past Medical History:   Diagnosis Date     Actinic keratosis      AK (actinic keratosis) 7/9/2012     Cataract cortical, senile right eye 4/17/2012     DDD (degenerative disc disease), lumbar 1979    s/p 4 back surgeries, spinal cord stimulator implant      Diverticulosis      ED (erectile dysfunction) 2009     GERD (gastroesophageal reflux disease) ~1980     HTN (hypertension), benign ~2000     Macular degeneration, dry, mild, ou 7/23/2016     Multiple lung nodules     Several basilar pulmonary nodules <5 mm     HUDSON (obstructive sleep apnea) 10/2005    on CPAP     Other abnormal glucose 01/14/2009     PE (pulmonary thromboembolism) (H) 1981    after back surgery      Pure hypercholesterolemia ~2000     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       Past Surgical History:   Procedure Laterality Date     ARTHROSCOPY KNEE RT/LT  ~1995    Right     C LUMBAR SPINE FUSION,ANTER APPRCH  8/2007    four times total, latest 8/07     CATARACT IOL, RT/LT       LASER YAG CAPSULOTOMY      both eyes     ORCHIECTOMY SCROTAL Bilateral 9/27/2017    Procedure: ORCHIECTOMY SCROTAL;  Bilateral orchiectomy scrotal approach;  Surgeon: Senthil Taylor MD;  Location: [facility]     PHACOEMULSIFICATION CLEAR CORNEA WITH STANDARD INTRAOCULAR LENS IMPLANT  6-18-12/7-30-12    right/left eye     ROTATOR CUFF REPAIR RT/LT  ~1995    right        Family History   Problem Relation Age of Onset     CANCER Father      Lung 64y     DIABETES Brother      Hypertension Brother      CANCER Mother      Liver     CEREBROVASCULAR DISEASE Mother      Eye Disorder Mother      Glaucoma Mother      CEREBROVASCULAR DISEASE Maternal Grandmother      C.A.D. No family hx  of      Thyroid Disease No family hx of      Macular Degeneration No family hx of        Social History     Social History     Marital status:      Spouse name: Tatiana     Number of children: 2     Years of education: N/A     Occupational History      Retired     Social History Main Topics     Smoking status: Former Smoker     Packs/day: 1.00     Years: 25.00     Types: Cigarettes     Quit date: 1/2/1976     Smokeless tobacco: Never Used      Comment: lives in smoke free household     Alcohol use 7.0 oz/week     14 Standard drinks or equivalent per week      Comment: 2-3 drinks every night     Drug use: No      Comment: tatoos- sterile     Sexual activity: Yes     Partners: Female     Other Topics Concern      Service Yes     [employer] x8 years     Blood Transfusions No     Caffeine Concern No     Hobby Hazards No     Sleep Concern No     Stress Concern No     Weight Concern Yes     Special Diet No     Back Care Yes     Exercise Yes     Seat Belt Yes     Self-Exams No     Parent/Sibling W/ Cabg, Mi Or Angioplasty Before 65f 55m? No     Social History Narrative       Outpatient Encounter Prescriptions as of 12/5/2017   Medication Sig Dispense Refill     MINOCYCLINE HCL PO Take 100 mg by mouth daily       quinapril (ACCUPRIL) 40 MG Tab TAKE 1 TABLET (40 MG) BY MOUTH DAILY - NEEDS TO FOLLOW UP 90 tablet 0     predniSONE (DELTASONE) 5 MG tablet Take 1 tablet (5 mg) by mouth 2 times daily 60 tablet 0     abiraterone (ZYTIGA) 250 MG tablet Take 4 tablets (1,000 mg) by mouth daily for 30 doses Take on empty stomach. 120 tablet 0     tamsulosin (FLOMAX) 0.4 MG capsule Take 1 capsule (0.4 mg) by mouth daily 30 capsule 1     oxyCODONE (ROXICODONE) 5 MG IR tablet Take 1 tablet (5 mg) by mouth every 6 hours as needed for pain 30 tablet 0     simvastatin (ZOCOR) 40 MG tablet TAKE 0.5 TABLETS (20 MG) BY MOUTH DAILY 45 tablet 3     Calcium Carbonate (CALCIUM 600 PO)        CVS VITAMIN  B12 1000 MCG TABS TAKE 1  "TABLET BY MOUTH EVERY DAY 30 tablet 5     CEPHALEXIN PO        LORazepam (ATIVAN) 0.5 MG tablet Take 1 tablet (0.5 mg) by mouth every 4 hours as needed (Anxiety, Nausea/Vomiting or Sleep) 30 tablet 2     abiraterone (ZYTIGA) 250 MG tablet Take 4 tablets (1,000 mg) by mouth daily 120 tablet 11     predniSONE (DELTASONE) 5 MG tablet Take 1 tablet (5 mg) by mouth 2 times daily 60 tablet 11     metoprolol (TOPROL-XL) 25 MG 24 hr tablet TAKE 1 TABLET (25 MG) BY MOUTH DAILY 90 tablet 0     gabapentin (NEURONTIN) 300 MG capsule Take 900 mg by mouth At Bedtime       sildenafil (VIAGRA) 100 MG tablet Take 1 tablet (100 mg) by mouth daily as needed for erectile dysfunction 10 tablet 3     Cholecalciferol (VITAMIN D) 2000 UNITS tablet Take 1 tablet by mouth daily       No facility-administered encounter medications on file as of 12/5/2017.              Review Of Systems  Skin: As above  Eyes: negative  Ears/Nose/Throat: negative  Respiratory: No shortness of breath, dyspnea on exertion, cough, or hemoptysis  Cardiovascular: negative  Gastrointestinal: negative  Genitourinary: negative  Musculoskeletal: negative  Neurologic: negative  Psychiatric: negative  Hematologic/Lymphatic/Immunologic: negative  Endocrine: negative      O:   NAD, WDWN, Alert & Oriented, Mood & Affect wnl, Vitals stable   Here today alone   /69  Pulse 51  Ht 1.816 m (5' 11.5\")   General appearance normal   Vitals stable   Alert, oriented and in no acute distress      Following lymph nodes palpated: Occipital, Cervical, Supraclavicular no lad   L frontal scalp ant 1.6cm red scaly papule    L frontal scalp post 9mm scaly papule       Eyes: Conjunctivae/lids:Normal     ENT: Lips, buccal mucosa, tongue: normal    MSK:Normal    Cardiovascular: peripheral edema none    Pulm: Breathing Normal    Lymph Nodes: No Head and Neck Lymphadenopathy     Neuro/Psych: Orientation:Normal; Mood/Affect:Normal      A/P:  1. L frontal scalp ant squamous cell carcinoma "   MOHS:   Location    After PGACAC discussed with patient, decision for Mohs surgery was made. Indication for Mohs was Location. Patient confirmed the site with Dr. Jhaveri.  After anesthesia with LEC, the tumor was excised using standard Mohs technique in 2 stages(s).  CLEAR MARGINS OBTAINED and Final defect size was 2.4 cm.       REPAIR SECOND INTENT: We discussed the options for wound management in full with the patient including risks/benefits/possible outcomes. Decision made to allow the wound to heal by second intention. EBL minimal; complications none; wound care routine.  The patient was discharged in good condition and will return in one month or prn for wound evaluation.    2. L frontal scalp post squamous cell carcinoma   MOHS:   Location    After PGACAC discussed with patient, decision for Mohs surgery was made. Indication for Mohs was Location. Patient confirmed the site with Dr. Jhaveri.  After anesthesia with LEC, the tumor was excised using standard Mohs technique in 1 stages(s).  CLEAR MARGINS OBTAINED and Final defect size was 1.4 cm.       REPAIR SECOND INTENT: We discussed the options for wound management in full with the patient including risks/benefits/possible outcomes. Decision made to allow the wound to heal by second intention. EBL minimal; complications none; wound care routine.  The patient was discharged in good condition and will return in one month or prn for wound evaluation.      BENIGN LESIONS DISCUSSED WITH PATIENT:  I discussed the specifics of tumor, prognosis, and genetics of benign lesions.  I explained that treatment of these lesions would be purely cosmetic and not medically neccessary.  I discussed with patient different removal options including excision, cautery and /or laser.      Nature and genetics of benign skin lesions dicussed with patient.  Signs and Symptoms of skin cancer discussed with patient.  Patient encouraged to perform monthly skin exams.  UV precautions  reviewed with patient.  Skin care regimen reviewed with patient: Eliminate harsh soaps, i.e. Dial, zest, irsih spring; Mild soaps such as Cetaphil or Dove sensitive skin, avoid hot or cold showers, aggressive use of emollients including vanicream, cetaphil or cerave discussed with patient.    Risks of non-melanoma skin cancer discussed with patient   Return to clinic 6 months

## 2017-12-05 NOTE — NURSING NOTE
"Chief Complaint   Patient presents with     Derm Problem     mohs 1 of 2 appts       Vitals:    12/05/17 0849   BP: 162/69   Pulse: 51   Height: 1.816 m (5' 11.5\")     Wt Readings from Last 1 Encounters:   11/27/17 83.3 kg (183 lb 11.2 oz)       Nishi Calderon LPN.................12/5/2017    "

## 2017-12-11 DIAGNOSIS — C79.51 BONE METASTASIS: Primary | ICD-10-CM

## 2017-12-11 DIAGNOSIS — C61 MALIGNANT NEOPLASM OF PROSTATE (H): ICD-10-CM

## 2017-12-11 RX ORDER — ABIRATERONE ACETATE 250 MG/1
1000 TABLET ORAL DAILY
Qty: 120 TABLET | Refills: 0 | Status: SHIPPED | OUTPATIENT
Start: 2017-12-11 | End: 2018-01-10

## 2017-12-11 RX ORDER — PREDNISONE 5 MG/1
5 TABLET ORAL 2 TIMES DAILY
Qty: 60 TABLET | Refills: 0 | Status: SHIPPED | OUTPATIENT
Start: 2017-12-11 | End: 2018-06-28

## 2017-12-18 ENCOUNTER — ONCOLOGY VISIT (OUTPATIENT)
Dept: ONCOLOGY | Facility: CLINIC | Age: 82
End: 2017-12-18
Payer: COMMERCIAL

## 2017-12-18 VITALS
WEIGHT: 186.1 LBS | HEART RATE: 59 BPM | SYSTOLIC BLOOD PRESSURE: 183 MMHG | OXYGEN SATURATION: 97 % | BODY MASS INDEX: 25.59 KG/M2 | DIASTOLIC BLOOD PRESSURE: 83 MMHG | TEMPERATURE: 98.1 F

## 2017-12-18 DIAGNOSIS — C61 MALIGNANT NEOPLASM OF PROSTATE (H): ICD-10-CM

## 2017-12-18 DIAGNOSIS — C61 MALIGNANT NEOPLASM OF PROSTATE (H): Primary | ICD-10-CM

## 2017-12-18 DIAGNOSIS — C79.51 BONE METASTASIS: ICD-10-CM

## 2017-12-18 LAB
ALBUMIN SERPL-MCNC: 3.3 G/DL (ref 3.4–5)
ALP SERPL-CCNC: 73 U/L (ref 40–150)
ALT SERPL W P-5'-P-CCNC: 18 U/L (ref 0–70)
ANION GAP SERPL CALCULATED.3IONS-SCNC: 6 MMOL/L (ref 3–14)
AST SERPL W P-5'-P-CCNC: 19 U/L (ref 0–45)
BASOPHILS # BLD AUTO: 0 10E9/L (ref 0–0.2)
BASOPHILS NFR BLD AUTO: 0.2 %
BILIRUB SERPL-MCNC: 0.7 MG/DL (ref 0.2–1.3)
BUN SERPL-MCNC: 13 MG/DL (ref 7–30)
CALCIUM SERPL-MCNC: 8.6 MG/DL (ref 8.5–10.1)
CHLORIDE SERPL-SCNC: 107 MMOL/L (ref 94–109)
CO2 SERPL-SCNC: 30 MMOL/L (ref 20–32)
CREAT SERPL-MCNC: 0.71 MG/DL (ref 0.66–1.25)
DIFFERENTIAL METHOD BLD: ABNORMAL
EOSINOPHIL # BLD AUTO: 0.1 10E9/L (ref 0–0.7)
EOSINOPHIL NFR BLD AUTO: 2.3 %
ERYTHROCYTE [DISTWIDTH] IN BLOOD BY AUTOMATED COUNT: 13.6 % (ref 10–15)
GFR SERPL CREATININE-BSD FRML MDRD: >90 ML/MIN/1.7M2
GLUCOSE SERPL-MCNC: 93 MG/DL (ref 70–99)
HCT VFR BLD AUTO: 39.8 % (ref 40–53)
HGB BLD-MCNC: 13 G/DL (ref 13.3–17.7)
IMM GRANULOCYTES # BLD: 0 10E9/L (ref 0–0.4)
IMM GRANULOCYTES NFR BLD: 0.2 %
LYMPHOCYTES # BLD AUTO: 1.3 10E9/L (ref 0.8–5.3)
LYMPHOCYTES NFR BLD AUTO: 28.8 %
MCH RBC QN AUTO: 31.2 PG (ref 26.5–33)
MCHC RBC AUTO-ENTMCNC: 32.7 G/DL (ref 31.5–36.5)
MCV RBC AUTO: 95 FL (ref 78–100)
MONOCYTES # BLD AUTO: 0.5 10E9/L (ref 0–1.3)
MONOCYTES NFR BLD AUTO: 10.4 %
NEUTROPHILS # BLD AUTO: 2.5 10E9/L (ref 1.6–8.3)
NEUTROPHILS NFR BLD AUTO: 58.1 %
PLATELET # BLD AUTO: 159 10E9/L (ref 150–450)
POTASSIUM SERPL-SCNC: 3.4 MMOL/L (ref 3.4–5.3)
PROT SERPL-MCNC: 6.4 G/DL (ref 6.8–8.8)
PSA SERPL-MCNC: 9.34 UG/L (ref 0–4)
RBC # BLD AUTO: 4.17 10E12/L (ref 4.4–5.9)
SODIUM SERPL-SCNC: 143 MMOL/L (ref 133–144)
WBC # BLD AUTO: 4.3 10E9/L (ref 4–11)

## 2017-12-18 PROCEDURE — 99214 OFFICE O/P EST MOD 30 MIN: CPT | Performed by: INTERNAL MEDICINE

## 2017-12-18 PROCEDURE — 85025 COMPLETE CBC W/AUTO DIFF WBC: CPT | Performed by: INTERNAL MEDICINE

## 2017-12-18 PROCEDURE — 36415 COLL VENOUS BLD VENIPUNCTURE: CPT | Performed by: INTERNAL MEDICINE

## 2017-12-18 PROCEDURE — 84153 ASSAY OF PSA TOTAL: CPT | Performed by: INTERNAL MEDICINE

## 2017-12-18 PROCEDURE — 80053 COMPREHEN METABOLIC PANEL: CPT | Performed by: INTERNAL MEDICINE

## 2017-12-18 ASSESSMENT — PAIN SCALES - GENERAL: PAINLEVEL: MODERATE PAIN (4)

## 2017-12-18 NOTE — MR AVS SNAPSHOT
After Visit Summary   12/18/2017    Dimitri Neves    MRN: [id number]           Patient Information     Date Of Birth          1935        Visit Information        Provider Department      12/18/2017 10:00 AM Rafael Valenzuela MD [facility]         Follow-ups after your visit        Your next 10 appointments already scheduled     Dec 19, 2017  8:00 AM CST   MOHS with Senthil Jhaveri MD   [facility] ([facility]    [address] [address]   [phone]            Jan 08, 2018  8:30 AM CST   LAB with LAB ONC [facility] ([facility])    [address] [place] [address] [id number] [phone]           Please do not eat 10-12 hours before your appointment if you are coming in fasting for labs on lipids, cholesterol, or glucose (sugar). This does not apply to pregnant women. Water, hot tea and black coffee (with nothing added) are okay. Do not drink other fluids, diet soda or chew gum.            Jan 08, 2018  9:00 AM CST   Level O with [address] ([facility])    [address] [address] [place] [address] [id number] [phone]            Dedrick 15, 2018  8:15 AM CST   LAB with LAB ONC [facility] ([facility])    [address] [facility] [id number] [phone]           Please do not eat 10-12 hours before your appointment if you are coming in fasting for labs on lipids, cholesterol, or glucose (sugar). This does not apply to pregnant women. Water, hot tea and black coffee (with nothing added) are okay. Do not drink other fluids, diet soda or chew gum.            Dedrick 15, 2018  9:00 AM CST   Return Visit with Rafael Valenzueal MD   [facility]    [address] [facility] [address] [phone]            Dedrick 15,  2018  9:30 AM CST   Level O with BAY [address] INFUSION   [facility]    [address] [place] [address] [phone]              Who to contact     If you have questions or need follow up information about today's clinic visit or your schedule please contact [facility] directly at [phone].  Normal or non-critical lab and imaging results will be communicated to you by TRAN.SLhart, letter or phone within 4 business days after the clinic has received the results. If you do not hear from us within 7 days, please contact the clinic through TRAN.SLhart or phone. If you have a critical or abnormal lab result, we will notify you by phone as soon as possible.  Submit refill requests through Undertone or call your pharmacy and they will forward the refill request to us. Please allow 3 business days for your refill to be completed.          Additional Information About Your Visit        Undertone Information     Undertone gives you secure access to your electronic health record. If you see a primary care provider, you can also send messages to your care team and make appointments. If you have questions, please call your primary care clinic.  If you do not have a primary care provider, please call [phone] and they will assist you.      Undertone is an electronic gateway that provides easy, online access to your medical records. With Undertone, you can request a clinic appointment, read your test results, renew a prescription or communicate with your care team.     To access your existing account, please contact your [facility] or call [phone] for assistance.        Care EveryWhere ID     This is your Care EveryWhere ID. This could be used by other organizations to access your [place] medical records  [id number]        Your Vitals Were     Pulse Temperature Pulse Oximetry BMI (Body Mass Index)          59 98.1  F (36.7  C)  (Oral) 97% 25.59 kg/m2         Blood Pressure from Last 3 Encounters:   12/18/17 183/83   12/05/17 162/69   11/27/17 157/76    Weight from Last 3 Encounters:   12/18/17 84.4 kg (186 lb 1.6 oz)   11/27/17 83.3 kg (183 lb 11.2 oz)   10/30/17 83.9 kg (185 lb)              Today, you had the following     No orders found for display       Primary Care Provider Office Phone # Fax #    Reginaldo Muir [phone] [address]  [place] [address]        Equal Access to Services     [facility]: Hadii nahid martins hadlinda Sogregorio, waaxda luqadaha, qaybta kaalmada adegrahamyada, naty willis . So [facility] [phone].    ATENCIÓN: Si habla español, tiene a roberts disposición servicios gratuitos de asistencia lingüística. Llame al [phone].    We comply with applicable federal civil rights laws and [place] laws. We do not discriminate on the basis of race, color, national origin, age, disability, sex, sexual orientation, or gender identity.            Thank you!     Thank you for choosing [facility]  for your care. Our goal is always to provide you with excellent care. Hearing back from our patients is one way we can continue to improve our services. Please take a few minutes to complete the written survey that you may receive in the mail after your visit with us. Thank you!             Your Updated Medication List - Protect others around you: Learn how to safely use, store and throw away your medicines at www.disposemymeds.org.          This list is accurate as of: 12/18/17 12:14 PM.  Always use your most recent med list.                   Brand Name Dispense Instructions for use Diagnosis    abiraterone 250 MG tablet    ZYTIGA    120 tablet    Take 4 tablets (1,000 mg) by mouth daily for 30 doses Take on empty stomach.    Bone metastasis (H), Malignant neoplasm of prostate (H)       CALCIUM 600 PO           CEPHALEXIN PO           CVS vitamin  B12 1000 MCG  Tabs   Generic drug:  cyanocobalamin     30 tablet    TAKE 1 TABLET BY MOUTH EVERY DAY    Low vitamin B12 level       gabapentin 300 MG capsule    NEURONTIN     Take 900 mg by mouth At Bedtime        LORazepam 0.5 MG tablet    ATIVAN    30 tablet    Take 1 tablet (0.5 mg) by mouth every 4 hours as needed (Anxiety, Nausea/Vomiting or Sleep)    Bone metastasis (H), Malignant neoplasm of prostate (H)       metoprolol 25 MG 24 hr tablet    TOPROL-XL    90 tablet    TAKE 1 TABLET (25 MG) BY MOUTH DAILY    HTN (hypertension), benign       MINOCYCLINE HCL PO      Take 100 mg by mouth daily    Squamous cell carcinoma, scalp/neck       oxyCODONE IR 5 MG tablet    ROXICODONE    30 tablet    Take 1 tablet (5 mg) by mouth every 6 hours as needed for pain    Narcotic dependence, episodic use (H)       predniSONE 5 MG tablet    DELTASONE    60 tablet    Take 1 tablet (5 mg) by mouth 2 times daily    Bone metastasis (H), Malignant neoplasm of prostate (H)       quinapril 40 MG Tab    ACCUPRIL    90 tablet    TAKE 1 TABLET (40 MG) BY MOUTH DAILY - NEEDS TO FOLLOW UP    HTN (hypertension), benign       sildenafil 100 MG tablet    VIAGRA    10 tablet    Take 1 tablet (100 mg) by mouth daily as needed for erectile dysfunction    ED (erectile dysfunction)       simvastatin 40 MG tablet    ZOCOR    45 tablet    TAKE 0.5 TABLETS (20 MG) BY MOUTH DAILY    Hyperlipidemia LDL goal <130       tamsulosin 0.4 MG capsule    FLOMAX    30 capsule    Take 1 capsule (0.4 mg) by mouth daily    Malignant neoplasm of prostate (H)       vitamin D 2000 UNITS tablet      Take 1 tablet by mouth daily

## 2017-12-18 NOTE — LETTER
12/18/2017         RE: Dimitri Neves  [address] [place] [address]        Dear Colleague,    Thank you for referring your patient, Dimitri Neves, to the [facility]. Please see a copy of my visit note below.    [facility]  HEMATOLOGY AND ONCOLOGY    FOLLOW-UP VISIT NOTE    PATIENT NAME: Dimitri Neves MRN # [id number]  DATE OF VISIT: Dec 18, 2017 YOB: 1935    REFERRING PROVIDER: No referring provider defined for this encounter.    CANCER TYPE: Prostate adenocarcinoma  STAGE: IV    TREATMENT SUMMARY:  Dimitri was followed by his PCP on 6/13/13 and was elevated at 32 as noted below. He was referred to Dr. Taylor. He was treated with a 10 day course of ciprofloxacin and followed again in 6 weeks on 8/10. His PSA drawn prior to this visit was unchanged and remained elevated at 32. He had a TRUS biopsy on 8/25/14 which was negative for prostate adenocarcinoma. His PSA was repeated in 3 months and now increased to 67.9. He had a repeat biopsy of prostate on 12/16/14 which now confirmed prostate adenocarcinoma with eileen 4+4 disease involving 5 of the cores and [place] 3+3 disease involving remainder of cores. He was staged with a CT scan and bone scan done on 12/29/14 which revealed metastatic foci in the upper cervical vertebrae on the left, right posterior 3rd rib and right ischium, focal uptake in the posterior left 11th rib with corresponding lytic expansile appearance on CT, suspicious for metastasis.            4/5/2011 08:46 6/13/2014 08:03 7/25/2014 09:47 12/5/2014 09:00 4/7/2015 09:14   PSA 2.77 32.70 (H) 32.00 (H) 67.88 (H) 1.92      He was started on androgen deprivation therapy in Jan 2015 with a good initial response. His PSA has been increasing recently and he was started on bicalutamide in February with no response. He has continued to have rising PSA and was prescribed abiraterone with prednisone. He had a huge copay with this and has been  referred to Medical Oncology to review other options.     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 He is tolerating his abiraterone without any difficulty.   He does have hot flushes, fatigue, mood swings on androgen deprivation therapy.     He did have bilateral orchiectomies.     He continues to struggle with pain in his knees.       PAST MEDICAL HISTORY     Past Medical History:   Diagnosis Date     Actinic keratosis      AK (actinic keratosis) 7/9/2012     Cataract cortical, senile right eye 4/17/2012     DDD (degenerative disc disease), lumbar 1979    s/p 4 back surgeries, spinal cord stimulator implant      Diverticulosis      ED (erectile dysfunction) 2009     GERD (gastroesophageal reflux disease) ~1980     HTN (hypertension), benign ~2000     Macular degeneration, dry, mild, ou 7/23/2016     Multiple lung nodules     Several basilar pulmonary nodules <5 mm     HUDSON (obstructive sleep apnea) 10/2005    on CPAP     Other abnormal glucose 01/14/2009     PE (pulmonary thromboembolism) (H) 1981    after back surgery      Pure hypercholesterolemia ~2000     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         CURRENT OUTPATIENT MEDICATIONS     Current Outpatient Prescriptions   Medication Sig     predniSONE (DELTASONE) 5 MG tablet Take 1 tablet (5 mg) by mouth 2 times daily     abiraterone (ZYTIGA) 250 MG tablet Take 4 tablets (1,000 mg) by mouth daily for 30 doses Take on empty stomach.     MINOCYCLINE HCL PO Take 100 mg by mouth daily     quinapril (ACCUPRIL) 40 MG Tab TAKE 1 TABLET (40 MG) BY MOUTH DAILY - NEEDS TO FOLLOW UP     tamsulosin (FLOMAX) 0.4 MG capsule Take 1 capsule (0.4 mg) by mouth daily     oxyCODONE  (ROXICODONE) 5 MG IR tablet Take 1 tablet (5 mg) by mouth every 6 hours as needed for pain     simvastatin (ZOCOR) 40 MG tablet TAKE 0.5 TABLETS (20 MG) BY MOUTH DAILY     Calcium Carbonate (CALCIUM 600 PO)      [facility] VITAMIN  B12 1000 MCG TABS TAKE 1 TABLET BY MOUTH EVERY DAY     CEPHALEXIN PO      LORazepam (ATIVAN) 0.5 MG tablet Take 1 tablet (0.5 mg) by mouth every 4 hours as needed (Anxiety, Nausea/Vomiting or Sleep)     metoprolol (TOPROL-XL) 25 MG 24 hr tablet TAKE 1 TABLET (25 MG) BY MOUTH DAILY     gabapentin (NEURONTIN) 300 MG capsule Take 900 mg by mouth At Bedtime     sildenafil (VIAGRA) 100 MG tablet Take 1 tablet (100 mg) by mouth daily as needed for erectile dysfunction     Cholecalciferol (VITAMIN D) 2000 UNITS tablet Take 1 tablet by mouth daily     No current facility-administered medications for this visit.         ALLERGIES      Allergies   Allergen Reactions     Morphine Sulfate GI Disturbance     vomiting     Vicodin [Hydrocodone-Acetaminophen] Nausea and Vomiting        REVIEW OF SYSTEMS   As above in the HPI, o/w complete 12-point ROS was negative.     PHYSICAL EXAM   /83 (Patient Position: Left side, Cuff Size: Adult Regular)  Pulse 59  Temp 98.1  F (36.7  C) (Oral)  Wt 84.4 kg (186 lb 1.6 oz)  SpO2 97%  BMI 25.59 kg/m2  GEN: NAD  HEENT: PERRL, EOMI, no icterus, injection or pallor. Oropharynx is clear.  NECK: no cervical or supraclavicular lymphadenopathy  LUNGS: clear bilaterally  CV: regular, no murmurs, rubs, or gallops  ABDOMEN: soft, non-tender, non-distended, normal bowel sounds, no hepatosplenomegaly by percussion or palpation  EXT: warm, well perfused, no edema  NEURO: alert  SKIN: no rashes     LABORATORY AND IMAGING STUDIES     Labs remain stable. Corrected calcium is within the normal range or low normal  Mild normocytic anemia   Recent Labs   Lab Test  12/18/17   0906  11/27/17   0856  10/16/17   0840  09/18/17   0949  09/15/17   1131   NA  143  142  140  140  138    POTASSIUM  3.4  3.9  3.9  4.2  4.1   CHLORIDE  107  105  108  104  104   CO2  30  30  28  32  27   ANIONGAP  6  7  4  4  7   BUN  13  13  10  12  16   CR  0.71  0.75  0.71  0.76  0.78   GLC  93  93  100*  103*  130*   ALETHA  8.6  8.7  8.2*  8.8  8.6     Recent Labs   Lab Test  12/28/11   0857  04/05/11   0846  06/16/10   1338  01/05/10   1134   PHOS  3.1  3.4  2.5  2.2*     Recent Labs   Lab Test  12/18/17   0906  11/27/17   0856  10/16/17   0840  09/18/17   0949  09/15/17   1131   WBC  4.3  5.0  4.8  6.5  8.6   HGB  13.0*  12.0*  12.1*  11.7*  11.6*   PLT  159  198  211  219  229   MCV  95  95  95  91  93   NEUTROPHIL  58.1  52.2  53.9  64.1  74.6     Recent Labs   Lab Test  12/18/17   0906  11/27/17   0856  10/16/17   0840   BILITOTAL  0.7  0.5  0.5   ALKPHOS  73  69  71   ALT  18  15  16   AST  19  18  19   ALBUMIN  3.3*  3.0*  3.0*     TSH   Date Value Ref Range Status   06/13/2016 1.60 0.40 - 4.00 mU/L Final   04/05/2011 3.73 0.4 - 5.0 mU/L Final     Recent Labs   Lab Test  12/18/17   0906  11/27/17   0856  10/16/17   0840  09/18/17   0949  08/21/17   0911   02/09/17   0823   PSA  9.34*  12.30*  11.90*  18.90*  30.80*   < >   --    TESTOSTTOTAL   --    --    --    --    --    --   9*    < > = values in this interval not displayed.           ASSESSMENT AND PLAN   1. 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 No response to addition of bicalutamide  3. PE diagnosed few weeks after initiation of megestrol acetate for hot flashes on GnRH agonist  4. No significant medical comorbidities    He is tolerating his abiraterone well and has no complains. His PSA has been declining nicely as expected. We would plan to continue as current. There are no immediate concerns.     He did get bilateral orchiectomies done last month 9/27/17. He would not need any more lupron or other GnRH directed therapies.     He has hot flashes, mood swings,  fatigue - from androgen deprivation. These are no different between either orchiectomy or GnRH therapy as they come with low levels of testosterone. There is no good way around.     He has nocturia. I will try tamsulosin for this. I have prescribed 0.4 mg qhs.     We would continue with zometa every 6-8 weeks. I could see him in 3 months. He is heading off to [place] in late January and will stay through May. I will see him when he returns. We will try to get a CMP drawn before he leaves for [place]. I explained him risk of hepatitis with abiraterone - though very unlikely.     Over 25 min of direct face to face time spent with patient with more than 50% time spent in counseling and coordinating care.        Again, thank you for allowing me to participate in the care of your patient.        Sincerely,        Rafael Valenzuela MD

## 2017-12-18 NOTE — NURSING NOTE
"Oncology Rooming Note    December 18, 2017 10:46 AM   Dimitri Neves is a 82 year old male who presents for:    Chief Complaint   Patient presents with     Oncology Clinic Visit     Initial Vitals: /83 (Patient Position: Left side, Cuff Size: Adult Regular)  Pulse 59  Temp 98.1  F (36.7  C) (Oral)  Wt 84.4 kg (186 lb 1.6 oz)  SpO2 97%  BMI 25.59 kg/m2 Estimated body mass index is 25.59 kg/(m^2) as calculated from the following:    Height as of 12/5/17: 1.816 m (5' 11.5\").    Weight as of this encounter: 84.4 kg (186 lb 1.6 oz). Body surface area is 2.06 meters squared.  Moderate Pain (4) Comment: takes oxycodone with relief   No LMP for male patient.  Allergies reviewed: Yes  Medications reviewed: Yes    Medications: Medication refills not needed today.  Pharmacy name entered into [employer]:    [id number] - [place], [place] - [address] [facility] - [place], [place] - [address] [place], [place] [address] ONLY - [place], [place]    Clinical concerns: going to [place] for a couple months, needs script mailed to him Dr. Valenzuela was notified.    5 minutes for nursing intake (face to face time)     Enrike Gilbert RN              "

## 2017-12-19 ENCOUNTER — OFFICE VISIT (OUTPATIENT)
Dept: DERMATOLOGY | Facility: CLINIC | Age: 82
End: 2017-12-19
Payer: COMMERCIAL

## 2017-12-19 ENCOUNTER — DOCUMENTATION ONLY (OUTPATIENT)
Dept: PHARMACY | Facility: CLINIC | Age: 82
End: 2017-12-19

## 2017-12-19 VITALS — HEART RATE: 54 BPM | SYSTOLIC BLOOD PRESSURE: 155 MMHG | DIASTOLIC BLOOD PRESSURE: 73 MMHG | OXYGEN SATURATION: 96 %

## 2017-12-19 DIAGNOSIS — C44.42 SQUAMOUS CELL CARCINOMA, SCALP/NECK: Primary | ICD-10-CM

## 2017-12-19 PROCEDURE — 17311 MOHS 1 STAGE H/N/HF/G: CPT | Performed by: DERMATOLOGY

## 2017-12-19 PROCEDURE — 17311 MOHS 1 STAGE H/N/HF/G: CPT | Mod: 59 | Performed by: DERMATOLOGY

## 2017-12-19 NOTE — PROGRESS NOTES
Primary Oncologist: Dr. Rafael Valenzuela  Primary Oncology Clinic: [facility]  Cancer Diagnosis: Metastatic Prostate Cancer      Abiraterone 1000 mg PO daily with Prednisone 5 mg PO BID  Start Date: 8/19/17  C2 9/19/17  C3 10/19/17  C4 11/19/17  C5 12/19/17      Drug Interaction Assessment:     Abiraterone will decrease Warfarin metabolism, potentially raising INR; patient is aware and will inquire about more frequent INR monitoring at the start of therapy    Abiraterone inhibits Metoprolol metabolism with a slight risk of causing bradycardia      Lab Monitoring Plan:  C1D1+   CMP, BP C2D1+ Call, CMP, BP C3D1+ Call, CMP, BP C4D1+ Call, CMP, BP C5D1+ Call, CMP, BP C6D1+ Call, CMP, BP   C1D8+    C2D8+    C3D8+    C4D8+    C5D8+    C6D8+      C1D15+ Call, CMP C2D15+ Call, CMP C3D15+    C4D15+    C5D15+    C6D15+      C1D22+    C2D22+    C3D22+    C4D22+    C5D22+    C6D22+        Subjective/Objective:  Dimitri Neves is a 82 year old male seen in clinic 12/18 for a follow-up visit for oral chemotherapy (abiraterone and prednisone). He is doing well with no new complaints. He has not missed any doses of either medication. Dimitri's blood pressure is elevated in clinic today but he mentioned that he thinks he has white coat syndrome. He has not been checking his blood pressure at home but has a cuff and was encouraged to check it a few times a week so that we have a better sense of his usual blood pressure. Of note, he will be in [place] from Jan 27-Apr 1 and will require his medication refills to be sent there.    ORAL CHEMOTHERAPY 7/26/2017 8/21/2017 9/18/2017 10/16/2017 11/27/2017 12/19/2017   Drug Name Zytiga (Abiraterone) Zytiga (Abiraterone) Zytiga (Abiraterone) Zytiga (Abiraterone) Zytiga (Abiraterone) Zytiga (Abiraterone)   Current Dosage 1000mg 1000mg 1000mg 1000mg 1000mg 1000mg   Current Schedule Daily Daily Daily Daily Daily Daily   Cycle Details Continuous Continuous Continuous Continuous  "Continuous Continuous   Start Date of Last Cycle 7/19/2017 8/19/2017 9/19/2017 10/16/2017 11/19/2017 12/19/2017   Planned next cycle start date - - - - 12/19/2017 -   Doses missed in last 2 weeks 0 0 0 0 0 0   Adherence Assessment Adherent Adherent Adherent Adherent Adherent Adherent   Adverse Effects No AE identified during assessment No AE identified during assessment No AE identified during assessment No AE identified during assessment No AE identified during assessment No AE identified during assessment   Any new drug interactions? - No No No No No   Is the dose as ordered appropriate for the patient? Yes Yes Yes Yes Yes Yes   Is the patient currently in pain? Assessed in last 30 days. Assessed in last 30 days. Assessed in last 30 days. Assessed in last 30 days. Assessed in last 30 days. Assessed in last 30 days.   Has the patient been assessed within the past 6 months for depression? Yes Yes Yes Yes - Yes   Has the patient missed any days of school, work, or other routine activity? No No No No No -       Vitals:  BP:   BP Readings from Last 1 Encounters:   12/19/17 155/73     Wt Readings from Last 1 Encounters:   12/18/17 84.4 kg (186 lb 1.6 oz)     Estimated body surface area is 2.06 meters squared as calculated from the following:    Height as of 12/5/17: 1.816 m (5' 11.5\").    Weight as of 12/18/17: 84.4 kg (186 lb 1.6 oz).    Labs:  Lab Results   Component Value Date     12/18/2017      Lab Results   Component Value Date    POTASSIUM 3.4 12/18/2017     Lab Results   Component Value Date    ALETHA 8.6 12/18/2017     Lab Results   Component Value Date    ALBUMIN 3.3 12/18/2017     No results found for: MAG  Lab Results   Component Value Date    PHOS 3.1 12/28/2011     Lab Results   Component Value Date    BUN 13 12/18/2017     Lab Results   Component Value Date    CR 0.71 12/18/2017     Lab Results   Component Value Date    AST 19 12/18/2017     Lab Results   Component Value Date    ALT 18 12/18/2017 "     Lab Results   Component Value Date    BILITOTAL 0.7 12/18/2017       Lab Results   Component Value Date    WBC 4.3 12/18/2017     Lab Results   Component Value Date    HGB 13.0 12/18/2017     Lab Results   Component Value Date     12/18/2017     Lab Results   Component Value Date    ANEU 2.5 12/18/2017     PSA: 9.34    Assessment:  Dimitri is tolerating therapy with minimal side effects. His blood pressure remains a bit elevated (last check 12/19: 155/73). Will continue to monitor and have asked him to check it at home a few times (before leaving for [place] at end of January).    Plan:  Continue current regimen.    Follow-Up:  1/8/17 when pt is in infusion for Zometa - assess BP, home BP readings.    Refill Due:  1/11/17 Refill due to [facility]. Of note, pt will be in [place] from Jan 27 - Apr 1 and will require refills to be sent there during that time. He provided the following information during this visit:    [place] Itinerary  Depart [facility] 2/1 1:30pm (arrive [facility] 2/1 10pm)    Staying at:  [facility]  [id number]  [address] [place] [address]    [facility] Cell (main contact number while in [place]): [phone]  [facility]: [phone]  Unit # [phone]    Depart [facility] 3/29 10pm (arrive [facility] 3/30 1:45pm)    Will pass this information along to [facility] to ensure they are aware of this plan.    Ann Marie Avila, PharmD  PGY-2 Hematology/Oncology Pharmacy Resident

## 2017-12-19 NOTE — PATIENT INSTRUCTIONS
Open Wound Care     for Right frontal scalp x2         ? No strenuous activity for 48 hours    ? Take Tylenol as needed for discomfort.                                                .         ? Do not drink alcoholic beverages for 48 hours.    ? Keep the pressure bandage in place for 24 hours. If the bandage becomes blood tinged or loose, reinforce it with gauze and tape.        (Refer to the reverse side of this page for management of bleeding).    ? Remove bandage in 24 hours and begin wound care as follows:     1. Clean area with tap water using a Q tip or gauze pad, (shower / bathe normally)  2. Dry wound with Q tip or gauze pad  3. Apply Aquaphor, Vaseline, Polysporin or Bacitracin Ointment with a Q tip    Do NOT use Neosporin Ointment *  4. Cover the wound with a band-aid or nonstick gauze pad and paper tape.  5. Repeat wound care once a day until wound is completely healed.    It is an old wives tale that a wound heals better when it is exposed to air and allowed to dry out. The wound will heal faster with a better cosmetic result if it is kept moist with ointment and covered with a bandage.  Do not let the wound dry out.      Supplies Needed:                Qtips or gauze pads                Polysporin or Bacitracin Ointment                Bandaids or nonstick gauze pads and paper tape    Wound care kits and brown paper tape are available for purchase at   the pharmacy.    BLEEDIN. Use tightly rolled up gauze or cloth to apply direct pressure over the bandage for 20   minutes.  2. Reapply pressure for an additional 20 minutes if necessary  3. Call the office or go to the nearest emergency room if pressure fails to stop the bleeding.  4. Use additional gauze and tape to maintain pressure once the bleeding has stopped.  5. Begin wound care 24 hours after surgery as directed.                  WOUND HEALING    1. One week after surgery a pink / red halo will form around the outside of the wound.   This  is new skin.  2. The center of the wound will appear yellowish white and produce some drainage.  3. The pink halo will slowly migrate in toward the center of the wound until the wound is covered with new shiny pink skin.  4. There will be no more drainage when the wound is completely healed.  5. It will take six months to one year for the redness to fade.  6. The scar may be itchy, tight and sensitive to extreme temperatures for a year after the surgery.  7. Massaging the area several times a day for several minutes after the wound is completely healed will help the scar soften and normalize faster. Begin massage only after healing is complete.      In case of emergency call: Dr Jhaveri: [phone]     [facility] [place]: [phone]    [facility]: [phone]

## 2017-12-19 NOTE — NURSING NOTE
"Initial /73  Pulse 54  SpO2 96% Estimated body mass index is 25.59 kg/(m^2) as calculated from the following:    Height as of 12/5/17: 1.816 m (5' 11.5\").    Weight as of 12/18/17: 84.4 kg (186 lb 1.6 oz). .      "

## 2017-12-19 NOTE — LETTER
12/19/2017         RE: Dimitri Neves  [address]  [place] [address]        Dear Colleague,    Thank you for referring your patient, Dimitri Neves, to the [facility]. Please see a copy of my visit note below.    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  Associated symptoms: none.  Patient has no other skin complaints today.  Remainder of the HPI, Meds, PMH, Allergies, FH, and SH was reviewed in chart.      Past Medical History:   Diagnosis Date     Actinic keratosis      AK (actinic keratosis) 7/9/2012     Cataract cortical, senile right eye 4/17/2012     DDD (degenerative disc disease), lumbar 1979    s/p 4 back surgeries, spinal cord stimulator implant      Diverticulosis      ED (erectile dysfunction) 2009     GERD (gastroesophageal reflux disease) ~1980     HTN (hypertension), benign ~2000     Macular degeneration, dry, mild, ou 7/23/2016     Multiple lung nodules     Several basilar pulmonary nodules <5 mm     HUDSON (obstructive sleep apnea) 10/2005    on CPAP     Other abnormal glucose 01/14/2009     PE (pulmonary thromboembolism) (H) 1981    after back surgery      Pure hypercholesterolemia ~2000     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       Past Surgical History:   Procedure Laterality Date     ARTHROSCOPY KNEE RT/LT  ~1995    Right     C LUMBAR SPINE FUSION,ANTER APPRCH  8/2007    four times total, latest 8/07     CATARACT IOL, RT/LT       LASER YAG CAPSULOTOMY      both eyes     ORCHIECTOMY SCROTAL Bilateral 9/27/2017    Procedure: ORCHIECTOMY SCROTAL;  Bilateral orchiectomy scrotal approach;  Surgeon: Senthil Taylor MD;  Location: [facility] OR     PHACOEMULSIFICATION CLEAR CORNEA WITH STANDARD INTRAOCULAR LENS IMPLANT  6-18-12/7-30-12    right/left eye     ROTATOR CUFF REPAIR RT/LT  ~1995    right        Family History   Problem Relation Age of Onset     CANCER Father       Lung 64y     DIABETES Brother      Hypertension Brother      CANCER Mother      Liver     CEREBROVASCULAR DISEASE Mother      Eye Disorder Mother      Glaucoma Mother      CEREBROVASCULAR DISEASE Maternal Grandmother      C.A.D. No family hx of      Thyroid Disease No family hx of      Macular Degeneration No family hx of        Social History     Social History     Marital status:      Spouse name: Tatiana     Number of children: 2     Years of education: N/A     Occupational History      Retired     Social History Main Topics     Smoking status: Former Smoker     Packs/day: 1.00     Years: 25.00     Types: Cigarettes     Quit date: 1/2/1976     Smokeless tobacco: Never Used      Comment: lives in smoke free household     Alcohol use 7.0 oz/week     14 Standard drinks or equivalent per week      Comment: 2-3 drinks every night     Drug use: No      Comment: tatoos- sterile     Sexual activity: Yes     Partners: Female     Other Topics Concern      Service Yes     [employer] x8 years     Blood Transfusions No     Caffeine Concern No     Hobby Hazards No     Sleep Concern No     Stress Concern No     Weight Concern Yes     Special Diet No     Back Care Yes     Exercise Yes     Seat Belt Yes     Self-Exams No     Parent/Sibling W/ Cabg, Mi Or Angioplasty Before 65f 55m? No     Social History Narrative       Outpatient Encounter Prescriptions as of 12/19/2017   Medication Sig Dispense Refill     predniSONE (DELTASONE) 5 MG tablet Take 1 tablet (5 mg) by mouth 2 times daily 60 tablet 0     abiraterone (ZYTIGA) 250 MG tablet Take 4 tablets (1,000 mg) by mouth daily for 30 doses Take on empty stomach. 120 tablet 0     MINOCYCLINE HCL PO Take 100 mg by mouth daily       quinapril (ACCUPRIL) 40 MG Tab TAKE 1 TABLET (40 MG) BY MOUTH DAILY - NEEDS TO FOLLOW UP 90 tablet 0     tamsulosin (FLOMAX) 0.4 MG capsule Take 1 capsule (0.4 mg) by mouth daily 30 capsule 1     oxyCODONE (ROXICODONE) 5 MG IR tablet Take 1  tablet (5 mg) by mouth every 6 hours as needed for pain 30 tablet 0     simvastatin (ZOCOR) 40 MG tablet TAKE 0.5 TABLETS (20 MG) BY MOUTH DAILY 45 tablet 3     Calcium Carbonate (CALCIUM 600 PO)        [facility] VITAMIN  B12 1000 MCG TABS TAKE 1 TABLET BY MOUTH EVERY DAY 30 tablet 5     CEPHALEXIN PO        LORazepam (ATIVAN) 0.5 MG tablet Take 1 tablet (0.5 mg) by mouth every 4 hours as needed (Anxiety, Nausea/Vomiting or Sleep) 30 tablet 2     metoprolol (TOPROL-XL) 25 MG 24 hr tablet TAKE 1 TABLET (25 MG) BY MOUTH DAILY 90 tablet 0     gabapentin (NEURONTIN) 300 MG capsule Take 900 mg by mouth At Bedtime       sildenafil (VIAGRA) 100 MG tablet Take 1 tablet (100 mg) by mouth daily as needed for erectile dysfunction 10 tablet 3     Cholecalciferol (VITAMIN D) 2000 UNITS tablet Take 1 tablet by mouth daily       No facility-administered encounter medications on file as of 12/19/2017.              Review Of Systems  Skin: As above  Eyes: negative  Ears/Nose/Throat: negative  Respiratory: No shortness of breath, dyspnea on exertion, cough, or hemoptysis  Cardiovascular: negative  Gastrointestinal: negative  Genitourinary: negative  Musculoskeletal: negative  Neurologic: negative  Psychiatric: negative  Hematologic/Lymphatic/Immunologic: negative  Endocrine: negative      O:   NAD, WDWN, Alert & Oriented, Mood & Affect wnl, Vitals stable   Here today alone   /73  Pulse 54  SpO2 96%   General appearance normal   Vitals stable   Alert, oriented and in no acute distress      Following lymph nodes palpated: Occipital, Cervical, Supraclavicular no lad   R frontal scalp ant 1.2cm red plaque   R frontal calp post 1cm red plaque       Eyes: Conjunctivae/lids:Normal     ENT: Lips, buccal mucosa, tongue: normal    MSK:Normal    Cardiovascular: peripheral edema none    Pulm: Breathing Normal    Lymph Nodes: No Head and Neck Lymphadenopathy     Neuro/Psych: Orientation:Normal; Mood/Affect:Normal      A/P:  1. r frontal scalp  ant squamous cell carcinoma   MOHS:   Location    After PGACAC discussed with patient, decision for Mohs surgery was made. Indication for Mohs was Location. Patient confirmed the site with Dr. Jhaveri.  After anesthesia with LEC, the tumor was excised using standard Mohs technique in 1 stages(s).  CLEAR MARGINS OBTAINED and Final defect size was 1.8 x  cm.       REPAIR SECOND INTENT: We discussed the options for wound management in full with the patient including risks/benefits/possible outcomes. Decision made to allow the wound to heal by second intention. EBL minimal; complications none; wound care routine.  The patient was discharged in good condition and will return in one month or prn for wound evaluation.    2. r frontal scalp post squamous cell carcinoma   MOHS:   Location    After PGACAC discussed with patient, decision for Mohs surgery was made. Indication for Mohs was Location. Patient confirmed the site with Dr. Jhaveri.  After anesthesia with LEC, the tumor was excised using standard Mohs technique in 1 stages(s).  CLEAR MARGINS OBTAINED and Final defect size was 1.5 cm.       REPAIR SECOND INTENT: We discussed the options for wound management in full with the patient including risks/benefits/possible outcomes. Decision made to allow the wound to heal by second intention. EBL minimal; complications none; wound care routine.  The patient was discharged in good condition and will return in one month or prn for wound evaluation.    BENIGN LESIONS DISCUSSED WITH PATIENT:  I discussed the specifics of tumor, prognosis, and genetics of benign lesions.  I explained that treatment of these lesions would be purely cosmetic and not medically neccessary.  I discussed with patient different removal options including excision, cautery and /or laser.      Nature and genetics of benign skin lesions dicussed with patient.  Signs and Symptoms of skin cancer discussed with patient.  Patient encouraged to perform monthly  skin exams.  UV precautions reviewed with patient.  Skin care regimen reviewed with patient: Eliminate harsh soaps, i.e. Dial, zest, irsih spring; Mild soaps such as Cetaphil or Dove sensitive skin, avoid hot or cold showers, aggressive use of emollients including vanicream, cetaphil or cerave discussed with patient.    Risks of non-melanoma skin cancer discussed with patient   Return to clinic 6 months      Again, thank you for allowing me to participate in the care of your patient.        Sincerely,        Senthil Jhaveri MD

## 2017-12-19 NOTE — NURSING NOTE
Surgical Office Location :   [facility]  [address] [place] [address]

## 2017-12-19 NOTE — PROGRESS NOTES
[facility]  HEMATOLOGY AND ONCOLOGY    FOLLOW-UP VISIT NOTE    PATIENT NAME: Dimitri Neves MRN # [id number]  DATE OF VISIT: Dec 18, 2017 YOB: 1935    REFERRING PROVIDER: No referring provider defined for this encounter.    CANCER TYPE: Prostate adenocarcinoma  STAGE: IV    TREATMENT SUMMARY:  Dimitri was followed by his PCP on 6/13/13 and was elevated at 32 as noted below. He was referred to Dr. Taylor. He was treated with a 10 day course of ciprofloxacin and followed again in 6 weeks on 8/10. His PSA drawn prior to this visit was unchanged and remained elevated at 32. He had a TRUS biopsy on 8/25/14 which was negative for prostate adenocarcinoma. His PSA was repeated in 3 months and now increased to 67.9. He had a repeat biopsy of prostate on 12/16/14 which now confirmed prostate adenocarcinoma with eileen 4+4 disease involving 5 of the cores and [place] 3+3 disease involving remainder of cores. He was staged with a CT scan and bone scan done on 12/29/14 which revealed metastatic foci in the upper cervical vertebrae on the left, right posterior 3rd rib and right ischium, focal uptake in the posterior left 11th rib with corresponding lytic expansile appearance on CT, suspicious for metastasis.            4/5/2011 08:46 6/13/2014 08:03 7/25/2014 09:47 12/5/2014 09:00 4/7/2015 09:14   PSA 2.77 32.70 (H) 32.00 (H) 67.88 (H) 1.92      He was started on androgen deprivation therapy in Jan 2015 with a good initial response. His PSA has been increasing recently and he was started on bicalutamide in February with no response. He has continued to have rising PSA and was prescribed abiraterone with prednisone. He had a huge copay with this and has been referred to Medical Oncology to review other options.     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 He is tolerating his abiraterone without any difficulty.   He does have hot flushes, fatigue, mood swings on androgen deprivation therapy.     He did have bilateral orchiectomies.     He continues to struggle with pain in his knees.       PAST MEDICAL HISTORY     Past Medical History:   Diagnosis Date     Actinic keratosis      AK (actinic keratosis) 7/9/2012     Cataract cortical, senile right eye 4/17/2012     DDD (degenerative disc disease), lumbar 1979    s/p 4 back surgeries, spinal cord stimulator implant      Diverticulosis      ED (erectile dysfunction) 2009     GERD (gastroesophageal reflux disease) ~1980     HTN (hypertension), benign ~2000     Macular degeneration, dry, mild, ou 7/23/2016     Multiple lung nodules     Several basilar pulmonary nodules <5 mm     HUDSON (obstructive sleep apnea) 10/2005    on CPAP     Other abnormal glucose 01/14/2009     PE (pulmonary thromboembolism) (H) 1981    after back surgery      Pure hypercholesterolemia ~2000     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         CURRENT OUTPATIENT MEDICATIONS     Current Outpatient Prescriptions   Medication Sig     predniSONE (DELTASONE) 5 MG tablet Take 1 tablet (5 mg) by mouth 2 times daily     abiraterone (ZYTIGA) 250 MG tablet Take 4 tablets (1,000 mg) by mouth daily for 30 doses Take on empty stomach.     MINOCYCLINE HCL PO Take 100 mg by mouth daily     quinapril (ACCUPRIL) 40 MG Tab TAKE 1 TABLET (40 MG) BY MOUTH DAILY - NEEDS TO FOLLOW UP     tamsulosin (FLOMAX) 0.4 MG capsule Take 1 capsule (0.4 mg) by mouth daily     oxyCODONE (ROXICODONE) 5 MG IR tablet Take 1 tablet (5 mg) by mouth every 6 hours as needed for pain     simvastatin (ZOCOR) 40 MG tablet TAKE 0.5 TABLETS (20 MG) BY MOUTH DAILY     Calcium Carbonate (CALCIUM 600 PO)      [facility] VITAMIN  B12 1000 MCG  TABS TAKE 1 TABLET BY MOUTH EVERY DAY     CEPHALEXIN PO      LORazepam (ATIVAN) 0.5 MG tablet Take 1 tablet (0.5 mg) by mouth every 4 hours as needed (Anxiety, Nausea/Vomiting or Sleep)     metoprolol (TOPROL-XL) 25 MG 24 hr tablet TAKE 1 TABLET (25 MG) BY MOUTH DAILY     gabapentin (NEURONTIN) 300 MG capsule Take 900 mg by mouth At Bedtime     sildenafil (VIAGRA) 100 MG tablet Take 1 tablet (100 mg) by mouth daily as needed for erectile dysfunction     Cholecalciferol (VITAMIN D) 2000 UNITS tablet Take 1 tablet by mouth daily     No current facility-administered medications for this visit.         ALLERGIES      Allergies   Allergen Reactions     Morphine Sulfate GI Disturbance     vomiting     Vicodin [Hydrocodone-Acetaminophen] Nausea and Vomiting        REVIEW OF SYSTEMS   As above in the HPI, o/w complete 12-point ROS was negative.     PHYSICAL EXAM   /83 (Patient Position: Left side, Cuff Size: Adult Regular)  Pulse 59  Temp 98.1  F (36.7  C) (Oral)  Wt 84.4 kg (186 lb 1.6 oz)  SpO2 97%  BMI 25.59 kg/m2  GEN: NAD  HEENT: PERRL, EOMI, no icterus, injection or pallor. Oropharynx is clear.  NECK: no cervical or supraclavicular lymphadenopathy  LUNGS: clear bilaterally  CV: regular, no murmurs, rubs, or gallops  ABDOMEN: soft, non-tender, non-distended, normal bowel sounds, no hepatosplenomegaly by percussion or palpation  EXT: warm, well perfused, no edema  NEURO: alert  SKIN: no rashes     LABORATORY AND IMAGING STUDIES     Labs remain stable. Corrected calcium is within the normal range or low normal  Mild normocytic anemia   Recent Labs   Lab Test  12/18/17   0906  11/27/17   0856  10/16/17   0840  09/18/17   0949  09/15/17   1131   NA  143  142  140  140  138   POTASSIUM  3.4  3.9  3.9  4.2  4.1   CHLORIDE  107  105  108  104  104   CO2  30  30  28  32  27   ANIONGAP  6  7  4  4  7   BUN  13  13  10  12  16   CR  0.71  0.75  0.71  0.76  0.78   GLC  93  93  100*  103*  130*   ALETHA  8.6  8.7  8.2*   8.8  8.6     Recent Labs   Lab Test  12/28/11   0857  04/05/11   0846  06/16/10   1338  01/05/10   1134   PHOS  3.1  3.4  2.5  2.2*     Recent Labs   Lab Test  12/18/17   0906  11/27/17   0856  10/16/17   0840  09/18/17   0949  09/15/17   1131   WBC  4.3  5.0  4.8  6.5  8.6   HGB  13.0*  12.0*  12.1*  11.7*  11.6*   PLT  159  198  211  219  229   MCV  95  95  95  91  93   NEUTROPHIL  58.1  52.2  53.9  64.1  74.6     Recent Labs   Lab Test  12/18/17   0906  11/27/17   0856  10/16/17   0840   BILITOTAL  0.7  0.5  0.5   ALKPHOS  73  69  71   ALT  18  15  16   AST  19  18  19   ALBUMIN  3.3*  3.0*  3.0*     TSH   Date Value Ref Range Status   06/13/2016 1.60 0.40 - 4.00 mU/L Final   04/05/2011 3.73 0.4 - 5.0 mU/L Final     Recent Labs   Lab Test  12/18/17   0906  11/27/17   0856  10/16/17   0840  09/18/17   0949  08/21/17   0911   02/09/17   0823   PSA  9.34*  12.30*  11.90*  18.90*  30.80*   < >   --    TESTOSTTOTAL   --    --    --    --    --    --   9*    < > = values in this interval not displayed.           ASSESSMENT AND PLAN   1. 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 No response to addition of bicalutamide  3. PE diagnosed few weeks after initiation of megestrol acetate for hot flashes on GnRH agonist  4. No significant medical comorbidities    He is tolerating his abiraterone well and has no complains. His PSA has been declining nicely as expected. We would plan to continue as current. There are no immediate concerns.     He did get bilateral orchiectomies done last month 9/27/17. He would not need any more lupron or other GnRH directed therapies.     He has hot flashes, mood swings, fatigue - from androgen deprivation. These are no different between either orchiectomy or GnRH therapy as they come with low levels of testosterone. There is no good way around.     He has nocturia. I will try tamsulosin for this. I have  prescribed 0.4 mg qhs.     We would continue with zometa every 6-8 weeks. I could see him in 3 months. He is heading off to [place] in late January and will stay through May. I will see him when he returns. We will try to get a CMP drawn before he leaves for [place]. I explained him risk of hepatitis with abiraterone - though very unlikely.     Over 25 min of direct face to face time spent with patient with more than 50% time spent in counseling and coordinating care.

## 2017-12-19 NOTE — MR AVS SNAPSHOT
After Visit Summary   2017    Dimitri Neves    MRN: [id number]           Patient Information     Date Of Birth          1935        Visit Information        Provider Department      2017 8:00 AM Senthil Jhaveri MD [facility]        Care Instructions    Open Wound Care     for Right frontal scalp x2         ? No strenuous activity for 48 hours    ? Take Tylenol as needed for discomfort.                                                .         ? Do not drink alcoholic beverages for 48 hours.    ? Keep the pressure bandage in place for 24 hours. If the bandage becomes blood tinged or loose, reinforce it with gauze and tape.        (Refer to the reverse side of this page for management of bleeding).    ? Remove bandage in 24 hours and begin wound care as follows:     1. Clean area with tap water using a Q tip or gauze pad, (shower / bathe normally)  2. Dry wound with Q tip or gauze pad  3. Apply Aquaphor, Vaseline, Polysporin or Bacitracin Ointment with a Q tip    Do NOT use Neosporin Ointment *  4. Cover the wound with a band-aid or nonstick gauze pad and paper tape.  5. Repeat wound care once a day until wound is completely healed.    It is an old wives tale that a wound heals better when it is exposed to air and allowed to dry out. The wound will heal faster with a better cosmetic result if it is kept moist with ointment and covered with a bandage.  Do not let the wound dry out.      Supplies Needed:                Qtips or gauze pads                Polysporin or Bacitracin Ointment                Bandaids or nonstick gauze pads and paper tape    Wound care kits and brown paper tape are available for purchase at   the pharmacy.    BLEEDIN. Use tightly rolled up gauze or cloth to apply direct pressure over the bandage for 20   minutes.  2. Reapply pressure for an additional 20 minutes if necessary  3. Call the office or go to the nearest emergency room if  pressure fails to stop the bleeding.  4. Use additional gauze and tape to maintain pressure once the bleeding has stopped.  5. Begin wound care 24 hours after surgery as directed.                  WOUND HEALING    1. One week after surgery a pink / red halo will form around the outside of the wound.   This is new skin.  2. The center of the wound will appear yellowish white and produce some drainage.  3. The pink halo will slowly migrate in toward the center of the wound until the wound is covered with new shiny pink skin.  4. There will be no more drainage when the wound is completely healed.  5. It will take six months to one year for the redness to fade.  6. The scar may be itchy, tight and sensitive to extreme temperatures for a year after the surgery.  7. Massaging the area several times a day for several minutes after the wound is completely healed will help the scar soften and normalize faster. Begin massage only after healing is complete.      In case of emergency call: Dr Jhaveri: [phone]     [facility]: [phone]    [facility]: [phone]            Follow-ups after your visit        Your next 10 appointments already scheduled     2018  8:30 AM CST   LAB with LAB ONC [facility])    [address] [place] [id number] [phone]           Please do not eat 10-12 hours before your appointment if you are coming in fasting for labs on lipids, cholesterol, or glucose (sugar). This does not apply to pregnant women. Water, hot tea and black coffee (with nothing added) are okay. Do not drink other fluids, diet soda or chew gum.            2018  9:00 AM CST   Level O with BAY 7 INFUSION   [facility]    [address] [place] [id number] [phone]            Dedrick 15, 2018  8:15 AM CST   LAB with LAB ONC [facility]  ([facility])    [address] [place] [id number] [phone]           Please do not eat 10-12 hours before your appointment if you are coming in fasting for labs on lipids, cholesterol, or glucose (sugar). This does not apply to pregnant women. Water, hot tea and black coffee (with nothing added) are okay. Do not drink other fluids, diet soda or chew gum.            Dedrick 15, 2018  9:00 AM CST   Return Visit with Rafael Valenzuela MD   [facility] ([facility]    [address] [place] [id number] [phone]            Dedrick 15, 2018  9:30 AM CST   Level O with BAY 6 INFUSION   [facility] ([facility])    [address] [place] [id number] [phone]              Who to contact     If you have questions or need follow up information about today's clinic visit or your schedule please contact [facility] directly at [phone].  Normal or non-critical lab and imaging results will be communicated to you by EpicForcehart, letter or phone within 4 business days after the clinic has received the results. If you do not hear from us within 7 days, please contact the clinic through EpicForcehart or phone. If you have a critical or abnormal lab result, we will notify you by phone as soon as possible.  Submit refill requests through Chelaile or call your pharmacy and they will forward the refill request to us. Please allow 3 business days for your refill to be completed.          Additional Information About Your Visit        EpicForcehart Information     Chelaile gives you secure access to your electronic health record. If you see a primary care provider, you can also send messages to your care team and make appointments. If you have questions, please call your primary care clinic.  If you do not have a primary care provider, please call [phone] and they will assist you.        Care  EveryWhere ID     This is your Care EveryWhere ID. This could be used by other organizations to access your [place] medical records  [id number]        Your Vitals Were     Pulse Pulse Oximetry                54 96%           Blood Pressure from Last 3 Encounters:   12/19/17 155/73   12/18/17 183/83   12/05/17 162/69    Weight from Last 3 Encounters:   12/18/17 84.4 kg (186 lb 1.6 oz)   11/27/17 83.3 kg (183 lb 11.2 oz)   10/30/17 83.9 kg (185 lb)              Today, you had the following     No orders found for display       Primary Care Provider Office Phone # Fax #    Reginaldo Muir [phone]       [address] [facility]  [place] [address]        Equal Access to Services     [facility] DEANA : Hadii nahid martins hadasho Sogregorio, waaxda luqadaha, qaybta kaalmada adeegyada, naty willis . So [facility] [phone].    ATENCIÓN: Si habla español, tiene a roberts disposición servicios gratuitos de asistencia lingüística. Kelsey al [phone].    We comply with applicable federal civil rights laws and [place] laws. We do not discriminate on the basis of race, color, national origin, age, disability, sex, sexual orientation, or gender identity.            Thank you!     Thank you for choosing [facility]  for your care. Our goal is always to provide you with excellent care. Hearing back from our patients is one way we can continue to improve our services. Please take a few minutes to complete the written survey that you may receive in the mail after your visit with us. Thank you!             Your Updated Medication List - Protect others around you: Learn how to safely use, store and throw away your medicines at www.disposemymeds.org.          This list is accurate as of: 12/19/17  9:57 AM.  Always use your most recent med list.                   Brand Name Dispense Instructions for use Diagnosis    abiraterone 250 MG tablet    ZYTIGA    120 tablet    Take 4 tablets (1,000 mg)  by mouth daily for 30 doses Take on empty stomach.    Bone metastasis (H), Malignant neoplasm of prostate (H)       CALCIUM 600 PO           CEPHALEXIN PO           [facility] vitamin  B12 1000 MCG Tabs   Generic drug:  cyanocobalamin     30 tablet    TAKE 1 TABLET BY MOUTH EVERY DAY    Low vitamin B12 level       gabapentin 300 MG capsule    NEURONTIN     Take 900 mg by mouth At Bedtime        LORazepam 0.5 MG tablet    ATIVAN    30 tablet    Take 1 tablet (0.5 mg) by mouth every 4 hours as needed (Anxiety, Nausea/Vomiting or Sleep)    Bone metastasis (H), Malignant neoplasm of prostate (H)       metoprolol 25 MG 24 hr tablet    TOPROL-XL    90 tablet    TAKE 1 TABLET (25 MG) BY MOUTH DAILY    HTN (hypertension), benign       MINOCYCLINE HCL PO      Take 100 mg by mouth daily    Squamous cell carcinoma, scalp/neck       oxyCODONE IR 5 MG tablet    ROXICODONE    30 tablet    Take 1 tablet (5 mg) by mouth every 6 hours as needed for pain    Narcotic dependence, episodic use (H)       predniSONE 5 MG tablet    DELTASONE    60 tablet    Take 1 tablet (5 mg) by mouth 2 times daily    Bone metastasis (H), Malignant neoplasm of prostate (H)       quinapril 40 MG Tab    ACCUPRIL    90 tablet    TAKE 1 TABLET (40 MG) BY MOUTH DAILY - NEEDS TO FOLLOW UP    HTN (hypertension), benign       sildenafil 100 MG tablet    VIAGRA    10 tablet    Take 1 tablet (100 mg) by mouth daily as needed for erectile dysfunction    ED (erectile dysfunction)       simvastatin 40 MG tablet    ZOCOR    45 tablet    TAKE 0.5 TABLETS (20 MG) BY MOUTH DAILY    Hyperlipidemia LDL goal <130       tamsulosin 0.4 MG capsule    FLOMAX    30 capsule    Take 1 capsule (0.4 mg) by mouth daily    Malignant neoplasm of prostate (H)       vitamin D 2000 UNITS tablet      Take 1 tablet by mouth daily

## 2017-12-19 NOTE — PROGRESS NOTES
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  Associated symptoms: none.  Patient has no other skin complaints today.  Remainder of the HPI, Meds, PMH, Allergies, FH, and SH was reviewed in chart.      Past Medical History:   Diagnosis Date     Actinic keratosis      AK (actinic keratosis) 7/9/2012     Cataract cortical, senile right eye 4/17/2012     DDD (degenerative disc disease), lumbar 1979    s/p 4 back surgeries, spinal cord stimulator implant      Diverticulosis      ED (erectile dysfunction) 2009     GERD (gastroesophageal reflux disease) ~1980     HTN (hypertension), benign ~2000     Macular degeneration, dry, mild, ou 7/23/2016     Multiple lung nodules     Several basilar pulmonary nodules <5 mm     HUDSON (obstructive sleep apnea) 10/2005    on CPAP     Other abnormal glucose 01/14/2009     PE (pulmonary thromboembolism) (H) 1981    after back surgery      Pure hypercholesterolemia ~2000     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       Past Surgical History:   Procedure Laterality Date     ARTHROSCOPY KNEE RT/LT  ~1995    Right     C LUMBAR SPINE FUSION,ANTER APPRCH  8/2007    four times total, latest 8/07     CATARACT IOL, RT/LT       LASER YAG CAPSULOTOMY      both eyes     ORCHIECTOMY SCROTAL Bilateral 9/27/2017    Procedure: ORCHIECTOMY SCROTAL;  Bilateral orchiectomy scrotal approach;  Surgeon: Senthil Taylor MD;  Location: [facility] OR     PHACOEMULSIFICATION CLEAR CORNEA WITH STANDARD INTRAOCULAR LENS IMPLANT  6-18-12/7-30-12    right/left eye     ROTATOR CUFF REPAIR RT/LT  ~1995    right        Family History   Problem Relation Age of Onset     CANCER Father      Lung 64y     DIABETES Brother      Hypertension Brother      CANCER Mother      Liver     CEREBROVASCULAR DISEASE Mother      Eye Disorder Mother      Glaucoma Mother      CEREBROVASCULAR DISEASE Maternal Grandmother      C.A.D. No family hx  of      Thyroid Disease No family hx of      Macular Degeneration No family hx of        Social History     Social History     Marital status:      Spouse name: Tatiana     Number of children: 2     Years of education: N/A     Occupational History      Retired     Social History Main Topics     Smoking status: Former Smoker     Packs/day: 1.00     Years: 25.00     Types: Cigarettes     Quit date: 1/2/1976     Smokeless tobacco: Never Used      Comment: lives in smoke free household     Alcohol use 7.0 oz/week     14 Standard drinks or equivalent per week      Comment: 2-3 drinks every night     Drug use: No      Comment: tatoos- sterile     Sexual activity: Yes     Partners: Female     Other Topics Concern      Service Yes     Sports [employer] x8 years     Blood Transfusions No     Caffeine Concern No     Hobby Hazards No     Sleep Concern No     Stress Concern No     Weight Concern Yes     Special Diet No     Back Care Yes     Exercise Yes     Seat Belt Yes     Self-Exams No     Parent/Sibling W/ Cabg, Mi Or Angioplasty Before 65f 55m? No     Social History Narrative       Outpatient Encounter Prescriptions as of 12/19/2017   Medication Sig Dispense Refill     predniSONE (DELTASONE) 5 MG tablet Take 1 tablet (5 mg) by mouth 2 times daily 60 tablet 0     abiraterone (ZYTIGA) 250 MG tablet Take 4 tablets (1,000 mg) by mouth daily for 30 doses Take on empty stomach. 120 tablet 0     MINOCYCLINE HCL PO Take 100 mg by mouth daily       quinapril (ACCUPRIL) 40 MG Tab TAKE 1 TABLET (40 MG) BY MOUTH DAILY - NEEDS TO FOLLOW UP 90 tablet 0     tamsulosin (FLOMAX) 0.4 MG capsule Take 1 capsule (0.4 mg) by mouth daily 30 capsule 1     oxyCODONE (ROXICODONE) 5 MG IR tablet Take 1 tablet (5 mg) by mouth every 6 hours as needed for pain 30 tablet 0     simvastatin (ZOCOR) 40 MG tablet TAKE 0.5 TABLETS (20 MG) BY MOUTH DAILY 45 tablet 3     Calcium Carbonate (CALCIUM 600 PO)        [facility]S VITAMIN  B12 1000 MCG TABS TAKE 1  TABLET BY MOUTH EVERY DAY 30 tablet 5     CEPHALEXIN PO        LORazepam (ATIVAN) 0.5 MG tablet Take 1 tablet (0.5 mg) by mouth every 4 hours as needed (Anxiety, Nausea/Vomiting or Sleep) 30 tablet 2     metoprolol (TOPROL-XL) 25 MG 24 hr tablet TAKE 1 TABLET (25 MG) BY MOUTH DAILY 90 tablet 0     gabapentin (NEURONTIN) 300 MG capsule Take 900 mg by mouth At Bedtime       sildenafil (VIAGRA) 100 MG tablet Take 1 tablet (100 mg) by mouth daily as needed for erectile dysfunction 10 tablet 3     Cholecalciferol (VITAMIN D) 2000 UNITS tablet Take 1 tablet by mouth daily       No facility-administered encounter medications on file as of 12/19/2017.              Review Of Systems  Skin: As above  Eyes: negative  Ears/Nose/Throat: negative  Respiratory: No shortness of breath, dyspnea on exertion, cough, or hemoptysis  Cardiovascular: negative  Gastrointestinal: negative  Genitourinary: negative  Musculoskeletal: negative  Neurologic: negative  Psychiatric: negative  Hematologic/Lymphatic/Immunologic: negative  Endocrine: negative      O:   NAD, WDWN, Alert & Oriented, Mood & Affect wnl, Vitals stable   Here today alone   /73  Pulse 54  SpO2 96%   General appearance normal   Vitals stable   Alert, oriented and in no acute distress      Following lymph nodes palpated: Occipital, Cervical, Supraclavicular no lad   R frontal scalp ant 1.2cm red plaque   R frontal calp post 1cm red plaque       Eyes: Conjunctivae/lids:Normal     ENT: Lips, buccal mucosa, tongue: normal    MSK:Normal    Cardiovascular: peripheral edema none    Pulm: Breathing Normal    Lymph Nodes: No Head and Neck Lymphadenopathy     Neuro/Psych: Orientation:Normal; Mood/Affect:Normal      A/P:  1. r frontal scalp ant squamous cell carcinoma   MOHS:   Location    After PGACAC discussed with patient, decision for Mohs surgery was made. Indication for Mohs was Location. Patient confirmed the site with Dr. Jhaveri.  After anesthesia with LEC, the tumor  was excised using standard Mohs technique in 1 stages(s).  CLEAR MARGINS OBTAINED and Final defect size was 1.8 x  cm.       REPAIR SECOND INTENT: We discussed the options for wound management in full with the patient including risks/benefits/possible outcomes. Decision made to allow the wound to heal by second intention. EBL minimal; complications none; wound care routine.  The patient was discharged in good condition and will return in one month or prn for wound evaluation.    2. r frontal scalp post squamous cell carcinoma   MOHS:   Location    After PGACAC discussed with patient, decision for Mohs surgery was made. Indication for Mohs was Location. Patient confirmed the site with Dr. Jhaveri.  After anesthesia with LEC, the tumor was excised using standard Mohs technique in 1 stages(s).  CLEAR MARGINS OBTAINED and Final defect size was 1.5 cm.       REPAIR SECOND INTENT: We discussed the options for wound management in full with the patient including risks/benefits/possible outcomes. Decision made to allow the wound to heal by second intention. EBL minimal; complications none; wound care routine.  The patient was discharged in good condition and will return in one month or prn for wound evaluation.    BENIGN LESIONS DISCUSSED WITH PATIENT:  I discussed the specifics of tumor, prognosis, and genetics of benign lesions.  I explained that treatment of these lesions would be purely cosmetic and not medically neccessary.  I discussed with patient different removal options including excision, cautery and /or laser.      Nature and genetics of benign skin lesions dicussed with patient.  Signs and Symptoms of skin cancer discussed with patient.  Patient encouraged to perform monthly skin exams.  UV precautions reviewed with patient.  Skin care regimen reviewed with patient: Eliminate harsh soaps, i.e. Dial, zest, irsih spring; Mild soaps such as Cetaphil or Dove sensitive skin, avoid hot or cold showers, aggressive use  of emollients including vanicream, cetaphil or cerave discussed with patient.    Risks of non-melanoma skin cancer discussed with patient   Return to clinic 6 months

## 2017-12-26 ENCOUNTER — OFFICE VISIT (OUTPATIENT)
Dept: INTERNAL MEDICINE | Facility: CLINIC | Age: 82
End: 2017-12-26
Payer: COMMERCIAL

## 2017-12-26 VITALS
SYSTOLIC BLOOD PRESSURE: 158 MMHG | TEMPERATURE: 97.2 F | HEART RATE: 73 BPM | BODY MASS INDEX: 25.44 KG/M2 | OXYGEN SATURATION: 97 % | DIASTOLIC BLOOD PRESSURE: 80 MMHG | WEIGHT: 185 LBS

## 2017-12-26 DIAGNOSIS — R60.0 BILATERAL LOWER EXTREMITY EDEMA: Primary | ICD-10-CM

## 2017-12-26 DIAGNOSIS — I10 HTN (HYPERTENSION), BENIGN: ICD-10-CM

## 2017-12-26 DIAGNOSIS — C79.51 BONE METASTASIS: ICD-10-CM

## 2017-12-26 DIAGNOSIS — C61 MALIGNANT NEOPLASM OF PROSTATE (H): ICD-10-CM

## 2017-12-26 PROCEDURE — 99214 OFFICE O/P EST MOD 30 MIN: CPT | Performed by: INTERNAL MEDICINE

## 2017-12-26 RX ORDER — QUINAPRIL 40 MG/1
TABLET ORAL
Qty: 90 TABLET | Refills: 0 | Status: SHIPPED | OUTPATIENT
Start: 2017-12-26 | End: 2018-07-08

## 2017-12-26 RX ORDER — FUROSEMIDE 20 MG
20 TABLET ORAL DAILY
Qty: 90 TABLET | Refills: 1 | Status: SHIPPED | OUTPATIENT
Start: 2017-12-26 | End: 2018-10-06

## 2017-12-26 RX ORDER — METOPROLOL SUCCINATE 25 MG/1
TABLET, EXTENDED RELEASE ORAL
Qty: 90 TABLET | Refills: 0 | Status: SHIPPED | OUTPATIENT
Start: 2017-12-26 | End: 2018-07-25

## 2017-12-26 RX ORDER — PREDNISONE 5 MG/1
5 TABLET ORAL 2 TIMES DAILY
Qty: 60 TABLET | Refills: 0 | Status: CANCELLED | OUTPATIENT
Start: 2017-12-26

## 2017-12-26 RX ORDER — TAMSULOSIN HYDROCHLORIDE 0.4 MG/1
0.4 CAPSULE ORAL DAILY
Qty: 90 CAPSULE | Refills: 1 | Status: SHIPPED | OUTPATIENT
Start: 2017-12-26 | End: 2018-04-10

## 2017-12-26 RX ORDER — ABIRATERONE ACETATE 250 MG/1
1000 TABLET ORAL DAILY
Qty: 120 TABLET | Refills: 0 | Status: CANCELLED | OUTPATIENT
Start: 2017-12-26

## 2017-12-26 RX ORDER — OXYCODONE HYDROCHLORIDE 5 MG/1
5 TABLET ORAL EVERY 6 HOURS PRN
Qty: 30 TABLET | Refills: 0 | Status: CANCELLED | OUTPATIENT
Start: 2017-12-26

## 2017-12-26 ASSESSMENT — PAIN SCALES - GENERAL: PAINLEVEL: MODERATE PAIN (4)

## 2017-12-26 NOTE — NURSING NOTE
"Chief Complaint   Patient presents with     Edema     Swelling in lower legs for 3 weeks       Initial /80  Pulse 73  Temp 97.2  F (36.2  C) (Oral)  Wt 185 lb (83.9 kg)  SpO2 97%  BMI 25.44 kg/m2 Estimated body mass index is 25.44 kg/(m^2) as calculated from the following:    Height as of 12/5/17: 5' 11.5\" (1.816 m).    Weight as of this encounter: 185 lb (83.9 kg).  Medication Reconciliation: complete     Merari Kaplan MA    "

## 2017-12-26 NOTE — PROGRESS NOTES
SUBJECTIVE:   Dimitri Neves is a 82 year old male who presents to clinic today for the following health issues:    Leg Edema -- Patient states his legs have been more edematous recently, bilaterally up to the knee. He talked to his daughter and was advised to come to the clinic. His left leg edema has decreased somewhat since talking to his daughter. He has also noticed some intermittent SHORTNESS OF BREATH but not consistent with paroxsysmal nocturnal dyspnea or orthopnea, no fever or chills or sputum production.. He has nocturia and slow urination, which he relates to prostate cancer. Patient notes he is taking ZYTIGA  (abiraterone acetate) and believes it is related to his edema and to some forgetfulness. We did spend some extra time reviewing this side effect profile. Denies worsened symptoms while supine. Feels his symptoms have been present for about the last 6 months, which is about when he started Zytiga. Had previous right leg edema after his right total knee replacement.    Hypertension -- Notes his blood pressure has been higher recently.  BP Readings from Last 3 Encounters:   12/26/17 158/80   12/19/17 155/73   12/18/17 183/83     Problem list and histories reviewed & adjusted, as indicated.  Additional history: as documented    Patient Active Problem List   Diagnosis     HTN (Hypertension), Benign goal <140/90     Narcotic dependence, episodic use (H)     ED (erectile dysfunction)     HUDSON (obstructive sleep apnea)     PE (pulmonary thromboembolism) (H)     HYPERLIPIDEMIA LDL GOAL <130     Advanced directives, counseling/discussion     Abnormal glucose     AK (actinic keratosis)     SNHL (sensorineural hearing loss)     Endolymphatic hydrops     History of squamous cell carcinoma     Pseudophakia, Yag Caps, ou     Venous (peripheral) insufficiency     Malignant neoplasm of prostate (H)     Dyspnea on exertion     Posterior vitreous detachment, bilateral     Iris nevus, ou     Dry eye syndrome,  bilateral     Macular degeneration, dry, mild, ou     Cotton wool spots, od     Bone metastasis (H)     Past Surgical History:   Procedure Laterality Date     ARTHROSCOPY KNEE RT/LT  ~1995    Right     C LUMBAR SPINE FUSION,ANTER APPRCH  8/2007    four times total, latest 8/07     CATARACT IOL, RT/LT       LASER YAG CAPSULOTOMY      both eyes     ORCHIECTOMY SCROTAL Bilateral 9/27/2017    Procedure: ORCHIECTOMY SCROTAL;  Bilateral orchiectomy scrotal approach;  Surgeon: Senthil Taylor MD;  Location: [facility] OR     PHACOEMULSIFICATION CLEAR CORNEA WITH STANDARD INTRAOCULAR LENS IMPLANT  6-18-12/7-30-12    right/left eye     ROTATOR CUFF REPAIR RT/LT  ~1995    right       Social History   Substance Use Topics     Smoking status: Former Smoker     Packs/day: 1.00     Years: 25.00     Types: Cigarettes     Quit date: 1/2/1976     Smokeless tobacco: Never Used      Comment: lives in smoke free household     Alcohol use 7.0 oz/week     14 Standard drinks or equivalent per week      Comment: 2-3 drinks every night     Family History   Problem Relation Age of Onset     CANCER Father      Lung 64y     DIABETES Brother      Hypertension Brother      CANCER Mother      Liver     CEREBROVASCULAR DISEASE Mother      Eye Disorder Mother      Glaucoma Mother      CEREBROVASCULAR DISEASE Maternal Grandmother      C.A.D. No family hx of      Thyroid Disease No family hx of      Macular Degeneration No family hx of          Current Outpatient Prescriptions   Medication Sig Dispense Refill     quinapril (ACCUPRIL) 40 MG Tab TAKE 1 TABLET (40 MG) BY MOUTH DAILY 90 tablet 0     metoprolol (TOPROL-XL) 25 MG 24 hr tablet TAKE 1 TABLET (25 MG) BY MOUTH DAILY 90 tablet 0     tamsulosin (FLOMAX) 0.4 MG capsule Take 1 capsule (0.4 mg) by mouth daily 90 capsule 1     furosemide (LASIX) 20 MG tablet Take 1 tablet (20 mg) by mouth daily 90 tablet 1     predniSONE (DELTASONE) 5 MG tablet Take 1 tablet (5 mg) by mouth 2 times daily 60 tablet 0      abiraterone (ZYTIGA) 250 MG tablet Take 4 tablets (1,000 mg) by mouth daily for 30 doses Take on empty stomach. 120 tablet 0     oxyCODONE (ROXICODONE) 5 MG IR tablet Take 1 tablet (5 mg) by mouth every 6 hours as needed for pain 30 tablet 0     simvastatin (ZOCOR) 40 MG tablet TAKE 0.5 TABLETS (20 MG) BY MOUTH DAILY 45 tablet 3     Calcium Carbonate (CALCIUM 600 PO)        CVS VITAMIN  B12 1000 MCG TABS TAKE 1 TABLET BY MOUTH EVERY DAY 30 tablet 5     LORazepam (ATIVAN) 0.5 MG tablet Take 1 tablet (0.5 mg) by mouth every 4 hours as needed (Anxiety, Nausea/Vomiting or Sleep) 30 tablet 2     gabapentin (NEURONTIN) 300 MG capsule Take 900 mg by mouth At Bedtime       sildenafil (VIAGRA) 100 MG tablet Take 1 tablet (100 mg) by mouth daily as needed for erectile dysfunction 10 tablet 3     Cholecalciferol (VITAMIN D) 2000 UNITS tablet Take 1 tablet by mouth daily       MINOCYCLINE HCL PO Take 100 mg by mouth daily       [DISCONTINUED] quinapril (ACCUPRIL) 40 MG Tab TAKE 1 TABLET (40 MG) BY MOUTH DAILY - NEEDS TO FOLLOW UP 90 tablet 0     CEPHALEXIN PO        [DISCONTINUED] metoprolol (TOPROL-XL) 25 MG 24 hr tablet TAKE 1 TABLET (25 MG) BY MOUTH DAILY 90 tablet 0     Labs reviewed in EPIC      Reviewed and updated as needed this visit by clinical staffTobacco  Allergies  Meds  Med Hx  Surg Hx  Fam Hx  Soc Hx      Reviewed and updated as needed this visit by Provider         ROS:  Constitutional, HEENT, cardiovascular, pulmonary, GI, , musculoskeletal, neuro, skin, endocrine and psych systems are negative, except as otherwise noted.    This document serves as a record of the services and decisions personally performed and made by Reginaldo Muir MD. It was created on their behalf by Senthil Guardado, a trained medical scribe. The creation of this document is based the provider's statements to the medical scribe.  Senthil Guardado December 26, 2017 2:57 PM    OBJECTIVE:   /80  Pulse 73  Temp 97.2  F (36.2  C)  (Oral)  Wt 83.9 kg (185 lb)  SpO2 97%  BMI 25.44 kg/m2  Body mass index is 25.44 kg/(m^2).  GENERAL: healthy, alert and no distress, answers all questions appropriately, appropriate grooming and affect, appears stated age   EYES: Eyes grossly normal to inspection, EOMI, conjunctivae and sclerae normal  RESP: lungs clear to auscultation - no rales, rhonchi or wheezes  CV: regular rate and rhythm, normal S1 S2, no S3 or S4, no murmur, click or rub, peripheral pulses strong  MS: 2-3+ bilateral lower extremity edema up to knees  SKIN: no suspicious lesions or rashes to visible skin   NEURO: mentation intact and speech normal  PSYCH: mentation appears normal, affect normal/bright    Diagnostic Test Results:  Results for orders placed or performed in visit on 12/18/17   CBC with platelets differential   Result Value Ref Range    WBC 4.3 4.0 - 11.0 10e9/L    RBC Count 4.17 (L) 4.4 - 5.9 10e12/L    Hemoglobin 13.0 (L) 13.3 - 17.7 g/dL    Hematocrit 39.8 (L) 40.0 - 53.0 %    MCV 95 78 - 100 fl    MCH 31.2 26.5 - 33.0 pg    MCHC 32.7 31.5 - 36.5 g/dL    RDW 13.6 10.0 - 15.0 %    Platelet Count 159 150 - 450 10e9/L    Diff Method Automated Method     % Neutrophils 58.1 %    % Lymphocytes 28.8 %    % Monocytes 10.4 %    % Eosinophils 2.3 %    % Basophils 0.2 %    % Immature Granulocytes 0.2 %    Absolute Neutrophil 2.5 1.6 - 8.3 10e9/L    Absolute Lymphocytes 1.3 0.8 - 5.3 10e9/L    Absolute Monocytes 0.5 0.0 - 1.3 10e9/L    Absolute Eosinophils 0.1 0.0 - 0.7 10e9/L    Absolute Basophils 0.0 0.0 - 0.2 10e9/L    Abs Immature Granulocytes 0.0 0 - 0.4 10e9/L   Comprehensive metabolic panel   Result Value Ref Range    Sodium 143 133 - 144 mmol/L    Potassium 3.4 3.4 - 5.3 mmol/L    Chloride 107 94 - 109 mmol/L    Carbon Dioxide 30 20 - 32 mmol/L    Anion Gap 6 3 - 14 mmol/L    Glucose 93 70 - 99 mg/dL    Urea Nitrogen 13 7 - 30 mg/dL    Creatinine 0.71 0.66 - 1.25 mg/dL    GFR Estimate >90 >60 mL/min/1.7m2    GFR Estimate If  Black >90 >60 mL/min/1.7m2    Calcium 8.6 8.5 - 10.1 mg/dL    Bilirubin Total 0.7 0.2 - 1.3 mg/dL    Albumin 3.3 (L) 3.4 - 5.0 g/dL    Protein Total 6.4 (L) 6.8 - 8.8 g/dL    Alkaline Phosphatase 73 40 - 150 U/L    ALT 18 0 - 70 U/L    AST 19 0 - 45 U/L   PSA tumor marker   Result Value Ref Range    PSA 9.34 (H) 0 - 4 ug/L       ASSESSMENT/PLAN:     (R60.0) Bilateral lower extremity edema  (primary encounter diagnosis)  Comment: this has all the earmarks of benign dependant lower extremity edema . It's bilateral and mild to moderate. It's possible that his metastatic prostate cancer  Is implicated in this somehow but there is no evidence of congestive heart failure, liver failure or kidney failure and Dr. Dr. Rafael Valenzuela with [facility] , his oncologist is regularly doing laboratory studies. We reviewed the treatments that are effective for lower extremity edema namely, leg elevation, judicious use of diuretic therapy and possibility extrinsic compression either with support stockings or possibility lymphedema therapy / lymphedema garments. Depending on how things go, assume a posture of observation for now. Recheck basic metabolic panel in 2-3 weeks   Plan: furosemide (LASIX) 20 MG tablet            (C79.51) Bone metastasis (H)  Comment: as detailed above   Plan: as above     (C61) Malignant neoplasm of prostate (H)  Comment: refills provided   Plan: tamsulosin (FLOMAX) 0.4 MG capsule            (I10) HTN (Hypertension), Benign goal <140/90  Comment: possibly adding the furosemide [ Lasix ] will help blood pressure. Medical assistant blood pressure recheck in 2-3 weeks    Plan: quinapril (ACCUPRIL) 40 MG Tab, metoprolol         (TOPROL-XL) 25 MG 24 hr tablet, furosemide         (LASIX) 20 MG tablet            Patient Instructions   - For Swellin. Elevate legs above horizontal for 20 minutes, 2-3 times a day.    2. Diuretic medications such as Lasix [Furosemide].   3. Wear Compression  Stockings (Indra Hose or Support Hose).    The information in this document, created by the medical scribe for me, accurately reflects the services I personally performed and the decisions made by me. I have reviewed and approved this document for accuracy.   MD Reginaldo Méndez MD  [facility]

## 2017-12-26 NOTE — MR AVS SNAPSHOT
After Visit Summary   2017    Dimitri Neves    MRN: [id number]           Patient Information     Date Of Birth          1935        Visit Information        Provider Department      2017 2:30 PM Reginaldo Muir MD [facility]        Today's Diagnoses     Bilateral lower extremity edema    -  1    Bone metastasis (H)        Malignant neoplasm of prostate (H)        HTN (Hypertension), Benign goal <140/90        Narcotic dependence, episodic use (H)          Care Instructions    - For Swellin. Elevate legs above horizontal for 20 minutes, 2-3 times a day.    2. Diuretic medications such as Lasix [Furosemide].   3. Wear Compression Stockings (Indra Hose or Support Hose).    [facility]    If you have any questions regarding to your visit please contact your care team:     Team Pink:   Clinic Hours Telephone Number   Internal Medicine:  Dr. Deborah Taylor, NP       7am-7pm  Monday - Thursday   7am-5pm    [phone]  (Appointment scheduling available )    Questions about your visit?  Team Line  [phone]   Urgent Care - [place] - 11am-9pm Monday--- 9am-5pm   [place] - 5pm-9pm Monday--- 9am-5pm  [phone] - [place]   [phone] - [place]       What options do I have for visits at the clinic other than the traditional office visit?  To expand how we care for you, many of our providers are utilizing electronic visits (e-visits) and telephone visits, when medically appropriate, for interactions with their patients rather than a visit in the clinic.   We also offer nurse visits for many medical concerns. Just like any other service, we will bill your insurance company for this type of visit based on time spent on the phone with your provider. Not all insurance companies cover these visits. Please check with your medical insurance  if this type of visit is covered. You will be responsible for any charges that are not paid by your insurance.      E-visits via Pigitharsciencebite:  generally incur a $35.00 fee.  Telephone visits:  Time spent on the phone: *charged based on time that is spent on the phone in increments of 10 minutes. Estimated cost:   5-10 mins $30.00   11-20 mins. $59.00   21-30 mins. $85.00   Use Razz (secure email communication and access to your chart) to send your primary care provider a message or make an appointment. Ask someone on your Team how to sign up for Razz.    For a Price Quote for your services, please call our Chatterfly Line at [phone].    As always, Thank you for trusting us with your health care needs!    Nakia BULLARD MA            Follow-ups after your visit        Your next 10 appointments already scheduled     Jan 08, 2018  8:30 AM CST   LAB with LAB ONC [facility])    [address] [place] [id number] [phone]           Please do not eat 10-12 hours before your appointment if you are coming in fasting for labs on lipids, cholesterol, or glucose (sugar). This does not apply to pregnant women. Water, hot tea and black coffee (with nothing added) are okay. Do not drink other fluids, diet soda or chew gum.            Jan 08, 2018  9:00 AM CST   Level O with BAY 7 [facility])    [id number] [place] [id number]   [phone]            Dedrick 15, 2018  8:15 AM CST   LAB with LAB ONC [facility])    [id number] [place] [id number] [phone]           Please do not eat 10-12 hours before your appointment if you are coming in fasting for labs on lipids, cholesterol, or glucose (sugar). This does not apply to pregnant women. Water, hot tea and black coffee (with nothing added) are okay. Do not  drink other fluids, diet soda or chew gum.            Dedrick 15, 2018  9:00 AM CST   [facility] Rafael [facility]   [facility]    [address] [facility] [address] [phone]            Dedrick 15, 2018  9:30 AM CST   Level O with BAY 6 INFUSION   [facility])    [address] [facility] [address] [phone]              Who to contact     If you have questions or need follow up information about today's clinic visit or your schedule please contact [facility] directly at [phone].  Normal or non-critical lab and imaging results will be communicated to you by MyChart, letter or phone within 4 business days after the clinic has received the results. If you do not hear from us within 7 days, please contact the clinic through Sentropihart or phone. If you have a critical or abnormal lab result, we will notify you by phone as soon as possible.  Submit refill requests through Mr. Youth or call your pharmacy and they will forward the refill request to us. Please allow 3 business days for your refill to be completed.          Additional Information About Your Visit        Sentropihart Information     Mr. Youth gives you secure access to your electronic health record. If you see a primary care provider, you can also send messages to your care team and make appointments. If you have questions, please call your primary care clinic.  If you do not have a primary care provider, please call [phone] and they will assist you.        Care EveryWhere ID     This is your Care EveryWhere ID. This could be used by other organizations to access your [place] medical records  [id number]        Your Vitals Were     Pulse Temperature Pulse Oximetry BMI (Body Mass Index)          73 97.2  F (36.2  C) (Oral) 97% 25.44 kg/m2         Blood Pressure from Last 3 Encounters:   12/26/17 158/80   12/19/17  155/73   12/18/17 183/83    Weight from Last 3 Encounters:   12/26/17 185 lb (83.9 kg)   12/18/17 186 lb 1.6 oz (84.4 kg)   11/27/17 183 lb 11.2 oz (83.3 kg)              Today, you had the following     No orders found for display         Today's Medication Changes          These changes are accurate as of: 12/26/17  3:08 PM.  If you have any questions, ask your nurse or doctor.               Start taking these medicines.        Dose/Directions    furosemide 20 MG tablet   Commonly known as:  LASIX   Used for:  Bilateral lower extremity edema, HTN (hypertension), benign   Started by:  Reginaldo Muir MD        Dose:  20 mg   Take 1 tablet (20 mg) by mouth daily   Quantity:  90 tablet   Refills:  1         These medicines have changed or have updated prescriptions.        Dose/Directions    metoprolol 25 MG 24 hr tablet   Commonly known as:  TOPROL-XL   This may have changed:  See the new instructions.   Used for:  HTN (hypertension), benign   Changed by:  Reginaldo Muir MD        TAKE 1 TABLET (25 MG) BY MOUTH DAILY   Quantity:  90 tablet   Refills:  0       quinapril 40 MG Tab   Commonly known as:  ACCUPRIL   This may have changed:  See the new instructions.   Used for:  HTN (hypertension), benign   Changed by:  Reginaldo Muir MD        TAKE 1 TABLET (40 MG) BY MOUTH DAILY   Quantity:  90 tablet   Refills:  0            Where to get your medicines      These medications were sent to [facility]/pharmacy #[id number] - [place], [place] - [address]   [address] [address], , [place] [address]     Phone:  [phone]     furosemide 20 MG tablet    metoprolol 25 MG 24 hr tablet    quinapril 40 MG Tab    tamsulosin 0.4 MG capsule                Primary Care Provider Office Phone # Fax #    Reginaldo Muir [phone] [address] [facility] [facility] [address]  [place] [address]        Equal Access to Services     CARLITA LEBLANC AH: Ramos Rodriguez, hugh luqhay, qaybnaty foster  gamal miltongraham arlenemaxi layulisylvia ah. So [facility] [phone].    ATENCIÓN: Si mikaylala ted, tiene a roberts disposición servicios gratuitos de asistencia lingüística. Kelsey romero [phone].    We comply with applicable federal civil rights laws and [place] laws. We do not discriminate on the basis of race, color, national origin, age, disability, sex, sexual orientation, or gender identity.            Thank you!     Thank you for choosing [facility]  for your care. Our goal is always to provide you with excellent care. Hearing back from our patients is one way we can continue to improve our services. Please take a few minutes to complete the written survey that you may receive in the mail after your visit with us. Thank you!             Your Updated Medication List - Protect others around you: Learn how to safely use, store and throw away your medicines at www.disposemymeds.org.          This list is accurate as of: 12/26/17  3:08 PM.  Always use your most recent med list.                   Brand Name Dispense Instructions for use Diagnosis    abiraterone 250 MG tablet    ZYTIGA    120 tablet    Take 4 tablets (1,000 mg) by mouth daily for 30 doses Take on empty stomach.    Bone metastasis (H), Malignant neoplasm of prostate (H)       CALCIUM 600 PO           CEPHALEXIN PO           CVS vitamin  B12 1000 MCG Tabs   Generic drug:  cyanocobalamin     30 tablet    TAKE 1 TABLET BY MOUTH EVERY DAY    Low vitamin B12 level       furosemide 20 MG tablet    LASIX    90 tablet    Take 1 tablet (20 mg) by mouth daily    Bilateral lower extremity edema, HTN (hypertension), benign       gabapentin 300 MG capsule    NEURONTIN     Take 900 mg by mouth At Bedtime        LORazepam 0.5 MG tablet    ATIVAN    30 tablet    Take 1 tablet (0.5 mg) by mouth every 4 hours as needed (Anxiety, Nausea/Vomiting or Sleep)    Bone metastasis (H), Malignant neoplasm of prostate (H)       metoprolol 25 MG 24 hr tablet    TOPROL-XL    90  tablet    TAKE 1 TABLET (25 MG) BY MOUTH DAILY    HTN (hypertension), benign       MINOCYCLINE HCL PO      Take 100 mg by mouth daily    Squamous cell carcinoma, scalp/neck       oxyCODONE IR 5 MG tablet    ROXICODONE    30 tablet    Take 1 tablet (5 mg) by mouth every 6 hours as needed for pain    Narcotic dependence, episodic use (H)       predniSONE 5 MG tablet    DELTASONE    60 tablet    Take 1 tablet (5 mg) by mouth 2 times daily    Bone metastasis (H), Malignant neoplasm of prostate (H)       quinapril 40 MG Tab    ACCUPRIL    90 tablet    TAKE 1 TABLET (40 MG) BY MOUTH DAILY    HTN (hypertension), benign       sildenafil 100 MG tablet    VIAGRA    10 tablet    Take 1 tablet (100 mg) by mouth daily as needed for erectile dysfunction    ED (erectile dysfunction)       simvastatin 40 MG tablet    ZOCOR    45 tablet    TAKE 0.5 TABLETS (20 MG) BY MOUTH DAILY    Hyperlipidemia LDL goal <130       tamsulosin 0.4 MG capsule    FLOMAX    90 capsule    Take 1 capsule (0.4 mg) by mouth daily    Malignant neoplasm of prostate (H)       vitamin D 2000 UNITS tablet      Take 1 tablet by mouth daily

## 2017-12-26 NOTE — PATIENT INSTRUCTIONS
- For Swellin. Elevate legs above horizontal for 20 minutes, 2-3 times a day.    2. Diuretic medications such as Lasix [Furosemide].   3. Wear Compression Stockings (Indra Hose or Support Hose).    [facility]    If you have any questions regarding to your visit please contact your care team:     Team Pink:   Clinic Hours Telephone Number   Internal Medicine:  Dr. Deborah Taylor, NP       7am-7pm  Monday - Thursday   7am-5pm    [phone]  (Appointment scheduling available )    Questions about your visit?  Team Line  [phone]   Urgent Care - [place] - 11am-9pm Monday--- 9am-5pm   [place] - 5pm-9pm Monday--- 9am-5pm  [phone] - [place]   [phone] - [place]       What options do I have for visits at the clinic other than the traditional office visit?  To expand how we care for you, many of our providers are utilizing electronic visits (e-visits) and telephone visits, when medically appropriate, for interactions with their patients rather than a visit in the clinic.   We also offer nurse visits for many medical concerns. Just like any other service, we will bill your insurance company for this type of visit based on time spent on the phone with your provider. Not all insurance companies cover these visits. Please check with your medical insurance if this type of visit is covered. You will be responsible for any charges that are not paid by your insurance.      E-visits via Agorafy:  generally incur a $35.00 fee.  Telephone visits:  Time spent on the phone: *charged based on time that is spent on the phone in increments of 10 minutes. Estimated cost:   5-10 mins $30.00   11-20 mins. $59.00   21-30 mins. $85.00   Use Agorafy (secure email communication and access to your chart) to send your primary care provider a message or make an appointment. Ask someone on your  Team how to sign up for Mentor Me.    For a Price Quote for your services, please call our Consumer Price Line at [phone].    As always, Thank you for trusting us with your health care needs!    Nakia BULLARD MA

## 2017-12-27 ENCOUNTER — TELEPHONE (OUTPATIENT)
Dept: INTERNAL MEDICINE | Facility: CLINIC | Age: 82
End: 2017-12-27

## 2017-12-27 NOTE — TELEPHONE ENCOUNTER
ZYTIGA  (abiraterone acetate) may increase the blood levels and effects of tamsulosin. This can cause blood pressure to fall excessively and heart rate to increase, especially when you rise from a sitting or lying position. The risk of other side effects such as dizziness, lightheadedness, fainting, headache, flushing, nasal congestion, heart palpitations, and priapism (prolonged and painful erection unrelated to sexual activity) may also increase. You may need a dose adjustment or more frequent monitoring by your doctor to safely use both medications. Let your doctor know if you develop these symptoms while using tamsulosin and they do not go away on their own or they become troublesome. Avoid driving or operating hazardous machinery until you know how the medication affects you, and use caution when getting up from a sitting or lying position. It is important to tell your doctor about all other medications you use, including vitamins and herbs. Do not stop using any medications without first talking to your doctor.    So In other words it's safe to try the two  Together, just be aware of these possible symptoms and stop it if you're feeling dizziness symptoms     Reginaldo Muir MD

## 2017-12-27 NOTE — TELEPHONE ENCOUNTER
Patient called and informed that he received a refill of the flomax for his prostate, but stated he is taking zytiga for this from his cancer doctor, should he still be taking flomax.     Anaid PERSAUD CMA ([facility])

## 2017-12-28 NOTE — TELEPHONE ENCOUNTER
Pt was given provider's message. States he was actually wondering if he should take lasix and the flomax. Wanted to know what the flomax does. Advised that this medication relaxes the smooth muscles of the prostate improving urine flow. He takes the lasix in the am and will take the flomax at night. He has a better understanding of the flomax, so will now start this med.    Pooja Gordon RN  [facility]

## 2018-01-04 ENCOUNTER — OFFICE VISIT (OUTPATIENT)
Dept: INTERNAL MEDICINE | Facility: CLINIC | Age: 83
End: 2018-01-04
Payer: COMMERCIAL

## 2018-01-04 VITALS
TEMPERATURE: 97 F | HEART RATE: 93 BPM | WEIGHT: 177.4 LBS | SYSTOLIC BLOOD PRESSURE: 130 MMHG | RESPIRATION RATE: 20 BRPM | BODY MASS INDEX: 24.03 KG/M2 | HEIGHT: 72 IN | OXYGEN SATURATION: 95 % | DIASTOLIC BLOOD PRESSURE: 76 MMHG

## 2018-01-04 DIAGNOSIS — R73.09 ABNORMAL GLUCOSE: ICD-10-CM

## 2018-01-04 DIAGNOSIS — G45.9 TRANSIENT CEREBRAL ISCHEMIA, UNSPECIFIED TYPE: ICD-10-CM

## 2018-01-04 DIAGNOSIS — E78.5 HYPERLIPIDEMIA LDL GOAL <130: ICD-10-CM

## 2018-01-04 DIAGNOSIS — R47.89 GARBLED SPEECH: Primary | ICD-10-CM

## 2018-01-04 DIAGNOSIS — I10 HTN (HYPERTENSION), BENIGN: ICD-10-CM

## 2018-01-04 PROCEDURE — 99214 OFFICE O/P EST MOD 30 MIN: CPT | Performed by: INTERNAL MEDICINE

## 2018-01-04 ASSESSMENT — PAIN SCALES - GENERAL: PAINLEVEL: NO PAIN (0)

## 2018-01-04 NOTE — PROGRESS NOTES
SUBJECTIVE:   Dimitri Neves is a 82 year old male who presents to clinic today with his wife for the following health issues:    Speech Problem -- Patient states about 5 days ago he had word forming difficulty for about 2 minutes while talking to his brother. He says his son also noticed a short right sided eye droop at that time and brought it up with him the next morning. The wife does not appreciate this finding. She notes he has had difficulty explaining things recently. Denies nausea or headache.     Respiratory Symptoms -- The wife reports she developed a productive cough and was prescribed Amoxicillin. He has been with a productive cough for a few days as well and they are curious if it is the same thing.    Compression Stockings -- He is curious what size of compression stockings he should get.    Hypertension --  BP Readings from Last 3 Encounters:   01/04/18 130/76   12/26/17 158/80   12/19/17 155/73     Problem list and histories reviewed & adjusted, as indicated.  Additional history: as documented    Patient Active Problem List   Diagnosis     HTN (Hypertension), Benign goal <140/90     Narcotic dependence, episodic use (H)     ED (erectile dysfunction)     HUDSON (obstructive sleep apnea)     PE (pulmonary thromboembolism) (H)     HYPERLIPIDEMIA LDL GOAL <130     Advanced directives, counseling/discussion     Abnormal glucose     AK (actinic keratosis)     SNHL (sensorineural hearing loss)     Endolymphatic hydrops     History of squamous cell carcinoma     Pseudophakia, Yag Caps, ou     Venous (peripheral) insufficiency     Malignant neoplasm of prostate (H)     Dyspnea on exertion     Posterior vitreous detachment, bilateral     Iris nevus, ou     Dry eye syndrome, bilateral     Macular degeneration, dry, mild, ou     Cotton wool spots, od     Bone metastasis (H)     Past Surgical History:   Procedure Laterality Date     ARTHROSCOPY KNEE RT/LT  ~1995    Right     C LUMBAR SPINE FUSION,ANTER APPRCH   8/2007    four times total, latest 8/07     CATARACT IOL, RT/LT       LASER YAG CAPSULOTOMY      both eyes     ORCHIECTOMY SCROTAL Bilateral 9/27/2017    Procedure: ORCHIECTOMY SCROTAL;  Bilateral orchiectomy scrotal approach;  Surgeon: Senthil Taylor MD;  Location: [facility] OR     PHACOEMULSIFICATION CLEAR CORNEA WITH STANDARD INTRAOCULAR LENS IMPLANT  6-18-12/7-30-12    right/left eye     ROTATOR CUFF REPAIR RT/LT  ~1995    right       Social History   Substance Use Topics     Smoking status: Former Smoker     Packs/day: 1.00     Years: 25.00     Types: Cigarettes     Quit date: 1/2/1976     Smokeless tobacco: Never Used      Comment: lives in smoke free household     Alcohol use 7.0 oz/week     14 Standard drinks or equivalent per week      Comment: 2-3 drinks every night     Family History   Problem Relation Age of Onset     CANCER Father      Lung 64y     DIABETES Brother      Hypertension Brother      CANCER Mother      Liver     CEREBROVASCULAR DISEASE Mother      Eye Disorder Mother      Glaucoma Mother      CEREBROVASCULAR DISEASE Maternal Grandmother      C.A.D. No family hx of      Thyroid Disease No family hx of      Macular Degeneration No family hx of          Current Outpatient Prescriptions   Medication Sig Dispense Refill     aspirin  MG EC tablet Take 1 tablet (325 mg) by mouth daily 1 tablet 0     quinapril (ACCUPRIL) 40 MG Tab TAKE 1 TABLET (40 MG) BY MOUTH DAILY 90 tablet 0     metoprolol (TOPROL-XL) 25 MG 24 hr tablet TAKE 1 TABLET (25 MG) BY MOUTH DAILY 90 tablet 0     tamsulosin (FLOMAX) 0.4 MG capsule Take 1 capsule (0.4 mg) by mouth daily 90 capsule 1     furosemide (LASIX) 20 MG tablet Take 1 tablet (20 mg) by mouth daily 90 tablet 1     predniSONE (DELTASONE) 5 MG tablet Take 1 tablet (5 mg) by mouth 2 times daily 60 tablet 0     abiraterone (ZYTIGA) 250 MG tablet Take 4 tablets (1,000 mg) by mouth daily for 30 doses Take on empty stomach. 120 tablet 0     MINOCYCLINE HCL PO Take 100  "mg by mouth daily       oxyCODONE (ROXICODONE) 5 MG IR tablet Take 1 tablet (5 mg) by mouth every 6 hours as needed for pain 30 tablet 0     simvastatin (ZOCOR) 40 MG tablet TAKE 0.5 TABLETS (20 MG) BY MOUTH DAILY 45 tablet 3     Calcium Carbonate (CALCIUM 600 PO)        CVS VITAMIN  B12 1000 MCG TABS TAKE 1 TABLET BY MOUTH EVERY DAY 30 tablet 5     LORazepam (ATIVAN) 0.5 MG tablet Take 1 tablet (0.5 mg) by mouth every 4 hours as needed (Anxiety, Nausea/Vomiting or Sleep) 30 tablet 2     gabapentin (NEURONTIN) 300 MG capsule Take 900 mg by mouth At Bedtime       Cholecalciferol (VITAMIN D) 2000 UNITS tablet Take 1 tablet by mouth daily       CEPHALEXIN PO        Labs reviewed in EPIC      Reviewed and updated as needed this visit by clinical staff  Tobacco  Allergies  Meds  Med Hx  Surg Hx  Fam Hx  Soc Hx      Reviewed and updated as needed this visit by Provider         ROS:  Constitutional, HEENT, cardiovascular, pulmonary, GI, , musculoskeletal, neuro, skin, endocrine and psych systems are negative, except as otherwise noted.    This document serves as a record of the services and decisions personally performed and made by Reginaldo Muir MD. It was created on their behalf by Senthil Guardado, a trained medical scribe. The creation of this document is based the provider's statements to the medical scribe.  Senthil Guardado January 4, 2018 6:27 PM    OBJECTIVE:   /76 (BP Location: Right arm, Cuff Size: Adult Regular)  Pulse 93  Temp 97  F (36.1  C) (Oral)  Resp 20  Ht 1.816 m (5' 11.5\")  Wt 80.5 kg (177 lb 6.4 oz)  SpO2 95%  BMI 24.4 kg/m2  Body mass index is 24.4 kg/(m^2).  GENERAL: healthy, alert and no distress, answers all questions appropriately, appropriate grooming and affect, appears stated age   EYES: Eyes grossly normal to inspection, conjunctivae and sclerae normal  SKIN: no suspicious lesions or rashes to visible skin   NEURO: mentation intact and speech normal, normal facial symmetry, " normal strength and tone, reflexes normal, cranial nerves intact, no apparent focal neurological deficits   PSYCH: mentation appears normal, affect normal/bright    Diagnostic Test Results:  Results for orders placed or performed in visit on 12/18/17   CBC with platelets differential   Result Value Ref Range    WBC 4.3 4.0 - 11.0 10e9/L    RBC Count 4.17 (L) 4.4 - 5.9 10e12/L    Hemoglobin 13.0 (L) 13.3 - 17.7 g/dL    Hematocrit 39.8 (L) 40.0 - 53.0 %    MCV 95 78 - 100 fl    MCH 31.2 26.5 - 33.0 pg    MCHC 32.7 31.5 - 36.5 g/dL    RDW 13.6 10.0 - 15.0 %    Platelet Count 159 150 - 450 10e9/L    Diff Method Automated Method     % Neutrophils 58.1 %    % Lymphocytes 28.8 %    % Monocytes 10.4 %    % Eosinophils 2.3 %    % Basophils 0.2 %    % Immature Granulocytes 0.2 %    Absolute Neutrophil 2.5 1.6 - 8.3 10e9/L    Absolute Lymphocytes 1.3 0.8 - 5.3 10e9/L    Absolute Monocytes 0.5 0.0 - 1.3 10e9/L    Absolute Eosinophils 0.1 0.0 - 0.7 10e9/L    Absolute Basophils 0.0 0.0 - 0.2 10e9/L    Abs Immature Granulocytes 0.0 0 - 0.4 10e9/L   Comprehensive metabolic panel   Result Value Ref Range    Sodium 143 133 - 144 mmol/L    Potassium 3.4 3.4 - 5.3 mmol/L    Chloride 107 94 - 109 mmol/L    Carbon Dioxide 30 20 - 32 mmol/L    Anion Gap 6 3 - 14 mmol/L    Glucose 93 70 - 99 mg/dL    Urea Nitrogen 13 7 - 30 mg/dL    Creatinine 0.71 0.66 - 1.25 mg/dL    GFR Estimate >90 >60 mL/min/1.7m2    GFR Estimate If Black >90 >60 mL/min/1.7m2    Calcium 8.6 8.5 - 10.1 mg/dL    Bilirubin Total 0.7 0.2 - 1.3 mg/dL    Albumin 3.3 (L) 3.4 - 5.0 g/dL    Protein Total 6.4 (L) 6.8 - 8.8 g/dL    Alkaline Phosphatase 73 40 - 150 U/L    ALT 18 0 - 70 U/L    AST 19 0 - 45 U/L   PSA tumor marker   Result Value Ref Range    PSA 9.34 (H) 0 - 4 ug/L     ASSESSMENT/PLAN:     (R47.89) Garbled speech  (primary encounter diagnosis)  Comment: this patients history is obviously quite concerning .  Plan: we had a long discussion . We could push for an  aggressive posture with a follow up workup including MRI / MRA of the head and neck , carotid ultrasounds and echocardiogram and blood work but in the end we already know his symptoms are most likely a true transient ischemic attack . What the future holds is a challenging question. He was not at this point on aspirin and we started him on a single daily 325 milligram aspirin . Further follow up depending on how things go . We discussed what to be on the watch for  , update me if significant changes have taken place     (G45.9) Transient cerebral ischemia, unspecified type  Comment: as detailed above   Plan: aspirin  MG EC tablet        Further follow up depending on how things go     (I10) HTN (Hypertension), Benign goal <140/90  Comment: noted as a point of historical importance   Plan: patient is a high risk patient for ischemic vascular disease      (E78.5) Hyperlipidemia LDL goal <130  Comment: as above   Plan: as above     (R73.09) Abnormal glucose  Comment:   Lab Results   Component Value Date    A1C 5.7 09/15/2017    A1C 5.5 07/19/2016    A1C 5.4 05/29/2012    A1C 5.6 06/16/2010    A1C 5.6 01/06/2010       Plan: absolutely no evidence of diabetes mellitus     Patient Instructions   - Start taking an full strength (325 MG) Aspirin every day.    - Let me know if you have any recurrence of these symptoms.    - I will discuss your case with one of my colleagues.     The information in this document, created by the medical scribe for me, accurately reflects the services I personally performed and the decisions made by me. I have reviewed and approved this document for accuracy.   MD Reginaldo Méndez MD  [facility]

## 2018-01-04 NOTE — NURSING NOTE
"Chief Complaint   Patient presents with     RECHECK     possbile stroke x5 days ago. Eye was drooping, couldn't pronounce words for about 2 minutes.     URI     x1 week     Other     discuss compression stockings/size       Initial /76 (BP Location: Right arm, Cuff Size: Adult Regular)  Pulse 93  Temp 97  F (36.1  C) (Oral)  Resp 20  Ht 5' 11.5\" (1.816 m)  Wt 177 lb 6.4 oz (80.5 kg)  SpO2 95%  BMI 24.4 kg/m2 Estimated body mass index is 24.4 kg/(m^2) as calculated from the following:    Height as of this encounter: 5' 11.5\" (1.816 m).    Weight as of this encounter: 177 lb 6.4 oz (80.5 kg).  Medication Reconciliation: complete       Modesta Laguna, JAME    "

## 2018-01-04 NOTE — MR AVS SNAPSHOT
After Visit Summary   1/4/2018    Dimitri Neves    MRN: [id number]           Patient Information     Date Of Birth          1935        Visit Information        Provider Department      1/4/2018 5:50 PM Reginaldo Muir MD [facility]        Today's Diagnoses     Garbled speech    -  1    Transient cerebral ischemia, unspecified type        HTN (Hypertension), Benign goal <140/90        Hyperlipidemia LDL goal <130        Abnormal glucose          Care Instructions    - Start taking an full strength (325 MG) Aspirin every day.    - Let me know if you have any recurrence of these symptoms.    - I will discuss your case with one of my colleagues.       [facility]    If you have any questions regarding to your visit please contact your care team:     Team Pink:   Clinic Hours Telephone Number   Internal Medicine:  Dr. Deborah Taylor NP       7am-7pm  Monday - Thursday   7am-5pm  Fridays  [phone]  (Appointment scheduling available 24/7)    Questions about your visit?  Team Line  [phone]   Urgent Care - [place] - 11am-9pm Monday-Friday Saturday-Sunday- 9am-5pm   [place] - 5pm-9pm Monday-Friday Saturday-Sunday- 9am-5pm  [phone] - [place]  [phone] - [place]       What options do I have for visits at the clinic other than the traditional office visit?  To expand how we care for you, many of our providers are utilizing electronic visits (e-visits) and telephone visits, when medically appropriate, for interactions with their patients rather than a visit in the clinic.   We also offer nurse visits for many medical concerns. Just like any other service, we will bill your insurance company for this type of visit based on time spent on the phone with your provider. Not all insurance companies cover these visits. Please check with your medical insurance if this type of visit is covered. You  will be responsible for any charges that are not paid by your insurance.      E-visits via [employer]hart:  generally incur a $35.00 fee.  Telephone visits:  Time spent on the phone: *charged based on time that is spent on the phone in increments of 10 minutes. Estimated cost:   5-10 mins $30.00   11-20 mins. $59.00   21-30 mins. $85.00   Use [employer]hart (secure email communication and access to your chart) to send your primary care provider a message or make an appointment. Ask someone on your Team how to sign up for [employer].    For a Price Quote for your services, please call our AURSOS Line at [phone].    As always, Thank you for trusting us with your health care needs!    Modesta Laguna CMA          Follow-ups after your visit        Your next 10 appointments already scheduled     Dedrick 15, 2018  8:15 AM CST   LAB with LAB ONC [facility])    [id number] [place] [id number] [phone]           Please do not eat 10-12 hours before your appointment if you are coming in fasting for labs on lipids, cholesterol, or glucose (sugar). This does not apply to pregnant women. Water, hot tea and black coffee (with nothing added) are okay. Do not drink other fluids, diet soda or chew gum.            Dedrick 15, 2018  9:00 AM CST   Return Visit with Rafael Valenzuela MD   [facility])    [id number] [place] [id number] [phone]            Dedrick 15, 2018  9:30 AM CST   Level O with BAY 6 INFUSION   [facility])    [id number] [place] [id number] [phone]              Who to contact     If you have questions or need follow up information about today's clinic visit or your schedule please contact [facility] directly at [phone].  Normal or non-critical lab and imaging results will be communicated to you by  "MyChart, letter or phone within 4 business days after the clinic has received the results. If you do not hear from us within 7 days, please contact the clinic through [employer] or phone. If you have a critical or abnormal lab result, we will notify you by phone as soon as possible.  Submit refill requests through [employer] or call your pharmacy and they will forward the refill request to us. Please allow 3 business days for your refill to be completed.          Additional Information About Your Visit        BR [employer] Information     [employer] gives you secure access to your electronic health record. If you see a primary care provider, you can also send messages to your care team and make appointments. If you have questions, please call your primary care clinic.  If you do not have a primary care provider, please call [phone] and they will assist you.        Care EveryWhere ID     This is your Care EveryWhere ID. This could be used by other organizations to access your [place] medical records  [id number]        Your Vitals Were     Pulse Temperature Respirations Height Pulse Oximetry BMI (Body Mass Index)    93 97  F (36.1  C) (Oral) 20 5' 11.5\" (1.816 m) 95% 24.4 kg/m2       Blood Pressure from Last 3 Encounters:   01/04/18 130/76   12/26/17 158/80   12/19/17 155/73    Weight from Last 3 Encounters:   01/04/18 177 lb 6.4 oz (80.5 kg)   12/26/17 185 lb (83.9 kg)   12/18/17 186 lb 1.6 oz (84.4 kg)              Today, you had the following     No orders found for display         Today's Medication Changes          These changes are accurate as of: 1/4/18  6:43 PM.  If you have any questions, ask your nurse or doctor.               Start taking these medicines.        Dose/Directions    aspirin  MG EC tablet   Used for:  Transient cerebral ischemia, unspecified type   Started by:  Reginaldo Muir MD        Dose:  325 mg   Take 1 tablet (325 mg) by mouth daily   Quantity:  1 tablet   Refills:  0            Where to " get your medicines      Some of these will need a paper prescription and others can be bought over the counter.  Ask your nurse if you have questions.     You don't need a prescription for these medications     aspirin  MG EC tablet                Primary Care Provider Office Phone # Fax #    Reginaldo Muir [phone] [address] [facility] [address]        Equal Access to Services     [facility] Center[facility] : Hadii aad ku hadasho Soomaali, waaxda luqadaha, qaybta kaalmada adeegyada, waxay idiin hayaan adeeg kharash la'aan ah. So [facility] [phone].    ATENCIÓN: Si habla espmirtha, tiene a roberts disposición servicios gratuitos de asistencia lingüística. Llame al [phone].    We comply with applicable federal civil rights laws and [place] laws. We do not discriminate on the basis of race, color, national origin, age, disability, sex, sexual orientation, or gender identity.            Thank you!     Thank you for choosing [facility]  for your care. Our goal is always to provide you with excellent care. Hearing back from our patients is one way we can continue to improve our services. Please take a few minutes to complete the written survey that you may receive in the mail after your visit with us. Thank you!             Your Updated Medication List - Protect others around you: Learn how to safely use, store and throw away your medicines at www.disposemymeds.org.          This list is accurate as of: 1/4/18  6:43 PM.  Always use your most recent med list.                   Brand Name Dispense Instructions for use Diagnosis    abiraterone 250 MG tablet    ZYTIGA    120 tablet    Take 4 tablets (1,000 mg) by mouth daily for 30 doses Take on empty stomach.    Bone metastasis (H), Malignant neoplasm of prostate (H)       aspirin  MG EC tablet     1 tablet    Take 1 tablet (325 mg) by mouth daily    Transient cerebral ischemia, unspecified type       CALCIUM 600 PO            CEPHALEXIN PO           CVS vitamin  B12 1000 MCG Tabs   Generic drug:  cyanocobalamin     30 tablet    TAKE 1 TABLET BY MOUTH EVERY DAY    Low vitamin B12 level       furosemide 20 MG tablet    LASIX    90 tablet    Take 1 tablet (20 mg) by mouth daily    Bilateral lower extremity edema, HTN (hypertension), benign       gabapentin 300 MG capsule    NEURONTIN     Take 900 mg by mouth At Bedtime        LORazepam 0.5 MG tablet    ATIVAN    30 tablet    Take 1 tablet (0.5 mg) by mouth every 4 hours as needed (Anxiety, Nausea/Vomiting or Sleep)    Bone metastasis (H), Malignant neoplasm of prostate (H)       metoprolol 25 MG 24 hr tablet    TOPROL-XL    90 tablet    TAKE 1 TABLET (25 MG) BY MOUTH DAILY    HTN (hypertension), benign       MINOCYCLINE HCL PO      Take 100 mg by mouth daily    Squamous cell carcinoma, scalp/neck       oxyCODONE IR 5 MG tablet    ROXICODONE    30 tablet    Take 1 tablet (5 mg) by mouth every 6 hours as needed for pain    Narcotic dependence, episodic use (H)       predniSONE 5 MG tablet    DELTASONE    60 tablet    Take 1 tablet (5 mg) by mouth 2 times daily    Bone metastasis (H), Malignant neoplasm of prostate (H)       quinapril 40 MG Tab    ACCUPRIL    90 tablet    TAKE 1 TABLET (40 MG) BY MOUTH DAILY    HTN (hypertension), benign       simvastatin 40 MG tablet    ZOCOR    45 tablet    TAKE 0.5 TABLETS (20 MG) BY MOUTH DAILY    Hyperlipidemia LDL goal <130       tamsulosin 0.4 MG capsule    FLOMAX    90 capsule    Take 1 capsule (0.4 mg) by mouth daily    Malignant neoplasm of prostate (H)       vitamin D 2000 UNITS tablet      Take 1 tablet by mouth daily

## 2018-01-05 NOTE — PATIENT INSTRUCTIONS
- Start taking an full strength (325 MG) Aspirin every day.    - Let me know if you have any recurrence of these symptoms.    - I will discuss your case with one of my colleagues.       [facility]    If you have any questions regarding to your visit please contact your care team:     Team Pink:   Clinic Hours Telephone Number   Internal Medicine:  Dr. Deborah Taylor NP       7am-7pm  Monday - Thursday   7am-5pm  Fridays  [phone]  (Appointment scheduling available 24/7)    Questions about your visit?  Team Line  [phone]   [facility] [place] [facility]pm Monday-Friday Saturday-Sunday- 9am-5pm   [place] - 5pm-9pm Monday-Friday Saturday-Sunday- 9am-5pm  [phone] - [place]   [phone] - [place]       What options do I have for visits at the clinic other than the traditional office visit?  To expand how we care for you, many of our providers are utilizing electronic visits (e-visits) and telephone visits, when medically appropriate, for interactions with their patients rather than a visit in the clinic.   We also offer nurse visits for many medical concerns. Just like any other service, we will bill your insurance company for this type of visit based on time spent on the phone with your provider. Not all insurance companies cover these visits. Please check with your medical insurance if this type of visit is covered. You will be responsible for any charges that are not paid by your insurance.      E-visits via [employer]:  generally incur a $35.00 fee.  Telephone visits:  Time spent on the phone: *charged based on time that is spent on the phone in increments of 10 minutes. Estimated cost:   5-10 mins $30.00   11-20 mins. $59.00   21-30 mins. $85.00   Use [employer] (secure email communication and access to your chart) to send your primary care provider a message or make an appointment. Ask someone on your Team how to sign  up for [employer].    For a Price Quote for your services, please call our Consumer Price Line at [phone].    As always, Thank you for trusting us with your health care needs!    Modesta Laguna CMA

## 2018-01-08 ENCOUNTER — MEDICAL CORRESPONDENCE (OUTPATIENT)
Dept: HEALTH INFORMATION MANAGEMENT | Facility: CLINIC | Age: 83
End: 2018-01-08

## 2018-01-09 ENCOUNTER — OFFICE VISIT (OUTPATIENT)
Dept: INTERNAL MEDICINE | Facility: CLINIC | Age: 83
End: 2018-01-09
Payer: COMMERCIAL

## 2018-01-09 VITALS
RESPIRATION RATE: 12 BRPM | BODY MASS INDEX: 22.97 KG/M2 | HEART RATE: 87 BPM | DIASTOLIC BLOOD PRESSURE: 78 MMHG | TEMPERATURE: 97.4 F | WEIGHT: 167 LBS | OXYGEN SATURATION: 96 % | SYSTOLIC BLOOD PRESSURE: 138 MMHG

## 2018-01-09 DIAGNOSIS — G45.9 TRANSIENT CEREBRAL ISCHEMIA, UNSPECIFIED TYPE: Primary | ICD-10-CM

## 2018-01-09 DIAGNOSIS — M62.81 GENERALIZED MUSCLE WEAKNESS: ICD-10-CM

## 2018-01-09 DIAGNOSIS — C61 MALIGNANT NEOPLASM OF PROSTATE (H): ICD-10-CM

## 2018-01-09 DIAGNOSIS — R06.09 DYSPNEA ON EXERTION: ICD-10-CM

## 2018-01-09 PROCEDURE — 99214 OFFICE O/P EST MOD 30 MIN: CPT | Performed by: INTERNAL MEDICINE

## 2018-01-09 RX ORDER — DOXYCYCLINE 100 MG/1
100 CAPSULE ORAL
COMMUNITY
Start: 2018-01-06 | End: 2018-01-13

## 2018-01-09 ASSESSMENT — PAIN SCALES - GENERAL: PAINLEVEL: NO PAIN (0)

## 2018-01-09 NOTE — MR AVS SNAPSHOT
After Visit Summary   1/9/2018    Dimitri Neves    MRN: [id number]           Patient Information     Date Of Birth          1935        Visit Information        Provider Department      1/9/2018 11:50 AM Reginaldo Muir MD [facility] Instructions    - Supportive measures reviewed ,    Rest   Push fluids   Acetaminophen prn for fever and aches and pains  Guaifenesin [ mucinex ] can help for sinus congestion   Sucrets or other throat lozenges can help for sore throat along with gargling with warm salt water     [facility]    If you have any questions regarding to your visit please contact your care team:     Team Pink:   Clinic Hours Telephone Number   Internal Medicine:  Dr. Deborah Taylor NP       7am-7pm  Monday - Thursday   7am-5pm  Fridays  [phone]  (Appointment scheduling available 24/7)    Questions about your visit?  Team Line  [phone]   Urgent Care - [place] - 11am-9pm Monday-Friday Saturday-Sunday- 9am-5pm   [place] - 5pm-9pm Monday-Friday Saturday-Sunday- 9am-5pm  [phone] - [place]   [phone] - [place]       What options do I have for visits at the clinic other than the traditional office visit?  To expand how we care for you, many of our providers are utilizing electronic visits (e-visits) and telephone visits, when medically appropriate, for interactions with their patients rather than a visit in the clinic.   We also offer nurse visits for many medical concerns. Just like any other service, we will bill your insurance company for this type of visit based on time spent on the phone with your provider. Not all insurance companies cover these visits. Please check with your medical insurance if this type of visit is covered. You will be responsible for any charges that are not paid by your insurance.      E-visits via [email or web address]:  generally incur a $35.00  fee.  Telephone visits:  Time spent on the phone: *charged based on time that is spent on the phone in increments of 10 minutes. Estimated cost:   5-10 mins $30.00   11-20 mins. $59.00   21-30 mins. $85.00   Use Nextlyhart (secure email communication and access to your chart) to send your primary care provider a message or make an appointment. Ask someone on your Team how to sign up for [employer].    For a Price Quote for your services, please call our [employer] Line at [phone].    As always, Thank you for trusting us with your health care needs!  Discharged By:  KIRBY BOX            Follow-ups after your visit        Your next 10 appointments already scheduled     Dedrick 15, 2018  8:15 AM CST   LAB with LAB ONC [facility])    [address] [place] [address] [id number] [phone]           Please do not eat 10-12 hours before your appointment if you are coming in fasting for labs on lipids, cholesterol, or glucose (sugar). This does not apply to pregnant women. Water, hot tea and black coffee (with nothing added) are okay. Do not drink other fluids, diet soda or chew gum.            Dedrick 15, 2018  9:00 AM CST   Return Visit with Rafael Valenzuela MD   [facility])    [address] [place] [address] [id number] [phone]            Dedrick 15, 2018  9:30 AM CST   Level O with [place] [facility])    [address] [place] [id number] [phone]              Who to contact     If you have questions or need follow up information about today's clinic visit or your schedule please contact [facility] directly at [phone].  Normal or non-critical lab and imaging results will be communicated to you by MyChart, letter or phone within 4 business days after the clinic has received the results. If you do not hear from us within  7 days, please contact the clinic through [employer] or phone. If you have a critical or abnormal lab result, we will notify you by phone as soon as possible.  Submit refill requests through [employer] or call your pharmacy and they will forward the refill request to us. Please allow 3 business days for your refill to be completed.          Additional Information About Your Visit        [employer] Information     [employer] gives you secure access to your electronic health record. If you see a primary care provider, you can also send messages to your care team and make appointments. If you have questions, please call your primary care clinic.  If you do not have a primary care provider, please call [phone] and they will assist you.        Care EveryWhere ID     This is your Care EveryWhere ID. This could be used by other organizations to access your [facility] medical records  [id number]        Your Vitals Were     Pulse Temperature Respirations Pulse Oximetry BMI (Body Mass Index)       87 97.4  F (36.3  C) (Oral) 12 96% 22.97 kg/m2        Blood Pressure from Last 3 Encounters:   01/09/18 138/78   01/04/18 130/76   12/26/17 158/80    Weight from Last 3 Encounters:   01/09/18 167 lb (75.8 kg)   01/04/18 177 lb 6.4 oz (80.5 kg)   12/26/17 185 lb (83.9 kg)              Today, you had the following     No orders found for display       Primary Care Provider Office Phone # Fax #    Reginaldo Muir [phone] [address] [facility] [id number]        Equal Access to Services     DAVIDSON LEBLANC : Hadii aad ku hadasho Soomaali, waaxda luqadaha, qaybta kaalmada adeegyada, naty willis . So [facility] [phone].    ATENCIÓN: Si habla español, tiene a roberts disposición servicios gratuitos de asistencia lingüística. Llame al [phone].    We comply with applicable federal civil rights laws and [place] laws. We do not discriminate on the basis of race, color, national origin, age,  disability, sex, sexual orientation, or gender identity.            Thank you!     Thank you for choosing [facility]  for your care. Our goal is always to provide you with excellent care. Hearing back from our patients is one way we can continue to improve our services. Please take a few minutes to complete the written survey that you may receive in the mail after your visit with us. Thank you!             Your Updated Medication List - Protect others around you: Learn how to safely use, store and throw away your medicines at www.disposemymeds.org.          This list is accurate as of: 1/9/18 12:37 PM.  Always use your most recent med list.                   Brand Name Dispense Instructions for use Diagnosis    abiraterone 250 MG tablet    ZYTIGA    120 tablet    Take 4 tablets (1,000 mg) by mouth daily for 30 doses Take on empty stomach.    Bone metastasis (H), Malignant neoplasm of prostate (H)       aspirin  MG EC tablet     1 tablet    Take 1 tablet (325 mg) by mouth daily    Transient cerebral ischemia, unspecified type       CALCIUM 600 PO           CEPHALEXIN PO           [employer] vitamin  B12 1000 MCG Tabs   Generic drug:  cyanocobalamin     30 tablet    TAKE 1 TABLET BY MOUTH EVERY DAY    Low vitamin B12 level       doxycycline 100 MG capsule    VIBRAMYCIN     Take 100 mg by mouth        furosemide 20 MG tablet    LASIX    90 tablet    Take 1 tablet (20 mg) by mouth daily    Bilateral lower extremity edema, HTN (hypertension), benign       gabapentin 300 MG capsule    NEURONTIN     Take 900 mg by mouth At Bedtime        LORazepam 0.5 MG tablet    ATIVAN    30 tablet    Take 1 tablet (0.5 mg) by mouth every 4 hours as needed (Anxiety, Nausea/Vomiting or Sleep)    Bone metastasis (H), Malignant neoplasm of prostate (H)       metoprolol 25 MG 24 hr tablet    TOPROL-XL    90 tablet    TAKE 1 TABLET (25 MG) BY MOUTH DAILY    HTN (hypertension), benign       MINOCYCLINE HCL PO      Take 100 mg by  mouth daily    Squamous cell carcinoma, scalp/neck       oxyCODONE IR 5 MG tablet    ROXICODONE    30 tablet    Take 1 tablet (5 mg) by mouth every 6 hours as needed for pain    Narcotic dependence, episodic use (H)       predniSONE 5 MG tablet    DELTASONE    60 tablet    Take 1 tablet (5 mg) by mouth 2 times daily    Bone metastasis (H), Malignant neoplasm of prostate (H)       quinapril 40 MG Tab    ACCUPRIL    90 tablet    TAKE 1 TABLET (40 MG) BY MOUTH DAILY    HTN (hypertension), benign       simvastatin 40 MG tablet    ZOCOR    45 tablet    TAKE 0.5 TABLETS (20 MG) BY MOUTH DAILY    Hyperlipidemia LDL goal <130       tamsulosin 0.4 MG capsule    FLOMAX    90 capsule    Take 1 capsule (0.4 mg) by mouth daily    Malignant neoplasm of prostate (H)       vitamin D 2000 UNITS tablet      Take 1 tablet by mouth daily

## 2018-01-09 NOTE — PATIENT INSTRUCTIONS
- Supportive measures reviewed ,    Rest   Push fluids   Acetaminophen prn for fever and aches and pains  Guaifenesin [ mucinex ] can help for sinus congestion   Sucrets or other throat lozenges can help for sore throat along with gargling with warm salt water     [facility]    If you have any questions regarding to your visit please contact your care team:     Team Pink:   Clinic Hours Telephone Number   Internal Medicine:  Dr. Deborah Taylor, NP       7am-7pm  Monday - Thursday   7am-5pm  Fridays  [phone]  (Appointment scheduling available 24/7)    Questions about your visit?  Team Line  [phone]   Urgent Care - [place] - 11am-9pm Monday-Friday Saturday-Sunday- 9am-5pm   [place] - 5pm-9pm Monday-Friday Saturday-Sunday- 9am-5pm  [phone] - [place]   [phone] - [place]       What options do I have for visits at the clinic other than the traditional office visit?  To expand how we care for you, many of our providers are utilizing electronic visits (e-visits) and telephone visits, when medically appropriate, for interactions with their patients rather than a visit in the clinic.   We also offer nurse visits for many medical concerns. Just like any other service, we will bill your insurance company for this type of visit based on time spent on the phone with your provider. Not all insurance companies cover these visits. Please check with your medical insurance if this type of visit is covered. You will be responsible for any charges that are not paid by your insurance.      E-visits via StormMQ:  generally incur a $35.00 fee.  Telephone visits:  Time spent on the phone: *charged based on time that is spent on the phone in increments of 10 minutes. Estimated cost:   5-10 mins $30.00   11-20 mins. $59.00   21-30 mins. $85.00   Use StormMQ (secure email communication and access to your chart) to send your primary care  provider a message or make an appointment. Ask someone on your Team how to sign up for [employer].    For a Price Quote for your services, please call our Consumer Price Line at [phone].    As always, Thank you for trusting us with your health care needs!  Discharged By:  KIRBY BOX

## 2018-01-09 NOTE — PROGRESS NOTES
SUBJECTIVE:   Dimitri Neves is a 82 year old male who presents to clinic today with his wife for the following health issues:       Transient cerebral ischemia, unspecified type  Malignant neoplasm of prostate (H)  Dyspnea on exertion  Generalized muscle weakness     I am seeing patient for a post emergency room evaluation follow up office visit . Also please see most recent previous office visit with me. Patient has had a recent event which is most consistent with transient ischemic attack . He's dealing with metastatic prostate cancer and continues with active treatment with ZYTIGA  (abiraterone acetate) , seeing Dr. Rafael Valenzuela with [facility].     Patient felt poorly at the emergency room and had a thorough evaluation and management.    ED/ Followup:    Facility:  [facility]  Date of visit: 1/6/2018  Reason for visit: Cough  Current Status: States he is feeling a little better   -- They found that he had orthostatic hypotension at the ED, dropping about 30 points systolic when standing. He has not had recurrence of TIA-like symptoms since his one episode about 2 weeks ago. His potassium levels were slightly low and does not remember any potassium-replacement therapy. Was given Doxycycline for a possible mild pneumonia. He feels he has improved somewhat, but not significantly. However, it has only been 3 days since the last ED visit. They are curious if the Doxycycline should be extended at all.    ED Note 1/6/2018 --  Chief Complaint:  Multiple complaints     History of Present Illness:  The history is provided by the patient and a relative.     Dimitri Neves is a 82 y.o. male with a history of hypertension and PE on aspirin who presents to the emergency department for evaluation of multiple complaints. The patient reports he developed a cold and cough approximately one week ago. Since that time he reports increasing exertional shortness of breath, weakness, and congested cough as well  "as lightheadedness. He otherwise denies any fever, nausea, vomiting, or diarrhea. He and family indicate his symptoms have continued to increase in severity, prompting him to come to the emergency department this evening. Of note, his wife was seen at urgent care on 1/1/18 for dizziness and was told she had a \"virus\" and prescribed amoxicillin and she notes the patient did receive a flu shot this year. Upon presentation to the emergency department, orthostatic vitals showed a 30 point drop from sitting to standing while in triage.     Per family, the patient also had an episode eight days ago of a right facial droop, and difficulty speaking that was preceded by a headache. He also had an episode of difficulty communicating, which he describes as similar to a \"word jumble\" where he was unable to say the words for the images he was thinking. They indicate he complained of a headache around [place] as well. Two days ago, he was seen by Dr. Feldman for evaluation of this and he suggested he may have suffered a TIA. No further complaints or concerns are voiced at this time.      ECG:  Time Performed: 1643  Time read: 1644  Indication: lightheadedness  Ventricular Rate: 67  QRS Duration: 102  R Axis: 81  Interpretation: Normal sinus rhythm  Normal ECG  When compared with ECG of 10-APR-2017 12:17,  No significant change was found    Imaging:  CT Head Brain without contrast:  1. No acute intracranial pathology.  2. Head CT unremarkable for age.   Report per radiology.     XR Chest 2 Views PA and Lateral:  1. Small right-sided pleural effusion.  2. Minimal chronic linear scarring right midlung.  Report per radiology.     ED Course:   Patient Vitals for the past 24 hrs:  BP Temp Pulse Resp SpO2 Height Weight   01/06/18 2100 167/62 98.2  F (36.8  C) 59 20 91 % - -   01/06/18 2030 154/56 - 63 - 92 % - -   01/06/18 2006 - - - - 95 % - -   01/06/18 2000 164/75 - 66 - 95 % - -   01/06/18 1945 169/77 - 66 - 95 % - -   01/06/18 1930 " 163/75 - 65 - 95 % - -   01/06/18 1929 - - 70 - 95 % - -   01/06/18 1915 174/77 - 75 - - - -   01/06/18 1904 168/76 - 65 - 97 % - -   01/06/18 1815 - - 65 - - - -   01/06/18 1800 - - 62 - 92 % - -   01/06/18 1745 162/73 - 64 - 96 % - -   01/06/18 1730 172/75 - 65 - 90 % - -   01/06/18 1715 168/71 - 68 - 94 % - -   01/06/18 1624 118/70 - - - - - -   01/06/18 1621 148/79 98.4  F (36.9  C) 74 18 91 % 1.829 m (6') 84.8 kg (187 lb)     I reviewed the patient's past medical history, charts, and nursing notes. I examined the patient and discussed the plan of care which included laboratory workup, imaging study and EKG. Blood was drawn and sent for testing.     He was given the above noted interventions.     Findings and plan explained to the patient. Patient discharged home with instructions regarding supportive care, medications, and reasons to return as well as the importance of close follow-up was reviewed.     Impression and Plan:  Dmiitri Neves is a 82 y.o. male who presents with a report of more than one week of congested cough and shortness of breath with any exertion. He also describes a very brief period of neurologic symptoms concerning for a TIA as detailed above. He saw his primary care doctor just two days ago for these problems. He was started on daily aspirin therapy but no additional workup was completed at that time. He arrived today with normal vital signs. He was also afebrile. During his evaluation his oxygen saturations hovered around 90%. Despite testing him for desaturation with exertion he remained at 89-90%. He did not report any significant worsening of his symptoms during his brief walk around the ED. His examination was positive for a congested cough, however this appeared to be transmitted upper airway sounds. His chest xray did not show any signs of acute infiltrate. However, there was a slight pleural effusion and scarring. The scarring had been seen on previous advanced imaging of the  chest. The remainder of his workup was otherwise unremarkable including the laboratory studies shown above as well as a CT scan of the head. I strongly suspect that the patient's experiencing a URI as well as some bronchitis related symptoms. Due to concerning comorbid conditions I felt the need to treat this patient for a possible atypical pneumonia process with a course of doxycycline. I also felt this would be adequate coverage for a possible sinusitis as a cause for his headaches. He was considered to be safe and appropriate for attempted outpatient management at this time. I did refer him to primary care for follow up evaluation within the next week. Concerning his TIA, I could not accomplish MRI scanning due to an implanted spinal stimulator. Therefore, I felt that imaging of the carotids probably should be accomplished as an outpatient. This recommendation was sent with his discharge instructions to be discussed with his primary care provider. His primary can also consider the need for echocardiogram testing for the unlikely possibility of intracardiac shunting at that time. The patient and his wife were reminded to seek care immediately should he have any worsening shortness of breath, high fever, chest pain or new neurologic symptoms. They left with compete understanding and agreement with this plan and no other complaints.     Diagnosis:  ENCOUNTER DIAGNOSES   ICD-10-CM   1. Cough R05 doxycycline (VIBRAMYCIN) 100 mg capsule   2. Dyspnea on exertion R06.09   3. Nonintractable headache, unspecified chronicity pattern, unspecified headache type R51       Problem list and histories reviewed & adjusted, as indicated.  Additional history: as documented    Patient Active Problem List   Diagnosis     HTN (Hypertension), Benign goal <140/90     Narcotic dependence, episodic use (H)     ED (erectile dysfunction)     HUDSON (obstructive sleep apnea)     PE (pulmonary thromboembolism) (H)     HYPERLIPIDEMIA LDL GOAL <130      Advanced directives, counseling/discussion     Abnormal glucose     AK (actinic keratosis)     SNHL (sensorineural hearing loss)     Endolymphatic hydrops     History of squamous cell carcinoma     Pseudophakia, Yag Caps, ou     Venous (peripheral) insufficiency     Malignant neoplasm of prostate (H)     Dyspnea on exertion     Posterior vitreous detachment, bilateral     Iris nevus, ou     Dry eye syndrome, bilateral     Macular degeneration, dry, mild, ou     Cotton wool spots, od     Bone metastasis (H)     Past Surgical History:   Procedure Laterality Date     ARTHROSCOPY KNEE RT/LT  ~1995    Right     C LUMBAR SPINE FUSION,ANTER APPRCH  8/2007    four times total, latest 8/07     CATARACT IOL, RT/LT       LASER YAG CAPSULOTOMY      both eyes     ORCHIECTOMY SCROTAL Bilateral 9/27/2017    Procedure: ORCHIECTOMY SCROTAL;  Bilateral orchiectomy scrotal approach;  Surgeon: Senthil Taylor MD;  Location: [facility] OR     PHACOEMULSIFICATION CLEAR CORNEA WITH STANDARD INTRAOCULAR LENS IMPLANT  6-18-12/7-30-12    right/left eye     ROTATOR CUFF REPAIR RT/LT  ~1995    right       Social History   Substance Use Topics     Smoking status: Former Smoker     Packs/day: 1.00     Years: 25.00     Types: Cigarettes     Quit date: 1/2/1976     Smokeless tobacco: Never Used      Comment: lives in smoke free household     Alcohol use 7.0 oz/week     14 Standard drinks or equivalent per week      Comment: 2-3 drinks every night     Family History   Problem Relation Age of Onset     CANCER Father      Lung 64y     DIABETES Brother      Hypertension Brother      CANCER Mother      Liver     CEREBROVASCULAR DISEASE Mother      Eye Disorder Mother      Glaucoma Mother      CEREBROVASCULAR DISEASE Maternal Grandmother      C.A.D. No family hx of      Thyroid Disease No family hx of      Macular Degeneration No family hx of          Current Outpatient Prescriptions   Medication Sig Dispense Refill     doxycycline (VIBRAMYCIN) 100 MG  capsule Take 100 mg by mouth       aspirin  MG EC tablet Take 1 tablet (325 mg) by mouth daily 1 tablet 0     quinapril (ACCUPRIL) 40 MG Tab TAKE 1 TABLET (40 MG) BY MOUTH DAILY 90 tablet 0     metoprolol (TOPROL-XL) 25 MG 24 hr tablet TAKE 1 TABLET (25 MG) BY MOUTH DAILY 90 tablet 0     tamsulosin (FLOMAX) 0.4 MG capsule Take 1 capsule (0.4 mg) by mouth daily 90 capsule 1     furosemide (LASIX) 20 MG tablet Take 1 tablet (20 mg) by mouth daily 90 tablet 1     predniSONE (DELTASONE) 5 MG tablet Take 1 tablet (5 mg) by mouth 2 times daily 60 tablet 0     abiraterone (ZYTIGA) 250 MG tablet Take 4 tablets (1,000 mg) by mouth daily for 30 doses Take on empty stomach. 120 tablet 0     oxyCODONE (ROXICODONE) 5 MG IR tablet Take 1 tablet (5 mg) by mouth every 6 hours as needed for pain 30 tablet 0     simvastatin (ZOCOR) 40 MG tablet TAKE 0.5 TABLETS (20 MG) BY MOUTH DAILY 45 tablet 3     Calcium Carbonate (CALCIUM 600 PO)        [employer] VITAMIN  B12 1000 MCG TABS TAKE 1 TABLET BY MOUTH EVERY DAY 30 tablet 5     CEPHALEXIN PO        LORazepam (ATIVAN) 0.5 MG tablet Take 1 tablet (0.5 mg) by mouth every 4 hours as needed (Anxiety, Nausea/Vomiting or Sleep) 30 tablet 2     gabapentin (NEURONTIN) 300 MG capsule Take 900 mg by mouth At Bedtime       Cholecalciferol (VITAMIN D) 2000 UNITS tablet Take 1 tablet by mouth daily       MINOCYCLINE HCL PO Take 100 mg by mouth daily       Labs reviewed in EPIC      Reviewed and updated as needed this visit by clinical staffTobacco  Allergies  Med Hx  Surg Hx  Fam Hx  Soc Hx      Reviewed and updated as needed this visit by Provider         ROS:  Constitutional, HEENT, cardiovascular, pulmonary, GI, , musculoskeletal, neuro, skin, endocrine and psych systems are negative, except as otherwise noted.    This document serves as a record of the services and decisions personally performed and made by Reginaldo Muir MD. It was created on their behalf by Senthil Guardado, a trained  medical scribe. The creation of this document is based the provider's statements to the medical scribe.  Senthil PERRY Guardado January 9, 2018 12:31 PM    OBJECTIVE:   /78  Pulse 87  Temp 97.4  F (36.3  C) (Oral)  Resp 12  Wt 75.8 kg (167 lb)  SpO2 96%  BMI 22.97 kg/m2  Body mass index is 22.97 kg/(m^2).  GENERAL: healthy, alert and no distress, answers all questions appropriately, appropriate grooming and affect, appears stated age   EYES: Eyes grossly normal to inspection, conjunctivae and sclerae normal  HENT: ear canals and TM's normal, nose and mouth without ulcers or lesions, no tonsillar enlargement and no exudate, pinkish posterior oropharynx   NECK: no adenopathy, no asymmetry, masses, or scars and thyroid normal to palpation  RESP: lungs clear to auscultation - no rales, rhonchi or wheezes  CV: regular rate and rhythm, normal S1 S2, no S3 or S4, no murmur, click or rub, no peripheral edema and peripheral pulses strong  SKIN: no suspicious lesions or rashes to visible skin   NEURO: mentation intact and speech normal  PSYCH: mentation appears normal, affect normal/bright    Diagnostic Test Results:  Results for orders placed or performed in visit on 12/18/17   CBC with platelets differential   Result Value Ref Range    WBC 4.3 4.0 - 11.0 10e9/L    RBC Count 4.17 (L) 4.4 - 5.9 10e12/L    Hemoglobin 13.0 (L) 13.3 - 17.7 g/dL    Hematocrit 39.8 (L) 40.0 - 53.0 %    MCV 95 78 - 100 fl    MCH 31.2 26.5 - 33.0 pg    MCHC 32.7 31.5 - 36.5 g/dL    RDW 13.6 10.0 - 15.0 %    Platelet Count 159 150 - 450 10e9/L    Diff Method Automated Method     % Neutrophils 58.1 %    % Lymphocytes 28.8 %    % Monocytes 10.4 %    % Eosinophils 2.3 %    % Basophils 0.2 %    % Immature Granulocytes 0.2 %    Absolute Neutrophil 2.5 1.6 - 8.3 10e9/L    Absolute Lymphocytes 1.3 0.8 - 5.3 10e9/L    Absolute Monocytes 0.5 0.0 - 1.3 10e9/L    Absolute Eosinophils 0.1 0.0 - 0.7 10e9/L    Absolute Basophils 0.0 0.0 - 0.2 10e9/L    Abs  Immature Granulocytes 0.0 0 - 0.4 10e9/L   Comprehensive metabolic panel   Result Value Ref Range    Sodium 143 133 - 144 mmol/L    Potassium 3.4 3.4 - 5.3 mmol/L    Chloride 107 94 - 109 mmol/L    Carbon Dioxide 30 20 - 32 mmol/L    Anion Gap 6 3 - 14 mmol/L    Glucose 93 70 - 99 mg/dL    Urea Nitrogen 13 7 - 30 mg/dL    Creatinine 0.71 0.66 - 1.25 mg/dL    GFR Estimate >90 >60 mL/min/1.7m2    GFR Estimate If Black >90 >60 mL/min/1.7m2    Calcium 8.6 8.5 - 10.1 mg/dL    Bilirubin Total 0.7 0.2 - 1.3 mg/dL    Albumin 3.3 (L) 3.4 - 5.0 g/dL    Protein Total 6.4 (L) 6.8 - 8.8 g/dL    Alkaline Phosphatase 73 40 - 150 U/L    ALT 18 0 - 70 U/L    AST 19 0 - 45 U/L   PSA tumor marker   Result Value Ref Range    PSA 9.34 (H) 0 - 4 ug/L     ASSESSMENT/PLAN:     (G45.9) Transient cerebral ischemia, unspecified type  (primary encounter diagnosis)  Comment: see most recent previous office visit with me. Patient has not had recurrence of symptoms and he is being treated with 325 milligrams of aspirin 1 time per day .  Plan: we again touched on the issues as to whether or not there was a point to a more aggressive position and workups such as carotid ultrasounds or echocardiograms and the like. In consideration of this patients generally frail health and the lack of likelihood that we would find a treatable / reversible condition we opted not to pursue additional workup or neurological consultation.    (C61) Malignant neoplasm of prostate (H)  Comment: noted as a point of historical importance   Plan: see oncology consultation and urologist office visit notes     (R06.09) Dyspnea on exertion  Comment: this is multifactorial   Plan: patient is generally in a weakened condition but there's no evidence of a pneumonia at this point or active congestive heart failure or cardiac ischemic vascular disease     (M62.81) Generalized muscle weakness  Comment: as detailed above   Plan: supportive measures reviewed , recheck in 1-2 months  depending on how things go     Patient Instructions   - Supportive measures reviewed ,    Rest   Push fluids   Acetaminophen prn for fever and aches and pains  Guaifenesin [ mucinex ] can help for sinus congestion   Sucrets or other throat lozenges can help for sore throat along with gargling with warm salt water         The information in this document, created by the medical scribe for me, accurately reflects the services I personally performed and the decisions made by me. I have reviewed and approved this document for accuracy.   MD Reginaldo Méndez MD  [facility]

## 2018-01-10 ENCOUNTER — TRANSFERRED RECORDS (OUTPATIENT)
Dept: HEALTH INFORMATION MANAGEMENT | Facility: CLINIC | Age: 83
End: 2018-01-10

## 2018-01-15 DIAGNOSIS — C61 MALIGNANT NEOPLASM OF PROSTATE (H): ICD-10-CM

## 2018-01-15 DIAGNOSIS — C79.51 BONE METASTASIS: Primary | ICD-10-CM

## 2018-01-15 RX ORDER — ABIRATERONE ACETATE 250 MG/1
1000 TABLET ORAL DAILY
Qty: 120 TABLET | Refills: 0 | Status: SHIPPED | OUTPATIENT
Start: 2018-01-15 | End: 2018-02-14

## 2018-01-15 RX ORDER — PREDNISONE 5 MG/1
5 TABLET ORAL 2 TIMES DAILY
Qty: 60 TABLET | Refills: 0 | Status: SHIPPED | OUTPATIENT
Start: 2018-01-15 | End: 2018-04-10

## 2018-01-16 ENCOUNTER — TELEPHONE (OUTPATIENT)
Dept: INTERNAL MEDICINE | Facility: CLINIC | Age: 83
End: 2018-01-16

## 2018-01-16 NOTE — TELEPHONE ENCOUNTER
Reason for Call:  Other prescription    Detailed comments: Pt was seen at [facility] for pneumonia. Pt is at the end of his symptoms but would like refill on doxycycline 100MG that was prescribed by a doctor at [facility]. [facility] [phone]    Phone Number Patient can be reached at: Home number on file [phone] (home)    Best Time: any    Can we leave a detailed message on this number? YES    Call taken on 1/16/2018 at 8:03 AM by Sue Milner

## 2018-01-17 NOTE — TELEPHONE ENCOUNTER
Per Crista AVERY, patient rep, patient is calling back checking on status of refill for abx from [facility].  Advised her to help patient schedule an appointment either with Dr. Muir tomorrow or another provider today to reassess symptoms and determine if further treatment is needed      Pablo Cheatham RN

## 2018-01-17 NOTE — PROGRESS NOTES
"  SUBJECTIVE:   Dimitri Neves is a 82 year old male who presents to clinic today for the following health issues:      Patient was evaluated for cough and dyspnea in [facility] ED on 1/6/2018. ED follow up was completed by me on 1/9/2018. For complete details of this visit, please review most recent office visit with me on 1/9/18.     Patient states he continues to experience a cough with limited improvement despite course of doxycycline. Prior to presenting to the ED, dyspnea had been present for \"a long time\". He describes cough productive of phlegm for the last three weeks. He mentions associated overall fatigue and malaise. the past, he has not had improvement with inhalers. He is not taking a decongestant. XR in ED showed small right sided pleural effusion and minimal chronic linear scarring right midlung.     Transient cerebral ischemia -/ transient ischemic attack - Dimitri has not experienced any recurrence of garbled speech since a couple of weeks ago. He does endorse difficulty with memory and comprehension.     Malignant neoplasm of prostate -- Active treatment with Zytiga, follwed by Dr Valenzuela.           Problem list and histories reviewed & adjusted, as indicated.  Additional history: as documented    Patient Active Problem List   Diagnosis     HTN (Hypertension), Benign goal <140/90     Narcotic dependence, episodic use (H)     ED (erectile dysfunction)     HUDSON (obstructive sleep apnea)     PE (pulmonary thromboembolism) (H)     HYPERLIPIDEMIA LDL GOAL <130     Advanced directives, counseling/discussion     Abnormal glucose     AK (actinic keratosis)     SNHL (sensorineural hearing loss)     Endolymphatic hydrops     History of squamous cell carcinoma     Pseudophakia, Yag Caps, ou     Venous (peripheral) insufficiency     Malignant neoplasm of prostate (H)     Dyspnea on exertion     Posterior vitreous detachment, bilateral     Iris nevus, ou     Dry eye syndrome, bilateral     Macular degeneration, dry, " mild, ou     Cotton wool spots, od     Bone metastasis (H)     Past Surgical History:   Procedure Laterality Date     ARTHROSCOPY KNEE RT/LT  ~1995    Right     C LUMBAR SPINE FUSION,ANTER APPRCH  8/2007    four times total, latest 8/07     CATARACT IOL, RT/LT       LASER YAG CAPSULOTOMY      both eyes     ORCHIECTOMY SCROTAL Bilateral 9/27/2017    Procedure: ORCHIECTOMY SCROTAL;  Bilateral orchiectomy scrotal approach;  Surgeon: Senthil Taylor MD;  Location: [facility] OR     PHACOEMULSIFICATION CLEAR CORNEA WITH STANDARD INTRAOCULAR LENS IMPLANT  6-18-12/7-30-12    right/left eye     ROTATOR CUFF REPAIR RT/LT  ~1995    right       Social History   Substance Use Topics     Smoking status: Former Smoker     Packs/day: 1.00     Years: 25.00     Types: Cigarettes     Quit date: 1/2/1976     Smokeless tobacco: Never Used      Comment: lives in smoke free household     Alcohol use 7.0 oz/week     14 Standard drinks or equivalent per week      Comment: 2-3 drinks every night     Family History   Problem Relation Age of Onset     CANCER Father      Lung 64y     DIABETES Brother      Hypertension Brother      CANCER Mother      Liver     CEREBROVASCULAR DISEASE Mother      Eye Disorder Mother      Glaucoma Mother      CEREBROVASCULAR DISEASE Maternal Grandmother      C.A.D. No family hx of      Thyroid Disease No family hx of      Macular Degeneration No family hx of          Current Outpatient Prescriptions   Medication Sig Dispense Refill     aspirin  MG EC tablet Take 1 tablet (325 mg) by mouth daily 1 tablet 0     quinapril (ACCUPRIL) 40 MG Tab TAKE 1 TABLET (40 MG) BY MOUTH DAILY 90 tablet 0     metoprolol (TOPROL-XL) 25 MG 24 hr tablet TAKE 1 TABLET (25 MG) BY MOUTH DAILY 90 tablet 0     tamsulosin (FLOMAX) 0.4 MG capsule Take 1 capsule (0.4 mg) by mouth daily 90 capsule 1     furosemide (LASIX) 20 MG tablet Take 1 tablet (20 mg) by mouth daily 90 tablet 1     oxyCODONE (ROXICODONE) 5 MG IR tablet Take 1 tablet (5  "mg) by mouth every 6 hours as needed for pain 30 tablet 0     simvastatin (ZOCOR) 40 MG tablet TAKE 0.5 TABLETS (20 MG) BY MOUTH DAILY 45 tablet 3     Calcium Carbonate (CALCIUM 600 PO)        CVS VITAMIN  B12 1000 MCG TABS TAKE 1 TABLET BY MOUTH EVERY DAY 30 tablet 5     CEPHALEXIN PO        gabapentin (NEURONTIN) 300 MG capsule Take 900 mg by mouth At Bedtime       Cholecalciferol (VITAMIN D) 2000 UNITS tablet Take 1 tablet by mouth daily       predniSONE (DELTASONE) 5 MG tablet Take 1 tablet (5 mg) by mouth 2 times daily (Patient not taking: Reported on 1/18/2018) 60 tablet 0     abiraterone (ZYTIGA) 250 MG tablet Take 4 tablets (1,000 mg) by mouth daily for 30 doses Take on empty stomach. (Patient not taking: Reported on 1/18/2018) 120 tablet 0     predniSONE (DELTASONE) 5 MG tablet Take 1 tablet (5 mg) by mouth 2 times daily (Patient not taking: Reported on 1/18/2018) 60 tablet 0     MINOCYCLINE HCL PO Take 100 mg by mouth daily       LORazepam (ATIVAN) 0.5 MG tablet Take 1 tablet (0.5 mg) by mouth every 4 hours as needed (Anxiety, Nausea/Vomiting or Sleep) (Patient not taking: Reported on 1/18/2018) 30 tablet 2     Labs reviewed in EPIC      Reviewed and updated as needed this visit by clinical staff       Reviewed and updated as needed this visit by Provider         ROS:  Constitutional, HEENT, cardiovascular, pulmonary, GI, , musculoskeletal, neuro, skin, endocrine and psych systems are negative, except as otherwise noted.    This document serves as a record of the services and decisions personally performed and made by Reginaldo Muir MD. It was created on his behalf by Taryn Her, a trained medical scribe. The creation of this document is based on the provider's statements to the medical scribe.  Taryn Her January 18, 2018 3:16 PM       OBJECTIVE:   /58  Pulse 76  Temp 96.7  F (35.9  C) (Oral)  Ht 1.816 m (5' 11.5\")  Wt 79.2 kg (174 lb 9.6 oz)  SpO2 94%  BMI 24.01 kg/m2  Body " mass index is 24.01 kg/(m^2).  GENERAL: alert and no distress, seems fatigued and a generally chronically ill appearance  RESP: Highly congested airways,  A lot of transmitted breath sounds, otherwise lungs clear to auscultation - no rales, rhonchi or wheezes  CV: regular rate and rhythm, normal S1 S2, no S3 or S4, no murmur, click or rub, no peripheral edema and peripheral pulses strong  MS: no gross musculoskeletal defects noted, no edema  NEURO: Normal strength and tone, mentation intact and speech normal  PSYCH: mentation appears normal, affect normal/bright    ASSESSMENT/PLAN:   (R06.00) Dyspnea, unspecified type  (primary encounter diagnosis)  Comment: patient here for follow up regarding emergency room visit for cough and dyspnea. There was benefit with nebulizer treatment in clinic today. Therefore, sending patient home with nebulizer. Medication direction and dosage discussed with patient and wife. They are agreeable with plan.   Plan: INHALATION/NEBULIZER TREATMENT, INITIAL,         ALBUTEROL/IPRATROPIUM 3ML NEB - .001,         INHALATION/NEBULIZER TREATMENT, INITIAL        Follow up if worsening or not improving.    (J20.9) Acute bronchitis, unspecified organism  Comment: see above.   Plan: albuterol (2.5 MG/3ML) 0.083% neb solution,         order for DME    (C79.51) Bone metastasis (H)  Comment: noted as point of historical importance     (R53.81) Malaise  Comment: we reviewed this in significant detail  .  Plan: there's no clear cut evidence of a separate pathological process to pursue. Discussed some different ideas and issues with his current chronic illnesses       The information in this document, created by the medical scribe for me, accurately reflects the services I personally performed and the decisions made by me. I have reviewed and approved this document for accuracy prior to leaving the patient care area.  January 18, 2018 3:29 PM    Reginaldo Muir MD  [facility]

## 2018-01-18 ENCOUNTER — OFFICE VISIT (OUTPATIENT)
Dept: INTERNAL MEDICINE | Facility: CLINIC | Age: 83
End: 2018-01-18
Payer: COMMERCIAL

## 2018-01-18 VITALS
SYSTOLIC BLOOD PRESSURE: 132 MMHG | WEIGHT: 174.6 LBS | HEART RATE: 76 BPM | DIASTOLIC BLOOD PRESSURE: 58 MMHG | HEIGHT: 72 IN | OXYGEN SATURATION: 94 % | TEMPERATURE: 96.7 F | BODY MASS INDEX: 23.65 KG/M2

## 2018-01-18 DIAGNOSIS — C79.51 BONE METASTASIS: ICD-10-CM

## 2018-01-18 DIAGNOSIS — R06.00 DYSPNEA, UNSPECIFIED TYPE: Primary | ICD-10-CM

## 2018-01-18 DIAGNOSIS — J20.9 ACUTE BRONCHITIS, UNSPECIFIED ORGANISM: ICD-10-CM

## 2018-01-18 DIAGNOSIS — R53.81 MALAISE: ICD-10-CM

## 2018-01-18 PROCEDURE — 99214 OFFICE O/P EST MOD 30 MIN: CPT | Mod: 25 | Performed by: INTERNAL MEDICINE

## 2018-01-18 PROCEDURE — 94640 AIRWAY INHALATION TREATMENT: CPT | Performed by: INTERNAL MEDICINE

## 2018-01-18 RX ORDER — IPRATROPIUM BROMIDE AND ALBUTEROL SULFATE 2.5; .5 MG/3ML; MG/3ML
1 SOLUTION RESPIRATORY (INHALATION) ONCE
Qty: 3 ML | Refills: 0 | Status: SHIPPED | OUTPATIENT
Start: 2018-01-18 | End: 2018-01-18

## 2018-01-18 RX ORDER — ALBUTEROL SULFATE 0.83 MG/ML
1 SOLUTION RESPIRATORY (INHALATION) EVERY 6 HOURS PRN
Qty: 25 VIAL | Refills: 3 | Status: SHIPPED | OUTPATIENT
Start: 2018-01-18 | End: 2020-01-01

## 2018-01-18 RX ORDER — IPRATROPIUM BROMIDE AND ALBUTEROL SULFATE 2.5; .5 MG/3ML; MG/3ML
1 SOLUTION RESPIRATORY (INHALATION) ONCE
Qty: 3 ML | Refills: 0 | Status: SHIPPED | OUTPATIENT
Start: 2018-01-18 | End: 2018-10-16

## 2018-01-18 NOTE — NURSING NOTE
The following nebulizer treatment was given:     MEDICATION: Duoneb  : [employer]  LOT #: [id number]  EXPIRATION DATE:  05/2019  NDC # 0626-4449-36    Discharge by KIRBY PULIDO

## 2018-01-18 NOTE — NURSING NOTE
"Chief Complaint   Patient presents with     Hospital F/U     Patient states he is not feeling any better and wants some antibiotics to feel better, there going on a trip soon and needs to feel better.        Initial /58  Pulse 76  Temp 96.7  F (35.9  C) (Oral)  Ht 5' 11.5\" (1.816 m)  Wt 174 lb 9.6 oz (79.2 kg)  SpO2 94%  BMI 24.01 kg/m2 Estimated body mass index is 24.01 kg/(m^2) as calculated from the following:    Height as of this encounter: 5' 11.5\" (1.816 m).    Weight as of this encounter: 174 lb 9.6 oz (79.2 kg).  Medication Reconciliation: complete   Nakia BULLARD MA      "

## 2018-01-18 NOTE — PATIENT INSTRUCTIONS
Nebulizer treatment was done in clinic today    Start nebulizer treatments as needed for shortness of breath and cough, max of 4-6 times per day. Continue to use as needed until symptoms resolve. There is no need for a tapering process.     Start taking Mucinex daily for chest congestion     Drink plenty of fluids- 6 to 10 glasses of liquids each day.   Rest.   Tylenol or ibuprofen as needed for discomfort.   RTC if any worsening symptoms or if not improving.    [facility]    If you have any questions regarding to your visit please contact your care team:     Team Pink:   Clinic Hours Telephone Number   Internal Medicine:  Dr. Deborah Taylor, NP       7am-7pm  Monday - Thursday   7am-5pm  Fridays  [phone]  (Appointment scheduling available 24/7)    Questions about your visit?  Team Line  [phone]   Urgent Care - [place] [facility] - 11am-9pm Monday-Friday Saturday-Sunday- 9am-5pm   [place] - 5pm-9pm Monday-Friday Saturday-Sunday- 9am-5pm  [phone] - [place]   [phone] - [place]       What options do I have for visits at the clinic other than the traditional office visit?  To expand how we care for you, many of our providers are utilizing electronic visits (e-visits) and telephone visits, when medically appropriate, for interactions with their patients rather than a visit in the clinic.   We also offer nurse visits for many medical concerns. Just like any other service, we will bill your insurance company for this type of visit based on time spent on the phone with your provider. Not all insurance companies cover these visits. Please check with your medical insurance if this type of visit is covered. You will be responsible for any charges that are not paid by your insurance.      E-visits via [employer]:  generally incur a $35.00 fee.  Telephone visits:  Time spent on the phone: *charged based on time that is spent on the  phone in increments of 10 minutes. Estimated cost:   5-10 mins $30.00   11-20 mins. $59.00   21-30 mins. $85.00   Use [employer] (secure email communication and access to your chart) to send your primary care provider a message or make an appointment. Ask someone on your Team how to sign up for "[employer]t.    For a Price Quote for your services, please call our [employer] Line at [phone].    As always, Thank you for trusting us with your health care needs!    Discharge by KIRBY PULIDO

## 2018-01-18 NOTE — MR AVS SNAPSHOT
After Visit Summary   1/18/2018    Dimitri Neves    MRN: [id number]           Patient Information     Date Of Birth          1935        Visit Information        Provider Department      1/18/2018 3:00 PM Reginaldo Muir MD [facility]        Today's Diagnoses     Dyspnea, unspecified type    -  1    Bone metastasis (H)        Malaise        Acute bronchitis, unspecified organism          Care Instructions    Nebulizer treatment was done in clinic today    Start nebulizer treatments as needed for shortness of breath and cough, max of 4-6 times per day. Continue to use as needed until symptoms resolve. There is no need for a tapering process.     Start taking Mucinex daily for chest congestion     Drink plenty of fluids- 6 to 10 glasses of liquids each day.   Rest.   Tylenol or ibuprofen as needed for discomfort.   RTC if any worsening symptoms or if not improving.    [facility]    If you have any questions regarding to your visit please contact your care team:     Team Pink:   Clinic Hours Telephone Number   Internal Medicine:  Dr. Deborah Taylor NP       7am-7pm  Monday - Thursday   7am-5pm  Fridays  [phone]  (Appointment scheduling available 24/7)    Questions about your visit?  Team Line  [phone]   Urgent Care - [place] - 11am-9pm Monday-Friday Saturday-Sunday- 9am-5pm   [place] - 5pm-9pm Monday-Friday Saturday-Sunday- 9am-5pm  [phone] - [place]   [phone] - [place]       What options do I have for visits at the clinic other than the traditional office visit?  To expand how we care for you, many of our providers are utilizing electronic visits (e-visits) and telephone visits, when medically appropriate, for interactions with their patients rather than a visit in the clinic.   We also offer nurse visits for many medical concerns. Just like any other service, we will bill your  insurance company for this type of visit based on time spent on the phone with your provider. Not all insurance companies cover these visits. Please check with your medical insurance if this type of visit is covered. You will be responsible for any charges that are not paid by your insurance.      E-visits via [employer]:  generally incur a $35.00 fee.  Telephone visits:  Time spent on the phone: *charged based on time that is spent on the phone in increments of 10 minutes. Estimated cost:   5-10 mins $30.00   11-20 mins. $59.00   21-30 mins. $85.00   Use [employer] (secure email communication and access to your chart) to send your primary care provider a message or make an appointment. Ask someone on your Team how to sign up for [employer].    For a Price Quote for your services, please call our [employer] Price Line at [phone].    As always, Thank you for trusting us with your health care needs!    Discharge by KIRBY PULIDO             Follow-ups after your visit        Your next 10 appointments already scheduled     Jan 25, 2018  2:00 PM CST   LAB with LAB ONC [facility] [place]note 2[facility]    [address] [address] [place] [id number] [phone]           Please do not eat 10-12 hours before your appointment if you are coming in fasting for labs on lipids, cholesterol, or glucose (sugar). This does not apply to pregnant women. Water, hot tea and black coffee (with nothing added) are okay. Do not drink other fluids, diet soda or chew gum.            Jan 25, 2018  2:45 PM CST   Return Visit with Rafael Valenzuela MD   [facility])    [address] [facility] [address] [phone]            Jan 25, 2018  3:15 PM CST   Level O with [address])    [address] [address] [facility] [address] [phone]              Who to contact     If you have  "questions or need follow up information about today's clinic visit or your schedule please contact [facility] directly at [phone].  Normal or non-critical lab and imaging results will be communicated to you by netTALKhart, letter or phone within 4 business days after the clinic has received the results. If you do not hear from us within 7 days, please contact the clinic through netTALKhart or phone. If you have a critical or abnormal lab result, we will notify you by phone as soon as possible.  Submit refill requests through [employer] or call your pharmacy and they will forward the refill request to us. Please allow 3 business days for your refill to be completed.          Additional Information About Your Visit        netTALKharLocation Information     [employer] gives you secure access to your electronic health record. If you see a primary care provider, you can also send messages to your care team and make appointments. If you have questions, please call your primary care clinic.  If you do not have a primary care provider, please call [phone] and they will assist you.        Care EveryWhere ID     This is your Care EveryWhere ID. This could be used by other organizations to access your [place] medical records  [id number]        Your Vitals Were     Pulse Temperature Height Pulse Oximetry BMI (Body Mass Index)       76 96.7  F (35.9  C) (Oral) 5' 11.5\" (1.816 m) 94% 24.01 kg/m2        Blood Pressure from Last 3 Encounters:   01/18/18 132/58   01/09/18 138/78   01/04/18 130/76    Weight from Last 3 Encounters:   01/18/18 174 lb 9.6 oz (79.2 kg)   01/09/18 167 lb (75.8 kg)   01/04/18 177 lb 6.4 oz (80.5 kg)              We Performed the Following     INHALATION/NEBULIZER TREATMENT, INITIAL     INHALATION/NEBULIZER TREATMENT, INITIAL          Today's Medication Changes          These changes are accurate as of: 1/18/18  4:09 PM.  If you have any questions, ask your nurse or doctor.               Start taking " these medicines.        Dose/Directions    albuterol (2.5 MG/3ML) 0.083% neb solution   Used for:  Acute bronchitis, unspecified organism   Started by:  Reginaldo Muir MD        Dose:  1 vial   Take 1 vial (2.5 mg) by nebulization every 6 hours as needed for shortness of breath / dyspnea or wheezing   Quantity:  25 vial   Refills:  3       ipratropium - albuterol 0.5 mg/2.5 mg/3 mL 0.5-2.5 (3) MG/3ML neb solution   Commonly known as:  DUONEB   Used for:  Dyspnea, unspecified type   Started by:  Reginaldo Muir MD        Dose:  1 vial   Take 1 vial (3 mLs) by nebulization once for 1 dose   Quantity:  3 mL   Refills:  0       order for DME   Used for:  Acute bronchitis, unspecified organism   Started by:  Reginaldo Muir MD        Equipment being ordered: Nebulizer   Quantity:  1 Device   Refills:  0            Where to get your medicines      These medications were sent to [facility]/pharmacy #[id number] - [place], [place] - [address] [facility] [place]  [address] [facility], , [facility] [address]     Phone:  [phone]     albuterol (2.5 MG/3ML) 0.083% neb solution         Some of these will need a paper prescription and others can be bought over the counter.  Ask your nurse if you have questions.     Bring a paper prescription for each of these medications     ipratropium - albuterol 0.5 mg/2.5 mg/3 mL 0.5-2.5 (3) MG/3ML neb solution    order for DME                Primary Care Provider Office Phone # Fax #    Reginaldo Muir [phone] [address] [facility] [address]        Equal Access to Services     DAVIDSON LEBLANC : Ramos Rodriguez, hugh mendez, sarahi holley, naty russo. Em[facility] [phone].    ATENCIÓN: Si habla español, tiene a roberts disposición servicios gratuitos de asistencia lingüística. Llame al [phone].    We comply with applicable federal civil rights laws and [place] laws. We do not discriminate on the basis of race,  color, national origin, age, disability, sex, sexual orientation, or gender identity.            Thank you!     Thank you for choosing [facility]  for your care. Our goal is always to provide you with excellent care. Hearing back from our patients is one way we can continue to improve our services. Please take a few minutes to complete the written survey that you may receive in the mail after your visit with us. Thank you!             Your Updated Medication List - Protect others around you: Learn how to safely use, store and throw away your medicines at www.disposemymeds.org.          This list is accurate as of: 1/18/18  4:09 PM.  Always use your most recent med list.                   Brand Name Dispense Instructions for use Diagnosis    abiraterone 250 MG tablet    ZYTIGA    120 tablet    Take 4 tablets (1,000 mg) by mouth daily for 30 doses Take on empty stomach.    Bone metastasis (H), Malignant neoplasm of prostate (H)       albuterol (2.5 MG/3ML) 0.083% neb solution     25 vial    Take 1 vial (2.5 mg) by nebulization every 6 hours as needed for shortness of breath / dyspnea or wheezing    Acute bronchitis, unspecified organism       aspirin  MG EC tablet     1 tablet    Take 1 tablet (325 mg) by mouth daily    Transient cerebral ischemia, unspecified type       CALCIUM 600 PO           CEPHALEXIN PO           CVS vitamin  B12 1000 MCG Tabs   Generic drug:  cyanocobalamin     30 tablet    TAKE 1 TABLET BY MOUTH EVERY DAY    Low vitamin B12 level       furosemide 20 MG tablet    LASIX    90 tablet    Take 1 tablet (20 mg) by mouth daily    Bilateral lower extremity edema, HTN (hypertension), benign       gabapentin 300 MG capsule    NEURONTIN     Take 900 mg by mouth At Bedtime        ipratropium - albuterol 0.5 mg/2.5 mg/3 mL 0.5-2.5 (3) MG/3ML neb solution    DUONEB    3 mL    Take 1 vial (3 mLs) by nebulization once for 1 dose    Dyspnea, unspecified type       LORazepam 0.5 MG tablet     ATIVAN    30 tablet    Take 1 tablet (0.5 mg) by mouth every 4 hours as needed (Anxiety, Nausea/Vomiting or Sleep)    Bone metastasis (H), Malignant neoplasm of prostate (H)       metoprolol succinate 25 MG 24 hr tablet    TOPROL-XL    90 tablet    TAKE 1 TABLET (25 MG) BY MOUTH DAILY    HTN (hypertension), benign       MINOCYCLINE HCL PO      Take 100 mg by mouth daily    Squamous cell carcinoma, scalp/neck       order for DME     1 Device    Equipment being ordered: Nebulizer    Acute bronchitis, unspecified organism       oxyCODONE IR 5 MG tablet    ROXICODONE    30 tablet    Take 1 tablet (5 mg) by mouth every 6 hours as needed for pain    Narcotic dependence, episodic use (H)       * predniSONE 5 MG tablet    DELTASONE    60 tablet    Take 1 tablet (5 mg) by mouth 2 times daily    Bone metastasis (H), Malignant neoplasm of prostate (H)       * predniSONE 5 MG tablet    DELTASONE    60 tablet    Take 1 tablet (5 mg) by mouth 2 times daily    Bone metastasis (H), Malignant neoplasm of prostate (H)       quinapril 40 MG Tab    ACCUPRIL    90 tablet    TAKE 1 TABLET (40 MG) BY MOUTH DAILY    HTN (hypertension), benign       simvastatin 40 MG tablet    ZOCOR    45 tablet    TAKE 0.5 TABLETS (20 MG) BY MOUTH DAILY    Hyperlipidemia LDL goal <130       tamsulosin 0.4 MG capsule    FLOMAX    90 capsule    Take 1 capsule (0.4 mg) by mouth daily    Malignant neoplasm of prostate (H)       vitamin D 2000 UNITS tablet      Take 1 tablet by mouth daily        * Notice:  This list has 2 medication(s) that are the same as other medications prescribed for you. Read the directions carefully, and ask your doctor or other care provider to review them with you.

## 2018-02-02 ENCOUNTER — TELEPHONE (OUTPATIENT)
Dept: INTERNAL MEDICINE | Facility: CLINIC | Age: 83
End: 2018-02-02

## 2018-02-02 NOTE — TELEPHONE ENCOUNTER
Reason for call:  Other   Patient called regarding (reason for call): prescription  Additional comments: Patient went to [place] and realized he forgot his Lasix medication. Can you send a small script to  [facility] in [place] at [phone]; he will be there for 2 months. Please contact with next steps.    Phone number to reach patient:  Cell number on file:    Telephone Information:   Mobile [phone]       Best Time:  ASAP    Can we leave a detailed message on this number?  YES

## 2018-02-02 NOTE — TELEPHONE ENCOUNTER
Was unable to send E-prescribe d/t pharmacy cannot be found in the system    Called [facility] at number below and spoke with Nakia.  Verbal orders for Furosemide 20mg called in for 90 tabs.  If he wants less dispensed, he can just let the pharmacy know to dispense less    Patient updated      Pablo Cheatham RN

## 2018-02-16 DIAGNOSIS — C79.51 BONE METASTASIS: Primary | ICD-10-CM

## 2018-02-16 DIAGNOSIS — C61 MALIGNANT NEOPLASM OF PROSTATE (H): ICD-10-CM

## 2018-02-16 RX ORDER — ABIRATERONE ACETATE 250 MG/1
1000 TABLET ORAL DAILY
Qty: 120 TABLET | Refills: 0 | Status: SHIPPED | OUTPATIENT
Start: 2018-02-16 | End: 2018-03-18

## 2018-02-16 RX ORDER — PREDNISONE 5 MG/1
5 TABLET ORAL 2 TIMES DAILY
Qty: 60 TABLET | Refills: 0 | Status: SHIPPED | OUTPATIENT
Start: 2018-02-16 | End: 2018-04-10

## 2018-02-23 ENCOUNTER — TELEPHONE (OUTPATIENT)
Dept: FAMILY MEDICINE | Facility: CLINIC | Age: 83
End: 2018-02-23

## 2018-02-23 DIAGNOSIS — I10 HTN (HYPERTENSION), BENIGN: ICD-10-CM

## 2018-02-23 RX ORDER — POTASSIUM CHLORIDE 750 MG/1
10 TABLET, EXTENDED RELEASE ORAL DAILY
Qty: 90 TABLET | Refills: 1 | Status: CANCELLED | OUTPATIENT
Start: 2018-02-23

## 2018-02-23 NOTE — TELEPHONE ENCOUNTER
"Patient currently in [place] and had forgotten his lasix so this was called into the [facility] in [place] (ph. [phone], see 2/2/18 phone encounter for pharmacy info)    Patient stated that for some reason, the pharmacy also dispensed Potassium Chloride (10mEq once daily) with the lasix.  However, Potassium Chloride is not active on [facility] med list.  Last prescription for Potassium Chloride from  was in 2013  Per 1/9/18 hospital f/u OV notes: \"His potassium levels were slightly low and does not remember any potassium-replacement therapy\"- see Care everywhere for most recent potassium level (3.1)    Patient wondering if he should start on the Potassium Chloride 10mEq daily  Will need to update med list if he is     Pablo Cheatham RN        "

## 2018-02-23 NOTE — TELEPHONE ENCOUNTER
Patient notified of Provider's message as written.  Patient verbalized understanding.  Pablo Cheatham RN

## 2018-02-23 NOTE — TELEPHONE ENCOUNTER
Reason for Call:  Other call back    Detailed comments: Patient has question regarding his potassium intake. He is currently in [place] and is unable to remember how much he is suppose to take this, everyday or not. Please call patient on his cellphone.     Phone Number Patient can be reached at: Home number on file [phone] (home)    Best Time: any    Can we leave a detailed message on this number? YES    Call taken on 2/23/2018 at 1:00 PM by Randolph Walker

## 2018-02-23 NOTE — TELEPHONE ENCOUNTER
This is a grey area . I think you can skip on the potassium but ideally if you're feeling sick at all or muscle cramps or other generalized weakness you'd want to consider a basic metabolic panel / potassium recheck     Reginaldo Muir MD

## 2018-03-01 ENCOUNTER — DOCUMENTATION ONLY (OUTPATIENT)
Dept: PHARMACY | Facility: CLINIC | Age: 83
End: 2018-03-01

## 2018-03-01 NOTE — PROGRESS NOTES
Oral Chemotherapy Monitoring Program    Primary Oncologist: Dr. Rafael Valenzuela  Primary Oncology Clinic: [facility]  Cancer Diagnosis: Metastatic Prostate Cancer      abiraterone (Zytiga) 1000 mg PO daily with Prednisone 5 mg PO BID  Start Date: 8/19/17      Drug Interaction Assessment:   1. Abiraterone will decrease Warfarin metabolism, potentially raising INR; patient is aware and will inquire about more frequent INR monitoring at the start of therapy  2. Abiraterone inhibits Metoprolol metabolism with a slight risk of causing bradycardia      Lab Monitoring Plan:  C1D1+   CMP, BP C2D1+ Call, CMP, BP C3D1+ Call, CMP, BP C4D1+ Call, CMP, BP C5D1+ Call, CMP, BP C6D1+ Call, CMP, BP   C1D8+    C2D8+    C3D8+    C4D8+    C5D8+    C6D8+      C1D15+ Call, CMP C2D15+ Call, CMP C3D15+    C4D15+    C5D15+    C6D15+      C1D22+    C2D22+    C3D22+    C4D22+    C5D22+    C6D22+        Subjective/Objective:  Dimitri Neves is a 82 year old male contacted by phone for a follow-up visit for oral chemotherapy.  Dimitri is currently in [place], [place]. He reports some dizziness that started before they left for their trip. It has continued throughout the trip. He did see a doctor in [facility]. His labs and BP were all ok. He also has had some swelling in his legs. The doctor had him increase his furosemide to 40 mg, which helped with some of the fluid retention. He denies any other side effects.     ORAL CHEMOTHERAPY 7/26/2017 8/21/2017 9/18/2017 10/16/2017 11/27/2017 12/19/2017 3/1/2018   Drug Name Zytiga (Abiraterone) Zytiga (Abiraterone) Zytiga (Abiraterone) Zytiga (Abiraterone) Zytiga (Abiraterone) Zytiga (Abiraterone) Zytiga (Abiraterone)   Current Dosage 1000mg 1000mg 1000mg 1000mg 1000mg 1000mg 1000mg   Current Schedule Daily Daily Daily Daily Daily Daily Daily   Cycle Details Continuous Continuous Continuous Continuous Continuous Continuous Continuous   Start Date of Last Cycle 7/19/2017 8/19/2017 9/19/2017  10/16/2017 11/19/2017 12/19/2017 -   Planned next cycle start date - - - - 12/19/2017 - -   Doses missed in last 2 weeks 0 0 0 0 0 0 0   Adherence Assessment Adherent Adherent Adherent Adherent Adherent Adherent Adherent   Adverse Effects No AE identified during assessment No AE identified during assessment No AE identified during assessment No AE identified during assessment No AE identified during assessment No AE identified during assessment Other (see note for details)   Other (see note for details) - - - - - - (No Data)   Pharmacist intervention? - - - - - - No   Any new drug interactions? - No No No No No No   Is the dose as ordered appropriate for the patient? Yes Yes Yes Yes Yes Yes -   Is the patient currently in pain? Assessed in last 30 days. Assessed in last 30 days. Assessed in last 30 days. Assessed in last 30 days. Assessed in last 30 days. Assessed in last 30 days. No   Has the patient been assessed within the past 6 months for depression? Yes Yes Yes Yes - Yes Yes   Has the patient missed any days of school, work, or other routine activity? No No No No No - -     Assessment:  Dimitri is tolerating therapy with some side effects.     Plan:  Continue current therapy.     Follow-Up:  4/30/18 Appt with Nicolas at 10:30 am.     Refill Due:  3/16/18 release abiraterone and prednisone to [facility]    Taya Haji, Pharm. D., BCOP

## 2018-03-16 DIAGNOSIS — C79.51 BONE METASTASIS: Primary | ICD-10-CM

## 2018-03-16 DIAGNOSIS — C61 MALIGNANT NEOPLASM OF PROSTATE (H): ICD-10-CM

## 2018-03-16 RX ORDER — PREDNISONE 5 MG/1
5 TABLET ORAL 2 TIMES DAILY
Qty: 60 TABLET | Refills: 0 | Status: SHIPPED | OUTPATIENT
Start: 2018-03-16 | End: 2018-04-10

## 2018-03-16 RX ORDER — ABIRATERONE ACETATE 250 MG/1
1000 TABLET ORAL DAILY
Qty: 120 TABLET | Refills: 0 | Status: SHIPPED | OUTPATIENT
Start: 2018-03-16 | End: 2018-04-15

## 2018-04-04 NOTE — PROGRESS NOTES
SUBJECTIVE:   Dimitri Neves is a 82 year old male who presents to clinic today for the following health issues:       HTN (hypertension), benign  Endolymphatic hydrops, unspecified laterality  Sensorineural hearing loss (SNHL), unspecified laterality  Malignant neoplasm of prostate (H)  Squamous cell carcinoma, scalp/neck  Chronic vasomotor rhinitis  Dizziness     Mr. Dimitri Neves is a patient with many different issues and this was a complicated office visit     Swollen feet  Patient has concerns about his swollen feet and problems with his equilibrium. He stated that it started when he caught the flu with a runny nose that it  almost prevented him from going on vacation. He states that during the vacation his feet had swollen up and that the swelling would not go down.  He thinks the swelling is associated with flying on the airplane possibly.  He states that his symptoms were improved with furosemide [ Lasix ] which was prescribed through an emergency room evaluation. He  has lost a lot of strength since having flu but overall feels good. He has not had any chest pain or paroxsysmal nocturnal dyspnea or orthopnea . Not especially shortness of breath , nothing beyond baseline        Falling  Patient states that he took a fall in the shower and thinks that he has a problem with equilibrium.When he is standing it feels as if he is going to faint. He do not feel like fainting while he is sitting. He has somewhat lower blood pressure today and given his different diagnoses I think we could reduce some of his anti-hypertensive medication. I am unable to really get a history of true rotatory dysequilibrium symptoms or vertiginous symptoms     BP Readings from Last 3 Encounters:   04/10/18 116/64   01/18/18 132/58   01/09/18 138/78           Meds  Patient is concerned that he is taking to many medications and wants to review them. Patient will decrease his dosages of Metoprolol from 25 mg to 12.5 mg  and Accupril  from 40 mg to 20 mg. I considered referring patient for an medication therapy management consult but he's decided to hold on this for now as we stopped several medications, see revised medication list       Problem list and histories reviewed & adjusted, as indicated.  Additional history: as documented    Patient Active Problem List   Diagnosis     HTN (Hypertension), Benign goal <140/90     Narcotic dependence, episodic use (H)     ED (erectile dysfunction)     HUDSON (obstructive sleep apnea)     PE (pulmonary thromboembolism) (H)     HYPERLIPIDEMIA LDL GOAL <130     Advanced directives, counseling/discussion     Abnormal glucose     AK (actinic keratosis)     SNHL (sensorineural hearing loss)     Endolymphatic hydrops     History of squamous cell carcinoma     Pseudophakia, Yag Caps, ou     Venous (peripheral) insufficiency     Malignant neoplasm of prostate (H)     Dyspnea on exertion     Posterior vitreous detachment, bilateral     Iris nevus, ou     Dry eye syndrome, bilateral     Macular degeneration, dry, mild, ou     Cotton wool spots, od     Bone metastasis (H)     Past Surgical History:   Procedure Laterality Date     ARTHROSCOPY KNEE RT/LT  ~1995    Right     C LUMBAR SPINE FUSION,ANTER APPRCH  8/2007    four times total, latest 8/07     CATARACT IOL, RT/LT       LASER YAG CAPSULOTOMY      both eyes     ORCHIECTOMY SCROTAL Bilateral 9/27/2017    Procedure: ORCHIECTOMY SCROTAL;  Bilateral orchiectomy scrotal approach;  Surgeon: Senthil Taylor MD;  Location: [facility] OR     PHACOEMULSIFICATION CLEAR CORNEA WITH STANDARD INTRAOCULAR LENS IMPLANT  6-18-12/7-30-12    right/left eye     ROTATOR CUFF REPAIR RT/LT  ~1995    right       Social History   Substance Use Topics     Smoking status: Former Smoker     Packs/day: 1.00     Years: 25.00     Types: Cigarettes     Quit date: 1/2/1976     Smokeless tobacco: Never Used      Comment: lives in smoke free household     Alcohol use 7.0 oz/week     14 Standard drinks  or equivalent per week      Comment: 2-3 drinks every night     Family History   Problem Relation Age of Onset     CANCER Father      Lung 64y     DIABETES Brother      Hypertension Brother      CANCER Mother      Liver     CEREBROVASCULAR DISEASE Mother      Eye Disorder Mother      Glaucoma Mother      CEREBROVASCULAR DISEASE Maternal Grandmother      C.A.D. No family hx of      Thyroid Disease No family hx of      Macular Degeneration No family hx of          Current Outpatient Prescriptions   Medication Sig Dispense Refill     [facility] VITAMIN  B12 1000 MCG TABS TAKE 1 TABLET BY MOUTH EVERY DAY 30 tablet 0     abiraterone (ZYTIGA) 250 MG tablet Take 4 tablets (1,000 mg) by mouth daily for 30 doses Take on empty stomach. 120 tablet 0     albuterol (2.5 MG/3ML) 0.083% neb solution Take 1 vial (2.5 mg) by nebulization every 6 hours as needed for shortness of breath / dyspnea or wheezing 25 vial 3     order for DME Equipment being ordered: Nebulizer 1 Device 0     aspirin  MG EC tablet Take 1 tablet (325 mg) by mouth daily 1 tablet 0     quinapril (ACCUPRIL) 40 MG Tab TAKE 1 TABLET (40 MG) BY MOUTH DAILY 90 tablet 0     metoprolol (TOPROL-XL) 25 MG 24 hr tablet TAKE 1 TABLET (25 MG) BY MOUTH DAILY 90 tablet 0     tamsulosin (FLOMAX) 0.4 MG capsule Take 1 capsule (0.4 mg) by mouth daily 90 capsule 1     predniSONE (DELTASONE) 5 MG tablet Take 1 tablet (5 mg) by mouth 2 times daily 60 tablet 0     MINOCYCLINE HCL PO Take 100 mg by mouth daily       oxyCODONE (ROXICODONE) 5 MG IR tablet Take 1 tablet (5 mg) by mouth every 6 hours as needed for pain 30 tablet 0     simvastatin (ZOCOR) 40 MG tablet TAKE 0.5 TABLETS (20 MG) BY MOUTH DAILY 45 tablet 3     Calcium Carbonate (CALCIUM 600 PO)        LORazepam (ATIVAN) 0.5 MG tablet Take 1 tablet (0.5 mg) by mouth every 4 hours as needed (Anxiety, Nausea/Vomiting or Sleep) 30 tablet 2     gabapentin (NEURONTIN) 300 MG capsule Take 900 mg by mouth At Bedtime        Cholecalciferol (VITAMIN D) 2000 UNITS tablet Take 1 tablet by mouth daily       ipratropium - albuterol 0.5 mg/2.5 mg/3 mL (DUONEB) 0.5-2.5 (3) MG/3ML neb solution Take 1 vial (3 mLs) by nebulization once for 1 dose 3 mL 0     furosemide (LASIX) 20 MG tablet Take 1 tablet (20 mg) by mouth daily (Patient not taking: Reported on 4/10/2018) 90 tablet 1     Labs reviewed in EPIC    Reviewed and updated as needed this visit by clinical staff  Tobacco  Allergies  Meds  Med Hx  Surg Hx  Fam Hx  Soc Hx      Reviewed and updated as needed this visit by Provider         ROS:  Constitutional, HEENT, cardiovascular, pulmonary, GI, , musculoskeletal, neuro, skin, endocrine and psych systems are negative, except as otherwise noted.  This document serves as a record of the services and decisions personally performed and made by Reginaldo Muir MD. It was created on his/her behalf by Raúl Gordon, trained medical scribe. The creation of this document is based the provider's statements to the medical scribes.    Scribe Raúl Gordon 10:03 AM, April 10, 2018    OBJECTIVE:     /64  Pulse 79  Temp 98  F (36.7  C) (Oral)  Resp 16  Wt 81.6 kg (180 lb)  SpO2 98%  BMI 24.76 kg/m2  Body mass index is 24.76 kg/(m^2).  GENERAL: healthy, alert and no distress, sunburned [ he's just returned from [place] ]  MS: no gross musculoskeletal defects noted, no edema  NEURO: Normal strength and tone, mentation intact and speech normal, no focal neurological deficits   PSYCH: mentation appears normal, affect normal/bright  LOWER EXTREMITIES : he has a dark stasis dermatitis with both legs and 1+ to 2+ lower extremity edema bilateral. Positive dorsalis pedis pulses present and no features of arteriolar insufficiency   ENT: normal ear exams with no cerumen impactions or signs or symptoms  Of infection .     Diagnostic Test Results:  No results found for this or any previous visit (from the past 24 hour(s)).    ASSESSMENT/PLAN:   1.  HTN (Hypertension), Benign goal <140/90  Controlled within acceptable limits, probably too well and we recommended he reduce his Toprol [metoprolol XL] and his quinapril (Accupril) to half doses only. Medical assistant blood pressure recheck  In 2-3 weeks     2. Endolymphatic hydrops, unspecified laterality  This was an older diagnosis in his problem list and patient has no memory of such a diagnosis   - OTOLARYNGOLOGY REFERRAL    3. Sensorineural hearing loss (SNHL), unspecified laterality  He definitely does have significant hearing loss and I am uncertain if he's having vertigo or not. I suspect a more multifactorial etiology of his   - OTOLARYNGOLOGY REFERRAL    4. Malignant neoplasm of prostate (H)  He's got metastatic disease     5. Squamous cell carcinoma, scalp/neck      6. Chronic vasomotor rhinitis  He's got a frustrating condition of never ending clear drainage from his nose. I am not sure there's any easy solution for this problem   - OTOLARYNGOLOGY REFERRAL    7. Dizziness    - OTOLARYNGOLOGY REFERRAL    Patient Instructions     Decrease your dosage off Accupril  to half  from 40 mg to 20 mg    Decrease metoporol from 25 mg to 12.5 mg    Schedule an appointment with ENT for a full check up           [facility]    If you have any questions regarding to your visit please contact your care team:     Team Pink:   Clinic Hours Telephone Number   Internal Medicine:  Dr. Deborah Taylor, NP       7am-7pm  Monday - Thursday   7am-5pm  Fridays  [phone]  (Appointment scheduling available 24/7)    Questions about your visit?  Team Line  [phone]   Urgent Care - [place] - 11am-9pm Monday-Friday Saturday-Sunday- 9am-5pm   [place] - 5pm-9pm Monday-Friday Saturday-Sunday- 9am-5pm  [phone] - [place]  [phone] - [place]       What options do I have for visits at the clinic other than the traditional office  visit?  To expand how we care for you, many of our providers are utilizing electronic visits (e-visits) and telephone visits, when medically appropriate, for interactions with their patients rather than a visit in the clinic.   We also offer nurse visits for many medical concerns. Just like any other service, we will bill your insurance company for this type of visit based on time spent on the phone with your provider. Not all insurance companies cover these visits. Please check with your medical insurance if this type of visit is covered. You will be responsible for any charges that are not paid by your insurance.      E-visits via onkeahart:  generally incur a $35.00 fee.  Telephone visits:  Time spent on the phone: *charged based on time that is spent on the phone in increments of 10 minutes. Estimated cost:   5-10 mins $30.00   11-20 mins. $59.00   21-30 mins. $85.00   Use onkeahart (secure email communication and access to your chart) to send your primary care provider a message or make an appointment. Ask someone on your Team how to sign up for [employer].    For a Price Quote for your services, please call our Consumer Price Line at [phone].    As always, Thank you for trusting us with your health care needs!    Anaid PERSAUD CMA ([facility])              The information in this document, created by the medical scribe, Raúl Gordon, for me, accurately reflects the services I personally performed and the decisions made by me. I have reviewed and approved this document for accuracy prior to leaving the patient care area.    Reginaldo Muir MD  [facility]

## 2018-04-10 ENCOUNTER — OFFICE VISIT (OUTPATIENT)
Dept: FAMILY MEDICINE | Facility: CLINIC | Age: 83
End: 2018-04-10
Payer: COMMERCIAL

## 2018-04-10 ENCOUNTER — TRANSFERRED RECORDS (OUTPATIENT)
Dept: HEALTH INFORMATION MANAGEMENT | Facility: CLINIC | Age: 83
End: 2018-04-10

## 2018-04-10 VITALS
OXYGEN SATURATION: 98 % | RESPIRATION RATE: 16 BRPM | WEIGHT: 180 LBS | BODY MASS INDEX: 24.76 KG/M2 | SYSTOLIC BLOOD PRESSURE: 116 MMHG | TEMPERATURE: 98 F | HEART RATE: 79 BPM | DIASTOLIC BLOOD PRESSURE: 64 MMHG

## 2018-04-10 DIAGNOSIS — C61 MALIGNANT NEOPLASM OF PROSTATE (H): ICD-10-CM

## 2018-04-10 DIAGNOSIS — R42 DIZZINESS: ICD-10-CM

## 2018-04-10 DIAGNOSIS — H81.09 ENDOLYMPHATIC HYDROPS, UNSPECIFIED LATERALITY: ICD-10-CM

## 2018-04-10 DIAGNOSIS — I10 HTN (HYPERTENSION), BENIGN: Primary | ICD-10-CM

## 2018-04-10 DIAGNOSIS — H90.5 SENSORINEURAL HEARING LOSS (SNHL), UNSPECIFIED LATERALITY: ICD-10-CM

## 2018-04-10 DIAGNOSIS — J30.0 CHRONIC VASOMOTOR RHINITIS: ICD-10-CM

## 2018-04-10 DIAGNOSIS — C44.42 SQUAMOUS CELL CARCINOMA, SCALP/NECK: ICD-10-CM

## 2018-04-10 PROCEDURE — 99214 OFFICE O/P EST MOD 30 MIN: CPT | Performed by: INTERNAL MEDICINE

## 2018-04-10 NOTE — PATIENT INSTRUCTIONS
Decrease your dosage off Accupril  to half  from 40 mg to 20 mg    Decrease metoporol from 25 mg to 12.5 mg    Schedule an appointment with ENT for a full check up           [facility]    If you have any questions regarding to your visit please contact your care team:     Team Pink:   Clinic Hours Telephone Number   Internal Medicine:  Dr. Deborah Taylor, NP       7am-7pm  Monday - Thursday   7am-5pm  Fridays  [phone]  (Appointment scheduling available 24/7)    Questions about your visit?  Team Line  [phone]   [facility] [place] 11am-9pm Monday-Friday Saturday-Sunday- 9am-5pm   [place] - 5pm-9pm Monday-Friday Saturday-Sunday- 9am-5pm  [phone] - [place]   [phone] - [place]       What options do I have for visits at the clinic other than the traditional office visit?  To expand how we care for you, many of our providers are utilizing electronic visits (e-visits) and telephone visits, when medically appropriate, for interactions with their patients rather than a visit in the clinic.   We also offer nurse visits for many medical concerns. Just like any other service, we will bill your insurance company for this type of visit based on time spent on the phone with your provider. Not all insurance companies cover these visits. Please check with your medical insurance if this type of visit is covered. You will be responsible for any charges that are not paid by your insurance.      E-visits via [employer]:  generally incur a $35.00 fee.  Telephone visits:  Time spent on the phone: *charged based on time that is spent on the phone in increments of 10 minutes. Estimated cost:   5-10 mins $30.00   11-20 mins. $59.00   21-30 mins. $85.00   Use [employer] (secure email communication and access to your chart) to send your primary care provider a message or make an appointment. Ask someone on your Team how to sign up for  [employer].    For a Price Quote for your services, please call our Consumer Price Line at [phone].    As always, Thank you for trusting us with your health care needs!    Anaid PERSAUD CMA ([facility])

## 2018-04-10 NOTE — MR AVS SNAPSHOT
After Visit Summary   4/10/2018    Dimitri Neves    MRN: [id number]           Patient Information     Date Of Birth          1935        Visit Information        Provider Department      4/10/2018 9:50 AM Reginaldo Muir MD [facility]        Today's Diagnoses     HTN (Hypertension), Benign goal <140/90    -  1    Endolymphatic hydrops, unspecified laterality        Sensorineural hearing loss (SNHL), unspecified laterality        Malignant neoplasm of prostate (H)        Squamous cell carcinoma, scalp/neck        Chronic vasomotor rhinitis        Dizziness          Care Instructions    Decrease your dosage off Accupril  to half  from 40 mg to 20 mg    Decrease metoporol from 25 mg to 12.5 mg    Schedule an appointment with ENT for a full check up           [facility]    If you have any questions regarding to your visit please contact your care team:     Team Pink:   Clinic Hours Telephone Number   Internal Medicine:  Dr. Deborah Taylor NP       7am-7pm  Monday - Thursday   7am-5pm  Fridays  [phone]  (Appointment scheduling available 24/7)    Questions about your visit?  Team Line  [phone]   Urgent Care - [place] and [place] - 11am-9pm Monday-Friday Saturday-Sunday- 9am-5pm   [place] - 5pm-9pm Monday-Friday Saturday-Sunday- 9am-5pm  [phone] - [place]   [phone] - [place]       What options do I have for visits at the clinic other than the traditional office visit?  To expand how we care for you, many of our providers are utilizing electronic visits (e-visits) and telephone visits, when medically appropriate, for interactions with their patients rather than a visit in the clinic.   We also offer nurse visits for many medical concerns. Just like any other service, we will bill your insurance company for this type of visit based on time spent on the phone with your provider. Not all insurance  companies cover these visits. Please check with your medical insurance if this type of visit is covered. You will be responsible for any charges that are not paid by your insurance.      E-visits via [employer]hart:  generally incur a $35.00 fee.  Telephone visits:  Time spent on the phone: *charged based on time that is spent on the phone in increments of 10 minutes. Estimated cost:   5-10 mins $30.00   11-20 mins. $59.00   21-30 mins. $85.00   Use [employer] (secure email communication and access to your chart) to send your primary care provider a message or make an appointment. Ask someone on your Team how to sign up for [employer].    For a Price Quote for your services, please call our [employer] Price Line at [phone].    As always, Thank you for trusting us with your health care needs!    Anaid PERSAUD CMA ([facility])            Follow-ups after your visit        Additional Services     OTOLARYNGOLOGY REFERRAL       Your provider has referred you to: FM: [facility] [phone]  [email or web address]    Please be aware that coverage of these services is subject to the terms and limitations of your health insurance plan.  Call member services at your health plan with any benefit or coverage questions.      Please bring the following with you to your appointment:    (1) Any X-Rays, CTs or MRIs which have been performed.  Contact the facility where they were done to arrange for  prior to your scheduled appointment.   (2) List of current medications  (3) This referral request   (4) Any documents/labs given to you for this referral                  Your next 10 appointments already scheduled     Apr 30, 2018 10:30 AM CDT   Return Visit with Rafael Valenzuela MD   [facility]    [address] [place] [id number] [phone]              Who to contact     If you have questions or need follow up information about  today's clinic visit or your schedule please contact [facility] directly at [phone].  Normal or non-critical lab and imaging results will be communicated to you by MyChart, letter or phone within 4 business days after the clinic has received the results. If you do not hear from us within 7 days, please contact the clinic through [employer] or phone. If you have a critical or abnormal lab result, we will notify you by phone as soon as possible.  Submit refill requests through [employer] or call your pharmacy and they will forward the refill request to us. Please allow 3 business days for your refill to be completed.          Additional Information About Your Visit        [employer] Information     [employer] gives you secure access to your electronic health record. If you see a primary care provider, you can also send messages to your care team and make appointments. If you have questions, please call your primary care clinic.  If you do not have a primary care provider, please call [phone] and they will assist you.        Care EveryWhere ID     This is your Care EveryWhere ID. This could be used by other organizations to access your [place] medical records  [id number]        Your Vitals Were     Pulse Temperature Respirations Pulse Oximetry BMI (Body Mass Index)       79 98  F (36.7  C) (Oral) 16 98% 24.76 kg/m2        Blood Pressure from Last 3 Encounters:   04/10/18 116/64   01/18/18 132/58   01/09/18 138/78    Weight from Last 3 Encounters:   04/10/18 180 lb (81.6 kg)   01/18/18 174 lb 9.6 oz (79.2 kg)   01/09/18 167 lb (75.8 kg)              We Performed the Following     OTOLARYNGOLOGY REFERRAL          Today's Medication Changes          These changes are accurate as of 4/10/18 10:26 AM.  If you have any questions, ask your nurse or doctor.               Stop taking these medicines if you haven't already. Please contact your care team if you have questions.     LORazepam 0.5 MG tablet   Commonly  known as:  ATIVAN   Stopped by:  Reginaldo Muir MD           MINOCYCLINE HCL PO   Stopped by:  Reginaldo Muir MD           tamsulosin 0.4 MG capsule   Commonly known as:  FLOMAX   Stopped by:  Reginaldo Muir MD                    Primary Care Provider Office Phone # Fax #    Reginaldo Muir [phone] [address] [facility] [address]        Equal Access to Services     [facility]: Hadii aad ku hadasho Soomaali, waaxda luqadaha, qaybta kaalmada adeegyada, waxay idiin hayaan adeeg kharash la'aan . So [facility] [phone].    ATENCIÓN: Si yany espmirtha, tiene a roberts disposición servicios gratuitos de asistencia lingüística. Llame al [phone].    We comply with applicable federal civil rights laws and [place] laws. We do not discriminate on the basis of race, color, national origin, age, disability, sex, sexual orientation, or gender identity.            Thank you!     Thank you for choosing [facility]  for your care. Our goal is always to provide you with excellent care. Hearing back from our patients is one way we can continue to improve our services. Please take a few minutes to complete the written survey that you may receive in the mail after your visit with us. Thank you!             Your Updated Medication List - Protect others around you: Learn how to safely use, store and throw away your medicines at www.disposemymeds.org.          This list is accurate as of 4/10/18 10:26 AM.  Always use your most recent med list.                   Brand Name Dispense Instructions for use Diagnosis    abiraterone 250 MG tablet    ZYTIGA    120 tablet    Take 4 tablets (1,000 mg) by mouth daily for 30 doses Take on empty stomach.    Bone metastasis (H), Malignant neoplasm of prostate (H)       albuterol (2.5 MG/3ML) 0.083% neb solution     25 vial    Take 1 vial (2.5 mg) by nebulization every 6 hours as needed for shortness of breath / dyspnea or wheezing    Acute bronchitis,  unspecified organism       aspirin  MG EC tablet     1 tablet    Take 1 tablet (325 mg) by mouth daily    Transient cerebral ischemia, unspecified type       CALCIUM 600 PO           [facility]S vitamin  B12 1000 MCG Tabs   Generic drug:  cyanocobalamin     30 tablet    TAKE 1 TABLET BY MOUTH EVERY DAY    Low vitamin B12 level       furosemide 20 MG tablet    LASIX    90 tablet    Take 1 tablet (20 mg) by mouth daily    Bilateral lower extremity edema, HTN (hypertension), benign       gabapentin 300 MG capsule    NEURONTIN     Take 900 mg by mouth At Bedtime        ipratropium - albuterol 0.5 mg/2.5 mg/3 mL 0.5-2.5 (3) MG/3ML neb solution    DUONEB    3 mL    Take 1 vial (3 mLs) by nebulization once for 1 dose    Dyspnea, unspecified type       metoprolol succinate 25 MG 24 hr tablet    TOPROL-XL    90 tablet    TAKE 1 TABLET (25 MG) BY MOUTH DAILY    HTN (hypertension), benign       order for DME     1 Device    Equipment being ordered: Nebulizer    Acute bronchitis, unspecified organism       oxyCODONE IR 5 MG tablet    ROXICODONE    30 tablet    Take 1 tablet (5 mg) by mouth every 6 hours as needed for pain    Narcotic dependence, episodic use (H)       predniSONE 5 MG tablet    DELTASONE    60 tablet    Take 1 tablet (5 mg) by mouth 2 times daily    Bone metastasis (H), Malignant neoplasm of prostate (H)       quinapril 40 MG Tab    ACCUPRIL    90 tablet    TAKE 1 TABLET (40 MG) BY MOUTH DAILY    HTN (hypertension), benign       simvastatin 40 MG tablet    ZOCOR    45 tablet    TAKE 0.5 TABLETS (20 MG) BY MOUTH DAILY    Hyperlipidemia LDL goal <130       vitamin D 2000 UNITS tablet      Take 1 tablet by mouth daily

## 2018-04-30 ENCOUNTER — ONCOLOGY VISIT (OUTPATIENT)
Dept: ONCOLOGY | Facility: CLINIC | Age: 83
End: 2018-04-30
Payer: COMMERCIAL

## 2018-04-30 ENCOUNTER — TELEPHONE (OUTPATIENT)
Dept: PHARMACY | Facility: CLINIC | Age: 83
End: 2018-04-30

## 2018-04-30 VITALS
SYSTOLIC BLOOD PRESSURE: 150 MMHG | WEIGHT: 187 LBS | OXYGEN SATURATION: 99 % | HEART RATE: 63 BPM | DIASTOLIC BLOOD PRESSURE: 75 MMHG | HEIGHT: 71 IN | RESPIRATION RATE: 15 BRPM | BODY MASS INDEX: 26.18 KG/M2 | TEMPERATURE: 97 F

## 2018-04-30 DIAGNOSIS — C61 MALIGNANT NEOPLASM OF PROSTATE (H): ICD-10-CM

## 2018-04-30 DIAGNOSIS — C79.51 BONE METASTASIS: ICD-10-CM

## 2018-04-30 DIAGNOSIS — C79.51 BONE METASTASIS: Primary | ICD-10-CM

## 2018-04-30 DIAGNOSIS — C61 MALIGNANT NEOPLASM OF PROSTATE (H): Primary | ICD-10-CM

## 2018-04-30 LAB
ALBUMIN SERPL-MCNC: 3.3 G/DL (ref 3.4–5)
ALP SERPL-CCNC: 104 U/L (ref 40–150)
ALT SERPL W P-5'-P-CCNC: 24 U/L (ref 0–70)
ANION GAP SERPL CALCULATED.3IONS-SCNC: 4 MMOL/L (ref 3–14)
AST SERPL W P-5'-P-CCNC: 27 U/L (ref 0–45)
BASOPHILS # BLD AUTO: 0 10E9/L (ref 0–0.2)
BASOPHILS NFR BLD AUTO: 0.2 %
BILIRUB SERPL-MCNC: 0.6 MG/DL (ref 0.2–1.3)
BUN SERPL-MCNC: 29 MG/DL (ref 7–30)
CALCIUM SERPL-MCNC: 9.4 MG/DL (ref 8.5–10.1)
CHLORIDE SERPL-SCNC: 103 MMOL/L (ref 94–109)
CO2 SERPL-SCNC: 33 MMOL/L (ref 20–32)
CREAT SERPL-MCNC: 1.06 MG/DL (ref 0.66–1.25)
DIFFERENTIAL METHOD BLD: ABNORMAL
EOSINOPHIL # BLD AUTO: 0 10E9/L (ref 0–0.7)
EOSINOPHIL NFR BLD AUTO: 0.7 %
ERYTHROCYTE [DISTWIDTH] IN BLOOD BY AUTOMATED COUNT: 14.6 % (ref 10–15)
GFR SERPL CREATININE-BSD FRML MDRD: 67 ML/MIN/1.7M2
GLUCOSE SERPL-MCNC: 97 MG/DL (ref 70–99)
HCT VFR BLD AUTO: 37 % (ref 40–53)
HGB BLD-MCNC: 12.1 G/DL (ref 13.3–17.7)
IMM GRANULOCYTES # BLD: 0 10E9/L (ref 0–0.4)
IMM GRANULOCYTES NFR BLD: 0.3 %
LYMPHOCYTES # BLD AUTO: 1.3 10E9/L (ref 0.8–5.3)
LYMPHOCYTES NFR BLD AUTO: 23.2 %
MCH RBC QN AUTO: 32.4 PG (ref 26.5–33)
MCHC RBC AUTO-ENTMCNC: 32.7 G/DL (ref 31.5–36.5)
MCV RBC AUTO: 99 FL (ref 78–100)
MONOCYTES # BLD AUTO: 0.4 10E9/L (ref 0–1.3)
MONOCYTES NFR BLD AUTO: 7.5 %
NEUTROPHILS # BLD AUTO: 3.9 10E9/L (ref 1.6–8.3)
NEUTROPHILS NFR BLD AUTO: 68.1 %
PLATELET # BLD AUTO: 189 10E9/L (ref 150–450)
POTASSIUM SERPL-SCNC: 4.2 MMOL/L (ref 3.4–5.3)
PROT SERPL-MCNC: 6.8 G/DL (ref 6.8–8.8)
PSA SERPL-MCNC: 13.3 UG/L (ref 0–4)
RBC # BLD AUTO: 3.74 10E12/L (ref 4.4–5.9)
SODIUM SERPL-SCNC: 140 MMOL/L (ref 133–144)
WBC # BLD AUTO: 5.8 10E9/L (ref 4–11)

## 2018-04-30 PROCEDURE — 36415 COLL VENOUS BLD VENIPUNCTURE: CPT | Performed by: INTERNAL MEDICINE

## 2018-04-30 PROCEDURE — 85025 COMPLETE CBC W/AUTO DIFF WBC: CPT | Performed by: INTERNAL MEDICINE

## 2018-04-30 PROCEDURE — 80053 COMPREHEN METABOLIC PANEL: CPT | Performed by: INTERNAL MEDICINE

## 2018-04-30 PROCEDURE — 99214 OFFICE O/P EST MOD 30 MIN: CPT | Performed by: INTERNAL MEDICINE

## 2018-04-30 PROCEDURE — 84153 ASSAY OF PSA TOTAL: CPT | Performed by: INTERNAL MEDICINE

## 2018-04-30 RX ORDER — ZOLEDRONIC ACID 0.04 MG/ML
4 INJECTION, SOLUTION INTRAVENOUS ONCE
Status: CANCELLED | OUTPATIENT
Start: 2018-10-01 | End: 2018-04-30

## 2018-04-30 RX ORDER — PREDNISONE 5 MG/1
5 TABLET ORAL 2 TIMES DAILY
Qty: 60 TABLET | Refills: 0 | Status: SHIPPED | OUTPATIENT
Start: 2018-04-30 | End: 2018-05-10

## 2018-04-30 RX ORDER — ABIRATERONE ACETATE 250 MG/1
1000 TABLET ORAL DAILY
Qty: 120 TABLET | Refills: 0 | Status: SHIPPED | OUTPATIENT
Start: 2018-04-30 | End: 2018-05-30

## 2018-04-30 RX ORDER — ZOLEDRONIC ACID 0.04 MG/ML
4 INJECTION, SOLUTION INTRAVENOUS ONCE
Status: CANCELLED | OUTPATIENT
Start: 2018-11-12 | End: 2018-04-30

## 2018-04-30 ASSESSMENT — PAIN SCALES - GENERAL: PAINLEVEL: NO PAIN (0)

## 2018-04-30 NOTE — PROGRESS NOTES
[facility]  HEMATOLOGY AND ONCOLOGY    FOLLOW-UP VISIT NOTE    PATIENT NAME: Dimitri Neves MRN # [id number]  DATE OF VISIT: Apr 30, 2018 YOB: 1935    REFERRING PROVIDER: No referring provider defined for this encounter.    CANCER TYPE: Prostate adenocarcinoma  STAGE: IV    TREATMENT SUMMARY:  Dimitri was followed by his PCP on 6/13/13 and was elevated at 32 as noted below. He was referred to Dr. Taylor. He was treated with a 10 day course of ciprofloxacin and followed again in 6 weeks on 8/10. His PSA drawn prior to this visit was unchanged and remained elevated at 32. He had a TRUS biopsy on 8/25/14 which was negative for prostate adenocarcinoma. His PSA was repeated in 3 months and now increased to 67.9. He had a repeat biopsy of prostate on 12/16/14 which now confirmed prostate adenocarcinoma with eileen 4+4 disease involving 5 of the cores and [place] 3+3 disease involving remainder of cores. He was staged with a CT scan and bone scan done on 12/29/14 which revealed metastatic foci in the upper cervical vertebrae on the left, right posterior 3rd rib and right ischium, focal uptake in the posterior left 11th rib with corresponding lytic expansile appearance on CT, suspicious for metastasis.            4/5/2011 08:46 6/13/2014 08:03 7/25/2014 09:47 12/5/2014 09:00 4/7/2015 09:14   PSA 2.77 32.70 (H) 32.00 (H) 67.88 (H) 1.92      He was started on androgen deprivation therapy in Jan 2015 with a good initial response. His PSA has been increasing recently and he was started on bicalutamide in February with no response. He has continued to have rising PSA and was prescribed abiraterone with prednisone. He had a huge copay with this and has been referred to Medical Oncology to review other options.     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 He is tolerating his abiraterone without any difficulty.   He does have hot flushes, fatigue, mood swings on androgen deprivation therapy.     He did have bilateral orchiectomies.     He continues to struggle with pain in his knees.       PAST MEDICAL HISTORY     Past Medical History:   Diagnosis Date     Actinic keratosis      AK (actinic keratosis) 7/9/2012     Cataract cortical, senile right eye 4/17/2012     DDD (degenerative disc disease), lumbar 1979    s/p 4 back surgeries, spinal cord stimulator implant      Diverticulosis      ED (erectile dysfunction) 2009     GERD (gastroesophageal reflux disease) ~1980     HTN (hypertension), benign ~2000     Macular degeneration, dry, mild, ou 7/23/2016     Multiple lung nodules     Several basilar pulmonary nodules <5 mm     HUDSON (obstructive sleep apnea) 10/2005    on CPAP     Other abnormal glucose 01/14/2009     PE (pulmonary thromboembolism) (H) 1981    after back surgery      Pure hypercholesterolemia ~2000     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         CURRENT OUTPATIENT MEDICATIONS     Current Outpatient Prescriptions   Medication Sig     abiraterone (ZYTIGA) 250 MG tablet Take 4 tablets (1,000 mg) by mouth daily for 30 doses Take on empty stomach.     albuterol (2.5 MG/3ML) 0.083% neb solution Take 1 vial (2.5 mg) by nebulization every 6 hours as needed for shortness of breath / dyspnea or wheezing     aspirin  MG EC tablet Take 1 tablet (325 mg) by mouth daily     Calcium Carbonate (CALCIUM 600 PO)      Cholecalciferol (VITAMIN D) 2000 UNITS tablet Take 1 tablet by mouth daily     CVS VITAMIN  B12 1000 MCG TABS TAKE 1 TABLET BY MOUTH EVERY DAY     furosemide (LASIX) 20 MG tablet Take 1 tablet (20 mg) by mouth daily     gabapentin (NEURONTIN) 300 MG capsule Take 900 mg by mouth At  "Bedtime     ipratropium - albuterol 0.5 mg/2.5 mg/3 mL (DUONEB) 0.5-2.5 (3) MG/3ML neb solution Take 1 vial (3 mLs) by nebulization once for 1 dose     metoprolol (TOPROL-XL) 25 MG 24 hr tablet TAKE 1 TABLET (25 MG) BY MOUTH DAILY     order for DME Equipment being ordered: Nebulizer     oxyCODONE (ROXICODONE) 5 MG IR tablet Take 1 tablet (5 mg) by mouth every 6 hours as needed for pain     predniSONE (DELTASONE) 5 MG tablet Take 1 tablet (5 mg) by mouth 2 times daily     predniSONE (DELTASONE) 5 MG tablet Take 1 tablet (5 mg) by mouth 2 times daily     quinapril (ACCUPRIL) 40 MG Tab TAKE 1 TABLET (40 MG) BY MOUTH DAILY     simvastatin (ZOCOR) 40 MG tablet TAKE 0.5 TABLETS (20 MG) BY MOUTH DAILY     No current facility-administered medications for this visit.         ALLERGIES      Allergies   Allergen Reactions     Morphine Sulfate GI Disturbance     vomiting     Vicodin [Hydrocodone-Acetaminophen] Nausea and Vomiting        REVIEW OF SYSTEMS   As above in the HPI, o/w complete 12-point ROS was negative.     PHYSICAL EXAM   /75  Pulse 63  Temp 97  F (36.1  C)  Resp 15  Ht 1.816 m (5' 11.5\")  Wt 84.8 kg (187 lb)  SpO2 99%  BMI 25.72 kg/m2  GEN: NAD  HEENT: PERRL, EOMI, no icterus, injection or pallor. Oropharynx is clear.  NECK: no cervical or supraclavicular lymphadenopathy  LUNGS: clear bilaterally  CV: regular, no murmurs, rubs, or gallops  ABDOMEN: soft, non-tender, non-distended, normal bowel sounds, no hepatosplenomegaly by percussion or palpation  EXT: warm, well perfused, no edema  NEURO: alert  SKIN: no rashes     LABORATORY AND IMAGING STUDIES     Labs remain stable. Corrected calcium is within the normal range or low normal  Mild normocytic anemia   Recent Labs   Lab Test  04/30/18   0937  12/18/17   0906  11/27/17   0856  10/16/17   0840  09/18/17   0949   NA  140  143  142  140  140   POTASSIUM  4.2  3.4  3.9  3.9  4.2   CHLORIDE  103  107  105  108  104   CO2  33*  30  30  28  32   ANIONGAP "  4  6  7  4  4   BUN  29  13  13  10  12   CR  1.06  0.71  0.75  0.71  0.76   GLC  97  93  93  100*  103*   ALETHA  9.4  8.6  8.7  8.2*  8.8     Recent Labs   Lab Test  12/28/11   0857  04/05/11   0846  06/16/10   1338   PHOS  3.1  3.4  2.5     Recent Labs   Lab Test  04/30/18   0937  12/18/17   0906  11/27/17   0856  10/16/17   0840  09/18/17   0949   WBC  5.8  4.3  5.0  4.8  6.5   HGB  12.1*  13.0*  12.0*  12.1*  11.7*   PLT  189  159  198  211  219   MCV  99  95  95  95  91   NEUTROPHIL  68.1  58.1  52.2  53.9  64.1     Recent Labs   Lab Test  04/30/18   0937  12/18/17   0906  11/27/17   0856   BILITOTAL  0.6  0.7  0.5   ALKPHOS  104  73  69   ALT  24  18  15   AST  27  19  18   ALBUMIN  3.3*  3.3*  3.0*     TSH   Date Value Ref Range Status   06/13/2016 1.60 0.40 - 4.00 mU/L Final   04/05/2011 3.73 0.4 - 5.0 mU/L Final     No results for input(s): CEA in the last 78209 hours.  Results for orders placed or performed in visit on 10/30/17   CT Chest Pulmonary Embolism w Contrast    Narrative    CT CHEST PULMONARY EMBOLISM WITH CONTRAST  10/30/2017 12:58 PM     HISTORY: Shortness of breath.    TECHNIQUE:  CT of the chest was performed following the administration  of 69 mL Isovue-370. Radiation dose for this scan was reduced using  automated exposure control, adjustment of the mA and/or kV according  to patient size, or iterative reconstruction technique.    COMPARISON: None.    FINDINGS:  There is no evidence for a pulmonary embolism.    There are a few calcified granulomas in the lungs. Again noted is  linear scarring in the right middle lobe. Minimal atelectatic changes  in the right lower lobe.    There are cysts in the kidneys. Patient is status post a  cholecystectomy.      Impression    IMPRESSION: No evidence for pulmonary embolism. No definite acute  abnormality seen.    ROSS WARD MD     Recent Labs   Lab Test  04/30/18   0937  12/18/17   0906  11/27/17   0856  10/16/17   0840  09/18/17   0949    02/09/17   0823   PSA  13.30*  9.34*  12.30*  11.90*  18.90*   < >   --    TESTOSTTOTAL   --    --    --    --    --    --   9*    < > = values in this interval not displayed.            ASSESSMENT AND PLAN   1. 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 No response to addition of bicalutamide  3. PE diagnosed few weeks after initiation of megestrol acetate for hot flashes on GnRH agonist  4. No significant medical comorbidities    He is tolerating his abiraterone well and has no complains. His PSA had been declining nicely as expected. However it is the first time that his PSA has started to increase. We would plan to continue as current. There are no immediate concerns.     He did get bilateral orchiectomies done last month 9/27/17. He would not need any more lupron or other GnRH directed therapies.     He has hot flashes, mood swings, fatigue - from androgen deprivation. These are no different between either orchiectomy or GnRH therapy as they come with low levels of testosterone. There is no good way around.     We would continue with zometa every 6-8 weeks. I could see him in 3 months.    Over 25 min of direct face to face time spent with patient with more than 50% time spent in counseling and coordinating care.

## 2018-04-30 NOTE — ORAL ONC MGMT
Oral Chemotherapy Monitoring Program     Primary Oncologist: Dr. Rafael Valenzueal  Primary Oncology Clinic: [facility]  Cancer Diagnosis: Metastatic Prostate Cancer      abiraterone (Zytiga) 1000 mg PO daily with Prednisone 5 mg PO BID  Start Date: 8/19/17      Drug Interaction Assessment:   1. Abiraterone will decrease Warfarin metabolism, potentially raising INR; patient is aware and will inquire about more frequent INR monitoring at the start of therapy  2. Abiraterone inhibits Metoprolol metabolism with a slight risk of causing bradycardia      Lab Monitoring Plan:  C1D1+   CMP, BP C2D1+ Call, CMP, BP C3D1+ Call, CMP, BP C4D1+ Call, CMP, BP C5D1+ Call, CMP, BP C6D1+ Call, CMP, BP   C1D8+    C2D8+    C3D8+    C4D8+    C5D8+    C6D8+      C1D15+ Call, CMP C2D15+ Call, CMP C3D15+    C4D15+    C5D15+    C6D15+      C1D22+    C2D22+    C3D22+    C4D22+    C5D22+    C6D22+         Subjective/Objective:  Dimitri Neves is a 82 year old male contacted by phone for a follow-up visit for oral chemotherapy.  Dimitri is currently in [place], [place]. He reports some dizziness that started before they left for their trip. It has continued throughout the trip. He did see a doctor in [facility]. His labs and BP were all ok. He also has had some swelling in his legs. The doctor had him increase his furosemide to 40 mg, which helped with some of the fluid retention. He denies any other side effects      ORAL CHEMOTHERAPY 8/21/2017 9/18/2017 10/16/2017 11/27/2017 12/19/2017 3/1/2018 4/30/2018   Drug Name Zytiga (Abiraterone) Zytiga (Abiraterone) Zytiga (Abiraterone) Zytiga (Abiraterone) Zytiga (Abiraterone) Zytiga (Abiraterone) Zytiga (Abiraterone)   Current Dosage 1000mg 1000mg 1000mg 1000mg 1000mg 1000mg 1000mg   Current Schedule Daily Daily Daily Daily Daily Daily Daily   Cycle Details Continuous Continuous Continuous Continuous Continuous Continuous Continuous   Start Date of Last Cycle 8/19/2017 9/19/2017 10/16/2017  "11/19/2017 12/19/2017 - 4/4/2018   Planned next cycle start date - - - 12/19/2017 - - 5/3/2018   Doses missed in last 2 weeks 0 0 0 0 0 0 0   Adherence Assessment Adherent Adherent Adherent Adherent Adherent Adherent Adherent   Adverse Effects No AE identified during assessment No AE identified during assessment No AE identified during assessment No AE identified during assessment No AE identified during assessment Other (see note for details) No AE identified during assessment   Other (see note for details) - - - - - (No Data) -   Pharmacist intervention? - - - - - No -   Any new drug interactions? No No No No No No No   Is the dose as ordered appropriate for the patient? Yes Yes Yes Yes Yes - Yes   Is the patient currently in pain? Assessed in last 30 days. Assessed in last 30 days. Assessed in last 30 days. Assessed in last 30 days. Assessed in last 30 days. No Assessed in last 30 days.   Has the patient been assessed within the past 6 months for depression? Yes Yes Yes - Yes Yes Yes   Has the patient missed any days of school, work, or other routine activity? No No No No - - -       Vitals:  BP:   BP Readings from Last 1 Encounters:   04/30/18 150/75     Wt Readings from Last 1 Encounters:   04/30/18 84.8 kg (187 lb)     Estimated body surface area is 2.07 meters squared as calculated from the following:    Height as of an earlier encounter on 4/30/18: 1.816 m (5' 11.5\").    Weight as of an earlier encounter on 4/30/18: 84.8 kg (187 lb).    Labs:  _  Result Component Current Result Ref Range   Sodium 140 (4/30/2018) 133 - 144 mmol/L     _  Result Component Current Result Ref Range   Potassium 4.2 (4/30/2018) 3.4 - 5.3 mmol/L     _  Result Component Current Result Ref Range   Calcium 9.4 (4/30/2018) 8.5 - 10.1 mg/dL     No results found for Mag within last 30 days.     No results found for Phos within last 30 days.     _  Result Component Current Result Ref Range   Albumin 3.3 (L) (4/30/2018) 3.4 - 5.0 g/dL "     _  Result Component Current Result Ref Range   Urea Nitrogen 29 (4/30/2018) 7 - 30 mg/dL     _  Result Component Current Result Ref Range   Creatinine 1.06 (4/30/2018) 0.66 - 1.25 mg/dL       _  Result Component Current Result Ref Range   AST 27 (4/30/2018) 0 - 45 U/L     _  Result Component Current Result Ref Range   ALT 24 (4/30/2018) 0 - 70 U/L     _  Result Component Current Result Ref Range   Bilirubin Total 0.6 (4/30/2018) 0.2 - 1.3 mg/dL       _  Result Component Current Result Ref Range   WBC 5.8 (4/30/2018) 4.0 - 11.0 10e9/L     _  Result Component Current Result Ref Range   Hemoglobin 12.1 (L) (4/30/2018) 13.3 - 17.7 g/dL     _  Result Component Current Result Ref Range   Platelet Count 189 (4/30/2018) 150 - 450 10e9/L     _  Result Component Current Result Ref Range   Absolute Neutrophil 3.9 (4/30/2018) 1.6 - 8.3 10e9/L     PSA 9.34 12/18/17  PSA 13.3 4/30/18    Assessment/Plan:  Per Dr Valenzuela, we will continue Zytiga per patient request and r/e with appt with Dr Valenzuela on 7/2/18    Follow-Up:  6/11/18 call    Refill Due:  SP    Dipesh Pepe, PharmD, BCOP  April 30, 2018

## 2018-04-30 NOTE — NURSING NOTE
"Oncology Rooming Note    April 30, 2018 10:34 AM   Dimitri Neves is a 82 year old male who presents for:    Chief Complaint   Patient presents with     Oncology Clinic Visit     follow up      Initial Vitals: /75  Pulse 63  Temp 97  F (36.1  C)  Resp 15  Ht 1.816 m (5' 11.5\")  Wt 84.8 kg (187 lb)  SpO2 99%  BMI 25.72 kg/m2 Estimated body mass index is 25.72 kg/(m^2) as calculated from the following:    Height as of this encounter: 1.816 m (5' 11.5\").    Weight as of this encounter: 84.8 kg (187 lb). Body surface area is 2.07 meters squared.  No Pain (0) Comment: Data Unavailable   No LMP for male patient.  Allergies reviewed: Yes  Medications reviewed: Yes    Medications: MEDICATION REFILLS NEEDED TODAY. Provider was notified.  Pharmacy name entered into [facility]:    [id number] - [place], [place] - [address], [facility] - [place], [place] - [address] [place] [address] [id number] - [place], [place] - [address] [facility]        5 minutes for nursing intake (face to face time)     Chreyl Rodriguez LPN              "

## 2018-04-30 NOTE — MR AVS SNAPSHOT
After Visit Summary   4/30/2018    Dimitri Neves    MRN: [id number]           Patient Information     Date Of Birth          1935        Visit Information        Provider Department      4/30/2018 10:30 AM Rafael Valenzuela MD [facility]        Today's Diagnoses     Malignant neoplasm of prostate (H)    -  1    Bone metastasis (H)           Follow-ups after your visit        Your next 10 appointments already scheduled     May 29, 2018 10:45 AM CDT   Return Visit with Senthil Jhaveri MD   [facility] ([facility])    [address] [facility] [address]   [phone]            Jun 25, 2018 10:00 AM CDT   NM INJECTION with [id number]   [facility] ([facility])    [address] [place] [id number] [phone]            Jun 25, 2018 10:30 AM CDT   CT CHEST/ABDOMEN/PELVIS W CONTRAST with MGCT1   [facility]note 3[facility]    [address] [place] [id number] [phone]           Please bring any scans or X-rays taken at other hospitals, if similar tests were done. Also bring a list of your medicines, including vitamins, minerals and over-the-counter drugs. It is safest to leave personal items at home.  Be sure to tell your doctor:   If you have any allergies.   If there s any chance you are pregnant.   If you are breastfeeding.  How to prepare:   Do not eat or drink for 2 hours before your exam. If you need to take medicine, you may take it with small sips of water. (We may ask you to take liquid medicine as well.)   Please wear loose clothing, such as a sweat suit or jogging clothes. Avoid snaps, zippers and other metal. We may ask you to undress and put on a hospital gown.  Please arrive 30 minutes early for your CT. Once in the department you might be asked to drink water 15-20 minutes prior to your exam.  If indicated you may be  asked to drink an oral contrast in advance of your CT.  If this is the case, the imaging team will let you know or be in contact with you prior to your appointment  Patients over 70 or patients with diabetes or kidney problems:   If you haven t had a blood test (creatinine test) within the last 30 days, the Cardiologist/Radiologist may require you to get this test prior to your exam.  If you have diabetes:   Continue to take your metformin medication on the day of your exam  If you have any questions, please call the Imaging Department where you will have your exam.            Jun 25, 2018  1:00 PM CDT   NM BONE SCAN WHOLE BODY with [facility]   [facility] ([facility])    [address] [place] [id number] [phone]           Please bring a list of your medicines to the exam. (Include vitamins, minerals and over-the-counter drugs.) You should wear comfortable clothes. Leave your valuables at home. Please bring related prior results and films.  Tell your doctor:   If you are breastfeeding or may be pregnant.   If you have had a barium test within the past 48 hours. Barium may change the results of certain exams.   If you think you may need sedation (medicine to help you relax).  You may eat and drink as normal.  Please call your Imaging Department at your exam site with any questions.            Jul 02, 2018  9:45 AM CDT   LAB with LAB ONC [facility] ([facility])    [address] [place] [address] [phone]           Please do not eat 10-12 hours before your appointment if you are coming in fasting for labs on lipids, cholesterol, or glucose (sugar). This does not apply to pregnant women. Water, hot tea and black coffee (with nothing added) are okay. Do not drink other fluids, diet soda or chew gum.            Jul 02, 2018 10:30 AM CDT   Return Visit with Rafael Valenzuela MD   [facility]  ([facility])    [address] [place] [id number] [phone]            Jul 02, 2018 11:00 AM CDT   Level O with BAY 2 INFUSION   [facility] ([facility])    [address] [place] [id number] [phone]              Future tests that were ordered for you today     Open Future Orders        Priority Expected Expires Ordered    CT Chest/Abdomen/Pelvis w Contrast Routine  4/30/2019 4/30/2018    NM Bone Scan Whole Body Routine  4/29/2019 4/30/2018            Who to contact     If you have questions or need follow up information about today's clinic visit or your schedule please contact [facility] directly at [phone].  Normal or non-critical lab and imaging results will be communicated to you by [facility], letter or phone within 4 business days after the clinic has received the results. If you do not hear from us within 7 days, please contact the clinic through [facility] or phone. If you have a critical or abnormal lab result, we will notify you by phone as soon as possible.  Submit refill requests through [employer] or call your pharmacy and they will forward the refill request to us. Please allow 3 business days for your refill to be completed.          Additional Information About Your Visit        [facility] Information     [employer] gives you secure access to your electronic health record. If you see a primary care provider, you can also send messages to your care team and make appointments. If you have questions, please call your primary care clinic.  If you do not have a primary care provider, please call [phone] and they will assist you.      [facility] is an electronic gateway that provides easy, online access to your medical records. With [facility], you can request a clinic appointment, read your test results, renew a prescription or communicate with your care team.     To access your existing account, please  "contact your [facility] or call [phone] for assistance.        Care EveryWhere ID     This is your Care EveryWhere ID. This could be used by other organizations to access your [place] medical records  [id number]        Your Vitals Were     Pulse Temperature Respirations Height Pulse Oximetry BMI (Body Mass Index)    63 97  F (36.1  C) 15 1.816 m (5' 11.5\") 99% 25.72 kg/m2       Blood Pressure from Last 3 Encounters:   04/30/18 150/75   04/10/18 116/64   01/18/18 132/58    Weight from Last 3 Encounters:   04/30/18 84.8 kg (187 lb)   04/10/18 81.6 kg (180 lb)   01/18/18 79.2 kg (174 lb 9.6 oz)                 Today's Medication Changes          These changes are accurate as of 4/30/18 11:30 AM.  If you have any questions, ask your nurse or doctor.               Start taking these medicines.        Dose/Directions    abiraterone 250 MG tablet   Commonly known as:  ZYTIGA   Used for:  Bone metastasis (H), Malignant neoplasm of prostate (H)   Started by:  Joel Pepe RPH        Dose:  1000 mg   Take 4 tablets (1,000 mg) by mouth daily for 30 doses Take on empty stomach.   Quantity:  120 tablet   Refills:  0         These medicines have changed or have updated prescriptions.        Dose/Directions    * predniSONE 5 MG tablet   Commonly known as:  DELTASONE   This may have changed:  Another medication with the same name was added. Make sure you understand how and when to take each.   Used for:  Bone metastasis (H), Malignant neoplasm of prostate (H)   Changed by:  Jole Pepe RPH        Dose:  5 mg   Take 1 tablet (5 mg) by mouth 2 times daily   Quantity:  60 tablet   Refills:  0       * predniSONE 5 MG tablet   Commonly known as:  DELTASONE   This may have changed:  You were already taking a medication with the same name, and this prescription was added. Make sure you understand how and when to take each.   Used for:  Bone metastasis (H), Malignant neoplasm of prostate (H)   Changed " by:  Joel Pepe RPH        Dose:  5 mg   Take 1 tablet (5 mg) by mouth 2 times daily   Quantity:  60 tablet   Refills:  0       * Notice:  This list has 2 medication(s) that are the same as other medications prescribed for you. Read the directions carefully, and ask your doctor or other care provider to review them with you.         Where to get your medicines      These medications were sent to [facility] - [place], [place] - [address] [place] [address]  [address] [facility], [facility] [address]    Hours:  Mon-Fri 8:30am-5:00pm Toll Free [phone] Phone:  [phone]     abiraterone 250 MG tablet    predniSONE 5 MG tablet                Primary Care Provider Office Phone # Fax #    Reginaldo Muir [phone] [address] [facility]  [facility] [address]        Equal Access to Services     [facility]: Hadii aad ku hadasho Soomaali, waaxda luqadaha, qaybta kaalmada adeegyada, waxay idiin haykemal willis . So [facility] [phone].    ATENCIÓN: Si habla español, tiene a roberts disposición servicios gratuitos de asistencia lingüística. Kelsey al [phone].    We comply with applicable federal civil rights laws and [place] laws. We do not discriminate on the basis of race, color, national origin, age, disability, sex, sexual orientation, or gender identity.            Thank you!     Thank you for choosing [facility]  for your care. Our goal is always to provide you with excellent care. Hearing back from our patients is one way we can continue to improve our services. Please take a few minutes to complete the written survey that you may receive in the mail after your visit with us. Thank you!             Your Updated Medication List - Protect others around you: Learn how to safely use, store and throw away your medicines at www.disposemymeds.org.          This list is accurate as of 4/30/18 11:30 AM.  Always use your most recent med list.                    Brand Name Dispense Instructions for use Diagnosis    abiraterone 250 MG tablet    ZYTIGA    120 tablet    Take 4 tablets (1,000 mg) by mouth daily for 30 doses Take on empty stomach.    Bone metastasis (H), Malignant neoplasm of prostate (H)       albuterol (2.5 MG/3ML) 0.083% neb solution     25 vial    Take 1 vial (2.5 mg) by nebulization every 6 hours as needed for shortness of breath / dyspnea or wheezing    Acute bronchitis, unspecified organism       aspirin 325 MG EC tablet     1 tablet    Take 1 tablet (325 mg) by mouth daily    Transient cerebral ischemia, unspecified type       CALCIUM 600 PO           [facility] vitamin  B12 1000 MCG Tabs   Generic drug:  cyanocobalamin     30 tablet    TAKE 1 TABLET BY MOUTH EVERY DAY    Low vitamin B12 level       furosemide 20 MG tablet    LASIX    90 tablet    Take 1 tablet (20 mg) by mouth daily    Bilateral lower extremity edema, HTN (hypertension), benign       gabapentin 300 MG capsule    NEURONTIN     Take 900 mg by mouth At Bedtime        ipratropium - albuterol 0.5 mg/2.5 mg/3 mL 0.5-2.5 (3) MG/3ML neb solution    DUONEB    3 mL    Take 1 vial (3 mLs) by nebulization once for 1 dose    Dyspnea, unspecified type       metoprolol succinate 25 MG 24 hr tablet    TOPROL-XL    90 tablet    TAKE 1 TABLET (25 MG) BY MOUTH DAILY    HTN (hypertension), benign       order for DME     1 Device    Equipment being ordered: Nebulizer    Acute bronchitis, unspecified organism       oxyCODONE IR 5 MG tablet    ROXICODONE    30 tablet    Take 1 tablet (5 mg) by mouth every 6 hours as needed for pain    Narcotic dependence, episodic use (H)       * predniSONE 5 MG tablet    DELTASONE    60 tablet    Take 1 tablet (5 mg) by mouth 2 times daily    Bone metastasis (H), Malignant neoplasm of prostate (H)       * predniSONE 5 MG tablet    DELTASONE    60 tablet    Take 1 tablet (5 mg) by mouth 2 times daily    Bone metastasis (H), Malignant neoplasm of prostate (H)       quinapril  40 MG Tab    ACCUPRIL    90 tablet    TAKE 1 TABLET (40 MG) BY MOUTH DAILY    HTN (hypertension), benign       simvastatin 40 MG tablet    ZOCOR    45 tablet    TAKE 0.5 TABLETS (20 MG) BY MOUTH DAILY    Hyperlipidemia LDL goal <130       vitamin D 2000 units tablet      Take 1 tablet by mouth daily        * Notice:  This list has 2 medication(s) that are the same as other medications prescribed for you. Read the directions carefully, and ask your doctor or other care provider to review them with you.

## 2018-04-30 NOTE — LETTER
4/30/2018         RE: Dimitri Neves  [address] [place] [address]        Dear Colleague,    Thank you for referring your patient, Dimitri Neves, to the [facility]. Please see a copy of my visit note below.    [facility]  HEMATOLOGY AND ONCOLOGY    FOLLOW-UP VISIT NOTE    PATIENT NAME: Dimitri Neves MRN # [id number]  DATE OF VISIT: Apr 30, 2018 YOB: 1935    REFERRING PROVIDER: No referring provider defined for this encounter.    CANCER TYPE: Prostate adenocarcinoma  STAGE: IV    TREATMENT SUMMARY:  Dimitri was followed by his PCP on 6/13/13 and was elevated at 32 as noted below. He was referred to Dr. Taylor. He was treated with a 10 day course of ciprofloxacin and followed again in 6 weeks on 8/10. His PSA drawn prior to this visit was unchanged and remained elevated at 32. He had a TRUS biopsy on 8/25/14 which was negative for prostate adenocarcinoma. His PSA was repeated in 3 months and now increased to 67.9. He had a repeat biopsy of prostate on 12/16/14 which now confirmed prostate adenocarcinoma with ieleen 4+4 disease involving 5 of the cores and [place] 3+3 disease involving remainder of cores. He was staged with a CT scan and bone scan done on 12/29/14 which revealed metastatic foci in the upper cervical vertebrae on the left, right posterior 3rd rib and right ischium, focal uptake in the posterior left 11th rib with corresponding lytic expansile appearance on CT, suspicious for metastasis.            4/5/2011 08:46 6/13/2014 08:03 7/25/2014 09:47 12/5/2014 09:00 4/7/2015 09:14   PSA 2.77 32.70 (H) 32.00 (H) 67.88 (H) 1.92      He was started on androgen deprivation therapy in Jan 2015 with a good initial response. His PSA has been increasing recently and he was started on bicalutamide in February with no response. He has continued to have rising PSA and was prescribed abiraterone with prednisone. He had a huge copay with this and has been  referred to Medical Oncology to review other options.     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 He is tolerating his abiraterone without any difficulty.   He does have hot flushes, fatigue, mood swings on androgen deprivation therapy.     He did have bilateral orchiectomies.     He continues to struggle with pain in his knees.       PAST MEDICAL HISTORY     Past Medical History:   Diagnosis Date     Actinic keratosis      AK (actinic keratosis) 7/9/2012     Cataract cortical, senile right eye 4/17/2012     DDD (degenerative disc disease), lumbar 1979    s/p 4 back surgeries, spinal cord stimulator implant      Diverticulosis      ED (erectile dysfunction) 2009     GERD (gastroesophageal reflux disease) ~1980     HTN (hypertension), benign ~2000     Macular degeneration, dry, mild, ou 7/23/2016     Multiple lung nodules     Several basilar pulmonary nodules <5 mm     HUDSON (obstructive sleep apnea) 10/2005    on CPAP     Other abnormal glucose 01/14/2009     PE (pulmonary thromboembolism) (H) 1981    after back surgery      Pure hypercholesterolemia ~2000     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         CURRENT OUTPATIENT MEDICATIONS     Current Outpatient Prescriptions   Medication Sig     abiraterone (ZYTIGA) 250 MG tablet Take 4 tablets (1,000 mg) by mouth daily for 30 doses Take on empty stomach.     albuterol (2.5 MG/3ML) 0.083% neb solution Take 1 vial (2.5 mg) by nebulization every 6 hours as needed for shortness of breath / dyspnea or wheezing     aspirin  MG EC tablet Take 1 tablet (325 mg) by mouth daily     Calcium Carbonate (CALCIUM 600 PO)      Cholecalciferol (VITAMIN D) 2000 UNITS tablet Take 1  "tablet by mouth daily     [facility] VITAMIN  B12 1000 MCG TABS TAKE 1 TABLET BY MOUTH EVERY DAY     furosemide (LASIX) 20 MG tablet Take 1 tablet (20 mg) by mouth daily     gabapentin (NEURONTIN) 300 MG capsule Take 900 mg by mouth At Bedtime     ipratropium - albuterol 0.5 mg/2.5 mg/3 mL (DUONEB) 0.5-2.5 (3) MG/3ML neb solution Take 1 vial (3 mLs) by nebulization once for 1 dose     metoprolol (TOPROL-XL) 25 MG 24 hr tablet TAKE 1 TABLET (25 MG) BY MOUTH DAILY     order for DME Equipment being ordered: Nebulizer     oxyCODONE (ROXICODONE) 5 MG IR tablet Take 1 tablet (5 mg) by mouth every 6 hours as needed for pain     predniSONE (DELTASONE) 5 MG tablet Take 1 tablet (5 mg) by mouth 2 times daily     predniSONE (DELTASONE) 5 MG tablet Take 1 tablet (5 mg) by mouth 2 times daily     quinapril (ACCUPRIL) 40 MG Tab TAKE 1 TABLET (40 MG) BY MOUTH DAILY     simvastatin (ZOCOR) 40 MG tablet TAKE 0.5 TABLETS (20 MG) BY MOUTH DAILY     No current facility-administered medications for this visit.         ALLERGIES      Allergies   Allergen Reactions     Morphine Sulfate GI Disturbance     vomiting     Vicodin [Hydrocodone-Acetaminophen] Nausea and Vomiting        REVIEW OF SYSTEMS   As above in the HPI, o/w complete 12-point ROS was negative.     PHYSICAL EXAM   /75  Pulse 63  Temp 97  F (36.1  C)  Resp 15  Ht 1.816 m (5' 11.5\")  Wt 84.8 kg (187 lb)  SpO2 99%  BMI 25.72 kg/m2  GEN: NAD  HEENT: PERRL, EOMI, no icterus, injection or pallor. Oropharynx is clear.  NECK: no cervical or supraclavicular lymphadenopathy  LUNGS: clear bilaterally  CV: regular, no murmurs, rubs, or gallops  ABDOMEN: soft, non-tender, non-distended, normal bowel sounds, no hepatosplenomegaly by percussion or palpation  EXT: warm, well perfused, no edema  NEURO: alert  SKIN: no rashes     LABORATORY AND IMAGING STUDIES     Labs remain stable. Corrected calcium is within the normal range or low normal  Mild normocytic anemia   Recent Labs   Lab " Test  04/30/18   0937  12/18/17   0906  11/27/17   0856  10/16/17   0840  09/18/17   0949   NA  140  143  142  140  140   POTASSIUM  4.2  3.4  3.9  3.9  4.2   CHLORIDE  103  107  105  108  104   CO2  33*  30  30  28  32   ANIONGAP  4  6  7  4  4   BUN  29  13  13  10  12   CR  1.06  0.71  0.75  0.71  0.76   GLC  97  93  93  100*  103*   ALETHA  9.4  8.6  8.7  8.2*  8.8     Recent Labs   Lab Test  12/28/11   0857  04/05/11   0846  06/16/10   1338   PHOS  3.1  3.4  2.5     Recent Labs   Lab Test  04/30/18   0937  12/18/17   0906  11/27/17   0856  10/16/17   0840  09/18/17   0949   WBC  5.8  4.3  5.0  4.8  6.5   HGB  12.1*  13.0*  12.0*  12.1*  11.7*   PLT  189  159  198  211  219   MCV  99  95  95  95  91   NEUTROPHIL  68.1  58.1  52.2  53.9  64.1     Recent Labs   Lab Test  04/30/18   0937  12/18/17   0906  11/27/17   0856   BILITOTAL  0.6  0.7  0.5   ALKPHOS  104  73  69   ALT  24  18  15   AST  27  19  18   ALBUMIN  3.3*  3.3*  3.0*     TSH   Date Value Ref Range Status   06/13/2016 1.60 0.40 - 4.00 mU/L Final   04/05/2011 3.73 0.4 - 5.0 mU/L Final     No results for input(s): CEA in the last 75556 hours.  Results for orders placed or performed in visit on 10/30/17   CT Chest Pulmonary Embolism w Contrast    Narrative    CT CHEST PULMONARY EMBOLISM WITH CONTRAST  10/30/2017 12:58 PM     HISTORY: Shortness of breath.    TECHNIQUE:  CT of the chest was performed following the administration  of 69 mL Isovue-370. Radiation dose for this scan was reduced using  automated exposure control, adjustment of the mA and/or kV according  to patient size, or iterative reconstruction technique.    COMPARISON: None.    FINDINGS:  There is no evidence for a pulmonary embolism.    There are a few calcified granulomas in the lungs. Again noted is  linear scarring in the right middle lobe. Minimal atelectatic changes  in the right lower lobe.    There are cysts in the kidneys. Patient is status post a  cholecystectomy.      Impression     IMPRESSION: No evidence for pulmonary embolism. No definite acute  abnormality seen.    ROSS WARD MD     Recent Labs   Lab Test  04/30/18   0937  12/18/17   0906  11/27/17   0856  10/16/17   0840  09/18/17   0949   02/09/17   0823   PSA  13.30*  9.34*  12.30*  11.90*  18.90*   < >   --    TESTOSTTOTAL   --    --    --    --    --    --   9*    < > = values in this interval not displayed.            ASSESSMENT AND PLAN   1. 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 No response to addition of bicalutamide  3. PE diagnosed few weeks after initiation of megestrol acetate for hot flashes on GnRH agonist  4. No significant medical comorbidities    He is tolerating his abiraterone well and has no complains. His PSA had been declining nicely as expected. However it is the first time that his PSA has started to increase. We would plan to continue as current. There are no immediate concerns.     He did get bilateral orchiectomies done last month 9/27/17. He would not need any more lupron or other GnRH directed therapies.     He has hot flashes, mood swings, fatigue - from androgen deprivation. These are no different between either orchiectomy or GnRH therapy as they come with low levels of testosterone. There is no good way around.     We would continue with zometa every 6-8 weeks. I could see him in 3 months.    Over 25 min of direct face to face time spent with patient with more than 50% time spent in counseling and coordinating care.        Again, thank you for allowing me to participate in the care of your patient.        Sincerely,        Rafael Valenzuela MD

## 2018-05-10 ENCOUNTER — OFFICE VISIT (OUTPATIENT)
Dept: FAMILY MEDICINE | Facility: CLINIC | Age: 83
End: 2018-05-10
Payer: COMMERCIAL

## 2018-05-10 ENCOUNTER — RADIANT APPOINTMENT (OUTPATIENT)
Dept: GENERAL RADIOLOGY | Facility: CLINIC | Age: 83
End: 2018-05-10
Attending: INTERNAL MEDICINE
Payer: COMMERCIAL

## 2018-05-10 VITALS
TEMPERATURE: 97.1 F | OXYGEN SATURATION: 97 % | SYSTOLIC BLOOD PRESSURE: 114 MMHG | WEIGHT: 187 LBS | DIASTOLIC BLOOD PRESSURE: 68 MMHG | HEART RATE: 78 BPM | RESPIRATION RATE: 16 BRPM | BODY MASS INDEX: 25.72 KG/M2

## 2018-05-10 DIAGNOSIS — M89.8X1 SCAPULALGIA: ICD-10-CM

## 2018-05-10 DIAGNOSIS — M25.511 ACUTE PAIN OF RIGHT SHOULDER: ICD-10-CM

## 2018-05-10 DIAGNOSIS — M25.511 ACUTE PAIN OF RIGHT SHOULDER: Primary | ICD-10-CM

## 2018-05-10 PROCEDURE — 99214 OFFICE O/P EST MOD 30 MIN: CPT | Performed by: INTERNAL MEDICINE

## 2018-05-10 PROCEDURE — 73030 X-RAY EXAM OF SHOULDER: CPT | Mod: RT

## 2018-05-10 RX ORDER — LIDOCAINE 50 MG/G
OINTMENT TOPICAL PRN
Qty: 50 G | Refills: 3 | Status: SHIPPED | OUTPATIENT
Start: 2018-05-10 | End: 2018-06-28

## 2018-05-10 RX ORDER — LIDOCAINE 50 MG/G
PATCH TOPICAL
Qty: 30 PATCH | Refills: 0 | Status: SHIPPED | OUTPATIENT
Start: 2018-05-10 | End: 2018-06-01

## 2018-05-10 NOTE — PATIENT INSTRUCTIONS
Ask your pharmacy if your insurance covers lidocaine patches or lidocaine gel. You can use these for shoulder pain.        [facility]    If you have any questions regarding to your visit please contact your care team:     Team Pink:   Clinic Hours Telephone Number   Internal Medicine:  Dr. Deborah Taylor, NP       7am-7pm  Monday - Thursday   7am-5pm  Fridays  [phone]  (Appointment scheduling available 24/7)    Questions about your recent visit?  Team Line  [phone]   Urgent Care - [place] and [facility] - 11am-9pm Monday-Friday Saturday-Sunday- 9am-5pm   [place] - 5pm-9pm Monday-Friday Saturday-Sunday- 9am-5pm  [phone] - [place]  [phone] - [place]       What options do I have for a visit other than an office visit? We offer electronic visits (e-visits) and telephone visits, when medically appropriate.  Please check with your medical insurance to see if these types of visits are covered, as you will be responsible for any charges that are not paid by your insurance.      You can use [employer] (secure electronic communication) to access to your chart, send your primary care provider a message, or make an appointment. Ask a team member how to get started.     For a price quote for your services, please call our Consumer Price Line at [phone] or our Imaging Cost estimation line at [phone] (for imaging tests).  Anaid PERSAUD CMA ([facility])

## 2018-05-10 NOTE — PROGRESS NOTES
SUBJECTIVE:   Dimitri Neves is a 82 year old male who presents to clinic today for the following health issues:    Acute pain of right shoulder  (primary encounter diagnosis)  Scapulalgia    Skin - Complains of light and dark spots on bilateral forearms. Once his skin broke open and bled. This is simply dermal hemorrhages associated with aging and atrophy of the skin / fat layer as associated with octagenarian skin, no worrisome quality     Shoulder pain - He fell out of bed 4 weeks ago when he rolled over. Hit his right shoulder square on the floor and his wife helped him up. His posterior right shoulder still hurts laying down and moving it. Pain has not improved and is exacerbated with laying on his back which is unfortunately how he most likes to sleep. Wife and daughter wanted him to get an XR. Does not think it is rotator cuff because he had rotator cuff surgery years ago. He followed up with Dr. Valenzuela (oncology) 2 weeks ago and is worried that this is due to metastasis of his prostate cancer.     Prostate cancer - He has a bone scan in 6 weeks.      Problem list and histories reviewed & adjusted, as indicated.  Additional history: as documented    Patient Active Problem List   Diagnosis     HTN (Hypertension), Benign goal <140/90     Narcotic dependence, episodic use (H)     ED (erectile dysfunction)     HUDSON (obstructive sleep apnea)     PE (pulmonary thromboembolism) (H)     HYPERLIPIDEMIA LDL GOAL <130     Advanced directives, counseling/discussion     Abnormal glucose     AK (actinic keratosis)     SNHL (sensorineural hearing loss)     Endolymphatic hydrops     History of squamous cell carcinoma     Pseudophakia, Yag Caps, ou     Venous (peripheral) insufficiency     Malignant neoplasm of prostate (H)     Dyspnea on exertion     Posterior vitreous detachment, bilateral     Iris nevus, ou     Dry eye syndrome, bilateral     Macular degeneration, dry, mild, ou     Cotton wool spots, od     Bone metastasis  (H)     Past Surgical History:   Procedure Laterality Date     ARTHROSCOPY KNEE RT/LT  ~1995    Right     C LUMBAR SPINE FUSION,ANTER APPRCH  8/2007    four times total, latest 8/07     CATARACT IOL, RT/LT       LASER YAG CAPSULOTOMY      both eyes     ORCHIECTOMY SCROTAL Bilateral 9/27/2017    Procedure: ORCHIECTOMY SCROTAL;  Bilateral orchiectomy scrotal approach;  Surgeon: Senthil Taylor MD;  Location: [facility] OR     PHACOEMULSIFICATION CLEAR CORNEA WITH STANDARD INTRAOCULAR LENS IMPLANT  6-18-12/7-30-12    right/left eye     ROTATOR CUFF REPAIR RT/LT  ~1995    right       Social History   Substance Use Topics     Smoking status: Former Smoker     Packs/day: 1.00     Years: 25.00     Types: Cigarettes     Quit date: 1/2/1976     Smokeless tobacco: Never Used      Comment: lives in smoke free household     Alcohol use 7.0 oz/week     14 Standard drinks or equivalent per week      Comment: 2-3 drinks every night     Family History   Problem Relation Age of Onset     CANCER Father      Lung 64y     DIABETES Brother      Hypertension Brother      CANCER Mother      Liver     CEREBROVASCULAR DISEASE Mother      Eye Disorder Mother      Glaucoma Mother      CEREBROVASCULAR DISEASE Maternal Grandmother      C.A.D. No family hx of      Thyroid Disease No family hx of      Macular Degeneration No family hx of          Current Outpatient Prescriptions   Medication Sig Dispense Refill     abiraterone (ZYTIGA) 250 MG tablet Take 4 tablets (1,000 mg) by mouth daily for 30 doses Take on empty stomach. 120 tablet 0     albuterol (2.5 MG/3ML) 0.083% neb solution Take 1 vial (2.5 mg) by nebulization every 6 hours as needed for shortness of breath / dyspnea or wheezing 25 vial 3     aspirin  MG EC tablet Take 1 tablet (325 mg) by mouth daily 1 tablet 0     Calcium Carbonate (CALCIUM 600 PO)        Cholecalciferol (VITAMIN D) 2000 UNITS tablet Take 1 tablet by mouth daily       CVS VITAMIN  B12 1000 MCG TABS TAKE 1 TABLET BY  MOUTH EVERY DAY 30 tablet 5     furosemide (LASIX) 20 MG tablet Take 1 tablet (20 mg) by mouth daily 90 tablet 1     gabapentin (NEURONTIN) 300 MG capsule Take 900 mg by mouth At Bedtime       metoprolol (TOPROL-XL) 25 MG 24 hr tablet TAKE 1 TABLET (25 MG) BY MOUTH DAILY 90 tablet 0     order for DME Equipment being ordered: Nebulizer 1 Device 0     oxyCODONE (ROXICODONE) 5 MG IR tablet Take 1 tablet (5 mg) by mouth every 6 hours as needed for pain 30 tablet 0     predniSONE (DELTASONE) 5 MG tablet Take 1 tablet (5 mg) by mouth 2 times daily 60 tablet 0     quinapril (ACCUPRIL) 40 MG Tab TAKE 1 TABLET (40 MG) BY MOUTH DAILY 90 tablet 0     simvastatin (ZOCOR) 40 MG tablet TAKE 0.5 TABLETS (20 MG) BY MOUTH DAILY 45 tablet 3     ipratropium - albuterol 0.5 mg/2.5 mg/3 mL (DUONEB) 0.5-2.5 (3) MG/3ML neb solution Take 1 vial (3 mLs) by nebulization once for 1 dose 3 mL 0     Labs reviewed in EPIC    Reviewed and updated as needed this visit by clinical staff  Tobacco  Allergies  Meds  Med Hx  Surg Hx  Fam Hx  Soc Hx      Reviewed and updated as needed this visit by Provider         ROS:  Constitutional, HEENT, cardiovascular, pulmonary, GI, , musculoskeletal, neuro, skin, endocrine and psych systems are negative, except as otherwise noted.    This document serves as a record of the services and decisions personally performed and made by Reginaldo Muir MD. It was created on his/her behalf by Cathie Shannon, trained medical scribe. The creation of this document is based the provider's statements to the medical scribes.    Cj Shannon 12:20 PM, May 10, 2018  OBJECTIVE:   /68  Pulse 78  Temp 97.1  F (36.2  C) (Oral)  Resp 16  Wt 84.8 kg (187 lb)  SpO2 97%  BMI 25.72 kg/m2  Body mass index is 25.72 kg/(m^2).  GENERAL: healthy, alert and no distress  MS: normal right shoulder range of motion. He's pretty much got all good range of motion and I can only find reproducible pain along the scapula directly just  below the supraspinatus muscle and onto the upper aspect of the scapula   NEURO: Normal strength and tone, mentation intact and speech normal  PSYCH: mentation appears normal, affect normal/bright    Diagnostic Test Results:  Orders Placed This Encounter   Procedures     XR Shoulder Right 2 Views         ASSESSMENT/PLAN:   (M25.511) Acute pain of right shoulder  (primary encounter diagnosis)  Comment: I am unable to make a clear diagnosis and it is possible that this is in fact due to metastatic prostate cancer  Although I doubt this. Plain xrays to my read don't show any obvious findings . I await the official overread of the film per radiology and then if the xrays are negative, will treat with lidoderm patches versus the gel if not a covered benefit with health insurance  And then will include Dr. Valenzuela with this discussion. The upcoming bone scan will light up if it's possibility involving the scapula.   Plan: XR Shoulder Right 2 Views, lidocaine (LIDODERM)        5 % Patch, lidocaine (XYLOCAINE) 5 % ointment            (M89.8X1) Scapulalgia  Comment: as detailed above   Plan: XR Shoulder Right 2 Views, lidocaine (LIDODERM)        5 % Patch, lidocaine (XYLOCAINE) 5 % ointment              Patient Instructions  Ask your pharmacy if your insurance covers lidocaine patches or lidocaine gel. You can use these for shoulder pain.      The information in this document, created by the medical scribe, Cathie Shannon, for me, accurately reflects the services I personally performed and the decisions made by me. I have reviewed and approved this document for accuracy prior to leaving the patient care area.    Reginaldo Muir MD  [facility]

## 2018-05-10 NOTE — MR AVS SNAPSHOT
After Visit Summary   5/10/2018    Dimitri Neves    MRN: [id number]           Patient Information     Date Of Birth          1935        Visit Information        Provider Department      5/10/2018 12:10 PM Reginaldo Muir MD [facility]        Today's Diagnoses     Acute pain of right shoulder    -  1    Scapulalgia          Care Instructions    Ask your pharmacy if your insurance covers lidocaine patches or lidocaine gel. You can use these for shoulder pain.        [facility]    If you have any questions regarding to your visit please contact your care team:     Team Pink:   Clinic Hours Telephone Number   Internal Medicine:  Dr. Deborah Taylor NP       7am-7pm  Monday - Thursday   7am-5pm  Fridays  [phone]  (Appointment scheduling available 24/7)    Questions about your recent visit?  Team Line  [phone]   Urgent Care - [place] and [facility] - 11am-9pm Monday-Friday Saturday-Sunday- 9am-5pm   [place] - 5pm-9pm Monday-Friday Saturday-Sunday- 9am-5pm  [phone] - [place]  [phone] - [place]       What options do I have for a visit other than an office visit? We offer electronic visits (e-visits) and telephone visits, when medically appropriate.  Please check with your medical insurance to see if these types of visits are covered, as you will be responsible for any charges that are not paid by your insurance.      You can use [employer] (secure electronic communication) to access to your chart, send your primary care provider a message, or make an appointment. Ask a team member how to get started.     For a price quote for your services, please call our Consumer Price Line at [phone] or our Imaging Cost estimation line at [phone] (for imaging tests).  Anaid PERSAUD CMA ([facility])            Follow-ups after your visit        Your next 10 appointments already scheduled     May 29, 2018 10:45 AM  CDT   Return Visit with Senthil [facility]haveri MD   [facility]    [address] [place]  [facility] [address]   [phone]            Jun 25, 2018 10:00 AM CDT   NM INJECTION with MGNMINJ1   [facility]    [address] [facility] [id number] [phone]            Jun 25, 2018 10:30 AM CDT   CT CHEST/ABDOMEN/PELVIS W CONTRAST with MGCT1   [facility]    [address] [facility] [id number] [phone]           Please bring any scans or X-rays taken at other hospitals, if similar tests were done. Also bring a list of your medicines, including vitamins, minerals and over-the-counter drugs. It is safest to leave personal items at home.  Be sure to tell your doctor:   If you have any allergies.   If there s any chance you are pregnant.   If you are breastfeeding.  How to prepare:   Do not eat or drink for 2 hours before your exam. If you need to take medicine, you may take it with small sips of water. (We may ask you to take liquid medicine as well.)   Please wear loose clothing, such as a sweat suit or jogging clothes. Avoid snaps, zippers and other metal. We may ask you to undress and put on a hospital gown.  Please arrive 30 minutes early for your CT. Once in the department you might be asked to drink water 15-20 minutes prior to your exam.  If indicated you may be asked to drink an oral contrast in advance of your CT.  If this is the case, the imaging team will let you know or be in contact with you prior to your appointment  Patients over 70 or patients with diabetes or kidney problems:   If you haven t had a blood test (creatinine test) within the last 30 days, the Cardiologist/Radiologist may require you to get this test prior to your exam.  If you have diabetes:   Continue to take your metformin medication on the day of your exam  If you have  any questions, please call the Imaging Department where you will have your exam.            Jun 25, 2018  1:00 PM CDT   NM BONE SCAN WHOLE BODY with MGNM1   [facility])    [address] [place] [address] [phone]           Please bring a list of your medicines to the exam. (Include vitamins, minerals and over-the-counter drugs.) You should wear comfortable clothes. Leave your valuables at home. Please bring related prior results and films.  Tell your doctor:   If you are breastfeeding or may be pregnant.   If you have had a barium test within the past 48 hours. Barium may change the results of certain exams.   If you think you may need sedation (medicine to help you relax).  You may eat and drink as normal.  Please call your Imaging Department at your exam site with any questions.            Jul 02, 2018  9:45 AM CDT   LAB with LAB ONC [facility])    [address] [place] [address] [phone]           Please do not eat 10-12 hours before your appointment if you are coming in fasting for labs on lipids, cholesterol, or glucose (sugar). This does not apply to pregnant women. Water, hot tea and black coffee (with nothing added) are okay. Do not drink other fluids, diet soda or chew gum.            Jul 02, 2018 10:30 AM CDT   Return Visit with Rafael Valenzuela MD   [facility])    [address] [facility] [id number] [phone]            Jul 02, 2018 11:00 AM CDT   Level O with [address])    [address] [facility] [address] [phone]              Who to contact     If you have questions or need follow up information about today's clinic visit or your schedule please contact [facility] directly at [phone].  Normal  or non-critical lab and imaging results will be communicated to you by [employer], letter or phone within 4 business days after the clinic has received the results. If you do not hear from us within 7 days, please contact the clinic through [employer] or phone. If you have a critical or abnormal lab result, we will notify you by phone as soon as possible.  Submit refill requests through [employer] or call your pharmacy and they will forward the refill request to us. Please allow 3 business days for your refill to be completed.          Additional Information About Your Visit        [employer] Information     [employer] gives you secure access to your electronic health record. If you see a primary care provider, you can also send messages to your care team and make appointments. If you have questions, please call your primary care clinic.  If you do not have a primary care provider, please call [phone] and they will assist you.        Care EveryWhere ID     This is your Care EveryWhere ID. This could be used by other organizations to access your [place] medical records  [id number]        Your Vitals Were     Pulse Temperature Respirations Pulse Oximetry BMI (Body Mass Index)       78 97.1  F (36.2  C) (Oral) 16 97% 25.72 kg/m2        Blood Pressure from Last 3 Encounters:   05/10/18 114/68   04/30/18 150/75   04/10/18 116/64    Weight from Last 3 Encounters:   05/10/18 187 lb (84.8 kg)   04/30/18 187 lb (84.8 kg)   04/10/18 180 lb (81.6 kg)                 Today's Medication Changes          These changes are accurate as of 5/10/18 12:53 PM.  If you have any questions, ask your nurse or doctor.               Start taking these medicines.        Dose/Directions    * lidocaine 5 % Patch   Commonly known as:  LIDODERM   Used for:  Scapulalgia, Acute pain of right shoulder   Started by:  Reginaldo Muir MD        Apply up to 3 patches to painful area at once for up to 12 h within a 24 h period.  Remove after 12 hours.    Quantity:  30 patch   Refills:  0       * lidocaine 5 % ointment   Commonly known as:  XYLOCAINE   Used for:  Acute pain of right shoulder, Scapulalgia   Started by:  Reginaldo Muir MD        Apply topically as needed for moderate pain   Quantity:  50 g   Refills:  3       * Notice:  This list has 2 medication(s) that are the same as other medications prescribed for you. Read the directions carefully, and ask your doctor or other care provider to review them with you.         Where to get your medicines      Some of these will need a paper prescription and others can be bought over the counter.  Ask your nurse if you have questions.     Bring a paper prescription for each of these medications     lidocaine 5 % ointment    lidocaine 5 % Patch                Primary Care Provider Office Phone # Fax #    Reginaldo Muir [phone] [address] [facility] [address]        Equal Access to Services     [facility]: Hadii nahid martins hadasho Soomaali, waaxda luqadaha, qaybta kaalmada adeegyada, naty willis . So [facility] [phone].    ATENCIÓN: Si habla español, tiene a roberts disposición servicios gratuitos de asistencia lingüística. [facility] [phone].    We comply with applicable federal civil rights laws and [place] laws. We do not discriminate on the basis of race, color, national origin, age, disability, sex, sexual orientation, or gender identity.            Thank you!     Thank you for choosing [facility]  for your care. Our goal is always to provide you with excellent care. Hearing back from our patients is one way we can continue to improve our services. Please take a few minutes to complete the written survey that you may receive in the mail after your visit with us. Thank you!             Your Updated Medication List - Protect others around you: Learn how to safely use, store and throw away your medicines at www.disposemymeds.org.           This list is accurate as of 5/10/18 12:53 PM.  Always use your most recent med list.                   Brand Name Dispense Instructions for use Diagnosis    abiraterone 250 MG tablet    ZYTIGA    120 tablet    Take 4 tablets (1,000 mg) by mouth daily for 30 doses Take on empty stomach.    Bone metastasis (H), Malignant neoplasm of prostate (H)       albuterol (2.5 MG/3ML) 0.083% neb solution     25 vial    Take 1 vial (2.5 mg) by nebulization every 6 hours as needed for shortness of breath / dyspnea or wheezing    Acute bronchitis, unspecified organism       aspirin 325 MG EC tablet     1 tablet    Take 1 tablet (325 mg) by mouth daily    Transient cerebral ischemia, unspecified type       CALCIUM 600 PO           CVS vitamin  B12 1000 MCG Tabs   Generic drug:  cyanocobalamin     30 tablet    TAKE 1 TABLET BY MOUTH EVERY DAY    Low vitamin B12 level       furosemide 20 MG tablet    LASIX    90 tablet    Take 1 tablet (20 mg) by mouth daily    Bilateral lower extremity edema, HTN (hypertension), benign       gabapentin 300 MG capsule    NEURONTIN     Take 900 mg by mouth At Bedtime        ipratropium - albuterol 0.5 mg/2.5 mg/3 mL 0.5-2.5 (3) MG/3ML neb solution    DUONEB    3 mL    Take 1 vial (3 mLs) by nebulization once for 1 dose    Dyspnea, unspecified type       * lidocaine 5 % Patch    LIDODERM    30 patch    Apply up to 3 patches to painful area at once for up to 12 h within a 24 h period.  Remove after 12 hours.    Scapulalgia, Acute pain of right shoulder       * lidocaine 5 % ointment    XYLOCAINE    50 g    Apply topically as needed for moderate pain    Acute pain of right shoulder, Scapulalgia       metoprolol succinate 25 MG 24 hr tablet    TOPROL-XL    90 tablet    TAKE 1 TABLET (25 MG) BY MOUTH DAILY    HTN (hypertension), benign       order for DME     1 Device    Equipment being ordered: Nebulizer    Acute bronchitis, unspecified organism       oxyCODONE IR 5 MG tablet    ROXICODONE    30 tablet     Take 1 tablet (5 mg) by mouth every 6 hours as needed for pain    Narcotic dependence, episodic use (H)       predniSONE 5 MG tablet    DELTASONE    60 tablet    Take 1 tablet (5 mg) by mouth 2 times daily    Bone metastasis (H), Malignant neoplasm of prostate (H)       quinapril 40 MG Tab    ACCUPRIL    90 tablet    TAKE 1 TABLET (40 MG) BY MOUTH DAILY    HTN (hypertension), benign       simvastatin 40 MG tablet    ZOCOR    45 tablet    TAKE 0.5 TABLETS (20 MG) BY MOUTH DAILY    Hyperlipidemia LDL goal <130       vitamin D 2000 units tablet      Take 1 tablet by mouth daily        * Notice:  This list has 2 medication(s) that are the same as other medications prescribed for you. Read the directions carefully, and ask your doctor or other care provider to review them with you.

## 2018-05-11 ENCOUNTER — TELEPHONE (OUTPATIENT)
Dept: ONCOLOGY | Facility: CLINIC | Age: 83
End: 2018-05-11

## 2018-05-11 NOTE — TELEPHONE ENCOUNTER
This patient has continued to have pain after last office visit.  He believed at the time of visit, his shoulder pain was due to recent fall, but all has resolved but continued significant shoulder pain.  He was evaluated at primary MD who completed x rays.  Primary MD suggested moving up imaging sooner.  Patient would like to have done asap. Message to Dr Valenzuela to see about moving up imaging and MD appt.   Dr Valenzuela OK with this.    Message relayed to care coordinator to follow up  Marie Kaplan RN

## 2018-05-14 ENCOUNTER — TELEPHONE (OUTPATIENT)
Dept: FAMILY MEDICINE | Facility: CLINIC | Age: 83
End: 2018-05-14

## 2018-05-14 ENCOUNTER — CARE COORDINATION (OUTPATIENT)
Dept: ONCOLOGY | Facility: CLINIC | Age: 83
End: 2018-05-14

## 2018-05-14 NOTE — PROGRESS NOTES
Spoke with patient to give him appointments for moving up his bone scan/CT scan due to recent increase of shoulder pain.  I gave him his rescheduled scan dates; advised to call [facility] - provided him with the phone number - [phone] Clinic to get the date scheduled for Dr. Valenzuela; advised he had openings on 5/24 and he should be able to get scheduled that day.

## 2018-05-22 ENCOUNTER — RADIANT APPOINTMENT (OUTPATIENT)
Dept: NUCLEAR MEDICINE | Facility: CLINIC | Age: 83
End: 2018-05-22
Attending: INTERNAL MEDICINE
Payer: COMMERCIAL

## 2018-05-22 ENCOUNTER — RADIANT APPOINTMENT (OUTPATIENT)
Dept: CT IMAGING | Facility: CLINIC | Age: 83
End: 2018-05-22
Attending: INTERNAL MEDICINE
Payer: COMMERCIAL

## 2018-05-22 DIAGNOSIS — C61 MALIGNANT NEOPLASM OF PROSTATE (H): ICD-10-CM

## 2018-05-22 DIAGNOSIS — C79.51 BONE METASTASIS: ICD-10-CM

## 2018-05-22 PROCEDURE — A9503 TC99M MEDRONATE: HCPCS | Performed by: INTERNAL MEDICINE

## 2018-05-22 PROCEDURE — 71260 CT THORAX DX C+: CPT | Performed by: RADIOLOGY

## 2018-05-22 PROCEDURE — 74177 CT ABD & PELVIS W/CONTRAST: CPT | Performed by: RADIOLOGY

## 2018-05-22 PROCEDURE — 78306 BONE IMAGING WHOLE BODY: CPT | Performed by: RADIOLOGY

## 2018-05-22 RX ORDER — IOPAMIDOL 755 MG/ML
115 INJECTION, SOLUTION INTRAVASCULAR ONCE
Status: COMPLETED | OUTPATIENT
Start: 2018-05-22 | End: 2018-05-22

## 2018-05-22 RX ORDER — TC 99M MEDRONATE 20 MG/10ML
20-30 INJECTION, POWDER, LYOPHILIZED, FOR SOLUTION INTRAVENOUS ONCE
Status: COMPLETED | OUTPATIENT
Start: 2018-05-22 | End: 2018-05-22

## 2018-05-22 RX ADMIN — TC 99M MEDRONATE 24.1 MCI.: 20 INJECTION, POWDER, LYOPHILIZED, FOR SOLUTION INTRAVENOUS at 09:05

## 2018-05-22 RX ADMIN — IOPAMIDOL 115 ML: 755 INJECTION, SOLUTION INTRAVASCULAR at 09:20

## 2018-05-24 ENCOUNTER — ONCOLOGY VISIT (OUTPATIENT)
Dept: ONCOLOGY | Facility: CLINIC | Age: 83
End: 2018-05-24
Attending: INTERNAL MEDICINE
Payer: COMMERCIAL

## 2018-05-24 ENCOUNTER — HOSPITAL ENCOUNTER (OUTPATIENT)
Facility: CLINIC | Age: 83
Setting detail: SPECIMEN
Discharge: HOME | End: 2018-05-24
Attending: INTERNAL MEDICINE | Admitting: INTERNAL MEDICINE
Payer: COMMERCIAL

## 2018-05-24 VITALS
OXYGEN SATURATION: 96 % | DIASTOLIC BLOOD PRESSURE: 73 MMHG | WEIGHT: 194.4 LBS | HEART RATE: 61 BPM | BODY MASS INDEX: 26.74 KG/M2 | SYSTOLIC BLOOD PRESSURE: 146 MMHG | TEMPERATURE: 98 F | RESPIRATION RATE: 16 BRPM

## 2018-05-24 DIAGNOSIS — C61 MALIGNANT NEOPLASM OF PROSTATE (H): ICD-10-CM

## 2018-05-24 DIAGNOSIS — C79.51 BONE METASTASIS: ICD-10-CM

## 2018-05-24 LAB
ALBUMIN SERPL-MCNC: 3.3 G/DL (ref 3.4–5)
ALP SERPL-CCNC: 93 U/L (ref 40–150)
ALT SERPL W P-5'-P-CCNC: 25 U/L (ref 0–70)
ANION GAP SERPL CALCULATED.3IONS-SCNC: 6 MMOL/L (ref 3–14)
AST SERPL W P-5'-P-CCNC: 23 U/L (ref 0–45)
BASOPHILS # BLD AUTO: 0 10E9/L (ref 0–0.2)
BASOPHILS NFR BLD AUTO: 0.2 %
BILIRUB SERPL-MCNC: 0.9 MG/DL (ref 0.2–1.3)
BUN SERPL-MCNC: 23 MG/DL (ref 7–30)
CALCIUM SERPL-MCNC: 8.7 MG/DL (ref 8.5–10.1)
CHLORIDE SERPL-SCNC: 105 MMOL/L (ref 94–109)
CO2 SERPL-SCNC: 28 MMOL/L (ref 20–32)
CREAT SERPL-MCNC: 0.88 MG/DL (ref 0.66–1.25)
DIFFERENTIAL METHOD BLD: ABNORMAL
EOSINOPHIL # BLD AUTO: 0 10E9/L (ref 0–0.7)
EOSINOPHIL NFR BLD AUTO: 0.4 %
ERYTHROCYTE [DISTWIDTH] IN BLOOD BY AUTOMATED COUNT: 15 % (ref 10–15)
GFR SERPL CREATININE-BSD FRML MDRD: 83 ML/MIN/1.7M2
GLUCOSE SERPL-MCNC: 102 MG/DL (ref 70–99)
HCT VFR BLD AUTO: 33.7 % (ref 40–53)
HGB BLD-MCNC: 11.3 G/DL (ref 13.3–17.7)
IMM GRANULOCYTES # BLD: 0 10E9/L (ref 0–0.4)
IMM GRANULOCYTES NFR BLD: 0.4 %
LYMPHOCYTES # BLD AUTO: 0.9 10E9/L (ref 0.8–5.3)
LYMPHOCYTES NFR BLD AUTO: 17.3 %
MCH RBC QN AUTO: 32.5 PG (ref 26.5–33)
MCHC RBC AUTO-ENTMCNC: 33.5 G/DL (ref 31.5–36.5)
MCV RBC AUTO: 97 FL (ref 78–100)
MONOCYTES # BLD AUTO: 0.4 10E9/L (ref 0–1.3)
MONOCYTES NFR BLD AUTO: 7.2 %
NEUTROPHILS # BLD AUTO: 3.9 10E9/L (ref 1.6–8.3)
NEUTROPHILS NFR BLD AUTO: 74.5 %
PLATELET # BLD AUTO: 149 10E9/L (ref 150–450)
POTASSIUM SERPL-SCNC: 4.4 MMOL/L (ref 3.4–5.3)
PROT SERPL-MCNC: 6.2 G/DL (ref 6.8–8.8)
PSA SERPL-MCNC: 10.4 UG/L (ref 0–4)
RBC # BLD AUTO: 3.48 10E12/L (ref 4.4–5.9)
SODIUM SERPL-SCNC: 139 MMOL/L (ref 133–144)
WBC # BLD AUTO: 5.3 10E9/L (ref 4–11)

## 2018-05-24 PROCEDURE — 85025 COMPLETE CBC W/AUTO DIFF WBC: CPT | Performed by: INTERNAL MEDICINE

## 2018-05-24 PROCEDURE — 36415 COLL VENOUS BLD VENIPUNCTURE: CPT

## 2018-05-24 PROCEDURE — 99214 OFFICE O/P EST MOD 30 MIN: CPT | Performed by: INTERNAL MEDICINE

## 2018-05-24 PROCEDURE — G0463 HOSPITAL OUTPT CLINIC VISIT: HCPCS

## 2018-05-24 PROCEDURE — 84153 ASSAY OF PSA TOTAL: CPT | Performed by: INTERNAL MEDICINE

## 2018-05-24 PROCEDURE — 80053 COMPREHEN METABOLIC PANEL: CPT | Performed by: INTERNAL MEDICINE

## 2018-05-24 ASSESSMENT — PAIN SCALES - GENERAL: PAINLEVEL: NO PAIN (0)

## 2018-05-24 NOTE — PROGRESS NOTES
"Oncology Rooming Note    May 24, 2018 11:30 AM   Dimitri Neves is a 82 year old male who presents for:    Chief Complaint   Patient presents with     Oncology Clinic Visit     Bone metastasis (H)     Initial Vitals: /73 (BP Location: Left arm, Patient Position: Sitting, Cuff Size: Adult Regular)  Pulse 61  Temp 98  F (36.7  C) (Oral)  Resp 16  Wt 88.2 kg (194 lb 6.4 oz)  SpO2 96%  BMI 26.74 kg/m2 Estimated body mass index is 26.74 kg/(m^2) as calculated from the following:    Height as of 4/30/18: 1.816 m (5' 11.5\").    Weight as of this encounter: 88.2 kg (194 lb 6.4 oz). Body surface area is 2.11 meters squared.  No Pain (0) Comment: Data Unavailable   No LMP for male patient.  Allergies reviewed: Yes  Medications reviewed: Yes    Medications: Medication refills not needed today.  Pharmacy name entered into [facility]:    [id number] - [place], [place] - [address] [facility] - [place], [place] - [address] [place], [place] [address]O PHARMACY - MAIL ORDER ONLY - [id number] [place], [place] - [address] [facility]    Clinical concerns: no    5 minutes for nursing intake (face to face time)          Sabrina Macias MA            Medical Assistant Note:  Dimitri Neves presents today for yes.    Patient seen by provider today: Yes: Dr Valenzuela.   present during visit today: Not Applicable.    Concerns: No Concerns.    Procedure:  Lab draw site: RAC, Needle type: BF, Gauge: 21. Gauze and coban applied    Post Assessment:  Labs drawn without difficulty: Yes.    Discharge Plan:  Departure Mode: Ambulatory.    Face to Face Time: 5.    Sabrina Macias MA            "

## 2018-05-24 NOTE — PROGRESS NOTES
[facility]  HEMATOLOGY AND ONCOLOGY    FOLLOW-UP VISIT NOTE    PATIENT NAME: Dimitri Neves MRN # [id number]  DATE OF VISIT: May 24, 2018 YOB: 1935    REFERRING PROVIDER: No referring provider defined for this encounter.    CANCER TYPE: Prostate adenocarcinoma  STAGE: IV    TREATMENT SUMMARY:  Dimitri was followed by his PCP on 6/13/13 and was elevated at 32 as noted below. He was referred to Dr. Taylor. He was treated with a 10 day course of ciprofloxacin and followed again in 6 weeks on 8/10. His PSA drawn prior to this visit was unchanged and remained elevated at 32. He had a TRUS biopsy on 8/25/14 which was negative for prostate adenocarcinoma. His PSA was repeated in 3 months and now increased to 67.9. He had a repeat biopsy of prostate on 12/16/14 which now confirmed prostate adenocarcinoma with eileen 4+4 disease involving 5 of the cores and [place] 3+3 disease involving remainder of cores. He was staged with a CT scan and bone scan done on 12/29/14 which revealed metastatic foci in the upper cervical vertebrae on the left, right posterior 3rd rib and right ischium, focal uptake in the posterior left 11th rib with corresponding lytic expansile appearance on CT, suspicious for metastasis.            4/5/2011 08:46 6/13/2014 08:03 7/25/2014 09:47 12/5/2014 09:00 4/7/2015 09:14   PSA 2.77 32.70 (H) 32.00 (H) 67.88 (H) 1.92      He was started on androgen deprivation therapy in Jan 2015 with a good initial response. His PSA has been increasing recently and he was started on bicalutamide in February with no response. He has continued to have rising PSA and was prescribed abiraterone with prednisone. He had a huge copay with this and has been referred to Medical Oncology to review other options.     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 He is tolerating his abiraterone without any difficulty.     He has new pain in his right shoulder which started after he fell from the bed trying to climb a wall in his dream. The pain has persisted for which his family has urged him to move up his follow up date in oncology.     He does have hot flushes, fatigue, mood swings on androgen deprivation therapy.     He did have bilateral orchiectomies.           PAST MEDICAL HISTORY     Past Medical History:   Diagnosis Date     Actinic keratosis      AK (actinic keratosis) 7/9/2012     Cataract cortical, senile right eye 4/17/2012     DDD (degenerative disc disease), lumbar 1979    s/p 4 back surgeries, spinal cord stimulator implant      Diverticulosis      ED (erectile dysfunction) 2009     GERD (gastroesophageal reflux disease) ~1980     HTN (hypertension), benign ~2000     Macular degeneration, dry, mild, ou 7/23/2016     Multiple lung nodules     Several basilar pulmonary nodules <5 mm     HUDSON (obstructive sleep apnea) 10/2005    on CPAP     Other abnormal glucose 01/14/2009     PE (pulmonary thromboembolism) (H) 1981    after back surgery      Pure hypercholesterolemia ~2000     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         CURRENT OUTPATIENT MEDICATIONS     Current Outpatient Prescriptions   Medication Sig     abiraterone (ZYTIGA) 250 MG tablet Take 4 tablets (1,000 mg) by mouth daily for 30 doses Take on empty stomach.     albuterol (2.5 MG/3ML) 0.083% neb solution Take 1 vial (2.5 mg) by nebulization every 6 hours as needed for shortness of breath / dyspnea or wheezing     aspirin  MG EC tablet Take 1 tablet (325 mg) by mouth daily     Calcium Carbonate (CALCIUM 600 PO)      Cholecalciferol (VITAMIN D) 2000 UNITS tablet Take 1 tablet by mouth daily     CVS VITAMIN  B12 1000 MCG TABS  TAKE 1 TABLET BY MOUTH EVERY DAY     furosemide (LASIX) 20 MG tablet Take 1 tablet (20 mg) by mouth daily     gabapentin (NEURONTIN) 300 MG capsule Take 900 mg by mouth At Bedtime     lidocaine (LIDODERM) 5 % Patch Apply up to 3 patches to painful area at once for up to 12 h within a 24 h period.  Remove after 12 hours.     lidocaine (XYLOCAINE) 5 % ointment Apply topically as needed for moderate pain     metoprolol (TOPROL-XL) 25 MG 24 hr tablet TAKE 1 TABLET (25 MG) BY MOUTH DAILY     order for DME Equipment being ordered: Nebulizer     oxyCODONE (ROXICODONE) 5 MG IR tablet Take 1 tablet (5 mg) by mouth every 6 hours as needed for pain     predniSONE (DELTASONE) 5 MG tablet Take 1 tablet (5 mg) by mouth 2 times daily     quinapril (ACCUPRIL) 40 MG Tab TAKE 1 TABLET (40 MG) BY MOUTH DAILY     simvastatin (ZOCOR) 40 MG tablet TAKE 0.5 TABLETS (20 MG) BY MOUTH DAILY     ipratropium - albuterol 0.5 mg/2.5 mg/3 mL (DUONEB) 0.5-2.5 (3) MG/3ML neb solution Take 1 vial (3 mLs) by nebulization once for 1 dose     No current facility-administered medications for this visit.         ALLERGIES      Allergies   Allergen Reactions     Morphine Sulfate GI Disturbance     vomiting     Vicodin [Hydrocodone-Acetaminophen] Nausea and Vomiting        REVIEW OF SYSTEMS   As above in the HPI, o/w complete 12-point ROS was negative.     PHYSICAL EXAM   /73 (BP Location: Left arm, Patient Position: Sitting, Cuff Size: Adult Regular)  Pulse 61  Temp 98  F (36.7  C) (Oral)  Resp 16  Wt 88.2 kg (194 lb 6.4 oz)  SpO2 96%  BMI 26.74 kg/m2  GEN: NAD  HEENT: PERRL, EOMI, no icterus, injection or pallor. Oropharynx is clear.  NECK: no cervical or supraclavicular lymphadenopathy  LUNGS: clear bilaterally  CV: regular, no murmurs, rubs, or gallops  ABDOMEN: soft, non-tender, non-distended, normal bowel sounds, no hepatosplenomegaly by percussion or palpation  EXT: warm, well perfused, no edema  NEURO: alert  SKIN: no rashes      LABORATORY AND IMAGING STUDIES     Labs remain stable. Corrected calcium is within the normal range or low normal  Mild normocytic anemia   Recent Labs   Lab Test  04/30/18   0937  12/18/17   0906  11/27/17   0856  10/16/17   0840  09/18/17   0949   NA  140  143  142  140  140   POTASSIUM  4.2  3.4  3.9  3.9  4.2   CHLORIDE  103  107  105  108  104   CO2  33*  30  30  28  32   ANIONGAP  4  6  7  4  4   BUN  29  13  13  10  12   CR  1.06  0.71  0.75  0.71  0.76   GLC  97  93  93  100*  103*   ALETHA  9.4  8.6  8.7  8.2*  8.8     Recent Labs   Lab Test  12/28/11   0857  04/05/11   0846  06/16/10   1338   PHOS  3.1  3.4  2.5     Recent Labs   Lab Test  04/30/18   0937  12/18/17   0906  11/27/17   0856  10/16/17   0840  09/18/17   0949   WBC  5.8  4.3  5.0  4.8  6.5   HGB  12.1*  13.0*  12.0*  12.1*  11.7*   PLT  189  159  198  211  219   MCV  99  95  95  95  91   NEUTROPHIL  68.1  58.1  52.2  53.9  64.1     Recent Labs   Lab Test  04/30/18   0937  12/18/17   0906  11/27/17   0856   BILITOTAL  0.6  0.7  0.5   ALKPHOS  104  73  69   ALT  24  18  15   AST  27  19  18   ALBUMIN  3.3*  3.3*  3.0*     TSH   Date Value Ref Range Status   06/13/2016 1.60 0.40 - 4.00 mU/L Final   04/05/2011 3.73 0.4 - 5.0 mU/L Final     No results for input(s): CEA in the last 70089 hours.  Results for orders placed or performed in visit on 05/22/18   CT Chest/Abdomen/Pelvis w Contrast    Narrative    Examination:  CT CHEST/ABDOMEN/PELVIS W CONTRAST, 5/22/2018 9:36 AM     Comparison: CT PE 10/30/2017, CT chest abdomen pelvis 7/17/2017    History: Restaging prostate cancer; external iliac nodes previously;  Malignant neoplasm of prostate (H); Bone metastasis (H)    Technique: Volumetric helical acquisition of CT images from the  thoracic inlet to the symphysis pubis after the uncomplicated  administration of 115 ml isovue 370. Coronal and sagittal images and  axial MIP images were reconstructed from the source data.    Findings:  Chest:  The  visualized thyroid is unremarkable. Heart size is normal. The  great vessels are normal in caliber and appearance. There is no  pericardial effusion. No mediastinal, hilar, or axillary  lymphadenopathy. Prominent right hilar lymph nodes are unchanged.  Patulous esophagus. The central tracheobronchial tree is patent. There  is no focal airspace consolidation, pleural effusion, or pneumothorax.  Minimal apical predominant emphysematous changes and scarring.  Scattered pulmonary nodules measuring maximally 2 mm are unchanged  from prior studies. Calcified granuloma in the left upper lobe. Right  calcified pleural plaques. Mild atelectatic and fibrotic changes in  the middle lobe and bilateral lower lobes.    Abdomen/pelvis:  Bilateral, predominantly exophytic renal cortical cysts are unchanged  from prior studies. There are also subcentimeter cortical  hypodensities in both kidneys, stable, too small to characterize,  favoring renal cysts. No hydronephrosis. Partially distended urinary  bladder. Pelvic phleboliths. Symmetric seminal vesicles. Prostate  calcifications.    Focal hypodensity in the left hepatic lobe is stable and too small to  accurately characterize on CT, but likely representing a small cyst.  The liver is otherwise unremarkable. The gallbladder is surgically  absent. Mild to moderate diffuse pancreatic atrophy. The adrenal  glands and spleen are normal.     There are no dilated loops of large or small bowel. No focal bowel  wall thickening or mucosal hyperenhancement. The appendix is normal.  Sigmoid colonic diverticulosis without CT evidence of active  diverticulitis. The major intra-abdominal vasculature is patent and  within normal limits for caliber. Atherosclerotic calcifications of  the abdominal aorta and its major branches. No free fluid. No free  air. The previously mentioned right external iliac chain lymph node is  significantly decreased in size (series 3, image 267) no measuring 13  mm.  Part of the anterior cortex of the structure remains thickened up  to 4.7 mm. No new or enlarging intra-abdominal lymphadenopathy.    Bones/soft tissues:  Surgical changes of posterior brendan and pedicle screw fusion of L2-L3.  Anterior interbody fusion device in place at L3-L4. Spinal stimulator  device with leads terminating posterior to the spinal cord at the  T7-T8 level. Rotator cuff repair on the right. Multilevel degenerative  changes of the spine. Degenerative changes of bilateral SI joints with  partial fusion. Degenerative changes of both hips. Diffuse bone  demineralization.    Sclerotic lesion at the junction of the right inferior pubic ramus and  the right ischium is increased from prior. There are no new sclerotic  foci visualized within the axial or proximal appendicular skeleton.  Healed prior left posterior 11th and 12th rib fractures.      Impression    Impression:   1. Increased size of a sclerotic focus concerning for metastasis within the right inferior pubic ramus.  2. Previously enlarged right external iliac lymph node has decreased in size. Recommend continued attention on follow-up.  3. Stable small pulmonary nodules.    I have personally reviewed the examination and initial interpretation  and I agree with the findings.    KEVIN ABEL MD     Recent Labs   Lab Test  04/30/18   0937  12/18/17   0906  11/27/17   0856  10/16/17   0840  09/18/17   0949   02/09/17   0823   PSA  13.30*  9.34*  12.30*  11.90*  18.90*   < >   --    TESTOSTTOTAL   --    --    --    --    --    --   9*    < > = values in this interval not displayed.     EXAMINATION: NM BONE SCAN WHOLE BODY  Whole-body bone scan, 5/22/2018 11:43 AM      HISTORY: Restaging - bony metastasis; Malignant neoplasm of prostate  (H); Bone metastasis (H)      ADDITIONAL INFORMATION: none     COMPARISON: Same-day CT; 7/17/2017, 4/25/2016, and 12/29/2014 nuclear  medicine bone scans     TECHNIQUE: The patient received 24.1 mCi of Tc-99m MDP  intravenously.  Whole body bone images were obtained at 3 hours.     FINDINGS: Continued radiotracer uptake in the mid left cervical spine,  posterior left 11th rib (corresponding to a chronic rib fracture  deformity), right ischial tuberosity. New focus of radiotracer uptake  at the right 11th costovertebral junction without corresponding  abnormality on same-day CT. Decreased abnormal radiotracer uptake  about the patient's right total knee arthroplasty hardware.         IMPRESSION:   1. No new suspicious foci of osseous radiotracer uptake. Continued  focal radiotracer uptake in the cervical spine, left 11th rib, and  right ischial tuberosity.  2. New focus of radiotracer uptake at the right 11th costovertebral  junction without corresponding abnormality on same-day CT, likely  degenerative in nature.  3. Decreased abnormal radiotracer uptake about the patient's right  total knee arthroplasty hardware in the setting of known osteomyelitis  at this site.     I have personally reviewed the examination and initial interpretation  and I agree with the findings.     HOLLY FOWLER MD                 ASSESSMENT AND PLAN   1. 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 No response to addition of bicalutamide  3. PE diagnosed few weeks after initiation of megestrol acetate for hot flashes on GnRH agonist  4. No significant medical comorbidities    He is tolerating his abiraterone well and has no complains. His PSA had been declining nicely as expected. However it has started to increase. He has new right shoulder pain. On direct questioning he does note that it started after a fall from bed at night. I have reviewed actual images of his CT scan and bone scans with him and his wife. We compared them to previous images. He does not have any lesions in this area. He did have rotator cuff surgery in the past and there are screws in the  right humerus. I suspect that he has some muscular injury in the area of discomfort. The scans are stable and possibly improved revealing improvement since start of abiraterone therapy. For now the PSA values are relatively stable and have not doubled since he hit abel about 6 months ago.     We would plan to continue as current. There are no immediate concerns.     He did get bilateral orchiectomies done last month 9/27/17. He would not need any more lupron or other GnRH directed therapies.     He has hot flashes, mood swings, fatigue - from androgen deprivation. These are no different between either orchiectomy or GnRH therapy as they come with low levels of testosterone. There is no good way around.     We would continue with zometa every 6-8 weeks. He has not received zometa in last 6 months. I will restart it at next visit. I could see him in 6 weeks on June 28th as previously planned.    Over 25 min of direct face to face time spent with patient with more than 50% time spent in counseling and coordinating care.

## 2018-05-24 NOTE — PATIENT INSTRUCTIONS
Labs today and prior to next     Change visit from July 2nd to June 28th at  [facility]   Done-tiffany Ascencio after visit  Done-tiffany SAMANIEGO given to patient/Tiffany

## 2018-05-24 NOTE — MR AVS SNAPSHOT
After Visit Summary   5/24/2018    Dimitri Neves    MRN: [id number]           Patient Information     Date Of Birth          1935        Visit Information        Provider Department      5/24/2018 11:30 AM Rafael Valenzuela MD [facility]        Today's Diagnoses     Malignant neoplasm of prostate (H)        Bone metastasis (H)          Care Instructions    Labs today and prior to next     Change visit from July 2nd to June 28th at  [facility]     Zometa after visit          Follow-ups after your visit        Your next 10 appointments already scheduled     May 29, 2018 10:45 AM CDT   Return Visit with Senthil Jhaveri MD   [facility]    [address] [facility] [address] [facility] [address]   [phone]            Jun 28, 2018 11:15 AM CDT   LAB with LAB ONC [facility] ([facility])    [address] [place] [id number] [phone]           Please do not eat 10-12 hours before your appointment if you are coming in fasting for labs on lipids, cholesterol, or glucose (sugar). This does not apply to pregnant women. Water, hot tea and black coffee (with nothing added) are okay. Do not drink other fluids, diet soda or chew gum.            Jun 28, 2018 12:00 PM CDT   Return Visit with Rafael Valenzuela MD   [facility])    [address] [place] [id number] [phone]            Jun 28, 2018 12:30 PM CDT   Level O with [place] [facility] ([facility])    [address] [place] [id number] [phone]              Who to contact     If you have questions or need follow up information about today's clinic visit or your schedule please contact [facility] directly at [phone].  Normal or non-critical lab and imaging results will be communicated to you by  MyChart, letter or phone within 4 business days after the clinic has received the results. If you do not hear from us within 7 days, please contact the clinic through [employer] or phone. If you have a critical or abnormal lab result, we will notify you by phone as soon as possible.  Submit refill requests through [employer] or call your pharmacy and they will forward the refill request to us. Please allow 3 business days for your refill to be completed.          Additional Information About Your Visit        [employer] Information     [employer] gives you secure access to your electronic health record. If you see a primary care provider, you can also send messages to your care team and make appointments. If you have questions, please call your primary care clinic.  If you do not have a primary care provider, please call [phone] and they will assist you.        Care EveryWhere ID     This is your Care EveryWhere ID. This could be used by other organizations to access your [facility] medical records  [id number]        Your Vitals Were     Pulse Temperature Respirations Pulse Oximetry BMI (Body Mass Index)       61 98  F (36.7  C) (Oral) 16 96% 26.74 kg/m2        Blood Pressure from Last 3 Encounters:   05/24/18 146/73   05/10/18 114/68   04/30/18 150/75    Weight from Last 3 Encounters:   05/24/18 88.2 kg (194 lb 6.4 oz)   05/10/18 84.8 kg (187 lb)   04/30/18 84.8 kg (187 lb)              We Performed the Following     CBC with platelets differential     Comprehensive metabolic panel     PSA tumor marker        Primary Care Provider Office Phone # Fax #    Reginaldo Muir [phone] [address] [facility] [address] [facility]  [place] [address]        Equal Access to Services     DAVIDSON LEBLANC : Hadimtiaz Rodriguez, hugh mendez, naty garner. [facility] [place] -[phone].    ATENCIÓN: Si habla español, tiene a roberts disposición servicios gratuitos de asistencia  lingüísticaKristie Cole al [phone].    We comply with applicable federal civil rights laws and [place] laws. We do not discriminate on the basis of race, color, national origin, age, disability, sex, sexual orientation, or gender identity.            Thank you!     Thank you for choosing [facility]  for your care. Our goal is always to provide you with excellent care. Hearing back from our patients is one way we can continue to improve our services. Please take a few minutes to complete the written survey that you may receive in the mail after your visit with us. Thank you!             Your Updated Medication List - Protect others around you: Learn how to safely use, store and throw away your medicines at www.disposemymeds.org.          This list is accurate as of 5/24/18 12:33 PM.  Always use your most recent med list.                   Brand Name Dispense Instructions for use Diagnosis    abiraterone 250 MG tablet    ZYTIGA    120 tablet    Take 4 tablets (1,000 mg) by mouth daily for 30 doses Take on empty stomach.    Bone metastasis (H), Malignant neoplasm of prostate (H)       albuterol (2.5 MG/3ML) 0.083% neb solution     25 vial    Take 1 vial (2.5 mg) by nebulization every 6 hours as needed for shortness of breath / dyspnea or wheezing    Acute bronchitis, unspecified organism       aspirin 325 MG EC tablet     1 tablet    Take 1 tablet (325 mg) by mouth daily    Transient cerebral ischemia, unspecified type       CALCIUM 600 PO           [facility] vitamin  B12 1000 MCG Tabs   Generic drug:  cyanocobalamin     30 tablet    TAKE 1 TABLET BY MOUTH EVERY DAY    Low vitamin B12 level       furosemide 20 MG tablet    LASIX    90 tablet    Take 1 tablet (20 mg) by mouth daily    Bilateral lower extremity edema, HTN (hypertension), benign       gabapentin 300 MG capsule    NEURONTIN     Take 900 mg by mouth At Bedtime        ipratropium - albuterol 0.5 mg/2.5 mg/3 mL 0.5-2.5 (3) MG/3ML neb solution     DUONEB    3 mL    Take 1 vial (3 mLs) by nebulization once for 1 dose    Dyspnea, unspecified type       * lidocaine 5 % Patch    LIDODERM    30 patch    Apply up to 3 patches to painful area at once for up to 12 h within a 24 h period.  Remove after 12 hours.    Scapulalgia, Acute pain of right shoulder       * lidocaine 5 % ointment    XYLOCAINE    50 g    Apply topically as needed for moderate pain    Acute pain of right shoulder, Scapulalgia       metoprolol succinate 25 MG 24 hr tablet    TOPROL-XL    90 tablet    TAKE 1 TABLET (25 MG) BY MOUTH DAILY    HTN (hypertension), benign       order for DME     1 Device    Equipment being ordered: Nebulizer    Acute bronchitis, unspecified organism       oxyCODONE IR 5 MG tablet    ROXICODONE    30 tablet    Take 1 tablet (5 mg) by mouth every 6 hours as needed for pain    Narcotic dependence, episodic use (H)       predniSONE 5 MG tablet    DELTASONE    60 tablet    Take 1 tablet (5 mg) by mouth 2 times daily    Bone metastasis (H), Malignant neoplasm of prostate (H)       quinapril 40 MG Tab    ACCUPRIL    90 tablet    TAKE 1 TABLET (40 MG) BY MOUTH DAILY    HTN (hypertension), benign       simvastatin 40 MG tablet    ZOCOR    45 tablet    TAKE 0.5 TABLETS (20 MG) BY MOUTH DAILY    Hyperlipidemia LDL goal <130       vitamin D 2000 units tablet      Take 1 tablet by mouth daily        * Notice:  This list has 2 medication(s) that are the same as other medications prescribed for you. Read the directions carefully, and ask your doctor or other care provider to review them with you.

## 2018-05-24 NOTE — LETTER
"    5/24/2018         RE: Dimitri Neves  [address] [place] [address]        Dear Colleague,    Thank you for referring your patient, Dimitri Neves, to the [facility]. Please see a copy of my visit note below.    Oncology Rooming Note    May 24, 2018 11:30 AM   Dimitri Neves is a 82 year old male who presents for:    Chief Complaint   Patient presents with     Oncology Clinic Visit     Bone metastasis (H)     Initial Vitals: /73 (BP Location: Left arm, Patient Position: Sitting, Cuff Size: Adult Regular)  Pulse 61  Temp 98  F (36.7  C) (Oral)  Resp 16  Wt 88.2 kg (194 lb 6.4 oz)  SpO2 96%  BMI 26.74 kg/m2 Estimated body mass index is 26.74 kg/(m^2) as calculated from the following:    Height as of 4/30/18: 1.816 m (5' 11.5\").    Weight as of this encounter: 88.2 kg (194 lb 6.4 oz). Body surface area is 2.11 meters squared.  No Pain (0) Comment: Data Unavailable   No LMP for male patient.  Allergies reviewed: Yes  Medications reviewed: Yes    Medications: Medication refills not needed today.  Pharmacy name entered into On[employer]:    [id number] - [place], [place] - [address] [facility] - [place], [place] - [address] [place] [address] PHARMACY - MAIL ORDER ONLY - [id number]  [place], [place] - [address] [facility]    Clinical concerns: no    5 minutes for nursing intake (face to face time)          Sabrina Macias MA            Medical Assistant Note:  Dimitri Neves presents today for yes.    Patient seen by provider today: Yes: Dr Valenzuela.   present during visit today: Not Applicable.    Concerns: No Concerns.    Procedure:  Lab draw site: RAC, Needle type: BF, Gauge: 21. Gauze and coban applied    Post Assessment:  Labs drawn without difficulty: Yes.    Discharge Plan:  Departure Mode: Ambulatory.    Face to Face Time: 5.    Sabrina Macias MA              [facility]  HEMATOLOGY AND " ONCOLOGY    FOLLOW-UP VISIT NOTE    PATIENT NAME: Dimitri Neves MRN # [id number]  DATE OF VISIT: May 24, 2018 YOB: 1935    REFERRING PROVIDER: No referring provider defined for this encounter.    CANCER TYPE: Prostate adenocarcinoma  STAGE: IV    TREATMENT SUMMARY:  Dimitri was followed by his PCP on 6/13/13 and was elevated at 32 as noted below. He was referred to Dr. Taylor. He was treated with a 10 day course of ciprofloxacin and followed again in 6 weeks on 8/10. His PSA drawn prior to this visit was unchanged and remained elevated at 32. He had a TRUS biopsy on 8/25/14 which was negative for prostate adenocarcinoma. His PSA was repeated in 3 months and now increased to 67.9. He had a repeat biopsy of prostate on 12/16/14 which now confirmed prostate adenocarcinoma with eileen 4+4 disease involving 5 of the cores and Ethan 3+3 disease involving remainder of cores. He was staged with a CT scan and bone scan done on 12/29/14 which revealed metastatic foci in the upper cervical vertebrae on the left, right posterior 3rd rib and right ischium, focal uptake in the posterior left 11th rib with corresponding lytic expansile appearance on CT, suspicious for metastasis.            4/5/2011 08:46 6/13/2014 08:03 7/25/2014 09:47 12/5/2014 09:00 4/7/2015 09:14   PSA 2.77 32.70 (H) 32.00 (H) 67.88 (H) 1.92      He was started on androgen deprivation therapy in Jan 2015 with a good initial response. His PSA has been increasing recently and he was started on bicalutamide in February with no response. He has continued to have rising PSA and was prescribed abiraterone with prednisone. He had a huge copay with this and has been referred to Medical Oncology to review other options.     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 He is tolerating his abiraterone without any difficulty.     He has new pain in his right shoulder which started after he fell from the bed trying to climb a wall in his dream. The pain has persisted for which his family has urged him to move up his follow up date in oncology.     He does have hot flushes, fatigue, mood swings on androgen deprivation therapy.     He did have bilateral orchiectomies.           PAST MEDICAL HISTORY     Past Medical History:   Diagnosis Date     Actinic keratosis      AK (actinic keratosis) 7/9/2012     Cataract cortical, senile right eye 4/17/2012     DDD (degenerative disc disease), lumbar 1979    s/p 4 back surgeries, spinal cord stimulator implant      Diverticulosis      ED (erectile dysfunction) 2009     GERD (gastroesophageal reflux disease) ~1980     HTN (hypertension), benign ~2000     Macular degeneration, dry, mild, ou 7/23/2016     Multiple lung nodules     Several basilar pulmonary nodules <5 mm     HUDSON (obstructive sleep apnea) 10/2005    on CPAP     Other abnormal glucose 01/14/2009     PE (pulmonary thromboembolism) (H) 1981    after back surgery      Pure hypercholesterolemia ~2000     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         CURRENT OUTPATIENT MEDICATIONS     Current Outpatient Prescriptions   Medication Sig     abiraterone (ZYTIGA) 250 MG tablet Take 4 tablets (1,000 mg) by mouth daily for 30 doses Take on empty stomach.     albuterol (2.5 MG/3ML) 0.083% neb solution Take 1 vial (2.5 mg) by nebulization every 6 hours as needed for shortness of breath / dyspnea or wheezing     aspirin  MG EC tablet Take 1 tablet (325 mg) by mouth daily     Calcium Carbonate (CALCIUM 600 PO)      Cholecalciferol (VITAMIN D) 2000 UNITS tablet Take 1 tablet by mouth daily     [facility] VITAMIN  B12 1000 MCG TABS TAKE 1 TABLET BY MOUTH EVERY DAY     furosemide (LASIX) 20 MG  tablet Take 1 tablet (20 mg) by mouth daily     gabapentin (NEURONTIN) 300 MG capsule Take 900 mg by mouth At Bedtime     lidocaine (LIDODERM) 5 % Patch Apply up to 3 patches to painful area at once for up to 12 h within a 24 h period.  Remove after 12 hours.     lidocaine (XYLOCAINE) 5 % ointment Apply topically as needed for moderate pain     metoprolol (TOPROL-XL) 25 MG 24 hr tablet TAKE 1 TABLET (25 MG) BY MOUTH DAILY     order for DME Equipment being ordered: Nebulizer     oxyCODONE (ROXICODONE) 5 MG IR tablet Take 1 tablet (5 mg) by mouth every 6 hours as needed for pain     predniSONE (DELTASONE) 5 MG tablet Take 1 tablet (5 mg) by mouth 2 times daily     quinapril (ACCUPRIL) 40 MG Tab TAKE 1 TABLET (40 MG) BY MOUTH DAILY     simvastatin (ZOCOR) 40 MG tablet TAKE 0.5 TABLETS (20 MG) BY MOUTH DAILY     ipratropium - albuterol 0.5 mg/2.5 mg/3 mL (DUONEB) 0.5-2.5 (3) MG/3ML neb solution Take 1 vial (3 mLs) by nebulization once for 1 dose     No current facility-administered medications for this visit.         ALLERGIES      Allergies   Allergen Reactions     Morphine Sulfate GI Disturbance     vomiting     Vicodin [Hydrocodone-Acetaminophen] Nausea and Vomiting        REVIEW OF SYSTEMS   As above in the HPI, o/w complete 12-point ROS was negative.     PHYSICAL EXAM   /73 (BP Location: Left arm, Patient Position: Sitting, Cuff Size: Adult Regular)  Pulse 61  Temp 98  F (36.7  C) (Oral)  Resp 16  Wt 88.2 kg (194 lb 6.4 oz)  SpO2 96%  BMI 26.74 kg/m2  GEN: NAD  HEENT: PERRL, EOMI, no icterus, injection or pallor. Oropharynx is clear.  NECK: no cervical or supraclavicular lymphadenopathy  LUNGS: clear bilaterally  CV: regular, no murmurs, rubs, or gallops  ABDOMEN: soft, non-tender, non-distended, normal bowel sounds, no hepatosplenomegaly by percussion or palpation  EXT: warm, well perfused, no edema  NEURO: alert  SKIN: no rashes     LABORATORY AND IMAGING STUDIES     Labs remain stable. Corrected  calcium is within the normal range or low normal  Mild normocytic anemia   Recent Labs   Lab Test  04/30/18   0937  12/18/17   0906  11/27/17   0856  10/16/17   0840  09/18/17   0949   NA  140  143  142  140  140   POTASSIUM  4.2  3.4  3.9  3.9  4.2   CHLORIDE  103  107  105  108  104   CO2  33*  30  30  28  32   ANIONGAP  4  6  7  4  4   BUN  29  13  13  10  12   CR  1.06  0.71  0.75  0.71  0.76   GLC  97  93  93  100*  103*   ALETHA  9.4  8.6  8.7  8.2*  8.8     Recent Labs   Lab Test  12/28/11   0857  04/05/11   0846  06/16/10   1338   PHOS  3.1  3.4  2.5     Recent Labs   Lab Test  04/30/18   0937  12/18/17   0906  11/27/17   0856  10/16/17   0840  09/18/17   0949   WBC  5.8  4.3  5.0  4.8  6.5   HGB  12.1*  13.0*  12.0*  12.1*  11.7*   PLT  189  159  198  211  219   MCV  99  95  95  95  91   NEUTROPHIL  68.1  58.1  52.2  53.9  64.1     Recent Labs   Lab Test  04/30/18   0937  12/18/17   0906  11/27/17   0856   BILITOTAL  0.6  0.7  0.5   ALKPHOS  104  73  69   ALT  24  18  15   AST  27  19  18   ALBUMIN  3.3*  3.3*  3.0*     TSH   Date Value Ref Range Status   06/13/2016 1.60 0.40 - 4.00 mU/L Final   04/05/2011 3.73 0.4 - 5.0 mU/L Final     No results for input(s): CEA in the last 39952 hours.  Results for orders placed or performed in visit on 05/22/18   CT Chest/Abdomen/Pelvis w Contrast    Narrative    Examination:  CT CHEST/ABDOMEN/PELVIS W CONTRAST, 5/22/2018 9:36 AM     Comparison: CT PE 10/30/2017, CT chest abdomen pelvis 7/17/2017    History: Restaging prostate cancer; external iliac nodes previously;  Malignant neoplasm of prostate (H); Bone metastasis (H)    Technique: Volumetric helical acquisition of CT images from the  thoracic inlet to the symphysis pubis after the uncomplicated  administration of 115 ml isovue 370. Coronal and sagittal images and  axial MIP images were reconstructed from the source data.    Findings:  Chest:  The visualized thyroid is unremarkable. Heart size is normal. The  great  vessels are normal in caliber and appearance. There is no  pericardial effusion. No mediastinal, hilar, or axillary  lymphadenopathy. Prominent right hilar lymph nodes are unchanged.  Patulous esophagus. The central tracheobronchial tree is patent. There  is no focal airspace consolidation, pleural effusion, or pneumothorax.  Minimal apical predominant emphysematous changes and scarring.  Scattered pulmonary nodules measuring maximally 2 mm are unchanged  from prior studies. Calcified granuloma in the left upper lobe. Right  calcified pleural plaques. Mild atelectatic and fibrotic changes in  the middle lobe and bilateral lower lobes.    Abdomen/pelvis:  Bilateral, predominantly exophytic renal cortical cysts are unchanged  from prior studies. There are also subcentimeter cortical  hypodensities in both kidneys, stable, too small to characterize,  favoring renal cysts. No hydronephrosis. Partially distended urinary  bladder. Pelvic phleboliths. Symmetric seminal vesicles. Prostate  calcifications.    Focal hypodensity in the left hepatic lobe is stable and too small to  accurately characterize on CT, but likely representing a small cyst.  The liver is otherwise unremarkable. The gallbladder is surgically  absent. Mild to moderate diffuse pancreatic atrophy. The adrenal  glands and spleen are normal.     There are no dilated loops of large or small bowel. No focal bowel  wall thickening or mucosal hyperenhancement. The appendix is normal.  Sigmoid colonic diverticulosis without CT evidence of active  diverticulitis. The major intra-abdominal vasculature is patent and  within normal limits for caliber. Atherosclerotic calcifications of  the abdominal aorta and its major branches. No free fluid. No free  air. The previously mentioned right external iliac chain lymph node is  significantly decreased in size (series 3, image 267) no measuring 13  mm. Part of the anterior cortex of the structure remains thickened up  to  4.7 mm. No new or enlarging intra-abdominal lymphadenopathy.    Bones/soft tissues:  Surgical changes of posterior brendan and pedicle screw fusion of L2-L3.  Anterior interbody fusion device in place at L3-L4. Spinal stimulator  device with leads terminating posterior to the spinal cord at the  T7-T8 level. Rotator cuff repair on the right. Multilevel degenerative  changes of the spine. Degenerative changes of bilateral SI joints with  partial fusion. Degenerative changes of both hips. Diffuse bone  demineralization.    Sclerotic lesion at the junction of the right inferior pubic ramus and  the right ischium is increased from prior. There are no new sclerotic  foci visualized within the axial or proximal appendicular skeleton.  Healed prior left posterior 11th and 12th rib fractures.      Impression    Impression:   1. Increased size of a sclerotic focus concerning for metastasis within the right inferior pubic ramus.  2. Previously enlarged right external iliac lymph node has decreased in size. Recommend continued attention on follow-up.  3. Stable small pulmonary nodules.    I have personally reviewed the examination and initial interpretation  and I agree with the findings.    KEVIN ABEL MD     Recent Labs   Lab Test  04/30/18   0937  12/18/17   0906  11/27/17   0856  10/16/17   0840  09/18/17   0949   02/09/17   0823   PSA  13.30*  9.34*  12.30*  11.90*  18.90*   < >   --    TESTOSTTOTAL   --    --    --    --    --    --   9*    < > = values in this interval not displayed.     EXAMINATION: NM BONE SCAN WHOLE BODY  Whole-body bone scan, 5/22/2018 11:43 AM      HISTORY: Restaging - bony metastasis; Malignant neoplasm of prostate  (H); Bone metastasis (H)      ADDITIONAL INFORMATION: none     COMPARISON: Same-day CT; 7/17/2017, 4/25/2016, and 12/29/2014 nuclear  medicine bone scans     TECHNIQUE: The patient received 24.1 mCi of Tc-99m MDP intravenously.  Whole body bone images were obtained at 3  hours.     FINDINGS: Continued radiotracer uptake in the mid left cervical spine,  posterior left 11th rib (corresponding to a chronic rib fracture  deformity), right ischial tuberosity. New focus of radiotracer uptake  at the right 11th costovertebral junction without corresponding  abnormality on same-day CT. Decreased abnormal radiotracer uptake  about the patient's right total knee arthroplasty hardware.         IMPRESSION:   1. No new suspicious foci of osseous radiotracer uptake. Continued  focal radiotracer uptake in the cervical spine, left 11th rib, and  right ischial tuberosity.  2. New focus of radiotracer uptake at the right 11th costovertebral  junction without corresponding abnormality on same-day CT, likely  degenerative in nature.  3. Decreased abnormal radiotracer uptake about the patient's right  total knee arthroplasty hardware in the setting of known osteomyelitis  at this site.     I have personally reviewed the examination and initial interpretation  and I agree with the findings.     HOLLY FOWLER MD                 ASSESSMENT AND PLAN   1. 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 No response to addition of bicalutamide  3. PE diagnosed few weeks after initiation of megestrol acetate for hot flashes on GnRH agonist  4. No significant medical comorbidities    He is tolerating his abiraterone well and has no complains. His PSA had been declining nicely as expected. However it has started to increase. He has new right shoulder pain. On direct questioning he does note that it started after a fall from bed at night. I have reviewed actual images of his CT scan and bone scans with him and his wife. We compared them to previous images. He does not have any lesions in this area. He did have rotator cuff surgery in the past and there are screws in the right humerus. I suspect that he has some muscular injury in  the area of discomfort. The scans are stable and possibly improved revealing improvement since start of abiraterone therapy. For now the PSA values are relatively stable and have not doubled since he hit abel about 6 months ago.     We would plan to continue as current. There are no immediate concerns.     He did get bilateral orchiectomies done last month 9/27/17. He would not need any more lupron or other GnRH directed therapies.     He has hot flashes, mood swings, fatigue - from androgen deprivation. These are no different between either orchiectomy or GnRH therapy as they come with low levels of testosterone. There is no good way around.     We would continue with zometa every 6-8 weeks. He has not received zometa in last 6 months. I will restart it at next visit. I could see him in 6 weeks on June 28th as previously planned.    Over 25 min of direct face to face time spent with patient with more than 50% time spent in counseling and coordinating care.        Again, thank you for allowing me to participate in the care of your patient.        Sincerely,        Rafael Valenzuela MD

## 2018-05-29 ENCOUNTER — OFFICE VISIT (OUTPATIENT)
Dept: DERMATOLOGY | Facility: CLINIC | Age: 83
End: 2018-05-29
Payer: COMMERCIAL

## 2018-05-29 VITALS — DIASTOLIC BLOOD PRESSURE: 64 MMHG | OXYGEN SATURATION: 99 % | HEART RATE: 60 BPM | SYSTOLIC BLOOD PRESSURE: 120 MMHG

## 2018-05-29 DIAGNOSIS — C79.51 BONE METASTASIS: Primary | ICD-10-CM

## 2018-05-29 DIAGNOSIS — D18.00 ANGIOMA: ICD-10-CM

## 2018-05-29 DIAGNOSIS — Z85.828 HISTORY OF SKIN CANCER: ICD-10-CM

## 2018-05-29 DIAGNOSIS — C61 MALIGNANT NEOPLASM OF PROSTATE (H): ICD-10-CM

## 2018-05-29 DIAGNOSIS — L57.0 AK (ACTINIC KERATOSIS): Primary | ICD-10-CM

## 2018-05-29 DIAGNOSIS — L81.4 LENTIGO: ICD-10-CM

## 2018-05-29 DIAGNOSIS — L82.1 SK (SEBORRHEIC KERATOSIS): ICD-10-CM

## 2018-05-29 PROCEDURE — 17000 DESTRUCT PREMALG LESION: CPT | Performed by: DERMATOLOGY

## 2018-05-29 PROCEDURE — 99213 OFFICE O/P EST LOW 20 MIN: CPT | Mod: 25 | Performed by: DERMATOLOGY

## 2018-05-29 PROCEDURE — 17003 DESTRUCT PREMALG LES 2-14: CPT | Performed by: DERMATOLOGY

## 2018-05-29 RX ORDER — ABIRATERONE ACETATE 250 MG/1
1000 TABLET ORAL DAILY
Qty: 120 TABLET | Refills: 0 | Status: SHIPPED | OUTPATIENT
Start: 2018-05-29 | End: 2018-06-28

## 2018-05-29 RX ORDER — PREDNISONE 5 MG/1
5 TABLET ORAL 2 TIMES DAILY
Qty: 60 TABLET | Refills: 0 | Status: SHIPPED | OUTPATIENT
Start: 2018-05-29 | End: 2018-11-23

## 2018-05-29 NOTE — NURSING NOTE
Chief Complaint   Patient presents with     Skin Check       Vitals:    05/29/18 1106   BP: 120/64   BP Location: Left arm   Patient Position: Sitting   Cuff Size: Adult Regular   Pulse: 60   SpO2: 99%     Wt Readings from Last 1 Encounters:   05/24/18 88.2 kg (194 lb 6.4 oz)   Nishi Caldeorn LPN.................5/29/2018

## 2018-05-29 NOTE — MR AVS SNAPSHOT
After Visit Summary   5/29/2018    Dimitri Neves    MRN: [id number]           Patient Information     Date Of Birth          1935        Visit Information        Provider Department      5/29/2018 10:45 AM Senthil Jhaveri MD [facility]        Care Instructions    WOUND CARE INSTRUCTIONS   FOR CRYOSURGERY   This area treated with liquid nitrogen will form a blister. You do not need to bandage the area until after the blister forms and breaks (which may be a few days). When the blister breaks, begin daily dressing changes as follows:   1) Clean and dry the area with tap water using clean Q-tip or sterile gauze pad.   2) Apply Polysporin ointment or Bacitracin ointment over entire wound. Do NOT use Neosporin ointment.   3) Cover the wound with a band-aid or sterile non-stick gauze pad and micropore paper tape.   REPEAT THESE INSTRUCTIONS AT LEAST ONCE A DAY UNTIL THE WOUND HAS COMPLETELY HEALED.   It is an old wives tale that a wound heals better when it is exposed to air and allowed to dry out. The wound will heal faster with a better cosmetic result if it is kept moist with ointment and covered with a bandage.   Do not let the wound dry out.   IMPORTANT INFORMATION ON REVERSE SIDE   Supplies Needed:   *Cotton tipped applicators (Q-tips)   *Polysporin ointment or Bacitracin ointment (NOT NEOSPORIN)   *Band-aids, or non stick gauze pads and micropore paper tape   PATIENT INFORMATION   During the healing process you will notice a number of changes. All wounds develop a small halo of redness surrounding the wound. This means healing is occurring. Severe itching with extensive redness usually indicates sensitivity to the ointment or bandage tape used to dress the wound. You should call our office if this develops.   Swelling and/or discoloration around your surgical site is common, particularly when performed around the eye.   All wounds normally drain. The larger the wound the  more drainage there will be. After 7-10 days, you will notice the wound beginning to shrink and new skin will begin to grow. The wound is healed when you can see skin has formed over the entire area. A healed wound has a healthy, shiny look to the surface and is red to dark pink in color to normalize. Wounds may take approximately 4-6 weeks to heal. Larger wounds may take 6-8 weeks. After the wound is healed you may discontinue dressing changes.   You may experience a sensation of tightness as your wound heals. This is normal and will gradually subside.   Your healed wound may be sensitive to temperature changes. This sensitivity improves with time, but if you re having a lot of discomfort, try to avoid temperature extremes.   Patients frequently experience itching after their wound appears to have healed because of the continue healing under the skin. Plain Vaseline will help relieve the itching.               Follow-ups after your visit        Your next 10 appointments already scheduled     Jun 28, 2018 11:15 AM CDT   LAB with LAB ON[facility])    [id number] [place] [id number] [phone]           Please do not eat 10-12 hours before your appointment if you are coming in fasting for labs on lipids, cholesterol, or glucose (sugar). This does not apply to pregnant women. Water, hot tea and black coffee (with nothing added) are okay. Do not drink other fluids, diet soda or chew gum.            Jun 28, 2018 12:00 PM CDT   Return Visit with Rafael Valenzuela MD   [facility])    [id number] [place] [id number] [phone]            Jun 28, 2018 12:30 PM CDT   Level O with [place] 2 INFUSION   [facility])    [id number] [place] [id number] [phone]              Who to contact     If you have questions or need  follow up information about today's clinic visit or your schedule please contact [facility] directly at [phone].  Normal or non-critical lab and imaging results will be communicated to you by [employer], letter or phone within 4 business days after the clinic has received the results. If you do not hear from us within 7 days, please contact the clinic through [employer] or phone. If you have a critical or abnormal lab result, we will notify you by phone as soon as possible.  Submit refill requests through [employer] or call your pharmacy and they will forward the refill request to us. Please allow 3 business days for your refill to be completed.          Additional Information About Your Visit        [employer] Information     [employer] gives you secure access to your electronic health record. If you see a primary care provider, you can also send messages to your care team and make appointments. If you have questions, please call your primary care clinic.  If you do not have a primary care provider, please call [phone] and they will assist you.        Care EveryWhere ID     This is your Care EveryWhere ID. This could be used by other organizations to access your [facility] medical records  [id number]        Your Vitals Were     Pulse Pulse Oximetry                60 99%           Blood Pressure from Last 3 Encounters:   05/29/18 120/64   05/24/18 146/73   05/10/18 114/68    Weight from Last 3 Encounters:   05/24/18 88.2 kg (194 lb 6.4 oz)   05/10/18 84.8 kg (187 lb)   04/30/18 84.8 kg (187 lb)              Today, you had the following     No orders found for display         Today's Medication Changes          These changes are accurate as of 5/29/18 11:26 AM.  If you have any questions, ask your nurse or doctor.               These medicines have changed or have updated prescriptions.        Dose/Directions    * abiraterone 250 MG tablet   Commonly known as:  ZYTIGA   This may have changed:  Another  medication with the same name was added. Make sure you understand how and when to take each.   Used for:  Bone metastasis (H), Malignant neoplasm of prostate (H)   Changed by:  Fahrenbruch, Eileen, RPH        Dose:  1000 mg   Take 4 tablets (1,000 mg) by mouth daily for 30 doses Take on empty stomach.   Quantity:  120 tablet   Refills:  0       * abiraterone 250 MG tablet   Commonly known as:  ZYTIGA   This may have changed:  You were already taking a medication with the same name, and this prescription was added. Make sure you understand how and when to take each.   Used for:  Bone metastasis (H), Malignant neoplasm of prostate (H)   Changed by:  Fahrenbruch, Eileen, RPH        Dose:  1000 mg   Take 4 tablets (1,000 mg) by mouth daily for 30 doses Take on empty stomach.   Quantity:  120 tablet   Refills:  0       * predniSONE 5 MG tablet   Commonly known as:  DELTASONE   This may have changed:  Another medication with the same name was added. Make sure you understand how and when to take each.   Used for:  Bone metastasis (H), Malignant neoplasm of prostate (H)   Changed by:  Fahrenbruch, Eileen, RPH        Dose:  5 mg   Take 1 tablet (5 mg) by mouth 2 times daily   Quantity:  60 tablet   Refills:  0       * predniSONE 5 MG tablet   Commonly known as:  DELTASONE   This may have changed:  You were already taking a medication with the same name, and this prescription was added. Make sure you understand how and when to take each.   Used for:  Bone metastasis (H), Malignant neoplasm of prostate (H)   Changed by:  Fahrenbruch, Eileen, RPH        Dose:  5 mg   Take 1 tablet (5 mg) by mouth 2 times daily   Quantity:  60 tablet   Refills:  0       * Notice:  This list has 4 medication(s) that are the same as other medications prescribed for you. Read the directions carefully, and ask your doctor or other care provider to review them with you.         Where to get your medicines      These medications were sent to  [facility] - [place], [place] - [address]  [address], [facility] [address]    Hours:  Mon-Fri 8:30am-5:00pm Toll Free [phone] Phone:  [phone]     abiraterone 250 MG tablet    predniSONE 5 MG tablet                Primary Care Provider Office Phone # Fax #    Reginaldo Muir [phone] [address] [facility]  [facility] [address]        Equal Access to Services     [facility]: Hadii aad ku hadasho Soomaali, waaxda luqadaha, qaybta kaalmada adeegyada, waxay idiin hayaan adeeg kharaofelia willis . So [facility] [phone].    ATENCIÓN: Si habla español, tiene a roberts disposición servicios gratuitos de asistencia lingüística. [facility] [phone].    We comply with applicable federal civil rights laws and [place] laws. We do not discriminate on the basis of race, color, national origin, age, disability, sex, sexual orientation, or gender identity.            Thank you!     Thank you for choosing [facility]  for your care. Our goal is always to provide you with excellent care. Hearing back from our patients is one way we can continue to improve our services. Please take a few minutes to complete the written survey that you may receive in the mail after your visit with us. Thank you!             Your Updated Medication List - Protect others around you: Learn how to safely use, store and throw away your medicines at www.disposemymeds.org.          This list is accurate as of 5/29/18 11:26 AM.  Always use your most recent med list.                   Brand Name Dispense Instructions for use Diagnosis    * abiraterone 250 MG tablet    ZYTIGA    120 tablet    Take 4 tablets (1,000 mg) by mouth daily for 30 doses Take on empty stomach.    Bone metastasis (H), Malignant neoplasm of prostate (H)       * abiraterone 250 MG tablet    ZYTIGA    120 tablet    Take 4 tablets (1,000 mg) by mouth daily for 30 doses Take on empty stomach.    Bone  metastasis (H), Malignant neoplasm of prostate (H)       albuterol (2.5 MG/3ML) 0.083% neb solution     25 vial    Take 1 vial (2.5 mg) by nebulization every 6 hours as needed for shortness of breath / dyspnea or wheezing    Acute bronchitis, unspecified organism       aspirin 325 MG EC tablet     1 tablet    Take 1 tablet (325 mg) by mouth daily    Transient cerebral ischemia, unspecified type       CALCIUM 600 PO           [facility] vitamin  B12 1000 MCG Tabs   Generic drug:  cyanocobalamin     30 tablet    TAKE 1 TABLET BY MOUTH EVERY DAY    Low vitamin B12 level       furosemide 20 MG tablet    LASIX    90 tablet    Take 1 tablet (20 mg) by mouth daily    Bilateral lower extremity edema, HTN (hypertension), benign       gabapentin 300 MG capsule    NEURONTIN     Take 900 mg by mouth At Bedtime        ipratropium - albuterol 0.5 mg/2.5 mg/3 mL 0.5-2.5 (3) MG/3ML neb solution    DUONEB    3 mL    Take 1 vial (3 mLs) by nebulization once for 1 dose    Dyspnea, unspecified type       * lidocaine 5 % Patch    LIDODERM    30 patch    Apply up to 3 patches to painful area at once for up to 12 h within a 24 h period.  Remove after 12 hours.    Scapulalgia, Acute pain of right shoulder       * lidocaine 5 % ointment    XYLOCAINE    50 g    Apply topically as needed for moderate pain    Acute pain of right shoulder, Scapulalgia       metoprolol succinate 25 MG 24 hr tablet    TOPROL-XL    90 tablet    TAKE 1 TABLET (25 MG) BY MOUTH DAILY    HTN (hypertension), benign       order for DME     1 Device    Equipment being ordered: Nebulizer    Acute bronchitis, unspecified organism       oxyCODONE IR 5 MG tablet    ROXICODONE    30 tablet    Take 1 tablet (5 mg) by mouth every 6 hours as needed for pain    Narcotic dependence, episodic use (H)       * predniSONE 5 MG tablet    DELTASONE    60 tablet    Take 1 tablet (5 mg) by mouth 2 times daily    Bone metastasis (H), Malignant neoplasm of prostate (H)       * predniSONE 5 MG  tablet    DELTASONE    60 tablet    Take 1 tablet (5 mg) by mouth 2 times daily    Bone metastasis (H), Malignant neoplasm of prostate (H)       quinapril 40 MG Tab    ACCUPRIL    90 tablet    TAKE 1 TABLET (40 MG) BY MOUTH DAILY    HTN (hypertension), benign       simvastatin 40 MG tablet    ZOCOR    45 tablet    TAKE 0.5 TABLETS (20 MG) BY MOUTH DAILY    Hyperlipidemia LDL goal <130       vitamin D 2000 units tablet      Take 1 tablet by mouth daily        * Notice:  This list has 6 medication(s) that are the same as other medications prescribed for you. Read the directions carefully, and ask your doctor or other care provider to review them with you.

## 2018-05-29 NOTE — TELEPHONE ENCOUNTER
Pharmacy called to check on the status of the patient's Lidocaine Patches. Explained that it was denied and that we sent a message to the doctor to see if they wanted to appeal that decision but they have not gotten back to us with an answer yet.

## 2018-05-29 NOTE — PROGRESS NOTES
Dimitri Neves is a 82 year old year old male patient here today for f/u hx of non-melanoma skin cancer.  Today he notes rough spot son face.   .  Patient states this has been present for a while.  Patient reports the following symptoms:  rough.  Patient reports the following previous treatments none.  Patient reports the following modifying factors none.  Associated symptoms: none.  Patient has no other skin complaints today.  Remainder of the HPI, Meds, PMH, Allergies, FH, and SH was reviewed in chart.      Past Medical History:   Diagnosis Date     Actinic keratosis      AK (actinic keratosis) 7/9/2012     Cataract cortical, senile right eye 4/17/2012     DDD (degenerative disc disease), lumbar 1979    s/p 4 back surgeries, spinal cord stimulator implant      Diverticulosis      ED (erectile dysfunction) 2009     GERD (gastroesophageal reflux disease) ~1980     HTN (hypertension), benign ~2000     Macular degeneration, dry, mild, ou 7/23/2016     Multiple lung nodules     Several basilar pulmonary nodules <5 mm     HUDSON (obstructive sleep apnea) 10/2005    on CPAP     Other abnormal glucose 01/14/2009     PE (pulmonary thromboembolism) (H) 1981    after back surgery      Pure hypercholesterolemia ~2000     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       Past Surgical History:   Procedure Laterality Date     ARTHROSCOPY KNEE RT/LT  ~1995    Right     C LUMBAR SPINE FUSION,ANTER APPRCH  8/2007    four times total, latest 8/07     CATARACT IOL, RT/LT       LASER YAG CAPSULOTOMY      both eyes     ORCHIECTOMY SCROTAL Bilateral 9/27/2017    Procedure: ORCHIECTOMY SCROTAL;  Bilateral orchiectomy scrotal approach;  Surgeon: Senthil Taylor MD;  Location: [facility] OR     PHACOEMULSIFICATION CLEAR CORNEA WITH STANDARD INTRAOCULAR LENS IMPLANT  6-18-12/7-30-12    right/left eye     ROTATOR CUFF REPAIR RT/LT  ~1995    right        Family History   Problem Relation Age of Onset     CANCER Father      Lung 64y      DIABETES Brother      Hypertension Brother      CANCER Mother      Liver     CEREBROVASCULAR DISEASE Mother      Eye Disorder Mother      Glaucoma Mother      CEREBROVASCULAR DISEASE Maternal Grandmother      C.A.D. No family hx of      Thyroid Disease No family hx of      Macular Degeneration No family hx of        Social History     Social History     Marital status:      Spouse name: Tatiana     Number of children: 2     Years of education: N/A     Occupational History      Retired     Social History Main Topics     Smoking status: Former Smoker     Packs/day: 1.00     Years: 25.00     Types: Cigarettes     Quit date: 1/2/1976     Smokeless tobacco: Never Used      Comment: lives in smoke free household     Alcohol use 7.0 oz/week     14 Standard drinks or equivalent per week      Comment: 2-3 drinks every night     Drug use: No      Comment: tatoos- sterile     Sexual activity: Yes     Partners: Female     Other Topics Concern      Service Yes     [employer] x8 years     Blood Transfusions No     Caffeine Concern No     Hobby Hazards No     Sleep Concern No     Stress Concern No     Weight Concern Yes     Special Diet No     Back Care Yes     Exercise Yes     Seat Belt Yes     Self-Exams No     Parent/Sibling W/ Cabg, Mi Or Angioplasty Before 65f 55m? No     Social History Narrative       Outpatient Encounter Prescriptions as of 5/29/2018   Medication Sig Dispense Refill     abiraterone (ZYTIGA) 250 MG tablet Take 4 tablets (1,000 mg) by mouth daily for 30 doses Take on empty stomach. 120 tablet 0     abiraterone (ZYTIGA) 250 MG tablet Take 4 tablets (1,000 mg) by mouth daily for 30 doses Take on empty stomach. 120 tablet 0     albuterol (2.5 MG/3ML) 0.083% neb solution Take 1 vial (2.5 mg) by nebulization every 6 hours as needed for shortness of breath / dyspnea or wheezing 25 vial 3     aspirin  MG EC tablet Take 1 tablet (325 mg) by mouth daily 1 tablet 0     Calcium Carbonate (CALCIUM  600 PO)        Cholecalciferol (VITAMIN D) 2000 UNITS tablet Take 1 tablet by mouth daily       [facility]S VITAMIN  B12 1000 MCG TABS TAKE 1 TABLET BY MOUTH EVERY DAY 30 tablet 5     furosemide (LASIX) 20 MG tablet Take 1 tablet (20 mg) by mouth daily 90 tablet 1     gabapentin (NEURONTIN) 300 MG capsule Take 900 mg by mouth At Bedtime       lidocaine (LIDODERM) 5 % Patch Apply up to 3 patches to painful area at once for up to 12 h within a 24 h period.  Remove after 12 hours. 30 patch 0     lidocaine (XYLOCAINE) 5 % ointment Apply topically as needed for moderate pain 50 g 3     metoprolol (TOPROL-XL) 25 MG 24 hr tablet TAKE 1 TABLET (25 MG) BY MOUTH DAILY 90 tablet 0     order for DME Equipment being ordered: Nebulizer 1 Device 0     oxyCODONE (ROXICODONE) 5 MG IR tablet Take 1 tablet (5 mg) by mouth every 6 hours as needed for pain 30 tablet 0     predniSONE (DELTASONE) 5 MG tablet Take 1 tablet (5 mg) by mouth 2 times daily 60 tablet 0     predniSONE (DELTASONE) 5 MG tablet Take 1 tablet (5 mg) by mouth 2 times daily 60 tablet 0     quinapril (ACCUPRIL) 40 MG Tab TAKE 1 TABLET (40 MG) BY MOUTH DAILY 90 tablet 0     simvastatin (ZOCOR) 40 MG tablet TAKE 0.5 TABLETS (20 MG) BY MOUTH DAILY 45 tablet 3     ipratropium - albuterol 0.5 mg/2.5 mg/3 mL (DUONEB) 0.5-2.5 (3) MG/3ML neb solution Take 1 vial (3 mLs) by nebulization once for 1 dose 3 mL 0     No facility-administered encounter medications on file as of 5/29/2018.              Review Of Systems  Skin: As above  Eyes: negative  Ears/Nose/Throat: negative  Respiratory: No shortness of breath, dyspnea on exertion, cough, or hemoptysis  Cardiovascular: negative  Gastrointestinal: negative  Genitourinary: negative  Musculoskeletal: negative  Neurologic: negative  Psychiatric: negative  Hematologic/Lymphatic/Immunologic: negative  Endocrine: negative      O:   NAD, WDWN, Alert & Oriented, Mood & Affect wnl, Vitals stable   Here today alone   /64 (BP Location: Left  arm, Patient Position: Sitting, Cuff Size: Adult Regular)  Pulse 60  SpO2 99%   General appearance normal   Vitals stable   Alert, oriented and in no acute distress      Following lymph nodes palpated: Occipital, Cervical, Supraclavicular no lad   Gritty papules on face, scalp well healed   Stuck on papules and brown macules on trunk and ext    Re dpapules on trunk         The remainder of the full exam was unremarkable; the following areas were examined:  conjunctiva/lids, oral mucosa, neck, peripheral vascular system, abdomen, lymph nodes, digits/nails, eccrine and apocrine glands, scalp/hair, face, neck, chest, abdomen, buttocks, back, RUE, LUE, RLE, LLE       Eyes: Conjunctivae/lids:Normal     ENT: Lips, buccal mucosa, tongue: normal    MSK:Normal    Cardiovascular: peripheral edema none    Pulm: Breathing Normal    Lymph Nodes: No Head and Neck Lymphadenopathy     Neuro/Psych: Orientation:Normal; Mood/Affect:Normal      A/P:  1. Actinic keratosis  R temple x5  L cheek x1  LN2:  Treated with LN2 for 5s for 1-2 cycles. Warned risks of blistering, pain, pigment change, scarring, and incomplete resolution.  Advised patient to return if lesions do not completely resolve.  Wound care sheet given.  2. Hx of non-melanoma skin cancer, seborrheic keratosis, letnigo, angioma  BENIGN LESIONS DISCUSSED WITH PATIENT:  I discussed the specifics of tumor, prognosis, and genetics of benign lesions.  I explained that treatment of these lesions would be purely cosmetic and not medically neccessary.  I discussed with patient different removal options including excision, cautery and /or laser.      Nature and genetics of benign skin lesions dicussed with patient.  Signs and Symptoms of skin cancer discussed with patient.  Patient to follow up with Primary Care provider regarding elevated blood pressure.  Patient encouraged to perform monthly skin exams.  UV precautions reviewed with patient.  Patient to follow up with Primary  Care provider regarding elevated blood pressure.  Skin care regimen reviewed with patient: Eliminate harsh soaps, i.e. Dial, zest, irsih spring; Mild soaps such as Cetaphil or Dove sensitive skin, avoid hot or cold showers, aggressive use of emollients including vanicream, cetaphil or cerave discussed with patient.    Risks of non-melanoma skin cancer discussed with patient   Return to clinic 6 months

## 2018-05-29 NOTE — LETTER
5/29/2018         RE: Dimitri Neves  [address]  [place] [address]        Dear Colleague,    Thank you for referring your patient, Dimitri Neves, to the [facility]. Please see a copy of my visit note below.    Dimitri Neves is a 82 year old year old male patient here today for f/u hx of non-melanoma skin cancer.  Today he notes rough spot son face.   .  Patient states this has been present for a while.  Patient reports the following symptoms:  rough.  Patient reports the following previous treatments none.  Patient reports the following modifying factors none.  Associated symptoms: none.  Patient has no other skin complaints today.  Remainder of the HPI, Meds, PMH, Allergies, FH, and SH was reviewed in chart.      Past Medical History:   Diagnosis Date     Actinic keratosis      AK (actinic keratosis) 7/9/2012     Cataract cortical, senile right eye 4/17/2012     DDD (degenerative disc disease), lumbar 1979    s/p 4 back surgeries, spinal cord stimulator implant      Diverticulosis      ED (erectile dysfunction) 2009     GERD (gastroesophageal reflux disease) ~1980     HTN (hypertension), benign ~2000     Macular degeneration, dry, mild, ou 7/23/2016     Multiple lung nodules     Several basilar pulmonary nodules <5 mm     HUDSON (obstructive sleep apnea) 10/2005    on CPAP     Other abnormal glucose 01/14/2009     PE (pulmonary thromboembolism) (H) 1981    after back surgery      Pure hypercholesterolemia ~2000     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       Past Surgical History:   Procedure Laterality Date     ARTHROSCOPY KNEE RT/LT  ~1995    Right     C LUMBAR SPINE FUSION,ANTER APPRCH  8/2007    four times total, latest 8/07     CATARACT IOL, RT/LT       LASER YAG CAPSULOTOMY      both eyes     ORCHIECTOMY SCROTAL Bilateral 9/27/2017    Procedure: ORCHIECTOMY SCROTAL;  Bilateral orchiectomy scrotal approach;  Surgeon: Senthil Taylor MD;  Location:  OR      PHACOEMULSIFICATION CLEAR CORNEA WITH STANDARD INTRAOCULAR LENS IMPLANT  6-18-12/7-30-12    right/left eye     ROTATOR CUFF REPAIR RT/LT  ~1995    right        Family History   Problem Relation Age of Onset     CANCER Father      Lung 64y     DIABETES Brother      Hypertension Brother      CANCER Mother      Liver     CEREBROVASCULAR DISEASE Mother      Eye Disorder Mother      Glaucoma Mother      CEREBROVASCULAR DISEASE Maternal Grandmother      C.A.D. No family hx of      Thyroid Disease No family hx of      Macular Degeneration No family hx of        Social History     Social History     Marital status:      Spouse name: Tatiana     Number of children: 2     Years of education: N/A     Occupational History      Retired     Social History Main Topics     Smoking status: Former Smoker     Packs/day: 1.00     Years: 25.00     Types: Cigarettes     Quit date: 1/2/1976     Smokeless tobacco: Never Used      Comment: lives in smoke free household     Alcohol use 7.0 oz/week     14 Standard drinks or equivalent per week      Comment: 2-3 drinks every night     Drug use: No      Comment: tatoos- sterile     Sexual activity: Yes     Partners: Female     Other Topics Concern      Service Yes     [employer] x8 years     Blood Transfusions No     Caffeine Concern No     Hobby Hazards No     Sleep Concern No     Stress Concern No     Weight Concern Yes     Special Diet No     Back Care Yes     Exercise Yes     Seat Belt Yes     Self-Exams No     Parent/Sibling W/ Cabg, Mi Or Angioplasty Before 65f 55m? No     Social History Narrative       Outpatient Encounter Prescriptions as of 5/29/2018   Medication Sig Dispense Refill     abiraterone (ZYTIGA) 250 MG tablet Take 4 tablets (1,000 mg) by mouth daily for 30 doses Take on empty stomach. 120 tablet 0     abiraterone (ZYTIGA) 250 MG tablet Take 4 tablets (1,000 mg) by mouth daily for 30 doses Take on empty stomach. 120 tablet 0     albuterol (2.5 MG/3ML) 0.083%  neb solution Take 1 vial (2.5 mg) by nebulization every 6 hours as needed for shortness of breath / dyspnea or wheezing 25 vial 3     aspirin  MG EC tablet Take 1 tablet (325 mg) by mouth daily 1 tablet 0     Calcium Carbonate (CALCIUM 600 PO)        Cholecalciferol (VITAMIN D) 2000 UNITS tablet Take 1 tablet by mouth daily       [employer] VITAMIN  B12 1000 MCG TABS TAKE 1 TABLET BY MOUTH EVERY DAY 30 tablet 5     furosemide (LASIX) 20 MG tablet Take 1 tablet (20 mg) by mouth daily 90 tablet 1     gabapentin (NEURONTIN) 300 MG capsule Take 900 mg by mouth At Bedtime       lidocaine (LIDODERM) 5 % Patch Apply up to 3 patches to painful area at once for up to 12 h within a 24 h period.  Remove after 12 hours. 30 patch 0     lidocaine (XYLOCAINE) 5 % ointment Apply topically as needed for moderate pain 50 g 3     metoprolol (TOPROL-XL) 25 MG 24 hr tablet TAKE 1 TABLET (25 MG) BY MOUTH DAILY 90 tablet 0     order for DME Equipment being ordered: Nebulizer 1 Device 0     oxyCODONE (ROXICODONE) 5 MG IR tablet Take 1 tablet (5 mg) by mouth every 6 hours as needed for pain 30 tablet 0     predniSONE (DELTASONE) 5 MG tablet Take 1 tablet (5 mg) by mouth 2 times daily 60 tablet 0     predniSONE (DELTASONE) 5 MG tablet Take 1 tablet (5 mg) by mouth 2 times daily 60 tablet 0     quinapril (ACCUPRIL) 40 MG Tab TAKE 1 TABLET (40 MG) BY MOUTH DAILY 90 tablet 0     simvastatin (ZOCOR) 40 MG tablet TAKE 0.5 TABLETS (20 MG) BY MOUTH DAILY 45 tablet 3     ipratropium - albuterol 0.5 mg/2.5 mg/3 mL (DUONEB) 0.5-2.5 (3) MG/3ML neb solution Take 1 vial (3 mLs) by nebulization once for 1 dose 3 mL 0     No facility-administered encounter medications on file as of 5/29/2018.              Review Of Systems  Skin: As above  Eyes: negative  Ears/Nose/Throat: negative  Respiratory: No shortness of breath, dyspnea on exertion, cough, or hemoptysis  Cardiovascular: negative  Gastrointestinal: negative  Genitourinary: negative  Musculoskeletal:  negative  Neurologic: negative  Psychiatric: negative  Hematologic/Lymphatic/Immunologic: negative  Endocrine: negative      O:   NAD, WDWN, Alert & Oriented, Mood & Affect wnl, Vitals stable   Here today alone   /64 (BP Location: Left arm, Patient Position: Sitting, Cuff Size: Adult Regular)  Pulse 60  SpO2 99%   General appearance normal   Vitals stable   Alert, oriented and in no acute distress      Following lymph nodes palpated: Occipital, Cervical, Supraclavicular no lad   Gritty papules on face, scalp well healed   Stuck on papules and brown macules on trunk and ext    Re dpapules on trunk         The remainder of the full exam was unremarkable; the following areas were examined:  conjunctiva/lids, oral mucosa, neck, peripheral vascular system, abdomen, lymph nodes, digits/nails, eccrine and apocrine glands, scalp/hair, face, neck, chest, abdomen, buttocks, back, RUE, LUE, RLE, LLE       Eyes: Conjunctivae/lids:Normal     ENT: Lips, buccal mucosa, tongue: normal    MSK:Normal    Cardiovascular: peripheral edema none    Pulm: Breathing Normal    Lymph Nodes: No Head and Neck Lymphadenopathy     Neuro/Psych: Orientation:Normal; Mood/Affect:Normal      A/P:  1. Actinic keratosis  R temple x5  L cheek x1  LN2:  Treated with LN2 for 5s for 1-2 cycles. Warned risks of blistering, pain, pigment change, scarring, and incomplete resolution.  Advised patient to return if lesions do not completely resolve.  Wound care sheet given.  2. Hx of non-melanoma skin cancer, seborrheic keratosis, letnigo, angioma  BENIGN LESIONS DISCUSSED WITH PATIENT:  I discussed the specifics of tumor, prognosis, and genetics of benign lesions.  I explained that treatment of these lesions would be purely cosmetic and not medically neccessary.  I discussed with patient different removal options including excision, cautery and /or laser.      Nature and genetics of benign skin lesions dicussed with patient.  Signs and Symptoms of skin  cancer discussed with patient.  Patient to follow up with Primary Care provider regarding elevated blood pressure.  Patient encouraged to perform monthly skin exams.  UV precautions reviewed with patient.  Patient to follow up with Primary Care provider regarding elevated blood pressure.  Skin care regimen reviewed with patient: Eliminate harsh soaps, i.e. Dial, zest, irsih spring; Mild soaps such as Cetaphil or Dove sensitive skin, avoid hot or cold showers, aggressive use of emollients including vanicream, cetaphil or cerave discussed with patient.    Risks of non-melanoma skin cancer discussed with patient   Return to clinic 6 months      Again, thank you for allowing me to participate in the care of your patient.        Sincerely,        Senthil Jhaveri MD

## 2018-05-31 ENCOUNTER — TELEPHONE (OUTPATIENT)
Dept: FAMILY MEDICINE | Facility: CLINIC | Age: 83
End: 2018-05-31

## 2018-05-31 ENCOUNTER — TELEPHONE (OUTPATIENT)
Dept: DERMATOLOGY | Facility: CLINIC | Age: 83
End: 2018-05-31

## 2018-05-31 NOTE — TELEPHONE ENCOUNTER
Reason for Call:  Other call back    Detailed comments: pt calling stating he has some spots sprayed on his face. They have formed scabs now, would like to know if he should start using the ointment to cover over the scabs or wait until the scabs fall off? Also the one is right by his eye so he will not be able to cover it up, will this be ok?    Phone Number Patient can be reached at: Home number on file [phone] (home)    Best Time: any     Can we leave a detailed message on this number? YES    Call taken on 5/31/2018 at 11:47 AM by Makeda Hernandez

## 2018-05-31 NOTE — TELEPHONE ENCOUNTER
Prior Authorization Retail Medication Request    Medication/Dose: Lidoderm 5% patch  ICD code (if different than what is on RX):    Previously Tried and Failed:    Rationale:      Insurance Name:  [email or web address] [phone]    Insurance ID:  TRX code [id number]      Pharmacy Information (if different than what is on RX)  Name:    Phone:

## 2018-05-31 NOTE — TELEPHONE ENCOUNTER
Spoke to patient and advised to keep areas moist with Aquaphor or Vaseline and apply repeatedly as needed to keep moist if unable to bandage area near his eye, but if he has scabs, the areas are dried out and will heal with less discomfort and scarring and will heal quicker, if keep moist with ointment. Patient verbalized understanding. Marlin French RN

## 2018-06-01 NOTE — TELEPHONE ENCOUNTER
We always lose this one. Suggest topical lidocaine gel and we can prescribe this if he's interested     Reginaldo Muir MD

## 2018-06-01 NOTE — TELEPHONE ENCOUNTER
See 5-14-18, telephone encounter.  Denial Rational: MEDICATION IS NOT COVERED FOR DIAGNOSIS - DIAGNOSIS IS NOT FDA APPROVED FOR MEDICATION  THIS IS ONLY COVERED FOR DIAGNOSIS OF SHINGLES PAIN OR DIABETIC NEUROPATHY    Do you want to do an appeal or change medication?  Yuli Michael,

## 2018-06-01 NOTE — TELEPHONE ENCOUNTER
Central Prior Authorization Team   Phone: [phone]    Please see encounter from 05/14/2018.  A PA for this medication was already done and has been denied.

## 2018-06-01 NOTE — TELEPHONE ENCOUNTER
Patient notified.  He stated that the pain is getting better and he will not need the prescription   He agreed to call back if symptoms persist/worsens    Pablo Cheatham RN

## 2018-06-11 ENCOUNTER — TELEPHONE (OUTPATIENT)
Dept: PHARMACY | Facility: CLINIC | Age: 83
End: 2018-06-11

## 2018-06-11 NOTE — ORAL ONC MGMT
Oral Chemotherapy Monitoring Program    Primary Oncologist: Nicolas  Primary Oncology Clinic: [facility]  Cancer Diagnosis: Prostate Cancer    Therapy History:    abiraterone (Zytiga) 1000 mg PO daily with Prednisone 5 mg PO BID  Start Date: 8/19/17      Drug Interaction Assessment: No new medications as of 6/11/18  1. Abiraterone will decrease Warfarin metabolism, potentially raising INR; patient is aware and will inquire about more frequent INR monitoring at the start of therapy  2. Abiraterone inhibits Metoprolol metabolism with a slight risk of causing bradycardia      Lab Monitoring Plan:  C1D1+   CMP, BP C2D1+ Call, CMP, BP C3D1+ Call, CMP, BP C4D1+ Call, CMP, BP C5D1+ Call, CMP, BP C6D1+ Call, CMP, BP   C1D8+    C2D8+    C3D8+    C4D8+    C5D8+    C6D8+      C1D15+ Call, CMP C2D15+ Call, CMP C3D15+    C4D15+    C5D15+    C6D15+      C1D22+    C2D22+    C3D22+    C4D22+    C5D22+    C6D22+          Subjective/Objective:  Dimitri Neves is a 82 year old male contacted by phone for a follow-up visit for oral chemotherapy.  Dimitri reports that he is doing well on abiraterone and denies any new changes. He denies any missed or extra doses. He reports taking his four tablets every morning and taking the prednisone at breakfast and dinner. He reports the swelling in his legs he was experiencing in [place] has resolved with the increase in his furosemide to 40 mg and he is not having issues with edema currently.    ORAL CHEMOTHERAPY 9/18/2017 10/16/2017 11/27/2017 12/19/2017 3/1/2018 4/30/2018 6/11/2018   Drug Name Zytiga (Abiraterone) Zytiga (Abiraterone) Zytiga (Abiraterone) Zytiga (Abiraterone) Zytiga (Abiraterone) Zytiga (Abiraterone) Zytiga (Abiraterone)   Current Dosage 1000mg 1000mg 1000mg 1000mg 1000mg 1000mg 1000mg   Current Schedule Daily Daily Daily Daily Daily Daily Daily   Cycle Details Continuous Continuous Continuous Continuous Continuous Continuous Continuous   Start Date of Last Cycle 9/19/2017 10/16/2017  "11/19/2017 12/19/2017 - 4/4/2018 -   Planned next cycle start date - - 12/19/2017 - - 5/3/2018 -   Doses missed in last 2 weeks 0 0 0 0 0 0 0   Adherence Assessment Adherent Adherent Adherent Adherent Adherent Adherent Adherent   Adverse Effects No AE identified during assessment No AE identified during assessment No AE identified during assessment No AE identified during assessment Other (see note for details) No AE identified during assessment No AE identified during assessment   Other (see note for details) - - - - (No Data) - -   Pharmacist intervention? - - - - No - -   Any new drug interactions? No No No No No No No   Is the dose as ordered appropriate for the patient? Yes Yes Yes Yes - Yes Yes   Is the patient currently in pain? Assessed in last 30 days. Assessed in last 30 days. Assessed in last 30 days. Assessed in last 30 days. No Assessed in last 30 days. -   Has the patient been assessed within the past 6 months for depression? Yes Yes - Yes Yes Yes -   Has the patient missed any days of school, work, or other routine activity? No No No - - - -       Last PHQ-2 Score on record:   PHQ-2 ( 1999 [employer]) 9/15/2017 6/13/2016   Q1: Little interest or pleasure in doing things 1 0   Q2: Feeling down, depressed or hopeless 1 0   PHQ-2 Score 2 0     Vitals:  BP:   BP Readings from Last 1 Encounters:   05/29/18 120/64     Wt Readings from Last 1 Encounters:   05/24/18 88.2 kg (194 lb 6.4 oz)     Estimated body surface area is 2.11 meters squared as calculated from the following:    Height as of 4/30/18: 1.816 m (5' 11.5\").    Weight as of 5/24/18: 88.2 kg (194 lb 6.4 oz).    Labs:  _  Result Component Current Result Ref Range   Sodium 139 (5/24/2018) 133 - 144 mmol/L     _  Result Component Current Result Ref Range   Potassium 4.4 (5/24/2018) 3.4 - 5.3 mmol/L     _  Result Component Current Result Ref Range   Calcium 8.7 (5/24/2018) 8.5 - 10.1 mg/dL     No results found for Mag within last 30 days.     No results " found for Phos within last 30 days.     _  Result Component Current Result Ref Range   Albumin 3.3 (L) (5/24/2018) 3.4 - 5.0 g/dL     _  Result Component Current Result Ref Range   Urea Nitrogen 23 (5/24/2018) 7 - 30 mg/dL     _  Result Component Current Result Ref Range   Creatinine 0.88 (5/24/2018) 0.66 - 1.25 mg/dL       _  Result Component Current Result Ref Range   AST 23 (5/24/2018) 0 - 45 U/L     _  Result Component Current Result Ref Range   ALT 25 (5/24/2018) 0 - 70 U/L     _  Result Component Current Result Ref Range   Bilirubin Total 0.9 (5/24/2018) 0.2 - 1.3 mg/dL       _  Result Component Current Result Ref Range   WBC 5.3 (5/24/2018) 4.0 - 11.0 10e9/L     _  Result Component Current Result Ref Range   Hemoglobin 11.3 (L) (5/24/2018) 13.3 - 17.7 g/dL     _  Result Component Current Result Ref Range   Platelet Count 149 (L) (5/24/2018) 150 - 450 10e9/L     _  Result Component Current Result Ref Range   Absolute Neutrophil 3.9 (5/24/2018) 1.6 - 8.3 10e9/L         Assessment/Plan:  Continue with abiraterone 1000mg daily and prednisone 5mg BID    Follow-Up:  6/28/18 visit with Dr. Valenzuela    Refill Due:  6/27/18 Release to [facility]    Judith Valles, PharmD, MPH, BCOP  Hematology/Oncology Clinical Pharmacist   [facility]   [email or web address]

## 2018-06-28 ENCOUNTER — TELEPHONE (OUTPATIENT)
Dept: PHARMACY | Facility: CLINIC | Age: 83
End: 2018-06-28

## 2018-06-28 ENCOUNTER — ONCOLOGY VISIT (OUTPATIENT)
Dept: ONCOLOGY | Facility: CLINIC | Age: 83
End: 2018-06-28
Payer: COMMERCIAL

## 2018-06-28 ENCOUNTER — INFUSION THERAPY VISIT (OUTPATIENT)
Dept: INFUSION THERAPY | Facility: CLINIC | Age: 83
End: 2018-06-28
Payer: COMMERCIAL

## 2018-06-28 VITALS
OXYGEN SATURATION: 98 % | HEART RATE: 57 BPM | WEIGHT: 197.8 LBS | TEMPERATURE: 97.4 F | DIASTOLIC BLOOD PRESSURE: 73 MMHG | RESPIRATION RATE: 16 BRPM | BODY MASS INDEX: 27.21 KG/M2 | SYSTOLIC BLOOD PRESSURE: 144 MMHG

## 2018-06-28 DIAGNOSIS — C61 MALIGNANT NEOPLASM OF PROSTATE (H): ICD-10-CM

## 2018-06-28 DIAGNOSIS — C79.51 BONE METASTASIS: Primary | ICD-10-CM

## 2018-06-28 DIAGNOSIS — C79.51 BONE METASTASIS: ICD-10-CM

## 2018-06-28 DIAGNOSIS — C61 MALIGNANT NEOPLASM OF PROSTATE (H): Primary | ICD-10-CM

## 2018-06-28 LAB
ALBUMIN SERPL-MCNC: 3.5 G/DL (ref 3.4–5)
ALP SERPL-CCNC: 84 U/L (ref 40–150)
ALT SERPL W P-5'-P-CCNC: 25 U/L (ref 0–70)
ANION GAP SERPL CALCULATED.3IONS-SCNC: 7 MMOL/L (ref 3–14)
AST SERPL W P-5'-P-CCNC: 25 U/L (ref 0–45)
BASOPHILS # BLD AUTO: 0 10E9/L (ref 0–0.2)
BASOPHILS NFR BLD AUTO: 0.2 %
BILIRUB SERPL-MCNC: 0.7 MG/DL (ref 0.2–1.3)
BUN SERPL-MCNC: 28 MG/DL (ref 7–30)
CALCIUM SERPL-MCNC: 9.6 MG/DL (ref 8.5–10.1)
CHLORIDE SERPL-SCNC: 103 MMOL/L (ref 94–109)
CO2 SERPL-SCNC: 30 MMOL/L (ref 20–32)
CREAT SERPL-MCNC: 1.08 MG/DL (ref 0.66–1.25)
DIFFERENTIAL METHOD BLD: ABNORMAL
EOSINOPHIL # BLD AUTO: 0 10E9/L (ref 0–0.7)
EOSINOPHIL NFR BLD AUTO: 0.5 %
ERYTHROCYTE [DISTWIDTH] IN BLOOD BY AUTOMATED COUNT: 15 % (ref 10–15)
GFR SERPL CREATININE-BSD FRML MDRD: 66 ML/MIN/1.7M2
GLUCOSE SERPL-MCNC: 107 MG/DL (ref 70–99)
HCT VFR BLD AUTO: 35.9 % (ref 40–53)
HGB BLD-MCNC: 11.9 G/DL (ref 13.3–17.7)
IMM GRANULOCYTES # BLD: 0 10E9/L (ref 0–0.4)
IMM GRANULOCYTES NFR BLD: 0.3 %
LYMPHOCYTES # BLD AUTO: 1.1 10E9/L (ref 0.8–5.3)
LYMPHOCYTES NFR BLD AUTO: 18.6 %
MCH RBC QN AUTO: 33.9 PG (ref 26.5–33)
MCHC RBC AUTO-ENTMCNC: 33.1 G/DL (ref 31.5–36.5)
MCV RBC AUTO: 102 FL (ref 78–100)
MONOCYTES # BLD AUTO: 0.5 10E9/L (ref 0–1.3)
MONOCYTES NFR BLD AUTO: 8.4 %
NEUTROPHILS # BLD AUTO: 4.2 10E9/L (ref 1.6–8.3)
NEUTROPHILS NFR BLD AUTO: 72 %
PLATELET # BLD AUTO: 183 10E9/L (ref 150–450)
POTASSIUM SERPL-SCNC: 4.4 MMOL/L (ref 3.4–5.3)
PROT SERPL-MCNC: 6.9 G/DL (ref 6.8–8.8)
PSA SERPL-MCNC: 10 UG/L (ref 0–4)
RBC # BLD AUTO: 3.51 10E12/L (ref 4.4–5.9)
SODIUM SERPL-SCNC: 140 MMOL/L (ref 133–144)
WBC # BLD AUTO: 5.8 10E9/L (ref 4–11)

## 2018-06-28 PROCEDURE — 85025 COMPLETE CBC W/AUTO DIFF WBC: CPT | Performed by: INTERNAL MEDICINE

## 2018-06-28 PROCEDURE — 84153 ASSAY OF PSA TOTAL: CPT | Performed by: INTERNAL MEDICINE

## 2018-06-28 PROCEDURE — 99214 OFFICE O/P EST MOD 30 MIN: CPT | Mod: 25 | Performed by: INTERNAL MEDICINE

## 2018-06-28 PROCEDURE — 99207 ZZC NO CHARGE NURSE ONLY: CPT

## 2018-06-28 PROCEDURE — 96374 THER/PROPH/DIAG INJ IV PUSH: CPT | Performed by: INTERNAL MEDICINE

## 2018-06-28 PROCEDURE — 80053 COMPREHEN METABOLIC PANEL: CPT | Performed by: INTERNAL MEDICINE

## 2018-06-28 PROCEDURE — 36415 COLL VENOUS BLD VENIPUNCTURE: CPT | Performed by: INTERNAL MEDICINE

## 2018-06-28 RX ORDER — ABIRATERONE ACETATE 250 MG/1
1000 TABLET ORAL DAILY
Qty: 120 TABLET | Refills: 0 | Status: SHIPPED | OUTPATIENT
Start: 2018-06-28 | End: 2018-07-28

## 2018-06-28 RX ORDER — PREDNISONE 5 MG/1
5 TABLET ORAL 2 TIMES DAILY
Qty: 60 TABLET | Refills: 0 | Status: SHIPPED | OUTPATIENT
Start: 2018-06-28 | End: 2018-09-04

## 2018-06-28 RX ORDER — ZOLEDRONIC ACID 0.04 MG/ML
4 INJECTION, SOLUTION INTRAVENOUS ONCE
Status: COMPLETED | OUTPATIENT
Start: 2018-06-28 | End: 2018-06-28

## 2018-06-28 RX ORDER — ZOLEDRONIC ACID 0.04 MG/ML
4 INJECTION, SOLUTION INTRAVENOUS ONCE
Status: CANCELLED | OUTPATIENT
Start: 2019-01-07 | End: 2018-12-13

## 2018-06-28 RX ADMIN — Medication 250 ML: at 13:38

## 2018-06-28 RX ADMIN — ZOLEDRONIC ACID 4 MG: 0.04 INJECTION, SOLUTION INTRAVENOUS at 13:38

## 2018-06-28 ASSESSMENT — PAIN SCALES - GENERAL: PAINLEVEL: NO PAIN (0)

## 2018-06-28 NOTE — TELEPHONE ENCOUNTER
Oral Chemotherapy Monitoring Program     Primary Oncologist: Nicolas  Primary Oncology Clinic: [facility]  Cancer Diagnosis: Prostate Cancer     Therapy History:     abiraterone (Zytiga) 1000 mg PO daily with Prednisone 5 mg PO BID  Start Date: 8/19/17      Drug Interaction Assessment: No new medications as of 6/11/18  1. Abiraterone will decrease Warfarin metabolism, potentially raising INR; patient is aware and will inquire about more frequent INR monitoring at the start of therapy  2. Abiraterone inhibits Metoprolol metabolism with a slight risk of causing bradycardia      Lab Monitoring Plan:  C1D1+   CMP, BP C2D1+ Call, CMP, BP C3D1+ Call, CMP, BP C4D1+ Call, CMP, BP C5D1+ Call, CMP, BP C6D1+ Call, CMP, BP   C1D8+    C2D8+    C3D8+    C4D8+    C5D8+    C6D8+      C1D15+ Call, CMP C2D15+ Call, CMP C3D15+    C4D15+    C5D15+    C6D15+      C1D22+    C2D22+    C3D22+    C4D22+    C5D22+    C6D22+          Subjective/Objective:  Dimitri Neves is a 82 year old male seen in clinic for a follow-up visit for oral chemotherapy.  Dimitri reports that he is doing well on abiraterone and denies any new changes.  He denies any missed or extra doses. He reports taking his four tablets every morning and taking the prednisone at breakfast and dinner. His leg swelling has improved.  He does have multiple bruising on his arms and legs.  It may be related to his daily aspirin intake.  Overall, his labs are stable.  PSA 10 (stable)  ORAL CHEMOTHERAPY 10/16/2017 11/27/2017 12/19/2017 3/1/2018 4/30/2018 6/11/2018 6/28/2018   Drug Name Zytiga (Abiraterone) Zytiga (Abiraterone) Zytiga (Abiraterone) Zytiga (Abiraterone) Zytiga (Abiraterone) Zytiga (Abiraterone) Zytiga (Abiraterone)   Current Dosage 1000mg 1000mg 1000mg 1000mg 1000mg 1000mg 1000mg   Current Schedule Daily Daily Daily Daily Daily Daily Daily   Cycle Details Continuous Continuous Continuous Continuous Continuous Continuous Continuous   Start Date of Last Cycle 10/16/2017  "11/19/2017 12/19/2017 - 4/4/2018 - -   Planned next cycle start date - 12/19/2017 - - 5/3/2018 - -   Doses missed in last 2 weeks 0 0 0 0 0 0 0   Adherence Assessment Adherent Adherent Adherent Adherent Adherent Adherent Adherent   Adverse Effects No AE identified during assessment No AE identified during assessment No AE identified during assessment Other (see note for details) No AE identified during assessment No AE identified during assessment No AE identified during assessment   Other (see note for details) - - - (No Data) - - -   Pharmacist intervention? - - - No - - -   Any new drug interactions? No No No No No No No   Is the dose as ordered appropriate for the patient? Yes Yes Yes - Yes Yes Yes   Is the patient currently in pain? Assessed in last 30 days. Assessed in last 30 days. Assessed in last 30 days. No Assessed in last 30 days. - Assessed in last 30 days.   Has the patient been assessed within the past 6 months for depression? Yes - Yes Yes Yes - Yes   Has the patient missed any days of school, work, or other routine activity? No No - - - - -       Vitals:  BP:   BP Readings from Last 1 Encounters:   06/28/18 144/73     Wt Readings from Last 1 Encounters:   06/28/18 89.7 kg (197 lb 12.8 oz)     Estimated body surface area is 2.13 meters squared as calculated from the following:    Height as of 4/30/18: 1.816 m (5' 11.5\").    Weight as of an earlier encounter on 6/28/18: 89.7 kg (197 lb 12.8 oz).    Labs:  _  Result Component Current Result Ref Range   Sodium 140 (6/28/2018) 133 - 144 mmol/L     _  Result Component Current Result Ref Range   Potassium 4.4 (6/28/2018) 3.4 - 5.3 mmol/L     _  Result Component Current Result Ref Range   Calcium 9.6 (6/28/2018) 8.5 - 10.1 mg/dL     No results found for Mag within last 30 days.     No results found for Phos within last 30 days.     _  Result Component Current Result Ref Range   Albumin 3.5 (6/28/2018) 3.4 - 5.0 g/dL     _  Result Component Current Result " Ref Range   Urea Nitrogen 28 (6/28/2018) 7 - 30 mg/dL     _  Result Component Current Result Ref Range   Creatinine 1.08 (6/28/2018) 0.66 - 1.25 mg/dL       _  Result Component Current Result Ref Range   AST 25 (6/28/2018) 0 - 45 U/L     _  Result Component Current Result Ref Range   ALT 25 (6/28/2018) 0 - 70 U/L     _  Result Component Current Result Ref Range   Bilirubin Total 0.7 (6/28/2018) 0.2 - 1.3 mg/dL       _  Result Component Current Result Ref Range   WBC 5.8 (6/28/2018) 4.0 - 11.0 10e9/L     _  Result Component Current Result Ref Range   Hemoglobin 11.9 (L) (6/28/2018) 13.3 - 17.7 g/dL     _  Result Component Current Result Ref Range   Platelet Count 183 (6/28/2018) 150 - 450 10e9/L     _  Result Component Current Result Ref Range   Absolute Neutrophil 4.2 (6/28/2018) 1.6 - 8.3 10e9/L     Assessment/Plan:  PSA slightly improved from last visit.  Will contine same Zytiga as ordered    Follow-Up:   6 weeks with Xgeva appt    Refill Due:  SP    Dipesh Pepe, PharmD, BCOP  June 28, 2018,

## 2018-06-28 NOTE — MR AVS SNAPSHOT
After Visit Summary   6/28/2018    Dimitri Neves    MRN: [id number]           Patient Information     Date Of Birth          1935        Visit Information        Provider Department      6/28/2018 12:30 PM [place] [facility]        Today's Diagnoses     Bone metastasis (H)    -  1    Malignant neoplasm of prostate (H)           Follow-ups after your visit        Your next 10 appointments already scheduled     Aug 09, 2018  8:45 AM CDT   Return Visit with [facility])    [address] [place] [address]   [phone]            Aug 09, 2018  9:15 AM CDT   Level O with [place] 10 INFUSION   [facility])    [address] [place] [address]   [phone]            Oct 01, 2018  9:45 AM CDT   LAB with LAB ONC [facility])    [address] [place] [id number]   [phone]           Please do not eat 10-12 hours before your appointment if you are coming in fasting for labs on lipids, cholesterol, or glucose (sugar). This does not apply to pregnant women. Water, hot tea and black coffee (with nothing added) are okay. Do not drink other fluids, diet soda or chew gum.            Oct 01, 2018 10:30 AM CDT   Return Visit with Rafael Valenzuela MD   [facility])    [id number] [place] [address]   [phone]            Oct 01, 2018 11:00 AM CDT   Level O with [place] [facility])    [address] [place] [address]   [phone]            Nov 27, 2018 10:30 AM CST   Return Visit with Senthil Jhaveri MD   [facility]    [address] [facility] [address]   [phone]               Who to contact     If you have questions or need follow up information about today's clinic visit or your schedule please contact [facility] directly at [phone].  Normal or non-critical lab and imaging results will be communicated to you by [employer], letter or phone within 4 business days after the clinic has received the results. If you do not hear from us within 7 days, please contact the clinic through [employer] or phone. If you have a critical or abnormal lab result, we will notify you by phone as soon as possible.  Submit refill requests through [employer] or call your pharmacy and they will forward the refill request to us. Please allow 3 business days for your refill to be completed.          Additional Information About Your Visit        ISIS Information     ISIS gives you secure access to your electronic health record. If you see a primary care provider, you can also send messages to your care team and make appointments. If you have questions, please call your primary care clinic.  If you do not have a primary care provider, please call [phone] and they will assist you.      [employer]IS is an electronic gateway that provides easy, online access to your medical records. With [employer], you can request a clinic appointment, read your test results, renew a prescription or communicate with your care team.     To access your existing account, please contact your [facility] or call [phone] for assistance.        Care EveryWhere ID     This is your Care EveryWhere ID. This could be used by other organizations to access your [facility] medical records  [id number]         Blood Pressure from Last 3 Encounters:   06/28/18 144/73   05/29/18 120/64   05/24/18 146/73    Weight from Last 3 Encounters:   06/28/18 89.7 kg (197 lb 12.8 oz)   05/24/18 88.2 kg (194 lb 6.4 oz)   05/10/18 84.8 kg (187 lb)              Today, you had the following     No orders  found for display         Today's Medication Changes          These changes are accurate as of 6/28/18  2:53 PM.  If you have any questions, ask your nurse or doctor.               These medicines have changed or have updated prescriptions.        Dose/Directions    * abiraterone 250 MG tablet   Commonly known as:  ZYTIGA   This may have changed:  Another medication with the same name was added. Make sure you understand how and when to take each.   Used for:  Bone metastasis (H), Malignant neoplasm of prostate (H)   Changed by:  Joel Pepe RPH        Dose:  1000 mg   Take 4 tablets (1,000 mg) by mouth daily for 30 doses Take on empty stomach.   Quantity:  120 tablet   Refills:  0       * abiraterone 250 MG tablet   Commonly known as:  ZYTIGA   This may have changed:  You were already taking a medication with the same name, and this prescription was added. Make sure you understand how and when to take each.   Used for:  Bone metastasis (H), Malignant neoplasm of prostate (H)   Changed by:  Joel Pepe RPH        Dose:  1000 mg   Take 4 tablets (1,000 mg) by mouth daily for 30 doses Take on empty stomach.   Quantity:  120 tablet   Refills:  0       * predniSONE 5 MG tablet   Commonly known as:  DELTASONE   This may have changed:  Another medication with the same name was added. Make sure you understand how and when to take each.   Used for:  Bone metastasis (H), Malignant neoplasm of prostate (H)   Changed by:  Joel Pepe RPH        Dose:  5 mg   Take 1 tablet (5 mg) by mouth 2 times daily   Quantity:  60 tablet   Refills:  0       * predniSONE 5 MG tablet   Commonly known as:  DELTASONE   This may have changed:  You were already taking a medication with the same name, and this prescription was added. Make sure you understand how and when to take each.   Used for:  Bone metastasis (H), Malignant neoplasm of prostate (H)   Changed by:  Joel Pepe RPH        Dose:  5 mg   Take 1 tablet (5 mg) by mouth 2 times daily    Quantity:  60 tablet   Refills:  0       * Notice:  This list has 4 medication(s) that are the same as other medications prescribed for you. Read the directions carefully, and ask your doctor or other care provider to review them with you.         Where to get your medicines      These medications were sent to [facility] - [place], [place] - [address]  [address] [facility], [facility] [address]    Hours:  Mon-Fri 8:30am-5:00pm Toll Free [phone] Phone:  [phone]     abiraterone 250 MG tablet    predniSONE 5 MG tablet                Primary Care Provider Office Phone # Fax #    Reginaldo Muir [phone] [address] [facility] [address]        Equal Access to Services     CARLITA LEBLANC : Hadii nahid martins hadasho Sogregorio, waaxda luqadaha, qaybta kaalmada adeegyada, naty willis . So [facility] [phone].    ATENCIÓN: Si habla español, tiene a roberts disposición servicios gratuitos de asistencia lingüística. Llame al [phone].    We comply with applicable federal civil rights laws and [place] laws. We do not discriminate on the basis of race, color, national origin, age, disability, sex, sexual orientation, or gender identity.            Thank you!     Thank you for choosing [facility]  for your care. Our goal is always to provide you with excellent care. Hearing back from our patients is one way we can continue to improve our services. Please take a few minutes to complete the written survey that you may receive in the mail after your visit with us. Thank you!             Your Updated Medication List - Protect others around you: Learn how to safely use, store and throw away your medicines at www.disposemymeds.org.          This list is accurate as of 6/28/18  2:53 PM.  Always use your most recent med list.                   Brand Name Dispense Instructions for use Diagnosis    * abiraterone 250 MG  tablet    ZYTIGA    120 tablet    Take 4 tablets (1,000 mg) by mouth daily for 30 doses Take on empty stomach.    Bone metastasis (H), Malignant neoplasm of prostate (H)       * abiraterone 250 MG tablet    ZYTIGA    120 tablet    Take 4 tablets (1,000 mg) by mouth daily for 30 doses Take on empty stomach.    Bone metastasis (H), Malignant neoplasm of prostate (H)       albuterol (2.5 MG/3ML) 0.083% neb solution     25 vial    Take 1 vial (2.5 mg) by nebulization every 6 hours as needed for shortness of breath / dyspnea or wheezing    Acute bronchitis, unspecified organism       aspirin 325 MG EC tablet     1 tablet    Take 1 tablet (325 mg) by mouth daily    Transient cerebral ischemia, unspecified type       CALCIUM 600 PO           [facility] vitamin  B12 1000 MCG Tabs   Generic drug:  cyanocobalamin     30 tablet    TAKE 1 TABLET BY MOUTH EVERY DAY    Low vitamin B12 level       furosemide 20 MG tablet    LASIX    90 tablet    Take 1 tablet (20 mg) by mouth daily    Bilateral lower extremity edema, HTN (hypertension), benign       gabapentin 300 MG capsule    NEURONTIN     Take 900 mg by mouth At Bedtime        ipratropium - albuterol 0.5 mg/2.5 mg/3 mL 0.5-2.5 (3) MG/3ML neb solution    DUONEB    3 mL    Take 1 vial (3 mLs) by nebulization once for 1 dose    Dyspnea, unspecified type       metoprolol succinate 25 MG 24 hr tablet    TOPROL-XL    90 tablet    TAKE 1 TABLET (25 MG) BY MOUTH DAILY    HTN (hypertension), benign       order for DME     1 Device    Equipment being ordered: Nebulizer    Acute bronchitis, unspecified organism       oxyCODONE IR 5 MG tablet    ROXICODONE    30 tablet    Take 1 tablet (5 mg) by mouth every 6 hours as needed for pain    Narcotic dependence, episodic use (H)       * predniSONE 5 MG tablet    DELTASONE    60 tablet    Take 1 tablet (5 mg) by mouth 2 times daily    Bone metastasis (H), Malignant neoplasm of prostate (H)       * predniSONE 5 MG tablet    DELTASONE    60 tablet     Take 1 tablet (5 mg) by mouth 2 times daily    Bone metastasis (H), Malignant neoplasm of prostate (H)       quinapril 40 MG Tab    ACCUPRIL    90 tablet    TAKE 1 TABLET (40 MG) BY MOUTH DAILY    HTN (hypertension), benign       simvastatin 40 MG tablet    ZOCOR    45 tablet    TAKE 0.5 TABLETS (20 MG) BY MOUTH DAILY    Hyperlipidemia LDL goal <130       vitamin D 2000 units tablet      Take 1 tablet by mouth daily        * Notice:  This list has 4 medication(s) that are the same as other medications prescribed for you. Read the directions carefully, and ask your doctor or other care provider to review them with you.

## 2018-06-28 NOTE — PROGRESS NOTES
Infusion Nursing Note:  Dimitri Neves presents today for Zometa.    Patient seen by provider today: Yes: Dr. Valenzuela   present during visit today: Not Applicable.    Note: Denies jaw pain and upcoming dental procedures. Understands he is to receive Zometa every 6 weeks from now on and inform his dentist he is on this medication.    Intravenous Access:  Peripheral IV placed.    Treatment Conditions:  Lab Results   Component Value Date    HGB 11.9 06/28/2018     Lab Results   Component Value Date    WBC 5.8 06/28/2018      Lab Results   Component Value Date    ANEU 4.2 06/28/2018     Lab Results   Component Value Date     06/28/2018      Lab Results   Component Value Date     06/28/2018                   Lab Results   Component Value Date    POTASSIUM 4.4 06/28/2018           No results found for: MAG         Lab Results   Component Value Date    CR 1.08 06/28/2018                   Lab Results   Component Value Date    ALETHA 9.6 06/28/2018                Lab Results   Component Value Date    BILITOTAL 0.7 06/28/2018           Lab Results   Component Value Date    ALBUMIN 3.5 06/28/2018                    Lab Results   Component Value Date    ALT 25 06/28/2018           Lab Results   Component Value Date    AST 25 06/28/2018           Post Infusion Assessment:  Patient tolerated infusion without incident.  Blood return noted pre and post infusion.  Site patent and intact, free from redness, edema or discomfort.  Access discontinued per protocol.    Discharge Plan:   Patient will return 8/9/18 for next appointment.   Patient discharged in stable condition accompanied by: self.  Departure Mode: Ambulatory.    Xochilt Ruiz RN

## 2018-06-28 NOTE — NURSING NOTE
"Oncology Rooming Note    June 28, 2018 11:15 AM   Dimitri Neves is a 82 year old male who presents for:    Chief Complaint   Patient presents with     Oncology Clinic Visit     2 months     Initial Vitals: /73  Pulse 57  Temp 97.4  F (36.3  C) (Oral)  Resp 16  Wt 89.7 kg (197 lb 12.8 oz)  SpO2 98%  BMI 27.21 kg/m2 Estimated body mass index is 27.21 kg/(m^2) as calculated from the following:    Height as of 4/30/18: 1.816 m (5' 11.5\").    Weight as of this encounter: 89.7 kg (197 lb 12.8 oz). Body surface area is 2.13 meters squared.  No Pain (0) Comment: Data Unavailable   No LMP for male patient.  Allergies reviewed: Yes  Medications reviewed: Yes    Medications: MEDICATION REFILLS NEEDED TODAY. Provider was notified.  Pharmacy name entered into EPIC:    [id number] - [place], [place] - [address], [facility] - [place], [place] - [address] [place], [place] [address]O PHARMACY - MAIL ORDER ONLY - [id number] - [place], [place] - [address] [facility]        5 minutes for nursing intake (face to face time)     Jyoti Antonio RN              "

## 2018-06-28 NOTE — LETTER
6/28/2018         RE: Dimitri Neves  [address] [place] [address]        Dear Colleague,    Thank you for referring your patient, Dimitri Neves, to the [facility]. Please see a copy of my visit note below.    [facility]  HEMATOLOGY AND ONCOLOGY    FOLLOW-UP VISIT NOTE    PATIENT NAME: Dimitri Neves MRN # [id number]  DATE OF VISIT: Jun 28, 2018 YOB: 1935    REFERRING PROVIDER: No referring provider defined for this encounter.    CANCER TYPE: Prostate adenocarcinoma  STAGE: IV    TREATMENT SUMMARY:  Dimitri was followed by his PCP on 6/13/13 and was elevated at 32 as noted below. He was referred to Dr. Taylor. He was treated with a 10 day course of ciprofloxacin and followed again in 6 weeks on 8/10. His PSA drawn prior to this visit was unchanged and remained elevated at 32. He had a TRUS biopsy on 8/25/14 which was negative for prostate adenocarcinoma. His PSA was repeated in 3 months and now increased to 67.9. He had a repeat biopsy of prostate on 12/16/14 which now confirmed prostate adenocarcinoma with eileen 4+4 disease involving 5 of the cores and [place] 3+3 disease involving remainder of cores. He was staged with a CT scan and bone scan done on 12/29/14 which revealed metastatic foci in the upper cervical vertebrae on the left, right posterior 3rd rib and right ischium, focal uptake in the posterior left 11th rib with corresponding lytic expansile appearance on CT, suspicious for metastasis.            4/5/2011 08:46 6/13/2014 08:03 7/25/2014 09:47 12/5/2014 09:00 4/7/2015 09:14   PSA 2.77 32.70 (H) 32.00 (H) 67.88 (H) 1.92      He was started on androgen deprivation therapy in Jan 2015 with a good initial response. His PSA has been increasing recently and he was started on bicalutamide in February with no response. He has continued to have rising PSA and was prescribed abiraterone with prednisone. He had a huge copay with this and has been  referred to Medical Oncology to review other options.     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 He is tolerating his abiraterone without any difficulty.     He has ecchymoses all over his body which he attributes to ageing. He has pain in his right knee for which he had arthroplasty about 2 yrs ago.    He does have hot flushes, fatigue, mood swings on androgen deprivation therapy.     He did have bilateral orchiectomies.           PAST MEDICAL HISTORY     Past Medical History:   Diagnosis Date     Actinic keratosis      AK (actinic keratosis) 7/9/2012     Cataract cortical, senile right eye 4/17/2012     DDD (degenerative disc disease), lumbar 1979    s/p 4 back surgeries, spinal cord stimulator implant      Diverticulosis      ED (erectile dysfunction) 2009     GERD (gastroesophageal reflux disease) ~1980     HTN (hypertension), benign ~2000     Macular degeneration, dry, mild, ou 7/23/2016     Multiple lung nodules     Several basilar pulmonary nodules <5 mm     HUDSON (obstructive sleep apnea) 10/2005    on CPAP     Other abnormal glucose 01/14/2009     PE (pulmonary thromboembolism) (H) 1981    after back surgery      Pure hypercholesterolemia ~2000     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         CURRENT OUTPATIENT MEDICATIONS     Current Outpatient Prescriptions   Medication Sig     abiraterone (ZYTIGA) 250 MG tablet Take 4 tablets (1,000 mg) by mouth daily for 30 doses Take on empty stomach.     albuterol (2.5 MG/3ML) 0.083% neb solution Take 1 vial (2.5 mg) by nebulization every 6 hours as needed for shortness of breath / dyspnea or wheezing     aspirin  MG EC tablet Take 1 tablet (325 mg) by mouth daily      Calcium Carbonate (CALCIUM 600 PO)      Cholecalciferol (VITAMIN D) 2000 UNITS tablet Take 1 tablet by mouth daily     [facility]S VITAMIN  B12 1000 MCG TABS TAKE 1 TABLET BY MOUTH EVERY DAY     furosemide (LASIX) 20 MG tablet Take 1 tablet (20 mg) by mouth daily     gabapentin (NEURONTIN) 300 MG capsule Take 900 mg by mouth At Bedtime     ipratropium - albuterol 0.5 mg/2.5 mg/3 mL (DUONEB) 0.5-2.5 (3) MG/3ML neb solution Take 1 vial (3 mLs) by nebulization once for 1 dose     metoprolol (TOPROL-XL) 25 MG 24 hr tablet TAKE 1 TABLET (25 MG) BY MOUTH DAILY     order for DME Equipment being ordered: Nebulizer     oxyCODONE (ROXICODONE) 5 MG IR tablet Take 1 tablet (5 mg) by mouth every 6 hours as needed for pain     predniSONE (DELTASONE) 5 MG tablet Take 1 tablet (5 mg) by mouth 2 times daily     quinapril (ACCUPRIL) 40 MG Tab TAKE 1 TABLET (40 MG) BY MOUTH DAILY     simvastatin (ZOCOR) 40 MG tablet TAKE 0.5 TABLETS (20 MG) BY MOUTH DAILY     No current facility-administered medications for this visit.         ALLERGIES      Allergies   Allergen Reactions     Morphine Sulfate GI Disturbance     vomiting     Vicodin [Hydrocodone-Acetaminophen] Nausea and Vomiting        REVIEW OF SYSTEMS   As above in the HPI, o/w complete 12-point ROS was negative.     PHYSICAL EXAM   /73  Pulse 57  Temp 97.4  F (36.3  C) (Oral)  Resp 16  Wt 89.7 kg (197 lb 12.8 oz)  SpO2 98%  BMI 27.21 kg/m2  GEN: NAD  HEENT: PERRL, EOMI, no icterus, injection or pallor. Oropharynx is clear.  NECK: no cervical or supraclavicular lymphadenopathy  LUNGS: clear bilaterally  CV: regular, no murmurs, rubs, or gallops  ABDOMEN: soft, non-tender, non-distended, normal bowel sounds, no hepatosplenomegaly by percussion or palpation  EXT: warm, well perfused, +++ edema  NEURO: alert  SKIN: Extensive ecchymoses in all the extremities     LABORATORY AND IMAGING STUDIES     Labs remain stable. Calcium is within the normal range or low normal  Mild  normocytic anemia   Recent Labs   Lab Test  06/28/18   1043  05/24/18   1223  04/30/18   0937  12/18/17   0906  11/27/17   0856   NA  140  139  140  143  142   POTASSIUM  4.4  4.4  4.2  3.4  3.9   CHLORIDE  103  105  103  107  105   CO2  30  28  33*  30  30   ANIONGAP  7  6  4  6  7   BUN  28  23  29  13  13   CR  1.08  0.88  1.06  0.71  0.75   GLC  107*  102*  97  93  93   ALETHA  9.6  8.7  9.4  8.6  8.7     Recent Labs   Lab Test  12/28/11   0857  04/05/11   0846  06/16/10   1338   PHOS  3.1  3.4  2.5     Recent Labs   Lab Test  06/28/18   1043  05/24/18   1223  04/30/18   0937  12/18/17   0906  11/27/17   0856   WBC  5.8  5.3  5.8  4.3  5.0   HGB  11.9*  11.3*  12.1*  13.0*  12.0*   PLT  183  149*  189  159  198   MCV  102*  97  99  95  95   NEUTROPHIL  72.0  74.5  68.1  58.1  52.2     Recent Labs   Lab Test  06/28/18   1043  05/24/18   1223  04/30/18   0937   BILITOTAL  0.7  0.9  0.6   ALKPHOS  84  93  104   ALT  25  25  24   AST  25  23  27   ALBUMIN  3.5  3.3*  3.3*     TSH   Date Value Ref Range Status   06/13/2016 1.60 0.40 - 4.00 mU/L Final   04/05/2011 3.73 0.4 - 5.0 mU/L Final     Recent Labs   Lab Test  06/28/18   1043  05/24/18   1223  04/30/18   0937  12/18/17   0906  11/27/17   0856   02/09/17   0823   PSA  10.00*  10.40*  13.30*  9.34*  12.30*   < >   --    TESTOSTTOTAL   --    --    --    --    --    --   9*    < > = values in this interval not displayed.           ASSESSMENT AND PLAN   1. 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 No response to addition of bicalutamide  3. PE diagnosed few weeks after initiation of megestrol acetate for hot flashes on GnRH agonist  4. No significant medical comorbidities    He is tolerating his abiraterone well and has no complains. His PSA did respond nicely as expected initially and has been flat since then. It was 64 on 7/13/17, dropped to 12 by 10/16/17 and has been stable since -  currently at 10. For now the PSA values are relatively stable and have not doubled since he hit abel about 6 months ago.     We would plan to continue as current. There are no immediate concerns.     He did get bilateral orchiectomies done last month 9/27/17. He would not need any more lupron or other GnRH directed therapies.     His labs are stable except for mild macrocytic anemia - possibly owing to ongoing therapy for cancer.     He has extensive ecchymoses likely secondary to prednisone use.     He has hot flashes, mood swings, fatigue - from androgen deprivation. These are no different between either orchiectomy or GnRH therapy as they come with low levels of testosterone.     We would continue with zometa every 6-8 weeks. He has not received zometa in last 6 months. I will restart it at this visit. I will have him meet Leatha Loredo in 6 weeks and I could see him in 12 weeks.    Over 25 min of direct face to face time spent with patient with more than 50% time spent in counseling and coordinating care.        Again, thank you for allowing me to participate in the care of your patient.        Sincerely,        Rafael Valenzuela MD

## 2018-06-28 NOTE — PROGRESS NOTES
[facility]  HEMATOLOGY AND ONCOLOGY    FOLLOW-UP VISIT NOTE    PATIENT NAME: Dimitri Neves MRN # [id number]  DATE OF VISIT: Jun 28, 2018 YOB: 1935    REFERRING PROVIDER: No referring provider defined for this encounter.    CANCER TYPE: Prostate adenocarcinoma  STAGE: IV    TREATMENT SUMMARY:  Dimitri was followed by his PCP on 6/13/13 and was elevated at 32 as noted below. He was referred to Dr. Taylor. He was treated with a 10 day course of ciprofloxacin and followed again in 6 weeks on 8/10. His PSA drawn prior to this visit was unchanged and remained elevated at 32. He had a TRUS biopsy on 8/25/14 which was negative for prostate adenocarcinoma. His PSA was repeated in 3 months and now increased to 67.9. He had a repeat biopsy of prostate on 12/16/14 which now confirmed prostate adenocarcinoma with eileen 4+4 disease involving 5 of the cores and Cochran 3+3 disease involving remainder of cores. He was staged with a CT scan and bone scan done on 12/29/14 which revealed metastatic foci in the upper cervical vertebrae on the left, right posterior 3rd rib and right ischium, focal uptake in the posterior left 11th rib with corresponding lytic expansile appearance on CT, suspicious for metastasis.            4/5/2011 08:46 6/13/2014 08:03 7/25/2014 09:47 12/5/2014 09:00 4/7/2015 09:14   PSA 2.77 32.70 (H) 32.00 (H) 67.88 (H) 1.92      He was started on androgen deprivation therapy in Jan 2015 with a good initial response. His PSA has been increasing recently and he was started on bicalutamide in February with no response. He has continued to have rising PSA and was prescribed abiraterone with prednisone. He had a huge copay with this and has been referred to Medical Oncology to review other options.     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 He is tolerating his abiraterone without any difficulty.     He has ecchymoses all over his body which he attributes to ageing. He has pain in his right knee for which he had arthroplasty about 2 yrs ago.    He does have hot flushes, fatigue, mood swings on androgen deprivation therapy.     He did have bilateral orchiectomies.           PAST MEDICAL HISTORY     Past Medical History:   Diagnosis Date     Actinic keratosis      AK (actinic keratosis) 7/9/2012     Cataract cortical, senile right eye 4/17/2012     DDD (degenerative disc disease), lumbar 1979    s/p 4 back surgeries, spinal cord stimulator implant      Diverticulosis      ED (erectile dysfunction) 2009     GERD (gastroesophageal reflux disease) ~1980     HTN (hypertension), benign ~2000     Macular degeneration, dry, mild, ou 7/23/2016     Multiple lung nodules     Several basilar pulmonary nodules <5 mm     HUDSON (obstructive sleep apnea) 10/2005    on CPAP     Other abnormal glucose 01/14/2009     PE (pulmonary thromboembolism) (H) 1981    after back surgery      Pure hypercholesterolemia ~2000     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         CURRENT OUTPATIENT MEDICATIONS     Current Outpatient Prescriptions   Medication Sig     abiraterone (ZYTIGA) 250 MG tablet Take 4 tablets (1,000 mg) by mouth daily for 30 doses Take on empty stomach.     albuterol (2.5 MG/3ML) 0.083% neb solution Take 1 vial (2.5 mg) by nebulization every 6 hours as needed for shortness of breath / dyspnea or wheezing     aspirin  MG EC tablet Take 1 tablet (325 mg) by mouth daily     Calcium Carbonate (CALCIUM 600 PO)      Cholecalciferol (VITAMIN D) 2000 UNITS tablet Take 1 tablet by mouth daily     [employer] VITAMIN  B12 1000 MCG TABS TAKE 1 TABLET BY MOUTH EVERY DAY     furosemide (LASIX) 20 MG tablet Take 1  tablet (20 mg) by mouth daily     gabapentin (NEURONTIN) 300 MG capsule Take 900 mg by mouth At Bedtime     ipratropium - albuterol 0.5 mg/2.5 mg/3 mL (DUONEB) 0.5-2.5 (3) MG/3ML neb solution Take 1 vial (3 mLs) by nebulization once for 1 dose     metoprolol (TOPROL-XL) 25 MG 24 hr tablet TAKE 1 TABLET (25 MG) BY MOUTH DAILY     order for DME Equipment being ordered: Nebulizer     oxyCODONE (ROXICODONE) 5 MG IR tablet Take 1 tablet (5 mg) by mouth every 6 hours as needed for pain     predniSONE (DELTASONE) 5 MG tablet Take 1 tablet (5 mg) by mouth 2 times daily     quinapril (ACCUPRIL) 40 MG Tab TAKE 1 TABLET (40 MG) BY MOUTH DAILY     simvastatin (ZOCOR) 40 MG tablet TAKE 0.5 TABLETS (20 MG) BY MOUTH DAILY     No current facility-administered medications for this visit.         ALLERGIES      Allergies   Allergen Reactions     Morphine Sulfate GI Disturbance     vomiting     Vicodin [Hydrocodone-Acetaminophen] Nausea and Vomiting        REVIEW OF SYSTEMS   As above in the HPI, o/w complete 12-point ROS was negative.     PHYSICAL EXAM   /73  Pulse 57  Temp 97.4  F (36.3  C) (Oral)  Resp 16  Wt 89.7 kg (197 lb 12.8 oz)  SpO2 98%  BMI 27.21 kg/m2  GEN: NAD  HEENT: PERRL, EOMI, no icterus, injection or pallor. Oropharynx is clear.  NECK: no cervical or supraclavicular lymphadenopathy  LUNGS: clear bilaterally  CV: regular, no murmurs, rubs, or gallops  ABDOMEN: soft, non-tender, non-distended, normal bowel sounds, no hepatosplenomegaly by percussion or palpation  EXT: warm, well perfused, +++ edema  NEURO: alert  SKIN: Extensive ecchymoses in all the extremities     LABORATORY AND IMAGING STUDIES     Labs remain stable. Calcium is within the normal range or low normal  Mild normocytic anemia   Recent Labs   Lab Test  06/28/18   1043  05/24/18   1223  04/30/18   0937  12/18/17   0906  11/27/17   0856   NA  140  139  140  143  142   POTASSIUM  4.4  4.4  4.2  3.4  3.9   CHLORIDE  103  105  103  107  105   CO2   30  28  33*  30  30   ANIONGAP  7  6  4  6  7   BUN  28  23  29  13  13   CR  1.08  0.88  1.06  0.71  0.75   GLC  107*  102*  97  93  93   ALETHA  9.6  8.7  9.4  8.6  8.7     Recent Labs   Lab Test  12/28/11   0857  04/05/11   0846  06/16/10   1338   PHOS  3.1  3.4  2.5     Recent Labs   Lab Test  06/28/18   1043  05/24/18   1223  04/30/18   0937  12/18/17   0906  11/27/17   0856   WBC  5.8  5.3  5.8  4.3  5.0   HGB  11.9*  11.3*  12.1*  13.0*  12.0*   PLT  183  149*  189  159  198   MCV  102*  97  99  95  95   NEUTROPHIL  72.0  74.5  68.1  58.1  52.2     Recent Labs   Lab Test  06/28/18   1043  05/24/18   1223  04/30/18   0937   BILITOTAL  0.7  0.9  0.6   ALKPHOS  84  93  104   ALT  25  25  24   AST  25  23  27   ALBUMIN  3.5  3.3*  3.3*     TSH   Date Value Ref Range Status   06/13/2016 1.60 0.40 - 4.00 mU/L Final   04/05/2011 3.73 0.4 - 5.0 mU/L Final     Recent Labs   Lab Test  06/28/18   1043  05/24/18   1223  04/30/18   0937  12/18/17   0906  11/27/17   0856   02/09/17   0823   PSA  10.00*  10.40*  13.30*  9.34*  12.30*   < >   --    TESTOSTTOTAL   --    --    --    --    --    --   9*    < > = values in this interval not displayed.           ASSESSMENT AND PLAN   1. 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 No response to addition of bicalutamide  3. PE diagnosed few weeks after initiation of megestrol acetate for hot flashes on GnRH agonist  4. No significant medical comorbidities    He is tolerating his abiraterone well and has no complains. His PSA did respond nicely as expected initially and has been flat since then. It was 64 on 7/13/17, dropped to 12 by 10/16/17 and has been stable since - currently at 10. For now the PSA values are relatively stable and have not doubled since he hit abel about 6 months ago.     We would plan to continue as current. There are no immediate concerns.     He did get bilateral orchiectomies  done last month 9/27/17. He would not need any more lupron or other GnRH directed therapies.     His labs are stable except for mild macrocytic anemia - possibly owing to ongoing therapy for cancer.     He has extensive ecchymoses likely secondary to prednisone use.     He has hot flashes, mood swings, fatigue - from androgen deprivation. These are no different between either orchiectomy or GnRH therapy as they come with low levels of testosterone.     We would continue with zometa every 6-8 weeks. He has not received zometa in last 6 months. I will restart it at this visit. I will have him meet Leatha Loredo in 6 weeks and I could see him in 12 weeks.    Over 25 min of direct face to face time spent with patient with more than 50% time spent in counseling and coordinating care.

## 2018-07-08 DIAGNOSIS — I10 HTN (HYPERTENSION), BENIGN: ICD-10-CM

## 2018-07-09 NOTE — TELEPHONE ENCOUNTER
Pending Prescriptions:                       Disp   Refills    quinapril (ACCUPRIL) 40 MG Tab [Pharmacy *90 tab*0            Sig: TAKE 1 TABLET (40 MG) BY MOUTH DAILY    Routing refill request to provider for review/approval because:  Recent change in medication dose to half doses, see recent BP's.     Candace Salazar RN on 7/9/2018 at 3:38 PM

## 2018-07-10 RX ORDER — QUINAPRIL 40 MG/1
TABLET ORAL
Qty: 90 TABLET | Refills: 0 | Status: SHIPPED | OUTPATIENT
Start: 2018-07-10 | End: 2018-10-06

## 2018-07-10 NOTE — TELEPHONE ENCOUNTER
BP Readings from Last 6 Encounters:   06/28/18 144/73   05/29/18 120/64   05/24/18 146/73   05/10/18 114/68   04/30/18 150/75   04/10/18 116/64     Needs regular dose    Reginaldo Muir MD

## 2018-07-17 ENCOUNTER — TRANSFERRED RECORDS (OUTPATIENT)
Dept: HEALTH INFORMATION MANAGEMENT | Facility: CLINIC | Age: 83
End: 2018-07-17

## 2018-07-24 ENCOUNTER — TRANSFERRED RECORDS (OUTPATIENT)
Dept: HEALTH INFORMATION MANAGEMENT | Facility: CLINIC | Age: 83
End: 2018-07-24

## 2018-07-25 ENCOUNTER — TELEPHONE (OUTPATIENT)
Dept: FAMILY MEDICINE | Facility: CLINIC | Age: 83
End: 2018-07-25

## 2018-07-25 DIAGNOSIS — I10 HTN (HYPERTENSION), BENIGN: ICD-10-CM

## 2018-07-25 RX ORDER — METOPROLOL SUCCINATE 25 MG/1
TABLET, EXTENDED RELEASE ORAL
Qty: 90 TABLET | Refills: 0 | Status: SHIPPED | OUTPATIENT
Start: 2018-07-25 | End: 2018-11-06

## 2018-07-25 NOTE — TELEPHONE ENCOUNTER
Prescription approved per [facility] Refill Protocol.  Vandana Marcos RN

## 2018-07-26 DIAGNOSIS — C79.51 BONE METASTASIS: Primary | ICD-10-CM

## 2018-07-26 DIAGNOSIS — C61 MALIGNANT NEOPLASM OF PROSTATE (H): ICD-10-CM

## 2018-07-26 RX ORDER — PREDNISONE 5 MG/1
5 TABLET ORAL 2 TIMES DAILY
Qty: 60 TABLET | Refills: 0 | Status: SHIPPED | OUTPATIENT
Start: 2018-07-26 | End: 2018-09-04

## 2018-07-26 RX ORDER — ABIRATERONE ACETATE 250 MG/1
1000 TABLET ORAL DAILY
Qty: 120 TABLET | Refills: 0 | Status: SHIPPED | OUTPATIENT
Start: 2018-07-26 | End: 2018-08-25

## 2018-07-31 ENCOUNTER — DOCUMENTATION ONLY (OUTPATIENT)
Dept: LAB | Facility: CLINIC | Age: 83
End: 2018-07-31

## 2018-07-31 NOTE — PROGRESS NOTES
This patient has overdue labs. A letter was sent on 6/25/2018 and there has been no lab appointment made. If you still want these labs done, please have your care team contact the patient to make a lab appointment. Otherwise, please have the labs discontinued and close the encounter.    Thank you,  [facility]

## 2018-08-01 NOTE — PROGRESS NOTES
1. Please discontinue any open orders I placed  2. Do not discontinue any open or standing orders placed by the oncologist or other MD   3. Then close this encounter     Reginaldo Muir MD

## 2018-08-02 NOTE — PROGRESS NOTES
FLP cancelled.  There are no other open lab orders under Dr. Muir's name    Closing encounter    Pablo Cheatham RN

## 2018-08-09 ENCOUNTER — TELEPHONE (OUTPATIENT)
Dept: PHARMACY | Facility: CLINIC | Age: 83
End: 2018-08-09

## 2018-08-09 ENCOUNTER — INFUSION THERAPY VISIT (OUTPATIENT)
Dept: INFUSION THERAPY | Facility: CLINIC | Age: 83
End: 2018-08-09
Payer: COMMERCIAL

## 2018-08-09 ENCOUNTER — ONCOLOGY VISIT (OUTPATIENT)
Dept: ONCOLOGY | Facility: CLINIC | Age: 83
End: 2018-08-09
Payer: COMMERCIAL

## 2018-08-09 VITALS
HEART RATE: 63 BPM | WEIGHT: 204 LBS | BODY MASS INDEX: 27.63 KG/M2 | HEIGHT: 72 IN | SYSTOLIC BLOOD PRESSURE: 139 MMHG | OXYGEN SATURATION: 96 % | DIASTOLIC BLOOD PRESSURE: 73 MMHG | TEMPERATURE: 99.7 F | RESPIRATION RATE: 20 BRPM

## 2018-08-09 DIAGNOSIS — C61 MALIGNANT NEOPLASM OF PROSTATE (H): Primary | ICD-10-CM

## 2018-08-09 DIAGNOSIS — C79.51 BONE METASTASIS: Primary | ICD-10-CM

## 2018-08-09 DIAGNOSIS — C61 MALIGNANT NEOPLASM OF PROSTATE (H): ICD-10-CM

## 2018-08-09 DIAGNOSIS — C79.51 BONE METASTASIS: ICD-10-CM

## 2018-08-09 DIAGNOSIS — R63.5 WEIGHT GAIN: ICD-10-CM

## 2018-08-09 LAB
ALBUMIN SERPL-MCNC: 3.6 G/DL (ref 3.4–5)
CALCIUM SERPL-MCNC: 9.3 MG/DL (ref 8.5–10.1)
CREAT SERPL-MCNC: 1.06 MG/DL (ref 0.66–1.25)
GFR SERPL CREATININE-BSD FRML MDRD: 67 ML/MIN/1.7M2
PSA SERPL-MCNC: 10.5 UG/L (ref 0–4)

## 2018-08-09 PROCEDURE — 36415 COLL VENOUS BLD VENIPUNCTURE: CPT | Performed by: INTERNAL MEDICINE

## 2018-08-09 PROCEDURE — 82565 ASSAY OF CREATININE: CPT | Performed by: INTERNAL MEDICINE

## 2018-08-09 PROCEDURE — 99207 ZZC NO CHARGE NURSE ONLY: CPT

## 2018-08-09 PROCEDURE — 99214 OFFICE O/P EST MOD 30 MIN: CPT | Mod: 25 | Performed by: NURSE PRACTITIONER

## 2018-08-09 PROCEDURE — 82310 ASSAY OF CALCIUM: CPT | Performed by: INTERNAL MEDICINE

## 2018-08-09 PROCEDURE — 82040 ASSAY OF SERUM ALBUMIN: CPT | Performed by: INTERNAL MEDICINE

## 2018-08-09 PROCEDURE — 96365 THER/PROPH/DIAG IV INF INIT: CPT | Performed by: NURSE PRACTITIONER

## 2018-08-09 PROCEDURE — 84153 ASSAY OF PSA TOTAL: CPT | Performed by: INTERNAL MEDICINE

## 2018-08-09 RX ORDER — ZOLEDRONIC ACID 0.04 MG/ML
4 INJECTION, SOLUTION INTRAVENOUS ONCE
Status: COMPLETED | OUTPATIENT
Start: 2018-08-09 | End: 2018-08-09

## 2018-08-09 RX ADMIN — Medication 250 ML: at 09:38

## 2018-08-09 RX ADMIN — ZOLEDRONIC ACID 4 MG: 0.04 INJECTION, SOLUTION INTRAVENOUS at 09:39

## 2018-08-09 ASSESSMENT — PAIN SCALES - GENERAL: PAINLEVEL: NO PAIN (0)

## 2018-08-09 NOTE — PROGRESS NOTES
Infusion Nursing Note:  Dimitri Neves presents today for Zometa.    Patient seen by provider today: Yes: Leatha Loredo NP   present during visit today: Not Applicable.    Note: Denies jaw pain or upcoming dental procedures. Zometa stop time was 09:55am.    Intravenous Access:  Peripheral IV placed.    Treatment Conditions:  Lab Results   Component Value Date     06/28/2018                   Lab Results   Component Value Date    POTASSIUM 4.4 06/28/2018           No results found for: MAG         Lab Results   Component Value Date    CR 1.06 08/09/2018                   Lab Results   Component Value Date    ALETHA 9.3 08/09/2018                Lab Results   Component Value Date    BILITOTAL 0.7 06/28/2018           Lab Results   Component Value Date    ALBUMIN 3.6 08/09/2018                    Lab Results   Component Value Date    ALT 25 06/28/2018           Lab Results   Component Value Date    AST 25 06/28/2018       Results reviewed, labs MET treatment parameters, ok to proceed with treatment.      Post Infusion Assessment:  Patient tolerated infusion without incident.  Blood return noted pre and post infusion.  Site patent and intact, free from redness, edema or discomfort.  No evidence of extravasations.  Access discontinued per protocol.    Discharge Plan:   Patient will return in 6 weeks on 10/1/18 for next appointment.   Patient discharged in stable condition accompanied by: wife.  Departure Mode: Ambulatory.    Xochilt Ruiz RN

## 2018-08-09 NOTE — PROGRESS NOTES
Oncology Follow Up Visit: August 9, 2018    Oncologist: Dr Rafael Valenzuela  PCP: Reginaldo Muir    Diagnosis: Stage IV Adenocarcinoma of the Prostate   Dimitri Neves is an 82 yo male who had an elevation of PSA to 32 noted by PCP in 6/2013. He was seen by Dr Taylor who had biopsy which proved adenocarcinoma of the prostate. Repeat biopsy in 12/2014 proved Gena 4+4 disease in 5 of cores and 3+3 in remainder of cores. Bone scan found metastasis to upper cervical vertebrae, right posterior 3rd rib and right ischium and posterior left 11th rib with corresponding lytic expansile appearance on CT suspicious for metastasis.    Treatment:   1/2015 began androgen deprivation therapy.  2/2017 began bicalutamide  7/19/2017 began and continues on Abiraterone with prednisone  9/27/2017 orchiectomies    Interval History: Mr Neves comes to clinic with wife for review of treatment for the prostate cancer.Pt shares he is feeling well except for left knee pain which continues but he is walking without appliance.He reports taking the arbiratone and prednisone as ordered and not missing doses. He admits to some hot flashes - experiencing daily but feels he is tolerating them. He is also noting easy tearing about anything but again feels this is tolerable. He denies pain and has no SOB, chest pain, fevers or weakness. He was able to gain weight over time including 17 lbs in last 3 months.    Rest of comprehensive and complete ROS is reviewed and is negative.   Past Medical History:   Diagnosis Date     Actinic keratosis      AK (actinic keratosis) 7/9/2012     Cataract cortical, senile right eye 4/17/2012     DDD (degenerative disc disease), lumbar 1979    s/p 4 back surgeries, spinal cord stimulator implant      Diverticulosis      ED (erectile dysfunction) 2009     GERD (gastroesophageal reflux disease) ~1980     HTN (hypertension), benign ~2000     Macular degeneration, dry, mild, ou 7/23/2016     Multiple lung nodules     Several  "basilar pulmonary nodules <5 mm     HUDSON (obstructive sleep apnea) 10/2005    on CPAP     Other abnormal glucose 01/14/2009     PE (pulmonary thromboembolism) (H) 1981    after back surgery      Pure hypercholesterolemia ~2000     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     Current Outpatient Prescriptions   Medication     abiraterone (ZYTIGA) 250 MG tablet     albuterol (2.5 MG/3ML) 0.083% neb solution     aspirin  MG EC tablet     Calcium Carbonate (CALCIUM 600 PO)     Cholecalciferol (VITAMIN D) 2000 UNITS tablet     [employer] VITAMIN  B12 1000 MCG TABS     furosemide (LASIX) 20 MG tablet     gabapentin (NEURONTIN) 300 MG capsule     ipratropium - albuterol 0.5 mg/2.5 mg/3 mL (DUONEB) 0.5-2.5 (3) MG/3ML neb solution     metoprolol succinate (TOPROL-XL) 25 MG 24 hr tablet     order for DME     oxyCODONE (ROXICODONE) 5 MG IR tablet     predniSONE (DELTASONE) 5 MG tablet     predniSONE (DELTASONE) 5 MG tablet     predniSONE (DELTASONE) 5 MG tablet     quinapril (ACCUPRIL) 40 MG Tab     simvastatin (ZOCOR) 40 MG tablet     No current facility-administered medications for this visit.      Allergies   Allergen Reactions     Morphine Sulfate GI Disturbance     vomiting     Vicodin [Hydrocodone-Acetaminophen] Nausea and Vomiting       Physical Exam: /73  Pulse 63  Temp 99.7  F (37.6  C) (Oral)  Resp 20  Ht 1.816 m (5' 11.5\")  Wt 92.5 kg (204 lb)  SpO2 96%  BMI 28.06 kg/m2   Constitutional: Alert, healthy, and in no distress.   ENT: Eyes bright , No mouth sores  Neck: Supple, No adenopathy.Thyroid symmetric  Cardiac: Heart rate and rhythm is regular and strong without murmur  Respiratory: Breathing easy. Lung sounds clear to auscultation  GI: Abdomen is soft, non-tender, BS normal. No masses or organomegaly  MS: Muscle tone normal, extremities with no edema. Right knee with enlargement  But able to walk well.   Skin: noted discoloration to skin with several small bruises to forearms.   Neuro: " Sensory grossly WNL, gait normal.   Lymph: Normal ant/post cervical, axillary, supraclavicular nodes  Psych: Mentation appears normal and affect normal/bright and smiling    Laboratory Results:   Results for orders placed or performed in visit on 08/09/18   Creatinine   Result Value Ref Range    Creatinine 1.06 0.66 - 1.25 mg/dL    GFR Estimate 67 >60 mL/min/1.7m2    GFR Estimate If Black 81 >60 mL/min/1.7m2   Calcium   Result Value Ref Range    Calcium 9.3 8.5 - 10.1 mg/dL   Albumin level   Result Value Ref Range    Albumin 3.6 3.4 - 5.0 g/dL   PSA tumor marker   Result Value Ref Range    PSA 10.50 (H) 0 - 4 ug/L     Assessment and Plan:   Stage IV adenocarcinoma of the Prostate- Pt has been using Zytiga and prednisone now since 7/2017. He is tolerating well and PSA remains stable at 10.5. He continues to experience hot flashes and easy tearing but feels he is able to tolerate these side effects.   He will see Dr Valenzuela in 6 weeks with treatment plan labs.   Bone metastasis- Using Zometa every 6-8 weeks and will receive today.   Weight gain- pt has good appetite and may be influenced by medication. He is working on increasing protein but has been seeing overall weight gain. He is still at good weight but will need to follow and may need dietician to review diet for overall health.   This was a 25 min visit with > 50% in counseling and coordinating care including education and management of concerns.    Leatha Loredo,CNP

## 2018-08-09 NOTE — MR AVS SNAPSHOT
After Visit Summary   8/9/2018    Dimitri Neves    MRN: [id number]           Patient Information     Date Of Birth          1935        Visit Information        Provider Department      8/9/2018 9:15 AM [place] [facility]        Today's Diagnoses     Bone metastasis (H)    -  1    Malignant neoplasm of prostate (H)           Follow-ups after your visit        Your next 10 appointments already scheduled     Oct 01, 2018  9:45 AM CDT   LAB with LAB ONC [facility] ([facility])    [address] [place] [address]   [phone]           Please do not eat 10-12 hours before your appointment if you are coming in fasting for labs on lipids, cholesterol, or glucose (sugar). This does not apply to pregnant women. Water, hot tea and black coffee (with nothing added) are okay. Do not drink other fluids, diet soda or chew gum.            Oct 01, 2018 10:30 AM CDT   Return Visit with Rafael Valenzuela MD   [facility] ([facility])    [address] [place] [address] [phone]            Oct 01, 2018 11:00 AM CDT   Level O with [address])    [address] [place] [id number] [phone]            Nov 27, 2018 10:30 AM CST   Return Visit with Senthil Jhaveri MD   [facility]    [address] [place] [address] [phone]              Who to contact     If you have questions or need follow up information about today's clinic visit or your schedule please contact [facility] directly at [phone].  Normal or non-critical lab and imaging results will be communicated to you by MyChart, letter or phone within 4 business days after the clinic has received the results. If you do not hear from us within 7 days,  please contact the clinic through [employer] or phone. If you have a critical or abnormal lab result, we will notify you by phone as soon as possible.  Submit refill requests through [employer] or call your pharmacy and they will forward the refill request to us. Please allow 3 business days for your refill to be completed.          Additional Information About Your Visit        [employer] Information     [employer] gives you secure access to your electronic health record. If you see a primary care provider, you can also send messages to your care team and make appointments. If you have questions, please call your primary care clinic.  If you do not have a primary care provider, please call [phone] and they will assist you.      m[employer] is an electronic gateway that provides easy, online access to your medical records. With m[employer], you can request a clinic appointment, read your test results, renew a prescription or communicate with your care team.     To access your existing account, please contact your [facility] or call [phone] for assistance.        Care EveryWhere ID     This is your Care EveryWhere ID. This could be used by other organizations to access your [place] medical records  [id number]         Blood Pressure from Last 3 Encounters:   08/09/18 139/73   06/28/18 144/73   05/29/18 120/64    Weight from Last 3 Encounters:   08/09/18 92.5 kg (204 lb)   06/28/18 89.7 kg (197 lb 12.8 oz)   05/24/18 88.2 kg (194 lb 6.4 oz)              Today, you had the following     No orders found for display       Primary Care Provider Office Phone # Fax #    Reginaldo Muir [phone]       [address] [facility] [address]        Equal Access to Services     [facility] : Hadii nahid Rodriguez, hugh mendez, qaybta naty cruz. So [facility] [phone].    ATENCIÓN: Si habla español, tiene a roberts disposición  servicios gratuitos de asistencia lingüística. Kelsey romero [phone].    We comply with applicable federal civil rights laws and [place] laws. We do not discriminate on the basis of race, color, national origin, age, disability, sex, sexual orientation, or gender identity.            Thank you!     Thank you for choosing [facility]  for your care. Our goal is always to provide you with excellent care. Hearing back from our patients is one way we can continue to improve our services. Please take a few minutes to complete the written survey that you may receive in the mail after your visit with us. Thank you!             Your Updated Medication List - Protect others around you: Learn how to safely use, store and throw away your medicines at www.disposemymeds.org.          This list is accurate as of 8/9/18  9:59 AM.  Always use your most recent med list.                   Brand Name Dispense Instructions for use Diagnosis    abiraterone 250 MG tablet    ZYTIGA    120 tablet    Take 4 tablets (1,000 mg) by mouth daily for 30 doses Take on empty stomach.    Bone metastasis (H), Malignant neoplasm of prostate (H)       albuterol (2.5 MG/3ML) 0.083% neb solution     25 vial    Take 1 vial (2.5 mg) by nebulization every 6 hours as needed for shortness of breath / dyspnea or wheezing    Acute bronchitis, unspecified organism       ASPIRIN PO      Take 81 mg by mouth        CALCIUM 600 PO           CVS vitamin  B12 1000 MCG Tabs   Generic drug:  cyanocobalamin     30 tablet    TAKE 1 TABLET BY MOUTH EVERY DAY    Low vitamin B12 level       furosemide 20 MG tablet    LASIX    90 tablet    Take 1 tablet (20 mg) by mouth daily    Bilateral lower extremity edema, HTN (hypertension), benign       gabapentin 300 MG capsule    NEURONTIN     Take 900 mg by mouth At Bedtime        ipratropium - albuterol 0.5 mg/2.5 mg/3 mL 0.5-2.5 (3) MG/3ML neb solution    DUONEB    3 mL    Take 1 vial (3 mLs) by nebulization  once for 1 dose    Dyspnea, unspecified type       metoprolol succinate 25 MG 24 hr tablet    TOPROL-XL    90 tablet    TAKE 1 TABLET (25 MG) BY MOUTH DAILY    HTN (hypertension), benign       order for DME     1 Device    Equipment being ordered: Nebulizer    Acute bronchitis, unspecified organism       oxyCODONE IR 5 MG tablet    ROXICODONE    30 tablet    Take 1 tablet (5 mg) by mouth every 6 hours as needed for pain    Narcotic dependence, episodic use (H)       * predniSONE 5 MG tablet    DELTASONE    60 tablet    Take 1 tablet (5 mg) by mouth 2 times daily    Bone metastasis (H), Malignant neoplasm of prostate (H)       * predniSONE 5 MG tablet    DELTASONE    60 tablet    Take 1 tablet (5 mg) by mouth 2 times daily    Bone metastasis (H), Malignant neoplasm of prostate (H)       * predniSONE 5 MG tablet    DELTASONE    60 tablet    Take 1 tablet (5 mg) by mouth 2 times daily    Bone metastasis (H), Malignant neoplasm of prostate (H)       quinapril 40 MG Tab    ACCUPRIL    90 tablet    TAKE 1 TABLET (40 MG) BY MOUTH DAILY    HTN (hypertension), benign       simvastatin 40 MG tablet    ZOCOR    45 tablet    TAKE 0.5 TABLETS (20 MG) BY MOUTH DAILY    Hyperlipidemia LDL goal <130       vitamin D 2000 units tablet      Take 1 tablet by mouth daily        * Notice:  This list has 3 medication(s) that are the same as other medications prescribed for you. Read the directions carefully, and ask your doctor or other care provider to review them with you.

## 2018-08-09 NOTE — LETTER
8/9/2018         RE: Dimitri Neves  [address] [place] [address]        Dear Colleague,    Thank you for referring your patient, Dimitri Neves, to the [facility]. Please see a copy of my visit note below.    Oncology Follow Up Visit: August 9, 2018    Oncologist: Dr Rafael Valenzuela  PCP: Reginaldo Muir    Diagnosis: Stage IV Adenocarcinoma of the Prostate   Dimitri Neves is an 82 yo male who had an elevation of PSA to 32 noted by PCP in 6/2013. He was seen by Dr Taylor who had biopsy which proved adenocarcinoma of the prostate. Repeat biopsy in 12/2014 proved [place] 4+4 disease in 5 of cores and 3+3 in remainder of cores. Bone scan found metastasis to upper cervical vertebrae, right posterior 3rd rib and right ischium and posterior left 11th rib with corresponding lytic expansile appearance on CT suspicious for metastasis.    Treatment:   1/2015 began androgen deprivation therapy.  2/2017 began bicalutamide  7/19/2017 began and continues on Abiraterone with prednisone  9/27/2017 orchiectomies    Interval History: Mr Neves comes to clinic with wife for review of treatment for the prostate cancer.Pt shares he is feeling well except for left knee pain which continues but he is walking without appliance.He reports taking the arbiratone and prednisone as ordered and not missing doses. He admits to some hot flashes - experiencing daily but feels he is tolerating them. He is also noting easy tearing about anything but again feels this is tolerable. He denies pain and has no SOB, chest pain, fevers or weakness. He was able to gain weight over time including 17 lbs in last 3 months.    Rest of comprehensive and complete ROS is reviewed and is negative.   Past Medical History:   Diagnosis Date     Actinic keratosis      AK (actinic keratosis) 7/9/2012     Cataract cortical, senile right eye 4/17/2012     DDD (degenerative disc disease), lumbar 1979    s/p 4 back surgeries, spinal cord stimulator  "implant      Diverticulosis      ED (erectile dysfunction) 2009     GERD (gastroesophageal reflux disease) ~1980     HTN (hypertension), benign ~2000     Macular degeneration, dry, mild, ou 7/23/2016     Multiple lung nodules     Several basilar pulmonary nodules <5 mm     HUDSON (obstructive sleep apnea) 10/2005    on CPAP     Other abnormal glucose 01/14/2009     PE (pulmonary thromboembolism) (H) 1981    after back surgery      Pure hypercholesterolemia ~2000     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     Current Outpatient Prescriptions   Medication     abiraterone (ZYTIGA) 250 MG tablet     albuterol (2.5 MG/3ML) 0.083% neb solution     aspirin  MG EC tablet     Calcium Carbonate (CALCIUM 600 PO)     Cholecalciferol (VITAMIN D) 2000 UNITS tablet     CVS VITAMIN  B12 1000 MCG TABS     furosemide (LASIX) 20 MG tablet     gabapentin (NEURONTIN) 300 MG capsule     ipratropium - albuterol 0.5 mg/2.5 mg/3 mL (DUONEB) 0.5-2.5 (3) MG/3ML neb solution     metoprolol succinate (TOPROL-XL) 25 MG 24 hr tablet     order for DME     oxyCODONE (ROXICODONE) 5 MG IR tablet     predniSONE (DELTASONE) 5 MG tablet     predniSONE (DELTASONE) 5 MG tablet     predniSONE (DELTASONE) 5 MG tablet     quinapril (ACCUPRIL) 40 MG Tab     simvastatin (ZOCOR) 40 MG tablet     No current facility-administered medications for this visit.      Allergies   Allergen Reactions     Morphine Sulfate GI Disturbance     vomiting     Vicodin [Hydrocodone-Acetaminophen] Nausea and Vomiting       Physical Exam: /73  Pulse 63  Temp 99.7  F (37.6  C) (Oral)  Resp 20  Ht 1.816 m (5' 11.5\")  Wt 92.5 kg (204 lb)  SpO2 96%  BMI 28.06 kg/m2   Constitutional: Alert, healthy, and in no distress.   ENT: Eyes bright , No mouth sores  Neck: Supple, No adenopathy.Thyroid symmetric  Cardiac: Heart rate and rhythm is regular and strong without murmur  Respiratory: Breathing easy. Lung sounds clear to auscultation  GI: Abdomen is soft, " non-tender, BS normal. No masses or organomegaly  MS: Muscle tone normal, extremities with no edema. Right knee with enlargement  But able to walk well.   Skin: noted discoloration to skin with several small bruises to forearms.   Neuro: Sensory grossly WNL, gait normal.   Lymph: Normal ant/post cervical, axillary, supraclavicular nodes  Psych: Mentation appears normal and affect normal/bright and smiling    Laboratory Results:   Results for orders placed or performed in visit on 08/09/18   Creatinine   Result Value Ref Range    Creatinine 1.06 0.66 - 1.25 mg/dL    GFR Estimate 67 >60 mL/min/1.7m2    GFR Estimate If Black 81 >60 mL/min/1.7m2   Calcium   Result Value Ref Range    Calcium 9.3 8.5 - 10.1 mg/dL   Albumin level   Result Value Ref Range    Albumin 3.6 3.4 - 5.0 g/dL   PSA tumor marker   Result Value Ref Range    PSA 10.50 (H) 0 - 4 ug/L     Assessment and Plan:   Stage IV adenocarcinoma of the Prostate- Pt has been using Zytiga and prednisone now since 7/2017. He is tolerating well and PSA remains stable at 10.5. He continues to experience hot flashes and easy tearing but feels he is able to tolerate these side effects.   He will see Dr Valenzuela in 6 weeks with treatment plan labs.   Bone metastasis- Using Zometa every 6-8 weeks and will receive today.   Weight gain- pt has good appetite and may be influenced by medication. He is working on increasing protein but has been seeing overall weight gain. He is still at good weight but will need to follow and may need dietician to review diet for overall health.   This was a 25 min visit with > 50% in counseling and coordinating care including education and management of concerns.    Leatha Loredo CNP      Again, thank you for allowing me to participate in the care of your patient.        Sincerely,        Leatha Loredo, NP, APRN CNP

## 2018-08-09 NOTE — MR AVS SNAPSHOT
After Visit Summary   8/9/2018    Dimitri Neves    MRN: [id number]           Patient Information     Date Of Birth          1935        Visit Information        Provider Department      8/9/2018 8:45 AM Leatha Loredo APRN CNP [facility]         Follow-ups after your visit        Your next 10 appointments already scheduled     Oct 01, 2018  9:45 AM CDT   [facility])    [address] [place] [address] [phone]           Please do not eat 10-12 hours before your appointment if you are coming in fasting for labs on lipids, cholesterol, or glucose (sugar). This does not apply to pregnant women. Water, hot tea and black coffee (with nothing added) are okay. Do not drink other fluids, diet soda or chew gum.            Oct 01, 2018 10:30 AM CDT   Return Visit with Rafael Valenzuela MD   [facility] ([facility])    [address] [place] [address] [phone]            Oct 01, 2018 11:00 AM CDT   Level O with BAY 2 INFUSION   [facility])    [address] [place] [address] [phone]            Nov 27, 2018 10:30 AM CST   Return Visit with Senthil Jhaveri MD   [facility] ([facility])    [address] [facility] [address] [phone]              Who to contact     If you have questions or need follow up information about today's clinic visit or your schedule please contact [facility] directly at [phone].  Normal or non-critical lab and imaging results will be communicated to you by MyChart, letter or phone within 4 business days after the clinic has received the results. If you do not hear from us within 7 days, please contact the clinic through MyChart or phone. If you have a critical or abnormal  "lab result, we will notify you by phone as soon as possible.  Submit refill requests through [employer] or call your pharmacy and they will forward the refill request to us. Please allow 3 business days for your refill to be completed.          Additional Information About Your Visit        [employer] Information     [employer] gives you secure access to your electronic health record. If you see a primary care provider, you can also send messages to your care team and make appointments. If you have questions, please call your primary care clinic.  If you do not have a primary care provider, please call [phone] and they will assist you.      [employer] is an electronic gateway that provides easy, online access to your medical records. With [employer], you can request a clinic appointment, read your test results, renew a prescription or communicate with your care team.     To access your existing account, please contact your [facility] or call [phone] for assistance.        Care EveryWhere ID     This is your Care EveryWhere ID. This could be used by other organizations to access your [place] medical records  [id number]        Your Vitals Were     Pulse Temperature Respirations Height Pulse Oximetry BMI (Body Mass Index)    63 99.7  F (37.6  C) (Oral) 20 1.816 m (5' 11.5\") 96% 28.06 kg/m2       Blood Pressure from Last 3 Encounters:   08/09/18 139/73   06/28/18 144/73   05/29/18 120/64    Weight from Last 3 Encounters:   08/09/18 92.5 kg (204 lb)   06/28/18 89.7 kg (197 lb 12.8 oz)   05/24/18 88.2 kg (194 lb 6.4 oz)              Today, you had the following     No orders found for display       Primary Care Provider Office Phone # Fax #    Reginaldo Muir [phone] [address] [facility] [place] [address]        Equal Access to Services     CARLITA LEBLANC AH: Ramos Rodriguez, waaxda luqadaha, qaybta kaalmalester holley, naty michaels " lamagdalena russo. So [facility] [phone].    ATENCIÓN: Si habla ted, tiene a roberts disposición servicios gratuitos de asistencia lingüística. Kelsey romero [phone].    We comply with applicable federal civil rights laws and [place] laws. We do not discriminate on the basis of race, color, national origin, age, disability, sex, sexual orientation, or gender identity.            Thank you!     Thank you for choosing [facility]  for your care. Our goal is always to provide you with excellent care. Hearing back from our patients is one way we can continue to improve our services. Please take a few minutes to complete the written survey that you may receive in the mail after your visit with us. Thank you!             Your Updated Medication List - Protect others around you: Learn how to safely use, store and throw away your medicines at www.disposemymeds.org.          This list is accurate as of 8/9/18  1:03 PM.  Always use your most recent med list.                   Brand Name Dispense Instructions for use Diagnosis    abiraterone 250 MG tablet    ZYTIGA    120 tablet    Take 4 tablets (1,000 mg) by mouth daily for 30 doses Take on empty stomach.    Bone metastasis (H), Malignant neoplasm of prostate (H)       albuterol (2.5 MG/3ML) 0.083% neb solution     25 vial    Take 1 vial (2.5 mg) by nebulization every 6 hours as needed for shortness of breath / dyspnea or wheezing    Acute bronchitis, unspecified organism       ASPIRIN PO      Take 81 mg by mouth        CALCIUM 600 PO           CVS vitamin  B12 1000 MCG Tabs   Generic drug:  cyanocobalamin     30 tablet    TAKE 1 TABLET BY MOUTH EVERY DAY    Low vitamin B12 level       furosemide 20 MG tablet    LASIX    90 tablet    Take 1 tablet (20 mg) by mouth daily    Bilateral lower extremity edema, HTN (hypertension), benign       gabapentin 300 MG capsule    NEURONTIN     Take 900 mg by mouth At Bedtime        ipratropium - albuterol 0.5 mg/2.5 mg/3 mL  0.5-2.5 (3) MG/3ML neb solution    DUONEB    3 mL    Take 1 vial (3 mLs) by nebulization once for 1 dose    Dyspnea, unspecified type       metoprolol succinate 25 MG 24 hr tablet    TOPROL-XL    90 tablet    TAKE 1 TABLET (25 MG) BY MOUTH DAILY    HTN (hypertension), benign       order for DME     1 Device    Equipment being ordered: Nebulizer    Acute bronchitis, unspecified organism       oxyCODONE IR 5 MG tablet    ROXICODONE    30 tablet    Take 1 tablet (5 mg) by mouth every 6 hours as needed for pain    Narcotic dependence, episodic use (H)       * predniSONE 5 MG tablet    DELTASONE    60 tablet    Take 1 tablet (5 mg) by mouth 2 times daily    Bone metastasis (H), Malignant neoplasm of prostate (H)       * predniSONE 5 MG tablet    DELTASONE    60 tablet    Take 1 tablet (5 mg) by mouth 2 times daily    Bone metastasis (H), Malignant neoplasm of prostate (H)       * predniSONE 5 MG tablet    DELTASONE    60 tablet    Take 1 tablet (5 mg) by mouth 2 times daily    Bone metastasis (H), Malignant neoplasm of prostate (H)       quinapril 40 MG Tab    ACCUPRIL    90 tablet    TAKE 1 TABLET (40 MG) BY MOUTH DAILY    HTN (hypertension), benign       simvastatin 40 MG tablet    ZOCOR    45 tablet    TAKE 0.5 TABLETS (20 MG) BY MOUTH DAILY    Hyperlipidemia LDL goal <130       vitamin D 2000 units tablet      Take 1 tablet by mouth daily        * Notice:  This list has 3 medication(s) that are the same as other medications prescribed for you. Read the directions carefully, and ask your doctor or other care provider to review them with you.

## 2018-08-09 NOTE — NURSING NOTE
"Oncology Rooming Note    August 9, 2018 8:55 AM   Dimitri Neves is a 83 year old male who presents for:    Chief Complaint   Patient presents with     Oncology Clinic Visit     prior to treatment      Initial Vitals: /73  Pulse 63  Temp 99.7  F (37.6  C) (Oral)  Resp 20  Ht 1.816 m (5' 11.5\")  Wt 92.5 kg (204 lb)  SpO2 96%  BMI 28.06 kg/m2 Estimated body mass index is 28.06 kg/(m^2) as calculated from the following:    Height as of this encounter: 1.816 m (5' 11.5\").    Weight as of this encounter: 92.5 kg (204 lb). Body surface area is 2.16 meters squared.  No Pain (0) Comment: Data Unavailable   No LMP for male patient.  Allergies reviewed: Yes  Medications reviewed: Yes    Medications: Medication refills not needed today.  Pharmacy name entered into [employer]:    [id number] - [place], [place] - [address], [facility] - [place], [place] - [address] [place], [place] [address]O PHARMACY - MAIL ORDER ONLY - [id number] - [place], [place] - [address] [facility]       5 minutes for nursing intake (face to face time)     Maritza Cline LPN              "

## 2018-08-09 NOTE — TELEPHONE ENCOUNTER
Oral Chemotherapy Monitoring Program      Primary Oncologist: Nicolas  Primary Oncology Clinic: [facility]  Cancer Diagnosis: Prostate Cancer      Therapy History:      abiraterone (Zytiga) 1000 mg PO daily with Prednisone 5 mg PO BID  Start Date: 8/19/17      Drug Interaction Assessment: No new medications as of 6/11/18  1. Abiraterone will decrease Warfarin metabolism, potentially raising INR; patient is aware and will inquire about more frequent INR monitoring at the start of therapy  2. Abiraterone inhibits Metoprolol metabolism with a slight risk of causing bradycardia      Lab Monitoring Plan:  C1D1+   CMP, BP C2D1+ Call, CMP, BP C3D1+ Call, CMP, BP C4D1+ Call, CMP, BP C5D1+ Call, CMP, BP C6D1+ Call, CMP, BP   C1D8+    C2D8+    C3D8+    C4D8+    C5D8+    C6D8+      C1D15+ Call, CMP C2D15+ Call, CMP C3D15+    C4D15+    C5D15+    C6D15+      C1D22+    C2D22+    C3D22+    C4D22+    C5D22+    C6D22+          Subjective/Objective:  Dimitri Neves is a 83 year old male seen by Leatha in clinic I was not present, for a follow-up visit for oral chemotherapy. Per Leatha, Dimitri reports that he is doing well on abiraterone and denies any new changes.He denies any missed or extra doses.     ORAL CHEMOTHERAPY 11/27/2017 12/19/2017 3/1/2018 4/30/2018 6/11/2018 6/28/2018 8/9/2018   Drug Name Zytiga (Abiraterone) Zytiga (Abiraterone) Zytiga (Abiraterone) Zytiga (Abiraterone) Zytiga (Abiraterone) Zytiga (Abiraterone) Zytiga (Abiraterone)   Current Dosage 1000mg 1000mg 1000mg 1000mg 1000mg 1000mg 1000mg   Current Schedule Daily Daily Daily Daily Daily Daily Daily   Cycle Details Continuous Continuous Continuous Continuous Continuous Continuous Continuous   Start Date of Last Cycle 11/19/2017 12/19/2017 - 4/4/2018 - - -   Planned next cycle start date 12/19/2017 - - 5/3/2018 - - -   Doses missed in last 2 weeks 0 0 0 0 0 0 0   Adherence Assessment Adherent Adherent Adherent Adherent Adherent Adherent -   Adverse Effects No AE  "identified during assessment No AE identified during assessment Other (see note for details) No AE identified during assessment No AE identified during assessment No AE identified during assessment No AE identified during assessment   Other (see note for details) - - (No Data) - - - -   Pharmacist intervention? - - No - - - -   Any new drug interactions? No No No No No No No   Is the dose as ordered appropriate for the patient? Yes Yes - Yes Yes Yes Yes   Is the patient currently in pain? Assessed in last 30 days. Assessed in last 30 days. No Assessed in last 30 days. - Assessed in last 30 days. Assessed in last 30 days.   Has the patient been assessed within the past 6 months for depression? - Yes Yes Yes - Yes Yes   Has the patient missed any days of school, work, or other routine activity? No - - - - - -       Vitals:  BP:   BP Readings from Last 1 Encounters:   08/09/18 139/73     Wt Readings from Last 1 Encounters:   08/09/18 92.5 kg (204 lb)     Estimated body surface area is 2.16 meters squared as calculated from the following:    Height as of an earlier encounter on 8/9/18: 1.816 m (5' 11.5\").    Weight as of an earlier encounter on 8/9/18: 92.5 kg (204 lb).    Labs:  No results found for NA within last 30 days.     No results found for K within last 30 days.     _  Result Component Current Result Ref Range   Calcium 9.3 (8/9/2018) 8.5 - 10.1 mg/dL     No results found for Mag within last 30 days.     No results found for Phos within last 30 days.     _  Result Component Current Result Ref Range   Albumin 3.6 (8/9/2018) 3.4 - 5.0 g/dL     No results found for BUN within last 30 days.     _  Result Component Current Result Ref Range   Creatinine 1.06 (8/9/2018) 0.66 - 1.25 mg/dL       No results found for AST within last 30 days.     No results found for ALT within last 30 days.     No results found for BILITOTAL within last 30 days.       No results found for WBC within last 30 days.     No results found for " HGB within last 30 days.     No results found for PLT within last 30 days.     No results found for ANC within last 30 days.     PSA 10.5 8/9/18    Assessment/Plan:  Continue same dose as ordered    Follow-Up:  1 month     Refill Due:  SURI Pepe, PharmD, BCOP  August 9, 2018

## 2018-08-23 DIAGNOSIS — C61 MALIGNANT NEOPLASM OF PROSTATE (H): ICD-10-CM

## 2018-08-23 DIAGNOSIS — C79.51 BONE METASTASIS: Primary | ICD-10-CM

## 2018-08-23 RX ORDER — PREDNISONE 5 MG/1
5 TABLET ORAL 2 TIMES DAILY
Qty: 60 TABLET | Refills: 0 | Status: SHIPPED | OUTPATIENT
Start: 2018-08-23 | End: 2018-09-04

## 2018-08-23 RX ORDER — ABIRATERONE ACETATE 250 MG/1
1000 TABLET ORAL DAILY
Qty: 120 TABLET | Refills: 0 | Status: SHIPPED | OUTPATIENT
Start: 2018-08-23 | End: 2018-09-22

## 2018-09-04 ENCOUNTER — OFFICE VISIT (OUTPATIENT)
Dept: FAMILY MEDICINE | Facility: CLINIC | Age: 83
End: 2018-09-04
Payer: COMMERCIAL

## 2018-09-04 VITALS
RESPIRATION RATE: 20 BRPM | SYSTOLIC BLOOD PRESSURE: 106 MMHG | BODY MASS INDEX: 27.78 KG/M2 | OXYGEN SATURATION: 98 % | WEIGHT: 202 LBS | TEMPERATURE: 96.9 F | DIASTOLIC BLOOD PRESSURE: 60 MMHG | HEART RATE: 96 BPM

## 2018-09-04 DIAGNOSIS — C61 MALIGNANT NEOPLASM OF PROSTATE (H): ICD-10-CM

## 2018-09-04 DIAGNOSIS — G47.33 OSA (OBSTRUCTIVE SLEEP APNEA): ICD-10-CM

## 2018-09-04 DIAGNOSIS — Z96.651 STATUS POST TOTAL RIGHT KNEE REPLACEMENT: ICD-10-CM

## 2018-09-04 DIAGNOSIS — R06.09 DYSPNEA ON EXERTION: Primary | ICD-10-CM

## 2018-09-04 DIAGNOSIS — I26.99 PE (PULMONARY THROMBOEMBOLISM) (H): ICD-10-CM

## 2018-09-04 DIAGNOSIS — F32.1 MODERATE SINGLE CURRENT EPISODE OF MAJOR DEPRESSIVE DISORDER (H): ICD-10-CM

## 2018-09-04 DIAGNOSIS — C79.51 BONE METASTASIS: ICD-10-CM

## 2018-09-04 DIAGNOSIS — R53.81 PHYSICAL DECONDITIONING: ICD-10-CM

## 2018-09-04 PROCEDURE — 99214 OFFICE O/P EST MOD 30 MIN: CPT | Performed by: INTERNAL MEDICINE

## 2018-09-04 NOTE — PROGRESS NOTES
SUBJECTIVE:   Dimitri Neves is a 83 year old male who presents to clinic today for the following health issues with wife:    Shortness of Breath  He has experienced dyspnea with exertion for 3-6 months. He has a social history of smoking years ago. Patient is having trouble walking down to the mailbox and feeling winded. He does not understand what is going on. He is interested in having a stress test completed, however he has had one before. I spent some significant time in review with chart and this patient has dyspnea on exertion and easily out of breath  With no evidence of a new pathological process. He has had adequate evaluations already for organic pathological processes. He has multiple medical problems and serious issues which easily account for his current symptoms . He's seeing Dr. Rafael Valenzuela with [facility] and is currently being treated with ZYTIGA  (abiraterone acetate)     Musculoskeletal  He had a total right knee replacement in October 2016. Patient went in 2 months later to have an infection cleaned out, and then started antibiotics. Since then, he has never experienced relief. He regrets having the surgery. He is experiencing pain.     They are members of a health club, but have not been going as often as they should.     Mood  His wife has noticed his mood has changed.     Medical History  Patient has a medical history of Prostate Cancer.    Problem list and histories reviewed & adjusted, as indicated.  Additional history: as documented    Patient Active Problem List   Diagnosis     HTN (Hypertension), Benign goal <140/90     Narcotic dependence, episodic use (H)     ED (erectile dysfunction)     HUDSON (obstructive sleep apnea)     PE (pulmonary thromboembolism) (H)     HYPERLIPIDEMIA LDL GOAL <130     Advanced directives, counseling/discussion     Abnormal glucose     AK (actinic keratosis)     SNHL (sensorineural hearing loss)     Endolymphatic hydrops     History of  squamous cell carcinoma     Pseudophakia, Yag Caps, ou     Venous (peripheral) insufficiency     Malignant neoplasm of prostate (H)     Dyspnea on exertion     Posterior vitreous detachment, bilateral     Iris nevus, ou     Dry eye syndrome, bilateral     Macular degeneration, dry, mild, ou     Cotton wool spots, od     Bone metastasis (H)     Past Surgical History:   Procedure Laterality Date     ARTHROSCOPY KNEE RT/LT  ~1995    Right     C LUMBAR SPINE FUSION,ANTER APPRCH  8/2007    four times total, latest 8/07     CATARACT IOL, RT/LT       LASER YAG CAPSULOTOMY      both eyes     ORCHIECTOMY SCROTAL Bilateral 9/27/2017    Procedure: ORCHIECTOMY SCROTAL;  Bilateral orchiectomy scrotal approach;  Surgeon: Senthil Taylor MD;  Location: [facility] OR     PHACOEMULSIFICATION CLEAR CORNEA WITH STANDARD INTRAOCULAR LENS IMPLANT  6-18-12/7-30-12    right/left eye     ROTATOR CUFF REPAIR RT/LT  ~1995    right       Social History   Substance Use Topics     Smoking status: Former Smoker     Packs/day: 1.00     Years: 25.00     Types: Cigarettes     Quit date: 1/2/1976     Smokeless tobacco: Never Used      Comment: lives in smoke free household     Alcohol use 7.0 oz/week     14 Standard drinks or equivalent per week      Comment: 2-3 drinks every night     Family History   Problem Relation Age of Onset     Cancer Father      Lung 64y     Diabetes Brother      Hypertension Brother      Cancer Mother      Liver     Cerebrovascular Disease Mother      Eye Disorder Mother      Glaucoma Mother      Cerebrovascular Disease Maternal Grandmother      C.A.D. No family hx of      Thyroid Disease No family hx of      Macular Degeneration No family hx of          Current Outpatient Prescriptions   Medication Sig Dispense Refill     abiraterone (ZYTIGA) 250 MG tablet Take 4 tablets (1,000 mg) by mouth daily for 30 doses Take on empty stomach. 120 tablet 0     albuterol (2.5 MG/3ML) 0.083% neb solution Take 1 vial (2.5 mg) by  nebulization every 6 hours as needed for shortness of breath / dyspnea or wheezing 25 vial 3     ASPIRIN PO Take 81 mg by mouth       Calcium Carbonate (CALCIUM 600 PO)        Cholecalciferol (VITAMIN D) 2000 UNITS tablet Take 1 tablet by mouth daily       CVS VITAMIN  B12 1000 MCG TABS TAKE 1 TABLET BY MOUTH EVERY DAY 30 tablet 5     furosemide (LASIX) 20 MG tablet Take 1 tablet (20 mg) by mouth daily 90 tablet 1     gabapentin (NEURONTIN) 300 MG capsule Take 900 mg by mouth At Bedtime       metoprolol succinate (TOPROL-XL) 25 MG 24 hr tablet TAKE 1 TABLET (25 MG) BY MOUTH DAILY 90 tablet 0     order for DME Equipment being ordered: Nebulizer 1 Device 0     oxyCODONE (ROXICODONE) 5 MG IR tablet Take 1 tablet (5 mg) by mouth every 6 hours as needed for pain 30 tablet 0     predniSONE (DELTASONE) 5 MG tablet Take 1 tablet (5 mg) by mouth 2 times daily 60 tablet 0     quinapril (ACCUPRIL) 40 MG Tab TAKE 1 TABLET (40 MG) BY MOUTH DAILY 90 tablet 0     sertraline (ZOLOFT) 50 MG tablet Take 1 tablet (50 mg) by mouth daily 30 tablet 1     simvastatin (ZOCOR) 40 MG tablet TAKE 0.5 TABLETS (20 MG) BY MOUTH DAILY 45 tablet 3     ipratropium - albuterol 0.5 mg/2.5 mg/3 mL (DUONEB) 0.5-2.5 (3) MG/3ML neb solution Take 1 vial (3 mLs) by nebulization once for 1 dose 3 mL 0     Labs reviewed in EPIC    Reviewed and updated as needed this visit by clinical staff  Tobacco  Allergies  Meds  Med Hx  Surg Hx  Fam Hx  Soc Hx      Reviewed and updated as needed this visit by Provider         ROS:  Constitutional, HEENT, cardiovascular, pulmonary, GI, , musculoskeletal, neuro, skin, endocrine and psych systems are negative, except as otherwise noted.    This document serves as a record of the services and decisions personally performed and made by Reginaldo Muir MD. It was created on his behalf by Jenise Salgado, a trained medical scribe. The creation of this document is based on the provider's statements to the medical  vinhzayra Salgado 11:02 AM September 4, 2018    OBJECTIVE:     /60  Pulse 96  Temp 96.9  F (36.1  C) (Oral)  Resp 20  Wt 91.6 kg (202 lb)  SpO2 98%  BMI 27.78 kg/m2  Body mass index is 27.78 kg/(m^2).  GENERAL APPEARANCE: healthy, alert and no distress  RESP: lungs clear to auscultation - no rales, rhonchi or wheezes  CV: regular rates and rhythm, normal S1 S2, no S3 or S4 and no murmur, click or rub  MS: extremities normal- no gross deformities noted  SKIN: no suspicious lesions or rashes  PSYCH: mentation appears normal and affect normal/bright  ASSESSMENT/PLAN:     1. Dyspnea on exertion  Multifactorial and explained via current known pathological processes. His current symptoms include normal oxygen and no evidence of infectious, or auto-immune diseases as well as known neoplastic disease     2. HUDSON (obstructive sleep apnea)  Reconditioning to continue treatment     3. PE (pulmonary thromboembolism) (H)  Doubt a relationship to shortness of breath status    4. Status post total right knee replacement  Is a major contributing factor to deconditioning     5. Malignant neoplasm of prostate (H)  As detailed above     6. Bone metastasis (H)  As above     7. Physical deconditioning  Carefully explained to patient     8. Moderate single current episode of major depressive disorder (H)  There is a new diagnosis of depression base don discussion and today's PHQ9 depression survey which is around 12  - sertraline (ZOLOFT) 50 MG tablet; Take 1 tablet (50 mg) by mouth daily  Dispense: 30 tablet; Refill: 1  Follow up 1-2 months     Patient Instructions     Start taking Zoloft 15mg once daily. Warned will not see improvement for 3 weeks.     Return in 1-2 months.         [facility]    If you have any questions regarding to your visit please contact your care team:     Team Pink:   Clinic Hours Telephone Number   Internal Medicine:  Dr. Deborah Taylor NP 7am-7pm   Monday - Thursday   7am-5pm  Fridays  [phone]  (Appointment scheduling available 24/7)   Urgent Care - [place] and [place] - 11am-9pm Monday-Friday Saturday-Sunday- 9am-5pm   [place] - 5pm-9pm Monday-Friday Saturday-Sunday- 9am-5pm  [phone] - [place]  [phone] - [place]       What options do I have for a visit other than an office visit? We offer electronic visits (e-visits) and telephone visits, when medically appropriate.  Please check with your medical insurance to see if these types of visits are covered, as you will be responsible for any charges that are not paid by your insurance.      You can use GOGETMi / ?????.?? (secure electronic communication) to access to your chart, send your primary care provider a message, or make an appointment. Ask a team member how to get started.     For a price quote for your services, please call our Consumer Price Line at [phone] or our Imaging Cost estimation line at [phone] (for imaging tests).  Anaid PERSAUD CMA ([facility])      The information in this document, created by the medical scribe for me, accurately reflects the services I personally performed and the decisions made by me. I have reviewed and approved this document for accuracy prior to leaving the patient care area.  September 4, 2018 11:07 AM    Reginaldo Muir MD  [facility]

## 2018-09-04 NOTE — MR AVS SNAPSHOT
After Visit Summary   9/4/2018    Dimitri Neves    MRN: [id number]           Patient Information     Date Of Birth          1935        Visit Information        Provider Department      9/4/2018 10:30 AM Reginaldo Muir MD [facility]        Today's Diagnoses     Dyspnea on exertion    -  1    HUDSON (obstructive sleep apnea)        PE (pulmonary thromboembolism) (H)        Status post total right knee replacement        Malignant neoplasm of prostate (H)        Bone metastasis (H)        Physical deconditioning        Moderate single current episode of major depressive disorder (H)          Care Instructions    Start taking Zoloft 15mg once daily. Warned will not see improvement for 3 weeks.     Return in 1-2 months.         [facility]    If you have any questions regarding to your visit please contact your care team:     Team Pink:   Clinic Hours Telephone Number   Internal Medicine:  Dr. Deborah Taylor NP 7am-7pm  Monday - Thursday   7am-5pm  Fridays  [phone]  (Appointment scheduling available 24/7)   Urgent Care - [place] - 11am-9pm Monday-Friday Saturday-Sunday- 9am-5pm   [place] - 5pm-9pm Monday-Friday Saturday-Sunday- 9am-5pm  [phone] - [place]  [phone] - [place]       What options do I have for a visit other than an office visit? We offer electronic visits (e-visits) and telephone visits, when medically appropriate.  Please check with your medical insurance to see if these types of visits are covered, as you will be responsible for any charges that are not paid by your insurance.      You can use InterEx (secure electronic communication) to access to your chart, send your primary care provider a message, or make an appointment. Ask a team member how to get started.     For a price quote for your services, please call our Consumer Price Line at [phone] or our Imaging Cost  estimation line at [phone] (for imaging tests).  Anaid PERSAUD CMA ([facility])            Follow-ups after your visit        Follow-up notes from your care team     Return in about 1 month (around 10/4/2018).      Your next 10 appointments already scheduled     Oct 01, 2018  9:45 AM CDT   LAB with LAB ONC [facility] ([facility])    [address] [place] [id number]   [phone]           Please do not eat 10-12 hours before your appointment if you are coming in fasting for labs on lipids, cholesterol, or glucose (sugar). This does not apply to pregnant women. Water, hot tea and black coffee (with nothing added) are okay. Do not drink other fluids, diet soda or chew gum.            Oct 01, 2018 10:30 AM CDT   Return Visit with Rafael Valenzuela MD   [facility]    [address] [place] [id number] [phone]            Oct 01, 2018 11:00 AM CDT   Level O with BAY 2 INFUSION   [facility] ([facility])    [address] [place] [id number] [phone]            Nov 27, 2018 10:30 AM CST   Return Visit with Senthil Jhaveri MD   [facility]    [address] [facility] [address] [phone]              Who to contact     If you have questions or need follow up information about today's clinic visit or your schedule please contact [facility] directly at [phone].  Normal or non-critical lab and imaging results will be communicated to you by MyChart, letter or phone within 4 business days after the clinic has received the results. If you do not hear from us within 7 days, please contact the clinic through MyChart or phone. If you have a critical or abnormal lab result, we will notify you by phone as soon as possible.  Submit refill requests through [employer] or call your  pharmacy and they will forward the refill request to us. Please allow 3 business days for your refill to be completed.          Additional Information About Your Visit        [employer] Information     [employer] gives you secure access to your electronic health record. If you see a primary care provider, you can also send messages to your care team and make appointments. If you have questions, please call your primary care clinic.  If you do not have a primary care provider, please call [phone] and they will assist you.        Care EveryWhere ID     This is your Care EveryWhere ID. This could be used by other organizations to access your [place] medical records  [id number]        Your Vitals Were     Pulse Temperature Respirations Pulse Oximetry BMI (Body Mass Index)       96 96.9  F (36.1  C) (Oral) 20 98% 27.78 kg/m2        Blood Pressure from Last 3 Encounters:   09/04/18 106/60   08/09/18 139/73   06/28/18 144/73    Weight from Last 3 Encounters:   09/04/18 202 lb (91.6 kg)   08/09/18 204 lb (92.5 kg)   06/28/18 197 lb 12.8 oz (89.7 kg)              We Performed the Following     PAF COMPLETED          Today's Medication Changes          These changes are accurate as of 9/4/18 11:09 AM.  If you have any questions, ask your nurse or doctor.               Start taking these medicines.        Dose/Directions    sertraline 50 MG tablet   Commonly known as:  ZOLOFT   Used for:  Moderate single current episode of major depressive disorder (H)   Started by:  Reginaldo Muir MD        Dose:  50 mg   Take 1 tablet (50 mg) by mouth daily   Quantity:  30 tablet   Refills:  1            Where to get your medicines      These medications were sent to [facility]/pharmacy #[id number] - [place], [place] - [address]  [address] , [place] [address]     Phone:  [phone]     sertraline 50 MG tablet                Primary Care Provider Office Phone # Fax #    Reginaldo Muir [phone]        [address] [facility] [address]        Equal Access to Services     YESENIADAVIDSON DEANA : Hadii nahid martins tomas Sobriiali, waaxda luqadaha, qaybta kajeisonda ashlynrebeccalester, naty rocha timothysylvia jacobsrileyofelia russo. [facility] [phone].    ATENCIÓN: Si habla español, tiene a roberts disposición servicios gratuitos de asistencia lingüística. [facility] [phone].    We comply with applicable federal civil rights laws and [place] laws. We do not discriminate on the basis of race, color, national origin, age, disability, sex, sexual orientation, or gender identity.            Thank you!     Thank you for choosing [facility]  for your care. Our goal is always to provide you with excellent care. Hearing back from our patients is one way we can continue to improve our services. Please take a few minutes to complete the written survey that you may receive in the mail after your visit with us. Thank you!             Your Updated Medication List - Protect others around you: Learn how to safely use, store and throw away your medicines at www.disposemymeds.org.          This list is accurate as of 9/4/18 11:09 AM.  Always use your most recent med list.                   Brand Name Dispense Instructions for use Diagnosis    abiraterone 250 MG tablet    ZYTIGA    120 tablet    Take 4 tablets (1,000 mg) by mouth daily for 30 doses Take on empty stomach.    Bone metastasis (H), Malignant neoplasm of prostate (H)       albuterol (2.5 MG/3ML) 0.083% neb solution     25 vial    Take 1 vial (2.5 mg) by nebulization every 6 hours as needed for shortness of breath / dyspnea or wheezing    Acute bronchitis, unspecified organism       ASPIRIN PO      Take 81 mg by mouth        CALCIUM 600 PO           CVS vitamin  B12 1000 MCG Tabs   Generic drug:  cyanocobalamin     30 tablet    TAKE 1 TABLET BY MOUTH EVERY DAY    Low vitamin B12 level       furosemide 20 MG tablet    LASIX    90 tablet    Take 1 tablet (20 mg) by mouth daily     Bilateral lower extremity edema, HTN (hypertension), benign       gabapentin 300 MG capsule    NEURONTIN     Take 900 mg by mouth At Bedtime        ipratropium - albuterol 0.5 mg/2.5 mg/3 mL 0.5-2.5 (3) MG/3ML neb solution    DUONEB    3 mL    Take 1 vial (3 mLs) by nebulization once for 1 dose    Dyspnea, unspecified type       metoprolol succinate 25 MG 24 hr tablet    TOPROL-XL    90 tablet    TAKE 1 TABLET (25 MG) BY MOUTH DAILY    HTN (hypertension), benign       order for DME     1 Device    Equipment being ordered: Nebulizer    Acute bronchitis, unspecified organism       oxyCODONE IR 5 MG tablet    ROXICODONE    30 tablet    Take 1 tablet (5 mg) by mouth every 6 hours as needed for pain    Narcotic dependence, episodic use (H)       predniSONE 5 MG tablet    DELTASONE    60 tablet    Take 1 tablet (5 mg) by mouth 2 times daily    Bone metastasis (H), Malignant neoplasm of prostate (H)       quinapril 40 MG Tab    ACCUPRIL    90 tablet    TAKE 1 TABLET (40 MG) BY MOUTH DAILY    HTN (hypertension), benign       sertraline 50 MG tablet    ZOLOFT    30 tablet    Take 1 tablet (50 mg) by mouth daily    Moderate single current episode of major depressive disorder (H)       simvastatin 40 MG tablet    ZOCOR    45 tablet    TAKE 0.5 TABLETS (20 MG) BY MOUTH DAILY    Hyperlipidemia LDL goal <130       vitamin D 2000 units tablet      Take 1 tablet by mouth daily

## 2018-09-04 NOTE — PATIENT INSTRUCTIONS
Start taking Zoloft 15mg once daily. Warned will not see improvement for 3 weeks.     Return in 1-2 months.         [facility]    If you have any questions regarding to your visit please contact your care team:     Team Pink:   Clinic Hours Telephone Number   Internal Medicine:  Dr. Deborah Taylor, NP 7am-7pm  Monday - Thursday   7am-5pm  Fridays  [phone]  (Appointment scheduling available 24/7)   Urgent Care - [place] - 11am-9pm Monday-Friday Saturday-Sunday- 9am-5pm   [place] - 5pm-9pm Monday-Friday Saturday-Sunday- 9am-5pm  [phone] - [place]  [phone] - [place]       What options do I have for a visit other than an office visit? We offer electronic visits (e-visits) and telephone visits, when medically appropriate.  Please check with your medical insurance to see if these types of visits are covered, as you will be responsible for any charges that are not paid by your insurance.      You can use [employer] (secure electronic communication) to access to your chart, send your primary care provider a message, or make an appointment. Ask a team member how to get started.     For a price quote for your services, please call our Consumer Price Line at [phone] or our Imaging Cost estimation line at [phone] (for imaging tests).  Anaid PERSAUD CMA ([facility])

## 2018-09-06 ASSESSMENT — PATIENT HEALTH QUESTIONNAIRE - PHQ9: SUM OF ALL RESPONSES TO PHQ QUESTIONS 1-9: 17

## 2018-09-10 ENCOUNTER — TELEPHONE (OUTPATIENT)
Dept: FAMILY MEDICINE | Facility: CLINIC | Age: 83
End: 2018-09-10

## 2018-09-10 NOTE — TELEPHONE ENCOUNTER
Reason for Call:  Other call back    Detailed comments: Patient states that he started talking the medication Zoloft and had a bunch of side effects. He stopped taking it Sunday and is now feeling better. States he had vibrations in his body, no apatite and dizziness.     Phone Number Patient can be reached at: Home number on file [phone] (home)    Best Time: any     Can we leave a detailed message on this number? YES    Call taken on 9/10/2018 at 9:37 AM by Hill Suresh

## 2018-09-18 DIAGNOSIS — C61 MALIGNANT NEOPLASM OF PROSTATE (H): ICD-10-CM

## 2018-09-18 DIAGNOSIS — C79.51 BONE METASTASIS: Primary | ICD-10-CM

## 2018-09-18 DIAGNOSIS — R79.89 LOW VITAMIN B12 LEVEL: ICD-10-CM

## 2018-09-18 RX ORDER — PREDNISONE 5 MG/1
5 TABLET ORAL 2 TIMES DAILY
Qty: 60 TABLET | Refills: 0 | Status: SHIPPED | OUTPATIENT
Start: 2018-09-18 | End: 2018-11-23

## 2018-09-18 RX ORDER — ABIRATERONE ACETATE 250 MG/1
1000 TABLET ORAL DAILY
Qty: 120 TABLET | Refills: 0 | Status: SHIPPED | OUTPATIENT
Start: 2018-09-18 | End: 2018-10-18

## 2018-09-24 DIAGNOSIS — E78.5 HYPERLIPIDEMIA LDL GOAL <130: ICD-10-CM

## 2018-09-25 RX ORDER — SIMVASTATIN 40 MG
TABLET ORAL
Qty: 15 TABLET | Refills: 0 | Status: SHIPPED | OUTPATIENT
Start: 2018-09-25 | End: 2018-10-22

## 2018-09-25 NOTE — TELEPHONE ENCOUNTER
Pending Prescriptions:                       Disp   Refills    simvastatin (ZOCOR) 40 MG tablet [Pharmac*45 tab*3            Sig: TAKE 0.5 TABLETS (20 MG) BY MOUTH DAILY    Medication is being filled for 1 time refill only due to:  Future labs ordered LDL..     Taya Peres RN

## 2018-09-26 DIAGNOSIS — F32.1 MODERATE SINGLE CURRENT EPISODE OF MAJOR DEPRESSIVE DISORDER (H): ICD-10-CM

## 2018-09-26 NOTE — TELEPHONE ENCOUNTER
Requested Prescriptions   Pending Prescriptions Disp Refills     sertraline (ZOLOFT) 50 MG tablet 30 tablet 1     Sig: Take 1 tablet (50 mg) by mouth daily    There is no refill protocol information for this order        Last Written Prescription Date:  9/4/2018  Last Fill Quantity: 30,  # refills: 1   Last office visit: 9/4/2018 with prescribing provider:  Isadora   Future Office Visit:   Next 5 appointments (look out 90 days)     Oct 01, 2018 10:30 AM CDT   Return Visit with Rafael Valenzuela MD   [facility] ([facility])    [address] [place] [id number]   [phone]            Oct 23, 2018 10:30 AM CDT   Office Visit with Reginaldo Muir MD   [facility] ([address] [address]   [phone]            Nov 27, 2018 10:30 AM CST   Return Visit with Senthil Jhaveri MD   [facility]    [address] [place] [id number]   [phone]

## 2018-10-01 ENCOUNTER — INFUSION THERAPY VISIT (OUTPATIENT)
Dept: INFUSION THERAPY | Facility: CLINIC | Age: 83
End: 2018-10-01
Payer: COMMERCIAL

## 2018-10-01 ENCOUNTER — TELEPHONE (OUTPATIENT)
Dept: PHARMACY | Facility: CLINIC | Age: 83
End: 2018-10-01

## 2018-10-01 ENCOUNTER — ONCOLOGY VISIT (OUTPATIENT)
Dept: ONCOLOGY | Facility: CLINIC | Age: 83
End: 2018-10-01
Payer: COMMERCIAL

## 2018-10-01 VITALS
TEMPERATURE: 97 F | OXYGEN SATURATION: 99 % | SYSTOLIC BLOOD PRESSURE: 150 MMHG | HEART RATE: 57 BPM | BODY MASS INDEX: 28.07 KG/M2 | RESPIRATION RATE: 18 BRPM | DIASTOLIC BLOOD PRESSURE: 72 MMHG | WEIGHT: 204.1 LBS

## 2018-10-01 DIAGNOSIS — C79.51 BONE METASTASIS: Primary | ICD-10-CM

## 2018-10-01 DIAGNOSIS — C61 MALIGNANT NEOPLASM OF PROSTATE (H): ICD-10-CM

## 2018-10-01 DIAGNOSIS — C61 MALIGNANT NEOPLASM OF PROSTATE (H): Primary | ICD-10-CM

## 2018-10-01 DIAGNOSIS — E78.5 HYPERLIPIDEMIA LDL GOAL <130: ICD-10-CM

## 2018-10-01 DIAGNOSIS — C79.51 BONE METASTASIS: ICD-10-CM

## 2018-10-01 LAB
ALBUMIN SERPL-MCNC: 3.3 G/DL (ref 3.4–5)
ALP SERPL-CCNC: 91 U/L (ref 40–150)
ALT SERPL W P-5'-P-CCNC: 23 U/L (ref 0–70)
ANION GAP SERPL CALCULATED.3IONS-SCNC: 6 MMOL/L (ref 3–14)
AST SERPL W P-5'-P-CCNC: 21 U/L (ref 0–45)
BASOPHILS # BLD AUTO: 0 10E9/L (ref 0–0.2)
BASOPHILS NFR BLD AUTO: 0.1 %
BILIRUB SERPL-MCNC: 0.6 MG/DL (ref 0.2–1.3)
BUN SERPL-MCNC: 16 MG/DL (ref 7–30)
CALCIUM SERPL-MCNC: 9.5 MG/DL (ref 8.5–10.1)
CHLORIDE SERPL-SCNC: 105 MMOL/L (ref 94–109)
CHOLEST SERPL-MCNC: 178 MG/DL
CO2 SERPL-SCNC: 33 MMOL/L (ref 20–32)
CREAT SERPL-MCNC: 0.95 MG/DL (ref 0.66–1.25)
DIFFERENTIAL METHOD BLD: ABNORMAL
EOSINOPHIL # BLD AUTO: 0 10E9/L (ref 0–0.7)
EOSINOPHIL NFR BLD AUTO: 0.2 %
ERYTHROCYTE [DISTWIDTH] IN BLOOD BY AUTOMATED COUNT: 13.5 % (ref 10–15)
GFR SERPL CREATININE-BSD FRML MDRD: 76 ML/MIN/1.7M2
GLUCOSE SERPL-MCNC: 85 MG/DL (ref 70–99)
HCT VFR BLD AUTO: 38.5 % (ref 40–53)
HDLC SERPL-MCNC: 87 MG/DL
HGB BLD-MCNC: 12.3 G/DL (ref 13.3–17.7)
IMM GRANULOCYTES # BLD: 0.1 10E9/L (ref 0–0.4)
IMM GRANULOCYTES NFR BLD: 0.6 %
LDLC SERPL CALC-MCNC: 60 MG/DL
LYMPHOCYTES # BLD AUTO: 1.6 10E9/L (ref 0.8–5.3)
LYMPHOCYTES NFR BLD AUTO: 17.7 %
MCH RBC QN AUTO: 32.7 PG (ref 26.5–33)
MCHC RBC AUTO-ENTMCNC: 31.9 G/DL (ref 31.5–36.5)
MCV RBC AUTO: 102 FL (ref 78–100)
MONOCYTES # BLD AUTO: 0.6 10E9/L (ref 0–1.3)
MONOCYTES NFR BLD AUTO: 6.2 %
NEUTROPHILS # BLD AUTO: 6.7 10E9/L (ref 1.6–8.3)
NEUTROPHILS NFR BLD AUTO: 75.2 %
NONHDLC SERPL-MCNC: 91 MG/DL
PLATELET # BLD AUTO: 215 10E9/L (ref 150–450)
POTASSIUM SERPL-SCNC: 4.4 MMOL/L (ref 3.4–5.3)
PROT SERPL-MCNC: 6.9 G/DL (ref 6.8–8.8)
PSA SERPL-MCNC: 11.7 UG/L (ref 0–4)
RBC # BLD AUTO: 3.76 10E12/L (ref 4.4–5.9)
SODIUM SERPL-SCNC: 144 MMOL/L (ref 133–144)
TRIGL SERPL-MCNC: 154 MG/DL
WBC # BLD AUTO: 8.9 10E9/L (ref 4–11)

## 2018-10-01 PROCEDURE — 99214 OFFICE O/P EST MOD 30 MIN: CPT | Mod: 25 | Performed by: INTERNAL MEDICINE

## 2018-10-01 PROCEDURE — 85025 COMPLETE CBC W/AUTO DIFF WBC: CPT | Performed by: INTERNAL MEDICINE

## 2018-10-01 PROCEDURE — 80061 LIPID PANEL: CPT | Performed by: INTERNAL MEDICINE

## 2018-10-01 PROCEDURE — 36415 COLL VENOUS BLD VENIPUNCTURE: CPT | Performed by: INTERNAL MEDICINE

## 2018-10-01 PROCEDURE — 84153 ASSAY OF PSA TOTAL: CPT | Performed by: INTERNAL MEDICINE

## 2018-10-01 PROCEDURE — 80053 COMPREHEN METABOLIC PANEL: CPT | Performed by: INTERNAL MEDICINE

## 2018-10-01 PROCEDURE — 96374 THER/PROPH/DIAG INJ IV PUSH: CPT | Performed by: INTERNAL MEDICINE

## 2018-10-01 PROCEDURE — 99207 ZZC NO CHARGE NURSE ONLY: CPT

## 2018-10-01 RX ORDER — ZOLEDRONIC ACID 0.04 MG/ML
4 INJECTION, SOLUTION INTRAVENOUS ONCE
Status: COMPLETED | OUTPATIENT
Start: 2018-10-01 | End: 2018-10-01

## 2018-10-01 RX ADMIN — Medication 250 ML: at 12:12

## 2018-10-01 RX ADMIN — ZOLEDRONIC ACID 4 MG: 0.04 INJECTION, SOLUTION INTRAVENOUS at 12:12

## 2018-10-01 NOTE — PROGRESS NOTES
Infusion Nursing Note:  Dimitri SAMIA Neves presents today for Zometa.    Patient seen by provider today: Yes: Dr. Valenzuela   present during visit today: Not Applicable.    Note: Pt saw Dr. Valenzuela, see Nicolas's note for assessment.    Intravenous Access:  Peripheral IV placed.    Treatment Conditions:  Lab Results   Component Value Date    HGB 12.3 10/01/2018     Lab Results   Component Value Date    WBC 8.9 10/01/2018      Lab Results   Component Value Date    ANEU 6.7 10/01/2018     Lab Results   Component Value Date     10/01/2018      Lab Results   Component Value Date     10/01/2018                   Lab Results   Component Value Date    POTASSIUM 4.4 10/01/2018           No results found for: MAG         Lab Results   Component Value Date    CR 0.95 10/01/2018                   Lab Results   Component Value Date    ALETHA 9.5 10/01/2018                Lab Results   Component Value Date    BILITOTAL 0.6 10/01/2018           Lab Results   Component Value Date    ALBUMIN 3.3 10/01/2018                    Lab Results   Component Value Date    ALT 23 10/01/2018           Lab Results   Component Value Date    AST 21 10/01/2018       Results reviewed, labs MET treatment parameters, ok to proceed with treatment.      Post Infusion Assessment:  Patient tolerated infusion without incident.  Site patent and intact, free from redness, edema or discomfort.  Access discontinued per protocol.    Discharge Plan:   Patient discharged in stable condition accompanied by: wife.  Departure Mode: Ambulatory.  Pt will RTC 11/12/18 for zometa.    Enrike Gilbert RN

## 2018-10-01 NOTE — PROGRESS NOTES
[facility]  HEMATOLOGY AND ONCOLOGY    FOLLOW-UP VISIT NOTE    PATIENT NAME: Dimitri Neves MRN # [id number]  DATE OF VISIT: Oct 1, 2018 YOB: 1935    REFERRING PROVIDER: No referring provider defined for this encounter.    CANCER TYPE: Prostate adenocarcinoma  STAGE: IV    TREATMENT SUMMARY:  Dimitri was followed by his PCP on 6/13/13 and was elevated at 32 as noted below. He was referred to Dr. Taylor. He was treated with a 10 day course of ciprofloxacin and followed again in 6 weeks on 8/10. His PSA drawn prior to this visit was unchanged and remained elevated at 32. He had a TRUS biopsy on 8/25/14 which was negative for prostate adenocarcinoma. His PSA was repeated in 3 months and now increased to 67.9. He had a repeat biopsy of prostate on 12/16/14 which now confirmed prostate adenocarcinoma with eileen 4+4 disease involving 5 of the cores and [place] 3+3 disease involving remainder of cores. He was staged with a CT scan and bone scan done on 12/29/14 which revealed metastatic foci in the upper cervical vertebrae on the left, right posterior 3rd rib and right ischium, focal uptake in the posterior left 11th rib with corresponding lytic expansile appearance on CT, suspicious for metastasis.          4/5/2011 08:46 6/13/2014 08:03 7/25/2014 09:47 12/5/2014 09:00 4/7/2015 09:14   PSA 2.77 32.70 (H) 32.00 (H) 67.88 (H) 1.92      He was started on androgen deprivation therapy in Jan 2015 with a good initial response. His PSA has been increasing recently and he was started on bicalutamide in February with no response. He has continued to have rising PSA and was prescribed abiraterone with prednisone. He had a huge copay with this and has been referred to Medical Oncology to review other options.     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 He is tolerating his abiraterone without any difficulty.     He has ecchymoses.    He does have hot flushes, fatigue, mood swings on androgen deprivation therapy.     He did have bilateral orchiectomies.         PAST MEDICAL HISTORY     Past Medical History:   Diagnosis Date     Actinic keratosis      AK (actinic keratosis) 7/9/2012     Cataract cortical, senile right eye 4/17/2012     DDD (degenerative disc disease), lumbar 1979    s/p 4 back surgeries, spinal cord stimulator implant      Diverticulosis      ED (erectile dysfunction) 2009     GERD (gastroesophageal reflux disease) ~1980     HTN (hypertension), benign ~2000     Macular degeneration, dry, mild, ou 7/23/2016     Multiple lung nodules     Several basilar pulmonary nodules <5 mm     HUDSON (obstructive sleep apnea) 10/2005    on CPAP     Other abnormal glucose 01/14/2009     PE (pulmonary thromboembolism) (H) 1981    after back surgery      Pure hypercholesterolemia ~2000     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         CURRENT OUTPATIENT MEDICATIONS     Current Outpatient Prescriptions   Medication Sig     abiraterone (ZYTIGA) 250 MG tablet Take 4 tablets (1,000 mg) by mouth daily for 30 doses Take on empty stomach.     ASPIRIN PO Take 81 mg by mouth     Calcium Carbonate (CALCIUM 600 PO)      Cholecalciferol (VITAMIN D) 2000 UNITS tablet Take 1 tablet by mouth daily     cyanocobalamin ([employer] VITAMIN  B12) 1000 MCG TABS Take 1 tablet by mouth daily     furosemide (LASIX) 20 MG tablet Take 1 tablet (20 mg) by mouth daily     metoprolol succinate (TOPROL-XL) 25 MG 24 hr tablet TAKE 1 TABLET (25 MG) BY MOUTH DAILY     oxyCODONE (ROXICODONE) 5 MG IR tablet Take 1 tablet (5 mg) by mouth every 6 hours as needed for pain     predniSONE (DELTASONE) 5 MG tablet Take 1 tablet (5 mg) by mouth 2 times daily     quinapril  (ACCUPRIL) 40 MG Tab TAKE 1 TABLET (40 MG) BY MOUTH DAILY     sertraline (ZOLOFT) 50 MG tablet Take 1 tablet (50 mg) by mouth daily Need to see MD for further refills     simvastatin (ZOCOR) 40 MG tablet TAKE 0.5 TABLETS (20 MG) BY MOUTH DAILY     albuterol (2.5 MG/3ML) 0.083% neb solution Take 1 vial (2.5 mg) by nebulization every 6 hours as needed for shortness of breath / dyspnea or wheezing (Patient not taking: Reported on 10/1/2018)     gabapentin (NEURONTIN) 300 MG capsule Take 900 mg by mouth At Bedtime     ipratropium - albuterol 0.5 mg/2.5 mg/3 mL (DUONEB) 0.5-2.5 (3) MG/3ML neb solution Take 1 vial (3 mLs) by nebulization once for 1 dose     order for DME Equipment being ordered: Nebulizer     predniSONE (DELTASONE) 5 MG tablet Take 1 tablet (5 mg) by mouth 2 times daily     No current facility-administered medications for this visit.         ALLERGIES      Allergies   Allergen Reactions     Morphine Sulfate GI Disturbance     vomiting     Vicodin [Hydrocodone-Acetaminophen] Nausea and Vomiting        REVIEW OF SYSTEMS   As above in the HPI, o/w complete 12-point ROS was negative.     PHYSICAL EXAM   /72 (BP Location: Left arm)  Pulse 57  Temp 97  F (36.1  C) (Oral)  Resp 18  Wt 92.6 kg (204 lb 1.6 oz)  SpO2 99%  BMI 28.07 kg/m2  GEN: NAD  HEENT: PERRL, EOMI, no icterus, injection or pallor. Oropharynx is clear.  NECK: no cervical or supraclavicular lymphadenopathy  LUNGS: clear bilaterally  CV: regular, no murmurs, rubs, or gallops  ABDOMEN: soft, non-tender, non-distended, normal bowel sounds, no hepatosplenomegaly by percussion or palpation  EXT: warm, well perfused, +++ edema  NEURO: alert  SKIN: Extensive ecchymoses in all the extremities     LABORATORY AND IMAGING STUDIES     Labs remain stable. Calcium is within the normal range or low normal  Mild normocytic anemia   Recent Labs   Lab Test  10/01/18   0926  08/09/18   0836  06/28/18   1043  05/24/18   1223  04/30/18   0937  12/18/17    0906   NA  144   --   140  139  140  143   POTASSIUM  4.4   --   4.4  4.4  4.2  3.4   CHLORIDE  105   --   103  105  103  107   CO2  33*   --   30  28  33*  30   ANIONGAP  6   --   7  6  4  6   BUN  16   --   28  23  29  13   CR  0.95  1.06  1.08  0.88  1.06  0.71   GLC  85   --   107*  102*  97  93   ALETHA  9.5  9.3  9.6  8.7  9.4  8.6     Recent Labs   Lab Test  12/28/11   0857  04/05/11   0846   PHOS  3.1  3.4     Recent Labs   Lab Test  10/01/18   0926  06/28/18   1043  05/24/18   1223  04/30/18   0937  12/18/17   0906   WBC  8.9  5.8  5.3  5.8  4.3   HGB  12.3*  11.9*  11.3*  12.1*  13.0*   PLT  215  183  149*  189  159   MCV  102*  102*  97  99  95   NEUTROPHIL  75.2  72.0  74.5  68.1  58.1     Recent Labs   Lab Test  10/01/18   0926  08/09/18   0836  06/28/18   1043  05/24/18   1223   BILITOTAL  0.6   --   0.7  0.9   ALKPHOS  91   --   84  93   ALT  23   --   25  25   AST  21   --   25  23   ALBUMIN  3.3*  3.6  3.5  3.3*     TSH   Date Value Ref Range Status   06/13/2016 1.60 0.40 - 4.00 mU/L Final   04/05/2011 3.73 0.4 - 5.0 mU/L Final     Recent Labs   Lab Test  10/01/18   0926  08/09/18   0836  06/28/18   1043  05/24/18   1223  04/30/18   0937   02/09/17   0823   PSA  11.70*  10.50*  10.00*  10.40*  13.30*   < >   --    TESTOSTTOTAL   --    --    --    --    --    --   9*    < > = values in this interval not displayed.          ASSESSMENT AND PLAN   1. 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 No response to addition of bicalutamide  3. PE diagnosed few weeks after initiation of megestrol acetate for hot flashes on GnRH agonist  4. No significant medical comorbidities    He is tolerating his abiraterone well and has no complains. His PSA did respond nicely as expected initially and has been flat since then. It was 64 on 7/13/17, dropped to 12 by 10/16/17 and has been stable since - currently at 11.7. For now the PSA values  are relatively stable and have not doubled since he hit abel about 6 months ago. His wife was little nervous with the rising PSA. His PSA has gone up for the last 2 visits. But in the past when his PSA does indeed starts to rise - it does go up pretty dramatically. He only has a marginal rise in his PSA. I will not consider changing therapy unless there is a big change in his PSA.     We would plan to continue as current. There are no immediate concerns.     He did get bilateral orchiectomies done last month 9/27/17. He would not need any more lupron or other GnRH directed therapies.     His labs are stable except for mild macrocytic anemia - possibly owing to ongoing therapy for cancer.     He has extensive ecchymoses likely secondary to prednisone use.     He has hot flashes, mood swings, fatigue - from androgen deprivation. These are no different between either orchiectomy or GnRH therapy as they come with low levels of testosterone.     We would continue with zometa every 6-8 weeks. He has not received zometa in last 6 months. I will restart it at this visit. I will have him meet Leatha Loredo in 6-8 weeks and I could see him in 12-16 weeks. If he has a doubling of his PSA at next visit with Leatha, I will get restaging scans done prior to his visit with me with plans to possibly move him to chemotherapy.     Over 25 min of direct face to face time spent with patient with more than 50% time spent in counseling and coordinating care.

## 2018-10-01 NOTE — TELEPHONE ENCOUNTER
Oral Chemotherapy Monitoring Program      Primary Oncologist: Nicolas  Primary Oncology Clinic: [facility]  Cancer Diagnosis: Prostate Cancer      Therapy History:      abiraterone (Zytiga) 1000 mg PO daily with Prednisone 5 mg PO BID  Start Date: 8/19/17      Drug Interaction Assessment: No new medications as of 6/11/18  1. Abiraterone will decrease Warfarin metabolism, potentially raising INR; patient is aware and will inquire about more frequent INR monitoring at the start of therapy  2. Abiraterone inhibits Metoprolol metabolism with a slight risk of causing bradycardia      Lab Monitoring Plan:  C1D1+   CMP, BP C2D1+ Call, CMP, BP C3D1+ Call, CMP, BP C4D1+ Call, CMP, BP C5D1+ Call, CMP, BP C6D1+ Call, CMP, BP   C1D8+    C2D8+    C3D8+    C4D8+    C5D8+    C6D8+      C1D15+ Call, CMP C2D15+ Call, CMP C3D15+    C4D15+    C5D15+    C6D15+      C1D22+    C2D22+    C3D22+    C4D22+    C5D22+    C6D22+          Subjective/Objective:  Dimitri Neves is a 83 year old male seen by Dr Driscoll in clinic I was not present, for a follow-up visit for oral chemotherapy. Per Dr Valenzuela, Dimitri reports that he is doing well on abiraterone and denies any new changes.  His PSA went up slightly 11.7 from 10.5 on 8/9/18    ORAL CHEMOTHERAPY 12/19/2017 3/1/2018 4/30/2018 6/11/2018 6/28/2018 8/9/2018 10/1/2018   Drug Name Zytiga (Abiraterone) Zytiga (Abiraterone) Zytiga (Abiraterone) Zytiga (Abiraterone) Zytiga (Abiraterone) Zytiga (Abiraterone) Zytiga (Abiraterone)   Current Dosage 1000mg 1000mg 1000mg 1000mg 1000mg 1000mg 1000mg   Current Schedule Daily Daily Daily Daily Daily Daily Daily   Cycle Details Continuous Continuous Continuous Continuous Continuous Continuous Continuous   Start Date of Last Cycle 12/19/2017 - 4/4/2018 - - - -   Planned next cycle start date - - 5/3/2018 - - - -   Doses missed in last 2 weeks 0 0 0 0 0 0 0   Adherence Assessment Adherent Adherent Adherent Adherent Adherent - Adherent   Adverse Effects No AE  "identified during assessment Other (see note for details) No AE identified during assessment No AE identified during assessment No AE identified during assessment No AE identified during assessment No AE identified during assessment   Other (see note for details) - (No Data) - - - - -   Pharmacist intervention? - No - - - - -   Any new drug interactions? No No No No No No No   Is the dose as ordered appropriate for the patient? Yes - Yes Yes Yes Yes Yes   Is the patient currently in pain? Assessed in last 30 days. No Assessed in last 30 days. - Assessed in last 30 days. Assessed in last 30 days. Assessed in last 30 days.   Has the patient been assessed within the past 6 months for depression? Yes Yes Yes - Yes Yes Yes   Has the patient missed any days of school, work, or other routine activity? - - - - - - -       Vitals:  BP:   BP Readings from Last 1 Encounters:   10/01/18 150/72     Wt Readings from Last 1 Encounters:   10/01/18 92.6 kg (204 lb 1.6 oz)     Estimated body surface area is 2.16 meters squared as calculated from the following:    Height as of 8/9/18: 1.816 m (5' 11.5\").    Weight as of an earlier encounter on 10/1/18: 92.6 kg (204 lb 1.6 oz).    Labs:  _  Result Component Current Result Ref Range   Sodium 144 (10/1/2018) 133 - 144 mmol/L     _  Result Component Current Result Ref Range   Potassium 4.4 (10/1/2018) 3.4 - 5.3 mmol/L     _  Result Component Current Result Ref Range   Calcium 9.5 (10/1/2018) 8.5 - 10.1 mg/dL     No results found for Mag within last 30 days.     No results found for Phos within last 30 days.     _  Result Component Current Result Ref Range   Albumin 3.3 (L) (10/1/2018) 3.4 - 5.0 g/dL     _  Result Component Current Result Ref Range   Urea Nitrogen 16 (10/1/2018) 7 - 30 mg/dL     _  Result Component Current Result Ref Range   Creatinine 0.95 (10/1/2018) 0.66 - 1.25 mg/dL       _  Result Component Current Result Ref Range   AST 21 (10/1/2018) 0 - 45 U/L     _  Result " Component Current Result Ref Range   ALT 23 (10/1/2018) 0 - 70 U/L     _  Result Component Current Result Ref Range   Bilirubin Total 0.6 (10/1/2018) 0.2 - 1.3 mg/dL       _  Result Component Current Result Ref Range   WBC 8.9 (10/1/2018) 4.0 - 11.0 10e9/L     _  Result Component Current Result Ref Range   Hemoglobin 12.3 (L) (10/1/2018) 13.3 - 17.7 g/dL     _  Result Component Current Result Ref Range   Platelet Count 215 (10/1/2018) 150 - 450 10e9/L     _  Result Component Current Result Ref Range   Absolute Neutrophil 6.7 (10/1/2018) 1.6 - 8.3 10e9/L     Assessment/Plan:  Continue same Zytiga dose as ordered    Follow-Up:  11/12/18 with Leatha PRATER 845 AM     Refill Due:  SP    Dipesh Pepe, PharmD, BCOP  October 1, 2018

## 2018-10-01 NOTE — MR AVS SNAPSHOT
After Visit Summary   10/1/2018    Dmiitri Neves    MRN: [id number]           Patient Information     Date Of Birth          1935        Visit Information        Provider Department      10/1/2018 10:30 AM Rafael Valenzuela MD [facility]        Today's Diagnoses     Malignant neoplasm of prostate (H)    -  1    Bone metastasis (H)           Follow-ups after your visit        Your next 10 appointments already scheduled     Oct 16, 2018  3:00 PM CDT   New Visit with Josiah Alicea MD   [facility] ([facility])    [address] [facility] [address]   [phone]            Oct 23, 2018 10:30 AM CDT   Office Visit with Reginaldo Muir MD   [facility] ([facility])    [address] [address]   [phone]           Bring a current list of meds and any records pertaining to this visit. For Physicals, please bring immunization records and any forms needing to be filled out. Please arrive 10 minutes early to complete paperwork.            Nov 12, 2018  8:15 AM CST   LAB with LAB ONC [facility] ([facility])    [address] [place] [address] [phone]           Please do not eat 10-12 hours before your appointment if you are coming in fasting for labs on lipids, cholesterol, or glucose (sugar). This does not apply to pregnant women. Water, hot tea and black coffee (with nothing added) are okay. Do not drink other fluids, diet soda or chew gum.            Nov 12, 2018  8:45 AM CST   Return Visit with NI De Anda [facility] ([facility])    [address] [place] [address] [phone]            Nov 12, 2018  9:30 AM CST   Level O with [address] ([facility])    [address] [address] [place] [address]    [phone]            Nov 27, 2018 10:30 AM CST   Return Visit with Senthil Jhaveri MD   [facility] ([facility])    [address] [facility] [address]   [phone]            Jan 03, 2019  7:45 AM CST   LAB with LAB ONC [facility] ([facility])    [address] [address] [address] [phone]           Please do not eat 10-12 hours before your appointment if you are coming in fasting for labs on lipids, cholesterol, or glucose (sugar). This does not apply to pregnant women. Water, hot tea and black coffee (with nothing added) are okay. Do not drink other fluids, diet soda or chew gum.            Jan 03, 2019  8:30 AM CST   Return Visit with Rafeal Valenzuela MD   [facility] ([facility])    [address] [facility] [address] [phone]              Who to contact     If you have questions or need follow up information about today's clinic visit or your schedule please contact [facility] directly at [phone].  Normal or non-critical lab and imaging results will be communicated to you by MyChart, letter or phone within 4 business days after the clinic has received the results. If you do not hear from us within 7 days, please contact the clinic through [employer] or phone. If you have a critical or abnormal lab result, we will notify you by phone as soon as possible.  Submit refill requests through [employer] or call your pharmacy and they will forward the refill request to us. Please allow 3 business days for your refill to be completed.          Additional Information About Your Visit        [employer] Information     [employer] gives you secure access to your electronic health record. If you see a primary care provider, you can also send messages to your care team and make appointments. If you have questions, please call your primary care  clinic.  If you do not have a primary care provider, please call [phone] and they will assist you.      [employer] is an electronic gateway that provides easy, online access to your medical records. With [employer], you can request a clinic appointment, read your test results, renew a prescription or communicate with your care team.     To access your existing account, please contact your [facility] or call [phone] for assistance.        Care EveryWhere ID     This is your Care EveryWhere ID. This could be used by other organizations to access your [place] medical records  [id number]        Your Vitals Were     Pulse Temperature Respirations Pulse Oximetry BMI (Body Mass Index)       57 97  F (36.1  C) (Oral) 18 99% 28.07 kg/m2        Blood Pressure from Last 3 Encounters:   10/01/18 150/72   09/04/18 106/60   08/09/18 139/73    Weight from Last 3 Encounters:   10/01/18 92.6 kg (204 lb 1.6 oz)   09/04/18 91.6 kg (202 lb)   08/09/18 92.5 kg (204 lb)              Today, you had the following     No orders found for display       Primary Care Provider Office Phone # Fax #    Reginaldo Muir [phone] [address] [facility] [address]        Equal Access to Services     [facility] : Hadii aad ku hadasho Soomaali, waaxda luqadaha, qaybta kaalmada adeegyada, waxay idiin haykemal willis . So [facility] [phone].    ATENCIÓN: Si habla español, tiene a roberts disposición servicios gratuitos de asistencia lingüística. Llame al [phone].    We comply with applicable federal civil rights laws and [place] laws. We do not discriminate on the basis of race, color, national origin, age, disability, sex, sexual orientation, or gender identity.            Thank you!     Thank you for choosing [facility]  for your care. Our goal is always to provide you with excellent care. Hearing back from our patients is one way we can continue to  improve our services. Please take a few minutes to complete the written survey that you may receive in the mail after your visit with us. Thank you!             Your Updated Medication List - Protect others around you: Learn how to safely use, store and throw away your medicines at www.disposemymeds.org.          This list is accurate as of 10/1/18 12:46 PM.  Always use your most recent med list.                   Brand Name Dispense Instructions for use Diagnosis    abiraterone 250 MG tablet    ZYTIGA    120 tablet    Take 4 tablets (1,000 mg) by mouth daily for 30 doses Take on empty stomach.    Bone metastasis (H), Malignant neoplasm of prostate (H)       albuterol (2.5 MG/3ML) 0.083% neb solution     25 vial    Take 1 vial (2.5 mg) by nebulization every 6 hours as needed for shortness of breath / dyspnea or wheezing    Acute bronchitis, unspecified organism       ASPIRIN PO      Take 81 mg by mouth        CALCIUM 600 PO           cyanocobalamin 1000 MCG Tabs    [employer] vitamin  B12    30 tablet    Take 1 tablet by mouth daily    Low vitamin B12 level       furosemide 20 MG tablet    LASIX    90 tablet    Take 1 tablet (20 mg) by mouth daily    Bilateral lower extremity edema, HTN (hypertension), benign       gabapentin 300 MG capsule    NEURONTIN     Take 900 mg by mouth At Bedtime        ipratropium - albuterol 0.5 mg/2.5 mg/3 mL 0.5-2.5 (3) MG/3ML neb solution    DUONEB    3 mL    Take 1 vial (3 mLs) by nebulization once for 1 dose    Dyspnea, unspecified type       metoprolol succinate 25 MG 24 hr tablet    TOPROL-XL    90 tablet    TAKE 1 TABLET (25 MG) BY MOUTH DAILY    HTN (hypertension), benign       order for DME     1 Device    Equipment being ordered: Nebulizer    Acute bronchitis, unspecified organism       oxyCODONE IR 5 MG tablet    ROXICODONE    30 tablet    Take 1 tablet (5 mg) by mouth every 6 hours as needed for pain    Narcotic dependence, episodic use (H)       * predniSONE 5 MG tablet     DELTASONE    60 tablet    Take 1 tablet (5 mg) by mouth 2 times daily    Bone metastasis (H), Malignant neoplasm of prostate (H)       * predniSONE 5 MG tablet    DELTASONE    60 tablet    Take 1 tablet (5 mg) by mouth 2 times daily    Bone metastasis (H), Malignant neoplasm of prostate (H)       quinapril 40 MG Tab    ACCUPRIL    90 tablet    TAKE 1 TABLET (40 MG) BY MOUTH DAILY    HTN (hypertension), benign       sertraline 50 MG tablet    ZOLOFT    30 tablet    Take 1 tablet (50 mg) by mouth daily Need to see MD for further refills    Moderate single current episode of major depressive disorder (H)       simvastatin 40 MG tablet    ZOCOR    15 tablet    TAKE 0.5 TABLETS (20 MG) BY MOUTH DAILY    Hyperlipidemia LDL goal <130       vitamin D 2000 units tablet      Take 1 tablet by mouth daily        * Notice:  This list has 2 medication(s) that are the same as other medications prescribed for you. Read the directions carefully, and ask your doctor or other care provider to review them with you.

## 2018-10-01 NOTE — NURSING NOTE
"Oncology Rooming Note    October 1, 2018 11:15 AM   Dimitri Neves is a 83 year old male who presents for:    Chief Complaint   Patient presents with     Oncology Clinic Visit     Initial Vitals: There were no vitals taken for this visit. Estimated body mass index is 27.78 kg/(m^2) as calculated from the following:    Height as of 8/9/18: 1.816 m (5' 11.5\").    Weight as of 9/4/18: 91.6 kg (202 lb). There is no height or weight on file to calculate BSA.  Data Unavailable Comment: Data Unavailable   No LMP for male patient.  Allergies reviewed: Yes  Medications reviewed: Yes    Medications: Medication refills not needed today.  Pharmacy name entered into [employer]:    [id number] [place] - [address] [facility] - [place], [place] - [address] [place], [place] [address]O PHARMACY - MAIL ORDER ONLY - [id number] [place], [place] - [address]        8 minutes for nursing intake (face to face time)     Olga Lidia Bonilla RN              "

## 2018-10-01 NOTE — MR AVS SNAPSHOT
After Visit Summary   10/1/2018    Dimitri Neves    MRN: [id number]           Patient Information     Date Of Birth          1935        Visit Information        Provider Department      10/1/2018 11:00 AM [place] [facility]        Today's Diagnoses     Bone metastasis (H)    -  1    Malignant neoplasm of prostate (H)           Follow-ups after your visit        Your next 10 appointments already scheduled     Oct 16, 2018  3:00 PM CDT   New Visit with Josiah Alicea MD   [facility] ([facility]    [address] [facility] [address]   [phone]            Oct 23, 2018 10:30 AM CDT   Office Visit with Reginaldo Muir MD   [facility] ([facility]    [address] [facility] [address]   [phone]           Bring a current list of meds and any records pertaining to this visit. For Physicals, please bring immunization records and any forms needing to be filled out. Please arrive 10 minutes early to complete paperwork.            Nov 12, 2018  8:15 AM CST   LAB with LAB ONC [facility] ([facility])    [address] [place] [address] [phone]           Please do not eat 10-12 hours before your appointment if you are coming in fasting for labs on lipids, cholesterol, or glucose (sugar). This does not apply to pregnant women. Water, hot tea and black coffee (with nothing added) are okay. Do not drink other fluids, diet soda or chew gum.            Nov 12, 2018  8:45 AM CST   Return Visit with NI De Anda [facility] ([facility])    [address] [place] [place] [address] [phone]            Nov 12, 2018  9:30 AM CST   Level O with [address] ([facility])    [address] [address] [address] [phone]             Nov 27, 2018 10:30 AM CST   Return Visit with Senthil Jhaveri MD   [facility] ([facility])    [address] [facility] [address]   [phone]            Jan 03, 2019  7:45 AM CST   LAB with LAB ONC [facility] ([facility])    [address] [place] [address] [phone]           Please do not eat 10-12 hours before your appointment if you are coming in fasting for labs on lipids, cholesterol, or glucose (sugar). This does not apply to pregnant women. Water, hot tea and black coffee (with nothing added) are okay. Do not drink other fluids, diet soda or chew gum.            Jan 03, 2019  8:30 AM CST   Return Visit with Rafael Valenzuela MD   [facility] ([facility])    [address] [facility] [address] [phone]              Who to contact     If you have questions or need follow up information about today's clinic visit or your schedule please contact [facility] directly at [phone].  Normal or non-critical lab and imaging results will be communicated to you by MyChart, letter or phone within 4 business days after the clinic has received the results. If you do not hear from us within 7 days, please contact the clinic through MyChart or phone. If you have a critical or abnormal lab result, we will notify you by phone as soon as possible.  Submit refill requests through [employer] or call your pharmacy and they will forward the refill request to us. Please allow 3 business days for your refill to be completed.          Additional Information About Your Visit        [employer] Information     [employer] gives you secure access to your electronic health record. If you see a primary care provider, you can also send messages to your care team and make appointments. If you have questions, please call your primary care clinic.  If you do not  have a primary care provider, please call [phone] and they will assist you.      [employer] is an electronic gateway that provides easy, online access to your medical records. With [employer], you can request a clinic appointment, read your test results, renew a prescription or communicate with your care team.     To access your existing account, please contact your [facility] or call [phone] for assistance.        Care EveryWhere ID     This is your Care EveryWhere ID. This could be used by other organizations to access your [place] medical records  [id number]         Blood Pressure from Last 3 Encounters:   10/01/18 150/72   09/04/18 106/60   08/09/18 139/73    Weight from Last 3 Encounters:   10/01/18 92.6 kg (204 lb 1.6 oz)   09/04/18 91.6 kg (202 lb)   08/09/18 92.5 kg (204 lb)              Today, you had the following     No orders found for display       Primary Care Provider Office Phone # Fax #    Reginaldo Muir [phone]       [address] [facility] [address]        Equal Access to Services     [facility]: Hadii aad ku hadasho Soomaali, waaxda luqadaha, qaybta kaalmada adeegyada, naty willis . So [facility] [phone].    ATENCIÓN: Si habla español, tiene a roberts disposición servicios gratuitos de asistencia lingüística. Llame al [phone].    We comply with applicable federal civil rights laws and [place] laws. We do not discriminate on the basis of race, color, national origin, age, disability, sex, sexual orientation, or gender identity.            Thank you!     Thank you for choosing [facility]  for your care. Our goal is always to provide you with excellent care. Hearing back from our patients is one way we can continue to improve our services. Please take a few minutes to complete the written survey that you may receive in the mail after your visit with us. Thank you!             Your  Updated Medication List - Protect others around you: Learn how to safely use, store and throw away your medicines at www.disposemymeds.org.          This list is accurate as of 10/1/18  1:26 PM.  Always use your most recent med list.                   Brand Name Dispense Instructions for use Diagnosis    abiraterone 250 MG tablet    ZYTIGA    120 tablet    Take 4 tablets (1,000 mg) by mouth daily for 30 doses Take on empty stomach.    Bone metastasis (H), Malignant neoplasm of prostate (H)       albuterol (2.5 MG/3ML) 0.083% neb solution     25 vial    Take 1 vial (2.5 mg) by nebulization every 6 hours as needed for shortness of breath / dyspnea or wheezing    Acute bronchitis, unspecified organism       ASPIRIN PO      Take 81 mg by mouth        CALCIUM 600 PO           cyanocobalamin 1000 MCG Tabs    [employer] vitamin  B12    30 tablet    Take 1 tablet by mouth daily    Low vitamin B12 level       furosemide 20 MG tablet    LASIX    90 tablet    Take 1 tablet (20 mg) by mouth daily    Bilateral lower extremity edema, HTN (hypertension), benign       gabapentin 300 MG capsule    NEURONTIN     Take 900 mg by mouth At Bedtime        ipratropium - albuterol 0.5 mg/2.5 mg/3 mL 0.5-2.5 (3) MG/3ML neb solution    DUONEB    3 mL    Take 1 vial (3 mLs) by nebulization once for 1 dose    Dyspnea, unspecified type       metoprolol succinate 25 MG 24 hr tablet    TOPROL-XL    90 tablet    TAKE 1 TABLET (25 MG) BY MOUTH DAILY    HTN (hypertension), benign       order for DME     1 Device    Equipment being ordered: Nebulizer    Acute bronchitis, unspecified organism       oxyCODONE IR 5 MG tablet    ROXICODONE    30 tablet    Take 1 tablet (5 mg) by mouth every 6 hours as needed for pain    Narcotic dependence, episodic use (H)       * predniSONE 5 MG tablet    DELTASONE    60 tablet    Take 1 tablet (5 mg) by mouth 2 times daily    Bone metastasis (H), Malignant neoplasm of prostate (H)       * predniSONE 5 MG tablet    DELTASONE     60 tablet    Take 1 tablet (5 mg) by mouth 2 times daily    Bone metastasis (H), Malignant neoplasm of prostate (H)       quinapril 40 MG Tab    ACCUPRIL    90 tablet    TAKE 1 TABLET (40 MG) BY MOUTH DAILY    HTN (hypertension), benign       sertraline 50 MG tablet    ZOLOFT    30 tablet    Take 1 tablet (50 mg) by mouth daily Need to see MD for further refills    Moderate single current episode of major depressive disorder (H)       simvastatin 40 MG tablet    ZOCOR    15 tablet    TAKE 0.5 TABLETS (20 MG) BY MOUTH DAILY    Hyperlipidemia LDL goal <130       vitamin D 2000 units tablet      Take 1 tablet by mouth daily        * Notice:  This list has 2 medication(s) that are the same as other medications prescribed for you. Read the directions carefully, and ask your doctor or other care provider to review them with you.

## 2018-10-01 NOTE — LETTER
10/1/2018         RE: Dimitri Neves  [address] [place] [address]        Dear Colleague,    Thank you for referring your patient, Dimitri Neves, to the [facility]. Please see a copy of my visit note below.    [facility]  HEMATOLOGY AND ONCOLOGY    FOLLOW-UP VISIT NOTE    PATIENT NAME: Dimitri Neves MRN # [id number]  DATE OF VISIT: Oct 1, 2018 YOB: 1935    REFERRING PROVIDER: No referring provider defined for this encounter.    CANCER TYPE: Prostate adenocarcinoma  STAGE: IV    TREATMENT SUMMARY:  Dimitri was followed by his PCP on 6/13/13 and was elevated at 32 as noted below. He was referred to Dr. Taylor. He was treated with a 10 day course of ciprofloxacin and followed again in 6 weeks on 8/10. His PSA drawn prior to this visit was unchanged and remained elevated at 32. He had a TRUS biopsy on 8/25/14 which was negative for prostate adenocarcinoma. His PSA was repeated in 3 months and now increased to 67.9. He had a repeat biopsy of prostate on 12/16/14 which now confirmed prostate adenocarcinoma with gena 4+4 disease involving 5 of the cores and Gena 3+3 disease involving remainder of cores. He was staged with a CT scan and bone scan done on 12/29/14 which revealed metastatic foci in the upper cervical vertebrae on the left, right posterior 3rd rib and right ischium, focal uptake in the posterior left 11th rib with corresponding lytic expansile appearance on CT, suspicious for metastasis.          4/5/2011 08:46 6/13/2014 08:03 7/25/2014 09:47 12/5/2014 09:00 4/7/2015 09:14   PSA 2.77 32.70 (H) 32.00 (H) 67.88 (H) 1.92      He was started on androgen deprivation therapy in Jan 2015 with a good initial response. His PSA has been increasing recently and he was started on bicalutamide in February with no response. He has continued to have rising PSA and was prescribed abiraterone with prednisone. He had a huge copay with this and has been referred  to Medical Oncology to review other options.     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 He is tolerating his abiraterone without any difficulty.     He has ecchymoses.    He does have hot flushes, fatigue, mood swings on androgen deprivation therapy.     He did have bilateral orchiectomies.         PAST MEDICAL HISTORY     Past Medical History:   Diagnosis Date     Actinic keratosis      AK (actinic keratosis) 7/9/2012     Cataract cortical, senile right eye 4/17/2012     DDD (degenerative disc disease), lumbar 1979    s/p 4 back surgeries, spinal cord stimulator implant      Diverticulosis      ED (erectile dysfunction) 2009     GERD (gastroesophageal reflux disease) ~1980     HTN (hypertension), benign ~2000     Macular degeneration, dry, mild, ou 7/23/2016     Multiple lung nodules     Several basilar pulmonary nodules <5 mm     HUDSON (obstructive sleep apnea) 10/2005    on CPAP     Other abnormal glucose 01/14/2009     PE (pulmonary thromboembolism) (H) 1981    after back surgery      Pure hypercholesterolemia ~2000     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         CURRENT OUTPATIENT MEDICATIONS     Current Outpatient Prescriptions   Medication Sig     abiraterone (ZYTIGA) 250 MG tablet Take 4 tablets (1,000 mg) by mouth daily for 30 doses Take on empty stomach.     ASPIRIN PO Take 81 mg by mouth     Calcium Carbonate (CALCIUM 600 PO)      Cholecalciferol (VITAMIN D) 2000 UNITS tablet Take 1 tablet by mouth daily     cyanocobalamin ([employer] VITAMIN  B12) 1000 MCG TABS Take 1 tablet by mouth daily     furosemide (LASIX) 20 MG tablet Take 1 tablet (20 mg) by mouth daily     metoprolol succinate (TOPROL-XL) 25 MG 24 hr  tablet TAKE 1 TABLET (25 MG) BY MOUTH DAILY     oxyCODONE (ROXICODONE) 5 MG IR tablet Take 1 tablet (5 mg) by mouth every 6 hours as needed for pain     predniSONE (DELTASONE) 5 MG tablet Take 1 tablet (5 mg) by mouth 2 times daily     quinapril (ACCUPRIL) 40 MG Tab TAKE 1 TABLET (40 MG) BY MOUTH DAILY     sertraline (ZOLOFT) 50 MG tablet Take 1 tablet (50 mg) by mouth daily Need to see MD for further refills     simvastatin (ZOCOR) 40 MG tablet TAKE 0.5 TABLETS (20 MG) BY MOUTH DAILY     albuterol (2.5 MG/3ML) 0.083% neb solution Take 1 vial (2.5 mg) by nebulization every 6 hours as needed for shortness of breath / dyspnea or wheezing (Patient not taking: Reported on 10/1/2018)     gabapentin (NEURONTIN) 300 MG capsule Take 900 mg by mouth At Bedtime     ipratropium - albuterol 0.5 mg/2.5 mg/3 mL (DUONEB) 0.5-2.5 (3) MG/3ML neb solution Take 1 vial (3 mLs) by nebulization once for 1 dose     order for DME Equipment being ordered: Nebulizer     predniSONE (DELTASONE) 5 MG tablet Take 1 tablet (5 mg) by mouth 2 times daily     No current facility-administered medications for this visit.         ALLERGIES      Allergies   Allergen Reactions     Morphine Sulfate GI Disturbance     vomiting     Vicodin [Hydrocodone-Acetaminophen] Nausea and Vomiting        REVIEW OF SYSTEMS   As above in the HPI, o/w complete 12-point ROS was negative.     PHYSICAL EXAM   /72 (BP Location: Left arm)  Pulse 57  Temp 97  F (36.1  C) (Oral)  Resp 18  Wt 92.6 kg (204 lb 1.6 oz)  SpO2 99%  BMI 28.07 kg/m2  GEN: NAD  HEENT: PERRL, EOMI, no icterus, injection or pallor. Oropharynx is clear.  NECK: no cervical or supraclavicular lymphadenopathy  LUNGS: clear bilaterally  CV: regular, no murmurs, rubs, or gallops  ABDOMEN: soft, non-tender, non-distended, normal bowel sounds, no hepatosplenomegaly by percussion or palpation  EXT: warm, well perfused, +++ edema  NEURO: alert  SKIN: Extensive ecchymoses in all the extremities      LABORATORY AND IMAGING STUDIES     Labs remain stable. Calcium is within the normal range or low normal  Mild normocytic anemia   Recent Labs   Lab Test  10/01/18   0926 08/09/18   0836  06/28/18   1043  05/24/18   1223  04/30/18   0937  12/18/17   0906   NA  144   --   140  139  140  143   POTASSIUM  4.4   --   4.4  4.4  4.2  3.4   CHLORIDE  105   --   103  105  103  107   CO2  33*   --   30  28  33*  30   ANIONGAP  6   --   7  6  4  6   BUN  16   --   28  23  29  13   CR  0.95  1.06  1.08  0.88  1.06  0.71   GLC  85   --   107*  102*  97  93   ALETHA  9.5  9.3  9.6  8.7  9.4  8.6     Recent Labs   Lab Test  12/28/11   0857  04/05/11   0846   PHOS  3.1  3.4     Recent Labs   Lab Test  10/01/18   0926  06/28/18   1043  05/24/18   1223  04/30/18   0937  12/18/17   0906   WBC  8.9  5.8  5.3  5.8  4.3   HGB  12.3*  11.9*  11.3*  12.1*  13.0*   PLT  215  183  149*  189  159   MCV  102*  102*  97  99  95   NEUTROPHIL  75.2  72.0  74.5  68.1  58.1     Recent Labs   Lab Test  10/01/18   0926  08/09/18   0836  06/28/18   1043  05/24/18   1223   BILITOTAL  0.6   --   0.7  0.9   ALKPHOS  91   --   84  93   ALT  23   --   25  25   AST  21   --   25  23   ALBUMIN  3.3*  3.6  3.5  3.3*     TSH   Date Value Ref Range Status   06/13/2016 1.60 0.40 - 4.00 mU/L Final   04/05/2011 3.73 0.4 - 5.0 mU/L Final     Recent Labs   Lab Test  10/01/18   0926 08/09/18   0836  06/28/18   1043  05/24/18   1223  04/30/18   0937   02/09/17   0823   PSA  11.70*  10.50*  10.00*  10.40*  13.30*   < >   --    TESTOSTTOTAL   --    --    --    --    --    --   9*    < > = values in this interval not displayed.          ASSESSMENT AND PLAN   1. 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 No response to addition of bicalutamide  3. PE diagnosed few weeks after initiation of megestrol acetate for hot flashes on GnRH agonist  4. No significant medical comorbidities    He is  tolerating his abiraterone well and has no complains. His PSA did respond nicely as expected initially and has been flat since then. It was 64 on 7/13/17, dropped to 12 by 10/16/17 and has been stable since - currently at 11.7. For now the PSA values are relatively stable and have not doubled since he hit abel about 6 months ago. His wife was little nervous with the rising PSA. His PSA has gone up for the last 2 visits. But in the past when his PSA does indeed starts to rise - it does go up pretty dramatically. He only has a marginal rise in his PSA. I will not consider changing therapy unless there is a big change in his PSA.     We would plan to continue as current. There are no immediate concerns.     He did get bilateral orchiectomies done last month 9/27/17. He would not need any more lupron or other GnRH directed therapies.     His labs are stable except for mild macrocytic anemia - possibly owing to ongoing therapy for cancer.     He has extensive ecchymoses likely secondary to prednisone use.     He has hot flashes, mood swings, fatigue - from androgen deprivation. These are no different between either orchiectomy or GnRH therapy as they come with low levels of testosterone.     We would continue with zometa every 6-8 weeks. He has not received zometa in last 6 months. I will restart it at this visit. I will have him meet Leatha Loredo in 6-8 weeks and I could see him in 12-16 weeks. If he has a doubling of his PSA at next visit with Leatha, I will get restaging scans done prior to his visit with me with plans to possibly move him to chemotherapy.     Over 25 min of direct face to face time spent with patient with more than 50% time spent in counseling and coordinating care.        Again, thank you for allowing me to participate in the care of your patient.        Sincerely,        Rafael Valenzuela MD

## 2018-10-03 ENCOUNTER — TELEPHONE (OUTPATIENT)
Dept: INTERNAL MEDICINE | Facility: CLINIC | Age: 83
End: 2018-10-03

## 2018-10-03 DIAGNOSIS — G47.33 OSA (OBSTRUCTIVE SLEEP APNEA): Primary | ICD-10-CM

## 2018-10-03 NOTE — TELEPHONE ENCOUNTER
Reason for Call: C pap machine is bad    Detailed comments: Patient would like a new prescription for a new C pap machine so he can order one. The one he has is bad.    Phone Number Patient can be reached at: Home number on file [phone] (home)    Best Time: any    Can we leave a detailed message on this number? YES    Call taken on 10/3/2018 at 8:30 AM by Jenna Mariano

## 2018-10-04 NOTE — TELEPHONE ENCOUNTER
CPAP [ continuous positive airway pressure] supplies are supplied by a sleep disorder specialist.    If his machine is 5-10 years old or something like that and he has no relationship with a sleep disorder specialist , I doubt a prescription for durable medical equipment or supplies for a new CPAP [ continuous positive airway pressure] machine and or associated CPAP supplies and tubing, would be covered.    See if he needs us to help with consultation with a sleep disorder specialist versus what information can he give us ?      Reginaldo Muir MD

## 2018-10-04 NOTE — PROGRESS NOTES
SUBJECTIVE:   Dimitri Neves is a 83 year old male who presents to clinic today for the following health issues:    Sleep Apnea  Dimitri reports his CPAP machine broke recently. He has been using this particular machine since 2011 and had another machine before then. He hasn't had a sleep study for more than 10 or 15 years. He's been told by [facility] that all I need is to sign a prescription for durable medical equipment or supplies . No repeat sleep study necessary , purportedly.    Additional Information   He reports taking a fall from a pontoon boat and he says that he still has aches and pains primarily in his face. He says Zoloft gave him dizziness and was screwing up his mind and after 4 days he ceased use. He claims less depressive mood lately despite his brother passing away.     Problem list and histories reviewed & adjusted, as indicated.  Additional history: as documented    Patient Active Problem List   Diagnosis     HTN (Hypertension), Benign goal <140/90     Narcotic dependence, episodic use (H)     ED (erectile dysfunction)     HUDSON (obstructive sleep apnea)     PE (pulmonary thromboembolism) (H)     HYPERLIPIDEMIA LDL GOAL <130     Advanced directives, counseling/discussion     Abnormal glucose     AK (actinic keratosis)     SNHL (sensorineural hearing loss)     Endolymphatic hydrops     History of squamous cell carcinoma     Pseudophakia, Yag Caps, ou     Venous (peripheral) insufficiency     Malignant neoplasm of prostate (H)     Dyspnea on exertion     Posterior vitreous detachment, bilateral     Iris nevus, ou     Dry eye syndrome, bilateral     Macular degeneration, dry, mild, ou     Cotton wool spots, od     Bone metastasis (H)     Past Surgical History:   Procedure Laterality Date     ARTHROSCOPY KNEE RT/LT  ~1995    Right     C LUMBAR SPINE FUSION,ANTER APPRCH  8/2007    four times total, latest 8/07     CATARACT IOL, RT/LT       LASER YAG CAPSULOTOMY      both eyes     ORCHIECTOMY  SCROTAL Bilateral 9/27/2017    Procedure: ORCHIECTOMY SCROTAL;  Bilateral orchiectomy scrotal approach;  Surgeon: Senthil Taylor MD;  Location: [facility] OR     PHACOEMULSIFICATION CLEAR CORNEA WITH STANDARD INTRAOCULAR LENS IMPLANT  6-18-12/7-30-12    right/left eye     ROTATOR CUFF REPAIR RT/LT  ~1995    right       Social History   Substance Use Topics     Smoking status: Former Smoker     Packs/day: 1.00     Years: 25.00     Types: Cigarettes     Quit date: 1/2/1976     Smokeless tobacco: Never Used      Comment: lives in smoke free household     Alcohol use 7.0 oz/week     14 Standard drinks or equivalent per week      Comment: 2-3 drinks every night     Family History   Problem Relation Age of Onset     Cancer Father      Lung 64y     Diabetes Brother      Hypertension Brother      Cancer Mother      Liver     Cerebrovascular Disease Mother      Eye Disorder Mother      Glaucoma Mother      Cerebrovascular Disease Maternal Grandmother      C.A.D. No family hx of      Thyroid Disease No family hx of      Macular Degeneration No family hx of          BP Readings from Last 3 Encounters:   10/08/18 114/80   10/01/18 150/72   09/04/18 106/60    Wt Readings from Last 3 Encounters:   10/08/18 93 kg (205 lb)   10/01/18 92.6 kg (204 lb 1.6 oz)   09/04/18 91.6 kg (202 lb)        Reviewed and updated as needed this visit by clinical staff       Reviewed and updated as needed this visit by Provider       ROS:  Constitutional, HEENT, cardiovascular, pulmonary, GI, , musculoskeletal, neuro, skin, endocrine and psych systems are negative, except as otherwise noted.    This document serves as a record of the services and decisions personally performed and made by Reginaldo Muir MD. It was created on his behalf by Steffen Quinn, a trained medical scribe. The creation of this document is based on the provider's statements to the medical scribe.  Steffen Quinn 8:51 AM October 8, 2018    OBJECTIVE:     /80  Pulse 86   Temp 96.7  F (35.9  C) (Oral)  Resp 16  Wt 93 kg (205 lb)  SpO2 96%  BMI 28.19 kg/m2  Body mass index is 28.19 kg/(m^2).  GENERAL: healthy, alert and no distress  EYES: Eyes grossly normal to inspection, PERRL and conjunctivae and sclerae normal  SKIN: no suspicious lesions or rashes  NEURO: Normal strength and tone, mentation intact and speech normal  PSYCH: mentation appears normal, affect normal/bright    Diagnostic Test Results:  No results found for this or any previous visit (from the past 24 hour(s)).    ASSESSMENT/PLAN:     (G47.33) HUDSON (obstructive sleep apnea)  (primary encounter diagnosis)  Comment: prescription for durable medical equipment or supplies written and signed. Patient is currently using his cpap machine and mask and deriving personal health benefits from this equipment and this treatment needs to be continued   Plan: order for DME          Other other issues reviewed and discussed with patient. Mainly reassurance given.    See Patient Instructions    The information in this document, created by the medical scribe for me, accurately reflects the services I personally performed and the decisions made by me. I have reviewed and approved this document for accuracy prior to leaving the patient care area.  October 8, 2018 8:51 AM    Reginaldo Muir MD  [facility]

## 2018-10-04 NOTE — TELEPHONE ENCOUNTER
Called pt but pt wasn't home so wife said to call back in an hour. Will call back after an hour.  Amanda RANGEL CMA

## 2018-10-04 NOTE — TELEPHONE ENCOUNTER
Please contact patient.  Does he have a sleep specialist? If so, please ask him to contact his sleep specialist for a new prescription for CPAP.  If he does not have a sleep specialist and his machine is 5-10 years old, we can refer him to a sleep specialist.     Pablo Cheatham RN

## 2018-10-06 DIAGNOSIS — I10 HTN (HYPERTENSION), BENIGN: ICD-10-CM

## 2018-10-06 DIAGNOSIS — R60.0 BILATERAL LOWER EXTREMITY EDEMA: ICD-10-CM

## 2018-10-08 ENCOUNTER — OFFICE VISIT (OUTPATIENT)
Dept: FAMILY MEDICINE | Facility: CLINIC | Age: 83
End: 2018-10-08
Payer: COMMERCIAL

## 2018-10-08 VITALS
BODY MASS INDEX: 28.19 KG/M2 | OXYGEN SATURATION: 96 % | RESPIRATION RATE: 16 BRPM | HEART RATE: 86 BPM | WEIGHT: 205 LBS | TEMPERATURE: 96.7 F | DIASTOLIC BLOOD PRESSURE: 80 MMHG | SYSTOLIC BLOOD PRESSURE: 114 MMHG

## 2018-10-08 DIAGNOSIS — G47.33 OSA (OBSTRUCTIVE SLEEP APNEA): Primary | ICD-10-CM

## 2018-10-08 PROCEDURE — 99213 OFFICE O/P EST LOW 20 MIN: CPT | Performed by: INTERNAL MEDICINE

## 2018-10-08 RX ORDER — FUROSEMIDE 20 MG
TABLET ORAL
Qty: 90 TABLET | Refills: 1 | Status: SHIPPED | OUTPATIENT
Start: 2018-10-08 | End: 2019-04-07

## 2018-10-08 RX ORDER — QUINAPRIL 40 MG/1
TABLET ORAL
Qty: 90 TABLET | Refills: 1 | Status: SHIPPED | OUTPATIENT
Start: 2018-10-08 | End: 2019-10-21

## 2018-10-08 NOTE — LETTER
My Depression Action Plan  Name: Dimitri Neves   Date of Birth 1935  Date: 10/8/2018    My doctor: Reginaldo Muir   My clinic: [address]  [place] [address]  [phone]          GREEN    ZONE   Good Control    What it looks like:     Things are going generally well. You have normal up s and down s. You may even feel depressed from time to time, but bad moods usually last less than a day.   What you need to do:  1. Continue to care for yourself (see self care plan)  2. Check your depression survival kit and update it as needed  3. Follow your physician s recommendations including any medication.  4. Do not stop taking medication unless you consult with your physician first.           YELLOW         ZONE Getting Worse    What it looks like:     Depression is starting to interfere with your life.     It may be hard to get out of bed; you may be starting to isolate yourself from others.    Symptoms of depression are starting to last most all day and this has happened for several days.     You may have suicidal thoughts but they are not constant.   What you need to do:     1. Call your care team, your response to treatment will improve if you keep your care team informed of your progress. Yellow periods are signs an adjustment may need to be made.     2. Continue your self-care, even if you have to fake it!    3. Talk to someone in your support network    4. Open up your depression survival kit           RED    ZONE Medical Alert - Get Help    What it looks like:     Depression is seriously interfering with your life.     You may experience these or other symptoms: You can t get out of bed most days, can t work or engage in other necessary activities, you have trouble taking care of basic hygiene, or basic responsibilities, thoughts of suicide or death that will not go away, self-injurious behavior.     What you need to do:  1. Call your care team and request a  same-day appointment. If they are not available (weekends or after hours) call your local crisis line, emergency room or 911.            Depression Self Care Plan / Survival Kit    Self-Care for Depression  Here s the deal. Your body and mind are really not as separate as most people think.  What you do and think affects how you feel and how you feel influences what you do and think. This means if you do things that people who feel good do, it will help you feel better.  Sometimes this is all it takes.  There is also a place for medication and therapy depending on how severe your depression is, so be sure to consult with your medical provider and/ or Behavioral Health Consultant if your symptoms are worsening or not improving.     In order to better manage my stress, I will:    Exercise  Get some form of exercise, every day. This will help reduce pain and release endorphins, the  feel good  chemicals in your brain. This is almost as good as taking antidepressants!  This is not the same as joining a gym and then never going! (they count on that by the way ) It can be as simple as just going for a walk or doing some gardening, anything that will get you moving.      Hygiene   Maintain good hygiene (Get out of bed in the morning, Make your bed, Brush your teeth, Take a shower, and Get dressed like you were going to work, even if you are unemployed).  If your clothes don't fit try to get ones that do.    Diet  I will strive to eat foods that are good for me, drink plenty of water, and avoid excessive sugar, caffeine, alcohol, and other mood-altering substances.  Some foods that are helpful in depression are: complex carbohydrates, B vitamins, flaxseed, fish or fish oil, fresh fruits and vegetables.    Psychotherapy  I agree to participate in Individual Therapy (if recommended).    Medication  If prescribed medications, I agree to take them.  Missing doses can result in serious side effects.  I understand that drinking  alcohol, or other illicit drug use, may cause potential side effects.  I will not stop my medication abruptly without first discussing it with my provider.    Staying Connected With Others  I will stay in touch with my friends, family members, and my primary care provider/team.    Use your imagination  Be creative.  We all have a creative side; it doesn t matter if it s oil painting, sand castles, or mud pies! This will also kick up the endorphins.    Witness Beauty  (AKA stop and smell the roses) Take a look outside, even in mid-winter. Notice colors, textures. Watch the squirrels and birds.     Service to others  Be of service to others.  There is always someone else in need.  By helping others we can  get out of ourselves  and remember the really important things.  This also provides opportunities for practicing all the other parts of the program.    Humor  Laugh and be silly!  Adjust your TV habits for less news and crime-drama and more comedy.    Control your stress  Try breathing deep, massage therapy, biofeedback, and meditation. Find time to relax each day.     My support system    Clinic Contact:  Phone number:    Contact 1:  Phone number:    Contact 2:  Phone number:    Faith/:  Phone number:    Therapist:  Phone number:    Local crisis center:    Phone number:    Other community support:  Phone number:

## 2018-10-08 NOTE — MR AVS SNAPSHOT
After Visit Summary   10/8/2018    Dimitri Neves    MRN: [id number]           Patient Information     Date Of Birth          1935        Visit Information        Provider Department      10/8/2018 8:50 AM Reginaldo Muir MD [facility]        Today's Diagnoses     HUDSON (obstructive sleep apnea)    -  1      Care Instructions    [facility]    If you have any questions regarding to your visit please contact your care team:     Team Pink:   Clinic Hours Telephone Number   Internal Medicine:  Dr. Deborah Taylor NP 7am-7pm  Monday - Thursday   7am-5pm  Fridays  [phone]  (Appointment scheduling available 24/7)   Urgent Care - [place] - 11am-9pm Monday-Friday Saturday-Sunday- 9am-5pm   [place] - 5pm-9pm Monday-Friday Saturday-Sunday- 9am-5pm  [phone] - [place]  [phone] - [place]       What options do I have for a visit other than an office visit? We offer electronic visits (e-visits) and telephone visits, when medically appropriate.  Please check with your medical insurance to see if these types of visits are covered, as you will be responsible for any charges that are not paid by your insurance.      You can use [employer] (secure electronic communication) to access to your chart, send your primary care provider a message, or make an appointment. Ask a team member how to get started.     For a price quote for your services, please call our Consumer Price Line at [phone] or our Imaging Cost estimation line at [phone] (for imaging tests).  Anaid PERSAUD CMA ([facility])            Follow-ups after your visit        Your next 10 appointments already scheduled     Oct 16, 2018  3:00 PM CDT   New Visit with Josiah Alicea MD   [facility] ([facility])    [address] [address]   [phone]            Oct 23, 2018 10:30 AM CDT   Office Visit  with Reginaldo Muir MD   [facility]    [address]  [place] [address] [phone]           Bring a current list of meds and any records pertaining to this visit. For Physicals, please bring immunization records and any forms needing to be filled out. Please arrive 10 minutes early to complete paperwork.            Nov 12, 2018  8:15 AM CST   LAB with LAB ONC [facility])    [address] [place] [address] [phone]           Please do not eat 10-12 hours before your appointment if you are coming in fasting for labs on lipids, cholesterol, or glucose (sugar). This does not apply to pregnant women. Water, hot tea and black coffee (with nothing added) are okay. Do not drink other fluids, diet soda or chew gum.            Nov 12, 2018  8:45 AM CST   Return Visit with NI De Anda [facility])    [address] [place] [address]   [phone]            Nov 12, 2018  9:30 AM CST   Level O with [address])    [address] [place] [address]   [phone]            Nov 27, 2018 10:30 AM CST   Return Visit with Senthil Jhaveri MD   [facility] ([facility])    [address] [place] [address]   [phone]            Jan 03, 2019  7:45 AM CST   LAB with LAB ONC [facility])    [address] [place] [address] [phone]           Please do not eat 10-12 hours before your appointment if you are coming in fasting for labs on lipids, cholesterol, or glucose (sugar). This does not apply to pregnant women. Water, hot tea and black coffee (with nothing added) are okay. Do not drink other fluids, diet soda or chew gum.             Jan 03, 2019  8:30 AM CST   Return Visit with Rafael Valenzuela MD   [facility]    [address] [facility] [address] [phone]            Jan 03, 2019  9:00 AM CST   Level O with BAY 2 INFUSION   [facility])    [address] [facility] [address] [phone]              Who to contact     If you have questions or need follow up information about today's clinic visit or your schedule please contact [facility] directly at [phone].  Normal or non-critical lab and imaging results will be communicated to you by [employer], letter or phone within 4 business days after the clinic has received the results. If you do not hear from us within 7 days, please contact the clinic through [employer] or phone. If you have a critical or abnormal lab result, we will notify you by phone as soon as possible.  Submit refill requests through [employer] or call your pharmacy and they will forward the refill request to us. Please allow 3 business days for your refill to be completed.          Additional Information About Your Visit        [employer] Information     [employer] gives you secure access to your electronic health record. If you see a primary care provider, you can also send messages to your care team and make appointments. If you have questions, please call your primary care clinic.  If you do not have a primary care provider, please call [phone] and they will assist you.        Care EveryWhere ID     This is your Care EveryWhere ID. This could be used by other organizations to access your [place] medical records  [id number]        Your Vitals Were     Pulse Temperature Respirations Pulse Oximetry BMI (Body Mass Index)       86 96.7  F (35.9  C) (Oral) 16 96% 28.19 kg/m2        Blood Pressure from Last 3 Encounters:   10/08/18 114/80   10/01/18 150/72   09/04/18 106/60    Weight  from Last 3 Encounters:   10/08/18 205 lb (93 kg)   10/01/18 204 lb 1.6 oz (92.6 kg)   09/04/18 202 lb (91.6 kg)              Today, you had the following     No orders found for display         Today's Medication Changes          These changes are accurate as of 10/8/18  9:02 AM.  If you have any questions, ask your nurse or doctor.               Start taking these medicines.        Dose/Directions    order for DME   Used for:  HUDSON (obstructive sleep apnea)   Started by:  Reginaldo Muir MD        Equipment being ordered: CPAP machine with associated CPAP supplies and tubing   Quantity:  1 Device   Refills:  0         Stop taking these medicines if you haven't already. Please contact your care team if you have questions.     sertraline 50 MG tablet   Commonly known as:  ZOLOFT   Stopped by:  Reginaldo Muir MD                Where to get your medicines      Some of these will need a paper prescription and others can be bought over the counter.  Ask your nurse if you have questions.     Bring a paper prescription for each of these medications     order for DME                Primary Care Provider Office Phone # Fax #    Reginaldo Muir [phone] [address] [address]        Equal Access to Services     [facility]: Hadii nahid marin Sogregorio, waaxda luqadaha, qaybta kaalmada adegrahamyada, naty russo. So [facility] [phone].    ATENCIÓN: Si habla español, tiene a roberts disposición servicios gratuitos de asistencia lingüística. Kelsey al [phone].    We comply with applicable federal civil rights laws and [place] laws. We do not discriminate on the basis of race, color, national origin, age, disability, sex, sexual orientation, or gender identity.            Thank you!     Thank you for choosing [facility]  for your care. Our goal is always to provide you with excellent care. Hearing back from our patients is one way we can continue  to improve our services. Please take a few minutes to complete the written survey that you may receive in the mail after your visit with us. Thank you!             Your Updated Medication List - Protect others around you: Learn how to safely use, store and throw away your medicines at www.disposemymeds.org.          This list is accurate as of 10/8/18  9:02 AM.  Always use your most recent med list.                   Brand Name Dispense Instructions for use Diagnosis    abiraterone 250 MG tablet    ZYTIGA    120 tablet    Take 4 tablets (1,000 mg) by mouth daily for 30 doses Take on empty stomach.    Bone metastasis (H), Malignant neoplasm of prostate (H)       albuterol (2.5 MG/3ML) 0.083% neb solution     25 vial    Take 1 vial (2.5 mg) by nebulization every 6 hours as needed for shortness of breath / dyspnea or wheezing    Acute bronchitis, unspecified organism       ASPIRIN PO      Take 81 mg by mouth        CALCIUM 600 PO           cyanocobalamin 1000 MCG Tabs    [employer] vitamin  B12    30 tablet    Take 1 tablet by mouth daily    Low vitamin B12 level       furosemide 20 MG tablet    LASIX    90 tablet    Take 1 tablet (20 mg) by mouth daily    Bilateral lower extremity edema, HTN (hypertension), benign       gabapentin 300 MG capsule    NEURONTIN     Take 900 mg by mouth At Bedtime        ipratropium - albuterol 0.5 mg/2.5 mg/3 mL 0.5-2.5 (3) MG/3ML neb solution    DUONEB    3 mL    Take 1 vial (3 mLs) by nebulization once for 1 dose    Dyspnea, unspecified type       metoprolol succinate 25 MG 24 hr tablet    TOPROL-XL    90 tablet    TAKE 1 TABLET (25 MG) BY MOUTH DAILY    HTN (hypertension), benign       order for DME     1 Device    Equipment being ordered: Nebulizer    Acute bronchitis, unspecified organism       order for DME     1 Device    Equipment being ordered: CPAP machine with associated CPAP supplies and tubing    HUDSON (obstructive sleep apnea)       oxyCODONE IR 5 MG tablet    ROXICODONE    30  tablet    Take 1 tablet (5 mg) by mouth every 6 hours as needed for pain    Narcotic dependence, episodic use (H)       * predniSONE 5 MG tablet    DELTASONE    60 tablet    Take 1 tablet (5 mg) by mouth 2 times daily    Bone metastasis (H), Malignant neoplasm of prostate (H)       * predniSONE 5 MG tablet    DELTASONE    60 tablet    Take 1 tablet (5 mg) by mouth 2 times daily    Bone metastasis (H), Malignant neoplasm of prostate (H)       quinapril 40 MG Tab    ACCUPRIL    90 tablet    TAKE 1 TABLET (40 MG) BY MOUTH DAILY    HTN (hypertension), benign       simvastatin 40 MG tablet    ZOCOR    15 tablet    TAKE 0.5 TABLETS (20 MG) BY MOUTH DAILY    Hyperlipidemia LDL goal <130       vitamin D 2000 units tablet      Take 1 tablet by mouth daily        * Notice:  This list has 2 medication(s) that are the same as other medications prescribed for you. Read the directions carefully, and ask your doctor or other care provider to review them with you.

## 2018-10-08 NOTE — PATIENT INSTRUCTIONS
[facility]    If you have any questions regarding to your visit please contact your care team:     Team Pink:   Clinic Hours Telephone Number   Internal Medicine:  Dr. Deborah Taylor NP 7am-7pm  Monday - Thursday   7am-5pm  Fridays  [phone]  (Appointment scheduling available 24/7)   Urgent Care - [place] [facility] - 11am-9pm Monday-Friday Saturday-Sunday- 9am-5pm   [place] - 5pm-9pm Monday-Friday Saturday-Sunday- 9am-5pm  [phone] - [place]  [phone] - [place]       What options do I have for a visit other than an office visit? We offer electronic visits (e-visits) and telephone visits, when medically appropriate.  Please check with your medical insurance to see if these types of visits are covered, as you will be responsible for any charges that are not paid by your insurance.      You can use [employer] (secure electronic communication) to access to your chart, send your primary care provider a message, or make an appointment. Ask a team member how to get started.     For a price quote for your services, please call our Consumer Price Line at [phone] or our Imaging Cost estimation line at [phone] (for imaging tests).  Anaid PERSAUD CMA ([facility])

## 2018-10-10 ENCOUNTER — TELEPHONE (OUTPATIENT)
Dept: FAMILY MEDICINE | Facility: CLINIC | Age: 83
End: 2018-10-10

## 2018-10-10 NOTE — TELEPHONE ENCOUNTER
Bj faxed a request and it needs a signature. Has it been returned yet. He needs to get the cpap. Please call and advise.

## 2018-10-10 NOTE — TELEPHONE ENCOUNTER
Reason for Call:  Other call back     Detailed comments: Needs Cpap machine and a prescription sihned. Bj sent Dr Muir a prescription and he needs to sign it and send it back to Bj. Please call back     Phone Number Patient can be reached at: Home number on file [phone] (home)     Best Time: any     Can we leave a detailed message on this number? YES     Call taken on 10/10/2018 at 3:19 PM by Jaclyn Vo

## 2018-10-10 NOTE — TELEPHONE ENCOUNTER
Reason for Call:  Other call back    Detailed comments: Needs Cpap machine and a prescription sihned. Bj sent Dr Muir a prescription and he needs to sign it and send it back to Bj. Please call back    Phone Number Patient can be reached at: Home number on file [phone] (home)    Best Time: any    Can we leave a detailed message on this number? YES    Call taken on 10/10/2018 at 3:19 PM by Jaclyn Vo

## 2018-10-10 NOTE — TELEPHONE ENCOUNTER
Spoke with patient. We have not received form. Patient will call back with phone number to call for form. Rachel Rose MA

## 2018-10-10 NOTE — TELEPHONE ENCOUNTER
Called [facility] oxygen and medical supply. Message left for return call or to fax any needed form to [phone]. Rachel Rose MA

## 2018-10-11 ENCOUNTER — MEDICAL CORRESPONDENCE (OUTPATIENT)
Dept: HEALTH INFORMATION MANAGEMENT | Facility: CLINIC | Age: 83
End: 2018-10-11

## 2018-10-12 NOTE — TELEPHONE ENCOUNTER
Bj paperwork signed by Dr. Muir and faxed back to them at [phone], with copy of 10-8-18, office note. Yuli Michael,

## 2018-10-15 ENCOUNTER — TELEPHONE (OUTPATIENT)
Dept: INTERNAL MEDICINE | Facility: CLINIC | Age: 83
End: 2018-10-15

## 2018-10-15 NOTE — TELEPHONE ENCOUNTER
"Notes were confirmed faxed over late this morning. Called to confirm if more is needed for the office notes. Constance Main,     Phone [phone] Cherelle at [facility].     CPAP/BIPAP Suppliers need the following to cover:    Medicare requires the physician must document in the medical record the patient's clinical need for PAP therapy, how often it is current machine, and if the patient is benefiting from the use of CPAP/BIPAP.    Office note needs to be update.    \"That the patient is using and benefiting form CPAP\"    [phone] fax    Constance Main,     "

## 2018-10-15 NOTE — TELEPHONE ENCOUNTER
Patient calling back to check status. States he has not had his cpap for 2 weeks. Please call patient when everything is done. He would like to know. Thank you

## 2018-10-15 NOTE — TELEPHONE ENCOUNTER
Called to get the order Re-faxed to the clinic.    [facility]  [phone] phone and [phone] fax    Due to where his lives this is handled out of their [facility]     Phone number [phone] option 2.    Reached them and they will be faxing this over to [facility] Fax [phone].    Constance Main,

## 2018-10-15 NOTE — TELEPHONE ENCOUNTER
Cherelle Lorenzo is calling back saying she already has the order she needs the notes changed to using and benefiting please fax to [phone] or call her back at [phone]

## 2018-10-16 ENCOUNTER — OFFICE VISIT (OUTPATIENT)
Dept: OPHTHALMOLOGY | Facility: CLINIC | Age: 83
End: 2018-10-16
Payer: COMMERCIAL

## 2018-10-16 DIAGNOSIS — H43.813 POSTERIOR VITREOUS DETACHMENT, BILATERAL: ICD-10-CM

## 2018-10-16 DIAGNOSIS — Z96.1 PSEUDOPHAKIA: ICD-10-CM

## 2018-10-16 DIAGNOSIS — D31.40 IRIS NEVUS, UNSPECIFIED LATERALITY: ICD-10-CM

## 2018-10-16 DIAGNOSIS — H52.4 PRESBYOPIA: ICD-10-CM

## 2018-10-16 DIAGNOSIS — H35.3131 EARLY DRY STAGE NONEXUDATIVE AGE-RELATED MACULAR DEGENERATION OF BOTH EYES: ICD-10-CM

## 2018-10-16 DIAGNOSIS — H04.123 DRY EYE SYNDROME, BILATERAL: ICD-10-CM

## 2018-10-16 DIAGNOSIS — Z01.01 ENCOUNTER FOR EXAMINATION OF EYES AND VISION WITH ABNORMAL FINDINGS: Primary | ICD-10-CM

## 2018-10-16 PROCEDURE — 92015 DETERMINE REFRACTIVE STATE: CPT | Performed by: OPHTHALMOLOGY

## 2018-10-16 PROCEDURE — 92014 COMPRE OPH EXAM EST PT 1/>: CPT | Performed by: OPHTHALMOLOGY

## 2018-10-16 ASSESSMENT — CUP TO DISC RATIO
OD_RATIO: 0.0
OS_RATIO: 0.0

## 2018-10-16 ASSESSMENT — VISUAL ACUITY
OS_CC: 20/30
OD_CC: 20/25
OD_CC+: -2
CORRECTION_TYPE: GLASSES
METHOD: SNELLEN - LINEAR
OS_CC+: -1

## 2018-10-16 ASSESSMENT — REFRACTION_MANIFEST
OS_AXIS: 100
OS_ADD: +2.50
OS_CYLINDER: +0.75
OD_AXIS: 120
OD_ADD: +2.50
OD_CYLINDER: +0.50
OS_SPHERE: -0.25
OD_SPHERE: +0.25

## 2018-10-16 ASSESSMENT — TONOMETRY
OD_IOP_MMHG: 14
OS_IOP_MMHG: 15
IOP_METHOD: APPLANATION

## 2018-10-16 ASSESSMENT — CONF VISUAL FIELD
METHOD: COUNTING FINGERS
OD_NORMAL: 1
OS_NORMAL: 1

## 2018-10-16 ASSESSMENT — SLIT LAMP EXAM - LIDS
COMMENTS: 2+ SCLERAL SHOW
COMMENTS: 2+ SCLERAL SHOW

## 2018-10-16 ASSESSMENT — REFRACTION_WEARINGRX
SPECS_TYPE: PAL
OD_AXIS: 050
OS_CYLINDER: +0.25
OD_SPHERE: PLANO
OS_AXIS: 049
OD_CYLINDER: +0.25
OD_ADD: +3.00
OS_ADD: +3.00
OS_SPHERE: -0.50

## 2018-10-16 NOTE — PATIENT INSTRUCTIONS
"Glasses Rx given - optional.  Use artificial tears up to 4 times daily both eyes.  (Refresh Tears, Systane Ultra/Balance, or Theratears)  Take a multiple vitamin or \"eye vitamin\" daily (AREDS2).  Protect your eyes outdoors from ultraviolet rays with sunglasses and/or brimmed hat.  Have spinach (cooked or raw), colorful fruits, walnuts, hazelnuts, almonds in your diet.  Monitor the vision in each eye weekly - call if any sudden persistent changes.  Call in June 2019 for an appointment in October 2019 for Complete Exam.    Dr. Alicea [phone]    "

## 2018-10-16 NOTE — PROGRESS NOTES
" Current Eye Medications:  none     Subjective:  Complete eye exam. Vision is OK both eyes in distance, having trouble at near for last year both eyes. No eye pain or discomfort in either eye.      Objective:  See Ophthalmology Exam.       Assessment:  Stable eye exam.      ICD-10-CM    1. Encounter for examination of eyes and vision with abnormal findings Z01.01 REFRACTIVE STATUS   2. Presbyopia H52.4 REFRACTIVE STATUS   3. Pseudophakia, Yag Caps, ou Z96.1 EYE EXAM (SIMPLE-NONBILLABLE)   4. Early dry stage nonexudative age-related macular degeneration of both eyes H35.3131    5. 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 Dry eye syndrome, bilateral H04.123    7. Posterior vitreous detachment, bilateral H43.813         Plan:  Glasses Rx given - optional.  Use artificial tears up to 4 times daily both eyes.  (Refresh Tears, Systane Ultra/Balance, or Theratears)  Take a multiple vitamin or \"eye vitamin\" daily (AREDS2).  Protect your eyes outdoors from ultraviolet rays with sunglasses and/or brimmed hat.  Have spinach (cooked or raw), colorful fruits, walnuts, hazelnuts, almonds in your diet.  Monitor the vision in each eye weekly - call if any sudden persistent changes.  Call in June 2019 for an appointment in October 2019 for Complete Exam.    Dr. Alicea [phone]           "

## 2018-10-16 NOTE — MR AVS SNAPSHOT
"              After Visit Summary   10/16/2018    Dimitri Neves    MRN: [id number]           Patient Information     Date Of Birth          1935        Visit Information        Provider Department      10/16/2018 3:00 PM Josiah Alicea MD [facility]        Today's Diagnoses     Encounter for examination of eyes and vision with abnormal findings    -  1    Macular degeneration of both eyes, unspecified type        Presbyopia        Cotton wool spots, od        Iris nevus, ou        Dry eye syndrome, bilateral        Posterior vitreous detachment, bilateral        Pseudophakia, Yag Caps, ou          Care Instructions    Glasses Rx given - optional.  Use artificial tears up to 4 times daily both eyes.  (Refresh Tears, Systane Ultra/Balance, or Theratears)  Take a multiple vitamin or \"eye vitamin\" daily (AREDS2).  Protect your eyes outdoors from ultraviolet rays with sunglasses and/or brimmed hat.  Have spinach (cooked or raw), colorful fruits, walnuts, hazelnuts, almonds in your diet.  Monitor the vision in each eye weekly - call if any sudden persistent changes.  Call in June 2019 for an appointment in October 2019 for Complete Exam.    Dr. Alicea [phone]            Follow-ups after your visit        Your next 10 appointments already scheduled     Oct 23, 2018 10:30 AM CDT   [facility]    [address] [place] [address] [phone]           Bring a current list of meds and any records pertaining to this visit. For Physicals, please bring immunization records and any forms needing to be filled out. Please arrive 10 minutes early to complete paperwork.            Nov 12, 2018  8:15 AM CST   LAB with LAB ONC [facility] ([facility])    [address] [place] [address] [phone]           Please do not eat 10-12 hours before your " appointment if you are coming in fasting for labs on lipids, cholesterol, or glucose (sugar). This does not apply to pregnant women. Water, hot tea and black coffee (with nothing added) are okay. Do not drink other fluids, diet soda or chew gum.            Nov 12, 2018  8:45 AM CST   Return Visit with NI De Anda CNP   [facility])    [address] [facility] [id number] [phone]            Nov 12, 2018  9:30 AM CST   Level O with [place] 9 [facility] ([facility])    [address] [facility] [address] [phone]            Nov 27, 2018 10:30 AM CST   Return Visit with Senthil Jhaveri MD   [facility]    [address] [facility] [address]   [phone]            Jan 03, 2019  7:45 AM CST   LAB with LAB ONC [facility])    [address] [place] [id number] [phone]           Please do not eat 10-12 hours before your appointment if you are coming in fasting for labs on lipids, cholesterol, or glucose (sugar). This does not apply to pregnant women. Water, hot tea and black coffee (with nothing added) are okay. Do not drink other fluids, diet soda or chew gum.            Jan 03, 2019  8:30 AM CST   Return Visit with Rafael Valenzuela MD   [facility])    [address] [facility] [address] [phone]            Jan 03, 2019  9:00 AM CST   Level O with [place] 2 [facility])    [address] [address] [address] [phone]              Who to contact     If you have questions or need follow up information about today's clinic visit or your schedule please contact [facility] directly at [phone].  Normal or  non-critical lab and imaging results will be communicated to you by MyChart, letter or phone within 4 business days after the clinic has received the results. If you do not hear from us within 7 days, please contact the clinic through LockerDomet or phone. If you have a critical or abnormal lab result, we will notify you by phone as soon as possible.  Submit refill requests through Cortica or call your pharmacy and they will forward the refill request to us. Please allow 3 business days for your refill to be completed.          Additional Information About Your Visit        Cortica Information     Cortica gives you secure access to your electronic health record. If you see a primary care provider, you can also send messages to your care team and make appointments. If you have questions, please call your primary care clinic.  If you do not have a primary care provider, please call [phone] and they will assist you.        Care EveryWhere ID     This is your Care EveryWhere ID. This could be used by other organizations to access your [facility] medical records  [id number]         Blood Pressure from Last 3 Encounters:   10/08/18 114/80   10/01/18 150/72   09/04/18 106/60    Weight from Last 3 Encounters:   10/08/18 93 kg (205 lb)   10/01/18 92.6 kg (204 lb 1.6 oz)   09/04/18 91.6 kg (202 lb)              Today, you had the following     No orders found for display       Primary Care Provider Office Phone # Fax #    Reginaldo Muir [phone]       [address] [facility] [address]        Equal Access to Services     [facility] Center[facility]ENNIE : Hadii aad ku hadasho Soomaali, waaxda luqadaha, qaybta kaalmada adeegyada, naty willis . So [facility] [phone].    ATENCIÓN: Si habla español, tiene a roberts disposición servicios gratuitos de asistencia lingüística. Llame al [phone].    We comply with applicable federal civil rights laws and [place] laws. We do not discriminate on the  basis of race, color, national origin, age, disability, sex, sexual orientation, or gender identity.            Thank you!     Thank you for choosing [facility]  for your care. Our goal is always to provide you with excellent care. Hearing back from our patients is one way we can continue to improve our services. Please take a few minutes to complete the written survey that you may receive in the mail after your visit with us. Thank you!             Your Updated Medication List - Protect others around you: Learn how to safely use, store and throw away your medicines at www.disposemymeds.org.          This list is accurate as of 10/16/18  3:36 PM.  Always use your most recent med list.                   Brand Name Dispense Instructions for use Diagnosis    abiraterone 250 MG tablet    ZYTIGA    120 tablet    Take 4 tablets (1,000 mg) by mouth daily for 30 doses Take on empty stomach.    Bone metastasis (H), Malignant neoplasm of prostate (H)       albuterol (2.5 MG/3ML) 0.083% neb solution     25 vial    Take 1 vial (2.5 mg) by nebulization every 6 hours as needed for shortness of breath / dyspnea or wheezing    Acute bronchitis, unspecified organism       ASPIRIN PO      Take 81 mg by mouth        CALCIUM 600 PO           cyanocobalamin 1000 MCG Tabs    [facility] vitamin  B12    30 tablet    Take 1 tablet by mouth daily    Low vitamin B12 level       furosemide 20 MG tablet    LASIX    90 tablet    TAKE 1 TABLET (20 MG) BY MOUTH DAILY    Bilateral lower extremity edema, HTN (hypertension), benign       gabapentin 300 MG capsule    NEURONTIN     Take 900 mg by mouth At Bedtime        ipratropium - albuterol 0.5 mg/2.5 mg/3 mL 0.5-2.5 (3) MG/3ML neb solution    DUONEB    3 mL    Take 1 vial (3 mLs) by nebulization once for 1 dose    Dyspnea, unspecified type       metoprolol succinate 25 MG 24 hr tablet    TOPROL-XL    90 tablet    TAKE 1 TABLET (25 MG) BY MOUTH DAILY    HTN (hypertension), benign       order  for DME     1 Device    Equipment being ordered: Nebulizer    Acute bronchitis, unspecified organism       order for DME     1 Device    Equipment being ordered: CPAP machine with associated CPAP supplies and tubing    HUDSON (obstructive sleep apnea)       oxyCODONE IR 5 MG tablet    ROXICODONE    30 tablet    Take 1 tablet (5 mg) by mouth every 6 hours as needed for pain    Narcotic dependence, episodic use (H)       * predniSONE 5 MG tablet    DELTASONE    60 tablet    Take 1 tablet (5 mg) by mouth 2 times daily    Bone metastasis (H), Malignant neoplasm of prostate (H)       * predniSONE 5 MG tablet    DELTASONE    60 tablet    Take 1 tablet (5 mg) by mouth 2 times daily    Bone metastasis (H), Malignant neoplasm of prostate (H)       quinapril 40 MG Tab    ACCUPRIL    90 tablet    TAKE 1 TABLET (40 MG) BY MOUTH DAILY    HTN (hypertension), benign       simvastatin 40 MG tablet    ZOCOR    15 tablet    TAKE 0.5 TABLETS (20 MG) BY MOUTH DAILY    Hyperlipidemia LDL goal <130       vitamin D 2000 units tablet      Take 1 tablet by mouth daily        * Notice:  This list has 2 medication(s) that are the same as other medications prescribed for you. Read the directions carefully, and ask your doctor or other care provider to review them with you.

## 2018-10-16 NOTE — LETTER
"    10/16/2018         RE: Dimitri Neves  [address] [place] [address]        Dear Colleague,    Thank you for referring your patient, Dimitri Neves, to the [facility]. Please see a copy of my visit note below.     Current Eye Medications:  none     Subjective:  Complete eye exam. Vision is OK both eyes in distance, having trouble at near for last year both eyes. No eye pain or discomfort in either eye.      Objective:  See Ophthalmology Exam.       Assessment:  Stable eye exam.      ICD-10-CM    1. Encounter for examination of eyes and vision with abnormal findings Z01.01 REFRACTIVE STATUS   2. Presbyopia H52.4 REFRACTIVE STATUS   3. Pseudophakia, Yag Caps, ou Z96.1 EYE EXAM (SIMPLE-NONBILLABLE)   4. Early dry stage nonexudative age-related macular degeneration of both eyes H35.3131    5. 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 Dry eye syndrome, bilateral H04.123    7. Posterior vitreous detachment, bilateral H43.813         Plan:  Glasses Rx given - optional.  Use artificial tears up to 4 times daily both eyes.  (Refresh Tears, Systane Ultra/Balance, or Theratears)  Take a multiple vitamin or \"eye vitamin\" daily (AREDS2).  Protect your eyes outdoors from ultraviolet rays with sunglasses and/or brimmed hat.  Have spinach (cooked or raw), colorful fruits, walnuts, hazelnuts, almonds in your diet.  Monitor the vision in each eye weekly - call if any sudden persistent changes.  Call in June 2019 for an appointment in October 2019 for Complete Exam.    Dr. Alicea [phone]             Again, thank you for allowing me to participate in the care of your patient.        Sincerely,        Josiah Alicea MD    "

## 2018-10-17 PROBLEM — H35.81 COTTON WOOL SPOTS: Status: RESOLVED | Noted: 2017-06-09 | Resolved: 2018-10-17

## 2018-10-17 PROBLEM — H35.3131 EARLY DRY STAGE NONEXUDATIVE AGE-RELATED MACULAR DEGENERATION OF BOTH EYES: Status: ACTIVE | Noted: 2018-10-17

## 2018-10-22 DIAGNOSIS — E78.5 HYPERLIPIDEMIA LDL GOAL <130: ICD-10-CM

## 2018-10-22 RX ORDER — SIMVASTATIN 40 MG
TABLET ORAL
Qty: 15 TABLET | Refills: 11 | Status: SHIPPED | OUTPATIENT
Start: 2018-10-22 | End: 2018-10-30

## 2018-10-25 ENCOUNTER — OFFICE VISIT (OUTPATIENT)
Dept: FAMILY MEDICINE | Facility: CLINIC | Age: 83
End: 2018-10-25
Payer: COMMERCIAL

## 2018-10-25 ENCOUNTER — RADIANT APPOINTMENT (OUTPATIENT)
Dept: GENERAL RADIOLOGY | Facility: CLINIC | Age: 83
End: 2018-10-25
Attending: INTERNAL MEDICINE
Payer: COMMERCIAL

## 2018-10-25 VITALS
HEIGHT: 71 IN | TEMPERATURE: 97.3 F | OXYGEN SATURATION: 96 % | RESPIRATION RATE: 18 BRPM | SYSTOLIC BLOOD PRESSURE: 124 MMHG | HEART RATE: 87 BPM | DIASTOLIC BLOOD PRESSURE: 72 MMHG | BODY MASS INDEX: 28.45 KG/M2 | WEIGHT: 203.2 LBS

## 2018-10-25 DIAGNOSIS — R05.9 COUGH: Primary | ICD-10-CM

## 2018-10-25 DIAGNOSIS — R06.09 DYSPNEA ON EXERTION: ICD-10-CM

## 2018-10-25 DIAGNOSIS — J20.9 ACUTE BRONCHITIS WITH SYMPTOMS > 10 DAYS: ICD-10-CM

## 2018-10-25 DIAGNOSIS — C79.51 BONE METASTASIS: Primary | ICD-10-CM

## 2018-10-25 DIAGNOSIS — R05.9 COUGH: ICD-10-CM

## 2018-10-25 DIAGNOSIS — C61 MALIGNANT NEOPLASM OF PROSTATE (H): ICD-10-CM

## 2018-10-25 PROCEDURE — 71046 X-RAY EXAM CHEST 2 VIEWS: CPT

## 2018-10-25 PROCEDURE — 99213 OFFICE O/P EST LOW 20 MIN: CPT | Performed by: INTERNAL MEDICINE

## 2018-10-25 RX ORDER — ABIRATERONE ACETATE 250 MG/1
1000 TABLET ORAL DAILY
Qty: 120 TABLET | Refills: 0 | Status: SHIPPED | OUTPATIENT
Start: 2018-10-25 | End: 2018-11-23

## 2018-10-25 RX ORDER — PREDNISONE 5 MG/1
5 TABLET ORAL 2 TIMES DAILY
Qty: 60 TABLET | Refills: 0 | Status: SHIPPED | OUTPATIENT
Start: 2018-10-25 | End: 2018-11-23

## 2018-10-25 ASSESSMENT — PAIN SCALES - GENERAL: PAINLEVEL: SEVERE PAIN (7)

## 2018-10-25 NOTE — MR AVS SNAPSHOT
After Visit Summary   10/25/2018    Dimitri Neves    MRN: [id number]           Patient Information     Date Of Birth          1935        Visit Information        Provider Department      10/25/2018 5:50 PM Reginaldo Muir MD [facility]        Today's Diagnoses     Cough    -  1    Dyspnea on exertion        Acute bronchitis with symptoms > 10 days          Care Instructions    Supportive measures reviewed ,    Rest   Push fluids   Acetaminophen prn for fever and aches and pains  Guaifenesin [ mucinex ] can help for sinus congestion   Robitussin DM, 1-2 tsp q 4-6 hours can help with congestion / coughing   Sucrets or other throat lozenges can help for sore throat along with gargling with warm salt water       [facility]    If you have any questions regarding to your visit please contact your care team:     Team Pink:   Clinic Hours Telephone Number   Internal Medicine:  Dr. Deborah Taylor NP 7am-7pm  Monday - Thursday   7am-5pm  Fridays  [phone]  (Appointment scheduling available 24/7)   [place] - [place] - 11am-9pm Monday-Friday Saturday-Sunday- 9am-5pm   [place] - 5pm-9pm Monday-Friday Saturday-Sunday- 9am-5pm  [phone] - [place]  [phone] - [place]       What options do I have for a visit other than an office visit? We offer electronic visits (e-visits) and telephone visits, when medically appropriate.  Please check with your medical insurance to see if these types of visits are covered, as you will be responsible for any charges that are not paid by your insurance.      You can use Volunia (secure electronic communication) to access to your chart, send your primary care provider a message, or make an appointment. Ask a team member how to get started.     For a price quote for your services, please call our Consumer Price Line at [phone] or our Imaging Cost estimation  line at [phone] (for imaging tests).    KIRBY Garcia            Follow-ups after your visit        Your next 10 appointments already scheduled     Nov 12, 2018  8:15 AM CST   LAB with LAB ONC [facility] ([facility])    [address] [place] [address] [phone]           Please do not eat 10-12 hours before your appointment if you are coming in fasting for labs on lipids, cholesterol, or glucose (sugar). This does not apply to pregnant women. Water, hot tea and black coffee (with nothing added) are okay. Do not drink other fluids, diet soda or chew gum.            Nov 12, 2018  8:45 AM CST   Return Visit with NI De Anda [facility])    [address] [place] [address]   [phone]            Nov 12, 2018  9:30 AM CST   Level O with [place] 9 [facility])    [address] [place] [address] [phone]            Nov 27, 2018 10:30 AM CST   [facility] Sentihl [facility]    [address] [facility] [address]   [phone]            Jan 03, 2019  7:45 AM CST   LAB with LAB ONC [facility])    [address] [place] [address] [phone]           Please do not eat 10-12 hours before your appointment if you are coming in fasting for labs on lipids, cholesterol, or glucose (sugar). This does not apply to pregnant women. Water, hot tea and black coffee (with nothing added) are okay. Do not drink other fluids, diet soda or chew gum.            Jan 03, 2019  8:30 AM CST   Return Visit with Rafael Valenzuela MD   [facility])    [address] [place] [address] [phone]            Jan 03,  "2019  9:00 AM CST   Level O with [place] 2 [facility])    [address] [place] [address] [phone]              Future tests that were ordered for you today     Open Future Orders        Priority Expected Expires Ordered    Comprehensive metabolic panel Routine  10/24/2019 10/25/2018            Who to contact     If you have questions or need follow up information about today's clinic visit or your schedule please contact [facility] directly at [phone].  Normal or non-critical lab and imaging results will be communicated to you by XOR.[email or web address], letter or phone within 4 business days after the clinic has received the results. If you do not hear from us within 7 days, please contact the clinic through Sentonst or phone. If you have a critical or abnormal lab result, we will notify you by phone as soon as possible.  Submit refill requests through Avot[employer]ics Powertrain or call your pharmacy and they will forward the refill request to us. Please allow 3 business days for your refill to be completed.          Additional Information About Your Visit        XOR.[email or web address] Information     [employer] Powert[employer] gives you secure access to your electronic health record. If you see a primary care provider, you can also send messages to your care team and make appointments. If you have questions, please call your primary care clinic.  If you do not have a primary care provider, please call [phone] and they will assist you.        Care EveryWhere ID     This is your Care EveryWhere ID. This could be used by other organizations to access your [facility] medical records  [id number]        Your Vitals Were     Pulse Temperature Respirations Height Pulse Oximetry BMI (Body Mass Index)    87 97.3  F (36.3  C) (Oral) 18 5' 11\" (1.803 m) 96% 28.34 kg/m2       Blood Pressure from Last 3 Encounters:   10/25/18 124/72   10/08/18 114/80   10/01/18 150/72    Weight from Last 3 " Encounters:   10/25/18 203 lb 3.2 oz (92.2 kg)   10/08/18 205 lb (93 kg)   10/01/18 204 lb 1.6 oz (92.6 kg)              Today, you had the following     No orders found for display         Today's Medication Changes          These changes are accurate as of 10/25/18  6:11 PM.  If you have any questions, ask your nurse or doctor.               Start taking these medicines.        Dose/Directions    abiraterone 250 MG tablet   Commonly known as:  ZYTIGA   Used for:  Bone metastasis (H), Malignant neoplasm of prostate (H)   Started by:  Taya Haji RPH        Dose:  1000 mg   Take 4 tablets (1,000 mg) by mouth daily for 30 doses Take on empty stomach.   Quantity:  120 tablet   Refills:  0         These medicines have changed or have updated prescriptions.        Dose/Directions    * predniSONE 5 MG tablet   Commonly known as:  DELTASONE   This may have changed:  Another medication with the same name was added. Make sure you understand how and when to take each.   Used for:  Bone metastasis (H), Malignant neoplasm of prostate (H)   Changed by:  Taya Haji RPH        Dose:  5 mg   Take 1 tablet (5 mg) by mouth 2 times daily   Quantity:  60 tablet   Refills:  0       * predniSONE 5 MG tablet   Commonly known as:  DELTASONE   This may have changed:  Another medication with the same name was added. Make sure you understand how and when to take each.   Used for:  Bone metastasis (H), Malignant neoplasm of prostate (H)   Changed by:  Taya Haji RPH        Dose:  5 mg   Take 1 tablet (5 mg) by mouth 2 times daily   Quantity:  60 tablet   Refills:  0       * predniSONE 5 MG tablet   Commonly known as:  DELTASONE   This may have changed:  You were already taking a medication with the same name, and this prescription was added. Make sure you understand how and when to take each.   Used for:  Bone metastasis (H), Malignant neoplasm of prostate (H)   Changed by:  Taya Haji RPH        Dose:  5 mg   Take 1 tablet (5 mg) by  mouth 2 times daily   Quantity:  60 tablet   Refills:  0       * Notice:  This list has 3 medication(s) that are the same as other medications prescribed for you. Read the directions carefully, and ask your doctor or other care provider to review them with you.         Where to get your medicines      These medications were sent to [facility] - [place], [place] - [address]  [address] [facility] [address]    Hours:  Mon-Fri 8:30am-5:00pm Toll Free [phone] Phone:  [phone]     abiraterone 250 MG tablet    predniSONE 5 MG tablet                Primary Care Provider Office Phone # Fax #    Reginaldo Muir -[phone] [address] [facility] [address]        Equal Access to Services     CARLITA LEBLANC : Hadii nahid martins hadasho Soomaali, waaxda luqadaha, qaybta kaalmada adeegyada, naty willis . So [facility] [phone].    ATENCIÓN: Si habla español, tiene a roberts disposición servicios gratuitos de asistencia lingüística. Kelsey al [phone].    We comply with applicable federal civil rights laws and [place] laws. We do not discriminate on the basis of race, color, national origin, age, disability, sex, sexual orientation, or gender identity.            Thank you!     Thank you for choosing [facility]  for your care. Our goal is always to provide you with excellent care. Hearing back from our patients is one way we can continue to improve our services. Please take a few minutes to complete the written survey that you may receive in the mail after your visit with us. Thank you!             Your Updated Medication List - Protect others around you: Learn how to safely use, store and throw away your medicines at www.disposemymeds.org.          This list is accurate as of 10/25/18  6:11 PM.  Always use your most recent med list.                   Brand Name Dispense Instructions for use Diagnosis     abiraterone 250 MG tablet    ZYTIGA    120 tablet    Take 4 tablets (1,000 mg) by mouth daily for 30 doses Take on empty stomach.    Bone metastasis (H), Malignant neoplasm of prostate (H)       albuterol (2.5 MG/3ML) 0.083% neb solution     25 vial    Take 1 vial (2.5 mg) by nebulization every 6 hours as needed for shortness of breath / dyspnea or wheezing    Acute bronchitis, unspecified organism       ASPIRIN PO      Take 81 mg by mouth        CALCIUM 600 PO           cyanocobalamin 1000 MCG Tabs    [employer] vitamin  B12    30 tablet    Take 1 tablet by mouth daily    Low vitamin B12 level       furosemide 20 MG tablet    LASIX    90 tablet    TAKE 1 TABLET (20 MG) BY MOUTH DAILY    Bilateral lower extremity edema, HTN (hypertension), benign       gabapentin 300 MG capsule    NEURONTIN     Take 900 mg by mouth At Bedtime        ipratropium - albuterol 0.5 mg/2.5 mg/3 mL 0.5-2.5 (3) MG/3ML neb solution    DUONEB    3 mL    Take 1 vial (3 mLs) by nebulization once for 1 dose    Dyspnea, unspecified type       metoprolol succinate 25 MG 24 hr tablet    TOPROL-XL    90 tablet    TAKE 1 TABLET (25 MG) BY MOUTH DAILY    HTN (hypertension), benign       order for DME     1 Device    Equipment being ordered: Nebulizer    Acute bronchitis, unspecified organism       order for DME     1 Device    Equipment being ordered: CPAP machine with associated CPAP supplies and tubing    HUDSON (obstructive sleep apnea)       oxyCODONE IR 5 MG tablet    ROXICODONE    30 tablet    Take 1 tablet (5 mg) by mouth every 6 hours as needed for pain    Narcotic dependence, episodic use (H)       * predniSONE 5 MG tablet    DELTASONE    60 tablet    Take 1 tablet (5 mg) by mouth 2 times daily    Bone metastasis (H), Malignant neoplasm of prostate (H)       * predniSONE 5 MG tablet    DELTASONE    60 tablet    Take 1 tablet (5 mg) by mouth 2 times daily    Bone metastasis (H), Malignant neoplasm of prostate (H)       * predniSONE 5 MG tablet     DELTASONE    60 tablet    Take 1 tablet (5 mg) by mouth 2 times daily    Bone metastasis (H), Malignant neoplasm of prostate (H)       quinapril 40 MG Tab    ACCUPRIL    90 tablet    TAKE 1 TABLET (40 MG) BY MOUTH DAILY    HTN (hypertension), benign       simvastatin 40 MG tablet    ZOCOR    15 tablet    TAKE 0.5 TABLETS (20 MG) BY MOUTH DAILY    Hyperlipidemia LDL goal <130       vitamin D 2000 units tablet      Take 1 tablet by mouth daily        * Notice:  This list has 3 medication(s) that are the same as other medications prescribed for you. Read the directions carefully, and ask your doctor or other care provider to review them with you.

## 2018-10-25 NOTE — PATIENT INSTRUCTIONS
Supportive measures reviewed ,    Rest   Push fluids   Acetaminophen prn for fever and aches and pains  Guaifenesin [ mucinex ] can help for sinus congestion   Robitussin DM, 1-2 tsp q 4-6 hours can help with congestion / coughing   Sucrets or other throat lozenges can help for sore throat along with gargling with warm salt water       [facility]    If you have any questions regarding to your visit please contact your care team:     Team Pink:   Clinic Hours Telephone Number   Internal Medicine:  Dr. Deborah Taylor, NP 7am-7pm  Monday - Thursday   7am-5pm  Fridays  [phone]  (Appointment scheduling available 24/7)   Urgent Care - [place] - 11am-9pm Monday-Friday Saturday-Sunday- 9am-5pm   [place] - 5pm-9pm Monday-Friday Saturday-Sunday- 9am-5pm  [phone] - [place]  [phone] - [place]       What options do I have for a visit other than an office visit? We offer electronic visits (e-visits) and telephone visits, when medically appropriate.  Please check with your medical insurance to see if these types of visits are covered, as you will be responsible for any charges that are not paid by your insurance.      You can use [employer] (secure electronic communication) to access to your chart, send your primary care provider a message, or make an appointment. Ask a team member how to get started.     For a price quote for your services, please call our Consumer Price Line at [phone] or our Imaging Cost estimation line at [phone] (for imaging tests).    KIRBY Garcia

## 2018-10-25 NOTE — PROGRESS NOTES
SUBJECTIVE:   Dimitri Neves is a 83 year old male who presents to clinic today for the following health issues:    Acute Bronchitis  Dimitri developed symptoms of a cough and mucosal secretions about 2 weeks ago. His coughing is worse at night he states. Dyspnea on exertion he states has been a chronic issue for him. Denies fevers, sore throat, ear pain, history of COPD or asthma.     Problem list and histories reviewed & adjusted, as indicated.  Additional history: as documented    Patient Active Problem List   Diagnosis     Essential hypertension with goal blood pressure less than 140/90     Narcotic dependence, episodic use (H)     ED (erectile dysfunction)     HUDSON (obstructive sleep apnea)     PE (pulmonary thromboembolism) (H)     HYPERLIPIDEMIA LDL GOAL <130     Advanced directives, counseling/discussion     Abnormal glucose     AK (actinic keratosis)     SNHL (sensorineural hearing loss)     Endolymphatic hydrops     History of squamous cell carcinoma     Pseudophakia, Yag Caps, ou     Venous (peripheral) insufficiency     Malignant neoplasm of prostate (H)     Dyspnea on exertion     Posterior vitreous detachment, bilateral     Iris nevus, ou     Dry eye syndrome, bilateral     Bone metastasis (H)     Early dry stage nonexudative age-related macular degeneration of both eyes     Past Surgical History:   Procedure Laterality Date     ARTHROSCOPY KNEE RT/LT  ~1995    Right     C LUMBAR SPINE FUSION,ANTER APPRCH  8/2007    four times total, latest 8/07     CATARACT IOL, RT/LT       LASER YAG CAPSULOTOMY      both eyes     ORCHIECTOMY SCROTAL Bilateral 9/27/2017    Procedure: ORCHIECTOMY SCROTAL;  Bilateral orchiectomy scrotal approach;  Surgeon: Senthil Taylor MD;  Location: [facility] OR     PHACOEMULSIFICATION CLEAR CORNEA WITH STANDARD INTRAOCULAR LENS IMPLANT  6-18-12/7-30-12    right/left eye     ROTATOR CUFF REPAIR RT/LT  ~1995    right       Social History   Substance Use Topics     Smoking status:  "Former Smoker     Packs/day: 1.00     Years: 25.00     Types: Cigarettes     Quit date: 1/2/1976     Smokeless tobacco: Never Used      Comment: lives in smoke free household     Alcohol use 7.0 oz/week     14 Standard drinks or equivalent per week      Comment: 2-3 drinks every night     Family History   Problem Relation Age of Onset     Cancer Father      Lung 64y     Diabetes Brother      Hypertension Brother      Cancer Mother      Liver     Cerebrovascular Disease Mother      Eye Disorder Mother      Glaucoma Mother      Cerebrovascular Disease Maternal Grandmother      C.A.D. No family hx of      Thyroid Disease No family hx of      Macular Degeneration No family hx of          BP Readings from Last 3 Encounters:   10/25/18 124/72   10/08/18 114/80   10/01/18 150/72    Wt Readings from Last 3 Encounters:   10/25/18 92.2 kg (203 lb 3.2 oz)   10/08/18 93 kg (205 lb)   10/01/18 92.6 kg (204 lb 1.6 oz)        Reviewed and updated as needed this visit by clinical staff       Reviewed and updated as needed this visit by Provider       ROS:  Constitutional, HEENT, cardiovascular, pulmonary, GI, , musculoskeletal, neuro, skin, endocrine and psych systems are negative, except as otherwise noted.    This document serves as a record of the services and decisions personally performed and made by Reginaldo Muir MD. It was created on his behalf by Steffen Quinn, a trained medical scribe. The creation of this document is based on the provider's statements to the medical scribe.  Steffen Quinn 6:00 PM October 25, 2018    OBJECTIVE:     /72  Pulse 87  Temp 97.3  F (36.3  C) (Oral)  Resp 18  Ht 1.803 m (5' 11\")  Wt 92.2 kg (203 lb 3.2 oz)  SpO2 96%  BMI 28.34 kg/m2  Body mass index is 28.34 kg/(m^2).  GENERAL: healthy, alert and no distress  EYES: Eyes grossly normal to inspection, PERRL and conjunctivae and sclerae normal  HENT: ear canals and TM's normal, nose and mouth without ulcers or lesions  NECK: no " adenopathy, no asymmetry, masses, or scars and thyroid normal to palpation  RESP: He has rumbling rhonchi and large airway sounds suggestive of chest congestion  SKIN: no suspicious lesions or rashes to visible skin   NEURO: Normal strength and tone, mentation intact and speech normal  PSYCH: mentation appears normal, affect normal/bright    Diagnostic Test Results:  No results found for this or any previous visit (from the past 24 hour(s)).    ASSESSMENT/PLAN:     (R05) Cough  (primary encounter diagnosis)  Comment: suggestive of a bronchitis illness, possibly pneumonia . Treatment with chest xray and follow up as indicated on results   Plan: XR Chest 2 Views, amoxicillin-clavulanate         (AUGMENTIN) 875-125 MG per tablet            (R06.09) Dyspnea on exertion  Comment: as detailed above   Plan: XR Chest 2 Views, amoxicillin-clavulanate         (AUGMENTIN) 875-125 MG per tablet            (J20.9) Acute bronchitis with symptoms > 10 days  Comment: as detailed above   Plan: XR Chest 2 Views, amoxicillin-clavulanate         (AUGMENTIN) 875-125 MG per tablet              See Patient Instructions    The information in this document, created by the medical scribe for me, accurately reflects the services I personally performed and the decisions made by me. I have reviewed and approved this document for accuracy prior to leaving the patient care area.  October 25, 2018 6:02 PM    Reginaldo Muir MD  [facility] [place]

## 2018-10-26 DIAGNOSIS — E78.5 HYPERLIPIDEMIA LDL GOAL <130: ICD-10-CM

## 2018-10-26 RX ORDER — SIMVASTATIN 40 MG
TABLET ORAL
Qty: 15 TABLET | Refills: 0
Start: 2018-10-26

## 2018-10-26 NOTE — TELEPHONE ENCOUNTER
Called and verified with pharmacy on duplicate prescription. Please disregard. KIRBY Garcia

## 2018-10-30 DIAGNOSIS — E78.5 HYPERLIPIDEMIA LDL GOAL <130: ICD-10-CM

## 2018-10-30 RX ORDER — SIMVASTATIN 40 MG
TABLET ORAL
Qty: 45 TABLET | Refills: 3 | Status: SHIPPED | OUTPATIENT
Start: 2018-10-30 | End: 2019-10-28

## 2018-10-30 NOTE — TELEPHONE ENCOUNTER
Signed Prescriptions:                        Disp   Refills    simvastatin (ZOCOR) 40 MG tablet           45 tab*3        Sig: TAKE 0.5 TABLETS (20 MG) BY MOUTH DAILY  Authorizing Provider: BLANE ZHENG  Ordering User: GERRI BUTTS    90 day supply with 3 refills sent.    Gerri Butts, RN

## 2018-11-06 ENCOUNTER — OFFICE VISIT (OUTPATIENT)
Dept: FAMILY MEDICINE | Facility: CLINIC | Age: 83
End: 2018-11-06
Payer: COMMERCIAL

## 2018-11-06 VITALS
TEMPERATURE: 96.7 F | DIASTOLIC BLOOD PRESSURE: 64 MMHG | OXYGEN SATURATION: 98 % | HEART RATE: 80 BPM | RESPIRATION RATE: 20 BRPM | BODY MASS INDEX: 28.31 KG/M2 | HEIGHT: 71 IN | WEIGHT: 202.2 LBS | SYSTOLIC BLOOD PRESSURE: 128 MMHG

## 2018-11-06 DIAGNOSIS — R06.02 SOB (SHORTNESS OF BREATH): ICD-10-CM

## 2018-11-06 DIAGNOSIS — J44.9 CHRONIC OBSTRUCTIVE PULMONARY DISEASE, UNSPECIFIED COPD TYPE (H): ICD-10-CM

## 2018-11-06 DIAGNOSIS — I10 HTN (HYPERTENSION), BENIGN: ICD-10-CM

## 2018-11-06 DIAGNOSIS — R05.9 COUGH: Primary | ICD-10-CM

## 2018-11-06 LAB
FEF 25/75: NORMAL
FEV-1: NORMAL
FEV1/FVC: NORMAL
FVC: NORMAL

## 2018-11-06 PROCEDURE — 94640 AIRWAY INHALATION TREATMENT: CPT | Mod: 59 | Performed by: INTERNAL MEDICINE

## 2018-11-06 PROCEDURE — 94010 BREATHING CAPACITY TEST: CPT | Performed by: INTERNAL MEDICINE

## 2018-11-06 PROCEDURE — 99214 OFFICE O/P EST MOD 30 MIN: CPT | Mod: 25 | Performed by: INTERNAL MEDICINE

## 2018-11-06 RX ORDER — PREDNISONE 20 MG/1
TABLET ORAL
Qty: 20 TABLET | Refills: 0 | Status: SHIPPED | OUTPATIENT
Start: 2018-11-06 | End: 2018-11-27

## 2018-11-06 RX ORDER — METOPROLOL SUCCINATE 25 MG/1
TABLET, EXTENDED RELEASE ORAL
Qty: 90 TABLET | Refills: 0 | Status: SHIPPED | OUTPATIENT
Start: 2018-11-06 | End: 2019-02-01

## 2018-11-06 RX ORDER — ALBUTEROL SULFATE 0.83 MG/ML
2.5 SOLUTION RESPIRATORY (INHALATION) EVERY 6 HOURS PRN
Qty: 25 VIAL | Refills: 0 | Status: SHIPPED | OUTPATIENT
Start: 2018-11-06 | End: 2018-11-15

## 2018-11-06 NOTE — NURSING NOTE
The following nebulizer treatment was given:     MEDICATION: Albuterol Sulfate 2.5 mg  : Ritedose  LOT #: [id number]  EXPIRATION DATE:  03/20  NDC # 16283-827-3

## 2018-11-06 NOTE — PROGRESS NOTES
SUBJECTIVE:   Dimitri Neves is a 83 year old male who presents to clinic today for the following health issues:      Patient presents with:  RECHECK: cough. Finished  taking Augmentin on Sunday, Nov 4,2018. Cough hasn't gone away.    Patient is concerned with 4 weeks now of persistent coughing and despite 10 days of treatment with Augmentin 2 times a day was completed probably 2 days ago, and there is nothing more then slight improvement. Less frequent coughing and not at night now. In the am sometimes he is producing a good amount of secretions. And the sputum has a brownish quality although not hemoptysis . No fever or chills and no sore throat . Some chest pain from coughing so much.    Patient has a history of taking prednisone 5 milligrams 2 times a day for the last 18 months or so as part of his cancer therapy for metastatic prostate cancer  . He's had recent radiographic images of chest including chest xray 10/25/2018 and abdominal chest pelvic CT scan about 4 months ago which confirm the presence of some component of chronic obstructive pulmonary disease / emphysema . He has already had a spirometry roughly 18 months ago that confirmed the presence of obstructive lung disease , we repeated this today and it shows worsened obstructive lung disease , now classed as severe. Interestingly his entire smoking career was extremely mild and he quit smoking 50 years ago.    CHEST TWO VIEWS  10/25/2018 6:32 PM      HISTORY: Cough. Dyspnea on exertion. Acute bronchitis with symptoms  greater than 10 days.     COMPARISON: 10/4/2016         IMPRESSION: Elevated right diaphragm. Interstitial densities  consistent with scarring at the right base, unchanged. Neural  stimulating leads in the midthoracic spine, unchanged. Normal heart  size and pulmonary vascularity.     MADIHA PADILLA MD    Examination:  CT CHEST/ABDOMEN/PELVIS W CONTRAST, 5/22/2018 9:36 AM      Comparison: CT PE 10/30/2017, CT chest abdomen pelvis  7/17/2017     History: Restaging prostate cancer; external iliac nodes previously;  Malignant neoplasm of prostate (H); Bone metastasis (H)     Technique: Volumetric helical acquisition of CT images from the  thoracic inlet to the symphysis pubis after the uncomplicated  administration of 115 ml isovue 370. Coronal and sagittal images and  axial MIP images were reconstructed from the source data.     Findings:  Chest:  The visualized thyroid is unremarkable. Heart size is normal. The  great vessels are normal in caliber and appearance. There is no  pericardial effusion. No mediastinal, hilar, or axillary  lymphadenopathy. Prominent right hilar lymph nodes are unchanged.  Patulous esophagus. The central tracheobronchial tree is patent. There  is no focal airspace consolidation, pleural effusion, or pneumothorax.  Minimal apical predominant emphysematous changes and scarring.  Scattered pulmonary nodules measuring maximally 2 mm are unchanged  from prior studies. Calcified granuloma in the left upper lobe. Right  calcified pleural plaques. Mild atelectatic and fibrotic changes in  the middle lobe and bilateral lower lobes.     Abdomen/pelvis:  Bilateral, predominantly exophytic renal cortical cysts are unchanged  from prior studies. There are also subcentimeter cortical  hypodensities in both kidneys, stable, too small to characterize,  favoring renal cysts. No hydronephrosis. Partially distended urinary  bladder. Pelvic phleboliths. Symmetric seminal vesicles. Prostate  calcifications.     Focal hypodensity in the left hepatic lobe is stable and too small to  accurately characterize on CT, but likely representing a small cyst.  The liver is otherwise unremarkable. The gallbladder is surgically  absent. Mild to moderate diffuse pancreatic atrophy. The adrenal  glands and spleen are normal.      There are no dilated loops of large or small bowel. No focal bowel  wall thickening or mucosal hyperenhancement. The appendix  is normal.  Sigmoid colonic diverticulosis without CT evidence of active  diverticulitis. The major intra-abdominal vasculature is patent and  within normal limits for caliber. Atherosclerotic calcifications of  the abdominal aorta and its major branches. No free fluid. No free  air. The previously mentioned right external iliac chain lymph node is  significantly decreased in size (series 3, image 267) no measuring 13  mm. Part of the anterior cortex of the structure remains thickened up  to 4.7 mm. No new or enlarging intra-abdominal lymphadenopathy.     Bones/soft tissues:  Surgical changes of posterior brendan and pedicle screw fusion of L2-L3.  Anterior interbody fusion device in place at L3-L4. Spinal stimulator  device with leads terminating posterior to the spinal cord at the  T7-T8 level. Rotator cuff repair on the right. Multilevel degenerative  changes of the spine. Degenerative changes of bilateral SI joints with  partial fusion. Degenerative changes of both hips. Diffuse bone  demineralization.     Sclerotic lesion at the junction of the right inferior pubic ramus and  the right ischium is increased from prior. There are no new sclerotic  foci visualized within the axial or proximal appendicular skeleton.  Healed prior left posterior 11th and 12th rib fractures.         Impression:   1. Increased size of a sclerotic focus concerning for metastasis  within the right inferior pubic ramus.  2. Previously enlarged right external iliac lymph node has decreased  in size. Recommend continued attention on follow-up.  3. Stable small pulmonary nodules.     I have personally reviewed the examination and initial interpretation  and I agree with the findings.     KEVIN ABEL MD      Problem list and histories reviewed & adjusted, as indicated.  Additional history: as documented    Patient Active Problem List   Diagnosis     Essential hypertension with goal blood pressure less than 140/90     Narcotic dependence,  episodic use (H)     ED (erectile dysfunction)     HUDSON (obstructive sleep apnea)     PE (pulmonary thromboembolism) (H)     HYPERLIPIDEMIA LDL GOAL <130     Advanced directives, counseling/discussion     Abnormal glucose     AK (actinic keratosis)     SNHL (sensorineural hearing loss)     Endolymphatic hydrops     History of squamous cell carcinoma     Pseudophakia, Yag Caps, ou     Venous (peripheral) insufficiency     Malignant neoplasm of prostate (H)     Dyspnea on exertion     Posterior vitreous detachment, bilateral     Iris nevus, ou     Dry eye syndrome, bilateral     Bone metastasis (H)     Early dry stage nonexudative age-related macular degeneration of both eyes     Past Surgical History:   Procedure Laterality Date     ARTHROSCOPY KNEE RT/LT  ~1995    Right     C LUMBAR SPINE FUSION,ANTER APPRCH  8/2007    four times total, latest 8/07     CATARACT IOL, RT/LT       LASER YAG CAPSULOTOMY      both eyes     ORCHIECTOMY SCROTAL Bilateral 9/27/2017    Procedure: ORCHIECTOMY SCROTAL;  Bilateral orchiectomy scrotal approach;  Surgeon: Senthil Taylor MD;  Location: [facility] OR     PHACOEMULSIFICATION CLEAR CORNEA WITH STANDARD INTRAOCULAR LENS IMPLANT  6-18-12/7-30-12    right/left eye     ROTATOR CUFF REPAIR RT/LT  ~1995    right       Social History   Substance Use Topics     Smoking status: Former Smoker     Packs/day: 1.00     Years: 25.00     Types: Cigarettes     Quit date: 1/2/1976     Smokeless tobacco: Never Used      Comment: lives in smoke free household     Alcohol use 7.0 oz/week     14 Standard drinks or equivalent per week      Comment: 2-3 drinks every night     Family History   Problem Relation Age of Onset     Cancer Father      Lung 64y     Diabetes Brother      Hypertension Brother      Cancer Mother      Liver     Cerebrovascular Disease Mother      Eye Disorder Mother      Glaucoma Mother      Cerebrovascular Disease Maternal Grandmother      C.A.D. No family hx of      Thyroid Disease No  family hx of      Macular Degeneration No family hx of          Current Outpatient Prescriptions   Medication Sig Dispense Refill     abiraterone (ZYTIGA) 250 MG tablet Take 4 tablets (1,000 mg) by mouth daily for 30 doses Take on empty stomach. 120 tablet 0     albuterol (2.5 MG/3ML) 0.083% neb solution Take 1 vial (2.5 mg) by nebulization every 6 hours as needed for shortness of breath / dyspnea or wheezing 25 vial 0     albuterol (2.5 MG/3ML) 0.083% neb solution Take 1 vial (2.5 mg) by nebulization every 6 hours as needed for shortness of breath / dyspnea or wheezing 25 vial 3     ASPIRIN PO Take 81 mg by mouth       Calcium Carbonate (CALCIUM 600 PO)        Cholecalciferol (VITAMIN D) 2000 UNITS tablet Take 1 tablet by mouth daily       cyanocobalamin ([employer] VITAMIN  B12) 1000 MCG TABS Take 1 tablet by mouth daily 30 tablet 5     furosemide (LASIX) 20 MG tablet TAKE 1 TABLET (20 MG) BY MOUTH DAILY 90 tablet 1     gabapentin (NEURONTIN) 300 MG capsule Take 900 mg by mouth At Bedtime       metoprolol succinate (TOPROL-XL) 25 MG 24 hr tablet TAKE 1 TABLET (25 MG) BY MOUTH DAILY 90 tablet 0     order for DME Equipment being ordered: Nebulizer 1 Device 0     order for DME Equipment being ordered: CPAP machine with associated CPAP supplies and tubing 1 Device 0     order for DME Equipment being ordered: Nebulizer 1 Device 0     predniSONE (DELTASONE) 20 MG tablet Take 3 tabs (60 mg) by mouth daily x 3 days, 2 tabs (40 mg) daily x 3 days, 1 tab (20 mg) daily x 3 days, then 1/2 tab (10 mg) x 3 days. 20 tablet 0     predniSONE (DELTASONE) 5 MG tablet Take 1 tablet (5 mg) by mouth 2 times daily 60 tablet 0     quinapril (ACCUPRIL) 40 MG Tab TAKE 1 TABLET (40 MG) BY MOUTH DAILY 90 tablet 1     simvastatin (ZOCOR) 40 MG tablet TAKE 0.5 TABLETS (20 MG) BY MOUTH DAILY 45 tablet 3     amoxicillin-clavulanate (AUGMENTIN) 875-125 MG per tablet Take 1 tablet by mouth 2 times daily (Patient not taking: Reported on 11/6/2018) 20  tablet 0     ipratropium - albuterol 0.5 mg/2.5 mg/3 mL (DUONEB) 0.5-2.5 (3) MG/3ML neb solution Take 1 vial (3 mLs) by nebulization once for 1 dose 3 mL 0     oxyCODONE (ROXICODONE) 5 MG IR tablet Take 1 tablet (5 mg) by mouth every 6 hours as needed for pain (Patient not taking: Reported on 11/6/2018) 30 tablet 0     predniSONE (DELTASONE) 5 MG tablet Take 1 tablet (5 mg) by mouth 2 times daily (Patient not taking: Reported on 11/6/2018) 60 tablet 0     predniSONE (DELTASONE) 5 MG tablet Take 1 tablet (5 mg) by mouth 2 times daily (Patient not taking: Reported on 11/6/2018) 60 tablet 0     Allergies   Allergen Reactions     Morphine Sulfate GI Disturbance     vomiting     Vicodin [Hydrocodone-Acetaminophen] Nausea and Vomiting     Recent Labs   Lab Test  10/01/18   0926  08/09/18   0836  06/28/18   1043  05/24/18   1223   09/15/17   1131   07/19/16   0817  06/13/16   1607   05/29/12   1328   04/05/11   0846   A1C   --    --    --    --    --   5.7   --   5.5   --    --   5.4   --    --    LDL  60   --    --    --    --   61   --   86   --    < >   --    < >  96   HDL  87   --    --    --    --   54   --   71   --    < >   --    < >  49   TRIG  154*   --    --    --    --   150*   --   98   --    < >   --    < >  73   ALT  23   --   25  25   < >   --    < >  43  32   < >  25   < >  24   CR  0.95  1.06  1.08  0.88   < >  0.78   < >  0.90   --    < >  0.90   < >  1.05   GFRESTIMATED  76  67  66  83   < >  >90   < >  81   --    < >  83   < >  69   GFRESTBLACK  >90  81  79  >90   < >  >90   < >  >90   GFR Calc     --    < >  >90   < >  84   POTASSIUM  4.4   --   4.4  4.4   < >  4.1   < >  4.4   --    < >  4.1   < >  4.2   TSH   --    --    --    --    --    --    --    --   1.60   --    --    --   3.73    < > = values in this interval not displayed.      BP Readings from Last 3 Encounters:   11/06/18 128/64   10/25/18 124/72   10/08/18 114/80    Wt Readings from Last 3 Encounters:   11/06/18 202 lb 3.2  "oz (91.7 kg)   10/25/18 203 lb 3.2 oz (92.2 kg)   10/08/18 205 lb (93 kg)                  Labs reviewed in EPIC    Reviewed and updated as needed this visit by clinical staff       Reviewed and updated as needed this visit by Provider         ROS:  Constitutional, HEENT, cardiovascular, pulmonary, gi and gu systems are negative, except as otherwise noted.    OBJECTIVE:                                                    /64  Pulse 80  Temp 96.7  F (35.9  C) (Oral)  Resp 20  Ht 5' 11\" (1.803 m)  Wt 202 lb 3.2 oz (91.7 kg)  SpO2 98%  BMI 28.2 kg/m2  Body mass index is 28.2 kg/(m^2).  GENERAL APPEARANCE: healthy, alert and no distress  EYES: Eyes grossly normal to inspection, PERRL and conjunctivae and sclerae normal  NECK: no adenopathy, no asymmetry, masses, or scars and thyroid normal to palpation  RESP: lungs clear to auscultation - no rales, rhonchi or wheezes, just quiet breath sounds especially at the bases  CV: regular rates and rhythm, normal S1 S2, no S3 or S4 and no murmur, click or rub    Diagnostic test results:  Diagnostic Test Results:  Orders Placed This Encounter   Procedures     Spirometry, Breathing Capacity: Normal Order, Clinic Performed          ASSESSMENT/PLAN:                                                    1. Cough  This patient has chronic obstructive pulmonary disease. His current symptoms are lingering along in a way that are just not seen with a upper respiratory tract infection . We have shown with spirometry and with prior radiographic images that he has a component of emphysema , we delivered a nebulizer treatment today in the office with albuterol and patient notes slight to moderate symptoms improvement . At this point we can't see a need for more antibiotics, etc , it's a burst of prednisone and recommended to start using the nebulizer treatments 3 times a day-four times a day on an as needed basis until he feels better. We need to consider additional treatments such " as n Advair, Dulera, or Symbicort, a combined a combined inhaled corticosteroid / long acting beta agonist agents and possibly tiotropium (SPIRIVA HANDIHALER)  , and consider pulmonary consultation dependent upon how he responds to the therapy   - Spirometry, Breathing Capacity: Normal Order, Clinic Performed  - order for DME; Equipment being ordered: Nebulizer  Dispense: 1 Device; Refill: 0  - albuterol (2.5 MG/3ML) 0.083% neb solution; Take 1 vial (2.5 mg) by nebulization every 6 hours as needed for shortness of breath / dyspnea or wheezing  Dispense: 25 vial; Refill: 0  - predniSONE (DELTASONE) 20 MG tablet; Take 3 tabs (60 mg) by mouth daily x 3 days, 2 tabs (40 mg) daily x 3 days, 1 tab (20 mg) daily x 3 days, then 1/2 tab (10 mg) x 3 days.  Dispense: 20 tablet; Refill: 0    2. SOB (shortness of breath)  As above   - Spirometry, Breathing Capacity: Normal Order, Clinic Performed  - order for DME; Equipment being ordered: Nebulizer  Dispense: 1 Device; Refill: 0  - albuterol (2.5 MG/3ML) 0.083% neb solution; Take 1 vial (2.5 mg) by nebulization every 6 hours as needed for shortness of breath / dyspnea or wheezing  Dispense: 25 vial; Refill: 0  - predniSONE (DELTASONE) 20 MG tablet; Take 3 tabs (60 mg) by mouth daily x 3 days, 2 tabs (40 mg) daily x 3 days, 1 tab (20 mg) daily x 3 days, then 1/2 tab (10 mg) x 3 days.  Dispense: 20 tablet; Refill: 0    3. HTN (Hypertension), Benign goal <140/90  Refills  - metoprolol succinate (TOPROL-XL) 25 MG 24 hr tablet; TAKE 1 TABLET (25 MG) BY MOUTH DAILY  Dispense: 90 tablet; Refill: 0    4. Chronic obstructive pulmonary disease, unspecified COPD type (H)  As detailed above   - order for DME; Equipment being ordered: Nebulizer  Dispense: 1 Device; Refill: 0  - albuterol (2.5 MG/3ML) 0.083% neb solution; Take 1 vial (2.5 mg) by nebulization every 6 hours as needed for shortness of breath / dyspnea or wheezing  Dispense: 25 vial; Refill: 0  - predniSONE (DELTASONE) 20 MG  tablet; Take 3 tabs (60 mg) by mouth daily x 3 days, 2 tabs (40 mg) daily x 3 days, 1 tab (20 mg) daily x 3 days, then 1/2 tab (10 mg) x 3 days.  Dispense: 20 tablet; Refill: 0      Follow up with Provider - 1 month      Reginaldo Muir MD  [facility]

## 2018-11-06 NOTE — PATIENT INSTRUCTIONS
[facility] (formerly [facility])    If you have any questions regarding to your visit please contact your care team:     Team Pink:   Clinic Hours Telephone Number   Internal Medicine:  Dr. Deborah Taylor NP 7am-7pm  Monday - Thursday   7am-5pm  Fridays  [phone]  (Appointment scheduling available 24/7)   Urgent Care - [place] and [facility] - 11am-9pm Monday-Friday Saturday-Sunday- 9am-5pm   [place] - 5pm-9pm Monday-Friday Saturday-Sunday- 9am-5pm  [phone] - [place]  [phone] - [place]       What options do I have for a visit other than an office visit? We offer electronic visits (e-visits) and telephone visits, when medically appropriate.  Please check with your medical insurance to see if these types of visits are covered, as you will be responsible for any charges that are not paid by your insurance.      You can use [facility] (secure electronic communication) to access to your chart, send your primary care provider a message, or make an appointment. Ask a team member how to get started.     For a price quote for your services, please call our Consumer Price Line at [phone] or our Imaging Cost estimation line at [phone] (for imaging tests).  Discharged By: An

## 2018-11-06 NOTE — MR AVS SNAPSHOT
After Visit Summary   11/6/2018    Dimitri Neves    MRN: [id number]           Patient Information     Date Of Birth          1935        Visit Information        Provider Department      11/6/2018 2:10 PM Reginaldo Muir MD [facility]        Today's Diagnoses     Cough    -  1    SOB (shortness of breath)        HTN (Hypertension), Benign goal <140/90        Chronic obstructive pulmonary disease, unspecified COPD type (H)          Care Instructions    [facility]    If you have any questions regarding to your visit please contact your care team:     Team Pink:   Clinic Hours Telephone Number   Internal Medicine:  Dr. Deborah Taylor NP 7am-7pm  Monday - Thursday   7am-5pm  Fridays  [phone]  (Appointment scheduling available 24/7)   Urgent Care - [place] and [facility] - 11am-9pm Monday-Friday Saturday-Sunday- 9am-5pm   [place] - 5pm-9pm Monday-Friday Saturday-Sunday- 9am-5pm  [phone] - [place]  [phone] - [place]       What options do I have for a visit other than an office visit? We offer electronic visits (e-visits) and telephone visits, when medically appropriate.  Please check with your medical insurance to see if these types of visits are covered, as you will be responsible for any charges that are not paid by your insurance.      You can use [employer] (secure electronic communication) to access to your chart, send your primary care provider a message, or make an appointment. Ask a team member how to get started.     For a price quote for your services, please call our Consumer Price Line at [phone] or our Imaging Cost estimation line at [phone] (for imaging tests).  Discharged By: An            Follow-ups after your visit        Your next 10 appointments already scheduled     Nov 12, 2018  8:15 AM CST   LAB with LAB ONC [facility] ([facility]  [facility])    [address] [place] [id number] [phone]           Please do not eat 10-12 hours before your appointment if you are coming in fasting for labs on lipids, cholesterol, or glucose (sugar). This does not apply to pregnant women. Water, hot tea and black coffee (with nothing added) are okay. Do not drink other fluids, diet soda or chew gum.            Nov 12, 2018  8:45 AM CST   Return Visit with [facility])    [address] [place] [id number] [phone]            Nov 12, 2018  9:30 AM CST   Level O with [place] 9 INFUSION   [facility])    [address] [place] [address] [phone]            Nov 27, 2018 10:30 AM CST   Return Visit with Senthil Jhaveri MD   [facility]    [address] [facility] [address]   [phone]            Jan 03, 2019  7:45 AM CST   LAB with LAB ONC [facility])    [address] [place] [id number] [phone]           Please do not eat 10-12 hours before your appointment if you are coming in fasting for labs on lipids, cholesterol, or glucose (sugar). This does not apply to pregnant women. Water, hot tea and black coffee (with nothing added) are okay. Do not drink other fluids, diet soda or chew gum.            Jan 03, 2019  8:30 AM CST   Return Visit with Rafael Valenzuela MD   [facility])    [address] [place] [address] [phone]            Jan 03, 2019  9:00 AM CST   Level O with [place] 2 INFUSION   [facility])    [address] [place] [id number] [phone]              Who to contact     If you have questions or need follow up  "information about today's clinic visit or your schedule please contact [facility] directly at [phone].  Normal or non-critical lab and imaging results will be communicated to you by [facility], letter or phone within 4 business days after the clinic has received the results. If you do not hear from us within 7 days, please contact the clinic through [facility] or phone. If you have a critical or abnormal lab result, we will notify you by phone as soon as possible.  Submit refill requests through [employer] or call your pharmacy and they will forward the refill request to us. Please allow 3 business days for your refill to be completed.          Additional Information About Your Visit        [facility] Information     [employer] gives you secure access to your electronic health record. If you see a primary care provider, you can also send messages to your care team and make appointments. If you have questions, please call your primary care clinic.  If you do not have a primary care provider, please call [phone] and they will assist you.        Care EveryWhere ID     This is your Care EveryWhere ID. This could be used by other organizations to access your [facility] medical records  [id number]        Your Vitals Were     Pulse Temperature Respirations Height Pulse Oximetry BMI (Body Mass Index)    80 96.7  F (35.9  C) (Oral) 20 5' 11\" (1.803 m) 98% 28.2 kg/m2       Blood Pressure from Last 3 Encounters:   11/06/18 128/64   10/25/18 124/72   10/08/18 114/80    Weight from Last 3 Encounters:   11/06/18 202 lb 3.2 oz (91.7 kg)   10/25/18 203 lb 3.2 oz (92.2 kg)   10/08/18 205 lb (93 kg)              We Performed the Following     Spirometry, Breathing Capacity: Normal Order, Clinic Performed          Today's Medication Changes          These changes are accurate as of 11/6/18  3:21 PM.  If you have any questions, ask your nurse or doctor.               Start taking these medicines.        Dose/Directions    " order for DME   Used for:  Chronic obstructive pulmonary disease, unspecified COPD type (H), SOB (shortness of breath), Cough   Started by:  Reginaldo Muir MD        Equipment being ordered: Nebulizer   Quantity:  1 Device   Refills:  0         These medicines have changed or have updated prescriptions.        Dose/Directions    * albuterol (2.5 MG/3ML) 0.083% neb solution   This may have changed:  Another medication with the same name was added. Make sure you understand how and when to take each.   Used for:  Acute bronchitis, unspecified organism   Changed by:  Reginaldo Muir MD        Dose:  1 vial   Take 1 vial (2.5 mg) by nebulization every 6 hours as needed for shortness of breath / dyspnea or wheezing   Quantity:  25 vial   Refills:  3       * albuterol (2.5 MG/3ML) 0.083% neb solution   This may have changed:  You were already taking a medication with the same name, and this prescription was added. Make sure you understand how and when to take each.   Used for:  Cough, SOB (shortness of breath), Chronic obstructive pulmonary disease, unspecified COPD type (H)   Changed by:  Reginaldo Muir MD        Dose:  2.5 mg   Take 1 vial (2.5 mg) by nebulization every 6 hours as needed for shortness of breath / dyspnea or wheezing   Quantity:  25 vial   Refills:  0       * predniSONE 5 MG tablet   Commonly known as:  DELTASONE   This may have changed:  Another medication with the same name was added. Make sure you understand how and when to take each.   Used for:  Bone metastasis (H), Malignant neoplasm of prostate (H)   Changed by:  Reginaldo Muir MD        Dose:  5 mg   Take 1 tablet (5 mg) by mouth 2 times daily   Quantity:  60 tablet   Refills:  0       * predniSONE 5 MG tablet   Commonly known as:  DELTASONE   This may have changed:  Another medication with the same name was added. Make sure you understand how and when to take each.   Used for:  Bone metastasis (H), Malignant neoplasm of prostate (H)   Changed by:   Reginaldo Muir MD        Dose:  5 mg   Take 1 tablet (5 mg) by mouth 2 times daily   Quantity:  60 tablet   Refills:  0       * predniSONE 5 MG tablet   Commonly known as:  DELTASONE   This may have changed:  Another medication with the same name was added. Make sure you understand how and when to take each.   Used for:  Bone metastasis (H), Malignant neoplasm of prostate (H)   Changed by:  Reginaldo Muir MD        Dose:  5 mg   Take 1 tablet (5 mg) by mouth 2 times daily   Quantity:  60 tablet   Refills:  0       * predniSONE 20 MG tablet   Commonly known as:  DELTASONE   This may have changed:  You were already taking a medication with the same name, and this prescription was added. Make sure you understand how and when to take each.   Used for:  Chronic obstructive pulmonary disease, unspecified COPD type (H), SOB (shortness of breath), Cough   Changed by:  Reginaldo Muir MD        Take 3 tabs (60 mg) by mouth daily x 3 days, 2 tabs (40 mg) daily x 3 days, 1 tab (20 mg) daily x 3 days, then 1/2 tab (10 mg) x 3 days.   Quantity:  20 tablet   Refills:  0       * Notice:  This list has 6 medication(s) that are the same as other medications prescribed for you. Read the directions carefully, and ask your doctor or other care provider to review them with you.         Where to get your medicines      These medications were sent to [facility] #[id number] - [place], [place] - [address] [address] [facility] [place]  [address] [facility] , [place] [facility] [address]     Phone:  [phone]     albuterol (2.5 MG/3ML) 0.083% neb solution    metoprolol succinate 25 MG 24 hr tablet    predniSONE 20 MG tablet         Some of these will need a paper prescription and others can be bought over the counter.  Ask your nurse if you have questions.     Bring a paper prescription for each of these medications     order for DME                Primary Care Provider Office Phone # Fax #    Reginaldo Muir [phone] [address] [facility]  [place]  [facility] [address]        Equal Access to Services     [facility] Campus[facility]ENN[facility] : Hadii nahid martins tomas Rodriguez, watimada luqhya, qakrystalta adamsjeisonnaty jeffersrileyofelia russo. So [facility] [phone].    ATENCIÓN: Si habla español, tiene a roberts disposición servicios gratuitos de asistencia lingüística. [facility] [phone].    We comply with applicable federal civil rights laws and [place] laws. We do not discriminate on the basis of race, color, national origin, age, disability, sex, sexual orientation, or gender identity.            Thank you!     Thank you for choosing [facility]  for your care. Our goal is always to provide you with excellent care. Hearing back from our patients is one way we can continue to improve our services. Please take a few minutes to complete the written survey that you may receive in the mail after your visit with us. Thank you!             Your Updated Medication List - Protect others around you: Learn how to safely use, store and throw away your medicines at www.disposemymeds.org.          This list is accurate as of 11/6/18  3:21 PM.  Always use your most recent med list.                   Brand Name Dispense Instructions for use Diagnosis    abiraterone 250 MG tablet    ZYTIGA    120 tablet    Take 4 tablets (1,000 mg) by mouth daily for 30 doses Take on empty stomach.    Bone metastasis (H), Malignant neoplasm of prostate (H)       * albuterol (2.5 MG/3ML) 0.083% neb solution     25 vial    Take 1 vial (2.5 mg) by nebulization every 6 hours as needed for shortness of breath / dyspnea or wheezing    Acute bronchitis, unspecified organism       * albuterol (2.5 MG/3ML) 0.083% neb solution     25 vial    Take 1 vial (2.5 mg) by nebulization every 6 hours as needed for shortness of breath / dyspnea or wheezing    Cough, SOB (shortness of breath), Chronic obstructive pulmonary disease, unspecified COPD type (H)       amoxicillin-clavulanate 875-125 MG per  tablet    AUGMENTIN    20 tablet    Take 1 tablet by mouth 2 times daily    Dyspnea on exertion, Cough, Acute bronchitis with symptoms > 10 days       ASPIRIN PO      Take 81 mg by mouth        CALCIUM 600 PO           cyanocobalamin 1000 MCG Tabs    [facility] vitamin  B12    30 tablet    Take 1 tablet by mouth daily    Low vitamin B12 level       furosemide 20 MG tablet    LASIX    90 tablet    TAKE 1 TABLET (20 MG) BY MOUTH DAILY    Bilateral lower extremity edema, HTN (hypertension), benign       gabapentin 300 MG capsule    NEURONTIN     Take 900 mg by mouth At Bedtime        ipratropium - albuterol 0.5 mg/2.5 mg/3 mL 0.5-2.5 (3) MG/3ML neb solution    DUONEB    3 mL    Take 1 vial (3 mLs) by nebulization once for 1 dose    Dyspnea, unspecified type       metoprolol succinate 25 MG 24 hr tablet    TOPROL-XL    90 tablet    TAKE 1 TABLET (25 MG) BY MOUTH DAILY    HTN (hypertension), benign       order for DME     1 Device    Equipment being ordered: Nebulizer    Acute bronchitis, unspecified organism       order for DME     1 Device    Equipment being ordered: CPAP machine with associated CPAP supplies and tubing    HUDSON (obstructive sleep apnea)       order for DME     1 Device    Equipment being ordered: Nebulizer    Chronic obstructive pulmonary disease, unspecified COPD type (H), SOB (shortness of breath), Cough       oxyCODONE IR 5 MG tablet    ROXICODONE    30 tablet    Take 1 tablet (5 mg) by mouth every 6 hours as needed for pain    Narcotic dependence, episodic use (H)       * predniSONE 5 MG tablet    DELTASONE    60 tablet    Take 1 tablet (5 mg) by mouth 2 times daily    Bone metastasis (H), Malignant neoplasm of prostate (H)       * predniSONE 5 MG tablet    DELTASONE    60 tablet    Take 1 tablet (5 mg) by mouth 2 times daily    Bone metastasis (H), Malignant neoplasm of prostate (H)       * predniSONE 5 MG tablet    DELTASONE    60 tablet    Take 1 tablet (5 mg) by mouth 2 times daily    Bone metastasis  (H), Malignant neoplasm of prostate (H)       * predniSONE 20 MG tablet    DELTASONE    20 tablet    Take 3 tabs (60 mg) by mouth daily x 3 days, 2 tabs (40 mg) daily x 3 days, 1 tab (20 mg) daily x 3 days, then 1/2 tab (10 mg) x 3 days.    Chronic obstructive pulmonary disease, unspecified COPD type (H), SOB (shortness of breath), Cough       quinapril 40 MG Tab    ACCUPRIL    90 tablet    TAKE 1 TABLET (40 MG) BY MOUTH DAILY    HTN (hypertension), benign       simvastatin 40 MG tablet    ZOCOR    45 tablet    TAKE 0.5 TABLETS (20 MG) BY MOUTH DAILY    Hyperlipidemia LDL goal <130       vitamin D 2000 units tablet      Take 1 tablet by mouth daily        * Notice:  This list has 6 medication(s) that are the same as other medications prescribed for you. Read the directions carefully, and ask your doctor or other care provider to review them with you.

## 2018-11-12 ENCOUNTER — DOCUMENTATION ONLY (OUTPATIENT)
Dept: PHARMACY | Facility: CLINIC | Age: 83
End: 2018-11-12

## 2018-11-12 ENCOUNTER — INFUSION THERAPY VISIT (OUTPATIENT)
Dept: INFUSION THERAPY | Facility: CLINIC | Age: 83
End: 2018-11-12
Payer: COMMERCIAL

## 2018-11-12 ENCOUNTER — ONCOLOGY VISIT (OUTPATIENT)
Dept: ONCOLOGY | Facility: CLINIC | Age: 83
End: 2018-11-12
Payer: COMMERCIAL

## 2018-11-12 VITALS
DIASTOLIC BLOOD PRESSURE: 71 MMHG | RESPIRATION RATE: 18 BRPM | SYSTOLIC BLOOD PRESSURE: 151 MMHG | HEART RATE: 63 BPM | HEIGHT: 71 IN | TEMPERATURE: 97.8 F | WEIGHT: 204 LBS | BODY MASS INDEX: 28.56 KG/M2 | OXYGEN SATURATION: 96 %

## 2018-11-12 DIAGNOSIS — D64.9 ANEMIA, UNSPECIFIED TYPE: ICD-10-CM

## 2018-11-12 DIAGNOSIS — C61 MALIGNANT NEOPLASM OF PROSTATE (H): Primary | ICD-10-CM

## 2018-11-12 DIAGNOSIS — C61 MALIGNANT NEOPLASM OF PROSTATE (H): ICD-10-CM

## 2018-11-12 DIAGNOSIS — C79.51 BONE METASTASIS: ICD-10-CM

## 2018-11-12 DIAGNOSIS — C79.51 BONE METASTASIS: Primary | ICD-10-CM

## 2018-11-12 LAB
ALBUMIN SERPL-MCNC: 3 G/DL (ref 3.4–5)
ALP SERPL-CCNC: 67 U/L (ref 40–150)
ALT SERPL W P-5'-P-CCNC: 22 U/L (ref 0–70)
ANION GAP SERPL CALCULATED.3IONS-SCNC: 7 MMOL/L (ref 3–14)
AST SERPL W P-5'-P-CCNC: 17 U/L (ref 0–45)
BASOPHILS # BLD AUTO: 0 10E9/L (ref 0–0.2)
BASOPHILS NFR BLD AUTO: 0.1 %
BILIRUB SERPL-MCNC: 0.7 MG/DL (ref 0.2–1.3)
BUN SERPL-MCNC: 20 MG/DL (ref 7–30)
CALCIUM SERPL-MCNC: 9.2 MG/DL (ref 8.5–10.1)
CHLORIDE SERPL-SCNC: 104 MMOL/L (ref 94–109)
CO2 SERPL-SCNC: 30 MMOL/L (ref 20–32)
CREAT SERPL-MCNC: 0.93 MG/DL (ref 0.66–1.25)
DIFFERENTIAL METHOD BLD: ABNORMAL
EOSINOPHIL # BLD AUTO: 0 10E9/L (ref 0–0.7)
EOSINOPHIL NFR BLD AUTO: 0.1 %
ERYTHROCYTE [DISTWIDTH] IN BLOOD BY AUTOMATED COUNT: 13.8 % (ref 10–15)
GFR SERPL CREATININE-BSD FRML MDRD: 78 ML/MIN/1.7M2
GLUCOSE SERPL-MCNC: 127 MG/DL (ref 70–99)
HCT VFR BLD AUTO: 34.3 % (ref 40–53)
HGB BLD-MCNC: 11.4 G/DL (ref 13.3–17.7)
IMM GRANULOCYTES # BLD: 0.1 10E9/L (ref 0–0.4)
IMM GRANULOCYTES NFR BLD: 1.6 %
LYMPHOCYTES # BLD AUTO: 0.9 10E9/L (ref 0.8–5.3)
LYMPHOCYTES NFR BLD AUTO: 11.3 %
MCH RBC QN AUTO: 32.9 PG (ref 26.5–33)
MCHC RBC AUTO-ENTMCNC: 33.2 G/DL (ref 31.5–36.5)
MCV RBC AUTO: 99 FL (ref 78–100)
MONOCYTES # BLD AUTO: 0.5 10E9/L (ref 0–1.3)
MONOCYTES NFR BLD AUTO: 6.8 %
NEUTROPHILS # BLD AUTO: 6 10E9/L (ref 1.6–8.3)
NEUTROPHILS NFR BLD AUTO: 80.1 %
PLATELET # BLD AUTO: 163 10E9/L (ref 150–450)
POTASSIUM SERPL-SCNC: 3.8 MMOL/L (ref 3.4–5.3)
PROT SERPL-MCNC: 6.1 G/DL (ref 6.8–8.8)
PSA SERPL-MCNC: 16.8 UG/L (ref 0–4)
RBC # BLD AUTO: 3.46 10E12/L (ref 4.4–5.9)
SODIUM SERPL-SCNC: 141 MMOL/L (ref 133–144)
WBC # BLD AUTO: 7.5 10E9/L (ref 4–11)

## 2018-11-12 PROCEDURE — 99214 OFFICE O/P EST MOD 30 MIN: CPT | Mod: 25 | Performed by: NURSE PRACTITIONER

## 2018-11-12 PROCEDURE — 84153 ASSAY OF PSA TOTAL: CPT | Performed by: INTERNAL MEDICINE

## 2018-11-12 PROCEDURE — 80053 COMPREHEN METABOLIC PANEL: CPT | Performed by: INTERNAL MEDICINE

## 2018-11-12 PROCEDURE — 36415 COLL VENOUS BLD VENIPUNCTURE: CPT | Performed by: INTERNAL MEDICINE

## 2018-11-12 PROCEDURE — 85025 COMPLETE CBC W/AUTO DIFF WBC: CPT | Performed by: INTERNAL MEDICINE

## 2018-11-12 PROCEDURE — 99207 ZZC NO CHARGE LOS: CPT

## 2018-11-12 PROCEDURE — 96374 THER/PROPH/DIAG INJ IV PUSH: CPT | Performed by: INTERNAL MEDICINE

## 2018-11-12 RX ORDER — ZOLEDRONIC ACID 0.04 MG/ML
4 INJECTION, SOLUTION INTRAVENOUS ONCE
Status: COMPLETED | OUTPATIENT
Start: 2018-11-12 | End: 2018-11-12

## 2018-11-12 RX ADMIN — Medication 250 ML: at 09:45

## 2018-11-12 RX ADMIN — ZOLEDRONIC ACID 4 MG: 0.04 INJECTION, SOLUTION INTRAVENOUS at 09:46

## 2018-11-12 ASSESSMENT — PAIN SCALES - GENERAL: PAINLEVEL: NO PAIN (0)

## 2018-11-12 NOTE — MR AVS SNAPSHOT
After Visit Summary   11/12/2018    Dimitri Neves    MRN: [id number]           Patient Information     Date Of Birth          1935        Visit Information        Provider Department      11/12/2018 9:30 AM [address]        Today's Diagnoses     Bone metastasis (H)    -  1    Malignant neoplasm of prostate (H)           Follow-ups after your visit        Your next 10 appointments already scheduled     Nov 27, 2018 10:30 AM CST   Return Visit with Senthil Jhaveri MD   [facility] ([facility]    [address] [address]   [phone]            Jan 07, 2019  9:15 AM CST   LAB with LAB ONC [facility] ([facility])    [address] [place] [id number] [phone]           Please do not eat 10-12 hours before your appointment if you are coming in fasting for labs on lipids, cholesterol, or glucose (sugar). This does not apply to pregnant women. Water, hot tea and black coffee (with nothing added) are okay. Do not drink other fluids, diet soda or chew gum.            Jan 07, 2019 10:00 AM CST   Return Visit with Rafael Valenzuela MD   [facility] ([facility])    [address] [place] [id number] [phone]            Jan 07, 2019 10:30 AM CST   Level O with [address])    [address] [place] [id number] [phone]              Future tests that were ordered for you today     Open Future Orders        Priority Expected Expires Ordered    CT Chest abdomen pelvis w & w/o contrast Routine  11/12/2019 11/12/2018    NM Bone Scan Whole Body Routine  6/10/2019 11/12/2018            Who to contact     If you have questions or need follow up information about today's clinic visit or your schedule please contact [facility] directly at [phone].  Normal or non-critical lab and imaging results will be communicated to you by [email or web address], letter or phone within 4 business days after the clinic has received the results. If you do not hear from us within 7 days, please contact the clinic through [email or web address] or phone. If you have a critical or abnormal lab result, we will notify you by phone as soon as possible.  Submit refill requests through [employer] or call your pharmacy and they will forward the refill request to us. Please allow 3 business days for your refill to be completed.          Additional Information About Your Visit        [email or web address] Information     [employer] gives you secure access to your electronic health record. If you see a primary care provider, you can also send messages to your care team and make appointments. If you have questions, please call your primary care clinic.  If you do not have a primary care provider, please call [phone] and they will assist you.      Click[employer]ale is an electronic gateway that provides easy, online access to your medical records. With [employer], you can request a clinic appointment, read your test results, renew a prescription or communicate with your care team.     To access your existing account, please contact your [facility] or call [phone] for assistance.        Care EveryWhere ID     This is your Care EveryWhere ID. This could be used by other organizations to access your [place] medical records  [id number]         Blood Pressure from Last 3 Encounters:   11/12/18 151/71   11/06/18 128/64   10/25/18 124/72    Weight from Last 3 Encounters:   11/12/18 92.5 kg (204 lb)   11/06/18 91.7 kg (202 lb 3.2 oz)   10/25/18 92.2 kg (203 lb 3.2 oz)              Today, you had the following     No orders found for display       Primary Care Provider Office Phone # Fax #    Reginaldo Muir [phone] [address]  [phone]        Equal Access to Services     [facility]: Hadii nahid matrins tomas Rodriguez, watimada luqhay, qajunior aadmsjeisonnaty jeffers. So [facility] [phone].    ATENCIÓN: Si habla español, tiene a roberts disposición servicios gratuitos de asistencia lingüística. Rosalindame al [phone].    We comply with applicable federal civil rights laws and [place] laws. We do not discriminate on the basis of race, color, national origin, age, disability, sex, sexual orientation, or gender identity.            Thank you!     Thank you for choosing [facility]  for your care. Our goal is always to provide you with excellent care. Hearing back from our patients is one way we can continue to improve our services. Please take a few minutes to complete the written survey that you may receive in the mail after your visit with us. Thank you!             Your Updated Medication List - Protect others around you: Learn how to safely use, store and throw away your medicines at www.disposemymeds.org.          This list is accurate as of 11/12/18 12:04 PM.  Always use your most recent med list.                   Brand Name Dispense Instructions for use Diagnosis    abiraterone 250 MG tablet    ZYTIGA    120 tablet    Take 4 tablets (1,000 mg) by mouth daily for 30 doses Take on empty stomach.    Bone metastasis (H), Malignant neoplasm of prostate (H)       * albuterol (2.5 MG/3ML) 0.083% neb solution     25 vial    Take 1 vial (2.5 mg) by nebulization every 6 hours as needed for shortness of breath / dyspnea or wheezing    Acute bronchitis, unspecified organism       * albuterol (2.5 MG/3ML) 0.083% neb solution     25 vial    Take 1 vial (2.5 mg) by nebulization every 6 hours as needed for shortness of breath / dyspnea or wheezing    Cough, SOB (shortness of breath), Chronic obstructive pulmonary disease, unspecified COPD type (H)       amoxicillin-clavulanate 875-125 MG per tablet     AUGMENTIN    20 tablet    Take 1 tablet by mouth 2 times daily    Dyspnea on exertion, Cough, Acute bronchitis with symptoms > 10 days       ASPIRIN PO      Take 81 mg by mouth        CALCIUM 600 PO           cyanocobalamin 1000 MCG Tabs    [facility] vitamin  B12    30 tablet    Take 1 tablet by mouth daily    Low vitamin B12 level       furosemide 20 MG tablet    LASIX    90 tablet    TAKE 1 TABLET (20 MG) BY MOUTH DAILY    Bilateral lower extremity edema, HTN (hypertension), benign       gabapentin 300 MG capsule    NEURONTIN     Take 900 mg by mouth At Bedtime        ipratropium - albuterol 0.5 mg/2.5 mg/3 mL 0.5-2.5 (3) MG/3ML neb solution    DUONEB    3 mL    Take 1 vial (3 mLs) by nebulization once for 1 dose    Dyspnea, unspecified type       metoprolol succinate 25 MG 24 hr tablet    TOPROL-XL    90 tablet    TAKE 1 TABLET (25 MG) BY MOUTH DAILY    HTN (hypertension), benign       order for DME     1 Device    Equipment being ordered: Nebulizer    Acute bronchitis, unspecified organism       order for DME     1 Device    Equipment being ordered: CPAP machine with associated CPAP supplies and tubing    HUDSON (obstructive sleep apnea)       order for DME     1 Device    Equipment being ordered: Nebulizer    Chronic obstructive pulmonary disease, unspecified COPD type (H), SOB (shortness of breath), Cough       oxyCODONE IR 5 MG tablet    ROXICODONE    30 tablet    Take 1 tablet (5 mg) by mouth every 6 hours as needed for pain    Narcotic dependence, episodic use (H)       * predniSONE 5 MG tablet    DELTASONE    60 tablet    Take 1 tablet (5 mg) by mouth 2 times daily    Bone metastasis (H), Malignant neoplasm of prostate (H)       * predniSONE 5 MG tablet    DELTASONE    60 tablet    Take 1 tablet (5 mg) by mouth 2 times daily    Bone metastasis (H), Malignant neoplasm of prostate (H)       * predniSONE 5 MG tablet    DELTASONE    60 tablet    Take 1 tablet (5 mg) by mouth 2 times daily    Bone metastasis (H),  Malignant neoplasm of prostate (H)       * predniSONE 20 MG tablet    DELTASONE    20 tablet    Take 3 tabs (60 mg) by mouth daily x 3 days, 2 tabs (40 mg) daily x 3 days, 1 tab (20 mg) daily x 3 days, then 1/2 tab (10 mg) x 3 days.    Chronic obstructive pulmonary disease, unspecified COPD type (H), SOB (shortness of breath), Cough       quinapril 40 MG Tab    ACCUPRIL    90 tablet    TAKE 1 TABLET (40 MG) BY MOUTH DAILY    HTN (hypertension), benign       simvastatin 40 MG tablet    ZOCOR    45 tablet    TAKE 0.5 TABLETS (20 MG) BY MOUTH DAILY    Hyperlipidemia LDL goal <130       vitamin D 2000 units tablet      Take 1 tablet by mouth daily        * Notice:  This list has 6 medication(s) that are the same as other medications prescribed for you. Read the directions carefully, and ask your doctor or other care provider to review them with you.

## 2018-11-12 NOTE — PROGRESS NOTES
Primary Oncologist: Nicolas  Primary Oncology Clinic: [place]  Cancer Diagnosis: Prostate Cancer      Therapy History:   abiraterone (Zytiga) 1000 mg PO daily with Prednisone 5 mg PO BID  Start Date: 8/19/17      Drug Interaction Assessment: No new medications as of 6/11/18  1. Abiraterone will decrease Warfarin metabolism, potentially raising INR; patient is aware and will inquire about more frequent INR monitoring at the start of therapy  2. Abiraterone inhibits Metoprolol metabolism with a slight risk of causing bradycardia      Lab Monitoring Plan:  C1D1+   CMP, BP C2D1+ Call, CMP, BP C3D1+ Call, CMP, BP C4D1+ Call, CMP, BP C5D1+ Call, CMP, BP C6D1+ Call, CMP, BP   C1D8+    C2D8+    C3D8+    C4D8+    C5D8+    C6D8+      C1D15+ Call, CMP C2D15+ Call, CMP C3D15+    C4D15+    C5D15+    C6D15+      C1D22+    C2D22+    C3D22+    C4D22+    C5D22+    C6D22+          Subjective/Objective:  Dimitri Neves is a 83 year old male seen by Leatha and in infusion for a follow-up visit for oral chemotherapy. Dimitri reports that he is doing well on abiraterone and denies any new changes or medications  His PSA went up to 16.8.  He is concerned about this.  Leatha discussed with [facility], we will keep him on same regimen until January.      ORAL CHEMOTHERAPY 3/1/2018 4/30/2018 6/11/2018 6/28/2018 8/9/2018 10/1/2018 11/12/2018   Drug Name Zytiga (Abiraterone) Zytiga (Abiraterone) Zytiga (Abiraterone) Zytiga (Abiraterone) Zytiga (Abiraterone) Zytiga (Abiraterone) Zytiga (Abiraterone)   Current Dosage 1000mg 1000mg 1000mg 1000mg 1000mg 1000mg 1000mg   Current Schedule Daily Daily Daily Daily Daily Daily Daily   Cycle Details Continuous Continuous Continuous Continuous Continuous Continuous Continuous   Start Date of Last Cycle - 4/4/2018 - - - - -   Planned next cycle start date - 5/3/2018 - - - - -   Doses missed in last 2 weeks 0 0 0 0 0 0 0   Adherence Assessment Adherent Adherent Adherent Adherent - Adherent Adherent   Adverse Effects  "Other (see note for details) No AE identified during assessment No AE identified during assessment No AE identified during assessment No AE identified during assessment No AE identified during assessment No AE identified during assessment   Other (see note for details) (No Data) - - - - - -   Pharmacist intervention? No - - - - - -   Any new drug interactions? No No No No No No No   Is the dose as ordered appropriate for the patient? - Yes Yes Yes Yes Yes Yes   Is the patient currently in pain? No Assessed in last 30 days. - Assessed in last 30 days. Assessed in last 30 days. Assessed in last 30 days. Assessed in last 30 days.   Has the patient been assessed within the past 6 months for depression? Yes Yes - Yes Yes Yes -   Has the patient missed any days of school, work, or other routine activity? - - - - - - No       Vitals:  BP:   BP Readings from Last 1 Encounters:   11/12/18 151/71     Wt Readings from Last 1 Encounters:   11/12/18 92.5 kg (204 lb)     Estimated body surface area is 2.15 meters squared as calculated from the following:    Height as of an earlier encounter on 11/12/18: 1.803 m (5' 11\").    Weight as of an earlier encounter on 11/12/18: 92.5 kg (204 lb).    Labs:  _  Result Component Current Result Ref Range   Sodium 141 (11/12/2018) 133 - 144 mmol/L     Result Component Current Result Ref Range   Potassium 3.8 (11/12/2018) 3.4 - 5.3 mmol/L     Result Component Current Result Ref Range   Calcium 9.2 (11/12/2018) 8.5 - 10.1 mg/dL     Result Component Current Result Ref Range   Albumin 3.0 (L) (11/12/2018) 3.4 - 5.0 g/dL     Result Component Current Result Ref Range   Urea Nitrogen 20 (11/12/2018) 7 - 30 mg/dL     Result Component Current Result Ref Range   Creatinine 0.93 (11/12/2018) 0.66 - 1.25 mg/dL     Result Component Current Result Ref Range   AST 17 (11/12/2018) 0 - 45 U/L     Result Component Current Result Ref Range   ALT 22 (11/12/2018) 0 - 70 U/L     Result Component Current Result Ref " Range   Bilirubin Total 0.7 (11/12/2018) 0.2 - 1.3 mg/dL     Result Component Current Result Ref Range   WBC 7.5 (11/12/2018) 4.0 - 11.0 10e9/L     Result Component Current Result Ref Range   Hemoglobin 11.4 (L) (11/12/2018) 13.3 - 17.7 g/dL     Result Component Current Result Ref Range   Platelet Count 163 (11/12/2018) 150 - 450 10e9/L     Result Component Current Result Ref Range   Absolute Neutrophil 6.0 (11/12/2018) 1.6 - 8.3 10e9/L      PSA 16.8    Assessment/Plan:  Per Dr. Valenzuela, remain on Zytiga/prednisone until Jan.  He may switch therapy at this time. /71    Follow-Up:  Nicolas 1/7/19 10AM    Refill Due:  2018/11/23 Release abiraterone/pred to [facility]

## 2018-11-12 NOTE — PROGRESS NOTES
Infusion Nursing Note:  Dimitri Neves presents today for Zometa.    Patient seen by provider today: Yes: Leatha Loredo NP   present during visit today: Not Applicable.    Note: N/A.    Intravenous Access:  Peripheral IV placed.    Treatment Conditions:  Lab Results   Component Value Date    HGB 11.4 11/12/2018     Lab Results   Component Value Date    WBC 7.5 11/12/2018      Lab Results   Component Value Date    ANEU 6.0 11/12/2018     Lab Results   Component Value Date     11/12/2018      Lab Results   Component Value Date     11/12/2018                   Lab Results   Component Value Date    POTASSIUM 3.8 11/12/2018           No results found for: MAG         Lab Results   Component Value Date    CR 0.93 11/12/2018                   Lab Results   Component Value Date    ALETHA 9.2 11/12/2018                Lab Results   Component Value Date    BILITOTAL 0.7 11/12/2018           Lab Results   Component Value Date    ALBUMIN 3.0 11/12/2018                    Lab Results   Component Value Date    ALT 22 11/12/2018           Lab Results   Component Value Date    AST 17 11/12/2018       Results reviewed, labs MET treatment parameters, ok to proceed with treatment.      Post Infusion Assessment:  Patient tolerated infusion without incident.  Site patent and intact, free from redness, edema or discomfort.  No evidence of extravasations.  Access discontinued per protocol.    Discharge Plan:   Discharge instructions reviewed with: Patient and Family.  Patient and/or family verbalized understanding of discharge instructions and all questions answered.  Patient discharged in stable condition accompanied by: wife.  Departure Mode: Ambulatory.    Romy Méndez RN

## 2018-11-12 NOTE — PROGRESS NOTES
Oncology Follow Up Visit: November 12, 2018     Oncologist: Dr Rafael Valenzuela  PCP: Reginaldo Muir    Diagnosis: Stage IV Adenocarcinoma of the Prostate   Dimitri Neves is an 84 yo male who had an elevation of PSA to 32 noted by PCP in 6/2013. He was seen by Dr Taylor who had biopsy which proved adenocarcinoma of the prostate. Repeat biopsy in 12/2014 proved [place] 4+4 disease in 5 of cores and 3+3 in remainder of cores. Bone scan found metastasis to upper cervical vertebrae, right posterior 3rd rib and right ischium and posterior left 11th rib with corresponding lytic expansile appearance on CT suspicious for metastasis.    Treatment:   1/2015 began androgen deprivation therapy.  2/2017 began bicalutamide  7/19/2017 began and continues on Abiraterone with prednisone  9/27/2017 bilateral orchiectomies    Interval History: Mr Neves comes to clinic with wife for review of treatment for the prostate cancer. Pt continues on treatment with Zytiga 1000mg daily and shares he has been having few hot flashes and noted that he fatigues more easily than previously.He does stay active around the home and has been eating without any weight loss.     Rest of comprehensive and complete ROS is reviewed and is negative.   Past Medical History:   Diagnosis Date     Actinic keratosis      AK (actinic keratosis) 7/9/2012     Cataract cortical, senile right eye 4/17/2012     DDD (degenerative disc disease), lumbar 1979    s/p 4 back surgeries, spinal cord stimulator implant      Diverticulosis      ED (erectile dysfunction) 2009     GERD (gastroesophageal reflux disease) ~1980     HTN (hypertension), benign ~2000     Macular degeneration, dry, mild, ou 7/23/2016     Multiple lung nodules     Several basilar pulmonary nodules <5 mm     HUDSON (obstructive sleep apnea) 10/2005    on CPAP     Other abnormal glucose 01/14/2009     PE (pulmonary thromboembolism) (H) 1981    after back surgery      Pure hypercholesterolemia ~2000     Squamous cell  "carcinoma 07/2012    L frontal scalp     Current Outpatient Prescriptions   Medication     abiraterone (ZYTIGA) 250 MG tablet     albuterol (2.5 MG/3ML) 0.083% neb solution     albuterol (2.5 MG/3ML) 0.083% neb solution     amoxicillin-clavulanate (AUGMENTIN) 875-125 MG per tablet     ASPIRIN PO     Calcium Carbonate (CALCIUM 600 PO)     Cholecalciferol (VITAMIN D) 2000 UNITS tablet     cyanocobalamin ([facility] VITAMIN  B12) 1000 MCG TABS     furosemide (LASIX) 20 MG tablet     gabapentin (NEURONTIN) 300 MG capsule     ipratropium - albuterol 0.5 mg/2.5 mg/3 mL (DUONEB) 0.5-2.5 (3) MG/3ML neb solution     metoprolol succinate (TOPROL-XL) 25 MG 24 hr tablet     order for DME     order for DME     order for DME     oxyCODONE (ROXICODONE) 5 MG IR tablet     predniSONE (DELTASONE) 20 MG tablet     predniSONE (DELTASONE) 5 MG tablet     predniSONE (DELTASONE) 5 MG tablet     predniSONE (DELTASONE) 5 MG tablet     quinapril (ACCUPRIL) 40 MG Tab     simvastatin (ZOCOR) 40 MG tablet     No current facility-administered medications for this visit.      Allergies   Allergen Reactions     Morphine Sulfate GI Disturbance     vomiting     Vicodin [Hydrocodone-Acetaminophen] Nausea and Vomiting       Physical Exam: /71  Pulse 63  Temp 97.8  F (36.6  C)  Resp 18  Ht 1.803 m (5' 11\")  Wt 92.5 kg (204 lb)  SpO2 96%  BMI 28.45 kg/m2   Constitutional: Alert, moving well on own and in no distress.   ENT: Eyes bright, No mouth sores  Neck: Supple, No adenopathy.Thyroid symmetric  Cardiac: Heart rate and rhythm is regular and strong without murmur  Respiratory: Breathing easy. Lung sounds clear to auscultation  GI: Abdomen is soft, non-tender, BS normal. No masses or organomegaly  MS: Muscle tone normal, extremities with no edema. Right knee with enlargement  But able to walk well.   Skin: thinning beard, small bruises to forearms    Neuro: Sensory grossly WNL, gait normal.   Lymph: Normal ant/post cervical, axillary, " supraclavicular nodes  Psych: Mentation appears normal and affect normal/bright and smiling    Laboratory Results:   Results for orders placed or performed in visit on 11/12/18   CBC with platelets differential   Result Value Ref Range    WBC 7.5 4.0 - 11.0 10e9/L    RBC Count 3.46 (L) 4.4 - 5.9 10e12/L    Hemoglobin 11.4 (L) 13.3 - 17.7 g/dL    Hematocrit 34.3 (L) 40.0 - 53.0 %    MCV 99 78 - 100 fl    MCH 32.9 26.5 - 33.0 pg    MCHC 33.2 31.5 - 36.5 g/dL    RDW 13.8 10.0 - 15.0 %    Platelet Count 163 150 - 450 10e9/L    Diff Method Automated Method     % Neutrophils 80.1 %    % Lymphocytes 11.3 %    % Monocytes 6.8 %    % Eosinophils 0.1 %    % Basophils 0.1 %    % Immature Granulocytes 1.6 %    Absolute Neutrophil 6.0 1.6 - 8.3 10e9/L    Absolute Lymphocytes 0.9 0.8 - 5.3 10e9/L    Absolute Monocytes 0.5 0.0 - 1.3 10e9/L    Absolute Eosinophils 0.0 0.0 - 0.7 10e9/L    Absolute Basophils 0.0 0.0 - 0.2 10e9/L    Abs Immature Granulocytes 0.1 0 - 0.4 10e9/L   PSA tumor marker   Result Value Ref Range    PSA 16.80 (H) 0 - 4 ug/L   Comprehensive metabolic panel   Result Value Ref Range    Sodium 141 133 - 144 mmol/L    Potassium 3.8 3.4 - 5.3 mmol/L    Chloride 104 94 - 109 mmol/L    Carbon Dioxide 30 20 - 32 mmol/L    Anion Gap 7 3 - 14 mmol/L    Glucose 127 (H) 70 - 99 mg/dL    Urea Nitrogen 20 7 - 30 mg/dL    Creatinine 0.93 0.66 - 1.25 mg/dL    GFR Estimate 78 >60 mL/min/1.7m2    GFR Estimate If Black >90 >60 mL/min/1.7m2    Calcium 9.2 8.5 - 10.1 mg/dL    Bilirubin Total 0.7 0.2 - 1.3 mg/dL    Albumin 3.0 (L) 3.4 - 5.0 g/dL    Protein Total 6.1 (L) 6.8 - 8.8 g/dL    Alkaline Phosphatase 67 40 - 150 U/L    ALT 22 0 - 70 U/L    AST 17 0 - 45 U/L     Assessment and Plan:   Stage IV adenocarcinoma of the Prostate- Pt continues using Zytiga 1000mg and prednisone now since 7/2017 with good tolerance though complains of continued hot flashes.    PSA has increase this check from 11.7 to 16.8 which worries pt but  reviewed that we watch trends and will review in 6 weeks with visit with Dr Valenzuela who can again review progression.   He will see Dr Valenzuela in 6 weeks with treatment plan labs.   Bone metastasis- Using Zometa every 6-8 weeks and will receive today.   Anemia- Hgb=11.4 - will continue to follow.   This was a 25 min visit with > 50% in counseling and coordinating care including education and management of concerns.    Leatha Loredo,CNP

## 2018-11-12 NOTE — MR AVS SNAPSHOT
After Visit Summary   11/12/2018    Dimitri Neves    MRN: [id number]           Patient Information     Date Of Birth          1935        Visit Information        Provider Department      11/12/2018 8:45 AM Leatha Loredo APRN CNP [facility]        Today's Diagnoses     Malignant neoplasm of prostate (H)    -  1    Bone metastasis (H)        Anemia, unspecified type           Follow-ups after your visit        Your next 10 appointments already scheduled     Nov 27, 2018 10:30 AM CST   Return Visit with Senthil [facility]haveri MD   [facility]    [address] [facility] [address]   [phone]            Jan 07, 2019  9:15 AM CST   LAB with LAB ONC [facility])    [address] [place] [id number] [phone]           Please do not eat 10-12 hours before your appointment if you are coming in fasting for labs on lipids, cholesterol, or glucose (sugar). This does not apply to pregnant women. Water, hot tea and black coffee (with nothing added) are okay. Do not drink other fluids, diet soda or chew gum.            Jan 07, 2019 10:00 AM CST   Return Visit with Rafael Valenzuela MD   [facility])    [id number] [place] [id number] [phone]            Jan 07, 2019 10:30 AM CST   Level O with BAY 9 [facility])    [address] [place] [id number] [phone]              Who to contact     If you have questions or need follow up information about today's clinic visit or your schedule please contact [facility] directly at [phone].  Normal or non-critical lab and imaging results will be communicated to you by MyChart, letter or phone within 4 business days after the clinic has received the  "results. If you do not hear from us within 7 days, please contact the clinic through [email or web address]. or phone. If you have a critical or abnormal lab result, we will notify you by phone as soon as possible.  Submit refill requests through [email or web address]. or call your pharmacy and they will forward the refill request to us. Please allow 3 business days for your refill to be completed.          Additional Information About Your Visit        [employer] Information     [email or web address]. gives you secure access to your electronic health record. If you see a primary care provider, you can also send messages to your care team and make appointments. If you have questions, please call your primary care clinic.  If you do not have a primary care provider, please call [phone] and they will assist you.      [email or web address]. is an electronic gateway that provides easy, online access to your medical records. With [email or web address]., you can request a clinic appointment, read your test results, renew a prescription or communicate with your care team.     To access your existing account, please contact your [facility] or call [phone] for assistance.        Care EveryWhere ID     This is your Care EveryWhere ID. This could be used by other organizations to access your [facility] medical records  [id number]        Your Vitals Were     Pulse Temperature Respirations Height Pulse Oximetry BMI (Body Mass Index)    63 97.8  F (36.6  C) 18 1.803 m (5' 11\") 96% 28.45 kg/m2       Blood Pressure from Last 3 Encounters:   11/12/18 151/71   11/06/18 128/64   10/25/18 124/72    Weight from Last 3 Encounters:   11/12/18 92.5 kg (204 lb)   11/06/18 91.7 kg (202 lb 3.2 oz)   10/25/18 92.2 kg (203 lb 3.2 oz)               Primary Care Provider Office Phone # Fax #    Reginaldo Muir [phone] [address] [facility] [place] [address]        Equal Access to Services     CARLITA LEBLANC AH: hugh Williamson, " sarahi ko miltonnaty bandaaan ah. So [facility] [phone].    ATENCIÓN: Si yany jean, tiene a roberts disposición servicios gratuitos de asistencia lingüística. Kelsey al [phone].    We comply with applicable federal civil rights laws and [place] laws. We do not discriminate on the basis of race, color, national origin, age, disability, sex, sexual orientation, or gender identity.            Thank you!     Thank you for choosing [facility]  for your care. Our goal is always to provide you with excellent care. Hearing back from our patients is one way we can continue to improve our services. Please take a few minutes to complete the written survey that you may receive in the mail after your visit with us. Thank you!             Your Updated Medication List - Protect others around you: Learn how to safely use, store and throw away your medicines at www.disposemymeds.org.          This list is accurate as of 11/12/18 11:59 PM.  Always use your most recent med list.                   Brand Name Dispense Instructions for use Diagnosis    abiraterone 250 MG tablet    ZYTIGA    120 tablet    Take 4 tablets (1,000 mg) by mouth daily for 30 doses Take on empty stomach.    Bone metastasis (H), Malignant neoplasm of prostate (H)       * albuterol (2.5 MG/3ML) 0.083% neb solution     25 vial    Take 1 vial (2.5 mg) by nebulization every 6 hours as needed for shortness of breath / dyspnea or wheezing    Acute bronchitis, unspecified organism       * albuterol (2.5 MG/3ML) 0.083% neb solution     25 vial    Take 1 vial (2.5 mg) by nebulization every 6 hours as needed for shortness of breath / dyspnea or wheezing    Cough, SOB (shortness of breath), Chronic obstructive pulmonary disease, unspecified COPD type (H)       amoxicillin-clavulanate 875-125 MG per tablet    AUGMENTIN    20 tablet    Take 1 tablet by mouth 2 times daily    Dyspnea on exertion, Cough, Acute bronchitis  with symptoms > 10 days       ASPIRIN PO      Take 81 mg by mouth        CALCIUM 600 PO           cyanocobalamin 1000 MCG Tabs    [facility] vitamin  B12    30 tablet    Take 1 tablet by mouth daily    Low vitamin B12 level       furosemide 20 MG tablet    LASIX    90 tablet    TAKE 1 TABLET (20 MG) BY MOUTH DAILY    Bilateral lower extremity edema, HTN (hypertension), benign       gabapentin 300 MG capsule    NEURONTIN     Take 900 mg by mouth At Bedtime        ipratropium - albuterol 0.5 mg/2.5 mg/3 mL 0.5-2.5 (3) MG/3ML neb solution    DUONEB    3 mL    Take 1 vial (3 mLs) by nebulization once for 1 dose    Dyspnea, unspecified type       metoprolol succinate 25 MG 24 hr tablet    TOPROL-XL    90 tablet    TAKE 1 TABLET (25 MG) BY MOUTH DAILY    HTN (hypertension), benign       order for DME     1 Device    Equipment being ordered: Nebulizer    Acute bronchitis, unspecified organism       order for DME     1 Device    Equipment being ordered: CPAP machine with associated CPAP supplies and tubing    HUDSON (obstructive sleep apnea)       order for DME     1 Device    Equipment being ordered: Nebulizer    Chronic obstructive pulmonary disease, unspecified COPD type (H), SOB (shortness of breath), Cough       oxyCODONE IR 5 MG tablet    ROXICODONE    30 tablet    Take 1 tablet (5 mg) by mouth every 6 hours as needed for pain    Narcotic dependence, episodic use (H)       * predniSONE 5 MG tablet    DELTASONE    60 tablet    Take 1 tablet (5 mg) by mouth 2 times daily    Bone metastasis (H), Malignant neoplasm of prostate (H)       * predniSONE 5 MG tablet    DELTASONE    60 tablet    Take 1 tablet (5 mg) by mouth 2 times daily    Bone metastasis (H), Malignant neoplasm of prostate (H)       * predniSONE 5 MG tablet    DELTASONE    60 tablet    Take 1 tablet (5 mg) by mouth 2 times daily    Bone metastasis (H), Malignant neoplasm of prostate (H)       * predniSONE 20 MG tablet    DELTASONE    20 tablet    Take 3 tabs (60 mg)  by mouth daily x 3 days, 2 tabs (40 mg) daily x 3 days, 1 tab (20 mg) daily x 3 days, then 1/2 tab (10 mg) x 3 days.    Chronic obstructive pulmonary disease, unspecified COPD type (H), SOB (shortness of breath), Cough       quinapril 40 MG Tab    ACCUPRIL    90 tablet    TAKE 1 TABLET (40 MG) BY MOUTH DAILY    HTN (hypertension), benign       simvastatin 40 MG tablet    ZOCOR    45 tablet    TAKE 0.5 TABLETS (20 MG) BY MOUTH DAILY    Hyperlipidemia LDL goal <130       vitamin D 2000 units tablet      Take 1 tablet by mouth daily        * Notice:  This list has 6 medication(s) that are the same as other medications prescribed for you. Read the directions carefully, and ask your doctor or other care provider to review them with you.

## 2018-11-12 NOTE — NURSING NOTE
"Oncology Rooming Note    November 12, 2018 8:49 AM   Dimitri Neves is a 83 year old male who presents for:    Chief Complaint   Patient presents with     Oncology Clinic Visit     6 week follow up     Initial Vitals: /71  Pulse 63  Temp 97.8  F (36.6  C)  Resp 18  Ht 1.803 m (5' 11\")  Wt 92.5 kg (204 lb)  SpO2 96%  BMI 28.45 kg/m2 Estimated body mass index is 28.45 kg/(m^2) as calculated from the following:    Height as of this encounter: 1.803 m (5' 11\").    Weight as of this encounter: 92.5 kg (204 lb). Body surface area is 2.15 meters squared.  No Pain (0) Comment: Data Unavailable   No LMP for male patient.  Allergies reviewed: Yes  Medications reviewed: Yes    Medications: Medication refills not needed today.  Pharmacy name entered into EPIC:    [id number] - [place], [place] - [address], [facility] - [place], [place] - [address] [place] [address]O PHARMACY - MAIL ORDER ONLY - [id number] - [place], [place] - [address] [facility]       5 minutes for nursing intake (face to face time)     Maritza Cline LPN              "

## 2018-11-12 NOTE — LETTER
11/12/2018         RE: Dimitri Neves  [address] [place] [address]        Dear Colleague,    Thank you for referring your patient, Dimitri Neves, to the [facility]. Please see a copy of my visit note below.    Oncology Follow Up Visit: November 12, 2018     Oncologist: Dr Rafael Valenzuela  PCP: Reginaldo Muir    Diagnosis: Stage IV Adenocarcinoma of the Prostate   Dimitri Neves is an 84 yo male who had an elevation of PSA to 32 noted by PCP in 6/2013. He was seen by Dr Taylor who had biopsy which proved adenocarcinoma of the prostate. Repeat biopsy in 12/2014 proved Gena 4+4 disease in 5 of cores and 3+3 in remainder of cores. Bone scan found metastasis to upper cervical vertebrae, right posterior 3rd rib and right ischium and posterior left 11th rib with corresponding lytic expansile appearance on CT suspicious for metastasis.    Treatment:   1/2015 began androgen deprivation therapy.  2/2017 began bicalutamide  7/19/2017 began and continues on Abiraterone with prednisone  9/27/2017 bilateral orchiectomies    Interval History: Mr Neves comes to clinic with wife for review of treatment for the prostate cancer. Pt continues on treatment with Zytiga 1000mg daily and shares he has been having few hot flashes and noted that he fatigues more easily than previously.He does stay active around the home and has been eating without any weight loss.     Rest of comprehensive and complete ROS is reviewed and is negative.   Past Medical History:   Diagnosis Date     Actinic keratosis      AK (actinic keratosis) 7/9/2012     Cataract cortical, senile right eye 4/17/2012     DDD (degenerative disc disease), lumbar 1979    s/p 4 back surgeries, spinal cord stimulator implant      Diverticulosis      ED (erectile dysfunction) 2009     GERD (gastroesophageal reflux disease) ~1980     HTN (hypertension), benign ~2000     Macular degeneration, dry, mild, ou 7/23/2016     Multiple lung nodules     Several  "basilar pulmonary nodules <5 mm     HUDSON (obstructive sleep apnea) 10/2005    on CPAP     Other abnormal glucose 01/14/2009     PE (pulmonary thromboembolism) (H) 1981    after back surgery      Pure hypercholesterolemia ~2000     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     Current Outpatient Prescriptions   Medication     abiraterone (ZYTIGA) 250 MG tablet     albuterol (2.5 MG/3ML) 0.083% neb solution     albuterol (2.5 MG/3ML) 0.083% neb solution     amoxicillin-clavulanate (AUGMENTIN) 875-125 MG per tablet     ASPIRIN PO     Calcium Carbonate (CALCIUM 600 PO)     Cholecalciferol (VITAMIN D) 2000 UNITS tablet     cyanocobalamin ([facility] VITAMIN  B12) 1000 MCG TABS     furosemide (LASIX) 20 MG tablet     gabapentin (NEURONTIN) 300 MG capsule     ipratropium - albuterol 0.5 mg/2.5 mg/3 mL (DUONEB) 0.5-2.5 (3) MG/3ML neb solution     metoprolol succinate (TOPROL-XL) 25 MG 24 hr tablet     order for DME     order for DME     order for DME     oxyCODONE (ROXICODONE) 5 MG IR tablet     predniSONE (DELTASONE) 20 MG tablet     predniSONE (DELTASONE) 5 MG tablet     predniSONE (DELTASONE) 5 MG tablet     predniSONE (DELTASONE) 5 MG tablet     quinapril (ACCUPRIL) 40 MG Tab     simvastatin (ZOCOR) 40 MG tablet     No current facility-administered medications for this visit.      Allergies   Allergen Reactions     Morphine Sulfate GI Disturbance     vomiting     Vicodin [Hydrocodone-Acetaminophen] Nausea and Vomiting       Physical Exam: /71  Pulse 63  Temp 97.8  F (36.6  C)  Resp 18  Ht 1.803 m (5' 11\")  Wt 92.5 kg (204 lb)  SpO2 96%  BMI 28.45 kg/m2   Constitutional: Alert, moving well on own and in no distress.   ENT: Eyes bright, No mouth sores  Neck: Supple, No adenopathy.Thyroid symmetric  Cardiac: Heart rate and rhythm is regular and strong without murmur  Respiratory: Breathing easy. Lung sounds clear to auscultation  GI: Abdomen is soft, non-tender, BS normal. No masses or organomegaly  MS: " Muscle tone normal, extremities with no edema. Right knee with enlargement  But able to walk well.   Skin: thinning beard, small bruises to forearms    Neuro: Sensory grossly WNL, gait normal.   Lymph: Normal ant/post cervical, axillary, supraclavicular nodes  Psych: Mentation appears normal and affect normal/bright and smiling    Laboratory Results:   Results for orders placed or performed in visit on 11/12/18   CBC with platelets differential   Result Value Ref Range    WBC 7.5 4.0 - 11.0 10e9/L    RBC Count 3.46 (L) 4.4 - 5.9 10e12/L    Hemoglobin 11.4 (L) 13.3 - 17.7 g/dL    Hematocrit 34.3 (L) 40.0 - 53.0 %    MCV 99 78 - 100 fl    MCH 32.9 26.5 - 33.0 pg    MCHC 33.2 31.5 - 36.5 g/dL    RDW 13.8 10.0 - 15.0 %    Platelet Count 163 150 - 450 10e9/L    Diff Method Automated Method     % Neutrophils 80.1 %    % Lymphocytes 11.3 %    % Monocytes 6.8 %    % Eosinophils 0.1 %    % Basophils 0.1 %    % Immature Granulocytes 1.6 %    Absolute Neutrophil 6.0 1.6 - 8.3 10e9/L    Absolute Lymphocytes 0.9 0.8 - 5.3 10e9/L    Absolute Monocytes 0.5 0.0 - 1.3 10e9/L    Absolute Eosinophils 0.0 0.0 - 0.7 10e9/L    Absolute Basophils 0.0 0.0 - 0.2 10e9/L    Abs Immature Granulocytes 0.1 0 - 0.4 10e9/L   PSA tumor marker   Result Value Ref Range    PSA 16.80 (H) 0 - 4 ug/L   Comprehensive metabolic panel   Result Value Ref Range    Sodium 141 133 - 144 mmol/L    Potassium 3.8 3.4 - 5.3 mmol/L    Chloride 104 94 - 109 mmol/L    Carbon Dioxide 30 20 - 32 mmol/L    Anion Gap 7 3 - 14 mmol/L    Glucose 127 (H) 70 - 99 mg/dL    Urea Nitrogen 20 7 - 30 mg/dL    Creatinine 0.93 0.66 - 1.25 mg/dL    GFR Estimate 78 >60 mL/min/1.7m2    GFR Estimate If Black >90 >60 mL/min/1.7m2    Calcium 9.2 8.5 - 10.1 mg/dL    Bilirubin Total 0.7 0.2 - 1.3 mg/dL    Albumin 3.0 (L) 3.4 - 5.0 g/dL    Protein Total 6.1 (L) 6.8 - 8.8 g/dL    Alkaline Phosphatase 67 40 - 150 U/L    ALT 22 0 - 70 U/L    AST 17 0 - 45 U/L     Assessment and Plan:   Stage IV  adenocarcinoma of the Prostate- Pt continues using Zytiga 1000mg and prednisone now since 7/2017 with good tolerance though complains of continued hot flashes.    PSA has increase this check from 11.7 to 16.8 which worries pt but reviewed that we watch trends and will review in 6 weeks with visit with Dr Valenzuela who can again review progression.   He will see Dr Valenzuela in 6 weeks with treatment plan labs.   Bone metastasis- Using Zometa every 6-8 weeks and will receive today.   Anemia- Hgb=11.4 - will continue to follow.   This was a 25 min visit with > 50% in counseling and coordinating care including education and management of concerns.    Leatha Loredo CNP      Again, thank you for allowing me to participate in the care of your patient.        Sincerely,        Leatha Loredo, JOSI, APRN CNP

## 2018-11-15 DIAGNOSIS — J44.9 CHRONIC OBSTRUCTIVE PULMONARY DISEASE, UNSPECIFIED COPD TYPE (H): ICD-10-CM

## 2018-11-15 DIAGNOSIS — R05.9 COUGH: ICD-10-CM

## 2018-11-15 DIAGNOSIS — R06.02 SOB (SHORTNESS OF BREATH): ICD-10-CM

## 2018-11-15 RX ORDER — ALBUTEROL SULFATE 0.83 MG/ML
SOLUTION RESPIRATORY (INHALATION)
Qty: 75 ML | Refills: 1 | Status: SHIPPED | OUTPATIENT
Start: 2018-11-15 | End: 2019-01-01

## 2018-11-19 RX ORDER — ZOLEDRONIC ACID 0.04 MG/ML
4 INJECTION, SOLUTION INTRAVENOUS ONCE
Status: CANCELLED | OUTPATIENT
Start: 2019-01-01 | End: 2019-04-01

## 2018-11-19 RX ORDER — ZOLEDRONIC ACID 0.04 MG/ML
4 INJECTION, SOLUTION INTRAVENOUS ONCE
Status: CANCELLED | OUTPATIENT
Start: 2019-04-18 | End: 2019-02-04

## 2018-11-19 RX ORDER — ZOLEDRONIC ACID 0.04 MG/ML
4 INJECTION, SOLUTION INTRAVENOUS ONCE
Status: CANCELLED | OUTPATIENT
Start: 2019-08-01 | End: 2019-03-04

## 2018-11-23 DIAGNOSIS — C79.51 BONE METASTASIS: Primary | ICD-10-CM

## 2018-11-23 DIAGNOSIS — C61 MALIGNANT NEOPLASM OF PROSTATE (H): ICD-10-CM

## 2018-11-23 RX ORDER — ABIRATERONE ACETATE 250 MG/1
1000 TABLET ORAL DAILY
Qty: 120 TABLET | Refills: 0 | Status: SHIPPED | OUTPATIENT
Start: 2018-11-23 | End: 2019-04-18

## 2018-11-23 RX ORDER — PREDNISONE 5 MG/1
5 TABLET ORAL 2 TIMES DAILY
Qty: 60 TABLET | Refills: 0 | Status: SHIPPED | OUTPATIENT
Start: 2018-11-23 | End: 2018-11-27

## 2018-11-27 ENCOUNTER — OFFICE VISIT (OUTPATIENT)
Dept: DERMATOLOGY | Facility: CLINIC | Age: 83
End: 2018-11-27
Payer: COMMERCIAL

## 2018-11-27 VITALS — DIASTOLIC BLOOD PRESSURE: 69 MMHG | OXYGEN SATURATION: 97 % | SYSTOLIC BLOOD PRESSURE: 110 MMHG | HEART RATE: 84 BPM

## 2018-11-27 DIAGNOSIS — Z85.828 HISTORY OF SKIN CANCER: ICD-10-CM

## 2018-11-27 DIAGNOSIS — L57.0 AK (ACTINIC KERATOSIS): ICD-10-CM

## 2018-11-27 DIAGNOSIS — C44.329 SQUAMOUS CELL CANCER OF SKIN OF LEFT TEMPLE: ICD-10-CM

## 2018-11-27 DIAGNOSIS — L81.4 LENTIGO: ICD-10-CM

## 2018-11-27 DIAGNOSIS — I87.2 VENOUS STASIS DERMATITIS, UNSPECIFIED LATERALITY: Primary | ICD-10-CM

## 2018-11-27 DIAGNOSIS — L82.1 SK (SEBORRHEIC KERATOSIS): ICD-10-CM

## 2018-11-27 DIAGNOSIS — D18.00 ANGIOMA: ICD-10-CM

## 2018-11-27 PROCEDURE — 17311 MOHS 1 STAGE H/N/HF/G: CPT | Performed by: DERMATOLOGY

## 2018-11-27 PROCEDURE — 88331 PATH CONSLTJ SURG 1 BLK 1SPC: CPT | Mod: 59 | Performed by: DERMATOLOGY

## 2018-11-27 PROCEDURE — 11100 HC BIOPSY SKIN/SUBQ/MUC MEM, SINGLE LESION: CPT | Mod: 59 | Performed by: DERMATOLOGY

## 2018-11-27 PROCEDURE — 99213 OFFICE O/P EST LOW 20 MIN: CPT | Mod: 25 | Performed by: DERMATOLOGY

## 2018-11-27 RX ORDER — FLUOROURACIL 50 MG/G
CREAM TOPICAL
Qty: 40 G | Refills: 1 | Status: SHIPPED | OUTPATIENT
Start: 2018-11-27 | End: 2019-01-01

## 2018-11-27 RX ORDER — FLUOCINONIDE 0.5 MG/G
CREAM TOPICAL
Qty: 120 G | Refills: 3 | Status: SHIPPED | OUTPATIENT
Start: 2018-11-27

## 2018-11-27 NOTE — PROGRESS NOTES
Dimitri Neves is a 83 year old year old male patient here today for rash on legs and spots on face.   .  Patient states this has been present for a while.  Patient reports the following symptoms:  itching.  Patient reports the following previous treatments none.  Patient reports the following modifying factors none.  Associated symptoms: none.  Patient has no other skin complaints today.  Remainder of the HPI, Meds, PMH, Allergies, FH, and SH was reviewed in chart.      Past Medical History:   Diagnosis Date     Actinic keratosis      AK (actinic keratosis) 7/9/2012     Cataract cortical, senile right eye 4/17/2012     DDD (degenerative disc disease), lumbar 1979    s/p 4 back surgeries, spinal cord stimulator implant      Diverticulosis      ED (erectile dysfunction) 2009     GERD (gastroesophageal reflux disease) ~1980     HTN (hypertension), benign ~2000     Macular degeneration, dry, mild, ou 7/23/2016     Multiple lung nodules     Several basilar pulmonary nodules <5 mm     HUDSON (obstructive sleep apnea) 10/2005    on CPAP     Other abnormal glucose 01/14/2009     PE (pulmonary thromboembolism) (H) 1981    after back surgery      Pure hypercholesterolemia ~2000     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       Past Surgical History:   Procedure Laterality Date     ARTHROSCOPY KNEE RT/LT  ~1995    Right     C LUMBAR SPINE FUSION,ANTER APPRCH  8/2007    four times total, latest 8/07     CATARACT IOL, RT/LT       LASER YAG CAPSULOTOMY      both eyes     ORCHIECTOMY SCROTAL Bilateral 9/27/2017    Procedure: ORCHIECTOMY SCROTAL;  Bilateral orchiectomy scrotal approach;  Surgeon: Senthil Taylor MD;  Location: [facility]     PHACOEMULSIFICATION CLEAR CORNEA WITH STANDARD INTRAOCULAR LENS IMPLANT  6-18-12/7-30-12    right/left eye     ROTATOR CUFF REPAIR RT/LT  ~1995    right        Family History   Problem Relation Age of Onset     Cancer Father      Lung 64y     Diabetes Brother      Hypertension Brother       Cancer Mother      Liver     Cerebrovascular Disease Mother      Eye Disorder Mother      Glaucoma Mother      Cerebrovascular Disease Maternal Grandmother      PATRICK.A.D. No family hx of      Thyroid Disease No family hx of      Macular Degeneration No family hx of      Melanoma No family hx of        Social History     Social History     Marital status:      Spouse name: Tatiana     Number of children: 2     Years of education: N/A     Occupational History      Retired     Social History Main Topics     Smoking status: Former Smoker     Packs/day: 1.00     Years: 25.00     Types: Cigarettes     Quit date: 1/2/1976     Smokeless tobacco: Never Used      Comment: lives in smoke free household     Alcohol use 7.0 oz/week     14 Standard drinks or equivalent per week      Comment: 2-3 drinks every night     Drug use: No      Comment: tatoos- sterile     Sexual activity: Yes     Partners: Female     Other Topics Concern      Service Yes     [employer] x8 years     Blood Transfusions No     Caffeine Concern No     Hobby Hazards No     Sleep Concern No     Stress Concern No     Weight Concern Yes     Special Diet No     Back Care Yes     Exercise Yes     Seat Belt Yes     Self-Exams No     Parent/Sibling W/ Cabg, Mi Or Angioplasty Before 65f 55m? No     Social History Narrative       Outpatient Encounter Prescriptions as of 11/27/2018   Medication Sig Dispense Refill     abiraterone (ZYTIGA) 250 MG tablet Take 4 tablets (1,000 mg) by mouth daily for 30 doses Take on empty stomach. 120 tablet 0     albuterol (2.5 MG/3ML) 0.083% neb solution TAKE 1 VIAL BY NEBULIZATION EVERY 6 HOURS AS NEEDED FOR SHORTNESS OF BREATH / DYSPNEA OR WHEEZING 75 mL 1     albuterol (2.5 MG/3ML) 0.083% neb solution Take 1 vial (2.5 mg) by nebulization every 6 hours as needed for shortness of breath / dyspnea or wheezing 25 vial 3     ASPIRIN PO Take 81 mg by mouth       Calcium Carbonate (CALCIUM 600 PO)        Cholecalciferol  (VITAMIN D) 2000 UNITS tablet Take 1 tablet by mouth daily       cyanocobalamin (CVS VITAMIN  B12) 1000 MCG TABS Take 1 tablet by mouth daily 30 tablet 5     fluocinonide (LIDEX) 0.05 % cream Apply sparingly to affected area twice daily as needed on legs.  Do not apply to face. 120 g 3     fluorouracil (EFUDEX) 5 % cream Twice daily to scalp and face for two weeks 40 g 1     furosemide (LASIX) 20 MG tablet TAKE 1 TABLET (20 MG) BY MOUTH DAILY 90 tablet 1     gabapentin (NEURONTIN) 300 MG capsule Take 900 mg by mouth At Bedtime       metoprolol succinate (TOPROL-XL) 25 MG 24 hr tablet TAKE 1 TABLET (25 MG) BY MOUTH DAILY 90 tablet 0     order for DME Equipment being ordered: Nebulizer 1 Device 0     order for DME Equipment being ordered: CPAP machine with associated CPAP supplies and tubing 1 Device 0     order for DME Equipment being ordered: Nebulizer 1 Device 0     quinapril (ACCUPRIL) 40 MG Tab TAKE 1 TABLET (40 MG) BY MOUTH DAILY 90 tablet 1     simvastatin (ZOCOR) 40 MG tablet TAKE 0.5 TABLETS (20 MG) BY MOUTH DAILY 45 tablet 3     ipratropium - albuterol 0.5 mg/2.5 mg/3 mL (DUONEB) 0.5-2.5 (3) MG/3ML neb solution Take 1 vial (3 mLs) by nebulization once for 1 dose 3 mL 0     oxyCODONE (ROXICODONE) 5 MG IR tablet Take 1 tablet (5 mg) by mouth every 6 hours as needed for pain (Patient not taking: Reported on 11/27/2018) 30 tablet 0     [DISCONTINUED] amoxicillin-clavulanate (AUGMENTIN) 875-125 MG per tablet Take 1 tablet by mouth 2 times daily (Patient not taking: Reported on 11/6/2018) 20 tablet 0     [DISCONTINUED] predniSONE (DELTASONE) 20 MG tablet Take 3 tabs (60 mg) by mouth daily x 3 days, 2 tabs (40 mg) daily x 3 days, 1 tab (20 mg) daily x 3 days, then 1/2 tab (10 mg) x 3 days. (Patient not taking: Reported on 11/12/2018) 20 tablet 0     [DISCONTINUED] predniSONE (DELTASONE) 5 MG tablet Take 1 tablet (5 mg) by mouth 2 times daily 60 tablet 0     No facility-administered encounter medications on file as  of 11/27/2018.              Review Of Systems  Skin: As above  Eyes: negative  Ears/Nose/Throat: negative  Respiratory: No shortness of breath, dyspnea on exertion, cough, or hemoptysis  Cardiovascular: negative  Gastrointestinal: negative  Genitourinary: negative  Musculoskeletal: negative  Neurologic: negative  Psychiatric: negative  Hematologic/Lymphatic/Immunologic: negative  Endocrine: negative      O:   NAD, WDWN, Alert & Oriented, Mood & Affect wnl, Vitals stable   Here today alone   /69  Pulse 84  SpO2 97%   General appearance normal   Vitals stable   Alert, oriented and in no acute distress      Following lymph nodes palpated: Occipital, Cervical, Supraclavicular no lad   Gritty papules on face  Stuck on papules and brown macules on trunk and ext   Red papules on trunk  BL legs pitting edema scale and sclerosis   L temple bleeding 6mm papule         The remainder of the full exam was unremarkable; the following areas were examined:  conjunctiva/lids, oral mucosa, neck, peripheral vascular system, abdomen, lymph nodes, digits/nails, eccrine and apocrine glands, scalp/hair, face, neck, chest, abdomen, buttocks, back, RUE, LUE, RLE, LLE       Eyes: Conjunctivae/lids:Normal     ENT: Lips, buccal mucosa, tongue: normal    MSK:Normal    Cardiovascular: peripheral edema none    Pulm: Breathing Normal    Lymph Nodes: No Head and Neck Lymphadenopathy     Neuro/Psych: Orientation:Normal; Mood/Affect:Normal      MICRO:   L temple:There is hyperkeratosis ulceration & parakeratosis of the epidermis, with full thickness epidermal involvement by atypical keratinocytes with rare pale vacuolated cells invading dermis.    A/P:  1. L temple r/o squamous cell carcinoma   TANGENTIAL BIOPSY IN HOUSE:  After consent, anesthesia with LEC and prep, tangential excision performed and dx above confirmed with frozen section histology.  No complications and routine wound care.  Patient told result squamous cell carcinoma .    2.  Actinic keratosis   Pathophysiology discussed with pateint   Efudex discussed with patient   Efudex to face and scalp twice daily for 2 weeks  Irritation discussed with patient   3. Stasis derm  To reduce swelling in the leg   Don't stand for long periods.   Take regular walks.   Elevate your feet when sitting: if your legs are swollen they need to be above your hips to drain effectively.   Elevate the foot of your bed overnight.   Once the dermatitis is under control, wear special graduated compression stockings long term.    To treat the dermatitis   Dry up oozing patches with dilute vinegar on gauze as compresses.   Topical lidex cream daily  Return to clinic 12 weeks  Use vanicream daily  Try not to scratch: it keeps the dermatitis going.   Protect your skin from injury: this can result in infection or ulceration.  Vinegar is 5% acetic acid. Make a 1% solution by adding   cup of vinegar (white or brown) to 1 pint of water.  4. Hx of non-melanoma skin cancer, seborrheic keratosis, letnigo, angioma  BENIGN LESIONS DISCUSSED WITH PATIENT:  I discussed the specifics of tumor, prognosis, and genetics of benign lesions.  I explained that treatment of these lesions would be purely cosmetic and not medically neccessary.  I discussed with patient different removal options including excision, cautery and /or laser.      Nature and genetics of benign skin lesions dicussed with patient.  Signs and Symptoms of skin cancer discussed with patient.  ABCDEs of melanoma reviewed with patient.  Patient encouraged to perform monthly skin exams.  UV precautions reviewed with patient.  Patient to follow up with Primary Care provider regarding elevated blood pressure.  Skin care regimen reviewed with patient: Eliminate harsh soaps, i.e. Dial, zest, irsih spring; Mild soaps such as Cetaphil or Dove sensitive skin, avoid hot or cold showers, aggressive use of emollients including vanicream, cetaphil or cerave discussed with patient.     Risks of non-melanoma skin cancer discussed with patient   Return to clinic 3 months  Patient to follow up with Primary Care provider regarding elevated blood pressure.    PROCEDURE NOTE  L temple squamous cell carcinoma   MOHS:   Location    After PGACAC discussed with patient, decision for Mohs surgery was made. Indication for Mohs was Location. Patient confirmed the site with Dr. Jhaveri.  After anesthesia with LEC, the tumor was excised using standard Mohs technique in 1 stages(s).  CLEAR MARGINS OBTAINED and Final defect size was 1.1* cm.       REPAIR SECOND INTENT: We discussed the options for wound management in full with the patient including risks/benefits/possible outcomes. Decision made to allow the wound to heal by second intention. EBL minimal; complications none; wound care routine.  The patient was discharged in good condition and will return in one month or prn for wound evaluation.

## 2018-11-27 NOTE — MR AVS SNAPSHOT
After Visit Summary   11/27/2018    Dimitri Neves    MRN: [id number]           Patient Information     Date Of Birth          1935        Visit Information        Provider Department      11/27/2018 10:30 AM Senthil Jhaveri MD [facility]        Care Instructions    Use the efudex cream to face and scalp 2 times a day for 2 weeks. This is a topical chemo cream to get rid of all of the pre-cancer spots on your face and scalp. Recheck with Dr. Jhaveri in 3 months or sooner if needed.    To reduce swelling in the leg   Don't stand for long periods.   Take regular walks.   Elevate your feet when sitting: if your legs are swollen they need to be above your hips to drain effectively.   Elevate the foot of your bed overnight.   Once the dermatitis is under control, wear special graduated compression stockings long term.    To treat the dermatitis   Dry up oozing patches with dilute vinegar on gauze as compresses.   Topical Lidex cream daily to lower legs and feet.  Return to clinic in 3 months  Use a good lotion daily  Try not to scratch: it keeps the dermatitis going.   Protect your skin from injury: this can result in infection or ulceration.  Vinegar is 5% acetic acid. Make a 1% solution by adding   cup of vinegar (white or brown) to 1 pint of water.      Open Wound Care     for Left temporal scalp        ? No strenuous activity for 48 hours    ? Take Tylenol as needed for discomfort.                                                .         ? Do not drink alcoholic beverages for 48 hours.    ? Keep the pressure bandage in place for 24 hours. If the bandage becomes blood tinged or loose, reinforce it with gauze and tape.        (Refer to the reverse side of this page for management of bleeding).    ? Remove bandage in 24 hours and begin wound care as follows:     1. Clean area with tap water using a Q tip or gauze pad, (shower / bathe normally)  2. Dry wound with Q tip or gauze  pad  3. Apply Aquaphor, Vaseline, Polysporin or Bacitracin Ointment with a Q tip    Do NOT use Neosporin Ointment *  4. Cover the wound with a band-aid or nonstick gauze pad and paper tape.  5. Repeat wound care once a day until wound is completely healed.    It is an old wives tale that a wound heals better when it is exposed to air and allowed to dry out. The wound will heal faster with a better cosmetic result if it is kept moist with ointment and covered with a bandage.  Do not let the wound dry out.      Supplies Needed:                Qtips or gauze pads                Polysporin or Bacitracin Ointment                Bandaids or nonstick gauze pads and paper tape    Wound care kits and brown paper tape are available for purchase at   the pharmacy.    BLEEDIN. Use tightly rolled up gauze or cloth to apply direct pressure over the bandage for 20   minutes.  2. Reapply pressure for an additional 20 minutes if necessary  3. Call the office or go to the nearest emergency room if pressure fails to stop the bleeding.  4. Use additional gauze and tape to maintain pressure once the bleeding has stopped.  5. Begin wound care 24 hours after surgery as directed.                  WOUND HEALING    1. One week after surgery a pink / red halo will form around the outside of the wound.   This is new skin.  2. The center of the wound will appear yellowish white and produce some drainage.  3. The pink halo will slowly migrate in toward the center of the wound until the wound is covered with new shiny pink skin.  4. There will be no more drainage when the wound is completely healed.  5. It will take six months to one year for the redness to fade.  6. The scar may be itchy, tight and sensitive to extreme temperatures for a year after the surgery.  7. Massaging the area several times a day for several minutes after the wound is completely healed will help the scar soften and normalize faster. Begin massage only after healing  is complete.      In case of emergency call: Dr Jhaveri: [phone]     [facility]: [phone]    [facility]: [phone]            Follow-ups after your visit        Your next 10 appointments already scheduled     2019  9:15 AM CST   LAB with LAB ONC [facility] ([facility] [place])    [address] [place] [id number] [phone]           Please do not eat 10-12 hours before your appointment if you are coming in fasting for labs on lipids, cholesterol, or glucose (sugar). This does not apply to pregnant women. Water, hot tea and black coffee (with nothing added) are okay. Do not drink other fluids, diet soda or chew gum.            2019 10:00 AM CST   Return Visit with Rafael Valenzuela MD   [facility] [place] ([facility] [place])    [address] [place] [address] [id number] [phone]            2019 10:30 AM CST   Level O with BAY 9 INFUSION   [facility])    [address] [place] [address] [id number] [phone]              Who to contact     If you have questions or need follow up information about today's clinic visit or your schedule please contact [facility] directly at [phone].  Normal or non-critical lab and imaging results will be communicated to you by MyChart, letter or phone within 4 business days after the clinic has received the results. If you do not hear from us within 7 days, please contact the clinic through [facility] or phone. If you have a critical or abnormal lab result, we will notify you by phone as soon as possible.  Submit refill requests through [facility]Cloud or call your pharmacy and they will forward the refill request to us. Please allow 3 business days for your refill to be completed.          Additional Information About Your Visit        [facility] Information     [facility] gives you secure  access to your electronic health record. If you see a primary care provider, you can also send messages to your care team and make appointments. If you have questions, please call your primary care clinic.  If you do not have a primary care provider, please call [phone] and they will assist you.        Care EveryWhere ID     This is your Care EveryWhere ID. This could be used by other organizations to access your [place] medical records  [id number]        Your Vitals Were     Pulse Pulse Oximetry                84 97%           Blood Pressure from Last 3 Encounters:   11/27/18 110/69   11/12/18 151/71   11/06/18 128/64    Weight from Last 3 Encounters:   11/12/18 92.5 kg (204 lb)   11/06/18 91.7 kg (202 lb 3.2 oz)   10/25/18 92.2 kg (203 lb 3.2 oz)              Today, you had the following     No orders found for display       Primary Care Provider Office Phone # Fax #    Reginaldo Muir [phone]       [address] [facility] [address]        Equal Access to Services     [facility]: Hadii aad ku hadasho Soomaali, waaxda luqadaha, qaybta kaalmada adeveronika, naty willis . So [facility] [phone].    ATENCIÓN: Si habla español, tiene a roberts disposición servicios gratuitos de asistencia lingüística. Kelsey al [phone].    We comply with applicable federal civil rights laws and [place] laws. We do not discriminate on the basis of race, color, national origin, age, disability, sex, sexual orientation, or gender identity.            Thank you!     Thank you for choosing [facility]  for your care. Our goal is always to provide you with excellent care. Hearing back from our patients is one way we can continue to improve our services. Please take a few minutes to complete the written survey that you may receive in the mail after your visit with us. Thank you!             Your Updated Medication List - Protect others around you: Learn how to safely  use, store and throw away your medicines at www.disposemymeds.org.          This list is accurate as of 11/27/18 10:52 AM.  Always use your most recent med list.                   Brand Name Dispense Instructions for use Diagnosis    abiraterone 250 MG tablet    ZYTIGA    120 tablet    Take 4 tablets (1,000 mg) by mouth daily for 30 doses Take on empty stomach.    Bone metastasis (H), Malignant neoplasm of prostate (H)       * albuterol (2.5 MG/3ML) 0.083% neb solution    PROVENTIL    25 vial    Take 1 vial (2.5 mg) by nebulization every 6 hours as needed for shortness of breath / dyspnea or wheezing    Acute bronchitis, unspecified organism       * albuterol (2.5 MG/3ML) 0.083% neb solution    PROVENTIL    75 mL    TAKE 1 VIAL BY NEBULIZATION EVERY 6 HOURS AS NEEDED FOR SHORTNESS OF BREATH / DYSPNEA OR WHEEZING    Cough, SOB (shortness of breath), Chronic obstructive pulmonary disease, unspecified COPD type (H)       ASPIRIN PO      Take 81 mg by mouth        CALCIUM 600 PO           furosemide 20 MG tablet    LASIX    90 tablet    TAKE 1 TABLET (20 MG) BY MOUTH DAILY    Bilateral lower extremity edema, HTN (hypertension), benign       gabapentin 300 MG capsule    NEURONTIN     Take 900 mg by mouth At Bedtime        ipratropium - albuterol 0.5 mg/2.5 mg/3 mL 0.5-2.5 (3) MG/3ML neb solution    DUONEB    3 mL    Take 1 vial (3 mLs) by nebulization once for 1 dose    Dyspnea, unspecified type       metoprolol succinate 25 MG 24 hr tablet    TOPROL-XL    90 tablet    TAKE 1 TABLET (25 MG) BY MOUTH DAILY    HTN (hypertension), benign       order for DME     1 Device    Equipment being ordered: Nebulizer    Acute bronchitis, unspecified organism       order for DME     1 Device    Equipment being ordered: CPAP machine with associated CPAP supplies and tubing    HUDSON (obstructive sleep apnea)       order for DME     1 Device    Equipment being ordered: Nebulizer    Chronic obstructive pulmonary disease, unspecified COPD  type (H), SOB (shortness of breath), Cough       oxyCODONE 5 MG tablet    ROXICODONE    30 tablet    Take 1 tablet (5 mg) by mouth every 6 hours as needed for pain    Narcotic dependence, episodic use (H)       quinapril 40 MG Tab    ACCUPRIL    90 tablet    TAKE 1 TABLET (40 MG) BY MOUTH DAILY    HTN (hypertension), benign       simvastatin 40 MG tablet    ZOCOR    45 tablet    TAKE 0.5 TABLETS (20 MG) BY MOUTH DAILY    Hyperlipidemia LDL goal <130       vitamin B12 1000 MCG tablet    [facility]S vitamin  B12    30 tablet    Take 1 tablet by mouth daily    Low vitamin B12 level       vitamin D3 2000 units tablet    CHOLECALCIFEROL     Take 1 tablet by mouth daily        * Notice:  This list has 2 medication(s) that are the same as other medications prescribed for you. Read the directions carefully, and ask your doctor or other care provider to review them with you.

## 2018-11-27 NOTE — LETTER
11/27/2018         RE: Dimitri Neves  [address]  [place] [address]        Dear Colleague,    Thank you for referring your patient, Dimitri Neves, to the [facility]. Please see a copy of my visit note below.    Dimitri Neves is a 83 year old year old male patient here today for rash on legs and spots on face.   .  Patient states this has been present for a while.  Patient reports the following symptoms:  itching.  Patient reports the following previous treatments none.  Patient reports the following modifying factors none.  Associated symptoms: none.  Patient has no other skin complaints today.  Remainder of the HPI, Meds, PMH, Allergies, FH, and SH was reviewed in chart.      Past Medical History:   Diagnosis Date     Actinic keratosis      AK (actinic keratosis) 7/9/2012     Cataract cortical, senile right eye 4/17/2012     DDD (degenerative disc disease), lumbar 1979    s/p 4 back surgeries, spinal cord stimulator implant      Diverticulosis      ED (erectile dysfunction) 2009     GERD (gastroesophageal reflux disease) ~1980     HTN (hypertension), benign ~2000     Macular degeneration, dry, mild, ou 7/23/2016     Multiple lung nodules     Several basilar pulmonary nodules <5 mm     HUDSON (obstructive sleep apnea) 10/2005    on CPAP     Other abnormal glucose 01/14/2009     PE (pulmonary thromboembolism) (H) 1981    after back surgery      Pure hypercholesterolemia ~2000     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       Past Surgical History:   Procedure Laterality Date     ARTHROSCOPY KNEE RT/LT  ~1995    Right     C LUMBAR SPINE FUSION,ANTER APPRCH  8/2007    four times total, latest 8/07     CATARACT IOL, RT/LT       LASER YAG CAPSULOTOMY      both eyes     ORCHIECTOMY SCROTAL Bilateral 9/27/2017    Procedure: ORCHIECTOMY SCROTAL;  Bilateral orchiectomy scrotal approach;  Surgeon: Senthil Taylor MD;  Location: [facility] OR     PHACOEMULSIFICATION CLEAR CORNEA WITH  STANDARD INTRAOCULAR LENS IMPLANT  6-18-12/7-30-12    right/left eye     ROTATOR CUFF REPAIR RT/LT  ~1995    right        Family History   Problem Relation Age of Onset     Cancer Father      Lung 64y     Diabetes Brother      Hypertension Brother      Cancer Mother      Liver     Cerebrovascular Disease Mother      Eye Disorder Mother      Glaucoma Mother      Cerebrovascular Disease Maternal Grandmother      C.A.DKristie No family hx of      Thyroid Disease No family hx of      Macular Degeneration No family hx of      Melanoma No family hx of        Social History     Social History     Marital status:      Spouse name: Tatiana     Number of children: 2     Years of education: N/A     Occupational History      Retired     Social History Main Topics     Smoking status: Former Smoker     Packs/day: 1.00     Years: 25.00     Types: Cigarettes     Quit date: 1/2/1976     Smokeless tobacco: Never Used      Comment: lives in smoke free household     Alcohol use 7.0 oz/week     14 Standard drinks or equivalent per week      Comment: 2-3 drinks every night     Drug use: No      Comment: tatoos- sterile     Sexual activity: Yes     Partners: Female     Other Topics Concern      Service Yes     [employer] x8 years     Blood Transfusions No     Caffeine Concern No     Hobby Hazards No     Sleep Concern No     Stress Concern No     Weight Concern Yes     Special Diet No     Back Care Yes     Exercise Yes     Seat Belt Yes     Self-Exams No     Parent/Sibling W/ Cabg, Mi Or Angioplasty Before 65f 55m? No     Social History Narrative       Outpatient Encounter Prescriptions as of 11/27/2018   Medication Sig Dispense Refill     abiraterone (ZYTIGA) 250 MG tablet Take 4 tablets (1,000 mg) by mouth daily for 30 doses Take on empty stomach. 120 tablet 0     albuterol (2.5 MG/3ML) 0.083% neb solution TAKE 1 VIAL BY NEBULIZATION EVERY 6 HOURS AS NEEDED FOR SHORTNESS OF BREATH / DYSPNEA OR WHEEZING 75 mL 1     albuterol  (2.5 MG/3ML) 0.083% neb solution Take 1 vial (2.5 mg) by nebulization every 6 hours as needed for shortness of breath / dyspnea or wheezing 25 vial 3     ASPIRIN PO Take 81 mg by mouth       Calcium Carbonate (CALCIUM 600 PO)        Cholecalciferol (VITAMIN D) 2000 UNITS tablet Take 1 tablet by mouth daily       cyanocobalamin ([facility] VITAMIN  B12) 1000 MCG TABS Take 1 tablet by mouth daily 30 tablet 5     fluocinonide (LIDEX) 0.05 % cream Apply sparingly to affected area twice daily as needed on legs.  Do not apply to face. 120 g 3     fluorouracil (EFUDEX) 5 % cream Twice daily to scalp and face for two weeks 40 g 1     furosemide (LASIX) 20 MG tablet TAKE 1 TABLET (20 MG) BY MOUTH DAILY 90 tablet 1     gabapentin (NEURONTIN) 300 MG capsule Take 900 mg by mouth At Bedtime       metoprolol succinate (TOPROL-XL) 25 MG 24 hr tablet TAKE 1 TABLET (25 MG) BY MOUTH DAILY 90 tablet 0     order for DME Equipment being ordered: Nebulizer 1 Device 0     order for DME Equipment being ordered: CPAP machine with associated CPAP supplies and tubing 1 Device 0     order for DME Equipment being ordered: Nebulizer 1 Device 0     quinapril (ACCUPRIL) 40 MG Tab TAKE 1 TABLET (40 MG) BY MOUTH DAILY 90 tablet 1     simvastatin (ZOCOR) 40 MG tablet TAKE 0.5 TABLETS (20 MG) BY MOUTH DAILY 45 tablet 3     ipratropium - albuterol 0.5 mg/2.5 mg/3 mL (DUONEB) 0.5-2.5 (3) MG/3ML neb solution Take 1 vial (3 mLs) by nebulization once for 1 dose 3 mL 0     oxyCODONE (ROXICODONE) 5 MG IR tablet Take 1 tablet (5 mg) by mouth every 6 hours as needed for pain (Patient not taking: Reported on 11/27/2018) 30 tablet 0     [DISCONTINUED] amoxicillin-clavulanate (AUGMENTIN) 875-125 MG per tablet Take 1 tablet by mouth 2 times daily (Patient not taking: Reported on 11/6/2018) 20 tablet 0     [DISCONTINUED] predniSONE (DELTASONE) 20 MG tablet Take 3 tabs (60 mg) by mouth daily x 3 days, 2 tabs (40 mg) daily x 3 days, 1 tab (20 mg) daily x 3 days, then 1/2  tab (10 mg) x 3 days. (Patient not taking: Reported on 11/12/2018) 20 tablet 0     [DISCONTINUED] predniSONE (DELTASONE) 5 MG tablet Take 1 tablet (5 mg) by mouth 2 times daily 60 tablet 0     No facility-administered encounter medications on file as of 11/27/2018.              Review Of Systems  Skin: As above  Eyes: negative  Ears/Nose/Throat: negative  Respiratory: No shortness of breath, dyspnea on exertion, cough, or hemoptysis  Cardiovascular: negative  Gastrointestinal: negative  Genitourinary: negative  Musculoskeletal: negative  Neurologic: negative  Psychiatric: negative  Hematologic/Lymphatic/Immunologic: negative  Endocrine: negative      O:   NAD, WDWN, Alert & Oriented, Mood & Affect wnl, Vitals stable   Here today alone   /69  Pulse 84  SpO2 97%   General appearance normal   Vitals stable   Alert, oriented and in no acute distress      Following lymph nodes palpated: Occipital, Cervical, Supraclavicular no lad   Gritty papules on face  Stuck on papules and brown macules on trunk and ext   Red papules on trunk  BL legs pitting edema scale and sclerosis   L temple bleeding 6mm papule         The remainder of the full exam was unremarkable; the following areas were examined:  conjunctiva/lids, oral mucosa, neck, peripheral vascular system, abdomen, lymph nodes, digits/nails, eccrine and apocrine glands, scalp/hair, face, neck, chest, abdomen, buttocks, back, RUE, LUE, RLE, LLE       Eyes: Conjunctivae/lids:Normal     ENT: Lips, buccal mucosa, tongue: normal    MSK:Normal    Cardiovascular: peripheral edema none    Pulm: Breathing Normal    Lymph Nodes: No Head and Neck Lymphadenopathy     Neuro/Psych: Orientation:Normal; Mood/Affect:Normal      MICRO:   L temple:There is hyperkeratosis ulceration & parakeratosis of the epidermis, with full thickness epidermal involvement by atypical keratinocytes with rare pale vacuolated cells invading dermis.    A/P:  1. L temple r/o squamous cell carcinoma    TANGENTIAL BIOPSY IN HOUSE:  After consent, anesthesia with LEC and prep, tangential excision performed and dx above confirmed with frozen section histology.  No complications and routine wound care.  Patient told result squamous cell carcinoma .    2. Actinic keratosis   Pathophysiology discussed with pateint   Efudex discussed with patient   Efudex to face and scalp twice daily for 2 weeks  Irritation discussed with patient   3. Stasis derm  To reduce swelling in the leg   Don't stand for long periods.   Take regular walks.   Elevate your feet when sitting: if your legs are swollen they need to be above your hips to drain effectively.   Elevate the foot of your bed overnight.   Once the dermatitis is under control, wear special graduated compression stockings long term.    To treat the dermatitis   Dry up oozing patches with dilute vinegar on gauze as compresses.   Topical lidex cream daily  Return to clinic 12 weeks  Use vanicream daily  Try not to scratch: it keeps the dermatitis going.   Protect your skin from injury: this can result in infection or ulceration.  Vinegar is 5% acetic acid. Make a 1% solution by adding   cup of vinegar (white or brown) to 1 pint of water.  4. Hx of non-melanoma skin cancer, seborrheic keratosis, letnigo, angioma  BENIGN LESIONS DISCUSSED WITH PATIENT:  I discussed the specifics of tumor, prognosis, and genetics of benign lesions.  I explained that treatment of these lesions would be purely cosmetic and not medically neccessary.  I discussed with patient different removal options including excision, cautery and /or laser.      Nature and genetics of benign skin lesions dicussed with patient.  Signs and Symptoms of skin cancer discussed with patient.  ABCDEs of melanoma reviewed with patient.  Patient encouraged to perform monthly skin exams.  UV precautions reviewed with patient.  Patient to follow up with Primary Care provider regarding elevated blood pressure.  Skin care  regimen reviewed with patient: Eliminate harsh soaps, i.e. Dial, zest, irsih spring; Mild soaps such as Cetaphil or Dove sensitive skin, avoid hot or cold showers, aggressive use of emollients including vanicream, cetaphil or cerave discussed with patient.    Risks of non-melanoma skin cancer discussed with patient   Return to clinic 3 months  Patient to follow up with Primary Care provider regarding elevated blood pressure.    PROCEDURE NOTE  L temple squamous cell carcinoma   MOHS:   Location    After PGACAC discussed with patient, decision for Mohs surgery was made. Indication for Mohs was Location. Patient confirmed the site with Dr. Jhaveri.  After anesthesia with LEC, the tumor was excised using standard Mohs technique in 1 stages(s).  CLEAR MARGINS OBTAINED and Final defect size was 1.1* cm.       REPAIR SECOND INTENT: We discussed the options for wound management in full with the patient including risks/benefits/possible outcomes. Decision made to allow the wound to heal by second intention. EBL minimal; complications none; wound care routine.  The patient was discharged in good condition and will return in one month or prn for wound evaluation.           Again, thank you for allowing me to participate in the care of your patient.        Sincerely,        Senthil Jhaveri MD

## 2018-11-27 NOTE — PATIENT INSTRUCTIONS
Use the efudex cream to face and scalp 2 times a day for 2 weeks. This is a topical chemo cream to get rid of all of the pre-cancer spots on your face and scalp. Recheck with Dr. Jhaveri in 3 months or sooner if needed.    To reduce swelling in the leg   Don't stand for long periods.   Take regular walks.   Elevate your feet when sitting: if your legs are swollen they need to be above your hips to drain effectively.   Elevate the foot of your bed overnight.   Once the dermatitis is under control, wear special graduated compression stockings long term.    To treat the dermatitis   Dry up oozing patches with dilute vinegar on gauze as compresses.   Topical Lidex cream daily to lower legs and feet.  Return to clinic in 3 months  Use a good lotion daily  Try not to scratch: it keeps the dermatitis going.   Protect your skin from injury: this can result in infection or ulceration.  Vinegar is 5% acetic acid. Make a 1% solution by adding   cup of vinegar (white or brown) to 1 pint of water.      Open Wound Care     for Left temporal scalp        ? No strenuous activity for 48 hours    ? Take Tylenol as needed for discomfort.                                                .         ? Do not drink alcoholic beverages for 48 hours.    ? Keep the pressure bandage in place for 24 hours. If the bandage becomes blood tinged or loose, reinforce it with gauze and tape.        (Refer to the reverse side of this page for management of bleeding).    ? Remove bandage in 24 hours and begin wound care as follows:     1. Clean area with tap water using a Q tip or gauze pad, (shower / bathe normally)  2. Dry wound with Q tip or gauze pad  3. Apply Aquaphor, Vaseline, Polysporin or Bacitracin Ointment with a Q tip    Do NOT use Neosporin Ointment *  4. Cover the wound with a band-aid or nonstick gauze pad and paper tape.  5. Repeat wound care once a day until wound is completely healed.    It is an old wives tale that a wound heals better  when it is exposed to air and allowed to dry out. The wound will heal faster with a better cosmetic result if it is kept moist with ointment and covered with a bandage.  Do not let the wound dry out.      Supplies Needed:                Qtips or gauze pads                Polysporin or Bacitracin Ointment                Bandaids or nonstick gauze pads and paper tape    Wound care kits and brown paper tape are available for purchase at   the pharmacy.    BLEEDIN. Use tightly rolled up gauze or cloth to apply direct pressure over the bandage for 20   minutes.  2. Reapply pressure for an additional 20 minutes if necessary  3. Call the office or go to the nearest emergency room if pressure fails to stop the bleeding.  4. Use additional gauze and tape to maintain pressure once the bleeding has stopped.  5. Begin wound care 24 hours after surgery as directed.                  WOUND HEALING    1. One week after surgery a pink / red halo will form around the outside of the wound.   This is new skin.  2. The center of the wound will appear yellowish white and produce some drainage.  3. The pink halo will slowly migrate in toward the center of the wound until the wound is covered with new shiny pink skin.  4. There will be no more drainage when the wound is completely healed.  5. It will take six months to one year for the redness to fade.  6. The scar may be itchy, tight and sensitive to extreme temperatures for a year after the surgery.  7. Massaging the area several times a day for several minutes after the wound is completely healed will help the scar soften and normalize faster. Begin massage only after healing is complete.      In case of emergency call: Dr Jhaveri: [phone]     [facility]: [phone]    [facility]: [phone]

## 2018-12-18 ENCOUNTER — TRANSFERRED RECORDS (OUTPATIENT)
Dept: HEALTH INFORMATION MANAGEMENT | Facility: CLINIC | Age: 83
End: 2018-12-18

## 2018-12-21 DIAGNOSIS — C61 MALIGNANT NEOPLASM OF PROSTATE (H): ICD-10-CM

## 2018-12-21 DIAGNOSIS — C79.51 BONE METASTASIS: Primary | ICD-10-CM

## 2018-12-21 RX ORDER — PREDNISONE 5 MG/1
5 TABLET ORAL 2 TIMES DAILY
Qty: 60 TABLET | Refills: 0 | Status: SHIPPED | OUTPATIENT
Start: 2018-12-21 | End: 2019-04-18

## 2018-12-21 RX ORDER — ABIRATERONE ACETATE 250 MG/1
1000 TABLET ORAL DAILY
Qty: 120 TABLET | Refills: 0 | Status: SHIPPED | OUTPATIENT
Start: 2018-12-21 | End: 2019-04-18

## 2019-01-01 ENCOUNTER — IMMUNIZATION (OUTPATIENT)
Dept: NURSING | Facility: CLINIC | Age: 84
End: 2019-01-01
Payer: COMMERCIAL

## 2019-01-01 ENCOUNTER — ANCILLARY PROCEDURE (OUTPATIENT)
Dept: NUCLEAR MEDICINE | Facility: CLINIC | Age: 84
End: 2019-01-01
Attending: INTERNAL MEDICINE
Payer: COMMERCIAL

## 2019-01-01 ENCOUNTER — TELEPHONE (OUTPATIENT)
Dept: ONCOLOGY | Facility: CLINIC | Age: 84
End: 2019-01-01

## 2019-01-01 ENCOUNTER — ANCILLARY PROCEDURE (OUTPATIENT)
Dept: CT IMAGING | Facility: CLINIC | Age: 84
End: 2019-01-01
Attending: INTERNAL MEDICINE
Payer: COMMERCIAL

## 2019-01-01 ENCOUNTER — INFUSION THERAPY VISIT (OUTPATIENT)
Dept: INFUSION THERAPY | Facility: CLINIC | Age: 84
End: 2019-01-01
Payer: COMMERCIAL

## 2019-01-01 ENCOUNTER — RESEARCH ENCOUNTER (OUTPATIENT)
Dept: ONCOLOGY | Facility: CLINIC | Age: 84
End: 2019-01-01

## 2019-01-01 ENCOUNTER — TRANSFERRED RECORDS (OUTPATIENT)
Dept: HEALTH INFORMATION MANAGEMENT | Facility: CLINIC | Age: 84
End: 2019-01-01

## 2019-01-01 ENCOUNTER — ONCOLOGY VISIT (OUTPATIENT)
Dept: ONCOLOGY | Facility: CLINIC | Age: 84
End: 2019-01-01
Attending: INTERNAL MEDICINE
Payer: COMMERCIAL

## 2019-01-01 ENCOUNTER — TELEPHONE (OUTPATIENT)
Dept: PHARMACY | Facility: CLINIC | Age: 84
End: 2019-01-01

## 2019-01-01 VITALS
HEART RATE: 64 BPM | TEMPERATURE: 97.7 F | OXYGEN SATURATION: 96 % | BODY MASS INDEX: 27.35 KG/M2 | SYSTOLIC BLOOD PRESSURE: 105 MMHG | HEIGHT: 71 IN | DIASTOLIC BLOOD PRESSURE: 66 MMHG | WEIGHT: 195.4 LBS | RESPIRATION RATE: 16 BRPM

## 2019-01-01 DIAGNOSIS — Z23 NEED FOR PROPHYLACTIC VACCINATION AND INOCULATION AGAINST INFLUENZA: Primary | ICD-10-CM

## 2019-01-01 DIAGNOSIS — C79.51 BONE METASTASIS: Primary | ICD-10-CM

## 2019-01-01 DIAGNOSIS — C79.51 BONE METASTASIS: ICD-10-CM

## 2019-01-01 DIAGNOSIS — D64.9 ANEMIA, UNSPECIFIED TYPE: ICD-10-CM

## 2019-01-01 DIAGNOSIS — C61 MALIGNANT NEOPLASM OF PROSTATE (H): ICD-10-CM

## 2019-01-01 DIAGNOSIS — C61 MALIGNANT NEOPLASM OF PROSTATE (H): Primary | ICD-10-CM

## 2019-01-01 LAB
ALBUMIN SERPL-MCNC: 3.2 G/DL (ref 3.4–5)
ALBUMIN SERPL-MCNC: NORMAL G/DL (ref 3.4–5)
ALP SERPL-CCNC: 77 U/L (ref 40–150)
ALP SERPL-CCNC: NORMAL U/L (ref 40–150)
ALT SERPL W P-5'-P-CCNC: 16 U/L (ref 0–70)
ALT SERPL W P-5'-P-CCNC: NORMAL U/L (ref 0–70)
ANION GAP SERPL CALCULATED.3IONS-SCNC: 4 MMOL/L (ref 3–14)
ANION GAP SERPL CALCULATED.3IONS-SCNC: NORMAL MMOL/L (ref 6–17)
AST SERPL W P-5'-P-CCNC: 15 U/L (ref 0–45)
AST SERPL W P-5'-P-CCNC: NORMAL U/L (ref 0–45)
BASOPHILS # BLD AUTO: 0 10E9/L (ref 0–0.2)
BASOPHILS NFR BLD AUTO: 0.2 %
BILIRUB SERPL-MCNC: 0.6 MG/DL (ref 0.2–1.3)
BILIRUB SERPL-MCNC: NORMAL MG/DL (ref 0.2–1.3)
BUN SERPL-MCNC: 11 MG/DL (ref 7–30)
BUN SERPL-MCNC: NORMAL MG/DL (ref 7–30)
CALCIUM SERPL-MCNC: 9 MG/DL (ref 8.5–10.1)
CALCIUM SERPL-MCNC: NORMAL MG/DL (ref 8.5–10.1)
CHLORIDE SERPL-SCNC: 108 MMOL/L (ref 94–109)
CHLORIDE SERPL-SCNC: NORMAL MMOL/L (ref 94–109)
CO2 SERPL-SCNC: 30 MMOL/L (ref 20–32)
CO2 SERPL-SCNC: NORMAL MMOL/L (ref 20–32)
CREAT SERPL-MCNC: 0.91 MG/DL (ref 0.66–1.25)
CREAT SERPL-MCNC: NORMAL MG/DL (ref 0.66–1.25)
DIFFERENTIAL METHOD BLD: ABNORMAL
EOSINOPHIL # BLD AUTO: 0.1 10E9/L (ref 0–0.7)
EOSINOPHIL NFR BLD AUTO: 0.6 %
ERYTHROCYTE [DISTWIDTH] IN BLOOD BY AUTOMATED COUNT: 14.4 % (ref 10–15)
GFR SERPL CREATININE-BSD FRML MDRD: 78 ML/MIN/{1.73_M2}
GFR SERPL CREATININE-BSD FRML MDRD: NORMAL ML/MIN/{1.73_M2}
GLUCOSE SERPL-MCNC: 105 MG/DL (ref 70–99)
GLUCOSE SERPL-MCNC: NORMAL MG/DL (ref 70–99)
HCT VFR BLD AUTO: 37.3 % (ref 40–53)
HGB BLD-MCNC: 12.2 G/DL (ref 13.3–17.7)
IMM GRANULOCYTES # BLD: 0 10E9/L (ref 0–0.4)
IMM GRANULOCYTES NFR BLD: 0.4 %
LYMPHOCYTES # BLD AUTO: 1.3 10E9/L (ref 0.8–5.3)
LYMPHOCYTES NFR BLD AUTO: 16 %
MCH RBC QN AUTO: 32.8 PG (ref 26.5–33)
MCHC RBC AUTO-ENTMCNC: 32.7 G/DL (ref 31.5–36.5)
MCV RBC AUTO: 100 FL (ref 78–100)
MONOCYTES # BLD AUTO: 0.5 10E9/L (ref 0–1.3)
MONOCYTES NFR BLD AUTO: 6.4 %
NEUTROPHILS # BLD AUTO: 6.3 10E9/L (ref 1.6–8.3)
NEUTROPHILS NFR BLD AUTO: 76.4 %
PLATELET # BLD AUTO: 248 10E9/L (ref 150–450)
POTASSIUM SERPL-SCNC: 4.1 MMOL/L (ref 3.4–5.3)
POTASSIUM SERPL-SCNC: NORMAL MMOL/L (ref 3.4–5.3)
PROT SERPL-MCNC: 6.5 G/DL (ref 6.8–8.8)
PROT SERPL-MCNC: NORMAL G/DL (ref 6.8–8.8)
PSA SERPL-MCNC: 34.5 UG/L (ref 0–4)
RBC # BLD AUTO: 3.72 10E12/L (ref 4.4–5.9)
SODIUM SERPL-SCNC: 142 MMOL/L (ref 133–144)
SODIUM SERPL-SCNC: NORMAL MMOL/L (ref 133–144)
WBC # BLD AUTO: 8.3 10E9/L (ref 4–11)

## 2019-01-01 PROCEDURE — 84153 ASSAY OF PSA TOTAL: CPT | Performed by: INTERNAL MEDICINE

## 2019-01-01 PROCEDURE — 78306 BONE IMAGING WHOLE BODY: CPT | Performed by: RADIOLOGY

## 2019-01-01 PROCEDURE — A9503 TC99M MEDRONATE: HCPCS | Performed by: INTERNAL MEDICINE

## 2019-01-01 PROCEDURE — 80053 COMPREHEN METABOLIC PANEL: CPT | Performed by: INTERNAL MEDICINE

## 2019-01-01 PROCEDURE — 85025 COMPLETE CBC W/AUTO DIFF WBC: CPT | Performed by: INTERNAL MEDICINE

## 2019-01-01 PROCEDURE — 99215 OFFICE O/P EST HI 40 MIN: CPT | Mod: 25 | Performed by: INTERNAL MEDICINE

## 2019-01-01 PROCEDURE — 90662 IIV NO PRSV INCREASED AG IM: CPT

## 2019-01-01 PROCEDURE — 74177 CT ABD & PELVIS W/CONTRAST: CPT | Performed by: STUDENT IN AN ORGANIZED HEALTH CARE EDUCATION/TRAINING PROGRAM

## 2019-01-01 PROCEDURE — 36415 COLL VENOUS BLD VENIPUNCTURE: CPT | Performed by: INTERNAL MEDICINE

## 2019-01-01 PROCEDURE — 99207 ZZC NO CHARGE NURSE ONLY: CPT

## 2019-01-01 PROCEDURE — G0008 ADMIN INFLUENZA VIRUS VAC: HCPCS

## 2019-01-01 PROCEDURE — 96365 THER/PROPH/DIAG IV INF INIT: CPT | Performed by: INTERNAL MEDICINE

## 2019-01-01 PROCEDURE — 71260 CT THORAX DX C+: CPT | Performed by: STUDENT IN AN ORGANIZED HEALTH CARE EDUCATION/TRAINING PROGRAM

## 2019-01-01 RX ORDER — ZOLEDRONIC ACID 0.04 MG/ML
4 INJECTION, SOLUTION INTRAVENOUS ONCE
Status: COMPLETED | OUTPATIENT
Start: 2019-01-01 | End: 2019-01-01

## 2019-01-01 RX ORDER — ZOLEDRONIC ACID 0.04 MG/ML
4 INJECTION, SOLUTION INTRAVENOUS ONCE
Status: CANCELLED | OUTPATIENT
Start: 2020-11-06

## 2019-01-01 RX ORDER — IOPAMIDOL 755 MG/ML
122 INJECTION, SOLUTION INTRAVASCULAR ONCE
Status: COMPLETED | OUTPATIENT
Start: 2019-01-01 | End: 2019-01-01

## 2019-01-01 RX ORDER — ZOLEDRONIC ACID 0.04 MG/ML
4 INJECTION, SOLUTION INTRAVENOUS ONCE
Status: CANCELLED | OUTPATIENT
Start: 2020-01-01

## 2019-01-01 RX ORDER — PREDNISONE 5 MG/1
5 TABLET ORAL 2 TIMES DAILY
Qty: 60 TABLET | Refills: 0 | Status: SHIPPED | OUTPATIENT
Start: 2019-01-01 | End: 2020-01-01

## 2019-01-01 RX ORDER — TC 99M MEDRONATE 20 MG/10ML
20-30 INJECTION, POWDER, LYOPHILIZED, FOR SOLUTION INTRAVENOUS ONCE
Status: COMPLETED | OUTPATIENT
Start: 2019-01-01 | End: 2019-01-01

## 2019-01-01 RX ORDER — ABIRATERONE ACETATE 250 MG/1
1000 TABLET ORAL DAILY
Qty: 120 TABLET | Refills: 0 | Status: SHIPPED | OUTPATIENT
Start: 2019-01-01 | End: 2020-01-01

## 2019-01-01 RX ADMIN — ZOLEDRONIC ACID 4 MG: 0.04 INJECTION, SOLUTION INTRAVENOUS at 10:35

## 2019-01-01 RX ADMIN — Medication 250 ML: at 10:35

## 2019-01-01 RX ADMIN — IOPAMIDOL 122 ML: 755 INJECTION, SOLUTION INTRAVASCULAR at 09:48

## 2019-01-01 RX ADMIN — TC 99M MEDRONATE 25.4 MCI.: 20 INJECTION, POWDER, LYOPHILIZED, FOR SOLUTION INTRAVENOUS at 09:15

## 2019-01-01 ASSESSMENT — PAIN SCALES - GENERAL: PAINLEVEL: NO PAIN (0)

## 2019-01-01 ASSESSMENT — MIFFLIN-ST. JEOR: SCORE: 1608.2

## 2019-01-07 ENCOUNTER — ONCOLOGY VISIT (OUTPATIENT)
Dept: ONCOLOGY | Facility: CLINIC | Age: 84
End: 2019-01-07
Payer: COMMERCIAL

## 2019-01-07 ENCOUNTER — DOCUMENTATION ONLY (OUTPATIENT)
Dept: PHARMACY | Facility: CLINIC | Age: 84
End: 2019-01-07

## 2019-01-07 ENCOUNTER — INFUSION THERAPY VISIT (OUTPATIENT)
Dept: INFUSION THERAPY | Facility: CLINIC | Age: 84
End: 2019-01-07
Payer: COMMERCIAL

## 2019-01-07 VITALS
TEMPERATURE: 98.1 F | DIASTOLIC BLOOD PRESSURE: 77 MMHG | HEIGHT: 71 IN | HEART RATE: 68 BPM | SYSTOLIC BLOOD PRESSURE: 123 MMHG | OXYGEN SATURATION: 95 % | WEIGHT: 197.38 LBS | RESPIRATION RATE: 18 BRPM | BODY MASS INDEX: 27.63 KG/M2

## 2019-01-07 DIAGNOSIS — C61 MALIGNANT NEOPLASM OF PROSTATE (H): Primary | ICD-10-CM

## 2019-01-07 DIAGNOSIS — C79.51 BONE METASTASIS: ICD-10-CM

## 2019-01-07 DIAGNOSIS — C79.51 BONE METASTASIS: Primary | ICD-10-CM

## 2019-01-07 DIAGNOSIS — C61 MALIGNANT NEOPLASM OF PROSTATE (H): ICD-10-CM

## 2019-01-07 LAB
ALBUMIN SERPL-MCNC: 3 G/DL (ref 3.4–5)
ALP SERPL-CCNC: 74 U/L (ref 40–150)
ALT SERPL W P-5'-P-CCNC: 17 U/L (ref 0–70)
ANION GAP SERPL CALCULATED.3IONS-SCNC: 9 MMOL/L (ref 3–14)
AST SERPL W P-5'-P-CCNC: 15 U/L (ref 0–45)
BASOPHILS # BLD AUTO: 0 10E9/L (ref 0–0.2)
BASOPHILS NFR BLD AUTO: 0.1 %
BILIRUB SERPL-MCNC: 0.7 MG/DL (ref 0.2–1.3)
BUN SERPL-MCNC: 18 MG/DL (ref 7–30)
CALCIUM SERPL-MCNC: 9.5 MG/DL (ref 8.5–10.1)
CHLORIDE SERPL-SCNC: 104 MMOL/L (ref 94–109)
CO2 SERPL-SCNC: 28 MMOL/L (ref 20–32)
CREAT SERPL-MCNC: 1.02 MG/DL (ref 0.66–1.25)
DIFFERENTIAL METHOD BLD: ABNORMAL
EOSINOPHIL # BLD AUTO: 0.1 10E9/L (ref 0–0.7)
EOSINOPHIL NFR BLD AUTO: 0.7 %
ERYTHROCYTE [DISTWIDTH] IN BLOOD BY AUTOMATED COUNT: 13.7 % (ref 10–15)
GFR SERPL CREATININE-BSD FRML MDRD: 68 ML/MIN/{1.73_M2}
GLUCOSE SERPL-MCNC: 117 MG/DL (ref 70–99)
HCT VFR BLD AUTO: 36.2 % (ref 40–53)
HGB BLD-MCNC: 12.1 G/DL (ref 13.3–17.7)
IMM GRANULOCYTES # BLD: 0 10E9/L (ref 0–0.4)
IMM GRANULOCYTES NFR BLD: 0.3 %
LYMPHOCYTES # BLD AUTO: 1.6 10E9/L (ref 0.8–5.3)
LYMPHOCYTES NFR BLD AUTO: 20.7 %
MCH RBC QN AUTO: 32.4 PG (ref 26.5–33)
MCHC RBC AUTO-ENTMCNC: 33.4 G/DL (ref 31.5–36.5)
MCV RBC AUTO: 97 FL (ref 78–100)
MONOCYTES # BLD AUTO: 0.7 10E9/L (ref 0–1.3)
MONOCYTES NFR BLD AUTO: 8.7 %
NEUTROPHILS # BLD AUTO: 5.3 10E9/L (ref 1.6–8.3)
NEUTROPHILS NFR BLD AUTO: 69.5 %
PLATELET # BLD AUTO: 211 10E9/L (ref 150–450)
POTASSIUM SERPL-SCNC: 3.3 MMOL/L (ref 3.4–5.3)
PROT SERPL-MCNC: 6.7 G/DL (ref 6.8–8.8)
PSA SERPL-MCNC: 13.2 UG/L (ref 0–4)
RBC # BLD AUTO: 3.73 10E12/L (ref 4.4–5.9)
SODIUM SERPL-SCNC: 141 MMOL/L (ref 133–144)
WBC # BLD AUTO: 7.6 10E9/L (ref 4–11)

## 2019-01-07 PROCEDURE — 99207 ZZC NO CHARGE LOS: CPT

## 2019-01-07 PROCEDURE — 36415 COLL VENOUS BLD VENIPUNCTURE: CPT | Performed by: INTERNAL MEDICINE

## 2019-01-07 PROCEDURE — 96374 THER/PROPH/DIAG INJ IV PUSH: CPT | Performed by: INTERNAL MEDICINE

## 2019-01-07 PROCEDURE — 84153 ASSAY OF PSA TOTAL: CPT | Performed by: INTERNAL MEDICINE

## 2019-01-07 PROCEDURE — 80053 COMPREHEN METABOLIC PANEL: CPT | Performed by: INTERNAL MEDICINE

## 2019-01-07 PROCEDURE — 99214 OFFICE O/P EST MOD 30 MIN: CPT | Mod: 25 | Performed by: INTERNAL MEDICINE

## 2019-01-07 PROCEDURE — 85025 COMPLETE CBC W/AUTO DIFF WBC: CPT | Performed by: INTERNAL MEDICINE

## 2019-01-07 RX ORDER — ZOLEDRONIC ACID 0.04 MG/ML
4 INJECTION, SOLUTION INTRAVENOUS ONCE
Status: COMPLETED | OUTPATIENT
Start: 2019-01-07 | End: 2019-01-07

## 2019-01-07 RX ORDER — POTASSIUM CHLORIDE 750 MG/1
10 TABLET, EXTENDED RELEASE ORAL 2 TIMES DAILY
Qty: 180 TABLET | Refills: 3 | Status: SHIPPED | OUTPATIENT
Start: 2019-01-07 | End: 2020-01-01

## 2019-01-07 RX ADMIN — Medication 250 ML: at 10:49

## 2019-01-07 RX ADMIN — ZOLEDRONIC ACID 4 MG: 0.04 INJECTION, SOLUTION INTRAVENOUS at 10:50

## 2019-01-07 ASSESSMENT — MIFFLIN-ST. JEOR: SCORE: 1622.16

## 2019-01-07 ASSESSMENT — PAIN SCALES - GENERAL: PAINLEVEL: NO PAIN (0)

## 2019-01-07 NOTE — NURSING NOTE
"Oncology Rooming Note    January 7, 2019 9:58 AM   Dimitri Neves is a 83 year old male who presents for:    Chief Complaint   Patient presents with     Oncology Clinic Visit     3 month follow up     Initial Vitals: /77 (BP Location: Left arm)   Pulse 68   Temp 98.1  F (36.7  C) (Oral)   Resp 18   Ht 1.803 m (5' 10.98\")   Wt 89.5 kg (197 lb 6 oz)   SpO2 95%   BMI 27.54 kg/m   Estimated body mass index is 27.54 kg/m  as calculated from the following:    Height as of this encounter: 1.803 m (5' 10.98\").    Weight as of this encounter: 89.5 kg (197 lb 6 oz). Body surface area is 2.12 meters squared.  No Pain (0) Comment: Data Unavailable   No LMP for male patient.  Allergies reviewed: Yes  Medications reviewed: Yes    Medications: Medication refills not needed today.  Pharmacy name entered into [employer]:    [id number] - [place], [place] - [address] [facility] - [place], [place] - [address] [place], [place] [address]O PHARMACY - MAIL ORDER ONLY - [id number] - [place], [place] - [address] [facility]        5 minutes for nursing intake (face to face time)     Taya Clemente LPN            "

## 2019-01-07 NOTE — PROGRESS NOTES
[facility]  HEMATOLOGY AND ONCOLOGY    FOLLOW-UP VISIT NOTE    PATIENT NAME: Dimitri Neves MRN # [id number]  DATE OF VISIT: Jan 7, 2019 YOB: 1935    REFERRING PROVIDER: No referring provider defined for this encounter.    CANCER TYPE: Prostate adenocarcinoma  STAGE: IV    TREATMENT SUMMARY:  Dimitri was followed by his PCP on 6/13/13 and was elevated at 32 as noted below. He was referred to Dr. Taylor. He was treated with a 10 day course of ciprofloxacin and followed again in 6 weeks on 8/10. His PSA drawn prior to this visit was unchanged and remained elevated at 32. He had a TRUS biopsy on 8/25/14 which was negative for prostate adenocarcinoma. His PSA was repeated in 3 months and now increased to 67.9. He had a repeat biopsy of prostate on 12/16/14 which now confirmed prostate adenocarcinoma with eileen 4+4 disease involving 5 of the cores and [place] 3+3 disease involving remainder of cores. He was staged with a CT scan and bone scan done on 12/29/14 which revealed metastatic foci in the upper cervical vertebrae on the left, right posterior 3rd rib and right ischium, focal uptake in the posterior left 11th rib with corresponding lytic expansile appearance on CT, suspicious for metastasis.          4/5/2011 08:46 6/13/2014 08:03 7/25/2014 09:47 12/5/2014 09:00 4/7/2015 09:14   PSA 2.77 32.70 (H) 32.00 (H) 67.88 (H) 1.92      He was started on androgen deprivation therapy in Jan 2015 with a good initial response. His PSA has been increasing recently and he was started on bicalutamide in February with no response. He has continued to have rising PSA and was prescribed abiraterone with prednisone. He had a huge copay with this and has been referred to Medical Oncology to review other options.     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 He is tolerating his abiraterone without any difficulty.     He has ecchymoses.    He does have hot flushes, fatigue, mood swings on androgen deprivation therapy.     He did have bilateral orchiectomies.         PAST MEDICAL HISTORY     Past Medical History:   Diagnosis Date     Actinic keratosis      AK (actinic keratosis) 7/9/2012     Cataract cortical, senile right eye 4/17/2012     DDD (degenerative disc disease), lumbar 1979    s/p 4 back surgeries, spinal cord stimulator implant      Diverticulosis      ED (erectile dysfunction) 2009     GERD (gastroesophageal reflux disease) ~1980     HTN (hypertension), benign ~2000     Macular degeneration, dry, mild, ou 7/23/2016     Multiple lung nodules     Several basilar pulmonary nodules <5 mm     HUDSON (obstructive sleep apnea) 10/2005    on CPAP     Other abnormal glucose 01/14/2009     PE (pulmonary thromboembolism) (H) 1981    after back surgery      Pure hypercholesterolemia ~2000     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         CURRENT OUTPATIENT MEDICATIONS     Current Outpatient Medications   Medication Sig     abiraterone (ZYTIGA) 250 MG tablet Take 4 tablets (1,000 mg) by mouth daily for 30 doses Take on empty stomach.     albuterol (2.5 MG/3ML) 0.083% neb solution TAKE 1 VIAL BY NEBULIZATION EVERY 6 HOURS AS NEEDED FOR SHORTNESS OF BREATH / DYSPNEA OR WHEEZING     albuterol (2.5 MG/3ML) 0.083% neb solution Take 1 vial (2.5 mg) by nebulization every 6 hours as needed for shortness of breath / dyspnea or wheezing     ASPIRIN PO Take 81 mg by mouth     Calcium Carbonate (CALCIUM 600 PO)      Cholecalciferol (VITAMIN D) 2000 UNITS tablet Take 1 tablet by mouth daily     cyanocobalamin ([employer] VITAMIN  B12) 1000 MCG TABS Take 1 tablet by mouth daily     fluocinonide (LIDEX) 0.05 % cream Apply sparingly to affected area  "twice daily as needed on legs.  Do not apply to face.     fluorouracil (EFUDEX) 5 % cream Twice daily to scalp and face for two weeks     furosemide (LASIX) 20 MG tablet TAKE 1 TABLET (20 MG) BY MOUTH DAILY     gabapentin (NEURONTIN) 300 MG capsule Take 900 mg by mouth At Bedtime     ipratropium - albuterol 0.5 mg/2.5 mg/3 mL (DUONEB) 0.5-2.5 (3) MG/3ML neb solution Take 1 vial (3 mLs) by nebulization once for 1 dose     metoprolol succinate (TOPROL-XL) 25 MG 24 hr tablet TAKE 1 TABLET (25 MG) BY MOUTH DAILY     order for DME Equipment being ordered: Nebulizer     order for DME Equipment being ordered: CPAP machine with associated CPAP supplies and tubing     order for DME Equipment being ordered: Nebulizer     oxyCODONE (ROXICODONE) 5 MG IR tablet Take 1 tablet (5 mg) by mouth every 6 hours as needed for pain (Patient not taking: Reported on 11/27/2018)     predniSONE (DELTASONE) 5 MG tablet Take 5 mg by mouth 2 times daily.     quinapril (ACCUPRIL) 40 MG Tab TAKE 1 TABLET (40 MG) BY MOUTH DAILY     simvastatin (ZOCOR) 40 MG tablet TAKE 0.5 TABLETS (20 MG) BY MOUTH DAILY     No current facility-administered medications for this visit.         ALLERGIES      Allergies   Allergen Reactions     Morphine Sulfate GI Disturbance     vomiting     Vicodin [Hydrocodone-Acetaminophen] Nausea and Vomiting        REVIEW OF SYSTEMS   As above in the HPI, o/w complete 12-point ROS was negative.     PHYSICAL EXAM   /77 (BP Location: Left arm)   Pulse 68   Temp 98.1  F (36.7  C) (Oral)   Resp 18   Ht 1.803 m (5' 10.98\")   Wt 89.5 kg (197 lb 6 oz)   SpO2 95%   BMI 27.54 kg/m    GEN: NAD  HEENT: PERRL, EOMI, no icterus, injection or pallor. Oropharynx is clear.  NECK: no cervical or supraclavicular lymphadenopathy  LUNGS: clear bilaterally  CV: regular, no murmurs, rubs, or gallops  ABDOMEN: soft, non-tender, non-distended, normal bowel sounds, no hepatosplenomegaly by percussion or palpation  EXT: warm, well perfused, " +++ edema  NEURO: alert  SKIN: Extensive ecchymoses in all the extremities     LABORATORY AND IMAGING STUDIES     Labs remain stable. Calcium is within the normal range or low normal  Mild normocytic anemia   Recent Labs   Lab Test 01/07/19  0855 11/12/18  0806 10/01/18  0926 08/09/18  0836 06/28/18  1043 05/24/18  1223    141 144  --  140 139   POTASSIUM 3.3* 3.8 4.4  --  4.4 4.4   CHLORIDE 104 104 105  --  103 105   CO2 28 30 33*  --  30 28   ANIONGAP 9 7 6  --  7 6   BUN 18 20 16  --  28 23   CR 1.02 0.93 0.95 1.06 1.08 0.88   * 127* 85  --  107* 102*   ALETHA 9.5 9.2 9.5 9.3 9.6 8.7     Recent Labs   Lab Test 12/28/11  0857 04/05/11  0846   PHOS 3.1 3.4     Recent Labs   Lab Test 01/07/19  0855 11/12/18  0806 10/01/18  0926 06/28/18  1043 05/24/18  1223   WBC 7.6 7.5 8.9 5.8 5.3   HGB 12.1* 11.4* 12.3* 11.9* 11.3*    163 215 183 149*   MCV 97 99 102* 102* 97   NEUTROPHIL 69.5 80.1 75.2 72.0 74.5     Recent Labs   Lab Test 01/07/19  0855 11/12/18  0806 10/01/18  0926   BILITOTAL 0.7 0.7 0.6   ALKPHOS 74 67 91   ALT 17 22 23   AST 15 17 21   ALBUMIN 3.0* 3.0* 3.3*     TSH   Date Value Ref Range Status   06/13/2016 1.60 0.40 - 4.00 mU/L Final   04/05/2011 3.73 0.4 - 5.0 mU/L Final     No results for input(s): CEA in the last 21341 hours.  Results for orders placed or performed in visit on 10/25/18   XR Chest 2 Views    Narrative    CHEST TWO VIEWS  10/25/2018 6:32 PM     HISTORY: Cough. Dyspnea on exertion. Acute bronchitis with symptoms  greater than 10 days.    COMPARISON: 10/4/2016      Impression    IMPRESSION: Elevated right diaphragm. Interstitial densities  consistent with scarring at the right base, unchanged. Neural  stimulating leads in the midthoracic spine, unchanged. Normal heart  size and pulmonary vascularity.    MADIHA PADILLA MD           Recent Labs   Lab Test 01/07/19  0855 11/12/18  0806 10/01/18  0926 08/09/18  0836 06/28/18  1043  02/09/17  0823   PSA 13.20* 16.80* 11.70*  10.50* 10.00*   < >  --    TESTOSTTOTAL  --   --   --   --   --   --  9*    < > = values in this interval not displayed.         ASSESSMENT AND PLAN   1. 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 No response to addition of bicalutamide  3. PE diagnosed few weeks after initiation of megestrol acetate for hot flashes on GnRH agonist  4. No significant medical comorbidities    He is tolerating his abiraterone well and has no complains. His PSA did respond nicely as expected initially and has been flat since then. It was 64 on 7/13/17, dropped to 12 by 10/16/17 and has been stable since - currently at 13.2 ng/ml. For now the PSA values are relatively stable and have not doubled since he hit abel about 6+ months ago. His PSA is improved since last visit about 2 months ago. I explained him that the PSA can bounce around a little before it steadily goes up. This is the reason why we do wait for a clear rise in PSA in the absence of radiographic or clinical progression. He only has a marginal rise in his PSA from the abel value. I will not consider changing therapy unless there is a big change in his PSA.     We would plan to continue as current. There are no immediate concerns.     He did get bilateral orchiectomies done last month 9/27/17. He would not need any more lupron or other GnRH directed therapies.     His labs are stable except for mild normoocytic anemia - possibly owing to ongoing therapy for cancer.     He has extensive ecchymoses likely secondary to prednisone use.     He has hot flashes, mood swings, fatigue - from androgen deprivation. These are no different between either orchiectomy or GnRH therapy as they come with low levels of testosterone.     We would continue with zometa every 12 weeks. I could see him in 12-16 weeks. If he has a doubling of his PSA at next visit, I will get restaging scans done prior to his visit  with me with plans to possibly move him to chemotherapy.     Over 25 min of direct face to face time spent with patient with more than 50% time spent in counseling and coordinating care.

## 2019-01-07 NOTE — PROGRESS NOTES
Infusion Nursing Note:  Dimirti Neves presents today for Zometa.    Patient seen by provider today: Yes: MD   present during visit today: Not Applicable.    Note: N/A.    Intravenous Access:  Peripheral IV placed.    Treatment Conditions:  Lab Results   Component Value Date    HGB 12.1 01/07/2019     Lab Results   Component Value Date    WBC 7.6 01/07/2019      Lab Results   Component Value Date    ANEU 5.3 01/07/2019     Lab Results   Component Value Date     01/07/2019      Lab Results   Component Value Date     01/07/2019                   Lab Results   Component Value Date    POTASSIUM 3.3 01/07/2019           No results found for: MAG         Lab Results   Component Value Date    CR 1.02 01/07/2019                   Lab Results   Component Value Date    ALETHA 9.5 01/07/2019                Lab Results   Component Value Date    BILITOTAL 0.7 01/07/2019           Lab Results   Component Value Date    ALBUMIN 3.0 01/07/2019                    Lab Results   Component Value Date    ALT 17 01/07/2019           Lab Results   Component Value Date    AST 15 01/07/2019       Results reviewed, labs MET treatment parameters, ok to proceed with treatment.      Post Infusion Assessment:  Patient tolerated infusion without incident.  Site patent and intact, free from redness, edema or discomfort.  No evidence of extravasations.  Access discontinued per protocol.    Discharge Plan:   Patient and/or family verbalized understanding of discharge instructions and all questions answered.  Patient discharged in stable condition accompanied by: self and wife.  Departure Mode: Ambulatory.    Jyoti Antonio RN

## 2019-01-07 NOTE — PROGRESS NOTES
Primary Oncologist: Nicolas  Primary Oncology Clinic: [facility]  Cancer Diagnosis: Prostate Cancer      Therapy History:   abiraterone (Zytiga) 1000 mg PO daily with Prednisone 5 mg PO BID  Start Date: 8/19/17      Drug Interaction Assessment: No new medications as of 6/11/18  1. Abiraterone will decrease Warfarin metabolism, potentially raising INR; patient is aware and will inquire about more frequent INR monitoring at the start of therapy  2. Abiraterone inhibits Metoprolol metabolism with a slight risk of causing bradycardia      Lab Monitoring Plan:  C1D1+   CMP, BP C2D1+ Call, CMP, BP C3D1+ Call, CMP, BP C4D1+ Call, CMP, BP C5D1+ Call, CMP, BP C6D1+ Call, CMP, BP   C1D8+    C2D8+    C3D8+    C4D8+    C5D8+    C6D8+      C1D15+ Call, CMP C2D15+ Call, CMP C3D15+    C4D15+    C5D15+    C6D15+      C1D22+    C2D22+    C3D22+    C4D22+    C5D22+    C6D22+          Subjective/Objective:  Dimitri Neves is a 83 year old male seen for a follow-up visit in clinic for oral chemotherapy. Dimitri reports that he is doing well on abiraterone and denies any new changes or medications  His PSA went down to 13.2.  He is excited about this because he thought he would start chemo.  He is going on a cruise in Feb, but patient should have enough drug based on refill schedule. Pt K today is 3.3 and on lasix daily.     ORAL CHEMOTHERAPY 4/30/2018 6/11/2018 6/28/2018 8/9/2018 10/1/2018 11/12/2018 1/7/2019   Drug Name Zytiga (Abiraterone) Zytiga (Abiraterone) Zytiga (Abiraterone) Zytiga (Abiraterone) Zytiga (Abiraterone) Zytiga (Abiraterone) Zytiga (Abiraterone)   Current Dosage 1000mg 1000mg 1000mg 1000mg 1000mg 1000mg 1000mg   Current Schedule Daily Daily Daily Daily Daily Daily Daily   Cycle Details Continuous Continuous Continuous Continuous Continuous Continuous Continuous   Start Date of Last Cycle 4/4/2018 - - - - - -   Planned next cycle start date 5/3/2018 - - - - - -   Doses missed in last 2 weeks 0 0 0 0 0 0 0   Adherence  "Assessment Adherent Adherent Adherent - Adherent Adherent Adherent   Adverse Effects No AE identified during assessment No AE identified during assessment No AE identified during assessment No AE identified during assessment No AE identified during assessment No AE identified during assessment No AE identified during assessment   Other (see note for details) - - - - - - -   Pharmacist intervention? - - - - - - -   Any new drug interactions? No No No No No No No   Is the dose as ordered appropriate for the patient? Yes Yes Yes Yes Yes Yes -   Is the patient currently in pain? Assessed in last 30 days. - Assessed in last 30 days. Assessed in last 30 days. Assessed in last 30 days. Assessed in last 30 days. Assessed in last 30 days.   Has the patient been assessed within the past 6 months for depression? Yes - Yes Yes Yes - Yes   Has the patient missed any days of school, work, or other routine activity? - - - - - No No       Vitals:  BP:   BP Readings from Last 1 Encounters:   01/07/19 123/77     Wt Readings from Last 1 Encounters:   01/07/19 89.5 kg (197 lb 6 oz)     Estimated body surface area is 2.12 meters squared as calculated from the following:    Height as of an earlier encounter on 1/7/19: 1.803 m (5' 10.98\").    Weight as of an earlier encounter on 1/7/19: 89.5 kg (197 lb 6 oz).    Labs:  _  Result Component Current Result Ref Range   Sodium 141 (1/7/2019) 133 - 144 mmol/L     Result Component Current Result Ref Range   Potassium 3.3 (L) (1/7/2019) 3.4 - 5.3 mmol/L     Result Component Current Result Ref Range   Calcium 9.5 (1/7/2019) 8.5 - 10.1 mg/dL     Result Component Current Result Ref Range   Albumin 3.0 (L) (1/7/2019) 3.4 - 5.0 g/dL     Result Component Current Result Ref Range   Urea Nitrogen 18 (1/7/2019) 7 - 30 mg/dL     Result Component Current Result Ref Range   Creatinine 1.02 (1/7/2019) 0.66 - 1.25 mg/dL     Result Component Current Result Ref Range   AST 15 (1/7/2019) 0 - 45 U/L     Result " Component Current Result Ref Range   ALT 17 (1/7/2019) 0 - 70 U/L     Result Component Current Result Ref Range   Bilirubin Total 0.7 (1/7/2019) 0.2 - 1.3 mg/dL     Result Component Current Result Ref Range   WBC 7.6 (1/7/2019) 4.0 - 11.0 10e9/L     Result Component Current Result Ref Range   Hemoglobin 12.1 (L) (1/7/2019) 13.3 - 17.7 g/dL     Result Component Current Result Ref Range   Platelet Count 211 (1/7/2019) 150 - 450 10e9/L     Result Component Current Result Ref Range   Absolute Neutrophil 5.3 (1/7/2019) 1.6 - 8.3 10e9/L     PSA 13.2    Assessment/Plan:  Continue abiraterone and prednisone therapy.  Dr. Valenzuela prescibed K 10meq daily for low potassium due to daily lasix.     Follow-Up:  2019/04/18 Visit Nicolas 930AM    Refill Due:  1/21 SP    Rebecca J. Fahrenbruch, PharmD, BCOP  January 7, 2019

## 2019-01-07 NOTE — LETTER
1/7/2019         RE: Dimitri Neves  [address] [place] [address]        Dear Colleague,    Thank you for referring your patient, Dimitri Neves, to the [facility]. Please see a copy of my visit note below.    [facility]  HEMATOLOGY AND ONCOLOGY    FOLLOW-UP VISIT NOTE    PATIENT NAME: Dimitri Neves MRN # [id number]  DATE OF VISIT: Jan 7, 2019 YOB: 1935    REFERRING PROVIDER: No referring provider defined for this encounter.    CANCER TYPE: Prostate adenocarcinoma  STAGE: IV    TREATMENT SUMMARY:  Dimitri was followed by his PCP on 6/13/13 and was elevated at 32 as noted below. He was referred to Dr. Taylor. He was treated with a 10 day course of ciprofloxacin and followed again in 6 weeks on 8/10. His PSA drawn prior to this visit was unchanged and remained elevated at 32. He had a TRUS biopsy on 8/25/14 which was negative for prostate adenocarcinoma. His PSA was repeated in 3 months and now increased to 67.9. He had a repeat biopsy of prostate on 12/16/14 which now confirmed prostate adenocarcinoma with eileen 4+4 disease involving 5 of the cores and [place] 3+3 disease involving remainder of cores. He was staged with a CT scan and bone scan done on 12/29/14 which revealed metastatic foci in the upper cervical vertebrae on the left, right posterior 3rd rib and right ischium, focal uptake in the posterior left 11th rib with corresponding lytic expansile appearance on CT, suspicious for metastasis.          4/5/2011 08:46 6/13/2014 08:03 7/25/2014 09:47 12/5/2014 09:00 4/7/2015 09:14   PSA 2.77 32.70 (H) 32.00 (H) 67.88 (H) 1.92      He was started on androgen deprivation therapy in Jan 2015 with a good initial response. His PSA has been increasing recently and he was started on bicalutamide in February with no response. He has continued to have rising PSA and was prescribed abiraterone with prednisone. He had a huge copay with this and has been referred to  Medical Oncology to review other options.     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 He is tolerating his abiraterone without any difficulty.     He has ecchymoses.    He does have hot flushes, fatigue, mood swings on androgen deprivation therapy.     He did have bilateral orchiectomies.         PAST MEDICAL HISTORY     Past Medical History:   Diagnosis Date     Actinic keratosis      AK (actinic keratosis) 7/9/2012     Cataract cortical, senile right eye 4/17/2012     DDD (degenerative disc disease), lumbar 1979    s/p 4 back surgeries, spinal cord stimulator implant      Diverticulosis      ED (erectile dysfunction) 2009     GERD (gastroesophageal reflux disease) ~1980     HTN (hypertension), benign ~2000     Macular degeneration, dry, mild, ou 7/23/2016     Multiple lung nodules     Several basilar pulmonary nodules <5 mm     HUDSON (obstructive sleep apnea) 10/2005    on CPAP     Other abnormal glucose 01/14/2009     PE (pulmonary thromboembolism) (H) 1981    after back surgery      Pure hypercholesterolemia ~2000     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         CURRENT OUTPATIENT MEDICATIONS     Current Outpatient Medications   Medication Sig     abiraterone (ZYTIGA) 250 MG tablet Take 4 tablets (1,000 mg) by mouth daily for 30 doses Take on empty stomach.     albuterol (2.5 MG/3ML) 0.083% neb solution TAKE 1 VIAL BY NEBULIZATION EVERY 6 HOURS AS NEEDED FOR SHORTNESS OF BREATH / DYSPNEA OR WHEEZING     albuterol (2.5 MG/3ML) 0.083% neb solution Take 1 vial (2.5 mg) by nebulization every 6 hours as needed for shortness of breath / dyspnea or wheezing     ASPIRIN PO Take 81 mg by mouth     Calcium Carbonate (CALCIUM  600 PO)      Cholecalciferol (VITAMIN D) 2000 UNITS tablet Take 1 tablet by mouth daily     cyanocobalamin ([facility] VITAMIN  B12) 1000 MCG TABS Take 1 tablet by mouth daily     fluocinonide (LIDEX) 0.05 % cream Apply sparingly to affected area twice daily as needed on legs.  Do not apply to face.     fluorouracil (EFUDEX) 5 % cream Twice daily to scalp and face for two weeks     furosemide (LASIX) 20 MG tablet TAKE 1 TABLET (20 MG) BY MOUTH DAILY     gabapentin (NEURONTIN) 300 MG capsule Take 900 mg by mouth At Bedtime     ipratropium - albuterol 0.5 mg/2.5 mg/3 mL (DUONEB) 0.5-2.5 (3) MG/3ML neb solution Take 1 vial (3 mLs) by nebulization once for 1 dose     metoprolol succinate (TOPROL-XL) 25 MG 24 hr tablet TAKE 1 TABLET (25 MG) BY MOUTH DAILY     order for DME Equipment being ordered: Nebulizer     order for DME Equipment being ordered: CPAP machine with associated CPAP supplies and tubing     order for DME Equipment being ordered: Nebulizer     oxyCODONE (ROXICODONE) 5 MG IR tablet Take 1 tablet (5 mg) by mouth every 6 hours as needed for pain (Patient not taking: Reported on 11/27/2018)     predniSONE (DELTASONE) 5 MG tablet Take 5 mg by mouth 2 times daily.     quinapril (ACCUPRIL) 40 MG Tab TAKE 1 TABLET (40 MG) BY MOUTH DAILY     simvastatin (ZOCOR) 40 MG tablet TAKE 0.5 TABLETS (20 MG) BY MOUTH DAILY     No current facility-administered medications for this visit.         ALLERGIES      Allergies   Allergen Reactions     Morphine Sulfate GI Disturbance     vomiting     Vicodin [Hydrocodone-Acetaminophen] Nausea and Vomiting        REVIEW OF SYSTEMS   As above in the HPI, o/w complete 12-point ROS was negative.     PHYSICAL EXAM   There were no vitals taken for this visit.  GEN: NAD  HEENT: PERRL, EOMI, no icterus, injection or pallor. Oropharynx is clear.  NECK: no cervical or supraclavicular lymphadenopathy  LUNGS: clear bilaterally  CV: regular, no murmurs, rubs, or gallops  ABDOMEN: soft, non-tender,  non-distended, normal bowel sounds, no hepatosplenomegaly by percussion or palpation  EXT: warm, well perfused, +++ edema  NEURO: alert  SKIN: Extensive ecchymoses in all the extremities     LABORATORY AND IMAGING STUDIES     Labs remain stable. Calcium is within the normal range or low normal  Mild normocytic anemia   Recent Labs   Lab Test 01/07/19  0855 11/12/18  0806 10/01/18  0926 08/09/18  0836 06/28/18  1043 05/24/18  1223    141 144  --  140 139   POTASSIUM 3.3* 3.8 4.4  --  4.4 4.4   CHLORIDE 104 104 105  --  103 105   CO2 28 30 33*  --  30 28   ANIONGAP 9 7 6  --  7 6   BUN 18 20 16  --  28 23   CR 1.02 0.93 0.95 1.06 1.08 0.88   * 127* 85  --  107* 102*   ALETHA 9.5 9.2 9.5 9.3 9.6 8.7     Recent Labs   Lab Test 12/28/11  0857 04/05/11  0846   PHOS 3.1 3.4     Recent Labs   Lab Test 01/07/19  0855 11/12/18  0806 10/01/18  0926 06/28/18  1043 05/24/18  1223   WBC 7.6 7.5 8.9 5.8 5.3   HGB 12.1* 11.4* 12.3* 11.9* 11.3*    163 215 183 149*   MCV 97 99 102* 102* 97   NEUTROPHIL 69.5 80.1 75.2 72.0 74.5     Recent Labs   Lab Test 01/07/19  0855 11/12/18  0806 10/01/18  0926   BILITOTAL 0.7 0.7 0.6   ALKPHOS 74 67 91   ALT 17 22 23   AST 15 17 21   ALBUMIN 3.0* 3.0* 3.3*     TSH   Date Value Ref Range Status   06/13/2016 1.60 0.40 - 4.00 mU/L Final   04/05/2011 3.73 0.4 - 5.0 mU/L Final     No results for input(s): CEA in the last 50518 hours.  Results for orders placed or performed in visit on 10/25/18   XR Chest 2 Views    Narrative    CHEST TWO VIEWS  10/25/2018 6:32 PM     HISTORY: Cough. Dyspnea on exertion. Acute bronchitis with symptoms  greater than 10 days.    COMPARISON: 10/4/2016      Impression    IMPRESSION: Elevated right diaphragm. Interstitial densities  consistent with scarring at the right base, unchanged. Neural  stimulating leads in the midthoracic spine, unchanged. Normal heart  size and pulmonary vascularity.    MADIHA PADILLA MD           Recent Labs   Lab Test  01/07/19  0855 11/12/18  0806 10/01/18  0926 08/09/18  0836 06/28/18  1043  02/09/17  0823   PSA 13.20* 16.80* 11.70* 10.50* 10.00*   < >  --    TESTOSTTOTAL  --   --   --   --   --   --  9*    < > = values in this interval not displayed.         ASSESSMENT AND PLAN   1. 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 No response to addition of bicalutamide  3. PE diagnosed few weeks after initiation of megestrol acetate for hot flashes on GnRH agonist  4. No significant medical comorbidities    He is tolerating his abiraterone well and has no complains. His PSA did respond nicely as expected initially and has been flat since then. It was 64 on 7/13/17, dropped to 12 by 10/16/17 and has been stable since - currently at 11.7. For now the PSA values are relatively stable and have not doubled since he hit abel about 6 months ago. His wife was little nervous with the rising PSA. His PSA has gone up for the last 2 visits. But in the past when his PSA does indeed starts to rise - it does go up pretty dramatically. He only has a marginal rise in his PSA. I will not consider changing therapy unless there is a big change in his PSA.     We would plan to continue as current. There are no immediate concerns.     He did get bilateral orchiectomies done last month 9/27/17. He would not need any more lupron or other GnRH directed therapies.     His labs are stable except for mild macrocytic anemia - possibly owing to ongoing therapy for cancer.     He has extensive ecchymoses likely secondary to prednisone use.     He has hot flashes, mood swings, fatigue - from androgen deprivation. These are no different between either orchiectomy or GnRH therapy as they come with low levels of testosterone.     We would continue with zometa every 6-8 weeks. He has not received zometa in last 6 months. I will restart it at this visit. I will have him meet Leatha  Facundo in 6-8 weeks and I could see him in 12-16 weeks. If he has a doubling of his PSA at next visit with Leatha, I will get restaging scans done prior to his visit with me with plans to possibly move him to chemotherapy.     Over 25 min of direct face to face time spent with patient with more than 50% time spent in counseling and coordinating care.        Again, thank you for allowing me to participate in the care of your patient.        Sincerely,        Rafael Valenzuela MD

## 2019-01-17 ENCOUNTER — MYC MEDICAL ADVICE (OUTPATIENT)
Dept: INTERNAL MEDICINE | Facility: CLINIC | Age: 84
End: 2019-01-17

## 2019-01-21 DIAGNOSIS — C61 MALIGNANT NEOPLASM OF PROSTATE (H): ICD-10-CM

## 2019-01-21 DIAGNOSIS — C79.51 BONE METASTASIS: Primary | ICD-10-CM

## 2019-01-21 RX ORDER — ABIRATERONE ACETATE 250 MG/1
1000 TABLET ORAL DAILY
Qty: 120 TABLET | Refills: 0 | Status: CANCELLED | OUTPATIENT
Start: 2019-01-30 | End: 2019-03-01

## 2019-01-21 RX ORDER — PREDNISONE 5 MG/1
5 TABLET ORAL 2 TIMES DAILY
Qty: 60 TABLET | Refills: 0 | Status: CANCELLED | OUTPATIENT
Start: 2019-01-30

## 2019-01-21 RX ORDER — ABIRATERONE ACETATE 250 MG/1
1000 TABLET ORAL DAILY
Qty: 120 TABLET | Refills: 0 | Status: SHIPPED | OUTPATIENT
Start: 2019-01-21 | End: 2019-04-18

## 2019-01-21 RX ORDER — PREDNISONE 5 MG/1
5 TABLET ORAL 2 TIMES DAILY
Qty: 60 TABLET | Refills: 0 | Status: SHIPPED | OUTPATIENT
Start: 2019-01-21 | End: 2019-04-18

## 2019-01-23 ENCOUNTER — DOCUMENTATION ONLY (OUTPATIENT)
Dept: OPHTHALMOLOGY | Facility: CLINIC | Age: 84
End: 2019-01-23

## 2019-02-01 DIAGNOSIS — I10 HTN (HYPERTENSION), BENIGN: ICD-10-CM

## 2019-02-01 RX ORDER — METOPROLOL SUCCINATE 25 MG/1
TABLET, EXTENDED RELEASE ORAL
Qty: 30 TABLET | Refills: 0 | Status: SHIPPED | OUTPATIENT
Start: 2019-02-01 | End: 2019-01-01

## 2019-02-01 NOTE — TELEPHONE ENCOUNTER
Signed Prescriptions:                        Disp   Refills    metoprolol succinate ER (TOPROL-XL) 25 MG *30 tab*0        Sig: TAKE 1 TABLET BY MOUTH EVERY DAY Follow up with Dr. Muir for further refills  Authorizing Provider: BLANE MUIR  Ordering User: GERRI BUTTS refill given. Patient must follow up with provider before any further refills will be sent.     Gerri Butts RN

## 2019-02-12 NOTE — TELEPHONE ENCOUNTER
Will send phq9 to patients house with return envelope.     Anaid PERSAUD CMA ([facility])

## 2019-02-21 DIAGNOSIS — C61 MALIGNANT NEOPLASM OF PROSTATE (H): ICD-10-CM

## 2019-02-21 DIAGNOSIS — C79.51 BONE METASTASIS: Primary | ICD-10-CM

## 2019-02-21 RX ORDER — PREDNISONE 5 MG/1
5 TABLET ORAL 2 TIMES DAILY
Qty: 60 TABLET | Refills: 0 | Status: SHIPPED | OUTPATIENT
Start: 2019-02-21 | End: 2019-04-18

## 2019-02-21 RX ORDER — ABIRATERONE ACETATE 250 MG/1
1000 TABLET ORAL DAILY
Qty: 120 TABLET | Refills: 0 | Status: SHIPPED | OUTPATIENT
Start: 2019-02-21 | End: 2019-04-18

## 2019-02-25 NOTE — TELEPHONE ENCOUNTER
Pt called back but he camille he will do it when he is at the clinic on 3/4/19.  Amanda RANGEL CMA

## 2019-02-26 ENCOUNTER — TELEPHONE (OUTPATIENT)
Dept: FAMILY MEDICINE | Facility: CLINIC | Age: 84
End: 2019-02-26

## 2019-02-26 DIAGNOSIS — R19.7 DIARRHEA OF PRESUMED INFECTIOUS ORIGIN: Primary | ICD-10-CM

## 2019-02-26 RX ORDER — DIPHENOXYLATE HCL/ATROPINE 2.5-.025MG
1 TABLET ORAL 4 TIMES DAILY PRN
Qty: 40 TABLET | Refills: 1 | Status: SHIPPED | OUTPATIENT
Start: 2019-02-26 | End: 2020-01-01

## 2019-02-26 NOTE — TELEPHONE ENCOUNTER
Called and spoke with patient.   Patient requesting that Rx [facility] [place]. Rx was a local print by Dr. Muir.     Alexa Pabon RN

## 2019-02-26 NOTE — TELEPHONE ENCOUNTER
"Called and spoke with patient and wife. Reports diarrhea for last 5-6 days.  Reports that he was in [facility] and got diarrhea and since he's been home it's gotten worse. States he can't be away from a toilet for more than 1/2 hour and has 3 instances of incontinence today.  Reports little abdominal pain and stools are light brown and \"oily\".  Denies blood in stools, signs of dehydration, dizziness, nausea/vomiting, fever.   Patient has tried kaopectate but no relief.     Advised patient he should schedule appointment but patient states he wouldn't be able to make it to the clinic without being incontinent or without a bathroom for that long.   Advised patient on home care instructions for diarrhea, encouraged clear liquid diet for first 12-24 hours then introduce bland foods and increase diet as tolerated.     Please advise if an Rx can be sent in or next steps.    Alexa Pabon RN  "

## 2019-02-26 NOTE — TELEPHONE ENCOUNTER
Send in prescription for     Diphenoxylate-atropine (LOMOTIL) [ Hard copy prescription generated ]    Recommend we need to collect stool studies to include ova and parasites, cdiff etc. If he hasn't improved over the next few days . Otherwise , recommend typical basic diarrhea treatments, push fluids, etc.    Reginaldo Muir MD

## 2019-02-26 NOTE — TELEPHONE ENCOUNTER
Reason for Call:  Medication or medication refill:    Do you use a [facility]?  Name of the pharmacy and phone number for the current request:    [id number] [place], [place] - [address]  [address]  [place] [address]  Phone: [phone] Fax: [phone]      Name of the medication requested: Something to help with diarrhea.     Other request: Patient has had diarrhea for 5 days. Needs something to help,     Can we leave a detailed message on this number? YES    Phone number patient can be reached at: Cell number on file:    Telephone Information:   Mobile [phone]       Best Time: any     Call taken on 2/26/2019 at 11:13 AM by Hill Suresh

## 2019-02-26 NOTE — TELEPHONE ENCOUNTER
Lomotil prescription faxed to [facility] pharmacy at [phone].  Patient called and informed.  Yuli Michael,

## 2019-03-04 ENCOUNTER — OFFICE VISIT (OUTPATIENT)
Dept: FAMILY MEDICINE | Facility: CLINIC | Age: 84
End: 2019-03-04
Payer: COMMERCIAL

## 2019-03-04 VITALS
OXYGEN SATURATION: 98 % | HEART RATE: 103 BPM | SYSTOLIC BLOOD PRESSURE: 118 MMHG | DIASTOLIC BLOOD PRESSURE: 70 MMHG | RESPIRATION RATE: 18 BRPM | TEMPERATURE: 97.1 F | BODY MASS INDEX: 27.21 KG/M2 | WEIGHT: 195 LBS

## 2019-03-04 DIAGNOSIS — D69.2 SENILE PURPURA (H): Primary | ICD-10-CM

## 2019-03-04 DIAGNOSIS — R19.7 DIARRHEA OF PRESUMED INFECTIOUS ORIGIN: ICD-10-CM

## 2019-03-04 PROCEDURE — 99213 OFFICE O/P EST LOW 20 MIN: CPT | Performed by: INTERNAL MEDICINE

## 2019-03-04 NOTE — PROGRESS NOTES
SUBJECTIVE:   Dimitri Neves is a 83 year old male who presents to clinic today for the following health issues:      Chief Complaint   Patient presents with     Derm Problem     dry spots and skin breaking open on both arms, spreading to legs         Senile purpura (H)  Diarrhea of presumed infectious origin     Patient and spouse took a 10 days cruise. Since returning to home patient had a short lived horrific self limited diarrhea condition . We prescribed diphenoxylate-atropine (LOMOTIL) for this and it has helped. We spent some time going over a lot of questions about diarrhea but his real most pressing concern was actually his noteworthy senile purpura . This is a chronic problem which seems especially problematic recently.    Problem list and histories reviewed & adjusted, as indicated.  Additional history: as documented    Patient Active Problem List   Diagnosis     Essential hypertension with goal blood pressure less than 140/90     Narcotic dependence, episodic use (H)     ED (erectile dysfunction)     HUDSON (obstructive sleep apnea)     PE (pulmonary thromboembolism) (H)     HYPERLIPIDEMIA LDL GOAL <130     Advanced directives, counseling/discussion     Abnormal glucose     AK (actinic keratosis)     SNHL (sensorineural hearing loss)     Endolymphatic hydrops     History of squamous cell carcinoma     Pseudophakia, Yag Caps, ou     Venous (peripheral) insufficiency     Malignant neoplasm of prostate (H)     Dyspnea on exertion     Posterior vitreous detachment, bilateral     Iris nevus, ou     Dry eye syndrome, bilateral     Bone metastasis (H)     Early dry stage nonexudative age-related macular degeneration of both eyes     Past Surgical History:   Procedure Laterality Date     ARTHROSCOPY KNEE RT/LT  ~1995    Right     C LUMBAR SPINE FUSION,ANTER APPRCH  8/2007    four times total, latest 8/07     CATARACT IOL, RT/LT       LASER YAG CAPSULOTOMY      both eyes     ORCHIECTOMY SCROTAL Bilateral  2017    Procedure: ORCHIECTOMY SCROTAL;  Bilateral orchiectomy scrotal approach;  Surgeon: Senthil Taylor MD;  Location: [facility] OR     PHACOEMULSIFICATION CLEAR CORNEA WITH STANDARD INTRAOCULAR LENS IMPLANT  12/12    right/left eye     ROTATOR CUFF REPAIR RT/LT  ~    right       Social History     Tobacco Use     Smoking status: Former Smoker     Packs/day: 1.00     Years: 25.00     Pack years: 25.00     Types: Cigarettes     Last attempt to quit: 1976     Years since quittin.1     Smokeless tobacco: Never Used     Tobacco comment: lives in smoke free household   Substance Use Topics     Alcohol use: Yes     Alcohol/week: 7.0 oz     Types: 14 Standard drinks or equivalent per week     Comment: 2-3 drinks every night     Family History   Problem Relation Age of Onset     Cancer Father         Lung 64y     Diabetes Brother      Hypertension Brother      Cancer Mother         Liver     Cerebrovascular Disease Mother      Eye Disorder Mother      Glaucoma Mother      Cerebrovascular Disease Maternal Grandmother      C.A.D. No family hx of      Thyroid Disease No family hx of      Macular Degeneration No family hx of      Melanoma No family hx of          Current Outpatient Medications   Medication Sig Dispense Refill     abiraterone (ZYTIGA) 250 MG tablet Take 4 tablets (1,000 mg) by mouth daily for 30 doses Take on empty stomach. 120 tablet 0     albuterol (2.5 MG/3ML) 0.083% neb solution TAKE 1 VIAL BY NEBULIZATION EVERY 6 HOURS AS NEEDED FOR SHORTNESS OF BREATH / DYSPNEA OR WHEEZING 75 mL 1     albuterol (2.5 MG/3ML) 0.083% neb solution Take 1 vial (2.5 mg) by nebulization every 6 hours as needed for shortness of breath / dyspnea or wheezing 25 vial 3     ASPIRIN PO Take 81 mg by mouth       Calcium Carbonate (CALCIUM 600 PO)        Cholecalciferol (VITAMIN D) 2000 UNITS tablet Take 1 tablet by mouth daily       cyanocobalamin ([facility] VITAMIN  B12) 1000 MCG TABS Take 1 tablet by mouth daily  30 tablet 5     diphenoxylate-atropine (LOMOTIL) 2.5-0.025 MG tablet Take 1 tablet by mouth 4 times daily as needed for diarrhea 40 tablet 1     fluocinonide (LIDEX) 0.05 % cream Apply sparingly to affected area twice daily as needed on legs.  Do not apply to face. 120 g 3     fluorouracil (EFUDEX) 5 % cream Twice daily to scalp and face for two weeks 40 g 1     furosemide (LASIX) 20 MG tablet TAKE 1 TABLET (20 MG) BY MOUTH DAILY 90 tablet 1     gabapentin (NEURONTIN) 300 MG capsule Take 900 mg by mouth At Bedtime       metoprolol succinate ER (TOPROL-XL) 25 MG 24 hr tablet TAKE 1 TABLET BY MOUTH EVERY DAY Follow up with Dr. Muir for further refills 30 tablet 0     order for DME Equipment being ordered: Nebulizer 1 Device 0     order for DME Equipment being ordered: CPAP machine with associated CPAP supplies and tubing 1 Device 0     order for DME Equipment being ordered: Nebulizer 1 Device 0     oxyCODONE (ROXICODONE) 5 MG IR tablet Take 1 tablet (5 mg) by mouth every 6 hours as needed for pain 30 tablet 0     potassium chloride ER (K-DUR/KLOR-CON M) 10 MEQ CR tablet Take 1 tablet (10 mEq) by mouth 2 times daily 180 tablet 3     predniSONE (DELTASONE) 5 MG tablet Take 5 mg by mouth 2 times daily. 60 tablet 0     predniSONE (DELTASONE) 5 MG tablet Take 5 mg by mouth 2 times daily. 60 tablet 0     predniSONE (DELTASONE) 5 MG tablet Take 5 mg by mouth 2 times daily. 60 tablet 0     quinapril (ACCUPRIL) 40 MG Tab TAKE 1 TABLET (40 MG) BY MOUTH DAILY 90 tablet 1     simvastatin (ZOCOR) 40 MG tablet TAKE 0.5 TABLETS (20 MG) BY MOUTH DAILY 45 tablet 3     ipratropium - albuterol 0.5 mg/2.5 mg/3 mL (DUONEB) 0.5-2.5 (3) MG/3ML neb solution Take 1 vial (3 mLs) by nebulization once for 1 dose 3 mL 0     Allergies   Allergen Reactions     Morphine Sulfate GI Disturbance     vomiting     Vicodin [Hydrocodone-Acetaminophen] Nausea and Vomiting     Recent Labs   Lab Test 01/07/19  0855 11/12/18  0806 10/01/18  0933   09/15/17  1131  07/19/16  0817 06/13/16  1607  05/29/12  1328  04/05/11  0846   A1C  --   --   --   --  5.7  --  5.5  --   --  5.4  --   --    LDL  --   --  60  --  61  --  86  --    < >  --    < > 96   HDL  --   --  87  --  54  --  71  --    < >  --    < > 49   TRIG  --   --  154*  --  150*  --  98  --    < >  --    < > 73   ALT 17 22 23   < >  --    < > 43 32   < > 25   < > 24   CR 1.02 0.93 0.95   < > 0.78   < > 0.90  --    < > 0.90   < > 1.05   GFRESTIMATED 68 78 76   < > >90   < > 81  --    < > 83   < > 69   GFRESTBLACK 79 >90 >90   < > >90   < > >90  African American GFR Calc    --    < > >90   < > 84   POTASSIUM 3.3* 3.8 4.4   < > 4.1   < > 4.4  --    < > 4.1   < > 4.2   TSH  --   --   --   --   --   --   --  1.60  --   --   --  3.73    < > = values in this interval not displayed.      BP Readings from Last 3 Encounters:   03/04/19 118/70   01/07/19 123/77   11/27/18 110/69    Wt Readings from Last 3 Encounters:   03/04/19 88.5 kg (195 lb)   01/07/19 89.5 kg (197 lb 6 oz)   11/12/18 92.5 kg (204 lb)                  Labs reviewed in EPIC    Reviewed and updated as needed this visit by clinical staff       Reviewed and updated as needed this visit by Provider         ROS:  Constitutional, HEENT, cardiovascular, pulmonary, gi and gu systems are negative, except as otherwise noted.    OBJECTIVE:                                                    /70   Pulse 103   Temp 97.1  F (36.2  C) (Oral)   Resp 18   Wt 88.5 kg (195 lb)   SpO2 98%   BMI 27.21 kg/m    Body mass index is 27.21 kg/m .  GENERAL APPEARANCE: healthy, alert and no distress  EYES: Eyes grossly normal to inspection, PERRL and conjunctivae and sclerae normal  SKIN: no suspicious lesions or rashes and notable for multiple senile purpura       Diagnostic test results:  Diagnostic Test Results:  No orders of the defined types were placed in this encounter.          ASSESSMENT/PLAN:                                                    1. Senile  purpura (H)  Pathological process reviewed in significant detail  . Fundamentally this is not a surprising or harmful development following rthis elderly gentleman . We discussed what to be on the watch for  , update me if significant changes have taken place , keep an eye on things     2. Diarrhea of presumed infectious origin  Noted as a point of historical importance       Follow up with Provider - 3-6 months      Reginaldo Muir MD  [facility]

## 2019-03-04 NOTE — PATIENT INSTRUCTIONS
I suggest for protection from skin tears and senile purpura , try some Telfa Non Stick Pads    Ask your local pharmacist

## 2019-03-19 ENCOUNTER — TRANSFERRED RECORDS (OUTPATIENT)
Dept: HEALTH INFORMATION MANAGEMENT | Facility: CLINIC | Age: 84
End: 2019-03-19

## 2019-03-21 DIAGNOSIS — C61 MALIGNANT NEOPLASM OF PROSTATE (H): ICD-10-CM

## 2019-03-21 DIAGNOSIS — C79.51 BONE METASTASIS: Primary | ICD-10-CM

## 2019-03-21 RX ORDER — ABIRATERONE ACETATE 250 MG/1
1000 TABLET ORAL DAILY
Qty: 120 TABLET | Refills: 0 | Status: SHIPPED | OUTPATIENT
Start: 2019-03-21 | End: 2019-08-20

## 2019-03-21 RX ORDER — PREDNISONE 5 MG/1
5 TABLET ORAL 2 TIMES DAILY
Qty: 60 TABLET | Refills: 0 | Status: SHIPPED | OUTPATIENT
Start: 2019-03-21 | End: 2019-08-20

## 2019-03-26 DIAGNOSIS — R79.89 LOW VITAMIN B12 LEVEL: ICD-10-CM

## 2019-03-27 RX ORDER — OMEGA-3/DHA/EPA/FISH OIL 35-113.5MG
TABLET,CHEWABLE ORAL
Qty: 30 TABLET | Refills: 5 | Status: SHIPPED | OUTPATIENT
Start: 2019-03-27 | End: 2019-09-18

## 2019-04-02 ENCOUNTER — OFFICE VISIT (OUTPATIENT)
Dept: DERMATOLOGY | Facility: CLINIC | Age: 84
End: 2019-04-02
Payer: COMMERCIAL

## 2019-04-02 VITALS — SYSTOLIC BLOOD PRESSURE: 135 MMHG | DIASTOLIC BLOOD PRESSURE: 69 MMHG | HEART RATE: 68 BPM | OXYGEN SATURATION: 96 %

## 2019-04-02 DIAGNOSIS — L57.0 AK (ACTINIC KERATOSIS): ICD-10-CM

## 2019-04-02 DIAGNOSIS — D48.5 NEOPLASM OF UNCERTAIN BEHAVIOR OF SKIN: Primary | ICD-10-CM

## 2019-04-02 PROCEDURE — 99213 OFFICE O/P EST LOW 20 MIN: CPT | Mod: 25 | Performed by: DERMATOLOGY

## 2019-04-02 PROCEDURE — 17000 DESTRUCT PREMALG LESION: CPT | Performed by: DERMATOLOGY

## 2019-04-02 PROCEDURE — 17003 DESTRUCT PREMALG LES 2-14: CPT | Performed by: DERMATOLOGY

## 2019-04-02 NOTE — NURSING NOTE
"Initial /69   Pulse 68   SpO2 96%  Estimated body mass index is 27.21 kg/m  as calculated from the following:    Height as of 1/7/19: 1.803 m (5' 10.98\").    Weight as of 3/4/19: 88.5 kg (195 lb). .      "

## 2019-04-02 NOTE — PROGRESS NOTES
Dimitri Neves is a 83 year old year old male patient here today for f/u efudex.  Did well still some rough spots.   .  Patient states this has been present for a while.  Patient reports the following symptoms:  rough.  Patient reports the following previous treatments efudex .  Patient reports the following modifying factors none.  Associated symptoms: none.  Patient has no other skin complaints today.  Remainder of the HPI, Meds, PMH, Allergies, FH, and SH was reviewed in chart.      Past Medical History:   Diagnosis Date     Actinic keratosis      AK (actinic keratosis) 7/9/2012     Cataract cortical, senile right eye 4/17/2012     DDD (degenerative disc disease), lumbar 1979    s/p 4 back surgeries, spinal cord stimulator implant      Diverticulosis      ED (erectile dysfunction) 2009     GERD (gastroesophageal reflux disease) ~1980     HTN (hypertension), benign ~2000     Macular degeneration, dry, mild, ou 7/23/2016     Multiple lung nodules     Several basilar pulmonary nodules <5 mm     HUDSON (obstructive sleep apnea) 10/2005    on CPAP     Other abnormal glucose 01/14/2009     PE (pulmonary thromboembolism) (H) 1981    after back surgery      Pure hypercholesterolemia ~2000     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       Past Surgical History:   Procedure Laterality Date     ARTHROSCOPY KNEE RT/LT  ~1995    Right     C LUMBAR SPINE FUSION,ANTER APPRCH  8/2007    four times total, latest 8/07     CATARACT IOL, RT/LT       LASER YAG CAPSULOTOMY      both eyes     ORCHIECTOMY SCROTAL Bilateral 9/27/2017    Procedure: ORCHIECTOMY SCROTAL;  Bilateral orchiectomy scrotal approach;  Surgeon: Senthil Taylor MD;  Location: [facility] OR     PHACOEMULSIFICATION CLEAR CORNEA WITH STANDARD INTRAOCULAR LENS IMPLANT  6-18-12/7-30-12    right/left eye     ROTATOR CUFF REPAIR RT/LT  ~1995    right        Family History   Problem Relation Age of Onset     Cancer Father         Lung 64y     Diabetes Brother       Hypertension Brother      Cancer Mother         Liver     Cerebrovascular Disease Mother      Eye Disorder Mother      Glaucoma Mother      Cerebrovascular Disease Maternal Grandmother      C.A.D. No family hx of      Thyroid Disease No family hx of      Macular Degeneration No family hx of      Melanoma No family hx of        Social History     Socioeconomic History     Marital status:      Spouse name: Tatiana     Number of children: 2     Years of education: Not on file     Highest education level: Not on file   Occupational History     Employer: RETIRED   Social Needs     Financial resource strain: Not on file     Food insecurity:     Worry: Not on file     Inability: Not on file     Transportation needs:     Medical: Not on file     Non-medical: Not on file   Tobacco Use     Smoking status: Former Smoker     Packs/day: 1.00     Years: 25.00     Pack years: 25.00     Types: Cigarettes     Last attempt to quit: 1976     Years since quittin.2     Smokeless tobacco: Never Used     Tobacco comment: lives in smoke free household   Substance and Sexual Activity     Alcohol use: Yes     Alcohol/week: 7.0 oz     Types: 14 Standard drinks or equivalent per week     Comment: 2-3 drinks every night     Drug use: No     Comment: tatoos- sterile     Sexual activity: Yes     Partners: Female   Lifestyle     Physical activity:     Days per week: Not on file     Minutes per session: Not on file     Stress: Not on file   Relationships     Social connections:     Talks on phone: Not on file     Gets together: Not on file     Attends Gnosticism service: Not on file     Active member of club or organization: Not on file     Attends meetings of clubs or organizations: Not on file     Relationship status: Not on file     Intimate partner violence:     Fear of current or ex partner: Not on file     Emotionally abused: Not on file     Physically abused: Not on file     Forced sexual activity: Not on file   Other Topics  Concern      Service Yes     Comment: [employer] x8 years     Blood Transfusions No     Caffeine Concern No     Occupational Exposure Not Asked     Hobby Hazards No     Sleep Concern No     Stress Concern No     Weight Concern Yes     Special Diet No     Back Care Yes     Exercise Yes     Bike Helmet Not Asked     Seat Belt Yes     Self-Exams No     Parent/sibling w/ CABG, MI or angioplasty before 65F 55M? No   Social History Narrative     Not on file       Outpatient Encounter Medications as of 4/2/2019   Medication Sig Dispense Refill     abiraterone (ZYTIGA) 250 MG tablet Take 4 tablets (1,000 mg) by mouth daily for 30 doses Take on empty stomach. 120 tablet 0     albuterol (2.5 MG/3ML) 0.083% neb solution TAKE 1 VIAL BY NEBULIZATION EVERY 6 HOURS AS NEEDED FOR SHORTNESS OF BREATH / DYSPNEA OR WHEEZING 75 mL 1     albuterol (2.5 MG/3ML) 0.083% neb solution Take 1 vial (2.5 mg) by nebulization every 6 hours as needed for shortness of breath / dyspnea or wheezing 25 vial 3     ASPIRIN PO Take 81 mg by mouth       Calcium Carbonate (CALCIUM 600 PO)        Cholecalciferol (VITAMIN D) 2000 UNITS tablet Take 1 tablet by mouth daily       [facility] VITAMIN  B12 1000 MCG tablet TAKE 1 TABLET BY MOUTH EVERY DAY 30 tablet 5     diphenoxylate-atropine (LOMOTIL) 2.5-0.025 MG tablet Take 1 tablet by mouth 4 times daily as needed for diarrhea 40 tablet 1     furosemide (LASIX) 20 MG tablet TAKE 1 TABLET (20 MG) BY MOUTH DAILY 90 tablet 1     gabapentin (NEURONTIN) 300 MG capsule Take 900 mg by mouth At Bedtime       metoprolol succinate ER (TOPROL-XL) 25 MG 24 hr tablet TAKE 1 TABLET BY MOUTH EVERY DAY Follow up with Dr. Muir for further refills 30 tablet 0     order for DME Equipment being ordered: Nebulizer 1 Device 0     order for DME Equipment being ordered: CPAP machine with associated CPAP supplies and tubing 1 Device 0     order for DME Equipment being ordered: Nebulizer 1 Device 0     oxyCODONE (ROXICODONE) 5 MG  IR tablet Take 1 tablet (5 mg) by mouth every 6 hours as needed for pain 30 tablet 0     potassium chloride ER (K-DUR/KLOR-CON M) 10 MEQ CR tablet Take 1 tablet (10 mEq) by mouth 2 times daily 180 tablet 3     predniSONE (DELTASONE) 5 MG tablet Take 5 mg by mouth 2 times daily. 60 tablet 0     predniSONE (DELTASONE) 5 MG tablet Take 5 mg by mouth 2 times daily. 60 tablet 0     predniSONE (DELTASONE) 5 MG tablet Take 5 mg by mouth 2 times daily. 60 tablet 0     predniSONE (DELTASONE) 5 MG tablet Take 5 mg by mouth 2 times daily. 60 tablet 0     quinapril (ACCUPRIL) 40 MG Tab TAKE 1 TABLET (40 MG) BY MOUTH DAILY 90 tablet 1     simvastatin (ZOCOR) 40 MG tablet TAKE 0.5 TABLETS (20 MG) BY MOUTH DAILY 45 tablet 3     [] abiraterone (ZYTIGA) 250 MG tablet Take 4 tablets (1,000 mg) by mouth daily for 30 doses Take on empty stomach. 120 tablet 0     [] abiraterone (ZYTIGA) 250 MG tablet Take 4 tablets (1,000 mg) by mouth daily for 30 doses Take on empty stomach. 120 tablet 0     [] abiraterone (ZYTIGA) 250 MG tablet Take 4 tablets (1,000 mg) by mouth daily for 30 doses Take on empty stomach. 120 tablet 0     [] abiraterone (ZYTIGA) 250 MG tablet Take 4 tablets (1,000 mg) by mouth daily for 30 doses Take on empty stomach. 120 tablet 0     [] abiraterone (ZYTIGA) 250 MG tablet Take 4 tablets (1,000 mg) by mouth daily for 30 doses Take on empty stomach. 120 tablet 0     fluocinonide (LIDEX) 0.05 % cream Apply sparingly to affected area twice daily as needed on legs.  Do not apply to face. (Patient not taking: Reported on 2019) 120 g 3     fluorouracil (EFUDEX) 5 % cream Twice daily to scalp and face for two weeks (Patient not taking: Reported on 2019) 40 g 1     ipratropium - albuterol 0.5 mg/2.5 mg/3 mL (DUONEB) 0.5-2.5 (3) MG/3ML neb solution Take 1 vial (3 mLs) by nebulization once for 1 dose 3 mL 0     No facility-administered encounter medications on file as of 2019.               Review Of Systems  Skin: As above  Eyes: negative  Ears/Nose/Throat: negative  Respiratory: No shortness of breath, dyspnea on exertion, cough, or hemoptysis  Cardiovascular: negative  Gastrointestinal: negative  Genitourinary: negative  Musculoskeletal: negative  Neurologic: negative  Psychiatric: negative  Hematologic/Lymphatic/Immunologic: negative  Endocrine: negative      O:   NAD, WDWN, Alert & Oriented, Mood & Affect wnl, Vitals stable   Here today alone   /69   Pulse 68   SpO2 96%    General appearance normal   Vitals stable   Alert, oriented and in no acute distress      Following lymph nodes palpated: Occipital, Cervical, Supraclavicular no lad   Gritty papules on face and scalp   L cheek and r temple with some concerning areas for squamous cell carcinoma      The remainder of expanded problem focused exam was unremarkable; the following areas were examined:  scalp/hair, conjunctiva/lids, face, neck, lips, chest, digits/nails, RUE, LUE.      Eyes: Conjunctivae/lids:Normal     ENT: Lips, buccal mucosa, tongue: normal    MSK:Normal    Cardiovascular: peripheral edema none    Pulm: Breathing Normal    Lymph Nodes: No Head and Neck Lymphadenopathy     Neuro/Psych: Orientation:Normal; Mood/Affect:Normal      A/P:  1. Actinic keratosis s/p efudex  L temple x3  L cheek x2  Scalp x7  LN2:  Treated with LN2 for 5s for 1-2 cycles. Warned risks of blistering, pain, pigment change, scarring, and incomplete resolution.  Advised patient to return if lesions do not completely resolve.  Wound care sheet given.  2. Concerning areas bx discussed with patient he declines  BENIGN LESIONS DISCUSSED WITH PATIENT:  I discussed the specifics of tumor, prognosis, and genetics of benign lesions.  I explained that treatment of these lesions would be purely cosmetic and not medically neccessary.  I discussed with patient different removal options including excision, cautery and /or laser.      Nature and genetics  of benign skin lesions dicussed with patient.  Signs and Symptoms of skin cancer discussed with patient.  Patient encouraged to perform monthly skin exams.  UV precautions reviewed with patient.  Patient to follow up with Primary Care provider regarding elevated blood pressure.  Skin care regimen reviewed with patient: Eliminate harsh soaps, i.e. Dial, zest, irsih spring; Mild soaps such as Cetaphil or Dove sensitive skin, avoid hot or cold showers, aggressive use of emollients including vanicream, cetaphil or cerave discussed with patient.    Risks of non-melanoma skin cancer discussed with patient   Return to clinic 3 months

## 2019-04-02 NOTE — LETTER
4/2/2019         RE: Dimitri Neves  [address]  [place] [address]        Dear Colleague,    Thank you for referring your patient, Dimitri Neves, to the [facility]. Please see a copy of my visit note below.    Dimitri Neves is a 83 year old year old male patient here today for f/u efudex.  Did well still some rough spots.   .  Patient states this has been present for a while.  Patient reports the following symptoms:  rough.  Patient reports the following previous treatments efudex .  Patient reports the following modifying factors none.  Associated symptoms: none.  Patient has no other skin complaints today.  Remainder of the HPI, Meds, PMH, Allergies, FH, and SH was reviewed in chart.      Past Medical History:   Diagnosis Date     Actinic keratosis      AK (actinic keratosis) 7/9/2012     Cataract cortical, senile right eye 4/17/2012     DDD (degenerative disc disease), lumbar 1979    s/p 4 back surgeries, spinal cord stimulator implant      Diverticulosis      ED (erectile dysfunction) 2009     GERD (gastroesophageal reflux disease) ~1980     HTN (hypertension), benign ~2000     Macular degeneration, dry, mild, ou 7/23/2016     Multiple lung nodules     Several basilar pulmonary nodules <5 mm     HUDSON (obstructive sleep apnea) 10/2005    on CPAP     Other abnormal glucose 01/14/2009     PE (pulmonary thromboembolism) (H) 1981    after back surgery      Pure hypercholesterolemia ~2000     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       Past Surgical History:   Procedure Laterality Date     ARTHROSCOPY KNEE RT/LT  ~1995    Right     C LUMBAR SPINE FUSION,ANTER APPRCH  8/2007    four times total, latest 8/07     CATARACT IOL, RT/LT       LASER YAG CAPSULOTOMY      both eyes     ORCHIECTOMY SCROTAL Bilateral 9/27/2017    Procedure: ORCHIECTOMY SCROTAL;  Bilateral orchiectomy scrotal approach;  Surgeon: Senthil Taylor MD;  Location: [facility] OR     PHACOEMULSIFICATION CLEAR CORNEA  WITH STANDARD INTRAOCULAR LENS IMPLANT  12/12    right/left eye     ROTATOR CUFF REPAIR RT/LT  ~    right        Family History   Problem Relation Age of Onset     Cancer Father         Lung 64y     Diabetes Brother      Hypertension Brother      Cancer Mother         Liver     Cerebrovascular Disease Mother      Eye Disorder Mother      Glaucoma Mother      Cerebrovascular Disease Maternal Grandmother      C.A.D. No family hx of      Thyroid Disease No family hx of      Macular Degeneration No family hx of      Melanoma No family hx of        Social History     Socioeconomic History     Marital status:      Spouse name: Tatiana     Number of children: 2     Years of education: Not on file     Highest education level: Not on file   Occupational History     Employer: RETIRED   Social Needs     Financial resource strain: Not on file     Food insecurity:     Worry: Not on file     Inability: Not on file     Transportation needs:     Medical: Not on file     Non-medical: Not on file   Tobacco Use     Smoking status: Former Smoker     Packs/day: 1.00     Years: 25.00     Pack years: 25.00     Types: Cigarettes     Last attempt to quit: 1976     Years since quittin.2     Smokeless tobacco: Never Used     Tobacco comment: lives in smoke free household   Substance and Sexual Activity     Alcohol use: Yes     Alcohol/week: 7.0 oz     Types: 14 Standard drinks or equivalent per week     Comment: 2-3 drinks every night     Drug use: No     Comment: tatoos- sterile     Sexual activity: Yes     Partners: Female   Lifestyle     Physical activity:     Days per week: Not on file     Minutes per session: Not on file     Stress: Not on file   Relationships     Social connections:     Talks on phone: Not on file     Gets together: Not on file     Attends Buddhist service: Not on file     Active member of club or organization: Not on file     Attends meetings of clubs or organizations: Not on file      Relationship status: Not on file     Intimate partner violence:     Fear of current or ex partner: Not on file     Emotionally abused: Not on file     Physically abused: Not on file     Forced sexual activity: Not on file   Other Topics Concern      Service Yes     Comment: [employer] x8 years     Blood Transfusions No     Caffeine Concern No     Occupational Exposure Not Asked     Hobby Hazards No     Sleep Concern No     Stress Concern No     Weight Concern Yes     Special Diet No     Back Care Yes     Exercise Yes     Bike Helmet Not Asked     Seat Belt Yes     Self-Exams No     Parent/sibling w/ CABG, MI or angioplasty before 65F 55M? No   Social History Narrative     Not on file       Outpatient Encounter Medications as of 4/2/2019   Medication Sig Dispense Refill     abiraterone (ZYTIGA) 250 MG tablet Take 4 tablets (1,000 mg) by mouth daily for 30 doses Take on empty stomach. 120 tablet 0     albuterol (2.5 MG/3ML) 0.083% neb solution TAKE 1 VIAL BY NEBULIZATION EVERY 6 HOURS AS NEEDED FOR SHORTNESS OF BREATH / DYSPNEA OR WHEEZING 75 mL 1     albuterol (2.5 MG/3ML) 0.083% neb solution Take 1 vial (2.5 mg) by nebulization every 6 hours as needed for shortness of breath / dyspnea or wheezing 25 vial 3     ASPIRIN PO Take 81 mg by mouth       Calcium Carbonate (CALCIUM 600 PO)        Cholecalciferol (VITAMIN D) 2000 UNITS tablet Take 1 tablet by mouth daily       [facility] VITAMIN  B12 1000 MCG tablet TAKE 1 TABLET BY MOUTH EVERY DAY 30 tablet 5     diphenoxylate-atropine (LOMOTIL) 2.5-0.025 MG tablet Take 1 tablet by mouth 4 times daily as needed for diarrhea 40 tablet 1     furosemide (LASIX) 20 MG tablet TAKE 1 TABLET (20 MG) BY MOUTH DAILY 90 tablet 1     gabapentin (NEURONTIN) 300 MG capsule Take 900 mg by mouth At Bedtime       metoprolol succinate ER (TOPROL-XL) 25 MG 24 hr tablet TAKE 1 TABLET BY MOUTH EVERY DAY Follow up with Dr. Muir for further refills 30 tablet 0     order for DME Equipment  being ordered: Nebulizer 1 Device 0     order for DME Equipment being ordered: CPAP machine with associated CPAP supplies and tubing 1 Device 0     order for DME Equipment being ordered: Nebulizer 1 Device 0     oxyCODONE (ROXICODONE) 5 MG IR tablet Take 1 tablet (5 mg) by mouth every 6 hours as needed for pain 30 tablet 0     potassium chloride ER (K-DUR/KLOR-CON M) 10 MEQ CR tablet Take 1 tablet (10 mEq) by mouth 2 times daily 180 tablet 3     predniSONE (DELTASONE) 5 MG tablet Take 5 mg by mouth 2 times daily. 60 tablet 0     predniSONE (DELTASONE) 5 MG tablet Take 5 mg by mouth 2 times daily. 60 tablet 0     predniSONE (DELTASONE) 5 MG tablet Take 5 mg by mouth 2 times daily. 60 tablet 0     predniSONE (DELTASONE) 5 MG tablet Take 5 mg by mouth 2 times daily. 60 tablet 0     quinapril (ACCUPRIL) 40 MG Tab TAKE 1 TABLET (40 MG) BY MOUTH DAILY 90 tablet 1     simvastatin (ZOCOR) 40 MG tablet TAKE 0.5 TABLETS (20 MG) BY MOUTH DAILY 45 tablet 3     [] abiraterone (ZYTIGA) 250 MG tablet Take 4 tablets (1,000 mg) by mouth daily for 30 doses Take on empty stomach. 120 tablet 0     [] abiraterone (ZYTIGA) 250 MG tablet Take 4 tablets (1,000 mg) by mouth daily for 30 doses Take on empty stomach. 120 tablet 0     [] abiraterone (ZYTIGA) 250 MG tablet Take 4 tablets (1,000 mg) by mouth daily for 30 doses Take on empty stomach. 120 tablet 0     [] abiraterone (ZYTIGA) 250 MG tablet Take 4 tablets (1,000 mg) by mouth daily for 30 doses Take on empty stomach. 120 tablet 0     [] abiraterone (ZYTIGA) 250 MG tablet Take 4 tablets (1,000 mg) by mouth daily for 30 doses Take on empty stomach. 120 tablet 0     fluocinonide (LIDEX) 0.05 % cream Apply sparingly to affected area twice daily as needed on legs.  Do not apply to face. (Patient not taking: Reported on 2019) 120 g 3     fluorouracil (EFUDEX) 5 % cream Twice daily to scalp and face for two weeks (Patient not taking: Reported on  4/2/2019) 40 g 1     ipratropium - albuterol 0.5 mg/2.5 mg/3 mL (DUONEB) 0.5-2.5 (3) MG/3ML neb solution Take 1 vial (3 mLs) by nebulization once for 1 dose 3 mL 0     No facility-administered encounter medications on file as of 4/2/2019.              Review Of Systems  Skin: As above  Eyes: negative  Ears/Nose/Throat: negative  Respiratory: No shortness of breath, dyspnea on exertion, cough, or hemoptysis  Cardiovascular: negative  Gastrointestinal: negative  Genitourinary: negative  Musculoskeletal: negative  Neurologic: negative  Psychiatric: negative  Hematologic/Lymphatic/Immunologic: negative  Endocrine: negative      O:   NAD, WDWN, Alert & Oriented, Mood & Affect wnl, Vitals stable   Here today alone   /69   Pulse 68   SpO2 96%    General appearance normal   Vitals stable   Alert, oriented and in no acute distress      Following lymph nodes palpated: Occipital, Cervical, Supraclavicular no lad   Gritty papules on face and scalp   L cheek and r temple with some concerning areas for squamous cell carcinoma      The remainder of expanded problem focused exam was unremarkable; the following areas were examined:  scalp/hair, conjunctiva/lids, face, neck, lips, chest, digits/nails, RUE, LUE.      Eyes: Conjunctivae/lids:Normal     ENT: Lips, buccal mucosa, tongue: normal    MSK:Normal    Cardiovascular: peripheral edema none    Pulm: Breathing Normal    Lymph Nodes: No Head and Neck Lymphadenopathy     Neuro/Psych: Orientation:Normal; Mood/Affect:Normal      A/P:  1. Actinic keratosis s/p efudex  L temple x3  L cheek x2  Scalp x7  LN2:  Treated with LN2 for 5s for 1-2 cycles. Warned risks of blistering, pain, pigment change, scarring, and incomplete resolution.  Advised patient to return if lesions do not completely resolve.  Wound care sheet given.  2. Concerning areas bx discussed with patient he declines  BENIGN LESIONS DISCUSSED WITH PATIENT:  I discussed the specifics of tumor, prognosis, and genetics  of benign lesions.  I explained that treatment of these lesions would be purely cosmetic and not medically neccessary.  I discussed with patient different removal options including excision, cautery and /or laser.      Nature and genetics of benign skin lesions dicussed with patient.  Signs and Symptoms of skin cancer discussed with patient.  Patient encouraged to perform monthly skin exams.  UV precautions reviewed with patient.  Patient to follow up with Primary Care provider regarding elevated blood pressure.  Skin care regimen reviewed with patient: Eliminate harsh soaps, i.e. Dial, zest, irsih spring; Mild soaps such as Cetaphil or Dove sensitive skin, avoid hot or cold showers, aggressive use of emollients including vanicream, cetaphil or cerave discussed with patient.    Risks of non-melanoma skin cancer discussed with patient   Return to clinic 3 months      Again, thank you for allowing me to participate in the care of your patient.        Sincerely,        Senthil Jhaveri MD

## 2019-04-07 DIAGNOSIS — I10 HTN (HYPERTENSION), BENIGN: ICD-10-CM

## 2019-04-07 DIAGNOSIS — R60.0 BILATERAL LOWER EXTREMITY EDEMA: ICD-10-CM

## 2019-04-08 RX ORDER — FUROSEMIDE 20 MG
TABLET ORAL
Qty: 90 TABLET | Refills: 1 | Status: SHIPPED | OUTPATIENT
Start: 2019-04-08 | End: 2020-01-01

## 2019-04-08 NOTE — TELEPHONE ENCOUNTER
"Routing refill request to provider for review/approval because:  Labs out of range:        Requested Prescriptions   Pending Prescriptions Disp Refills     furosemide (LASIX) 20 MG tablet [Pharmacy Med Name: FUROSEMIDE 20 MG TABLET]  Last Written Prescription Date:  10/8/18  Last Fill Quantity: 90,  # refills: 1   Last office visit: 1/18/2018 with prescribing provider:     Future Office Visit:   Next 5 appointments (look out 90 days)    Apr 18, 2019  9:30 AM CDT  Return Visit with Rafael Valenzuela MD  [facility] [id number] [place] [id number] [phone]          90 tablet 1     Sig: TAKE 1 TABLET (20 MG) BY MOUTH DAILY       Diuretics (Including Combos) Protocol Failed - 4/8/2019 11:27 AM        Failed - Normal serum potassium on file in past 12 months     Recent Labs   Lab Test 01/07/19  0855   POTASSIUM 3.3*                    Passed - Blood pressure under 140/90 in past 12 months     BP Readings from Last 3 Encounters:   04/02/19 135/69   03/04/19 118/70   01/07/19 123/77                 Passed - Recent (12 mo) or future (30 days) visit within the authorizing provider's specialty     Patient had office visit in the last 12 months or has a visit in the next 30 days with authorizing provider or within the authorizing provider's specialty.  See \"Patient Info\" tab in inbasket, or \"Choose Columns\" in Meds & Orders section of the refill encounter.              Passed - Medication is active on med list        Passed - Patient is age 18 or older        Passed - Normal serum creatinine on file in past 12 months     Recent Labs   Lab Test 01/07/19  0855   CR 1.02              Passed - Normal serum sodium on file in past 12 months     Recent Labs   Lab Test 01/07/19  0855                 Aleida Ayala RN - BC      "

## 2019-04-11 ENCOUNTER — TELEPHONE (OUTPATIENT)
Dept: FAMILY MEDICINE | Facility: CLINIC | Age: 84
End: 2019-04-11

## 2019-04-11 ASSESSMENT — PATIENT HEALTH QUESTIONNAIRE - PHQ9: SUM OF ALL RESPONSES TO PHQ QUESTIONS 1-9: 0

## 2019-04-11 NOTE — TELEPHONE ENCOUNTER
Tawanda Mosley, I m Anaid Barr. I work with Dr. Muir at [facility]. He/she noticed in your chart that you are due for a patient health questionnaire. We would like to complete that today as Dr. Muir cares about your health.    Next 5 appointments (look out 90 days)    Apr 18, 2019  9:30 AM CDT  Return Visit with Rafael Valenzuela MD  [facility] ([facility]) [address] [place] [id number] [phone]   Jul 08, 2019 10:45 AM CDT  Return Visit with Senthil Jhaveri MD  [facility] ([facility]) [address] [place] [id number] [phone]          Called patient; Called patient, completed PHQ9. PHQ9 score: 0. (If patient has sucidal thoughts discuss with Team RN ASAP) <5 PASS, >5 FAIL Needs follow up appointment.  If patient refuses appointment please ask them if they would be willing to speak to the Team RN for further support over the phone.     If PHQ-9 is less than 5, close encounter. If PHQ-9 is 5 or greater, recommend that patient schedule follow up appointment with primary provider (in office, e-visit or phone visit) for medication follow up and evaluation. If positive suicidal thoughts, huddle with RN or provider today and route encounter.     Appointment Made? No    Anaid Barr

## 2019-04-18 ENCOUNTER — ONCOLOGY VISIT (OUTPATIENT)
Dept: ONCOLOGY | Facility: CLINIC | Age: 84
End: 2019-04-18
Payer: COMMERCIAL

## 2019-04-18 ENCOUNTER — INFUSION THERAPY VISIT (OUTPATIENT)
Dept: INFUSION THERAPY | Facility: CLINIC | Age: 84
End: 2019-04-18
Payer: COMMERCIAL

## 2019-04-18 ENCOUNTER — TELEPHONE (OUTPATIENT)
Dept: PHARMACY | Facility: CLINIC | Age: 84
End: 2019-04-18

## 2019-04-18 VITALS
DIASTOLIC BLOOD PRESSURE: 69 MMHG | WEIGHT: 198.5 LBS | SYSTOLIC BLOOD PRESSURE: 109 MMHG | BODY MASS INDEX: 27.79 KG/M2 | TEMPERATURE: 98.6 F | HEIGHT: 71 IN | HEART RATE: 64 BPM | RESPIRATION RATE: 16 BRPM | OXYGEN SATURATION: 97 %

## 2019-04-18 DIAGNOSIS — C61 MALIGNANT NEOPLASM OF PROSTATE (H): ICD-10-CM

## 2019-04-18 DIAGNOSIS — C79.51 BONE METASTASIS: ICD-10-CM

## 2019-04-18 DIAGNOSIS — C61 MALIGNANT NEOPLASM OF PROSTATE (H): Primary | ICD-10-CM

## 2019-04-18 DIAGNOSIS — D64.9 ANEMIA, UNSPECIFIED TYPE: ICD-10-CM

## 2019-04-18 DIAGNOSIS — C79.51 BONE METASTASIS: Primary | ICD-10-CM

## 2019-04-18 LAB
ALBUMIN SERPL-MCNC: 3.3 G/DL (ref 3.4–5)
ALP SERPL-CCNC: 83 U/L (ref 40–150)
ALT SERPL W P-5'-P-CCNC: 18 U/L (ref 0–70)
ANION GAP SERPL CALCULATED.3IONS-SCNC: 4 MMOL/L (ref 3–14)
AST SERPL W P-5'-P-CCNC: 16 U/L (ref 0–45)
BASOPHILS # BLD AUTO: 0 10E9/L (ref 0–0.2)
BASOPHILS NFR BLD AUTO: 0.2 %
BILIRUB SERPL-MCNC: 1 MG/DL (ref 0.2–1.3)
BUN SERPL-MCNC: 27 MG/DL (ref 7–30)
CALCIUM SERPL-MCNC: 9.3 MG/DL (ref 8.5–10.1)
CHLORIDE SERPL-SCNC: 105 MMOL/L (ref 94–109)
CO2 SERPL-SCNC: 31 MMOL/L (ref 20–32)
CREAT SERPL-MCNC: 1.03 MG/DL (ref 0.66–1.25)
DIFFERENTIAL METHOD BLD: ABNORMAL
EOSINOPHIL # BLD AUTO: 0.1 10E9/L (ref 0–0.7)
EOSINOPHIL NFR BLD AUTO: 0.7 %
ERYTHROCYTE [DISTWIDTH] IN BLOOD BY AUTOMATED COUNT: 15.2 % (ref 10–15)
GFR SERPL CREATININE-BSD FRML MDRD: 67 ML/MIN/{1.73_M2}
GLUCOSE SERPL-MCNC: 105 MG/DL (ref 70–99)
HCT VFR BLD AUTO: 37.6 % (ref 40–53)
HGB BLD-MCNC: 12.4 G/DL (ref 13.3–17.7)
IMM GRANULOCYTES # BLD: 0 10E9/L (ref 0–0.4)
IMM GRANULOCYTES NFR BLD: 0.5 %
LYMPHOCYTES # BLD AUTO: 1.3 10E9/L (ref 0.8–5.3)
LYMPHOCYTES NFR BLD AUTO: 16 %
MCH RBC QN AUTO: 32.8 PG (ref 26.5–33)
MCHC RBC AUTO-ENTMCNC: 33 G/DL (ref 31.5–36.5)
MCV RBC AUTO: 100 FL (ref 78–100)
MONOCYTES # BLD AUTO: 0.7 10E9/L (ref 0–1.3)
MONOCYTES NFR BLD AUTO: 9 %
NEUTROPHILS # BLD AUTO: 6.1 10E9/L (ref 1.6–8.3)
NEUTROPHILS NFR BLD AUTO: 73.6 %
PLATELET # BLD AUTO: 208 10E9/L (ref 150–450)
POTASSIUM SERPL-SCNC: 4.1 MMOL/L (ref 3.4–5.3)
PROT SERPL-MCNC: 6.8 G/DL (ref 6.8–8.8)
PSA SERPL-MCNC: 17.8 UG/L (ref 0–4)
RBC # BLD AUTO: 3.78 10E12/L (ref 4.4–5.9)
SODIUM SERPL-SCNC: 140 MMOL/L (ref 133–144)
WBC # BLD AUTO: 8.2 10E9/L (ref 4–11)

## 2019-04-18 PROCEDURE — 99207 ZZC NO CHARGE LOS: CPT

## 2019-04-18 PROCEDURE — 99214 OFFICE O/P EST MOD 30 MIN: CPT | Mod: 25 | Performed by: INTERNAL MEDICINE

## 2019-04-18 PROCEDURE — 85025 COMPLETE CBC W/AUTO DIFF WBC: CPT | Performed by: INTERNAL MEDICINE

## 2019-04-18 PROCEDURE — 96365 THER/PROPH/DIAG IV INF INIT: CPT | Performed by: INTERNAL MEDICINE

## 2019-04-18 PROCEDURE — 80053 COMPREHEN METABOLIC PANEL: CPT | Performed by: INTERNAL MEDICINE

## 2019-04-18 PROCEDURE — 84153 ASSAY OF PSA TOTAL: CPT | Performed by: INTERNAL MEDICINE

## 2019-04-18 PROCEDURE — 36415 COLL VENOUS BLD VENIPUNCTURE: CPT | Performed by: INTERNAL MEDICINE

## 2019-04-18 RX ORDER — ABIRATERONE ACETATE 250 MG/1
1000 TABLET ORAL DAILY
Qty: 120 TABLET | Refills: 0 | Status: SHIPPED | OUTPATIENT
Start: 2019-04-18 | End: 2019-08-20

## 2019-04-18 RX ORDER — PREDNISONE 5 MG/1
5 TABLET ORAL 2 TIMES DAILY
Qty: 60 TABLET | Refills: 0 | Status: SHIPPED | OUTPATIENT
Start: 2019-04-18 | End: 2019-08-20

## 2019-04-18 RX ORDER — ZOLEDRONIC ACID 0.04 MG/ML
4 INJECTION, SOLUTION INTRAVENOUS ONCE
Status: COMPLETED | OUTPATIENT
Start: 2019-04-18 | End: 2019-04-18

## 2019-04-18 RX ADMIN — ZOLEDRONIC ACID 4 MG: 0.04 INJECTION, SOLUTION INTRAVENOUS at 10:28

## 2019-04-18 RX ADMIN — Medication 250 ML: at 10:28

## 2019-04-18 ASSESSMENT — MIFFLIN-ST. JEOR: SCORE: 1627.26

## 2019-04-18 ASSESSMENT — PAIN SCALES - GENERAL: PAINLEVEL: NO PAIN (0)

## 2019-04-18 NOTE — LETTER
4/18/2019         RE: Dimitri Neves  [address] [place] [address]        Dear Colleague,    Thank you for referring your patient, Dimitri Neves, to the [facility]. Please see a copy of my visit note below.    [facility]  HEMATOLOGY AND ONCOLOGY    FOLLOW-UP VISIT NOTE    PATIENT NAME: Dimitri Neves MRN # [id number]  DATE OF VISIT: Apr 18, 2019 YOB: 1935    REFERRING PROVIDER: No referring provider defined for this encounter.    CANCER TYPE: Prostate adenocarcinoma  STAGE: IV    TREATMENT SUMMARY:  Dimitri was followed by his PCP on 6/13/13 and was elevated at 32 as noted below. He was referred to Dr. Taylor. He was treated with a 10 day course of ciprofloxacin and followed again in 6 weeks on 8/10. His PSA drawn prior to this visit was unchanged and remained elevated at 32. He had a TRUS biopsy on 8/25/14 which was negative for prostate adenocarcinoma. His PSA was repeated in 3 months and now increased to 67.9. He had a repeat biopsy of prostate on 12/16/14 which now confirmed prostate adenocarcinoma with eileen 4+4 disease involving 5 of the cores and [place] 3+3 disease involving remainder of cores. He was staged with a CT scan and bone scan done on 12/29/14 which revealed metastatic foci in the upper cervical vertebrae on the left, right posterior 3rd rib and right ischium, focal uptake in the posterior left 11th rib with corresponding lytic expansile appearance on CT, suspicious for metastasis.          4/5/2011 08:46 6/13/2014 08:03 7/25/2014 09:47 12/5/2014 09:00 4/7/2015 09:14   PSA 2.77 32.70 (H) 32.00 (H) 67.88 (H) 1.92      He was started on androgen deprivation therapy in Jan 2015 with a good initial response. His PSA has been increasing recently and he was started on bicalutamide in February with no response. He has continued to have rising PSA and was prescribed abiraterone with prednisone. He had a huge copay with this and has been referred  to Medical Oncology to review other options.     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 He is tolerating his abiraterone without any difficulty.     He notes that he does not have the strength he used to have.     He does have hot flushes, fatigue, mood swings on androgen deprivation therapy.     He did have bilateral orchiectomies.         PAST MEDICAL HISTORY     Past Medical History:   Diagnosis Date     Actinic keratosis      AK (actinic keratosis) 7/9/2012     Cataract cortical, senile right eye 4/17/2012     DDD (degenerative disc disease), lumbar 1979    s/p 4 back surgeries, spinal cord stimulator implant      Diverticulosis      ED (erectile dysfunction) 2009     GERD (gastroesophageal reflux disease) ~1980     HTN (hypertension), benign ~2000     Macular degeneration, dry, mild, ou 7/23/2016     Multiple lung nodules     Several basilar pulmonary nodules <5 mm     HUDSON (obstructive sleep apnea) 10/2005    on CPAP     Other abnormal glucose 01/14/2009     PE (pulmonary thromboembolism) (H) 1981    after back surgery      Pure hypercholesterolemia ~2000     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         CURRENT OUTPATIENT MEDICATIONS     Current Outpatient Medications   Medication Sig     abiraterone (ZYTIGA) 250 MG tablet Take 4 tablets (1,000 mg) by mouth daily for 30 doses Take on empty stomach.     ASPIRIN PO Take 81 mg by mouth     Calcium Carbonate (CALCIUM 600 PO)      Cholecalciferol (VITAMIN D) 2000 UNITS tablet Take 1 tablet by mouth daily     [employer] VITAMIN  B12 1000 MCG tablet TAKE 1 TABLET BY MOUTH EVERY DAY     diphenoxylate-atropine (LOMOTIL) 2.5-0.025 MG tablet Take 1 tablet by mouth 4 times daily as  needed for diarrhea     furosemide (LASIX) 20 MG tablet TAKE 1 TABLET (20 MG) BY MOUTH DAILY     gabapentin (NEURONTIN) 300 MG capsule Take 900 mg by mouth At Bedtime     metoprolol succinate ER (TOPROL-XL) 25 MG 24 hr tablet TAKE 1 TABLET BY MOUTH EVERY DAY Follow up with Dr. Muir for further refills     oxyCODONE (ROXICODONE) 5 MG IR tablet Take 1 tablet (5 mg) by mouth every 6 hours as needed for pain     potassium chloride ER (K-DUR/KLOR-CON M) 10 MEQ CR tablet Take 1 tablet (10 mEq) by mouth 2 times daily     predniSONE (DELTASONE) 5 MG tablet Take 5 mg by mouth 2 times daily.     quinapril (ACCUPRIL) 40 MG Tab TAKE 1 TABLET (40 MG) BY MOUTH DAILY     simvastatin (ZOCOR) 40 MG tablet TAKE 0.5 TABLETS (20 MG) BY MOUTH DAILY     albuterol (2.5 MG/3ML) 0.083% neb solution TAKE 1 VIAL BY NEBULIZATION EVERY 6 HOURS AS NEEDED FOR SHORTNESS OF BREATH / DYSPNEA OR WHEEZING (Patient not taking: Reported on 4/18/2019)     albuterol (2.5 MG/3ML) 0.083% neb solution Take 1 vial (2.5 mg) by nebulization every 6 hours as needed for shortness of breath / dyspnea or wheezing (Patient not taking: Reported on 4/18/2019)     fluocinonide (LIDEX) 0.05 % cream Apply sparingly to affected area twice daily as needed on legs.  Do not apply to face. (Patient not taking: Reported on 4/2/2019)     fluorouracil (EFUDEX) 5 % cream Twice daily to scalp and face for two weeks (Patient not taking: Reported on 4/2/2019)     ipratropium - albuterol 0.5 mg/2.5 mg/3 mL (DUONEB) 0.5-2.5 (3) MG/3ML neb solution Take 1 vial (3 mLs) by nebulization once for 1 dose     No current facility-administered medications for this visit.      Facility-Administered Medications Ordered in Other Visits   Medication     0.9% sodium chloride BOLUS     zoledronic acid (ZOMETA) intermittent infusion 4 mg        ALLERGIES      Allergies   Allergen Reactions     Morphine Sulfate GI Disturbance     vomiting     Vicodin [Hydrocodone-Acetaminophen] Nausea and Vomiting  "       REVIEW OF SYSTEMS   As above in the HPI, o/w complete 12-point ROS was negative.     PHYSICAL EXAM   /69 (BP Location: Right arm)   Pulse 64   Temp 98.6  F (37  C) (Oral)   Resp 16   Ht 1.803 m (5' 10.98\")   Wt 90 kg (198 lb 8 oz)   SpO2 97%   BMI 27.70 kg/m     GEN: NAD  HEENT: PERRL, EOMI, no icterus, injection or pallor. Oropharynx is clear.  NECK: no cervical or supraclavicular lymphadenopathy  LUNGS: clear bilaterally  CV: regular, no murmurs, rubs, or gallops  ABDOMEN: soft, non-tender, non-distended, normal bowel sounds, no hepatosplenomegaly by percussion or palpation  EXT: warm, well perfused, +++ edema  NEURO: alert  SKIN: Extensive ecchymoses in all the extremities     LABORATORY AND IMAGING STUDIES     Labs remain stable. Calcium is within the normal range or low normal  Mild normocytic anemia     Recent Labs   Lab Test 04/18/19  0831 01/07/19  0855 11/12/18  0806 10/01/18  0926 08/09/18  0836 06/28/18  1043    141 141 144  --  140   POTASSIUM 4.1 3.3* 3.8 4.4  --  4.4   CHLORIDE 105 104 104 105  --  103   CO2 31 28 30 33*  --  30   ANIONGAP 4 9 7 6  --  7   BUN 27 18 20 16  --  28   CR 1.03 1.02 0.93 0.95 1.06 1.08   * 117* 127* 85  --  107*   ALETHA 9.3 9.5 9.2 9.5 9.3 9.6     Recent Labs   Lab Test 12/28/11  0857 04/05/11  0846   PHOS 3.1 3.4     Recent Labs   Lab Test 04/18/19  0831 01/07/19  0855 11/12/18  0806 10/01/18  0926 06/28/18  1043   WBC 8.2 7.6 7.5 8.9 5.8   HGB 12.4* 12.1* 11.4* 12.3* 11.9*    211 163 215 183    97 99 102* 102*   NEUTROPHIL 73.6 69.5 80.1 75.2 72.0     Recent Labs   Lab Test 04/18/19  0831 01/07/19  0855 11/12/18  0806   BILITOTAL 1.0 0.7 0.7   ALKPHOS 83 74 67   ALT 18 17 22   AST 16 15 17   ALBUMIN 3.3* 3.0* 3.0*     TSH   Date Value Ref Range Status   06/13/2016 1.60 0.40 - 4.00 mU/L Final   04/05/2011 3.73 0.4 - 5.0 mU/L Final       Recent Labs   Lab Test 04/18/19  0831 01/07/19  0855 11/12/18  0806 10/01/18  0926 " 08/09/18  0836  02/09/17  0823   PSA 17.80* 13.20* 16.80* 11.70* 10.50*   < >  --    TESTOSTTOTAL  --   --   --   --   --   --  9*    < > = values in this interval not displayed.     Recent Labs   Lab Test 04/18/19  0831 01/07/19  0855 11/12/18  0806 10/01/18  0926 08/09/18  0836 06/28/18  1043   PSA 17.80* 13.20* 16.80* 11.70* 10.50* 10.00*   ALKPHOS 83 74 67 91  --  84           ASSESSMENT AND PLAN   1. 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 No response to addition of bicalutamide  3. PE diagnosed few weeks after initiation of megestrol acetate for hot flashes on GnRH agonist  4. No significant medical comorbidities    He is tolerating his abiraterone well and has no complains. His PSA did respond nicely as expected initially and has been relatively since then. It was 64 on 7/13/17, dropped to 12 by 10/16/17 and has been stable since - currently at 17.8 ng/ml. For now the PSA values are relatively stable and have not doubled since he hit abel about 6+ months ago. His PSA is slightly higher since last visit about 3 months ago. I explained him that the PSA can bounce around a little before it steadily goes up. This is the reason why we do wait for a clear rise in PSA in the absence of radiographic or clinical progression. He only has a marginal rise in his PSA from the abel value. I will not consider changing therapy unless there is a big change in his PSA.     We would plan to continue as current. There are no immediate concerns.     He did get bilateral orchiectomies done last month 9/27/17. He would not need any more lupron or other GnRH directed therapies.     His labs are stable except for mild normocytic anemia - possibly owing to ongoing therapy for cancer.     He has extensive ecchymoses likely secondary to prednisone use.     He has hot flashes, mood swings, fatigue - from androgen deprivation. These are no different  between either orchiectomy or GnRH therapy as they come with low levels of testosterone.     I encouraged him to be more physically active. His wife is more regular with the gym and he is unable to make it. He notes that he is limited by his knee since he had a knee surgery. I encouraged him to do more exercises involving muscles around the knee to strengthen it. Balanced diet and exercise are the only 2 things that would help him other than his anti-cancer drugs from the cancer perspective.      We would continue with zometa every 12 weeks. I could see him in 12-16 weeks. If he has a doubling of his PSA at next visit, I will get restaging scans done prior to his visit with me with plans to possibly move him to chemotherapy.     Over 25 min of direct face to face time spent with patient with more than 50% time spent in counseling and coordinating care.        Again, thank you for allowing me to participate in the care of your patient.        Sincerely,        Rafael Valenzuela MD

## 2019-04-18 NOTE — TELEPHONE ENCOUNTER
Primary Oncologist: Nicolas  Primary Oncology Clinic: [facility]  Cancer Diagnosis: Prostate Cancer      Therapy History:   abiraterone (Zytiga) 1000 mg PO daily with Prednisone 5 mg PO BID  Start Date: 8/19/17      Drug Interaction Assessment: No new medications as of 6/11/18  1. Abiraterone will decrease Warfarin metabolism, potentially raising INR; patient is aware and will inquire about more frequent INR monitoring at the start of therapy  2. Abiraterone inhibits Metoprolol metabolism with a slight risk of causing bradycardia      Lab Monitoring Plan:  C1D1+   CMP, BP C2D1+ Call, CMP, BP C3D1+ Call, CMP, BP C4D1+ Call, CMP, BP C5D1+ Call, CMP, BP C6D1+ Call, CMP, BP   C1D8+    C2D8+    C3D8+    C4D8+    C5D8+    C6D8+      C1D15+ Call, CMP C2D15+ Call, CMP C3D15+    C4D15+    C5D15+    C6D15+      C1D22+    C2D22+    C3D22+    C4D22+    C5D22+    C6D22+          Subjective/Objective:  Dimitri Neves is a 83 year old male seen for a follow-up visit in clinic for oral chemotherapy. Dimitri reports that he is doing well on abiraterone and denies any new changes or medications.    ORAL CHEMOTHERAPY 6/11/2018 6/28/2018 8/9/2018 10/1/2018 11/12/2018 1/7/2019 4/18/2019   Drug Name Zytiga (Abiraterone) Zytiga (Abiraterone) Zytiga (Abiraterone) Zytiga (Abiraterone) Zytiga (Abiraterone) Zytiga (Abiraterone) Zytiga (Abiraterone)   Current Dosage 1000mg 1000mg 1000mg 1000mg 1000mg 1000mg 1000mg   Current Schedule Daily Daily Daily Daily Daily Daily Daily   Cycle Details Continuous Continuous Continuous Continuous Continuous Continuous Continuous   Start Date of Last Cycle - - - - - - -   Planned next cycle start date - - - - - - -   Doses missed in last 2 weeks 0 0 0 0 0 0 0   Adherence Assessment Adherent Adherent - Adherent Adherent Adherent Adherent   Adverse Effects No AE identified during assessment No AE identified during assessment No AE identified during assessment No AE identified during assessment No AE identified  "during assessment No AE identified during assessment No AE identified during assessment   Other (see note for details) - - - - - - -   Pharmacist intervention? - - - - - - -   Any new drug interactions? No No No No No No No   Is the dose as ordered appropriate for the patient? Yes Yes Yes Yes Yes - Yes   Is the patient currently in pain? - Assessed in last 30 days. Assessed in last 30 days. Assessed in last 30 days. Assessed in last 30 days. Assessed in last 30 days. Assessed in last 30 days.   Has the patient been assessed within the past 6 months for depression? - Yes Yes Yes - Yes Yes   Has the patient missed any days of school, work, or other routine activity? - - - - No No -       Vitals:  BP:   BP Readings from Last 1 Encounters:   04/18/19 109/69     Wt Readings from Last 1 Encounters:   04/18/19 90 kg (198 lb 8 oz)     Estimated body surface area is 2.12 meters squared as calculated from the following:    Height as of an earlier encounter on 4/18/19: 1.803 m (5' 10.98\").    Weight as of an earlier encounter on 4/18/19: 90 kg (198 lb 8 oz).    Labs:  _  Result Component Current Result Ref Range   Sodium 140 (4/18/2019) 133 - 144 mmol/L     _  Result Component Current Result Ref Range   Potassium 4.1 (4/18/2019) 3.4 - 5.3 mmol/L     _  Result Component Current Result Ref Range   Calcium 9.3 (4/18/2019) 8.5 - 10.1 mg/dL     No results found for Mag within last 30 days.     No results found for Phos within last 30 days.     _  Result Component Current Result Ref Range   Albumin 3.3 (L) (4/18/2019) 3.4 - 5.0 g/dL     _  Result Component Current Result Ref Range   Urea Nitrogen 27 (4/18/2019) 7 - 30 mg/dL     _  Result Component Current Result Ref Range   Creatinine 1.03 (4/18/2019) 0.66 - 1.25 mg/dL       _  Result Component Current Result Ref Range   AST 16 (4/18/2019) 0 - 45 U/L     _  Result Component Current Result Ref Range   ALT 18 (4/18/2019) 0 - 70 U/L     _  Result Component Current Result Ref Range "   Bilirubin Total 1.0 (4/18/2019) 0.2 - 1.3 mg/dL       _  Result Component Current Result Ref Range   WBC 8.2 (4/18/2019) 4.0 - 11.0 10e9/L     _  Result Component Current Result Ref Range   Hemoglobin 12.4 (L) (4/18/2019) 13.3 - 17.7 g/dL     _  Result Component Current Result Ref Range   Platelet Count 208 (4/18/2019) 150 - 450 10e9/L     _  Result Component Current Result Ref Range   Absolute Neutrophil 6.1 (4/18/2019) 1.6 - 8.3 10e9/L     Assessment/Plan:  Continue same dose as ordered    Follow-Up:  3 months with labs- appt not made at this point    Refill Due:  SURI Pepe, PharmD, BCOP  April 18, 2019

## 2019-04-18 NOTE — PROGRESS NOTES
Infusion Nursing Note:  Dimitri Neves presents today for Zometa.    Patient seen by provider today: Yes: Dr. Valenzuela   present during visit today: Not Applicable.    Note: N/A.    Intravenous Access:  Peripheral IV placed.    Treatment Conditions:  Lab Results   Component Value Date    HGB 12.4 04/18/2019     Lab Results   Component Value Date    WBC 8.2 04/18/2019      Lab Results   Component Value Date    ANEU 6.1 04/18/2019     Lab Results   Component Value Date     04/18/2019      Lab Results   Component Value Date     04/18/2019                   Lab Results   Component Value Date    POTASSIUM 4.1 04/18/2019           No results found for: MAG         Lab Results   Component Value Date    CR 1.03 04/18/2019                   Lab Results   Component Value Date    ALETHA 9.3 04/18/2019                Lab Results   Component Value Date    BILITOTAL 1.0 04/18/2019           Lab Results   Component Value Date    ALBUMIN 3.3 04/18/2019                    Lab Results   Component Value Date    ALT 18 04/18/2019           Lab Results   Component Value Date    AST 16 04/18/2019       Results reviewed, labs MET treatment parameters, ok to proceed with treatment.      Post Infusion Assessment:  Patient tolerated infusion without incident.  Site patent and intact, free from redness, edema or discomfort.  No evidence of extravasations.  Access discontinued per protocol.       Discharge Plan:   Discharge instructions reviewed with: Patient and Family.  Patient and/or family verbalized understanding of discharge instructions and all questions answered.  Patient discharged in stable condition accompanied by: wife.  Departure Mode: Ambulatory.    Romy Méndez RN

## 2019-04-18 NOTE — NURSING NOTE
"Oncology Rooming Note    April 18, 2019 9:25 AM   Dimitri Neves is a 83 year old male who presents for:    Chief Complaint   Patient presents with     Oncology Clinic Visit     3 month follow up     Initial Vitals: /69 (BP Location: Right arm)   Pulse 64   Temp 98.6  F (37  C) (Oral)   Resp 16   Ht 1.803 m (5' 10.98\")   Wt 90 kg (198 lb 8 oz)   SpO2 97%   BMI 27.70 kg/m   Estimated body mass index is 27.7 kg/m  as calculated from the following:    Height as of this encounter: 1.803 m (5' 10.98\").    Weight as of this encounter: 90 kg (198 lb 8 oz). Body surface area is 2.12 meters squared.  No Pain (0) Comment: Data Unavailable   No LMP for male patient.  Allergies reviewed: Yes  Medications reviewed: Yes    Medications: Medication refills not needed today.  Pharmacy name entered into [employer]:    [id number] - [place], [place] - [address], [facility] - [place], [place] - [address] [facility] - [id number] - [place] - [phone] [facility] ADAN Clemente LPN            "

## 2019-04-18 NOTE — PROGRESS NOTES
[facility]  HEMATOLOGY AND ONCOLOGY    FOLLOW-UP VISIT NOTE    PATIENT NAME: Dimitri Neves MRN # [id number]  DATE OF VISIT: Apr 18, 2019 YOB: 1935    REFERRING PROVIDER: No referring provider defined for this encounter.    CANCER TYPE: Prostate adenocarcinoma  STAGE: IV    TREATMENT SUMMARY:  Dimitri was followed by his PCP on 6/13/13 and was elevated at 32 as noted below. He was referred to Dr. Taylor. He was treated with a 10 day course of ciprofloxacin and followed again in 6 weeks on 8/10. His PSA drawn prior to this visit was unchanged and remained elevated at 32. He had a TRUS biopsy on 8/25/14 which was negative for prostate adenocarcinoma. His PSA was repeated in 3 months and now increased to 67.9. He had a repeat biopsy of prostate on 12/16/14 which now confirmed prostate adenocarcinoma with eileen 4+4 disease involving 5 of the cores and [place]lap 3+3 disease involving remainder of cores. He was staged with a CT scan and bone scan done on 12/29/14 which revealed metastatic foci in the upper cervical vertebrae on the left, right posterior 3rd rib and right ischium, focal uptake in the posterior left 11th rib with corresponding lytic expansile appearance on CT, suspicious for metastasis.          4/5/2011 08:46 6/13/2014 08:03 7/25/2014 09:47 12/5/2014 09:00 4/7/2015 09:14   PSA 2.77 32.70 (H) 32.00 (H) 67.88 (H) 1.92      He was started on androgen deprivation therapy in Jan 2015 with a good initial response. His PSA has been increasing recently and he was started on bicalutamide in February with no response. He has continued to have rising PSA and was prescribed abiraterone with prednisone. He had a huge copay with this and has been referred to Medical Oncology to review other options.     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 He is tolerating his abiraterone without any difficulty.     He notes that he does not have the strength he used to have.     He does have hot flushes, fatigue, mood swings on androgen deprivation therapy.     He did have bilateral orchiectomies.         PAST MEDICAL HISTORY     Past Medical History:   Diagnosis Date     Actinic keratosis      AK (actinic keratosis) 7/9/2012     Cataract cortical, senile right eye 4/17/2012     DDD (degenerative disc disease), lumbar 1979    s/p 4 back surgeries, spinal cord stimulator implant      Diverticulosis      ED (erectile dysfunction) 2009     GERD (gastroesophageal reflux disease) ~1980     HTN (hypertension), benign ~2000     Macular degeneration, dry, mild, ou 7/23/2016     Multiple lung nodules     Several basilar pulmonary nodules <5 mm     HUDSON (obstructive sleep apnea) 10/2005    on CPAP     Other abnormal glucose 01/14/2009     PE (pulmonary thromboembolism) (H) 1981    after back surgery      Pure hypercholesterolemia ~2000     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         CURRENT OUTPATIENT MEDICATIONS     Current Outpatient Medications   Medication Sig     abiraterone (ZYTIGA) 250 MG tablet Take 4 tablets (1,000 mg) by mouth daily for 30 doses Take on empty stomach.     ASPIRIN PO Take 81 mg by mouth     Calcium Carbonate (CALCIUM 600 PO)      Cholecalciferol (VITAMIN D) 2000 UNITS tablet Take 1 tablet by mouth daily     [employer] VITAMIN  B12 1000 MCG tablet TAKE 1 TABLET BY MOUTH EVERY DAY     diphenoxylate-atropine (LOMOTIL) 2.5-0.025 MG tablet Take 1 tablet by mouth 4 times daily as needed for diarrhea     furosemide (LASIX) 20 MG tablet TAKE 1 TABLET (20 MG) BY MOUTH DAILY     gabapentin (NEURONTIN) 300 MG capsule Take 900 mg by mouth At Bedtime     metoprolol succinate ER (TOPROL-XL) 25 MG 24 hr  tablet TAKE 1 TABLET BY MOUTH EVERY DAY Follow up with Dr. Muir for further refills     oxyCODONE (ROXICODONE) 5 MG IR tablet Take 1 tablet (5 mg) by mouth every 6 hours as needed for pain     potassium chloride ER (K-DUR/KLOR-CON M) 10 MEQ CR tablet Take 1 tablet (10 mEq) by mouth 2 times daily     predniSONE (DELTASONE) 5 MG tablet Take 5 mg by mouth 2 times daily.     quinapril (ACCUPRIL) 40 MG Tab TAKE 1 TABLET (40 MG) BY MOUTH DAILY     simvastatin (ZOCOR) 40 MG tablet TAKE 0.5 TABLETS (20 MG) BY MOUTH DAILY     albuterol (2.5 MG/3ML) 0.083% neb solution TAKE 1 VIAL BY NEBULIZATION EVERY 6 HOURS AS NEEDED FOR SHORTNESS OF BREATH / DYSPNEA OR WHEEZING (Patient not taking: Reported on 4/18/2019)     albuterol (2.5 MG/3ML) 0.083% neb solution Take 1 vial (2.5 mg) by nebulization every 6 hours as needed for shortness of breath / dyspnea or wheezing (Patient not taking: Reported on 4/18/2019)     fluocinonide (LIDEX) 0.05 % cream Apply sparingly to affected area twice daily as needed on legs.  Do not apply to face. (Patient not taking: Reported on 4/2/2019)     fluorouracil (EFUDEX) 5 % cream Twice daily to scalp and face for two weeks (Patient not taking: Reported on 4/2/2019)     ipratropium - albuterol 0.5 mg/2.5 mg/3 mL (DUONEB) 0.5-2.5 (3) MG/3ML neb solution Take 1 vial (3 mLs) by nebulization once for 1 dose     No current facility-administered medications for this visit.      Facility-Administered Medications Ordered in Other Visits   Medication     0.9% sodium chloride BOLUS     zoledronic acid (ZOMETA) intermittent infusion 4 mg        ALLERGIES      Allergies   Allergen Reactions     Morphine Sulfate GI Disturbance     vomiting     Vicodin [Hydrocodone-Acetaminophen] Nausea and Vomiting        REVIEW OF SYSTEMS   As above in the HPI, o/w complete 12-point ROS was negative.     PHYSICAL EXAM   /69 (BP Location: Right arm)   Pulse 64   Temp 98.6  F (37  C) (Oral)   Resp 16   Ht 1.803 m (5'  "10.98\")   Wt 90 kg (198 lb 8 oz)   SpO2 97%   BMI 27.70 kg/m    GEN: NAD  HEENT: PERRL, EOMI, no icterus, injection or pallor. Oropharynx is clear.  NECK: no cervical or supraclavicular lymphadenopathy  LUNGS: clear bilaterally  CV: regular, no murmurs, rubs, or gallops  ABDOMEN: soft, non-tender, non-distended, normal bowel sounds, no hepatosplenomegaly by percussion or palpation  EXT: warm, well perfused, +++ edema  NEURO: alert  SKIN: Extensive ecchymoses in all the extremities     LABORATORY AND IMAGING STUDIES     Labs remain stable. Calcium is within the normal range or low normal  Mild normocytic anemia     Recent Labs   Lab Test 04/18/19  0831 01/07/19  0855 11/12/18  0806 10/01/18  0926 08/09/18  0836 06/28/18  1043    141 141 144  --  140   POTASSIUM 4.1 3.3* 3.8 4.4  --  4.4   CHLORIDE 105 104 104 105  --  103   CO2 31 28 30 33*  --  30   ANIONGAP 4 9 7 6  --  7   BUN 27 18 20 16  --  28   CR 1.03 1.02 0.93 0.95 1.06 1.08   * 117* 127* 85  --  107*   ALETHA 9.3 9.5 9.2 9.5 9.3 9.6     Recent Labs   Lab Test 12/28/11  0857 04/05/11  0846   PHOS 3.1 3.4     Recent Labs   Lab Test 04/18/19  0831 01/07/19  0855 11/12/18  0806 10/01/18  0926 06/28/18  1043   WBC 8.2 7.6 7.5 8.9 5.8   HGB 12.4* 12.1* 11.4* 12.3* 11.9*    211 163 215 183    97 99 102* 102*   NEUTROPHIL 73.6 69.5 80.1 75.2 72.0     Recent Labs   Lab Test 04/18/19  0831 01/07/19  0855 11/12/18  0806   BILITOTAL 1.0 0.7 0.7   ALKPHOS 83 74 67   ALT 18 17 22   AST 16 15 17   ALBUMIN 3.3* 3.0* 3.0*     TSH   Date Value Ref Range Status   06/13/2016 1.60 0.40 - 4.00 mU/L Final   04/05/2011 3.73 0.4 - 5.0 mU/L Final       Recent Labs   Lab Test 04/18/19  0831 01/07/19  0855 11/12/18  0806 10/01/18  0926 08/09/18  0836  02/09/17  0823   PSA 17.80* 13.20* 16.80* 11.70* 10.50*   < >  --    TESTOSTTOTAL  --   --   --   --   --   --  9*    < > = values in this interval not displayed.     Recent Labs   Lab Test 04/18/19  0831 " 01/07/19  0855 11/12/18  0806 10/01/18  0926 08/09/18  0836 06/28/18  1043   PSA 17.80* 13.20* 16.80* 11.70* 10.50* 10.00*   ALKPHOS 83 74 67 91  --  84           ASSESSMENT AND PLAN   1. 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 No response to addition of bicalutamide  3. PE diagnosed few weeks after initiation of megestrol acetate for hot flashes on GnRH agonist  4. No significant medical comorbidities    He is tolerating his abiraterone well and has no complains. His PSA did respond nicely as expected initially and has been relatively since then. It was 64 on 7/13/17, dropped to 12 by 10/16/17 and has been stable since - currently at 17.8 ng/ml. For now the PSA values are relatively stable and have not doubled since he hit abel about 6+ months ago. His PSA is slightly higher since last visit about 3 months ago. I explained him that the PSA can bounce around a little before it steadily goes up. This is the reason why we do wait for a clear rise in PSA in the absence of radiographic or clinical progression. He only has a marginal rise in his PSA from the abel value. I will not consider changing therapy unless there is a big change in his PSA.     We would plan to continue as current. There are no immediate concerns.     He did get bilateral orchiectomies done last month 9/27/17. He would not need any more lupron or other GnRH directed therapies.     His labs are stable except for mild normocytic anemia - possibly owing to ongoing therapy for cancer.     He has extensive ecchymoses likely secondary to prednisone use.     He has hot flashes, mood swings, fatigue - from androgen deprivation. These are no different between either orchiectomy or GnRH therapy as they come with low levels of testosterone.     I encouraged him to be more physically active. His wife is more regular with the gym and he is unable to make it. He notes that  he is limited by his knee since he had a knee surgery. I encouraged him to do more exercises involving muscles around the knee to strengthen it. Balanced diet and exercise are the only 2 things that would help him other than his anti-cancer drugs from the cancer perspective.      We would continue with zometa every 12 weeks. I could see him in 12-16 weeks. If he has a doubling of his PSA at next visit, I will get restaging scans done prior to his visit with me with plans to possibly move him to chemotherapy.     Over 25 min of direct face to face time spent with patient with more than 50% time spent in counseling and coordinating care.

## 2019-05-03 ENCOUNTER — PATIENT OUTREACH (OUTPATIENT)
Dept: ONCOLOGY | Facility: CLINIC | Age: 84
End: 2019-05-03

## 2019-05-03 NOTE — PROGRESS NOTES
Ascencion had sent a [email or web address] message April 19th, wondering what his most recent PSA was. This RN had responded to his [employer] message on April 19th with the PSA results, 17.8. Per Dr. Valenzuela, even though the PSA has gone up 4 points, the values have not shown a clear rise therefore we will continue on current treatment plan. Ascencion stated he had already spoken to Dr. Valenzuela but appreciated the call back. Ascencion verbalized understanding and had no further questions.     April Lawson RN, BSN, [facility], LAc  Patient Care Coordinator  [facility] [place]  [phone]

## 2019-05-17 DIAGNOSIS — C79.51 BONE METASTASIS: ICD-10-CM

## 2019-05-17 DIAGNOSIS — C61 MALIGNANT NEOPLASM OF PROSTATE (H): ICD-10-CM

## 2019-05-20 DIAGNOSIS — C79.51 BONE METASTASIS: Primary | ICD-10-CM

## 2019-05-20 DIAGNOSIS — C61 MALIGNANT NEOPLASM OF PROSTATE (H): ICD-10-CM

## 2019-05-20 RX ORDER — ABIRATERONE ACETATE 250 MG/1
1000 TABLET ORAL DAILY
Qty: 120 TABLET | Refills: 0 | Status: SHIPPED | OUTPATIENT
Start: 2019-05-20 | End: 2019-08-20

## 2019-05-20 RX ORDER — PREDNISONE 5 MG/1
5 TABLET ORAL 2 TIMES DAILY
Qty: 60 TABLET | Refills: 0 | Status: SHIPPED | OUTPATIENT
Start: 2019-05-20 | End: 2019-08-20

## 2019-06-06 ENCOUNTER — TRANSFERRED RECORDS (OUTPATIENT)
Dept: HEALTH INFORMATION MANAGEMENT | Facility: CLINIC | Age: 84
End: 2019-06-06

## 2019-06-18 DIAGNOSIS — C79.51 BONE METASTASIS: Primary | ICD-10-CM

## 2019-06-18 DIAGNOSIS — C61 MALIGNANT NEOPLASM OF PROSTATE (H): ICD-10-CM

## 2019-06-18 RX ORDER — ABIRATERONE ACETATE 250 MG/1
1000 TABLET ORAL DAILY
Qty: 120 TABLET | Refills: 0 | Status: SHIPPED | OUTPATIENT
Start: 2019-06-18 | End: 2019-08-20

## 2019-06-18 RX ORDER — PREDNISONE 5 MG/1
5 TABLET ORAL 2 TIMES DAILY
Qty: 60 TABLET | Refills: 0 | Status: SHIPPED | OUTPATIENT
Start: 2019-06-18 | End: 2019-08-20

## 2019-07-18 DIAGNOSIS — C79.51 BONE METASTASIS: Primary | ICD-10-CM

## 2019-07-18 DIAGNOSIS — C61 MALIGNANT NEOPLASM OF PROSTATE (H): ICD-10-CM

## 2019-07-18 RX ORDER — ABIRATERONE ACETATE 250 MG/1
1000 TABLET ORAL DAILY
Qty: 120 TABLET | Refills: 0 | Status: SHIPPED | OUTPATIENT
Start: 2019-07-18 | End: 2019-08-20

## 2019-07-18 RX ORDER — PREDNISONE 5 MG/1
5 TABLET ORAL 2 TIMES DAILY
Qty: 60 TABLET | Refills: 0 | Status: SHIPPED | OUTPATIENT
Start: 2019-07-18 | End: 2019-08-20

## 2019-07-31 DIAGNOSIS — I10 HTN (HYPERTENSION), BENIGN: ICD-10-CM

## 2019-08-01 ENCOUNTER — DOCUMENTATION ONLY (OUTPATIENT)
Dept: PHARMACY | Facility: CLINIC | Age: 84
End: 2019-08-01

## 2019-08-01 ENCOUNTER — ONCOLOGY VISIT (OUTPATIENT)
Dept: ONCOLOGY | Facility: CLINIC | Age: 84
End: 2019-08-01
Attending: INTERNAL MEDICINE
Payer: COMMERCIAL

## 2019-08-01 ENCOUNTER — INFUSION THERAPY VISIT (OUTPATIENT)
Dept: INFUSION THERAPY | Facility: CLINIC | Age: 84
End: 2019-08-01
Payer: COMMERCIAL

## 2019-08-01 VITALS
DIASTOLIC BLOOD PRESSURE: 74 MMHG | BODY MASS INDEX: 27.83 KG/M2 | HEART RATE: 52 BPM | WEIGHT: 198.8 LBS | OXYGEN SATURATION: 98 % | TEMPERATURE: 97.9 F | SYSTOLIC BLOOD PRESSURE: 132 MMHG | RESPIRATION RATE: 16 BRPM | HEIGHT: 71 IN

## 2019-08-01 DIAGNOSIS — C79.51 BONE METASTASIS: ICD-10-CM

## 2019-08-01 DIAGNOSIS — C61 MALIGNANT NEOPLASM OF PROSTATE (H): ICD-10-CM

## 2019-08-01 DIAGNOSIS — C79.51 BONE METASTASIS: Primary | ICD-10-CM

## 2019-08-01 DIAGNOSIS — C61 MALIGNANT NEOPLASM OF PROSTATE (H): Primary | ICD-10-CM

## 2019-08-01 LAB
ALBUMIN SERPL-MCNC: 3.4 G/DL (ref 3.4–5)
ALP SERPL-CCNC: 86 U/L (ref 40–150)
ALT SERPL W P-5'-P-CCNC: 20 U/L (ref 0–70)
ANION GAP SERPL CALCULATED.3IONS-SCNC: 4 MMOL/L (ref 3–14)
AST SERPL W P-5'-P-CCNC: 16 U/L (ref 0–45)
BASOPHILS # BLD AUTO: 0 10E9/L (ref 0–0.2)
BASOPHILS NFR BLD AUTO: 0.2 %
BILIRUB SERPL-MCNC: 0.7 MG/DL (ref 0.2–1.3)
BUN SERPL-MCNC: 18 MG/DL (ref 7–30)
CALCIUM SERPL-MCNC: 9.4 MG/DL (ref 8.5–10.1)
CHLORIDE SERPL-SCNC: 104 MMOL/L (ref 94–109)
CO2 SERPL-SCNC: 30 MMOL/L (ref 20–32)
CREAT SERPL-MCNC: 0.9 MG/DL (ref 0.66–1.25)
DIFFERENTIAL METHOD BLD: ABNORMAL
EOSINOPHIL # BLD AUTO: 0 10E9/L (ref 0–0.7)
EOSINOPHIL NFR BLD AUTO: 0.4 %
ERYTHROCYTE [DISTWIDTH] IN BLOOD BY AUTOMATED COUNT: 14.1 % (ref 10–15)
GFR SERPL CREATININE-BSD FRML MDRD: 79 ML/MIN/{1.73_M2}
GLUCOSE SERPL-MCNC: 115 MG/DL (ref 70–99)
HCT VFR BLD AUTO: 38.5 % (ref 40–53)
HGB BLD-MCNC: 12.5 G/DL (ref 13.3–17.7)
IMM GRANULOCYTES # BLD: 0 10E9/L (ref 0–0.4)
IMM GRANULOCYTES NFR BLD: 0.4 %
LYMPHOCYTES # BLD AUTO: 1.5 10E9/L (ref 0.8–5.3)
LYMPHOCYTES NFR BLD AUTO: 16.5 %
MCH RBC QN AUTO: 32.2 PG (ref 26.5–33)
MCHC RBC AUTO-ENTMCNC: 32.5 G/DL (ref 31.5–36.5)
MCV RBC AUTO: 99 FL (ref 78–100)
MONOCYTES # BLD AUTO: 0.6 10E9/L (ref 0–1.3)
MONOCYTES NFR BLD AUTO: 6.4 %
NEUTROPHILS # BLD AUTO: 7 10E9/L (ref 1.6–8.3)
NEUTROPHILS NFR BLD AUTO: 76.1 %
PLATELET # BLD AUTO: 228 10E9/L (ref 150–450)
POTASSIUM SERPL-SCNC: 4.5 MMOL/L (ref 3.4–5.3)
PROT SERPL-MCNC: 6.9 G/DL (ref 6.8–8.8)
PSA SERPL-MCNC: 27 UG/L (ref 0–4)
RBC # BLD AUTO: 3.88 10E12/L (ref 4.4–5.9)
SODIUM SERPL-SCNC: 138 MMOL/L (ref 133–144)
WBC # BLD AUTO: 9.2 10E9/L (ref 4–11)

## 2019-08-01 PROCEDURE — 85025 COMPLETE CBC W/AUTO DIFF WBC: CPT | Performed by: INTERNAL MEDICINE

## 2019-08-01 PROCEDURE — 80053 COMPREHEN METABOLIC PANEL: CPT | Performed by: INTERNAL MEDICINE

## 2019-08-01 PROCEDURE — 99215 OFFICE O/P EST HI 40 MIN: CPT | Mod: 25 | Performed by: INTERNAL MEDICINE

## 2019-08-01 PROCEDURE — 84153 ASSAY OF PSA TOTAL: CPT | Performed by: INTERNAL MEDICINE

## 2019-08-01 PROCEDURE — 96365 THER/PROPH/DIAG IV INF INIT: CPT | Performed by: INTERNAL MEDICINE

## 2019-08-01 PROCEDURE — 36415 COLL VENOUS BLD VENIPUNCTURE: CPT | Performed by: INTERNAL MEDICINE

## 2019-08-01 PROCEDURE — 99207 ZZC NO CHARGE LOS: CPT

## 2019-08-01 RX ORDER — ZOLEDRONIC ACID 0.04 MG/ML
4 INJECTION, SOLUTION INTRAVENOUS ONCE
Status: CANCELLED | OUTPATIENT
Start: 2020-01-01

## 2019-08-01 RX ORDER — ZOLEDRONIC ACID 0.04 MG/ML
4 INJECTION, SOLUTION INTRAVENOUS ONCE
Status: COMPLETED | OUTPATIENT
Start: 2019-08-01 | End: 2019-08-01

## 2019-08-01 RX ADMIN — Medication 250 ML: at 11:37

## 2019-08-01 RX ADMIN — ZOLEDRONIC ACID 4 MG: 0.04 INJECTION, SOLUTION INTRAVENOUS at 11:38

## 2019-08-01 ASSESSMENT — MIFFLIN-ST. JEOR: SCORE: 1628.62

## 2019-08-01 ASSESSMENT — PAIN SCALES - GENERAL: PAINLEVEL: NO PAIN (0)

## 2019-08-01 NOTE — LETTER
8/1/2019         RE: Dimitri Neves  [address] [place] [address]        Dear Colleague,    Thank you for referring your patient, Dimitri Neves, to the [facility]. Please see a copy of my visit note below.    [facility]  HEMATOLOGY AND ONCOLOGY    FOLLOW-UP VISIT NOTE    PATIENT NAME: Dimitri Neves MRN # [id number]  DATE OF VISIT: Aug 1, 2019 YOB: 1935    REFERRING PROVIDER: No referring provider defined for this encounter.    CANCER TYPE: Prostate adenocarcinoma  STAGE: IV    TREATMENT SUMMARY:  Dimitri was followed by his PCP on 6/13/13 and was elevated at 32 as noted below. He was referred to Dr. Taylor. He was treated with a 10 day course of ciprofloxacin and followed again in 6 weeks on 8/10. His PSA drawn prior to this visit was unchanged and remained elevated at 32. He had a TRUS biopsy on 8/25/14 which was negative for prostate adenocarcinoma. His PSA was repeated in 3 months and now increased to 67.9. He had a repeat biopsy of prostate on 12/16/14 which now confirmed prostate adenocarcinoma with eileen 4+4 disease involving 5 of the cores and [place] 3+3 disease involving remainder of cores. He was staged with a CT scan and bone scan done on 12/29/14 which revealed metastatic foci in the upper cervical vertebrae on the left, right posterior 3rd rib and right ischium, focal uptake in the posterior left 11th rib with corresponding lytic expansile appearance on CT, suspicious for metastasis.          4/5/2011 08:46 6/13/2014 08:03 7/25/2014 09:47 12/5/2014 09:00 4/7/2015 09:14   PSA 2.77 32.70 (H) 32.00 (H) 67.88 (H) 1.92      He was started on androgen deprivation therapy in Jan 2015 with a good initial response. His PSA has been increasing recently and he was started on bicalutamide in February with no response. He has continued to have rising PSA and was prescribed abiraterone with prednisone. He had a huge copay with this and has been referred to  Medical Oncology to review other options.     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 He is tolerating his abiraterone without any difficulty.     He notes that he does not have the strength he used to have.     He does have hot flushes, fatigue, mood swings on androgen deprivation therapy. He has increased bruising.     He did have bilateral orchiectomies.         PAST MEDICAL HISTORY     Past Medical History:   Diagnosis Date     Actinic keratosis      AK (actinic keratosis) 7/9/2012     Cataract cortical, senile right eye 4/17/2012     DDD (degenerative disc disease), lumbar 1979    s/p 4 back surgeries, spinal cord stimulator implant      Diverticulosis      ED (erectile dysfunction) 2009     GERD (gastroesophageal reflux disease) ~1980     HTN (hypertension), benign ~2000     Macular degeneration, dry, mild, ou 7/23/2016     Multiple lung nodules     Several basilar pulmonary nodules <5 mm     HUDSON (obstructive sleep apnea) 10/2005    on CPAP     Other abnormal glucose 01/14/2009     PE (pulmonary thromboembolism) (H) 1981    after back surgery      Pure hypercholesterolemia ~2000     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         CURRENT OUTPATIENT MEDICATIONS     Current Outpatient Medications   Medication Sig     albuterol (2.5 MG/3ML) 0.083% neb solution Take 1 vial (2.5 mg) by nebulization every 6 hours as needed for shortness of breath / dyspnea or wheezing     ASPIRIN PO Take 81 mg by mouth     Calcium Carbonate (CALCIUM 600 PO)      Cholecalciferol (VITAMIN D) 2000 UNITS tablet Take 1 tablet by mouth daily     [employer] VITAMIN  B12 1000 MCG tablet TAKE 1 TABLET BY MOUTH EVERY DAY      diphenoxylate-atropine (LOMOTIL) 2.5-0.025 MG tablet Take 1 tablet by mouth 4 times daily as needed for diarrhea     fluocinonide (LIDEX) 0.05 % cream Apply sparingly to affected area twice daily as needed on legs.  Do not apply to face.     furosemide (LASIX) 20 MG tablet TAKE 1 TABLET (20 MG) BY MOUTH DAILY     gabapentin (NEURONTIN) 300 MG capsule Take 900 mg by mouth At Bedtime     metoprolol succinate ER (TOPROL-XL) 25 MG 24 hr tablet TAKE 1 TABLET BY MOUTH EVERY DAY Follow up with Dr. Muir for further refills     oxyCODONE (ROXICODONE) 5 MG IR tablet Take 1 tablet (5 mg) by mouth every 6 hours as needed for pain     potassium chloride ER (K-DUR/KLOR-CON M) 10 MEQ CR tablet Take 1 tablet (10 mEq) by mouth 2 times daily     quinapril (ACCUPRIL) 40 MG Tab TAKE 1 TABLET (40 MG) BY MOUTH DAILY     simvastatin (ZOCOR) 40 MG tablet TAKE 0.5 TABLETS (20 MG) BY MOUTH DAILY     abiraterone (ZYTIGA) 250 MG tablet Take 4 tablets (1,000 mg) by mouth daily for 30 doses Take on empty stomach.     albuterol (2.5 MG/3ML) 0.083% neb solution TAKE 1 VIAL BY NEBULIZATION EVERY 6 HOURS AS NEEDED FOR SHORTNESS OF BREATH / DYSPNEA OR WHEEZING (Patient not taking: Reported on 4/18/2019)     fluorouracil (EFUDEX) 5 % cream Twice daily to scalp and face for two weeks (Patient not taking: Reported on 4/2/2019)     ipratropium - albuterol 0.5 mg/2.5 mg/3 mL (DUONEB) 0.5-2.5 (3) MG/3ML neb solution Take 1 vial (3 mLs) by nebulization once for 1 dose     metoprolol succinate ER (TOPROL-XL) 25 MG 24 hr tablet TAKE 1 TABLET BY MOUTH EVERY DAY     predniSONE (DELTASONE) 5 MG tablet Take 1 tablet (5 mg) by mouth 2 times daily     No current facility-administered medications for this visit.         ALLERGIES      Allergies   Allergen Reactions     Morphine Sulfate GI Disturbance     vomiting     Vicodin [Hydrocodone-Acetaminophen] Nausea and Vomiting        REVIEW OF SYSTEMS   As above in the HPI, o/w complete 12-point ROS was negative.      "PHYSICAL EXAM   /74 (BP Location: Right arm)   Pulse 52   Temp 97.9  F (36.6  C) (Oral)   Resp 16   Ht 1.803 m (5' 10.98\")   Wt 90.2 kg (198 lb 12.8 oz)   SpO2 98%   BMI 27.74 kg/m     GEN: NAD  HEENT: PERRL, EOMI, no icterus, injection or pallor. Oropharynx is clear.  NECK: no cervical or supraclavicular lymphadenopathy  LUNGS: clear bilaterally  CV: regular, no murmurs, rubs, or gallops  ABDOMEN: soft, non-tender, non-distended, normal bowel sounds, no hepatosplenomegaly by percussion or palpation  EXT: warm, well perfused, +++ edema  NEURO: alert  SKIN: Extensive ecchymoses in all the extremities     LABORATORY AND IMAGING STUDIES     Labs remain stable. Calcium is within the normal range    Mild normocytic anemia     Recent Labs   Lab Test 08/01/19  0935 04/18/19  0831 01/07/19  0855 11/12/18  0806 10/01/18  0926    140 141 141 144   POTASSIUM 4.5 4.1 3.3* 3.8 4.4   CHLORIDE 104 105 104 104 105   CO2 30 31 28 30 33*   ANIONGAP 4 4 9 7 6   BUN 18 27 18 20 16   CR 0.90 1.03 1.02 0.93 0.95   * 105* 117* 127* 85   ALETHA 9.4 9.3 9.5 9.2 9.5     Recent Labs   Lab Test 12/28/11  0857   PHOS 3.1     Recent Labs   Lab Test 08/01/19  0935 04/18/19  0831 01/07/19  0855 11/12/18  0806 10/01/18  0926   WBC 9.2 8.2 7.6 7.5 8.9   HGB 12.5* 12.4* 12.1* 11.4* 12.3*    208 211 163 215   MCV 99 100 97 99 102*   NEUTROPHIL 76.1 73.6 69.5 80.1 75.2     Recent Labs   Lab Test 08/01/19  0935 04/18/19  0831 01/07/19  0855   BILITOTAL 0.7 1.0 0.7   ALKPHOS 86 83 74   ALT 20 18 17   AST 16 16 15   ALBUMIN 3.4 3.3* 3.0*     TSH   Date Value Ref Range Status   06/13/2016 1.60 0.40 - 4.00 mU/L Final   04/05/2011 3.73 0.4 - 5.0 mU/L Final     Recent Labs   Lab Test 08/01/19  0935 04/18/19  0831 01/07/19  0855 11/12/18  0806 10/01/18  0926  02/09/17  0823   PSA 27.00* 17.80* 13.20* 16.80* 11.70*   < >  --    TESTOSTTOTAL  --   --   --   --   --   --  9*    < > = values in this interval not displayed.        " ASSESSMENT AND PLAN   1. 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 No response to addition of bicalutamide  3. PE diagnosed few weeks after initiation of megestrol acetate for hot flashes on GnRH agonist  4. No significant medical comorbidities    He is tolerating his abiraterone well and has no complains. His PSA did respond nicely as expected initially and has been relatively since then. It was 64 on 7/13/17, dropped to 12 by 10/16/17. It has been slowly rising since then. He had a very gradual rise in his PSA this far. His PSA at this visit is much higher at 27 up from 17.8 at the last visit.     We would plan to continue with abiraterone as current. There are no immediate concerns. However given the progressive disease, I would get restaging scans prior to next visit in 3 months. If he has radiographic progression, we would have to switch to an alternative line of therapy. I reviewed chemotherapy with docetaxel as the next step. I reviewed chemotherapy with docetaxel. I extensively reviewed the side effects from this chemotherapy.  We reviewed the peripheral neuropathy, alopecia, neutropenic fevers, myelosuppression, mucositis, diarrhea, allergies, pedal edema and rash.  Of these, alopecia and nail changes have more cosmetic bearings.  Neutropenic fevers are something to be quite cognizant about. He should take every fever seriously because if his counts are low, then he would not have the defenses and he should give us a call immediately.  He should not even wait for the next morning.  He should be seen either in the clinic or in the ER the same day.  We would get basic blood tests including the CBC and blood cultures.  We would do a fever workup for him.  If the counts were adequate, we could discharge him home on antibiotics if he looked stable.  If his counts were low, even if he was feeling fine, we would have to admit him  to ensure that he was safe and stable. We reviewed the palliative intent, side effects, benefits, rationale, alternatives and outpatient regimen with him.        He did get bilateral orchiectomies done last month 9/27/17. He would not need any more lupron or other GnRH directed therapies.     His labs are stable except for mild normocytic anemia - possibly owing to ongoing therapy for cancer.     He has extensive ecchymoses likely secondary to prednisone use.     He has hot flashes, mood swings, fatigue - from androgen deprivation. These are no different between either orchiectomy or GnRH therapy as they come with low levels of testosterone.     I encouraged him to be more physically active. His wife is more regular with the gym and he is unable to make it. He notes that he is limited by his knee since he had a knee surgery. I encouraged him to do more exercises involving muscles around the knee to strengthen it. Balanced diet and exercise are the only 2 things that would help him other than his anti-cancer drugs from the cancer perspective.      We would continue with zometa every 12 weeks. I could see him in 12-16 weeks with restaging scans done prior to his visit with me with plans to possibly move him to chemotherapy for progressive disease.     Over 25 min of direct face to face time spent with patient with more than 50% time spent in counseling and coordinating care.        Again, thank you for allowing me to participate in the care of your patient.        Sincerely,        Rafael Valenzuela MD

## 2019-08-01 NOTE — PROGRESS NOTES
Infusion Nursing Note:  Dimitri Neves presents today for Zometa.    Patient seen by provider today: Yes: Dr. Valenzuela   present during visit today: Not Applicable.    Note: N/A.    Intravenous Access:  Peripheral IV placed.    Treatment Conditions:  Lab Results   Component Value Date    HGB 12.5 08/01/2019     Lab Results   Component Value Date    WBC 9.2 08/01/2019      Lab Results   Component Value Date    ANEU 7.0 08/01/2019     Lab Results   Component Value Date     08/01/2019      Lab Results   Component Value Date     08/01/2019                   Lab Results   Component Value Date    POTASSIUM 4.5 08/01/2019           No results found for: MAG         Lab Results   Component Value Date    CR 0.90 08/01/2019                   Lab Results   Component Value Date    ALETHA 9.4 08/01/2019                Lab Results   Component Value Date    BILITOTAL 0.7 08/01/2019           Lab Results   Component Value Date    ALBUMIN 3.4 08/01/2019                    Lab Results   Component Value Date    ALT 20 08/01/2019           Lab Results   Component Value Date    AST 16 08/01/2019       Results reviewed, labs MET treatment parameters, ok to proceed with treatment.      Post Infusion Assessment:  Patient tolerated infusion without incident.  Site patent and intact, free from redness, edema or discomfort.  No evidence of extravasations.  Access discontinued per protocol.       Discharge Plan:   Discharge instructions reviewed with: Patient.  Patient and/or family verbalized understanding of discharge instructions and all questions answered.  Patient discharged in stable condition accompanied by: wife.  Departure Mode: Ambulatory.    Romy Méndez RN

## 2019-08-01 NOTE — PROGRESS NOTES
Primary Oncologist: Nicolas  Primary Oncology Clinic: [facility]  Cancer Diagnosis: Prostate Cancer      Therapy History:   abiraterone (Zytiga) 1000 mg PO daily with Prednisone 5 mg PO BID  Start Date: 8/19/17      Drug Interaction Assessment: No new medications as of 8/1/19  1.. Abiraterone inhibits Metoprolol metabolism with a slight risk of causing bradycardia      Lab Monitoring Plan:  C1D1+   CMP, BP C2D1+ Call, CMP, BP C3D1+ Call, CMP, BP C4D1+ Call, CMP, BP C5D1+ Call, CMP, BP C6D1+ Call, CMP, BP   C1D8+    C2D8+    C3D8+    C4D8+    C5D8+    C6D8+      C1D15+ Call, CMP C2D15+ Call, CMP C3D15+    C4D15+    C5D15+    C6D15+      C1D22+    C2D22+    C3D22+    C4D22+    C5D22+    C6D22+          Subjective/Objective:  Ascencion Neves is a 83 year old male seen in clinic for a follow-up visit for oral chemotherapy.  Ascencion says he is doing great on therapy. Does note that he is bruising more than he usually does, but otherwise is experiencing no side effects from the medications. He has not missed any doses and has not started any new medications.    ORAL CHEMOTHERAPY 6/28/2018 8/9/2018 10/1/2018 11/12/2018 1/7/2019 4/18/2019 8/1/2019   Drug Name Zytiga (Abiraterone) Zytiga (Abiraterone) Zytiga (Abiraterone) Zytiga (Abiraterone) Zytiga (Abiraterone) Zytiga (Abiraterone) Zytiga (Abiraterone)   Current Dosage 1000mg 1000mg 1000mg 1000mg 1000mg 1000mg 1000mg   Current Schedule Daily Daily Daily Daily Daily Daily Daily   Cycle Details Continuous Continuous Continuous Continuous Continuous Continuous Continuous   Start Date of Last Cycle - - - - - - -   Planned next cycle start date - - - - - - -   Doses missed in last 2 weeks 0 0 0 0 0 0 0   Adherence Assessment Adherent - Adherent Adherent Adherent Adherent Adherent   Adverse Effects No AE identified during assessment No AE identified during assessment No AE identified during assessment No AE identified during assessment No AE identified during assessment No AE identified  "during assessment No AE identified during assessment   Other (see note for details) - - - - - - -   Pharmacist intervention? - - - - - - -   Any new drug interactions? No No No No No No No   Is the dose as ordered appropriate for the patient? Yes Yes Yes Yes - Yes Yes   Is the patient currently in pain? Assessed in last 30 days. Assessed in last 30 days. Assessed in last 30 days. Assessed in last 30 days. Assessed in last 30 days. Assessed in last 30 days. No   Has the patient been assessed within the past 6 months for depression? Yes Yes Yes - Yes Yes Yes   Has the patient missed any days of school, work, or other routine activity? - - - No No - No       Vitals:  BP:   BP Readings from Last 1 Encounters:   08/01/19 132/74     Wt Readings from Last 1 Encounters:   08/01/19 90.2 kg (198 lb 12.8 oz)     Estimated body surface area is 2.13 meters squared as calculated from the following:    Height as of an earlier encounter on 8/1/19: 1.803 m (5' 10.98\").    Weight as of an earlier encounter on 8/1/19: 90.2 kg (198 lb 12.8 oz).    Labs:  _  Result Component Current Result Ref Range   Sodium 138 (8/1/2019) 133 - 144 mmol/L     Result Component Current Result Ref Range   Potassium 4.5 (8/1/2019) 3.4 - 5.3 mmol/L     Result Component Current Result Ref Range   Calcium 9.4 (8/1/2019) 8.5 - 10.1 mg/dL     Result Component Current Result Ref Range   Albumin 3.4 (8/1/2019) 3.4 - 5.0 g/dL   _  Result Component Current Result Ref Range   Urea Nitrogen 18 (8/1/2019) 7 - 30 mg/dL   _  Result Component Current Result Ref Range   Creatinine 0.90 (8/1/2019) 0.66 - 1.25 mg/dL   _  Result Component Current Result Ref Range   AST 16 (8/1/2019) 0 - 45 U/L     Result Component Current Result Ref Range   ALT 20 (8/1/2019) 0 - 70 U/L   _  Result Component Current Result Ref Range   Bilirubin Total 0.7 (8/1/2019) 0.2 - 1.3 mg/dL   _  Result Component Current Result Ref Range   WBC 9.2 (8/1/2019) 4.0 - 11.0 10e9/L   _  Result Component " Current Result Ref Range   Hemoglobin 12.5 (L) (8/1/2019) 13.3 - 17.7 g/dL   _  Result Component Current Result Ref Range   Platelet Count 228 (8/1/2019) 150 - 450 10e9/L   _  Result Component Current Result Ref Range   Absolute Neutrophil 7.0 (8/1/2019) 1.6 - 8.3 10e9/L           Assessment:  Ascencion is tolerating treatment well.     Plan:  Continue therapy.     Follow-Up:  11/4 for labs, 11/7 - 3 month follow-up with Dr. Valenzuela    Refill Due:  8/16 to [facility]    Belle Champion, PharmD  August 1, 2019

## 2019-08-01 NOTE — NURSING NOTE
"Oncology Rooming Note    August 1, 2019 10:42 AM   Dimitri Neves is a 83 year old male who presents for:     Chief Complaint   Patient presents with     Oncology Clinic Visit     3 month follow up     Initial Vitals: /74 (BP Location: Right arm)   Pulse 52   Temp 97.9  F (36.6  C) (Oral)   Resp 16   Ht 1.803 m (5' 10.98\")   Wt 90.2 kg (198 lb 12.8 oz)   SpO2 98%   BMI 27.74 kg/m   Estimated body mass index is 27.74 kg/m  as calculated from the following:    Height as of this encounter: 1.803 m (5' 10.98\").    Weight as of this encounter: 90.2 kg (198 lb 12.8 oz). Body surface area is 2.13 meters squared.  No Pain (0) Comment: Data Unavailable   No LMP for male patient.  Allergies reviewed: Yes  Medications reviewed: Yes    Medications: Medication refills not needed today.  Pharmacy name entered into [employer]:    [id number] - [place], [place] - [address] [facility] - [place], [place] - [address] [place] [address]  [facility] - [facility] ONLY - [id number], [place] - [address] [facility] YULISSA RADHA Clemente LPN            "

## 2019-08-01 NOTE — PROGRESS NOTES
[facility]  HEMATOLOGY AND ONCOLOGY    FOLLOW-UP VISIT NOTE    PATIENT NAME: Dimitri Neves MRN # [id number]  DATE OF VISIT: Aug 1, 2019 YOB: 1935    REFERRING PROVIDER: No referring provider defined for this encounter.    CANCER TYPE: Prostate adenocarcinoma  STAGE: IV    TREATMENT SUMMARY:  Dimitri was followed by his PCP on 6/13/13 and was elevated at 32 as noted below. He was referred to Dr. Taylor. He was treated with a 10 day course of ciprofloxacin and followed again in 6 weeks on 8/10. His PSA drawn prior to this visit was unchanged and remained elevated at 32. He had a TRUS biopsy on 8/25/14 which was negative for prostate adenocarcinoma. His PSA was repeated in 3 months and now increased to 67.9. He had a repeat biopsy of prostate on 12/16/14 which now confirmed prostate adenocarcinoma with eileen 4+4 disease involving 5 of the cores and Solomon 3+3 disease involving remainder of cores. He was staged with a CT scan and bone scan done on 12/29/14 which revealed metastatic foci in the upper cervical vertebrae on the left, right posterior 3rd rib and right ischium, focal uptake in the posterior left 11th rib with corresponding lytic expansile appearance on CT, suspicious for metastasis.          4/5/2011 08:46 6/13/2014 08:03 7/25/2014 09:47 12/5/2014 09:00 4/7/2015 09:14   PSA 2.77 32.70 (H) 32.00 (H) 67.88 (H) 1.92      He was started on androgen deprivation therapy in Jan 2015 with a good initial response. His PSA has been increasing recently and he was started on bicalutamide in February with no response. He has continued to have rising PSA and was prescribed abiraterone with prednisone. He had a huge copay with this and has been referred to Medical Oncology to review other options.     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 He is tolerating his abiraterone without any difficulty.     He notes that he does not have the strength he used to have.     He does have hot flushes, fatigue, mood swings on androgen deprivation therapy. He has increased bruising.     He did have bilateral orchiectomies.         PAST MEDICAL HISTORY     Past Medical History:   Diagnosis Date     Actinic keratosis      AK (actinic keratosis) 7/9/2012     Cataract cortical, senile right eye 4/17/2012     DDD (degenerative disc disease), lumbar 1979    s/p 4 back surgeries, spinal cord stimulator implant      Diverticulosis      ED (erectile dysfunction) 2009     GERD (gastroesophageal reflux disease) ~1980     HTN (hypertension), benign ~2000     Macular degeneration, dry, mild, ou 7/23/2016     Multiple lung nodules     Several basilar pulmonary nodules <5 mm     HUDSON (obstructive sleep apnea) 10/2005    on CPAP     Other abnormal glucose 01/14/2009     PE (pulmonary thromboembolism) (H) 1981    after back surgery      Pure hypercholesterolemia ~2000     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         CURRENT OUTPATIENT MEDICATIONS     Current Outpatient Medications   Medication Sig     albuterol (2.5 MG/3ML) 0.083% neb solution Take 1 vial (2.5 mg) by nebulization every 6 hours as needed for shortness of breath / dyspnea or wheezing     ASPIRIN PO Take 81 mg by mouth     Calcium Carbonate (CALCIUM 600 PO)      Cholecalciferol (VITAMIN D) 2000 UNITS tablet Take 1 tablet by mouth daily     [facility] VITAMIN  B12 1000 MCG tablet TAKE 1 TABLET BY MOUTH EVERY DAY     diphenoxylate-atropine (LOMOTIL) 2.5-0.025 MG tablet Take 1 tablet by mouth 4 times daily as needed for diarrhea     fluocinonide (LIDEX) 0.05 % cream Apply sparingly to affected area twice daily as needed on legs.  Do not apply to face.      "furosemide (LASIX) 20 MG tablet TAKE 1 TABLET (20 MG) BY MOUTH DAILY     gabapentin (NEURONTIN) 300 MG capsule Take 900 mg by mouth At Bedtime     metoprolol succinate ER (TOPROL-XL) 25 MG 24 hr tablet TAKE 1 TABLET BY MOUTH EVERY DAY Follow up with Dr. Muir for further refills     oxyCODONE (ROXICODONE) 5 MG IR tablet Take 1 tablet (5 mg) by mouth every 6 hours as needed for pain     potassium chloride ER (K-DUR/KLOR-CON M) 10 MEQ CR tablet Take 1 tablet (10 mEq) by mouth 2 times daily     quinapril (ACCUPRIL) 40 MG Tab TAKE 1 TABLET (40 MG) BY MOUTH DAILY     simvastatin (ZOCOR) 40 MG tablet TAKE 0.5 TABLETS (20 MG) BY MOUTH DAILY     abiraterone (ZYTIGA) 250 MG tablet Take 4 tablets (1,000 mg) by mouth daily for 30 doses Take on empty stomach.     albuterol (2.5 MG/3ML) 0.083% neb solution TAKE 1 VIAL BY NEBULIZATION EVERY 6 HOURS AS NEEDED FOR SHORTNESS OF BREATH / DYSPNEA OR WHEEZING (Patient not taking: Reported on 4/18/2019)     fluorouracil (EFUDEX) 5 % cream Twice daily to scalp and face for two weeks (Patient not taking: Reported on 4/2/2019)     ipratropium - albuterol 0.5 mg/2.5 mg/3 mL (DUONEB) 0.5-2.5 (3) MG/3ML neb solution Take 1 vial (3 mLs) by nebulization once for 1 dose     metoprolol succinate ER (TOPROL-XL) 25 MG 24 hr tablet TAKE 1 TABLET BY MOUTH EVERY DAY     predniSONE (DELTASONE) 5 MG tablet Take 1 tablet (5 mg) by mouth 2 times daily     No current facility-administered medications for this visit.         ALLERGIES      Allergies   Allergen Reactions     Morphine Sulfate GI Disturbance     vomiting     Vicodin [Hydrocodone-Acetaminophen] Nausea and Vomiting        REVIEW OF SYSTEMS   As above in the HPI, o/w complete 12-point ROS was negative.     PHYSICAL EXAM   /74 (BP Location: Right arm)   Pulse 52   Temp 97.9  F (36.6  C) (Oral)   Resp 16   Ht 1.803 m (5' 10.98\")   Wt 90.2 kg (198 lb 12.8 oz)   SpO2 98%   BMI 27.74 kg/m    GEN: NAD  HEENT: PERRL, EOMI, no icterus, " injection or pallor. Oropharynx is clear.  NECK: no cervical or supraclavicular lymphadenopathy  LUNGS: clear bilaterally  CV: regular, no murmurs, rubs, or gallops  ABDOMEN: soft, non-tender, non-distended, normal bowel sounds, no hepatosplenomegaly by percussion or palpation  EXT: warm, well perfused, +++ edema  NEURO: alert  SKIN: Extensive ecchymoses in all the extremities     LABORATORY AND IMAGING STUDIES     Labs remain stable. Calcium is within the normal range    Mild normocytic anemia     Recent Labs   Lab Test 08/01/19  0935 04/18/19  0831 01/07/19  0855 11/12/18  0806 10/01/18  0926    140 141 141 144   POTASSIUM 4.5 4.1 3.3* 3.8 4.4   CHLORIDE 104 105 104 104 105   CO2 30 31 28 30 33*   ANIONGAP 4 4 9 7 6   BUN 18 27 18 20 16   CR 0.90 1.03 1.02 0.93 0.95   * 105* 117* 127* 85   ALETHA 9.4 9.3 9.5 9.2 9.5     Recent Labs   Lab Test 12/28/11  0857   PHOS 3.1     Recent Labs   Lab Test 08/01/19  0935 04/18/19  0831 01/07/19  0855 11/12/18  0806 10/01/18  0926   WBC 9.2 8.2 7.6 7.5 8.9   HGB 12.5* 12.4* 12.1* 11.4* 12.3*    208 211 163 215   MCV 99 100 97 99 102*   NEUTROPHIL 76.1 73.6 69.5 80.1 75.2     Recent Labs   Lab Test 08/01/19  0935 04/18/19  0831 01/07/19  0855   BILITOTAL 0.7 1.0 0.7   ALKPHOS 86 83 74   ALT 20 18 17   AST 16 16 15   ALBUMIN 3.4 3.3* 3.0*     TSH   Date Value Ref Range Status   06/13/2016 1.60 0.40 - 4.00 mU/L Final   04/05/2011 3.73 0.4 - 5.0 mU/L Final     Recent Labs   Lab Test 08/01/19  0935 04/18/19  0831 01/07/19  0855 11/12/18  0806 10/01/18  0926  02/09/17  0823   PSA 27.00* 17.80* 13.20* 16.80* 11.70*   < >  --    TESTOSTTOTAL  --   --   --   --   --   --  9*    < > = values in this interval not displayed.        ASSESSMENT AND PLAN   1. 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 No response to addition of bicalutamide  3. PE diagnosed few weeks after initiation of  megestrol acetate for hot flashes on GnRH agonist  4. No significant medical comorbidities    He is tolerating his abiraterone well and has no complains. His PSA did respond nicely as expected initially and has been relatively since then. It was 64 on 7/13/17, dropped to 12 by 10/16/17. It has been slowly rising since then. He had a very gradual rise in his PSA this far. His PSA at this visit is much higher at 27 up from 17.8 at the last visit.     We would plan to continue with abiraterone as current. There are no immediate concerns. However given the progressive disease, I would get restaging scans prior to next visit in 3 months. If he has radiographic progression, we would have to switch to an alternative line of therapy. I reviewed chemotherapy with docetaxel as the next step. I reviewed chemotherapy with docetaxel. I extensively reviewed the side effects from this chemotherapy.  We reviewed the peripheral neuropathy, alopecia, neutropenic fevers, myelosuppression, mucositis, diarrhea, allergies, pedal edema and rash.  Of these, alopecia and nail changes have more cosmetic bearings.  Neutropenic fevers are something to be quite cognizant about. He should take every fever seriously because if his counts are low, then he would not have the defenses and he should give us a call immediately.  He should not even wait for the next morning.  He should be seen either in the clinic or in the ER the same day.  We would get basic blood tests including the CBC and blood cultures.  We would do a fever workup for him.  If the counts were adequate, we could discharge him home on antibiotics if he looked stable.  If his counts were low, even if he was feeling fine, we would have to admit him to ensure that he was safe and stable. We reviewed the palliative intent, side effects, benefits, rationale, alternatives and outpatient regimen with him.        He did get bilateral orchiectomies done last month 9/27/17. He would not need  any more lupron or other GnRH directed therapies.     His labs are stable except for mild normocytic anemia - possibly owing to ongoing therapy for cancer.     He has extensive ecchymoses likely secondary to prednisone use.     He has hot flashes, mood swings, fatigue - from androgen deprivation. These are no different between either orchiectomy or GnRH therapy as they come with low levels of testosterone.     I encouraged him to be more physically active. His wife is more regular with the gym and he is unable to make it. He notes that he is limited by his knee since he had a knee surgery. I encouraged him to do more exercises involving muscles around the knee to strengthen it. Balanced diet and exercise are the only 2 things that would help him other than his anti-cancer drugs from the cancer perspective.      We would continue with zometa every 12 weeks. I could see him in 12-16 weeks with restaging scans done prior to his visit with me with plans to possibly move him to chemotherapy for progressive disease.     Over 25 min of direct face to face time spent with patient with more than 50% time spent in counseling and coordinating care.

## 2019-08-02 RX ORDER — METOPROLOL SUCCINATE 25 MG/1
TABLET, EXTENDED RELEASE ORAL
Qty: 90 TABLET | Refills: 0 | Status: SHIPPED | OUTPATIENT
Start: 2019-08-02 | End: 2019-10-28

## 2019-08-02 NOTE — TELEPHONE ENCOUNTER
"Routing refill request to provider for review/approval because:  Shante given x1 and patient did not follow up, please advise  A break in medication    Requested Prescriptions   Pending Prescriptions Disp Refills     metoprolol succinate ER (TOPROL-XL) 25 MG 24 hr tablet [Pharmacy Med Name: METOPROLOL SUCC ER 25 MG TAB]  Last Written Prescription Date:  2/1/19  Last Fill Quantity: 30,  # refills: 0   Last office visit: 3/4/2019 with prescribing provider:     Future Office Visit:   Next 5 appointments (look out 90 days)    Sep 03, 2019  2:45 PM CDT  [facility] Visit with Senthil [facility]veri [facility]  [facility] [address] [facility] [address]  [phone]        90 tablet 0     Sig: TAKE 1 TABLET BY MOUTH EVERY DAY       Beta-Blockers Protocol Passed - 7/31/2019 12:29 PM        Passed - Blood pressure under 140/90 in past 12 months     BP Readings from Last 3 Encounters:   08/01/19 132/74   04/18/19 109/69   04/02/19 135/69                 Passed - Patient is age 6 or older        Passed - Recent (12 mo) or future (30 days) visit within the authorizing provider's specialty     Patient had office visit in the last 12 months or has a visit in the next 30 days with authorizing provider or within the authorizing provider's specialty.  See \"Patient Info\" tab in inbasket, or \"Choose Columns\" in Meds & Orders section of the refill encounter.              Passed - Medication is active on med list        Aleida Ayala RN - BC      "

## 2019-08-10 DIAGNOSIS — C61 MALIGNANT NEOPLASM OF PROSTATE (H): ICD-10-CM

## 2019-08-10 DIAGNOSIS — C79.51 BONE METASTASIS: ICD-10-CM

## 2019-08-15 DIAGNOSIS — C61 MALIGNANT NEOPLASM OF PROSTATE (H): ICD-10-CM

## 2019-08-15 DIAGNOSIS — C79.51 BONE METASTASIS: Primary | ICD-10-CM

## 2019-08-15 RX ORDER — PREDNISONE 5 MG/1
5 TABLET ORAL 2 TIMES DAILY
Qty: 60 TABLET | Refills: 0 | Status: SHIPPED | OUTPATIENT
Start: 2019-08-15 | End: 2019-01-01

## 2019-08-15 RX ORDER — ABIRATERONE ACETATE 250 MG/1
1000 TABLET ORAL DAILY
Qty: 120 TABLET | Refills: 0 | Status: SHIPPED | OUTPATIENT
Start: 2019-08-15 | End: 2019-01-01

## 2019-09-03 ENCOUNTER — TELEPHONE (OUTPATIENT)
Dept: DERMATOLOGY | Facility: CLINIC | Age: 84
End: 2019-09-03

## 2019-09-03 ENCOUNTER — OFFICE VISIT (OUTPATIENT)
Dept: DERMATOLOGY | Facility: CLINIC | Age: 84
End: 2019-09-03
Payer: COMMERCIAL

## 2019-09-03 VITALS — HEART RATE: 75 BPM | SYSTOLIC BLOOD PRESSURE: 109 MMHG | TEMPERATURE: 99.1 F | DIASTOLIC BLOOD PRESSURE: 66 MMHG

## 2019-09-03 DIAGNOSIS — L81.4 LENTIGO: Primary | ICD-10-CM

## 2019-09-03 DIAGNOSIS — L82.1 SEBORRHEIC KERATOSIS: ICD-10-CM

## 2019-09-03 DIAGNOSIS — D18.01 ANGIOMA OF SKIN: ICD-10-CM

## 2019-09-03 DIAGNOSIS — C44.329 SQUAMOUS CELL CARCINOMA OF SKIN OF RIGHT TEMPLE: ICD-10-CM

## 2019-09-03 DIAGNOSIS — L57.0 AK (ACTINIC KERATOSIS): ICD-10-CM

## 2019-09-03 DIAGNOSIS — Z85.828 HISTORY OF SKIN CANCER: ICD-10-CM

## 2019-09-03 DIAGNOSIS — C44.320 SQUAMOUS CELL CARCINOMA OF FACE: ICD-10-CM

## 2019-09-03 PROCEDURE — 11103 TANGNTL BX SKIN EA SEP/ADDL: CPT | Performed by: DERMATOLOGY

## 2019-09-03 PROCEDURE — 99213 OFFICE O/P EST LOW 20 MIN: CPT | Mod: 25 | Performed by: DERMATOLOGY

## 2019-09-03 PROCEDURE — 88331 PATH CONSLTJ SURG 1 BLK 1SPC: CPT | Performed by: DERMATOLOGY

## 2019-09-03 PROCEDURE — 11102 TANGNTL BX SKIN SINGLE LES: CPT | Performed by: DERMATOLOGY

## 2019-09-03 RX ORDER — CALCIPOTRIENE 50 UG/G
CREAM TOPICAL
Qty: 60 G | Refills: 3 | Status: SHIPPED | OUTPATIENT
Start: 2019-09-03

## 2019-09-03 RX ORDER — FLUOROURACIL 50 MG/G
CREAM TOPICAL
Qty: 40 G | Refills: 3 | Status: SHIPPED | OUTPATIENT
Start: 2019-09-03

## 2019-09-03 NOTE — LETTER
9/3/2019         RE: Dimitri Neves  [address] [place] [address]        Dear Colleague,    Thank you for referring your patient, Dimitri Neves, to the [facility]. Please see a copy of my visit note below.    Dimitri Neves is a 84 year old year old male patient here today for f/u efudex.  He did on face but not scalp yet.  He notes two persistent spots on right temple that did not clear with efudex.   .  Patient states this has been present for a while.  Patient reports the following symptoms:  tender.  Patient reports the following previous treatments none.  Patient reports the following modifying factors none.  Associated symptoms: none.  Patient has no other skin complaints today.  Remainder of the HPI, Meds, PMH, Allergies, FH, and SH was reviewed in chart.      Past Medical History:   Diagnosis Date     Actinic keratosis      AK (actinic keratosis) 7/9/2012     Cataract cortical, senile right eye 4/17/2012     DDD (degenerative disc disease), lumbar 1979    s/p 4 back surgeries, spinal cord stimulator implant      Diverticulosis      ED (erectile dysfunction) 2009     GERD (gastroesophageal reflux disease) ~1980     HTN (hypertension), benign ~2000     Macular degeneration, dry, mild, ou 7/23/2016     Multiple lung nodules     Several basilar pulmonary nodules <5 mm     HUDSON (obstructive sleep apnea) 10/2005    on CPAP     Other abnormal glucose 01/14/2009     PE (pulmonary thromboembolism) (H) 1981    after back surgery      Pure hypercholesterolemia ~2000     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       Past Surgical History:   Procedure Laterality Date     ARTHROSCOPY KNEE RT/LT  ~1995    Right     C LUMBAR SPINE FUSION,ANTER APPRCH  8/2007    four times total, latest 8/07     CATARACT IOL, RT/LT       LASER YAG CAPSULOTOMY      both eyes     ORCHIECTOMY SCROTAL Bilateral 9/27/2017    Procedure: ORCHIECTOMY SCROTAL;  Bilateral orchiectomy scrotal approach;  Surgeon:  Claudia, Senthil Sanchez MD;  Location: [facility] OR     PHACOEMULSIFICATION CLEAR CORNEA WITH STANDARD INTRAOCULAR LENS IMPLANT  12/12    right/left eye     ROTATOR CUFF REPAIR RT/LT  ~    right        Family History   Problem Relation Age of Onset     Cancer Father         Lung 64y     Diabetes Brother      Hypertension Brother      Cancer Mother         Liver     Cerebrovascular Disease Mother      Eye Disorder Mother      Glaucoma Mother      Cerebrovascular Disease Maternal Grandmother      C.A.D. No family hx of      Thyroid Disease No family hx of      Macular Degeneration No family hx of      Melanoma No family hx of        Social History     Socioeconomic History     Marital status:      Spouse name: Tatiana     Number of children: 2     Years of education: Not on file     Highest education level: Not on file   Occupational History     Employer: RETIRED   Social Needs     Financial resource strain: Not on file     Food insecurity:     Worry: Not on file     Inability: Not on file     Transportation needs:     Medical: Not on file     Non-medical: Not on file   Tobacco Use     Smoking status: Former Smoker     Packs/day: 1.00     Years: 25.00     Pack years: 25.00     Types: Cigarettes     Last attempt to quit: 1976     Years since quittin.6     Smokeless tobacco: Never Used     Tobacco comment: lives in smoke free household   Substance and Sexual Activity     Alcohol use: Yes     Alcohol/week: 7.0 oz     Types: 14 Standard drinks or equivalent per week     Comment: 2-3 drinks every night     Drug use: No     Comment: tatoos- sterile     Sexual activity: Yes     Partners: Female   Lifestyle     Physical activity:     Days per week: Not on file     Minutes per session: Not on file     Stress: Not on file   Relationships     Social connections:     Talks on phone: Not on file     Gets together: Not on file     Attends Shinto service: Not on file     Active member of club or organization:  Not on file     Attends meetings of clubs or organizations: Not on file     Relationship status: Not on file     Intimate partner violence:     Fear of current or ex partner: Not on file     Emotionally abused: Not on file     Physically abused: Not on file     Forced sexual activity: Not on file   Other Topics Concern      Service Yes     Comment: [employer] x8 years     Blood Transfusions No     Caffeine Concern No     Occupational Exposure Not Asked     Hobby Hazards No     Sleep Concern No     Stress Concern No     Weight Concern Yes     Special Diet No     Back Care Yes     Exercise Yes     Bike Helmet Not Asked     Seat Belt Yes     Self-Exams No     Parent/sibling w/ CABG, MI or angioplasty before 65F 55M? No   Social History Narrative     Not on file       Outpatient Encounter Medications as of 9/3/2019   Medication Sig Dispense Refill     abiraterone (ZYTIGA) 250 MG tablet Take 4 tablets (1,000 mg) by mouth daily for 30 doses Take on empty stomach. 120 tablet 0     albuterol (2.5 MG/3ML) 0.083% neb solution TAKE 1 VIAL BY NEBULIZATION EVERY 6 HOURS AS NEEDED FOR SHORTNESS OF BREATH / DYSPNEA OR WHEEZING (Patient not taking: Reported on 4/18/2019) 75 mL 1     albuterol (2.5 MG/3ML) 0.083% neb solution Take 1 vial (2.5 mg) by nebulization every 6 hours as needed for shortness of breath / dyspnea or wheezing 25 vial 3     ASPIRIN PO Take 81 mg by mouth       Calcium Carbonate (CALCIUM 600 PO)        Cholecalciferol (VITAMIN D) 2000 UNITS tablet Take 1 tablet by mouth daily       [employer] VITAMIN  B12 1000 MCG tablet TAKE 1 TABLET BY MOUTH EVERY DAY 30 tablet 5     diphenoxylate-atropine (LOMOTIL) 2.5-0.025 MG tablet Take 1 tablet by mouth 4 times daily as needed for diarrhea 40 tablet 1     fluocinonide (LIDEX) 0.05 % cream Apply sparingly to affected area twice daily as needed on legs.  Do not apply to face. 120 g 3     fluorouracil (EFUDEX) 5 % cream Twice daily to scalp and face for two weeks (Patient  not taking: Reported on 4/2/2019) 40 g 1     furosemide (LASIX) 20 MG tablet TAKE 1 TABLET (20 MG) BY MOUTH DAILY 90 tablet 1     gabapentin (NEURONTIN) 300 MG capsule Take 900 mg by mouth At Bedtime       ipratropium - albuterol 0.5 mg/2.5 mg/3 mL (DUONEB) 0.5-2.5 (3) MG/3ML neb solution Take 1 vial (3 mLs) by nebulization once for 1 dose 3 mL 0     metoprolol succinate ER (TOPROL-XL) 25 MG 24 hr tablet TAKE 1 TABLET BY MOUTH EVERY DAY 90 tablet 0     metoprolol succinate ER (TOPROL-XL) 25 MG 24 hr tablet TAKE 1 TABLET BY MOUTH EVERY DAY Follow up with Dr. Muir for further refills 30 tablet 0     oxyCODONE (ROXICODONE) 5 MG IR tablet Take 1 tablet (5 mg) by mouth every 6 hours as needed for pain 30 tablet 0     potassium chloride ER (K-DUR/KLOR-CON M) 10 MEQ CR tablet Take 1 tablet (10 mEq) by mouth 2 times daily 180 tablet 3     predniSONE (DELTASONE) 5 MG tablet Take 1 tablet (5 mg) by mouth 2 times daily 60 tablet 0     quinapril (ACCUPRIL) 40 MG Tab TAKE 1 TABLET (40 MG) BY MOUTH DAILY 90 tablet 1     simvastatin (ZOCOR) 40 MG tablet TAKE 0.5 TABLETS (20 MG) BY MOUTH DAILY 45 tablet 3     No facility-administered encounter medications on file as of 9/3/2019.              Review Of Systems  Skin: As above  Eyes: negative  Ears/Nose/Throat: negative  Respiratory: No shortness of breath, dyspnea on exertion, cough, or hemoptysis  Cardiovascular: negative  Gastrointestinal: negative  Genitourinary: negative  Musculoskeletal: negative  Neurologic: negative  Psychiatric: negative  Hematologic/Lymphatic/Immunologic: negative  Endocrine: negative      O:   NAD, WDWN, Alert & Oriented, Mood & Affect wnl, Vitals stable   Here today alone   There were no vitals taken for this visit.   General appearance normal   Vitals stable   Alert, oriented and in no acute distress      Following lymph nodes palpated: Occipital, Cervical, Supraclavicular no lad       Stuck on papules and brown macules on trunk and ext   Red papules on  trunk  Gritty appules ons calp  R temple crusted 6mm tender scaly papule   R lateral brow 7mm tender scaly papule     The remainder of expanded problem focused exam was unremarkable; the following areas were examined:  scalp/hair, conjunctiva/lids, face, neck, lips, chest, digits/nails, RUE, LUE.      Eyes: Conjunctivae/lids:Normal     ENT: Lips, buccal mucosa, tongue: normal    MSK:Normal    Cardiovascular: peripheral edema none    Pulm: Breathing Normal    Lymph Nodes: No Head and Neck Lymphadenopathy     Neuro/Psych: Orientation:Normal; Mood/Affect:Normal      MICRO:     R temple:There is hyperkeratosis & parakeratosis of the epidermis, with full thickness epidermal involvement by atypical keratinocytes with rare pale vacuolated cells invading dermis.    R lat brow:There is hyperkeratosis & parakeratosis of the epidermis, with full thickness epidermal involvement by atypical keratinocytes with rare pale vacuolated cells invading dermis.    A/P:  1. Seborrheic keratosis, lentigo, angioma, hx of non-melanoma skin cancer   2. Actinic keratosis scalp  Efudex and dovonex discussed with patient   Irritation discussed with patient   trwice daily for ten days  3. R/o squamous cell carcinoma   TANGENTIAL BIOPSY IN HOUSE:  After consent, anesthesia with [facility] and prep, tangential excision performed and dx above confirmed with frozen section histology.  No complications and routine wound care.  Patient told result   R temple squamous cell carcinoma   R lat brow   Squamous cell carcinoma   Schedule excision       BENIGN LESIONS DISCUSSED WITH PATIENT:  I discussed the specifics of tumor, prognosis, and genetics of benign lesions.  I explained that treatment of these lesions would be purely cosmetic and not medically neccessary.  I discussed with patient different removal options including excision, cautery and /or laser.      Nature and genetics of benign skin lesions dicussed with patient.  Signs and Symptoms of skin cancer  discussed with patient.  Patient encouraged to perform monthly skin exams.  UV precautions reviewed with patient.  Skin care regimen reviewed with patient: Eliminate harsh soaps, i.e. Dial, zest, irsih spring; Mild soaps such as Cetaphil or Dove sensitive skin, avoid hot or cold showers, aggressive use of emollients including vanicream, cetaphil or cerave discussed with patient.    Risks of non-melanoma skin cancer discussed with patient   Return to clinic next appt       Again, thank you for allowing me to participate in the care of your patient.        Sincerely,        Senthil Jhaveri MD

## 2019-09-03 NOTE — NURSING NOTE
"Initial /66   Pulse 75   Temp 99.1  F (37.3  C) (Tympanic)  Estimated body mass index is 27.74 kg/m  as calculated from the following:    Height as of 8/1/19: 1.803 m (5' 10.98\").    Weight as of 8/1/19: 90.2 kg (198 lb 12.8 oz). .    April Arevalo LPN    "

## 2019-09-03 NOTE — PROGRESS NOTES
Dimitri Neves is a 84 year old year old male patient here today for f/u efudex.  He did on face but not scalp yet.  He notes two persistent spots on right temple that did not clear with efudex.   .  Patient states this has been present for a while.  Patient reports the following symptoms:  tender.  Patient reports the following previous treatments none.  Patient reports the following modifying factors none.  Associated symptoms: none.  Patient has no other skin complaints today.  Remainder of the HPI, Meds, PMH, Allergies, FH, and SH was reviewed in chart.      Past Medical History:   Diagnosis Date     Actinic keratosis      AK (actinic keratosis) 7/9/2012     Cataract cortical, senile right eye 4/17/2012     DDD (degenerative disc disease), lumbar 1979    s/p 4 back surgeries, spinal cord stimulator implant      Diverticulosis      ED (erectile dysfunction) 2009     GERD (gastroesophageal reflux disease) ~1980     HTN (hypertension), benign ~2000     Macular degeneration, dry, mild, ou 7/23/2016     Multiple lung nodules     Several basilar pulmonary nodules <5 mm     HUDSON (obstructive sleep apnea) 10/2005    on CPAP     Other abnormal glucose 01/14/2009     PE (pulmonary thromboembolism) (H) 1981    after back surgery      Pure hypercholesterolemia ~2000     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       Past Surgical History:   Procedure Laterality Date     ARTHROSCOPY KNEE RT/LT  ~1995    Right     C LUMBAR SPINE FUSION,ANTER APPRCH  8/2007    four times total, latest 8/07     CATARACT IOL, RT/LT       LASER YAG CAPSULOTOMY      both eyes     ORCHIECTOMY SCROTAL Bilateral 9/27/2017    Procedure: ORCHIECTOMY SCROTAL;  Bilateral orchiectomy scrotal approach;  Surgeon: Senthil Taylor MD;  Location: [facility] OR     PHACOEMULSIFICATION CLEAR CORNEA WITH STANDARD INTRAOCULAR LENS IMPLANT  6-18-12/7-30-12    right/left eye     ROTATOR CUFF REPAIR RT/LT  ~1995    right        Family History   Problem Relation  Age of Onset     Cancer Father         Lung 64y     Diabetes Brother      Hypertension Brother      Cancer Mother         Liver     Cerebrovascular Disease Mother      Eye Disorder Mother      Glaucoma Mother      Cerebrovascular Disease Maternal Grandmother      C.A.D. No family hx of      Thyroid Disease No family hx of      Macular Degeneration No family hx of      Melanoma No family hx of        Social History     Socioeconomic History     Marital status:      Spouse name: Tatiana     Number of children: 2     Years of education: Not on file     Highest education level: Not on file   Occupational History     Employer: RETIRED   Social Needs     Financial resource strain: Not on file     Food insecurity:     Worry: Not on file     Inability: Not on file     Transportation needs:     Medical: Not on file     Non-medical: Not on file   Tobacco Use     Smoking status: Former Smoker     Packs/day: 1.00     Years: 25.00     Pack years: 25.00     Types: Cigarettes     Last attempt to quit: 1976     Years since quittin.6     Smokeless tobacco: Never Used     Tobacco comment: lives in smoke free household   Substance and Sexual Activity     Alcohol use: Yes     Alcohol/week: 7.0 oz     Types: 14 Standard drinks or equivalent per week     Comment: 2-3 drinks every night     Drug use: No     Comment: tatoos- sterile     Sexual activity: Yes     Partners: Female   Lifestyle     Physical activity:     Days per week: Not on file     Minutes per session: Not on file     Stress: Not on file   Relationships     Social connections:     Talks on phone: Not on file     Gets together: Not on file     Attends Shinto service: Not on file     Active member of club or organization: Not on file     Attends meetings of clubs or organizations: Not on file     Relationship status: Not on file     Intimate partner violence:     Fear of current or ex partner: Not on file     Emotionally abused: Not on file     Physically  abused: Not on file     Forced sexual activity: Not on file   Other Topics Concern      Service Yes     Comment: [employer] x8 years     Blood Transfusions No     Caffeine Concern No     Occupational Exposure Not Asked     Hobby Hazards No     Sleep Concern No     Stress Concern No     Weight Concern Yes     Special Diet No     Back Care Yes     Exercise Yes     Bike Helmet Not Asked     Seat Belt Yes     Self-Exams No     Parent/sibling w/ CABG, MI or angioplasty before 65F 55M? No   Social History Narrative     Not on file       Outpatient Encounter Medications as of 9/3/2019   Medication Sig Dispense Refill     abiraterone (ZYTIGA) 250 MG tablet Take 4 tablets (1,000 mg) by mouth daily for 30 doses Take on empty stomach. 120 tablet 0     albuterol (2.5 MG/3ML) 0.083% neb solution TAKE 1 VIAL BY NEBULIZATION EVERY 6 HOURS AS NEEDED FOR SHORTNESS OF BREATH / DYSPNEA OR WHEEZING (Patient not taking: Reported on 4/18/2019) 75 mL 1     albuterol (2.5 MG/3ML) 0.083% neb solution Take 1 vial (2.5 mg) by nebulization every 6 hours as needed for shortness of breath / dyspnea or wheezing 25 vial 3     ASPIRIN PO Take 81 mg by mouth       Calcium Carbonate (CALCIUM 600 PO)        Cholecalciferol (VITAMIN D) 2000 UNITS tablet Take 1 tablet by mouth daily       [facility] VITAMIN  B12 1000 MCG tablet TAKE 1 TABLET BY MOUTH EVERY DAY 30 tablet 5     diphenoxylate-atropine (LOMOTIL) 2.5-0.025 MG tablet Take 1 tablet by mouth 4 times daily as needed for diarrhea 40 tablet 1     fluocinonide (LIDEX) 0.05 % cream Apply sparingly to affected area twice daily as needed on legs.  Do not apply to face. 120 g 3     fluorouracil (EFUDEX) 5 % cream Twice daily to scalp and face for two weeks (Patient not taking: Reported on 4/2/2019) 40 g 1     furosemide (LASIX) 20 MG tablet TAKE 1 TABLET (20 MG) BY MOUTH DAILY 90 tablet 1     gabapentin (NEURONTIN) 300 MG capsule Take 900 mg by mouth At Bedtime       ipratropium - albuterol 0.5  mg/2.5 mg/3 mL (DUONEB) 0.5-2.5 (3) MG/3ML neb solution Take 1 vial (3 mLs) by nebulization once for 1 dose 3 mL 0     metoprolol succinate ER (TOPROL-XL) 25 MG 24 hr tablet TAKE 1 TABLET BY MOUTH EVERY DAY 90 tablet 0     metoprolol succinate ER (TOPROL-XL) 25 MG 24 hr tablet TAKE 1 TABLET BY MOUTH EVERY DAY Follow up with Dr. Muir for further refills 30 tablet 0     oxyCODONE (ROXICODONE) 5 MG IR tablet Take 1 tablet (5 mg) by mouth every 6 hours as needed for pain 30 tablet 0     potassium chloride ER (K-DUR/KLOR-CON M) 10 MEQ CR tablet Take 1 tablet (10 mEq) by mouth 2 times daily 180 tablet 3     predniSONE (DELTASONE) 5 MG tablet Take 1 tablet (5 mg) by mouth 2 times daily 60 tablet 0     quinapril (ACCUPRIL) 40 MG Tab TAKE 1 TABLET (40 MG) BY MOUTH DAILY 90 tablet 1     simvastatin (ZOCOR) 40 MG tablet TAKE 0.5 TABLETS (20 MG) BY MOUTH DAILY 45 tablet 3     No facility-administered encounter medications on file as of 9/3/2019.              Review Of Systems  Skin: As above  Eyes: negative  Ears/Nose/Throat: negative  Respiratory: No shortness of breath, dyspnea on exertion, cough, or hemoptysis  Cardiovascular: negative  Gastrointestinal: negative  Genitourinary: negative  Musculoskeletal: negative  Neurologic: negative  Psychiatric: negative  Hematologic/Lymphatic/Immunologic: negative  Endocrine: negative      O:   NAD, WDWN, Alert & Oriented, Mood & Affect wnl, Vitals stable   Here today alone   There were no vitals taken for this visit.   General appearance normal   Vitals stable   Alert, oriented and in no acute distress      Following lymph nodes palpated: Occipital, Cervical, Supraclavicular no lad       Stuck on papules and brown macules on trunk and ext   Red papules on trunk  Gritty appules ons calp  R temple crusted 6mm tender scaly papule   R lateral brow 7mm tender scaly papule     The remainder of expanded problem focused exam was unremarkable; the following areas were examined:  scalp/hair,  conjunctiva/lids, face, neck, lips, chest, digits/nails, RUE, LUE.      Eyes: Conjunctivae/lids:Normal     ENT: Lips, buccal mucosa, tongue: normal    MSK:Normal    Cardiovascular: peripheral edema none    Pulm: Breathing Normal    Lymph Nodes: No Head and Neck Lymphadenopathy     Neuro/Psych: Orientation:Normal; Mood/Affect:Normal      MICRO:     R temple:There is hyperkeratosis & parakeratosis of the epidermis, with full thickness epidermal involvement by atypical keratinocytes with rare pale vacuolated cells invading dermis.    R lat brow:There is hyperkeratosis & parakeratosis of the epidermis, with full thickness epidermal involvement by atypical keratinocytes with rare pale vacuolated cells invading dermis.    A/P:  1. Seborrheic keratosis, lentigo, angioma, hx of non-melanoma skin cancer   2. Actinic keratosis scalp  Efudex and dovonex discussed with patient   Irritation discussed with patient   trwice daily for ten days  3. R/o squamous cell carcinoma   TANGENTIAL BIOPSY IN HOUSE:  After consent, anesthesia with [facility] and prep, tangential excision performed and dx above confirmed with frozen section histology.  No complications and routine wound care.  Patient told result   R temple squamous cell carcinoma   R lat brow   Squamous cell carcinoma   Schedule excision       BENIGN LESIONS DISCUSSED WITH PATIENT:  I discussed the specifics of tumor, prognosis, and genetics of benign lesions.  I explained that treatment of these lesions would be purely cosmetic and not medically neccessary.  I discussed with patient different removal options including excision, cautery and /or laser.      Nature and genetics of benign skin lesions dicussed with patient.  Signs and Symptoms of skin cancer discussed with patient.  Patient encouraged to perform monthly skin exams.  UV precautions reviewed with patient.  Skin care regimen reviewed with patient: Eliminate harsh soaps, i.e. Dial, zest, irsih spring; Mild soaps such as  Cetaphil or Dove sensitive skin, avoid hot or cold showers, aggressive use of emollients including vanicream, cetaphil or cerave discussed with patient.    Risks of non-melanoma skin cancer discussed with patient   Return to clinic next appt

## 2019-09-03 NOTE — PATIENT INSTRUCTIONS
Start using the efudex cream on your scalp 2 times a day for 14 days.      Wound Care Instructions     FOR SUPERFICIAL WOUNDS     [facility] [phone]    [facility] [phone]  Right temple x2                        AFTER 24 HOURS YOU SHOULD REMOVE THE BANDAGE AND BEGIN DAILY DRESSING CHANGES AS FOLLOWS:     1) Remove Dressing.     2) Clean and dry the area with tap water using a Q-tip or sterile gauze pad.     3) Apply Vaseline, Aquaphor, Polysporin ointment or Bacitracin ointment over entire wound.  Do NOT use Neosporin ointment.     4) Cover the wound with a band-aid, or a sterile non-stick gauze pad and micropore paper tape      REPEAT THESE INSTRUCTIONS AT LEAST ONCE A DAY UNTIL THE WOUND HAS COMPLETELY HEALED.    It is an old wives tale that a wound heals better when it is exposed to air and allowed to dry out. The wound will heal faster with a better cosmetic result if it is kept moist with ointment and covered with a bandage.    **Do not let the wound dry out.**      Supplies Needed:      *Cotton tipped applicators (Q-tips)    *Polysporin Ointment or Bacitracin Ointment (NOT NEOSPORIN)    *Band-aids or non-stick gauze pads and micropore paper tape.      PATIENT INFORMATION:    During the healing process you will notice a number of changes. All wounds develop a small halo of redness surrounding the wound.  This means healing is occurring. Severe itching with extensive redness usually indicates sensitivity to the ointment or bandage tape used to dress the wound.  You should call our office if this develops.      Swelling  and/or discoloration around your surgical site is common, particularly when performed around the eye.    All wounds normally drain.  The larger the wound the more drainage there will be.  After 7-10 days, you will notice the wound beginning to shrink and new skin will begin to grow.  The wound is healed when you can see skin has formed over the entire area.  A  healed wound has a healthy, shiny look to the surface and is red to dark pink in color to normalize.  Wounds may take approximately 4-6 weeks to heal.  Larger wounds may take 6-8 weeks.  After the wound is healed you may discontinue dressing changes.    You may experience a sensation of tightness as your wound heals. This is normal and will gradually subside.    Your healed wound may be sensitive to temperature changes. This sensitivity improves with time, but if you re having a lot of discomfort, try to avoid temperature extremes.    Patients frequently experience itching after their wound appears to have healed because of the continue healing under the skin.  Plain Vaseline will help relieve the itching.        POSSIBLE COMPLICATIONS    BLEEDIN. Leave the bandage in place.  2. Use tightly rolled up gauze or a cloth to apply direct pressure over the bandage for 30  minutes.  3. Reapply pressure for an additional 30 minutes if necessary  4. Use additional gauze and tape to maintain pressure once the bleeding has stopped.

## 2019-09-03 NOTE — TELEPHONE ENCOUNTER
----- Message from Senthil Jhaveri MD sent at 9/3/2019  2:50 PM CDT -----  R temple squamous cell carcinoma   R lat brow   Squamous cell carcinoma   Schedule excision

## 2019-09-03 NOTE — LETTER
[facility]  [address]  [facility] [place] [address]  Phone: [phone]    September 3, 2019    Dimitri Neves                                                                                                               [address] [place] [address]            Dear Mr. Neves,    You are scheduled for Mohs Surgery on Wednesday September 18 th at 7:45 am.     Please check in at Dermatology Clinic.   (2nd Floor, last  Clinic on right up staircase or elevator -past OB/GYN clinic)    You don't need to arrive more than 5-10 minutes prior to your appointment time.     Be sure to eat a good breakfast and bathe and wash your hair prior to Surgery.    If you are taking any anti-coagulants that are prescribed by your Doctor (such as Coumadin/warfarin, Plavix, Aspirin, Ibuprofen), please continue taking them.     However, If you are taking anti-coagulants over the counter without  a Doctor's order for a Medical condition, please discontinue them 10 days prior to Surgery.      Please wear loose comfortable clothing as it could possibly be 4-6 hours until your surgery is completed depending upon how many layers of tissue need to be removed.     Wi-fi access is available.     Thank you,      Senthil Jhaveri MD/ Marlin French RN

## 2019-09-03 NOTE — TELEPHONE ENCOUNTER
Message left to return call.     Needs one Mohs surgery appt. Pre op letter drafted.     Marlin French RN

## 2019-09-04 NOTE — TELEPHONE ENCOUNTER
Patient notified. Patient verbalized understanding.    Scheduled for MOHS Surgery. Mohs Pre-op letter sent.      Marlin French RN

## 2019-09-12 ENCOUNTER — TELEPHONE (OUTPATIENT)
Dept: FAMILY MEDICINE | Facility: CLINIC | Age: 84
End: 2019-09-12

## 2019-09-12 ASSESSMENT — PATIENT HEALTH QUESTIONNAIRE - PHQ9: SUM OF ALL RESPONSES TO PHQ QUESTIONS 1-9: 0

## 2019-09-12 NOTE — TELEPHONE ENCOUNTER
Called patient and complete questionnaire with a score of 0.      Anaid KUMARI CMA ([facility])

## 2019-09-16 DIAGNOSIS — C79.51 BONE METASTASIS: Primary | ICD-10-CM

## 2019-09-16 DIAGNOSIS — C61 MALIGNANT NEOPLASM OF PROSTATE (H): ICD-10-CM

## 2019-09-16 RX ORDER — ABIRATERONE ACETATE 250 MG/1
1000 TABLET ORAL DAILY
Qty: 120 TABLET | Refills: 0 | Status: SHIPPED | OUTPATIENT
Start: 2019-09-16 | End: 2019-01-01

## 2019-09-16 RX ORDER — PREDNISONE 5 MG/1
5 TABLET ORAL 2 TIMES DAILY
Qty: 60 TABLET | Refills: 0 | Status: SHIPPED | OUTPATIENT
Start: 2019-09-16 | End: 2019-01-01

## 2019-09-18 DIAGNOSIS — R79.89 LOW VITAMIN B12 LEVEL: ICD-10-CM

## 2019-09-18 RX ORDER — OMEGA-3/DHA/EPA/FISH OIL 35-113.5MG
TABLET,CHEWABLE ORAL
Qty: 90 TABLET | Refills: 0 | Status: SHIPPED | OUTPATIENT
Start: 2019-09-18 | End: 2019-10-20

## 2019-09-19 NOTE — TELEPHONE ENCOUNTER
Prescription approved per [facility].  Pt overdue for follow up.  Please schedule  Vandana Marcos RN

## 2019-09-29 NOTE — TELEPHONE ENCOUNTER
Prescription approved per [facility] [facility].  Vandana Marcos RN    
diarrhea

## 2019-09-30 ENCOUNTER — OFFICE VISIT (OUTPATIENT)
Dept: DERMATOLOGY | Facility: CLINIC | Age: 84
End: 2019-09-30
Payer: COMMERCIAL

## 2019-09-30 VITALS — SYSTOLIC BLOOD PRESSURE: 123 MMHG | HEART RATE: 66 BPM | DIASTOLIC BLOOD PRESSURE: 64 MMHG | OXYGEN SATURATION: 95 %

## 2019-09-30 DIAGNOSIS — C44.320 SQUAMOUS CELL CARCINOMA OF FACE: Primary | ICD-10-CM

## 2019-09-30 DIAGNOSIS — C44.329 SQUAMOUS CELL CARCINOMA OF SKIN OF RIGHT TEMPLE: ICD-10-CM

## 2019-09-30 PROCEDURE — 13132 CMPLX RPR F/C/C/M/N/AX/G/H/F: CPT | Mod: 51 | Performed by: DERMATOLOGY

## 2019-09-30 PROCEDURE — 17311 MOHS 1 STAGE H/N/HF/G: CPT | Mod: 59 | Performed by: DERMATOLOGY

## 2019-09-30 PROCEDURE — 17311 MOHS 1 STAGE H/N/HF/G: CPT | Performed by: DERMATOLOGY

## 2019-09-30 NOTE — NURSING NOTE
"Initial /64   Pulse 66   SpO2 95%  Estimated body mass index is 27.74 kg/m  as calculated from the following:    Height as of 8/1/19: 1.803 m (5' 10.98\").    Weight as of 8/1/19: 90.2 kg (198 lb 12.8 oz). .    April Arevalo LPN    "

## 2019-09-30 NOTE — NURSING NOTE
Surgical Office Location :   [facility]  [address] [place] [address]

## 2019-09-30 NOTE — PATIENT INSTRUCTIONS
Sutured Wound Care     [facility]: [phone]    [facility]: [phone]          ? No strenuous activity for 48 hours. Resume moderate activity in 48 hours. No heavy exercising until you are seen for follow up in one week.     ? Take Tylenol as needed for discomfort.                         ? Do not drink alcoholic beverages for 48 hours.     ? Keep the pressure bandage in place for 24 hours. If the bandage becomes blood tinged or loose, reinforce it with gauze and tape.        (Refer to the reverse side of this page for management of bleeding).    ? Remove pressure bandage in 24 hours     ? Leave the flat bandage in place until your follow up appointment.    ? Keep the bandage dry. Wash around it carefully.    ? If the tape becomes soiled or starts to come off, reinforce it with additional paper tape.    ? Do not smoke for 3 weeks; smoking is detrimental to wound healing.    ? It is normal to have swelling and bruising around the surgical site. The bruising will fade in approximately 10-14 days. Elevate the area to reduce swelling.    ? Numbness, itchiness and sensitivity to temperature changes can occur after surgery and may take up to 18 months to normalize.      POSSIBLE COMPLICATIONS    BLEEDIN. Leave the bandage in place.  2. Use tightly rolled up gauze or a cloth to apply direct pressure over the bandage for 20   minutes.  3. Reapply pressure for an additional 20 minutes if necessary  4. Call the office or go to the nearest emergency room if pressure fails to stop the bleeding.  5. Use additional gauze and tape to maintain pressure once the bleeding has stopped.        PAIN:    1. Post operative pain should slowly get better, never worse.  2. A severe increase in pain may indicate a problem. Call the office if this occurs.    In case of emergency phone:Dr Jhaveri [phone]

## 2019-09-30 NOTE — PROGRESS NOTES
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  Patient reports the following modifying factors none.  Associated symptoms: none.  Patient has no other skin complaints today.  Remainder of the HPI, Meds, PMH, Allergies, FH, and SH was reviewed in chart.      Past Medical History:   Diagnosis Date     Actinic keratosis      AK (actinic keratosis) 7/9/2012     Cataract cortical, senile right eye 4/17/2012     DDD (degenerative disc disease), lumbar 1979    s/p 4 back surgeries, spinal cord stimulator implant      Diverticulosis      ED (erectile dysfunction) 2009     GERD (gastroesophageal reflux disease) ~1980     HTN (hypertension), benign ~2000     Macular degeneration, dry, mild, ou 7/23/2016     Multiple lung nodules     Several basilar pulmonary nodules <5 mm     HUDSON (obstructive sleep apnea) 10/2005    on CPAP     Other abnormal glucose 01/14/2009     PE (pulmonary thromboembolism) (H) 1981    after back surgery      Pure hypercholesterolemia ~2000     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       Past Surgical History:   Procedure Laterality Date     ARTHROSCOPY KNEE RT/LT  ~1995    Right     C LUMBAR SPINE FUSION,ANTER APPRCH  8/2007    four times total, latest 8/07     CATARACT IOL, RT/LT       LASER YAG CAPSULOTOMY      both eyes     ORCHIECTOMY SCROTAL Bilateral 9/27/2017    Procedure: ORCHIECTOMY SCROTAL;  Bilateral orchiectomy scrotal approach;  Surgeon: Senthil Taylor MD;  Location: [facility] OR     PHACOEMULSIFICATION CLEAR CORNEA WITH STANDARD INTRAOCULAR LENS IMPLANT  6-18-12/7-30-12    right/left eye     ROTATOR CUFF REPAIR RT/LT  ~1995    right        Family History   Problem Relation Age of Onset     Cancer Father         Lung 64y     Diabetes Brother      Hypertension Brother      Cancer Mother         Liver     Cerebrovascular Disease Mother      Eye Disorder Mother      Glaucoma  Mother      Cerebrovascular Disease Maternal Grandmother      C.A.D. No family hx of      Thyroid Disease No family hx of      Macular Degeneration No family hx of      Melanoma No family hx of        Social History     Socioeconomic History     Marital status:      Spouse name: Tatiana     Number of children: 2     Years of education: Not on file     Highest education level: Not on file   Occupational History     Employer: RETIRED   Social Needs     Financial resource strain: Not on file     Food insecurity:     Worry: Not on file     Inability: Not on file     Transportation needs:     Medical: Not on file     Non-medical: Not on file   Tobacco Use     Smoking status: Former Smoker     Packs/day: 1.00     Years: 25.00     Pack years: 25.00     Types: Cigarettes     Last attempt to quit: 1976     Years since quittin.7     Smokeless tobacco: Never Used     Tobacco comment: lives in smoke free household   Substance and Sexual Activity     Alcohol use: Yes     Alcohol/week: 11.7 standard drinks     Types: 14 Standard drinks or equivalent per week     Comment: 2-3 drinks every night     Drug use: No     Comment: tatoos- sterile     Sexual activity: Yes     Partners: Female   Lifestyle     Physical activity:     Days per week: Not on file     Minutes per session: Not on file     Stress: Not on file   Relationships     Social connections:     Talks on phone: Not on file     Gets together: Not on file     Attends [employer] service: Not on file     Active member of club or organization: Not on file     Attends meetings of clubs or organizations: Not on file     Relationship status: Not on file     Intimate partner violence:     Fear of current or ex partner: Not on file     Emotionally abused: Not on file     Physically abused: Not on file     Forced sexual activity: Not on file   Other Topics Concern      Service Yes     Comment: [employer] x8 years     Blood Transfusions No     Caffeine Concern  No     Occupational Exposure Not Asked     Hobby Hazards No     Sleep Concern No     Stress Concern No     Weight Concern Yes     Special Diet No     Back Care Yes     Exercise Yes     Bike Helmet Not Asked     Seat Belt Yes     Self-Exams No     Parent/sibling w/ CABG, MI or angioplasty before 65F 55M? No   Social History Narrative     Not on file       Outpatient Encounter Medications as of 9/30/2019   Medication Sig Dispense Refill     abiraterone (ZYTIGA) 250 MG tablet Take 4 tablets (1,000 mg) by mouth daily for 30 doses Take on empty stomach. 120 tablet 0     abiraterone (ZYTIGA) 250 MG tablet Take 4 tablets (1,000 mg) by mouth daily for 30 doses Take on empty stomach. 120 tablet 0     albuterol (2.5 MG/3ML) 0.083% neb solution Take 1 vial (2.5 mg) by nebulization every 6 hours as needed for shortness of breath / dyspnea or wheezing 25 vial 3     ASPIRIN PO Take 81 mg by mouth       calcipotriene (DOVONEX) 0.005 % external cream Mix with efudex and apply twice daily to scalp for ten days 60 g 3     Calcium Carbonate (CALCIUM 600 PO)        Cholecalciferol (VITAMIN D) 2000 UNITS tablet Take 1 tablet by mouth daily       [employer] VITAMIN  B12 1000 MCG tablet TAKE 1 TABLET BY MOUTH EVERY DAY 90 tablet 0     diphenoxylate-atropine (LOMOTIL) 2.5-0.025 MG tablet Take 1 tablet by mouth 4 times daily as needed for diarrhea 40 tablet 1     fluocinonide (LIDEX) 0.05 % cream Apply sparingly to affected area twice daily as needed on legs.  Do not apply to face. 120 g 3     furosemide (LASIX) 20 MG tablet TAKE 1 TABLET (20 MG) BY MOUTH DAILY 90 tablet 1     gabapentin (NEURONTIN) 300 MG capsule Take 900 mg by mouth At Bedtime       metoprolol succinate ER (TOPROL-XL) 25 MG 24 hr tablet TAKE 1 TABLET BY MOUTH EVERY DAY 90 tablet 0     metoprolol succinate ER (TOPROL-XL) 25 MG 24 hr tablet TAKE 1 TABLET BY MOUTH EVERY DAY Follow up with Dr. Muir for further refills 30 tablet 0     potassium chloride ER (K-DUR/KLOR-CON M) 10 MEQ  CR tablet Take 1 tablet (10 mEq) by mouth 2 times daily 180 tablet 3     predniSONE (DELTASONE) 5 MG tablet Take 1 tablet (5 mg) by mouth 2 times daily 60 tablet 0     predniSONE (DELTASONE) 5 MG tablet Take 1 tablet (5 mg) by mouth 2 times daily 60 tablet 0     quinapril (ACCUPRIL) 40 MG Tab TAKE 1 TABLET (40 MG) BY MOUTH DAILY 90 tablet 1     simvastatin (ZOCOR) 40 MG tablet TAKE 0.5 TABLETS (20 MG) BY MOUTH DAILY 45 tablet 3     albuterol (2.5 MG/3ML) 0.083% neb solution TAKE 1 VIAL BY NEBULIZATION EVERY 6 HOURS AS NEEDED FOR SHORTNESS OF BREATH / DYSPNEA OR WHEEZING (Patient not taking: Reported on 4/18/2019) 75 mL 1     fluorouracil (EFUDEX) 5 % cream Twice daily to scalp and face for two weeks (Patient not taking: Reported on 4/2/2019) 40 g 1     fluorouracil (EFUDEX) 5 % external cream Mix with dovonex apply to scalp for ten days twice a day (Patient not taking: Reported on 9/30/2019) 40 g 3     ipratropium - albuterol 0.5 mg/2.5 mg/3 mL (DUONEB) 0.5-2.5 (3) MG/3ML neb solution Take 1 vial (3 mLs) by nebulization once for 1 dose 3 mL 0     oxyCODONE (ROXICODONE) 5 MG IR tablet Take 1 tablet (5 mg) by mouth every 6 hours as needed for pain (Patient not taking: Reported on 9/30/2019) 30 tablet 0     No facility-administered encounter medications on file as of 9/30/2019.              Review Of Systems  Skin: As above  Eyes: negative  Ears/Nose/Throat: negative  Respiratory: No shortness of breath, dyspnea on exertion, cough, or hemoptysis  Cardiovascular: negative  Gastrointestinal: negative  Genitourinary: negative  Musculoskeletal: negative  Neurologic: negative  Psychiatric: negative  Hematologic/Lymphatic/Immunologic: negative  Endocrine: negative      O:   NAD, WDWN, Alert & Oriented, Mood & Affect wnl, Vitals stable   Here today alone   /64   Pulse 66   SpO2 95%    General appearance normal   Vitals stable   Alert, oriented and in no acute distress      Following lymph nodes palpated: Occipital,  Cervical, Supraclavicular no lad   R temple 6mm scaly papule    R lat brow 7mm scaly papule       Eyes: Conjunctivae/lids:Normal     ENT: Lips, buccal mucosa, tongue: normal    MSK:Normal    Cardiovascular: peripheral edema none    Pulm: Breathing Normal    Lymph Nodes: No Head and Neck Lymphadenopathy     Neuro/Psych: Orientation:Normal; Mood/Affect:Normal      A/P:  1. R temple squamous cell carcinoma   MOHS:   Location    After PGACAC discussed with patient, decision for Mohs surgery was made. Indication for Mohs was Location. Patient confirmed the site with Dr. Jhaveri.  After anesthesia with LEC, the tumor was excised using standard Mohs technique in 1 stages(s).  CLEAR MARGINS OBTAINED and Final defect size was 1 x 1.1 cm.       REPAIR COMPLEX: Because of the tightness of the surrounding skin and Because of the size and full thickness nature of the defect, a complex closure was planned. After LEC anesthesia and prep, Burow's triangles were excised in the relaxed skin tension lines. The wound edges were widely undermined by dissection in the subcutaneous plane until adequate tissue mobility was obtained. Hemostasis was obtained. The wound edges were closed in a layered fashion using Vicryl and Fast Absorbing Plain Gut sutures. Postoperative length was 3 cm.   EBL minimal; complications none; wound care routine.  The patient was discharged in good condition and will return in one week for wound evaluation.  2. R brow squamous cell carcinoma   MOHS:   Location    After PGACAC discussed with patient, decision for Mohs surgery was made. Indication for Mohs was Location. Patient confirmed the site with Dr. Jhaveri.  After anesthesia with LEC, the tumor was excised using standard Mohs technique in 1 stages(s).  CLEAR MARGINS OBTAINED and Final defect size was 1.3 x 1.1 cm.       REPAIR COMPLEX: Because of the tightness of the surrounding skin and Because of the size and full thickness nature of the defect, a complex  closure was planned. After LEC anesthesia and prep, Burow's triangles were excised in the relaxed skin tension lines. The wound edges were widely undermined by dissection in the subcutaneous plane until adequate tissue mobility was obtained. Hemostasis was obtained. The wound edges were closed in a layered fashion using Vicryl and Fast Absorbing Plain Gut sutures. Postoperative length was 3.3 cm.   EBL minimal; complications none; wound care routine.  The patient was discharged in good condition and will return in one week for wound evaluation.  BENIGN LESIONS DISCUSSED WITH PATIENT:  I discussed the specifics of tumor, prognosis, and genetics of benign lesions.  I explained that treatment of these lesions would be purely cosmetic and not medically neccessary.  I discussed with patient different removal options including excision, cautery and /or laser.      Nature and genetics of benign skin lesions dicussed with patient.  Signs and Symptoms of skin cancer discussed with patient.  Patient encouraged to perform monthly skin exams.  UV precautions reviewed with patient.  Skin care regimen reviewed with patient: Eliminate harsh soaps, i.e. Dial, zest, irsih spring; Mild soaps such as Cetaphil or Dove sensitive skin, avoid hot or cold showers, aggressive use of emollients including vanicream, cetaphil or cerave discussed with patient.    Risks of non-melanoma skin cancer discussed with patient   Return to clinic 6 months

## 2019-09-30 NOTE — LETTER
9/30/2019         RE: Dimitri Neves  [id number] [place] [address]        Dear Colleague,    Thank you for referring your patient, Dimitri Neves, to the [facility]. Please see a copy of my visit note below.    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  Patient reports the following modifying factors none.  Associated symptoms: none.  Patient has no other skin complaints today.  Remainder of the HPI, Meds, PMH, Allergies, FH, and SH was reviewed in chart.      Past Medical History:   Diagnosis Date     Actinic keratosis      AK (actinic keratosis) 7/9/2012     Cataract cortical, senile right eye 4/17/2012     DDD (degenerative disc disease), lumbar 1979    s/p 4 back surgeries, spinal cord stimulator implant      Diverticulosis      ED (erectile dysfunction) 2009     GERD (gastroesophageal reflux disease) ~1980     HTN (hypertension), benign ~2000     Macular degeneration, dry, mild, ou 7/23/2016     Multiple lung nodules     Several basilar pulmonary nodules <5 mm     HUDSON (obstructive sleep apnea) 10/2005    on CPAP     Other abnormal glucose 01/14/2009     PE (pulmonary thromboembolism) (H) 1981    after back surgery      Pure hypercholesterolemia ~2000     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       Past Surgical History:   Procedure Laterality Date     ARTHROSCOPY KNEE RT/LT  ~1995    Right     C LUMBAR SPINE FUSION,ANTER APPRCH  8/2007    four times total, latest 8/07     CATARACT IOL, RT/LT       LASER YAG CAPSULOTOMY      both eyes     ORCHIECTOMY SCROTAL Bilateral 9/27/2017    Procedure: ORCHIECTOMY SCROTAL;  Bilateral orchiectomy scrotal approach;  Surgeon: Senthil Taylor MD;  Location: [facility] OR     PHACOEMULSIFICATION CLEAR CORNEA WITH STANDARD INTRAOCULAR LENS IMPLANT  6-18-12/7-30-12    right/left eye     ROTATOR CUFF REPAIR RT/LT  ~1995    right         Family History   Problem Relation Age of Onset     Cancer Father         Lung 64y     Diabetes Brother      Hypertension Brother      Cancer Mother         Liver     Cerebrovascular Disease Mother      Eye Disorder Mother      Glaucoma Mother      Cerebrovascular Disease Maternal Grandmother      C.A.D. No family hx of      Thyroid Disease No family hx of      Macular Degeneration No family hx of      Melanoma No family hx of        Social History     Socioeconomic History     Marital status:      Spouse name: Tatiana     Number of children: 2     Years of education: Not on file     Highest education level: Not on file   Occupational History     Employer: RETIRED   Social Needs     Financial resource strain: Not on file     Food insecurity:     Worry: Not on file     Inability: Not on file     Transportation needs:     Medical: Not on file     Non-medical: Not on file   Tobacco Use     Smoking status: Former Smoker     Packs/day: 1.00     Years: 25.00     Pack years: 25.00     Types: Cigarettes     Last attempt to quit: 1976     Years since quittin.7     Smokeless tobacco: Never Used     Tobacco comment: lives in smoke free household   Substance and Sexual Activity     Alcohol use: Yes     Alcohol/week: 11.7 standard drinks     Types: 14 Standard drinks or equivalent per week     Comment: 2-3 drinks every night     Drug use: No     Comment: tatoos- sterile     Sexual activity: Yes     Partners: Female   Lifestyle     Physical activity:     Days per week: Not on file     Minutes per session: Not on file     Stress: Not on file   Relationships     Social connections:     Talks on phone: Not on file     Gets together: Not on file     Attends Sabianist service: Not on file     Active member of club or organization: Not on file     Attends meetings of clubs or organizations: Not on file     Relationship status: Not on file     Intimate partner violence:     Fear of current or ex partner: Not on file      Emotionally abused: Not on file     Physically abused: Not on file     Forced sexual activity: Not on file   Other Topics Concern      Service Yes     Comment: [employer] x8 years     Blood Transfusions No     Caffeine Concern No     Occupational Exposure Not Asked     Hobby Hazards No     Sleep Concern No     Stress Concern No     Weight Concern Yes     Special Diet No     Back Care Yes     Exercise Yes     Bike Helmet Not Asked     Seat Belt Yes     Self-Exams No     Parent/sibling w/ CABG, MI or angioplasty before 65F 55M? No   Social History Narrative     Not on file       Outpatient Encounter Medications as of 9/30/2019   Medication Sig Dispense Refill     abiraterone (ZYTIGA) 250 MG tablet Take 4 tablets (1,000 mg) by mouth daily for 30 doses Take on empty stomach. 120 tablet 0     abiraterone (ZYTIGA) 250 MG tablet Take 4 tablets (1,000 mg) by mouth daily for 30 doses Take on empty stomach. 120 tablet 0     albuterol (2.5 MG/3ML) 0.083% neb solution Take 1 vial (2.5 mg) by nebulization every 6 hours as needed for shortness of breath / dyspnea or wheezing 25 vial 3     ASPIRIN PO Take 81 mg by mouth       calcipotriene (DOVONEX) 0.005 % external cream Mix with efudex and apply twice daily to scalp for ten days 60 g 3     Calcium Carbonate (CALCIUM 600 PO)        Cholecalciferol (VITAMIN D) 2000 UNITS tablet Take 1 tablet by mouth daily       [employer] VITAMIN  B12 1000 MCG tablet TAKE 1 TABLET BY MOUTH EVERY DAY 90 tablet 0     diphenoxylate-atropine (LOMOTIL) 2.5-0.025 MG tablet Take 1 tablet by mouth 4 times daily as needed for diarrhea 40 tablet 1     fluocinonide (LIDEX) 0.05 % cream Apply sparingly to affected area twice daily as needed on legs.  Do not apply to face. 120 g 3     furosemide (LASIX) 20 MG tablet TAKE 1 TABLET (20 MG) BY MOUTH DAILY 90 tablet 1     gabapentin (NEURONTIN) 300 MG capsule Take 900 mg by mouth At Bedtime       metoprolol succinate ER (TOPROL-XL) 25 MG 24 hr tablet TAKE 1  TABLET BY MOUTH EVERY DAY 90 tablet 0     metoprolol succinate ER (TOPROL-XL) 25 MG 24 hr tablet TAKE 1 TABLET BY MOUTH EVERY DAY Follow up with Dr. Muir for further refills 30 tablet 0     potassium chloride ER (K-DUR/KLOR-CON M) 10 MEQ CR tablet Take 1 tablet (10 mEq) by mouth 2 times daily 180 tablet 3     predniSONE (DELTASONE) 5 MG tablet Take 1 tablet (5 mg) by mouth 2 times daily 60 tablet 0     predniSONE (DELTASONE) 5 MG tablet Take 1 tablet (5 mg) by mouth 2 times daily 60 tablet 0     quinapril (ACCUPRIL) 40 MG Tab TAKE 1 TABLET (40 MG) BY MOUTH DAILY 90 tablet 1     simvastatin (ZOCOR) 40 MG tablet TAKE 0.5 TABLETS (20 MG) BY MOUTH DAILY 45 tablet 3     albuterol (2.5 MG/3ML) 0.083% neb solution TAKE 1 VIAL BY NEBULIZATION EVERY 6 HOURS AS NEEDED FOR SHORTNESS OF BREATH / DYSPNEA OR WHEEZING (Patient not taking: Reported on 4/18/2019) 75 mL 1     fluorouracil (EFUDEX) 5 % cream Twice daily to scalp and face for two weeks (Patient not taking: Reported on 4/2/2019) 40 g 1     fluorouracil (EFUDEX) 5 % external cream Mix with dovonex apply to scalp for ten days twice a day (Patient not taking: Reported on 9/30/2019) 40 g 3     ipratropium - albuterol 0.5 mg/2.5 mg/3 mL (DUONEB) 0.5-2.5 (3) MG/3ML neb solution Take 1 vial (3 mLs) by nebulization once for 1 dose 3 mL 0     oxyCODONE (ROXICODONE) 5 MG IR tablet Take 1 tablet (5 mg) by mouth every 6 hours as needed for pain (Patient not taking: Reported on 9/30/2019) 30 tablet 0     No facility-administered encounter medications on file as of 9/30/2019.              Review Of Systems  Skin: As above  Eyes: negative  Ears/Nose/Throat: negative  Respiratory: No shortness of breath, dyspnea on exertion, cough, or hemoptysis  Cardiovascular: negative  Gastrointestinal: negative  Genitourinary: negative  Musculoskeletal: negative  Neurologic: negative  Psychiatric: negative  Hematologic/Lymphatic/Immunologic: negative  Endocrine: negative      O:   NAD, WDWN, Alert  & Oriented, Mood & Affect wnl, Vitals stable   Here today alone   /64   Pulse 66   SpO2 95%    General appearance normal   Vitals stable   Alert, oriented and in no acute distress      Following lymph nodes palpated: Occipital, Cervical, Supraclavicular no lad   R temple 6mm scaly papule    R lat brow 7mm scaly papule       Eyes: Conjunctivae/lids:Normal     ENT: Lips, buccal mucosa, tongue: normal    MSK:Normal    Cardiovascular: peripheral edema none    Pulm: Breathing Normal    Lymph Nodes: No Head and Neck Lymphadenopathy     Neuro/Psych: Orientation:Normal; Mood/Affect:Normal      A/P:  1. R temple squamous cell carcinoma   MOHS:   Location    After PGACAC discussed with patient, decision for Mohs surgery was made. Indication for Mohs was Location. Patient confirmed the site with Dr. Jhaveri.  After anesthesia with LEC, the tumor was excised using standard Mohs technique in 1 stages(s).  CLEAR MARGINS OBTAINED and Final defect size was 1 x 1.1 cm.       REPAIR COMPLEX: Because of the tightness of the surrounding skin and Because of the size and full thickness nature of the defect, a complex closure was planned. After LEC anesthesia and prep, Burow's triangles were excised in the relaxed skin tension lines. The wound edges were widely undermined by dissection in the subcutaneous plane until adequate tissue mobility was obtained. Hemostasis was obtained. The wound edges were closed in a layered fashion using Vicryl and Fast Absorbing Plain Gut sutures. Postoperative length was 3 cm.   EBL minimal; complications none; wound care routine.  The patient was discharged in good condition and will return in one week for wound evaluation.  2. R brow squamous cell carcinoma   MOHS:   Location    After PGACAC discussed with patient, decision for Mohs surgery was made. Indication for Mohs was Location. Patient confirmed the site with Dr. Jhaveri.  After anesthesia with LEC, the tumor was excised using standard Mohs  technique in 1 stages(s).  CLEAR MARGINS OBTAINED and Final defect size was 1.3 x 1.1 cm.       REPAIR COMPLEX: Because of the tightness of the surrounding skin and Because of the size and full thickness nature of the defect, a complex closure was planned. After LEC anesthesia and prep, Burow's triangles were excised in the relaxed skin tension lines. The wound edges were widely undermined by dissection in the subcutaneous plane until adequate tissue mobility was obtained. Hemostasis was obtained. The wound edges were closed in a layered fashion using Vicryl and Fast Absorbing Plain Gut sutures. Postoperative length was 3.3 cm.   EBL minimal; complications none; wound care routine.  The patient was discharged in good condition and will return in one week for wound evaluation.  BENIGN LESIONS DISCUSSED WITH PATIENT:  I discussed the specifics of tumor, prognosis, and genetics of benign lesions.  I explained that treatment of these lesions would be purely cosmetic and not medically neccessary.  I discussed with patient different removal options including excision, cautery and /or laser.      Nature and genetics of benign skin lesions dicussed with patient.  Signs and Symptoms of skin cancer discussed with patient.  Patient encouraged to perform monthly skin exams.  UV precautions reviewed with patient.  Skin care regimen reviewed with patient: Eliminate harsh soaps, i.e. Dial, zest, irsih spring; Mild soaps such as Cetaphil or Dove sensitive skin, avoid hot or cold showers, aggressive use of emollients including vanicream, cetaphil or cerave discussed with patient.    Risks of non-melanoma skin cancer discussed with patient   Return to clinic 6 months      Again, thank you for allowing me to participate in the care of your patient.        Sincerely,        Senthil Jhaveri MD

## 2019-10-07 ENCOUNTER — ALLIED HEALTH/NURSE VISIT (OUTPATIENT)
Dept: DERMATOLOGY | Facility: CLINIC | Age: 84
End: 2019-10-07
Payer: COMMERCIAL

## 2019-10-07 DIAGNOSIS — Z48.01 ENCOUNTER FOR CHANGE OR REMOVAL OF SURGICAL WOUND DRESSING: Primary | ICD-10-CM

## 2019-10-07 PROCEDURE — 99207 ZZC NO CHARGE NURSE ONLY: CPT

## 2019-10-07 NOTE — PATIENT INSTRUCTIONS
WOUND CARE INSTRUCTIONS  for  ONE WEEK AFTER SURGERY  Right temple          1) Leave flat bandage on your skin for one week after today s bandage change.  2) In one week when you remove the bandage, you may resume your regular skin care routine, including washing with mild soap and water, applying moisturizer, make-up and sunscreen.    3) If there are any open or bleeding areas at the incision/graft site you should begin to cover the area with a bandage daily as follows:    1) Clean and dry the area with plain tap water using a Q-tip or sterile gauze pad.  2) Apply Polysporin or Bacitracin ointment to the open area.  3) Cover the wound with a band-aid or a sterile non-stick gauze pad and micropore paper tape.         SIGNS OF INFECTION  - If you notice any of these signs of infection, call your doctor right away: expanding redness around the wound.  - Yellow or greenish-colored pus or cloudy wound drainage.    - Red streaking spreading from the wound.  - Increased swelling, tenderness, or pain around the wound.   - Fever.    Please remember that yellow and clear drainage from a wound can be normal and related to normal wound healing.  Isolated drainage from a wound without a combination of the above features does not indicate infection.       *Once the bandages are removed, the scar will be red and firm (especially in the lip/chin area). This is normal and will fade in time. It might take 6-12 months for this to happen.     *Massaging the area will help the scar soften and fade quicker. Begin to massage the area one month after the bandages have been removed. To massage apply pressure directly and firmly over the scar with the fingertips and move in a circular motion. Massage the area for a few minutes several times a day. Continue to massage the site for several months.    *Approximately 6-8 weeks after surgery it is not uncommon to see the formation of  tender pimple-like  bump along the scar. This is normal. As  the scar continues to mature and the stitches underneath the skin begin to dissolve, this might occur. Do not pick or squeeze, this will resolve on it s own. Should one break open producing a small amount of drainage, apply Polysporin or Bacitracin ointment a few times a day until the wound is completely healed.    *Numbness in the surgical area is expected. It might take 12-18 months for the feeling to return to normal. During this time sensations of itchiness, tingling and occasional sharp pains might be noted. These feelings are normal and will subside once the nerves have completely healed.         IN CASE OF EMERGENCY: Dr Jhaveri [phone]     If you were seen in [place] call: [phone]    If you were seen in [place] call: [phone]

## 2019-10-07 NOTE — PROGRESS NOTES
Pt returned to clinic for post surgery 1 week follow up bandage change. Pt has no complaints, denies pain. Bandage removed from right temple, area cleansed with normal saline. Site is healing and wound edges approximating well. Reapplied new steri strips and paper tape.    Advised to watch for signs/sx of infection; spreading redness, drainage, odor, fever. Call or report promptly to clinic. Pt given written instructions and informed to rtc as needed. Patient verbalized understanding.     April Arevalo LPN

## 2019-10-14 DIAGNOSIS — C79.51 BONE METASTASIS: Primary | ICD-10-CM

## 2019-10-14 DIAGNOSIS — C61 MALIGNANT NEOPLASM OF PROSTATE (H): ICD-10-CM

## 2019-10-14 RX ORDER — ABIRATERONE ACETATE 250 MG/1
1000 TABLET ORAL DAILY
Qty: 120 TABLET | Refills: 0 | Status: SHIPPED | OUTPATIENT
Start: 2019-10-14 | End: 2020-01-01

## 2019-10-14 RX ORDER — PREDNISONE 5 MG/1
5 TABLET ORAL 2 TIMES DAILY
Qty: 60 TABLET | Refills: 0 | Status: SHIPPED | OUTPATIENT
Start: 2019-10-14 | End: 2020-01-01

## 2019-10-20 DIAGNOSIS — R79.89 LOW VITAMIN B12 LEVEL: ICD-10-CM

## 2019-10-21 DIAGNOSIS — I10 HTN (HYPERTENSION), BENIGN: ICD-10-CM

## 2019-10-22 RX ORDER — QUINAPRIL 40 MG/1
TABLET ORAL
Qty: 90 TABLET | Refills: 0 | Status: SHIPPED | OUTPATIENT
Start: 2019-10-22 | End: 2020-01-01

## 2019-10-22 RX ORDER — ACETAMINOPHEN/DIPHENHYDRAMINE 500MG-25MG
TABLET ORAL
Qty: 90 TABLET | Refills: 1 | Status: SHIPPED | OUTPATIENT
Start: 2019-10-22 | End: 2020-01-01

## 2019-10-28 DIAGNOSIS — E78.5 HYPERLIPIDEMIA LDL GOAL <130: ICD-10-CM

## 2019-10-28 DIAGNOSIS — I10 HTN (HYPERTENSION), BENIGN: ICD-10-CM

## 2019-10-30 RX ORDER — METOPROLOL SUCCINATE 25 MG/1
TABLET, EXTENDED RELEASE ORAL
Qty: 90 TABLET | Refills: 0 | Status: SHIPPED | OUTPATIENT
Start: 2019-10-30 | End: 2020-01-01

## 2019-10-31 RX ORDER — SIMVASTATIN 40 MG
TABLET ORAL
Qty: 45 TABLET | Refills: 3 | Status: SHIPPED | OUTPATIENT
Start: 2019-10-31

## 2019-11-07 NOTE — PROGRESS NOTES
[facility]  HEMATOLOGY AND ONCOLOGY    FOLLOW-UP VISIT NOTE    PATIENT NAME: Dimitri Neves MRN # [id number]  DATE OF VISIT: Nov 7, 2019 YOB: 1935    REFERRING PROVIDER: No referring provider defined for this encounter.    CANCER TYPE: Prostate adenocarcinoma  STAGE: IV    TREATMENT SUMMARY:  Dimitri was followed by his PCP on 6/13/13 and was elevated at 32 as noted below. He was referred to Dr. Taylor. He was treated with a 10 day course of ciprofloxacin and followed again in 6 weeks on 8/10. His PSA drawn prior to this visit was unchanged and remained elevated at 32. He had a TRUS biopsy on 8/25/14 which was negative for prostate adenocarcinoma. His PSA was repeated in 3 months and now increased to 67.9. He had a repeat biopsy of prostate on 12/16/14 which now confirmed prostate adenocarcinoma with eileen 4+4 disease involving 5 of the cores and [place] 3+3 disease involving remainder of cores. He was staged with a CT scan and bone scan done on 12/29/14 which revealed metastatic foci in the upper cervical vertebrae on the left, right posterior 3rd rib and right ischium, focal uptake in the posterior left 11th rib with corresponding lytic expansile appearance on CT, suspicious for metastasis.          4/5/2011 08:46 6/13/2014 08:03 7/25/2014 09:47 12/5/2014 09:00 4/7/2015 09:14   PSA 2.77 32.70 (H) 32.00 (H) 67.88 (H) 1.92      He was started on androgen deprivation therapy in Jan 2015 with a good initial response. His PSA has been increasing recently and he was started on bicalutamide in February with no response. He has continued to have rising PSA and was prescribed abiraterone with prednisone. He had a huge copay with this and has been referred to Medical Oncology to review other options.     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 He is tolerating his abiraterone without any difficulty.     He does have hot flushes, fatigue, mood swings on androgen deprivation therapy. He has increased bruising.     He did have bilateral orchiectomies.         PAST MEDICAL HISTORY     Past Medical History:   Diagnosis Date     Actinic keratosis      AK (actinic keratosis) 7/9/2012     Cataract cortical, senile right eye 4/17/2012     DDD (degenerative disc disease), lumbar 1979    s/p 4 back surgeries, spinal cord stimulator implant      Diverticulosis      ED (erectile dysfunction) 2009     GERD (gastroesophageal reflux disease) ~1980     HTN (hypertension), benign ~2000     Macular degeneration, dry, mild, ou 7/23/2016     Multiple lung nodules     Several basilar pulmonary nodules <5 mm     HUDSON (obstructive sleep apnea) 10/2005    on CPAP     Other abnormal glucose 01/14/2009     PE (pulmonary thromboembolism) (H) 1981    after back surgery      Pure hypercholesterolemia ~2000     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         CURRENT OUTPATIENT MEDICATIONS     Current Outpatient Medications   Medication Sig     abiraterone (ZYTIGA) 250 MG tablet Take 4 tablets (1,000 mg) by mouth daily for 30 doses Take on empty stomach.     albuterol (2.5 MG/3ML) 0.083% neb solution Take 1 vial (2.5 mg) by nebulization every 6 hours as needed for shortness of breath / dyspnea or wheezing     ASPIRIN PO Take 81 mg by mouth     calcipotriene (DOVONEX) 0.005 % external cream Mix with efudex and apply twice daily to scalp for ten days     Calcium Carbonate (CALCIUM 600 PO)      Cholecalciferol (VITAMIN D) 2000 UNITS tablet Take 1 tablet by mouth daily     [employer] VITAMIN B12 1000 MCG tablet TAKE 1 TABLET BY MOUTH EVERY DAY     diphenoxylate-atropine (LOMOTIL) 2.5-0.025 MG tablet Take 1 tablet by mouth 4 times daily  "as needed for diarrhea     fluocinonide (LIDEX) 0.05 % cream Apply sparingly to affected area twice daily as needed on legs.  Do not apply to face.     fluorouracil (EFUDEX) 5 % external cream Mix with dovonex apply to scalp for ten days twice a day     furosemide (LASIX) 20 MG tablet TAKE 1 TABLET (20 MG) BY MOUTH DAILY     gabapentin (NEURONTIN) 300 MG capsule Take 900 mg by mouth At Bedtime     metoprolol succinate ER (TOPROL-XL) 25 MG 24 hr tablet TAKE 1 TABLET BY MOUTH EVERY DAY     potassium chloride ER (K-DUR/KLOR-CON M) 10 MEQ CR tablet Take 1 tablet (10 mEq) by mouth 2 times daily     predniSONE (DELTASONE) 5 MG tablet Take 1 tablet (5 mg) by mouth 2 times daily     quinapril (ACCUPRIL) 40 MG tablet TAKE 1 TABLET (40 MG) BY MOUTH DAILY     simvastatin (ZOCOR) 40 MG tablet TAKE 0.5 TABLETS (20 MG) BY MOUTH DAILY     ipratropium - albuterol 0.5 mg/2.5 mg/3 mL (DUONEB) 0.5-2.5 (3) MG/3ML neb solution Take 1 vial (3 mLs) by nebulization once for 1 dose     No current facility-administered medications for this visit.         ALLERGIES      Allergies   Allergen Reactions     Morphine Sulfate GI Disturbance     vomiting     Vicodin [Hydrocodone-Acetaminophen] Nausea and Vomiting        REVIEW OF SYSTEMS   As above in the HPI, o/w complete 12-point ROS was negative.     PHYSICAL EXAM   /66 (BP Location: Left arm)   Pulse 64   Temp 97.7  F (36.5  C) (Oral)   Resp 16   Ht 1.803 m (5' 10.98\")   Wt 88.6 kg (195 lb 6.4 oz)   SpO2 96%   BMI 27.27 kg/m    GEN: NAD  HEENT: PERRL, EOMI, no icterus, injection or pallor. Oropharynx is clear.  NECK: no cervical or supraclavicular lymphadenopathy  LUNGS: clear bilaterally  CV: regular, no murmurs, rubs, or gallops  ABDOMEN: soft, non-tender, non-distended, normal bowel sounds, no hepatosplenomegaly by percussion or palpation  EXT: warm, well perfused, +++ edema  NEURO: alert  SKIN: Extensive ecchymoses in all the extremities     LABORATORY AND IMAGING STUDIES "     Labs remain stable. Calcium is within the normal range    Mild normocytic anemia     Recent Labs   Lab Test 11/04/19  0841 08/01/19  0935 04/18/19  0831 01/07/19  0855 11/12/18  0806    138 140 141 141   POTASSIUM 4.1 4.5 4.1 3.3* 3.8   CHLORIDE 108 104 105 104 104   CO2 30 30 31 28 30   ANIONGAP 4 4 4 9 7   BUN 11 18 27 18 20   CR 0.91 0.90 1.03 1.02 0.93   * 115* 105* 117* 127*   ALETHA 9.0 9.4 9.3 9.5 9.2     Recent Labs   Lab Test 12/28/11  0857   PHOS 3.1     Recent Labs   Lab Test 11/04/19  0841 08/01/19  0935 04/18/19  0831 01/07/19  0855 11/12/18  0806   WBC 8.3 9.2 8.2 7.6 7.5   HGB 12.2* 12.5* 12.4* 12.1* 11.4*    228 208 211 163    99 100 97 99   NEUTROPHIL 76.4 76.1 73.6 69.5 80.1     Recent Labs   Lab Test 11/04/19  0841 08/01/19  0935 04/18/19  0831   BILITOTAL 0.6 0.7 1.0   ALKPHOS 77 86 83   ALT 16 20 18   AST 15 16 16   ALBUMIN 3.2* 3.4 3.3*     TSH   Date Value Ref Range Status   06/13/2016 1.60 0.40 - 4.00 mU/L Final   04/05/2011 3.73 0.4 - 5.0 mU/L Final     No results for input(s): CEA in the last 48821 hours.  Results for orders placed or performed in visit on 11/04/19   CT Chest/Abdomen/Pelvis w Contrast    Narrative    EXAMINATION: CT CHEST/ABDOMEN/PELVIS W CONTRAST, 11/4/2019 9:57 AM    TECHNIQUE:  Helical CT images from the lung apices through the  symphysis pubis were obtained with contrast.  Coronal and sagittal  reformatted images were generated at a workstation for further  assessment.    CONTRAST:  120 ml Isovue 370.    COMPARISON: CT 5/22/2018 and bone scan 5/22/2018.    HISTORY: Prostate cancer - rising PSA; Malignant neoplasm of prostate  (H); Bone metastasis (H)    ADDITIONAL HISTORY FROM THE EMR: Under treatment with abiraterone.  Today prostate-specific antigen is 35, previously 27 August 2019.    FINDINGS:    Chest: The visualized thyroid is unremarkable. Heart size is normal.  Mild atherosclerotic calcification of the course of LAD. Origins of  the  great neck vessels are patent. There is no pericardial effusion.  No mediastinal, hilar, or axillary lymphadenopathy.  The central  tracheobronchial tree is patent. There is no focal airspace  consolidation, pleural effusion, or pneumothorax. Minimal apical  predominant emphysematous changes and scarring, unchanged compared to  prior. Scattered granulomatous bilaterally, unchanged. Plaque-like  pleural calcification in the right ventral pleura unchanged. Scattered  atelectatic and fibrotic changes in the bilateral lower segments. No  worrisome lung nodule.    Abdomen/pelvis:  Bilateral, predominantly exophytic renal cortical cysts are unchanged  from prior studies. There are also subcentimeter cortical  hypodensities in both kidneys, stable, too small to characterize. No  hydronephrosis. Pelvic phleboliths. Symmetric seminal vesicles.  Prostate calcifications.     Focal hypodensity in the left hepatic lobe is stable and too small to  accurately characterize on CT. The liver is otherwise unremarkable.  The gallbladder is surgically absent. Mild to moderate diffuse  pancreatic atrophy.  Right adrenal gland thickening dates back to 2017  without significant change.    There are no dilated loops of large or small bowel. No focal bowel  wall thickening or mucosal hyperenhancement. The appendix is normal.  Sigmoid colonic diverticulosis without CT evidence of active  diverticulitis.     The major intra-abdominal vasculature is patent and within normal  limits for caliber. Atherosclerotic calcifications of the abdominal  aorta and its major branches. Minimally increased narrowing in the  origin of the celiac trunk with poststenotic ectasia of the proximal  celiac trunk, nonspecific and without substantial change since 2017.  No retroperitoneal, pelvic or inguinal lymphadenopathy. No new or  enlarging intra-abdominal lymphadenopathy.    Bones/soft tissues: New ill-defined sclerosis in the lateral left  seventh rib since CT  from 5/22/2018 (series 3, image 200) and anterior  left sixth rib (series 3, image 254).    Unchanged sclerotic metastatic focus in the right ischial bone and  right inferior pubic ramus.    Multilevel anterior and posterior bridging osteophytes as well as  fusion of the spinous processes. Appearance of the spine resembles  diffuse idiopathic skeletal hyperostosis. Osteoporosis of the spine.  Posterior fusion hardware at L2-3. Laminectomy defect at L2-3.  Interdiscal fusion at this L3-4. Unchanged posterior bulging  (osteophyte at T11-T12 with resultant mild to moderate spinal canal  stenosis. Spinal stimulator device with leads terminating posterior to  the spinal cord at the T7-T8 level. Rotator cuff repair on the right.  Degenerative changes of bilateral SI joints with partial fusion.  Degenerative changes of both hips.  Healed prior left posterior 11th  and 12th rib fractures.      Impression    Impression: In this patient with metastatic prostate cancer with  rising PSA:  1. Indeterminate sclerotic focus in the left lateral seventh rib and  anterior left sixth rib, new since 2018.  Attention on follow-up.  2. Stable sclerotic metastatic focus in the right ischial bone and  right inferior pubic ramus.  3. No metastatic lymphadenopathy. No solid organ or lung metastasis.  4. Please refer to same date bone scan for additional findings.    I have personally reviewed the examination and initial interpretation  and I agree with the findings.    KT TAMEZ MD     EXAMINATION: NM BONE SCAN WHOLE BODY  Whole-body bone scan, 11/4/2019 12:33 PM      HISTORY: Restaging - bony metastasis; Malignant neoplasm of prostate  (H); Bone metastasis (H)      ADDITIONAL INFORMATION: none     COMPARISON: CT cap 11/4/2019, 7/17/2017, nuclear medicine bone scan  5/22/2018, 7/17/2017     TECHNIQUE: The patient received 25.4 mCi of Tc-99m MDP intravenously.  Whole body bone images were obtained at 3 hours.     FINDINGS:      Focal  radiotracer uptake in the right posterior inferior pubic ramus,  slightly larger. This corresponds to known osteoblastic metastasis. No  new metastatic lesions.  New focal radiotracer uptake in the anterior left sixth rib. No  definite corresponding abnormality on CT. This is likely degenerative.  Irregular uptake throughout the spine, degenerative   Periarticular uptake adjacent to the patient's right total knee  arthroplasty.                                                                      IMPRESSION:   1. Slightly progressed osseous metastasis in the right inferior pubic  ramus lesion.   2. New focal radiotracer uptake at the anterior left sixth rib, which  is most likely degenerative change at the costochondral junction.     I have personally reviewed the examination and initial interpretation  and I agree with the findings.     CAESAR CALDWELL MD             Recent Labs   Lab Test 11/04/19  0841 08/01/19  0935 04/18/19  0831 01/07/19  0855 11/12/18  0806  02/09/17  0823   PSA 34.50* 27.00* 17.80* 13.20* 16.80*   < >  --    TESTOSTTOTAL  --   --   --   --   --   --  9*    < > = values in this interval not displayed.        ASSESSMENT AND PLAN   1. 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 No response to addition of bicalutamide  3. PE diagnosed few weeks after initiation of megestrol acetate for hot flashes on GnRH agonist  4. No significant medical comorbidities    He is tolerating his abiraterone well and has no complains. His PSA did respond nicely as expected initially and has been rising since naidr value of 13.2 ng/ml in Jan of 2019. His PSA at this visit is not much higher at 34.5 up from 27 in August and 17.8 ng/ml in April. It has doubled since April in the last 6 months.     I reviewed actual images from his restaging scans. He has a small area of uptake in the right inferior pubic ramus and in left 7th rib. The rib  "lesion is suspicious for degenerative disease. However the right pubic ramus lesion has been present in the past and is more prominent. There is no other evidence of metastatic disease. His CT scan also reveals corresponding sclerotic lesion for the right inferior pubic rami.      We would plan to continue with abiraterone as current for now as we have no immediate concerns. However given the progressive disease, I would recommend getting screened for 'Triton 3' study. If he does not have the mutation, I would recommend chemotherapy with docetaxel as the next step. I reviewed chemotherapy with docetaxel. I extensively reviewed the side effects from this chemotherapy.  We reviewed the peripheral neuropathy, alopecia, neutropenic fevers, myelosuppression, mucositis, diarrhea, allergies, pedal edema and rash. We reviewed the palliative intent, side effects, benefits, rationale, alternatives and outpatient regimen with him. He wondered if the chemotherapy could be done locally in [place]. I do not think that we could have the chemotherapy done in [place]. We are not there as yet.     I reviewed the 'Triton 3\" clinical trial in great details with him. In a study of similar class of agent - olaparib published in NEJ (N Engl J Med 2015; 373:5142-5284) about 33% of similar patients did have one of the mutations involving homologous DNA repair enzyme. 14 of the 16 patients with one of the mutations had a treatment response. Anemia (in 10 of the 50 patients [20%]) and fatigue (in 6 [12%]) were the most common grade 3 or 4 adverse events, findings that are consistent with previous studies of this class of agents. The current Triton 3 study selects patients with one of these mutations which is checked at screening (BRCA1/2, NAFISA, CHK2, RAD51 and others). This can be done from a fresh biopsy or from next generation sequencing of circulating tumor cells. He does not have a good site for biopsy as the only site for disease is right " inferior pubic rami. We could try checking for mutations in circulating tumor cells.     He did get bilateral orchiectomies done last month 9/27/17. He would not need any more lupron or other GnRH directed therapies.     His labs are stable except for mild normocytic anemia - possibly owing to ongoing therapy for cancer. He also has mild hypoalbuminemia which is unchanged.     He has extensive ecchymoses likely secondary to prednisone use.     He has hot flashes, mood swings, fatigue - from androgen deprivation. These are no different between either orchiectomy or GnRH therapy as they come with low levels of testosterone.      We would continue with zometa every 12 weeks. I could see him in 8 weeks with labs done prior to his visit with me. He will see our research nurse today and try to get tested for the DNA homologous repair enzyme deficiency with plans to possibly to start clinical trial if he has the mutation or move to chemotherapy for progressive disease without the mutation.     Over 45 min of direct face to face time spent with patient with more than 50% time spent in counseling and coordinating care.

## 2019-11-07 NOTE — PROGRESS NOTES
Met with Ascencion and his wife Amanda during his Zometa infusion to discuss possible enrollment onto the Triton 3 Clinical Trial; A Multicenter, Randomized, Open-label Phase 3 Study of Rucaparib versus Physician's Choice of Therapy for Patients with Metastatic Castration-resistant Prostate Cancer Associated with Homologous Recombination Deficiency.    P.I.   Dr. Armenta    I answered their initial questions about gene mutations and we discussed the next steps for pre-screening and blood sample to send for testing of gene mutations. I supplied them with a copy of the consent and my contact card should they have any questions or concerns before our next contact.    I will contact them tomorrow afternoon by phone to discuss his enrollment further, and to make a plan for signing of consent and blood draw appointment if he chooses to move into the pre-screening portion of the trial.    Patient and wife are very interested and believe Ascencion will move forward to the pre-screen early next week.    Azra Mayorga RN

## 2019-11-07 NOTE — NURSING NOTE
"Oncology Rooming Note    November 7, 2019 9:28 AM   Dimitri Neves is a 84 year old male who presents for:    Chief Complaint   Patient presents with     Oncology Clinic Visit     3 month follow up/prior to treatment     Initial Vitals: /66 (BP Location: Left arm)   Pulse 64   Temp 97.7  F (36.5  C) (Oral)   Resp 16   Ht 1.803 m (5' 10.98\")   Wt 88.6 kg (195 lb 6.4 oz)   SpO2 96%   BMI 27.27 kg/m   Estimated body mass index is 27.27 kg/m  as calculated from the following:    Height as of this encounter: 1.803 m (5' 10.98\").    Weight as of this encounter: 88.6 kg (195 lb 6.4 oz). Body surface area is 2.11 meters squared.  No Pain (0) Comment: Data Unavailable   No LMP for male patient.  Allergies reviewed: Yes  Medications reviewed: Yes    Medications: Medication refills not needed today.  Pharmacy name entered into [employer]:    [id number] - [place], [place] - [address] [facility] [place] [facility] [address] [place] [address] AVE   [facility] - MAIL ORDER ONLY - [id number] - [place], [place] - [address] [facility] [place] RADHA Clemente LPN              "

## 2019-11-07 NOTE — PROGRESS NOTES
Infusion Nursing Note:  Dimitri SAMIA Neves presents today for Zometa.    Patient seen by provider today: Yes: Dr. Valenzuela   present during visit today: Not Applicable.    Note: N/A.    Intravenous Access:  Peripheral IV placed.    Treatment Conditions:  Lab Results   Component Value Date     11/04/2019                   Lab Results   Component Value Date    POTASSIUM 4.1 11/04/2019           No results found for: MAG         Lab Results   Component Value Date    CR 0.91 11/04/2019                   Lab Results   Component Value Date    ALETHA 9.0 11/04/2019                Lab Results   Component Value Date    BILITOTAL 0.6 11/04/2019           Lab Results   Component Value Date    ALBUMIN 3.2 11/04/2019                    Lab Results   Component Value Date    ALT 16 11/04/2019           Lab Results   Component Value Date    AST 15 11/04/2019       Results reviewed, labs MET treatment parameters, ok to proceed with treatment.      Post Infusion Assessment:  Patient tolerated infusion without incident.  Blood return noted pre and post infusion.  Site patent and intact, free from redness, edema or discomfort.  No evidence of extravasations.  Access discontinued per protocol.       Discharge Plan:   Patient will return as directed for next appointment.   Patient discharged in stable condition accompanied by: wife.  Departure Mode: Ambulatory.    Naila Pedraza, RN-BSN, PHN, OCN  [facility]

## 2019-11-08 NOTE — TELEPHONE ENCOUNTER
Called and spoke to Ascencion.  He will be coming to the cancer center lobby to meet me on Tuesday, November 12th @ 10 AM to discuss and sign consent for the pre-screen portion of the Triton 3 clinical trial.  He will then have 2 study tubes of blood drawn for DNA analysis.     Lab appointment will be around 10:30.     Azra Mayorga RN

## 2019-11-12 NOTE — PROGRESS NOTES
Today I met with Ascencion and his wife Amanda prior to his study related lab appointment to review and complete the pre-screening informed consent form for the TRITON 3 clinical trial; a multicenter, randomized, open-label phase 3 study of Rucaparib vs physician's choice of therapy for patients with metastatic castration-resistant prostate cancer associated with homologous recombination deficiency. Ascencion was provided with a copy of the informed consent in a previous appointment and had time to thoroughly review prior to our meeting. Ascencion and Amanda had several questions which were answered to their satisfaction.  During the consent process we reviewed each page and he was given the opportunity to ask additional questions. This study was also discussed by Dr. Valenzuela at their previous appointment. The pre-screening portion of the study was thoroughly discussed including those about the possibility that an appropriate gene mutation may not be found in his blood sample, length of time to receive results (two weeks after [facility] receives the blood vials), and the need to sign a full consent for the study if the proper mutation is detected, and he chooses to enter the trial at that time.      Patient signed the pre-screening informed consent, Version 2.1 dated 11/27/18 and HIPAA dated 7/18/16. The patient was given a copy as well as my contact information. A copy was also sent to [facility]. No study specific procedures were performed before the signing of the consent.  After consent I accompanied Ascencion to the lab where he had his study visit ctDNA research tubes drawn.             : John Mayorga, [phone]  PI: Dr. Armenta  Oncologist: Dr. Valenzuela,  ([facility]

## 2019-11-18 NOTE — PROGRESS NOTES
Panel Management Review          Composite cancer screening  Chart review shows that this patient is due/due soon for the following None  Summary:    Patient is due/failing the following:   PHYSICAL, lipids and eye exam    Action needed:   Patient needs office visit for annual wellness.    Type of outreach:    ancillary visit today reminded patient of wellness exam    Questions for provider review:    None                                                                                                                                    JAME Doyle       Chart routed to Care Team .

## 2019-12-04 NOTE — TELEPHONE ENCOUNTER
Spoke to Ascencion to inform him that we received the results of the Triton 3 clinical trial pre-screening and there are no mutations in the genes that were tested so he will not be eligible to move forward and participate in the clinical trial.    Ascencion verbalized understanding and expressed gratitude.     Azra Mayorga RN

## 2020-01-01 ENCOUNTER — MEDICAL CORRESPONDENCE (OUTPATIENT)
Dept: HEALTH INFORMATION MANAGEMENT | Facility: CLINIC | Age: 85
End: 2020-01-01

## 2020-01-01 ENCOUNTER — ALLIED HEALTH/NURSE VISIT (OUTPATIENT)
Dept: NURSING | Facility: CLINIC | Age: 85
End: 2020-01-01
Payer: COMMERCIAL

## 2020-01-01 ENCOUNTER — ANCILLARY PROCEDURE (OUTPATIENT)
Dept: GENERAL RADIOLOGY | Facility: CLINIC | Age: 85
End: 2020-01-01
Attending: SURGERY
Payer: COMMERCIAL

## 2020-01-01 ENCOUNTER — TELEPHONE (OUTPATIENT)
Dept: FAMILY MEDICINE | Facility: CLINIC | Age: 85
End: 2020-01-01

## 2020-01-01 ENCOUNTER — VIRTUAL VISIT (OUTPATIENT)
Dept: ONCOLOGY | Facility: CLINIC | Age: 85
End: 2020-01-01
Payer: COMMERCIAL

## 2020-01-01 ENCOUNTER — HOSPITAL ENCOUNTER (OUTPATIENT)
Facility: AMBULATORY SURGERY CENTER | Age: 85
Discharge: HOME | End: 2020-07-24
Attending: SURGERY | Admitting: SURGERY
Payer: COMMERCIAL

## 2020-01-01 ENCOUNTER — HEALTH MAINTENANCE LETTER (OUTPATIENT)
Age: 85
End: 2020-01-01

## 2020-01-01 ENCOUNTER — ONCOLOGY VISIT (OUTPATIENT)
Dept: ONCOLOGY | Facility: CLINIC | Age: 85
End: 2020-01-01
Payer: COMMERCIAL

## 2020-01-01 ENCOUNTER — TELEPHONE (OUTPATIENT)
Dept: ONCOLOGY | Facility: CLINIC | Age: 85
End: 2020-01-01

## 2020-01-01 ENCOUNTER — ANCILLARY PROCEDURE (OUTPATIENT)
Dept: CT IMAGING | Facility: CLINIC | Age: 85
End: 2020-01-01
Attending: INTERNAL MEDICINE
Payer: COMMERCIAL

## 2020-01-01 ENCOUNTER — INFUSION THERAPY VISIT (OUTPATIENT)
Dept: INFUSION THERAPY | Facility: CLINIC | Age: 85
End: 2020-01-01
Payer: COMMERCIAL

## 2020-01-01 ENCOUNTER — CARE COORDINATION (OUTPATIENT)
Dept: ONCOLOGY | Facility: CLINIC | Age: 85
End: 2020-01-01

## 2020-01-01 ENCOUNTER — TRANSFERRED RECORDS (OUTPATIENT)
Dept: HEALTH INFORMATION MANAGEMENT | Facility: CLINIC | Age: 85
End: 2020-01-01

## 2020-01-01 ENCOUNTER — ANCILLARY PROCEDURE (OUTPATIENT)
Dept: NUCLEAR MEDICINE | Facility: CLINIC | Age: 85
End: 2020-01-01
Attending: INTERNAL MEDICINE
Payer: COMMERCIAL

## 2020-01-01 ENCOUNTER — DOCUMENTATION ONLY (OUTPATIENT)
Dept: OTHER | Facility: CLINIC | Age: 85
End: 2020-01-01

## 2020-01-01 ENCOUNTER — ONCOLOGY VISIT (OUTPATIENT)
Dept: ONCOLOGY | Facility: CLINIC | Age: 85
End: 2020-01-01
Attending: INTERNAL MEDICINE
Payer: COMMERCIAL

## 2020-01-01 ENCOUNTER — DOCUMENTATION ONLY (OUTPATIENT)
Dept: FAMILY MEDICINE | Facility: CLINIC | Age: 85
End: 2020-01-01

## 2020-01-01 ENCOUNTER — ANESTHESIA EVENT (OUTPATIENT)
Dept: SURGERY | Facility: AMBULATORY SURGERY CENTER | Age: 85
End: 2020-01-01

## 2020-01-01 ENCOUNTER — INFUSION THERAPY VISIT (OUTPATIENT)
Dept: INFUSION THERAPY | Facility: CLINIC | Age: 85
End: 2020-01-01
Attending: NURSE PRACTITIONER
Payer: COMMERCIAL

## 2020-01-01 ENCOUNTER — OFFICE VISIT (OUTPATIENT)
Dept: INTERNAL MEDICINE | Facility: CLINIC | Age: 85
End: 2020-01-01
Payer: COMMERCIAL

## 2020-01-01 ENCOUNTER — OFFICE VISIT (OUTPATIENT)
Dept: FAMILY MEDICINE | Facility: CLINIC | Age: 85
End: 2020-01-01
Payer: COMMERCIAL

## 2020-01-01 ENCOUNTER — TELEPHONE (OUTPATIENT)
Dept: CARE COORDINATION | Facility: CLINIC | Age: 85
End: 2020-01-01

## 2020-01-01 ENCOUNTER — ANESTHESIA (OUTPATIENT)
Dept: SURGERY | Facility: AMBULATORY SURGERY CENTER | Age: 85
End: 2020-01-01

## 2020-01-01 VITALS
WEIGHT: 188 LBS | DIASTOLIC BLOOD PRESSURE: 76 MMHG | TEMPERATURE: 98.1 F | SYSTOLIC BLOOD PRESSURE: 136 MMHG | HEART RATE: 92 BPM | OXYGEN SATURATION: 98 % | RESPIRATION RATE: 15 BRPM | BODY MASS INDEX: 34.66 KG/M2

## 2020-01-01 VITALS
DIASTOLIC BLOOD PRESSURE: 56 MMHG | SYSTOLIC BLOOD PRESSURE: 103 MMHG | RESPIRATION RATE: 22 BRPM | HEART RATE: 94 BPM | OXYGEN SATURATION: 100 % | TEMPERATURE: 98.7 F

## 2020-01-01 VITALS
OXYGEN SATURATION: 97 % | BODY MASS INDEX: 35.96 KG/M2 | HEART RATE: 101 BPM | TEMPERATURE: 99.8 F | RESPIRATION RATE: 18 BRPM | SYSTOLIC BLOOD PRESSURE: 114 MMHG | DIASTOLIC BLOOD PRESSURE: 72 MMHG | WEIGHT: 195.4 LBS | HEIGHT: 62 IN

## 2020-01-01 VITALS
WEIGHT: 191.4 LBS | DIASTOLIC BLOOD PRESSURE: 87 MMHG | HEART RATE: 68 BPM | TEMPERATURE: 97.8 F | BODY MASS INDEX: 35.29 KG/M2 | SYSTOLIC BLOOD PRESSURE: 146 MMHG | RESPIRATION RATE: 16 BRPM

## 2020-01-01 VITALS
WEIGHT: 182 LBS | TEMPERATURE: 97 F | OXYGEN SATURATION: 97 % | HEART RATE: 112 BPM | DIASTOLIC BLOOD PRESSURE: 62 MMHG | BODY MASS INDEX: 33.56 KG/M2 | SYSTOLIC BLOOD PRESSURE: 110 MMHG

## 2020-01-01 VITALS
DIASTOLIC BLOOD PRESSURE: 60 MMHG | RESPIRATION RATE: 16 BRPM | TEMPERATURE: 97.7 F | SYSTOLIC BLOOD PRESSURE: 127 MMHG | OXYGEN SATURATION: 97 %

## 2020-01-01 VITALS
HEART RATE: 67 BPM | SYSTOLIC BLOOD PRESSURE: 151 MMHG | TEMPERATURE: 98.6 F | OXYGEN SATURATION: 98 % | RESPIRATION RATE: 18 BRPM | WEIGHT: 177.4 LBS | DIASTOLIC BLOOD PRESSURE: 78 MMHG | BODY MASS INDEX: 24.4 KG/M2

## 2020-01-01 VITALS
HEART RATE: 60 BPM | OXYGEN SATURATION: 98 % | DIASTOLIC BLOOD PRESSURE: 75 MMHG | BODY MASS INDEX: 24.39 KG/M2 | SYSTOLIC BLOOD PRESSURE: 135 MMHG | WEIGHT: 180.1 LBS | HEIGHT: 72 IN | TEMPERATURE: 97.3 F

## 2020-01-01 VITALS
DIASTOLIC BLOOD PRESSURE: 72 MMHG | WEIGHT: 198 LBS | HEART RATE: 73 BPM | RESPIRATION RATE: 18 BRPM | OXYGEN SATURATION: 97 % | BODY MASS INDEX: 36.51 KG/M2 | SYSTOLIC BLOOD PRESSURE: 110 MMHG | TEMPERATURE: 98.5 F

## 2020-01-01 DIAGNOSIS — C61 MALIGNANT NEOPLASM OF PROSTATE (H): ICD-10-CM

## 2020-01-01 DIAGNOSIS — Z48.00 CHANGE OF DRESSING: ICD-10-CM

## 2020-01-01 DIAGNOSIS — T45.1X5A CHEMOTHERAPY-INDUCED NEUTROPENIA (H): Primary | ICD-10-CM

## 2020-01-01 DIAGNOSIS — Z11.59 ENCOUNTER FOR SCREENING FOR OTHER VIRAL DISEASES: Primary | ICD-10-CM

## 2020-01-01 DIAGNOSIS — C61 MALIGNANT NEOPLASM OF PROSTATE (H): Primary | ICD-10-CM

## 2020-01-01 DIAGNOSIS — J44.9 CHRONIC OBSTRUCTIVE PULMONARY DISEASE, UNSPECIFIED COPD TYPE (H): ICD-10-CM

## 2020-01-01 DIAGNOSIS — J18.9 PNEUMONIA DUE TO INFECTIOUS ORGANISM, UNSPECIFIED LATERALITY, UNSPECIFIED PART OF LUNG: ICD-10-CM

## 2020-01-01 DIAGNOSIS — Z11.59 ENCOUNTER FOR SCREENING FOR OTHER VIRAL DISEASES: ICD-10-CM

## 2020-01-01 DIAGNOSIS — C79.51 BONE METASTASIS: Primary | ICD-10-CM

## 2020-01-01 DIAGNOSIS — R60.0 PERIPHERAL EDEMA: ICD-10-CM

## 2020-01-01 DIAGNOSIS — N28.9 IMPAIRED RENAL FUNCTION: ICD-10-CM

## 2020-01-01 DIAGNOSIS — D70.1 CHEMOTHERAPY-INDUCED NEUTROPENIA (H): Primary | ICD-10-CM

## 2020-01-01 DIAGNOSIS — G89.29 CHRONIC LOW BACK PAIN WITHOUT SCIATICA, UNSPECIFIED BACK PAIN LATERALITY: ICD-10-CM

## 2020-01-01 DIAGNOSIS — F34.89 OTHER SPECIFIED PERSISTENT MOOD DISORDERS (H): ICD-10-CM

## 2020-01-01 DIAGNOSIS — C79.51 BONE METASTASIS: ICD-10-CM

## 2020-01-01 DIAGNOSIS — J96.01 ACUTE RESPIRATORY FAILURE WITH HYPOXIA (H): Primary | ICD-10-CM

## 2020-01-01 DIAGNOSIS — I10 HTN (HYPERTENSION), BENIGN: ICD-10-CM

## 2020-01-01 DIAGNOSIS — Z95.828 PORT-A-CATH IN PLACE: ICD-10-CM

## 2020-01-01 DIAGNOSIS — Z48.00 ENCOUNTER FOR CHANGE OF DRESSING: Primary | ICD-10-CM

## 2020-01-01 DIAGNOSIS — Z48.00 CHANGE OF DRESSING: Primary | ICD-10-CM

## 2020-01-01 DIAGNOSIS — D64.9 ANEMIA, UNSPECIFIED TYPE: ICD-10-CM

## 2020-01-01 DIAGNOSIS — T45.1X5A CHEMOTHERAPY-INDUCED NEUTROPENIA (H): ICD-10-CM

## 2020-01-01 DIAGNOSIS — Z51.89 VISIT FOR WOUND CARE: Primary | ICD-10-CM

## 2020-01-01 DIAGNOSIS — J20.9 ACUTE BRONCHITIS, UNSPECIFIED ORGANISM: ICD-10-CM

## 2020-01-01 DIAGNOSIS — F41.9 ANXIETY: ICD-10-CM

## 2020-01-01 DIAGNOSIS — M62.81 MUSCLE WEAKNESS (GENERALIZED): ICD-10-CM

## 2020-01-01 DIAGNOSIS — C80.1 CANCER (H): ICD-10-CM

## 2020-01-01 DIAGNOSIS — S49.91XD INJURY OF RIGHT UPPER EXTREMITY, SUBSEQUENT ENCOUNTER: ICD-10-CM

## 2020-01-01 DIAGNOSIS — Z01.818 PREOP GENERAL PHYSICAL EXAM: Primary | ICD-10-CM

## 2020-01-01 DIAGNOSIS — Z48.01 ENCOUNTER FOR CHANGE OR REMOVAL OF SURGICAL WOUND DRESSING: Primary | ICD-10-CM

## 2020-01-01 DIAGNOSIS — L03.113 CELLULITIS OF RIGHT UPPER EXTREMITY: Primary | ICD-10-CM

## 2020-01-01 DIAGNOSIS — R06.02 SOB (SHORTNESS OF BREATH): ICD-10-CM

## 2020-01-01 DIAGNOSIS — E87.6 HYPOKALEMIA: ICD-10-CM

## 2020-01-01 DIAGNOSIS — D70.1 CHEMOTHERAPY-INDUCED NEUTROPENIA (H): ICD-10-CM

## 2020-01-01 DIAGNOSIS — M54.50 CHRONIC LOW BACK PAIN WITHOUT SCIATICA, UNSPECIFIED BACK PAIN LATERALITY: ICD-10-CM

## 2020-01-01 DIAGNOSIS — R79.89 LOW VITAMIN B12 LEVEL: ICD-10-CM

## 2020-01-01 DIAGNOSIS — D64.9 LOW HEMOGLOBIN: ICD-10-CM

## 2020-01-01 DIAGNOSIS — R53.81 PHYSICAL DECONDITIONING: ICD-10-CM

## 2020-01-01 DIAGNOSIS — R11.0 NAUSEA: ICD-10-CM
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
## 2020-01-01 DIAGNOSIS — D69.2 SENILE PURPURA (H): ICD-10-CM

## 2020-01-01 DIAGNOSIS — R63.8 POOR FLUID INTAKE: ICD-10-CM

## 2020-01-01 DIAGNOSIS — Z48.00 DRESSING CHANGE: Primary | ICD-10-CM

## 2020-01-01 DIAGNOSIS — Z53.9 DIAGNOSIS NOT YET DEFINED: Primary | ICD-10-CM

## 2020-01-01 DIAGNOSIS — S51.819A SKIN TEAR OF FOREARM WITHOUT COMPLICATION: Primary | ICD-10-CM

## 2020-01-01 DIAGNOSIS — E66.01 MORBID OBESITY (H): ICD-10-CM

## 2020-01-01 DIAGNOSIS — S40.022A ARM BRUISE, LEFT, INITIAL ENCOUNTER: ICD-10-CM

## 2020-01-01 DIAGNOSIS — I10 ESSENTIAL HYPERTENSION WITH GOAL BLOOD PRESSURE LESS THAN 140/90: ICD-10-CM

## 2020-01-01 DIAGNOSIS — F11.20 NARCOTIC DEPENDENCE, EPISODIC USE (H): ICD-10-CM

## 2020-01-01 DIAGNOSIS — R05.9 COUGH: ICD-10-CM

## 2020-01-01 DIAGNOSIS — R60.0 BILATERAL LOWER EXTREMITY EDEMA: ICD-10-CM

## 2020-01-01 DIAGNOSIS — I26.99 PE (PULMONARY THROMBOEMBOLISM) (H): ICD-10-CM

## 2020-01-01 LAB
ALBUMIN SERPL-MCNC: 3.1 G/DL (ref 3.4–5)
ALBUMIN SERPL-MCNC: 3.2 G/DL (ref 3.4–5)
ALBUMIN SERPL-MCNC: 3.2 G/DL (ref 3.4–5)
ALBUMIN SERPL-MCNC: 3.3 G/DL (ref 3.4–5)
ALP SERPL-CCNC: 75 U/L (ref 40–150)
ALP SERPL-CCNC: 76 U/L (ref 40–150)
ALP SERPL-CCNC: 78 U/L (ref 40–150)
ALP SERPL-CCNC: 79 U/L (ref 40–150)
ALT SERPL W P-5'-P-CCNC: 15 U/L (ref 0–70)
ALT SERPL W P-5'-P-CCNC: 15 U/L (ref 0–70)
ALT SERPL W P-5'-P-CCNC: 17 U/L (ref 0–70)
ALT SERPL W P-5'-P-CCNC: 21 U/L (ref 0–70)
ANION GAP SERPL CALCULATED.3IONS-SCNC: 10 MMOL/L (ref 3–14)
ANION GAP SERPL CALCULATED.3IONS-SCNC: 4 MMOL/L (ref 3–14)
ANION GAP SERPL CALCULATED.3IONS-SCNC: 5 MMOL/L (ref 3–14)
ANION GAP SERPL CALCULATED.3IONS-SCNC: 5 MMOL/L (ref 3–14)
ANION GAP SERPL CALCULATED.3IONS-SCNC: 6 MMOL/L (ref 3–14)
AST SERPL W P-5'-P-CCNC: 16 U/L (ref 0–45)
AST SERPL W P-5'-P-CCNC: 16 U/L (ref 0–45)
AST SERPL W P-5'-P-CCNC: 17 U/L (ref 0–45)
AST SERPL W P-5'-P-CCNC: 18 U/L (ref 0–45)
BASOPHILS # BLD AUTO: 0 10E9/L (ref 0–0.2)
BASOPHILS # BLD AUTO: 0.1 10E9/L (ref 0–0.2)
BASOPHILS NFR BLD AUTO: 0.1 %
BASOPHILS NFR BLD AUTO: 0.1 %
BASOPHILS NFR BLD AUTO: 0.2 %
BASOPHILS NFR BLD AUTO: 0.4 %
BASOPHILS NFR BLD AUTO: 0.9 %
BILIRUB DIRECT SERPL-MCNC: 0.1 MG/DL (ref 0–0.2)
BILIRUB SERPL-MCNC: 0.5 MG/DL (ref 0.2–1.3)
BILIRUB SERPL-MCNC: 0.6 MG/DL (ref 0.2–1.3)
BILIRUB SERPL-MCNC: 0.9 MG/DL (ref 0.2–1.3)
BILIRUB SERPL-MCNC: 1 MG/DL (ref 0.2–1.3)
BUN SERPL-MCNC: 17 MG/DL (ref 7–30)
BUN SERPL-MCNC: 18 MG/DL (ref 7–30)
BUN SERPL-MCNC: 18 MG/DL (ref 7–30)
BUN SERPL-MCNC: 19 MG/DL (ref 7–30)
BUN SERPL-MCNC: 22 MG/DL (ref 7–30)
CALCIUM SERPL-MCNC: 8.9 MG/DL (ref 8.5–10.1)
CALCIUM SERPL-MCNC: 9 MG/DL (ref 8.5–10.1)
CALCIUM SERPL-MCNC: 9.1 MG/DL (ref 8.5–10.1)
CALCIUM SERPL-MCNC: 9.2 MG/DL (ref 8.5–10.1)
CALCIUM SERPL-MCNC: 9.3 MG/DL (ref 8.5–10.1)
CALCIUM SERPL-MCNC: 9.6 MG/DL (ref 8.5–10.1)
CHLORIDE SERPL-SCNC: 102 MMOL/L (ref 94–109)
CHLORIDE SERPL-SCNC: 106 MMOL/L (ref 94–109)
CHLORIDE SERPL-SCNC: 107 MMOL/L (ref 94–109)
CHLORIDE SERPL-SCNC: 107 MMOL/L (ref 94–109)
CHLORIDE SERPL-SCNC: 109 MMOL/L (ref 94–109)
CO2 SERPL-SCNC: 22 MMOL/L (ref 20–32)
CO2 SERPL-SCNC: 26 MMOL/L (ref 20–32)
CO2 SERPL-SCNC: 27 MMOL/L (ref 20–32)
CO2 SERPL-SCNC: 29 MMOL/L (ref 20–32)
CO2 SERPL-SCNC: 30 MMOL/L (ref 20–32)
CREAT SERPL-MCNC: 0.8 MG/DL (ref 0.66–1.25)
CREAT SERPL-MCNC: 0.82 MG/DL (ref 0.66–1.25)
CREAT SERPL-MCNC: 0.88 MG/DL (ref 0.66–1.25)
CREAT SERPL-MCNC: 0.95 MG/DL (ref 0.66–1.25)
CREAT SERPL-MCNC: 0.99 MG/DL (ref 0.66–1.25)
CREAT SERPL-MCNC: 1.24 MG/DL (ref 0.66–1.25)
DIFFERENTIAL METHOD BLD: ABNORMAL
EOSINOPHIL # BLD AUTO: 0 10E9/L (ref 0–0.7)
EOSINOPHIL # BLD AUTO: 0.1 10E9/L (ref 0–0.7)
EOSINOPHIL NFR BLD AUTO: 0 %
EOSINOPHIL NFR BLD AUTO: 0.5 %
EOSINOPHIL NFR BLD AUTO: 0.8 %
EOSINOPHIL NFR BLD AUTO: 1 %
EOSINOPHIL NFR BLD AUTO: 1.4 %
ERYTHROCYTE [DISTWIDTH] IN BLOOD BY AUTOMATED COUNT: 13.5 % (ref 10–15)
ERYTHROCYTE [DISTWIDTH] IN BLOOD BY AUTOMATED COUNT: 13.9 % (ref 10–15)
ERYTHROCYTE [DISTWIDTH] IN BLOOD BY AUTOMATED COUNT: 14.1 % (ref 10–15)
ERYTHROCYTE [DISTWIDTH] IN BLOOD BY AUTOMATED COUNT: 15.1 % (ref 10–15)
ERYTHROCYTE [DISTWIDTH] IN BLOOD BY AUTOMATED COUNT: 15.4 % (ref 10–15)
FERRITIN SERPL-MCNC: 560 NG/ML (ref 26–388)
GFR SERPL CREATININE-BSD FRML MDRD: 54 ML/MIN/{1.73_M2}
GFR SERPL CREATININE-BSD FRML MDRD: 71 ML/MIN/{1.73_M2}
GFR SERPL CREATININE-BSD FRML MDRD: 74 ML/MIN/{1.73_M2}
GFR SERPL CREATININE-BSD FRML MDRD: 80 ML/MIN/{1.73_M2}
GFR SERPL CREATININE-BSD FRML MDRD: 82 ML/MIN/{1.73_M2}
GFR SERPL CREATININE-BSD FRML MDRD: 82 ML/MIN/{1.73_M2}
GLUCOSE SERPL-MCNC: 111 MG/DL (ref 70–99)
GLUCOSE SERPL-MCNC: 112 MG/DL (ref 70–99)
GLUCOSE SERPL-MCNC: 120 MG/DL (ref 70–99)
GLUCOSE SERPL-MCNC: 125 MG/DL (ref 70–99)
GLUCOSE SERPL-MCNC: 94 MG/DL (ref 70–99)
HCT VFR BLD AUTO: 36.6 % (ref 40–53)
HCT VFR BLD AUTO: 36.6 % (ref 40–53)
HCT VFR BLD AUTO: 36.8 % (ref 40–53)
HCT VFR BLD AUTO: 38.3 % (ref 40–53)
HCT VFR BLD AUTO: 39.1 % (ref 40–53)
HGB BLD-MCNC: 11.7 G/DL (ref 13.3–17.7)
HGB BLD-MCNC: 12 G/DL (ref 13.3–17.7)
HGB BLD-MCNC: 12.3 G/DL (ref 13.3–17.7)
HGB BLD-MCNC: 12.5 G/DL (ref 13.3–17.7)
HGB BLD-MCNC: 12.8 G/DL (ref 13.3–17.7)
IMM GRANULOCYTES # BLD: 0 10E9/L (ref 0–0.4)
IMM GRANULOCYTES NFR BLD: 0.3 %
IMM GRANULOCYTES NFR BLD: 0.3 %
IMM GRANULOCYTES NFR BLD: 0.4 %
LDH SERPL L TO P-CCNC: 146 U/L (ref 85–227)
LYMPHOCYTES # BLD AUTO: 0.9 10E9/L (ref 0.8–5.3)
LYMPHOCYTES # BLD AUTO: 1.1 10E9/L (ref 0.8–5.3)
LYMPHOCYTES # BLD AUTO: 1.2 10E9/L (ref 0.8–5.3)
LYMPHOCYTES # BLD AUTO: 2 10E9/L (ref 0.8–5.3)
LYMPHOCYTES # BLD AUTO: 2.1 10E9/L (ref 0.8–5.3)
LYMPHOCYTES NFR BLD AUTO: 10.9 %
LYMPHOCYTES NFR BLD AUTO: 12.2 %
LYMPHOCYTES NFR BLD AUTO: 16.9 %
LYMPHOCYTES NFR BLD AUTO: 24.9 %
LYMPHOCYTES NFR BLD AUTO: 26.2 %
MCH RBC QN AUTO: 31.1 PG (ref 26.5–33)
MCH RBC QN AUTO: 31.5 PG (ref 26.5–33)
MCH RBC QN AUTO: 31.9 PG (ref 26.5–33)
MCH RBC QN AUTO: 32.1 PG (ref 26.5–33)
MCH RBC QN AUTO: 33 PG (ref 26.5–33)
MCHC RBC AUTO-ENTMCNC: 32 G/DL (ref 31.5–36.5)
MCHC RBC AUTO-ENTMCNC: 32.6 G/DL (ref 31.5–36.5)
MCHC RBC AUTO-ENTMCNC: 32.6 G/DL (ref 31.5–36.5)
MCHC RBC AUTO-ENTMCNC: 32.7 G/DL (ref 31.5–36.5)
MCHC RBC AUTO-ENTMCNC: 33.6 G/DL (ref 31.5–36.5)
MCV RBC AUTO: 100 FL (ref 78–100)
MCV RBC AUTO: 95 FL (ref 78–100)
MCV RBC AUTO: 97 FL (ref 78–100)
MCV RBC AUTO: 98 FL (ref 78–100)
MCV RBC AUTO: 98 FL (ref 78–100)
MONOCYTES # BLD AUTO: 0.1 10E9/L (ref 0–1.3)
MONOCYTES # BLD AUTO: 0.5 10E9/L (ref 0–1.3)
MONOCYTES # BLD AUTO: 0.7 10E9/L (ref 0–1.3)
MONOCYTES # BLD AUTO: 0.9 10E9/L (ref 0–1.3)
MONOCYTES # BLD AUTO: 1.2 10E9/L (ref 0–1.3)
MONOCYTES NFR BLD AUTO: 12 %
MONOCYTES NFR BLD AUTO: 14.1 %
MONOCYTES NFR BLD AUTO: 2 %
MONOCYTES NFR BLD AUTO: 6.9 %
MONOCYTES NFR BLD AUTO: 7.6 %
NEUTROPHILS # BLD AUTO: 4.7 10E9/L (ref 1.6–8.3)
NEUTROPHILS # BLD AUTO: 5 10E9/L (ref 1.6–8.3)
NEUTROPHILS # BLD AUTO: 5.2 10E9/L (ref 1.6–8.3)
NEUTROPHILS # BLD AUTO: 6 10E9/L (ref 1.6–8.3)
NEUTROPHILS # BLD AUTO: 8.5 10E9/L (ref 1.6–8.3)
NEUTROPHILS NFR BLD AUTO: 59.3 %
NEUTROPHILS NFR BLD AUTO: 60.4 %
NEUTROPHILS NFR BLD AUTO: 73.6 %
NEUTROPHILS NFR BLD AUTO: 81.2 %
NEUTROPHILS NFR BLD AUTO: 85.4 %
PLATELET # BLD AUTO: 190 10E9/L (ref 150–450)
PLATELET # BLD AUTO: 205 10E9/L (ref 150–450)
PLATELET # BLD AUTO: 221 10E9/L (ref 150–450)
PLATELET # BLD AUTO: 251 10E9/L (ref 150–450)
PLATELET # BLD AUTO: 266 10E9/L (ref 150–450)
POTASSIUM SERPL-SCNC: 3.6 MMOL/L (ref 3.4–5.3)
POTASSIUM SERPL-SCNC: 3.8 MMOL/L (ref 3.4–5.3)
POTASSIUM SERPL-SCNC: 4.1 MMOL/L (ref 3.4–5.3)
POTASSIUM SERPL-SCNC: 4.3 MMOL/L (ref 3.4–5.3)
POTASSIUM SERPL-SCNC: 4.5 MMOL/L (ref 3.4–5.3)
PROT SERPL-MCNC: 6.4 G/DL (ref 6.8–8.8)
PROT SERPL-MCNC: 6.7 G/DL (ref 6.8–8.8)
PSA SERPL-MCNC: 46.9 UG/L (ref 0–4)
PSA SERPL-MCNC: 74 UG/L (ref 0–4)
RBC # BLD AUTO: 3.67 10E12/L (ref 4.4–5.9)
RBC # BLD AUTO: 3.73 10E12/L (ref 4.4–5.9)
RBC # BLD AUTO: 3.74 10E12/L (ref 4.4–5.9)
RBC # BLD AUTO: 3.97 10E12/L (ref 4.4–5.9)
RBC # BLD AUTO: 4.11 10E12/L (ref 4.4–5.9)
SARS-COV-2 RNA SPEC QL NAA+PROBE: NOT DETECTED
SODIUM SERPL-SCNC: 136 MMOL/L (ref 133–144)
SODIUM SERPL-SCNC: 138 MMOL/L (ref 133–144)
SODIUM SERPL-SCNC: 140 MMOL/L (ref 133–144)
SODIUM SERPL-SCNC: 140 MMOL/L (ref 133–144)
SODIUM SERPL-SCNC: 141 MMOL/L (ref 133–144)
SPECIMEN SOURCE: NORMAL
WBC # BLD AUTO: 10.5 10E9/L (ref 4–11)
WBC # BLD AUTO: 7 10E9/L (ref 4–11)
WBC # BLD AUTO: 7 10E9/L (ref 4–11)
WBC # BLD AUTO: 7.8 10E9/L (ref 4–11)
WBC # BLD AUTO: 8.5 10E9/L (ref 4–11)

## 2020-01-01 PROCEDURE — 99215 OFFICE O/P EST HI 40 MIN: CPT | Performed by: INTERNAL MEDICINE

## 2020-01-01 PROCEDURE — 36415 COLL VENOUS BLD VENIPUNCTURE: CPT | Performed by: INTERNAL MEDICINE

## 2020-01-01 PROCEDURE — 85025 COMPLETE CBC W/AUTO DIFF WBC: CPT | Performed by: INTERNAL MEDICINE

## 2020-01-01 PROCEDURE — 96365 THER/PROPH/DIAG IV INF INIT: CPT | Performed by: INTERNAL MEDICINE

## 2020-01-01 PROCEDURE — 82728 ASSAY OF FERRITIN: CPT | Performed by: INTERNAL MEDICINE

## 2020-01-01 PROCEDURE — 80076 HEPATIC FUNCTION PANEL: CPT | Performed by: INTERNAL MEDICINE

## 2020-01-01 PROCEDURE — 90471 IMMUNIZATION ADMIN: CPT | Performed by: FAMILY MEDICINE

## 2020-01-01 PROCEDURE — 77001 FLUOROGUIDE FOR VEIN DEVICE: CPT | Mod: 26 | Performed by: SURGERY

## 2020-01-01 PROCEDURE — 71260 CT THORAX DX C+: CPT | Performed by: RADIOLOGY

## 2020-01-01 PROCEDURE — 82565 ASSAY OF CREATININE: CPT | Performed by: INTERNAL MEDICINE

## 2020-01-01 PROCEDURE — 99214 OFFICE O/P EST MOD 30 MIN: CPT | Mod: TEL | Performed by: INTERNAL MEDICINE

## 2020-01-01 PROCEDURE — 99207 ZZC NO CHARGE NURSE ONLY: CPT

## 2020-01-01 PROCEDURE — 36561 INSERT TUNNELED CV CATH: CPT | Performed by: SURGERY

## 2020-01-01 PROCEDURE — 99214 OFFICE O/P EST MOD 30 MIN: CPT | Performed by: INTERNAL MEDICINE

## 2020-01-01 PROCEDURE — 80048 BASIC METABOLIC PNL TOTAL CA: CPT | Performed by: NURSE PRACTITIONER

## 2020-01-01 PROCEDURE — 84153 ASSAY OF PSA TOTAL: CPT | Performed by: INTERNAL MEDICINE

## 2020-01-01 PROCEDURE — 82310 ASSAY OF CALCIUM: CPT | Performed by: INTERNAL MEDICINE

## 2020-01-01 PROCEDURE — 96360 HYDRATION IV INFUSION INIT: CPT

## 2020-01-01 PROCEDURE — 25000128 H RX IP 250 OP 636: Performed by: NURSE PRACTITIONER

## 2020-01-01 PROCEDURE — G8916 PT W IV AB GIVEN ON TIME: HCPCS

## 2020-01-01 PROCEDURE — G0180 MD CERTIFICATION HHA PATIENT: HCPCS | Performed by: INTERNAL MEDICINE

## 2020-01-01 PROCEDURE — U0003 INFECTIOUS AGENT DETECTION BY NUCLEIC ACID (DNA OR RNA); SEVERE ACUTE RESPIRATORY SYNDROME CORONAVIRUS 2 (SARS-COV-2) (CORONAVIRUS DISEASE [COVID-19]), AMPLIFIED PROBE TECHNIQUE, MAKING USE OF HIGH THROUGHPUT TECHNOLOGIES AS DESCRIBED BY CMS-2020-01-R: HCPCS | Performed by: SURGERY

## 2020-01-01 PROCEDURE — G8907 PT DOC NO EVENTS ON DISCHARG: HCPCS

## 2020-01-01 PROCEDURE — A9503 TC99M MEDRONATE: HCPCS | Performed by: RADIOLOGY

## 2020-01-01 PROCEDURE — 80053 COMPREHEN METABOLIC PANEL: CPT | Performed by: INTERNAL MEDICINE

## 2020-01-01 PROCEDURE — 71045 X-RAY EXAM CHEST 1 VIEW: CPT | Performed by: RADIOLOGY

## 2020-01-01 PROCEDURE — 93000 ELECTROCARDIOGRAM COMPLETE: CPT | Performed by: INTERNAL MEDICINE

## 2020-01-01 PROCEDURE — 99214 OFFICE O/P EST MOD 30 MIN: CPT | Mod: 95 | Performed by: NURSE PRACTITIONER

## 2020-01-01 PROCEDURE — 36561 INSERT TUNNELED CV CATH: CPT

## 2020-01-01 PROCEDURE — 99213 OFFICE O/P EST LOW 20 MIN: CPT | Mod: 25 | Performed by: FAMILY MEDICINE

## 2020-01-01 PROCEDURE — 96375 TX/PRO/DX INJ NEW DRUG ADDON: CPT | Performed by: INTERNAL MEDICINE

## 2020-01-01 PROCEDURE — 80048 BASIC METABOLIC PNL TOTAL CA: CPT | Performed by: INTERNAL MEDICINE

## 2020-01-01 PROCEDURE — 83615 LACTATE (LD) (LDH) ENZYME: CPT | Performed by: INTERNAL MEDICINE

## 2020-01-01 PROCEDURE — 90714 TD VACC NO PRESV 7 YRS+ IM: CPT | Performed by: FAMILY MEDICINE

## 2020-01-01 PROCEDURE — 99213 OFFICE O/P EST LOW 20 MIN: CPT | Performed by: FAMILY MEDICINE

## 2020-01-01 PROCEDURE — 74177 CT ABD & PELVIS W/CONTRAST: CPT | Performed by: RADIOLOGY

## 2020-01-01 PROCEDURE — 25800030 ZZH RX IP 258 OP 636: Performed by: NURSE PRACTITIONER

## 2020-01-01 PROCEDURE — 96367 TX/PROPH/DG ADDL SEQ IV INF: CPT | Performed by: INTERNAL MEDICINE

## 2020-01-01 PROCEDURE — 99207 ZZC NO CHARGE LOS: CPT

## 2020-01-01 PROCEDURE — 96413 CHEMO IV INFUSION 1 HR: CPT | Performed by: INTERNAL MEDICINE

## 2020-01-01 PROCEDURE — 78306 BONE IMAGING WHOLE BODY: CPT | Performed by: RADIOLOGY

## 2020-01-01 DEVICE — CATH PORT POWERPORT CLEARVUE ISP 8FR 5608062: Type: IMPLANTABLE DEVICE | Site: CHEST  WALL | Status: FUNCTIONAL

## 2020-01-01 RX ORDER — CEFAZOLIN SODIUM 2 G/100ML
2 INJECTION, SOLUTION INTRAVENOUS
Status: COMPLETED | OUTPATIENT
Start: 2020-01-01 | End: 2020-01-01

## 2020-01-01 RX ORDER — METOPROLOL SUCCINATE 25 MG/1
12.5 TABLET, EXTENDED RELEASE ORAL DAILY
Qty: 45 TABLET | Refills: 1 | Status: SHIPPED | OUTPATIENT
Start: 2020-01-01

## 2020-01-01 RX ORDER — HEPARIN SODIUM,PORCINE 10 UNIT/ML
5 VIAL (ML) INTRAVENOUS
Status: CANCELLED | OUTPATIENT
Start: 2020-01-01

## 2020-01-01 RX ORDER — PREDNISONE 5 MG/1
5 TABLET ORAL 2 TIMES DAILY
Qty: 60 TABLET | Refills: 0 | Status: SHIPPED | OUTPATIENT
Start: 2020-01-01 | End: 2020-01-01

## 2020-01-01 RX ORDER — ABIRATERONE ACETATE 250 MG/1
1000 TABLET ORAL DAILY
Qty: 120 TABLET | Refills: 0 | Status: CANCELLED | OUTPATIENT
Start: 2020-01-01

## 2020-01-01 RX ORDER — ZOLEDRONIC ACID 0.04 MG/ML
4 INJECTION, SOLUTION INTRAVENOUS ONCE
Status: COMPLETED | OUTPATIENT
Start: 2020-01-01 | End: 2020-01-01

## 2020-01-01 RX ORDER — PROCHLORPERAZINE MALEATE 10 MG
5 TABLET ORAL EVERY 6 HOURS PRN
Qty: 30 TABLET | Refills: 5 | Status: SHIPPED | OUTPATIENT
Start: 2020-01-01

## 2020-01-01 RX ORDER — EPINEPHRINE 1 MG/ML
0.3 INJECTION, SOLUTION INTRAMUSCULAR; SUBCUTANEOUS EVERY 5 MIN PRN
Status: CANCELLED | OUTPATIENT
Start: 2020-01-01

## 2020-01-01 RX ORDER — QUINAPRIL 40 MG/1
TABLET ORAL
Qty: 90 TABLET | Refills: 0 | Status: SHIPPED | OUTPATIENT
Start: 2020-01-01

## 2020-01-01 RX ORDER — ACETAMINOPHEN 325 MG/1
650 TABLET ORAL EVERY 6 HOURS PRN
Qty: 50 TABLET | Refills: 0 | COMMUNITY
Start: 2020-01-01

## 2020-01-01 RX ORDER — ABIRATERONE ACETATE 250 MG/1
1000 TABLET ORAL DAILY
Qty: 120 TABLET | Refills: 0 | Status: SHIPPED | OUTPATIENT
Start: 2020-01-01 | End: 2020-01-01

## 2020-01-01 RX ORDER — ALBUTEROL SULFATE 0.83 MG/ML
2.5 SOLUTION RESPIRATORY (INHALATION)
Status: CANCELLED | OUTPATIENT
Start: 2020-01-01

## 2020-01-01 RX ORDER — DEXAMETHASONE 4 MG/1
TABLET ORAL
Qty: 10 TABLET | Refills: 9 | Status: SHIPPED | OUTPATIENT
Start: 2020-01-01

## 2020-01-01 RX ORDER — HEPARIN SODIUM (PORCINE) LOCK FLUSH IV SOLN 100 UNIT/ML 100 UNIT/ML
5 SOLUTION INTRAVENOUS
Status: DISCONTINUED | OUTPATIENT
Start: 2020-01-01 | End: 2020-01-01 | Stop reason: HOSPADM

## 2020-01-01 RX ORDER — NALOXONE HYDROCHLORIDE 0.4 MG/ML
.1-.4 INJECTION, SOLUTION INTRAMUSCULAR; INTRAVENOUS; SUBCUTANEOUS
Status: CANCELLED | OUTPATIENT
Start: 2020-01-01

## 2020-01-01 RX ORDER — QUINAPRIL 40 MG/1
TABLET ORAL
Qty: 90 TABLET | Refills: 0 | Status: SHIPPED | OUTPATIENT
Start: 2020-01-01 | End: 2020-01-01 | Stop reason: DRUGHIGH

## 2020-01-01 RX ORDER — HEPARIN SODIUM (PORCINE) LOCK FLUSH IV SOLN 100 UNIT/ML 100 UNIT/ML
5 SOLUTION INTRAVENOUS
Status: CANCELLED | OUTPATIENT
Start: 2020-01-01

## 2020-01-01 RX ORDER — LORAZEPAM 0.5 MG/1
0.5 TABLET ORAL EVERY 4 HOURS PRN
Qty: 30 TABLET | Refills: 5 | Status: SHIPPED | OUTPATIENT
Start: 2020-01-01 | End: 2020-01-01

## 2020-01-01 RX ORDER — ACETAMINOPHEN/DIPHENHYDRAMINE 500MG-25MG
TABLET ORAL
Qty: 90 TABLET | Refills: 1 | Status: SHIPPED | OUTPATIENT
Start: 2020-01-01

## 2020-01-01 RX ORDER — ALBUTEROL SULFATE 0.83 MG/ML
2.5 SOLUTION RESPIRATORY (INHALATION) EVERY 6 HOURS PRN
Qty: 25 VIAL | Refills: 3 | Status: CANCELLED | OUTPATIENT
Start: 2020-01-01

## 2020-01-01 RX ORDER — DEXAMETHASONE 4 MG/1
TABLET ORAL
Qty: 10 TABLET | Refills: 9 | Status: SHIPPED | OUTPATIENT
Start: 2020-01-01 | End: 2020-01-01

## 2020-01-01 RX ORDER — FUROSEMIDE 20 MG
TABLET ORAL
Qty: 90 TABLET | Refills: 1 | Status: SHIPPED | OUTPATIENT
Start: 2020-01-01

## 2020-01-01 RX ORDER — PROPOFOL 10 MG/ML
INJECTION, EMULSION INTRAVENOUS PRN
Status: DISCONTINUED | OUTPATIENT
Start: 2020-01-01 | End: 2020-01-01

## 2020-01-01 RX ORDER — DIPHENHYDRAMINE HYDROCHLORIDE 50 MG/ML
50 INJECTION INTRAMUSCULAR; INTRAVENOUS
Status: CANCELLED
Start: 2020-01-01

## 2020-01-01 RX ORDER — LORAZEPAM 0.5 MG/1
0.5 TABLET ORAL EVERY 4 HOURS PRN
Qty: 30 TABLET | Refills: 5 | Status: SHIPPED | OUTPATIENT
Start: 2020-01-01

## 2020-01-01 RX ORDER — MEPERIDINE HYDROCHLORIDE 25 MG/ML
25 INJECTION INTRAMUSCULAR; INTRAVENOUS; SUBCUTANEOUS EVERY 30 MIN PRN
Status: CANCELLED | OUTPATIENT
Start: 2020-01-01

## 2020-01-01 RX ORDER — QUINAPRIL 20 MG/1
20 TABLET ORAL AT BEDTIME
Qty: 90 TABLET | Refills: 1
Start: 2020-01-01

## 2020-01-01 RX ORDER — METHYLPREDNISOLONE SODIUM SUCCINATE 125 MG/2ML
125 INJECTION, POWDER, LYOPHILIZED, FOR SOLUTION INTRAMUSCULAR; INTRAVENOUS
Status: CANCELLED
Start: 2020-01-01

## 2020-01-01 RX ORDER — SODIUM CHLORIDE 9 MG/ML
1000 INJECTION, SOLUTION INTRAVENOUS CONTINUOUS PRN
Status: CANCELLED
Start: 2020-01-01

## 2020-01-01 RX ORDER — FENTANYL CITRATE 50 UG/ML
INJECTION, SOLUTION INTRAMUSCULAR; INTRAVENOUS PRN
Status: DISCONTINUED | OUTPATIENT
Start: 2020-01-01 | End: 2020-01-01

## 2020-01-01 RX ORDER — CEFAZOLIN SODIUM 1 G/3ML
1 INJECTION, POWDER, FOR SOLUTION INTRAMUSCULAR; INTRAVENOUS SEE ADMIN INSTRUCTIONS
Status: DISCONTINUED | OUTPATIENT
Start: 2020-01-01 | End: 2020-01-01 | Stop reason: HOSPADM

## 2020-01-01 RX ORDER — NALOXONE HYDROCHLORIDE 0.4 MG/ML
.1-.4 INJECTION, SOLUTION INTRAMUSCULAR; INTRAVENOUS; SUBCUTANEOUS
Status: DISCONTINUED | OUTPATIENT
Start: 2020-01-01 | End: 2020-01-01 | Stop reason: HOSPADM

## 2020-01-01 RX ORDER — PREDNISONE 5 MG/1
5 TABLET ORAL 2 TIMES DAILY
Qty: 60 TABLET | Refills: 0 | Status: CANCELLED | OUTPATIENT
Start: 2020-01-01

## 2020-01-01 RX ORDER — SODIUM CHLORIDE, SODIUM LACTATE, POTASSIUM CHLORIDE, CALCIUM CHLORIDE 600; 310; 30; 20 MG/100ML; MG/100ML; MG/100ML; MG/100ML
INJECTION, SOLUTION INTRAVENOUS CONTINUOUS
Status: DISCONTINUED | OUTPATIENT
Start: 2020-01-01 | End: 2020-01-01 | Stop reason: HOSPADM

## 2020-01-01 RX ORDER — LIDOCAINE 40 MG/G
CREAM TOPICAL
Status: DISCONTINUED | OUTPATIENT
Start: 2020-01-01 | End: 2020-01-01 | Stop reason: HOSPADM

## 2020-01-01 RX ORDER — PREDNISONE 5 MG/1
5 TABLET ORAL 2 TIMES DAILY
Qty: 42 TABLET | Refills: 0 | Status: SHIPPED | OUTPATIENT
Start: 2020-01-01

## 2020-01-01 RX ORDER — FENTANYL CITRATE 50 UG/ML
25-50 INJECTION, SOLUTION INTRAMUSCULAR; INTRAVENOUS
Status: DISCONTINUED | OUTPATIENT
Start: 2020-01-01 | End: 2020-01-01 | Stop reason: HOSPADM

## 2020-01-01 RX ORDER — ACETAMINOPHEN 325 MG/1
975 TABLET ORAL ONCE
Status: COMPLETED | OUTPATIENT
Start: 2020-01-01 | End: 2020-01-01

## 2020-01-01 RX ORDER — POTASSIUM CHLORIDE 750 MG/1
TABLET, EXTENDED RELEASE ORAL
Qty: 180 TABLET | Refills: 3 | Status: SHIPPED | OUTPATIENT
Start: 2020-01-01

## 2020-01-01 RX ORDER — PROPOFOL 10 MG/ML
INJECTION, EMULSION INTRAVENOUS CONTINUOUS PRN
Status: DISCONTINUED | OUTPATIENT
Start: 2020-01-01 | End: 2020-01-01

## 2020-01-01 RX ORDER — HEPARIN SODIUM,PORCINE 10 UNIT/ML
5 VIAL (ML) INTRAVENOUS
Status: DISCONTINUED | OUTPATIENT
Start: 2020-01-01 | End: 2020-01-01 | Stop reason: HOSPADM

## 2020-01-01 RX ORDER — OXYCODONE HYDROCHLORIDE 5 MG/1
5 TABLET ORAL EVERY 4 HOURS PRN
Status: DISCONTINUED | OUTPATIENT
Start: 2020-01-01 | End: 2020-01-01 | Stop reason: HOSPADM

## 2020-01-01 RX ORDER — LIDOCAINE HYDROCHLORIDE 20 MG/ML
INJECTION, SOLUTION INFILTRATION; PERINEURAL PRN
Status: DISCONTINUED | OUTPATIENT
Start: 2020-01-01 | End: 2020-01-01

## 2020-01-01 RX ORDER — PROCHLORPERAZINE MALEATE 10 MG
5 TABLET ORAL EVERY 6 HOURS PRN
Qty: 30 TABLET | Refills: 5 | Status: SHIPPED | OUTPATIENT
Start: 2020-01-01 | End: 2020-01-01

## 2020-01-01 RX ORDER — ONDANSETRON 4 MG/1
4 TABLET, ORALLY DISINTEGRATING ORAL EVERY 30 MIN PRN
Status: DISCONTINUED | OUTPATIENT
Start: 2020-01-01 | End: 2020-01-01 | Stop reason: HOSPADM

## 2020-01-01 RX ORDER — LORAZEPAM 2 MG/ML
0.5 INJECTION INTRAMUSCULAR EVERY 4 HOURS PRN
Status: CANCELLED
Start: 2020-01-01

## 2020-01-01 RX ORDER — MEPERIDINE HYDROCHLORIDE 25 MG/ML
12.5 INJECTION INTRAMUSCULAR; INTRAVENOUS; SUBCUTANEOUS
Status: DISCONTINUED | OUTPATIENT
Start: 2020-01-01 | End: 2020-01-01 | Stop reason: HOSPADM

## 2020-01-01 RX ORDER — TC 99M MEDRONATE 20 MG/10ML
26.7 INJECTION, POWDER, LYOPHILIZED, FOR SOLUTION INTRAVENOUS ONCE
Status: COMPLETED | OUTPATIENT
Start: 2020-01-01 | End: 2020-01-01

## 2020-01-01 RX ORDER — PREDNISONE 5 MG/1
5 TABLET ORAL 2 TIMES DAILY
Qty: 42 TABLET | Refills: 0 | Status: SHIPPED | OUTPATIENT
Start: 2020-01-01 | End: 2020-01-01

## 2020-01-01 RX ORDER — SULFAMETHOXAZOLE/TRIMETHOPRIM 800-160 MG
1 TABLET ORAL 2 TIMES DAILY
Qty: 14 TABLET | Refills: 0 | Status: SHIPPED | OUTPATIENT
Start: 2020-01-01 | End: 2020-01-01

## 2020-01-01 RX ORDER — METOPROLOL SUCCINATE 25 MG/1
TABLET, EXTENDED RELEASE ORAL
Qty: 90 TABLET | Refills: 0 | Status: SHIPPED | OUTPATIENT
Start: 2020-01-01

## 2020-01-01 RX ORDER — ALBUTEROL SULFATE 0.83 MG/ML
SOLUTION RESPIRATORY (INHALATION)
Qty: 75 ML | Refills: 1 | Status: SHIPPED | OUTPATIENT
Start: 2020-01-01

## 2020-01-01 RX ORDER — HEPARIN SODIUM (PORCINE) LOCK FLUSH IV SOLN 100 UNIT/ML 100 UNIT/ML
500 SOLUTION INTRAVENOUS DAILY PRN
Status: DISCONTINUED | OUTPATIENT
Start: 2020-01-01 | End: 2020-01-01 | Stop reason: HOSPADM

## 2020-01-01 RX ORDER — ONDANSETRON 2 MG/ML
4 INJECTION INTRAMUSCULAR; INTRAVENOUS EVERY 30 MIN PRN
Status: DISCONTINUED | OUTPATIENT
Start: 2020-01-01 | End: 2020-01-01 | Stop reason: HOSPADM

## 2020-01-01 RX ORDER — ALBUTEROL SULFATE 0.83 MG/ML
SOLUTION RESPIRATORY (INHALATION)
Qty: 75 ML | Refills: 1 | Status: SHIPPED | OUTPATIENT
Start: 2020-01-01 | End: 2020-01-01

## 2020-01-01 RX ORDER — ALBUTEROL SULFATE 90 UG/1
1-2 AEROSOL, METERED RESPIRATORY (INHALATION)
Status: CANCELLED
Start: 2020-01-01

## 2020-01-01 RX ORDER — IOPAMIDOL 755 MG/ML
112 INJECTION, SOLUTION INTRAVASCULAR ONCE
Status: COMPLETED | OUTPATIENT
Start: 2020-01-01 | End: 2020-01-01

## 2020-01-01 RX ADMIN — Medication 100 MCG: at 10:04

## 2020-01-01 RX ADMIN — PROPOFOL 50 MG: 10 INJECTION, EMULSION INTRAVENOUS at 09:04

## 2020-01-01 RX ADMIN — Medication 100 MCG: at 09:45

## 2020-01-01 RX ADMIN — HEPARIN SODIUM (PORCINE) LOCK FLUSH IV SOLN 100 UNIT/ML 5 ML: 100 SOLUTION at 13:29

## 2020-01-01 RX ADMIN — SODIUM CHLORIDE 1000 ML: 9 INJECTION, SOLUTION INTRAVENOUS at 14:48

## 2020-01-01 RX ADMIN — ZOLEDRONIC ACID 4 MG: 0.04 INJECTION, SOLUTION INTRAVENOUS at 13:01

## 2020-01-01 RX ADMIN — PROPOFOL 150 MCG/KG/MIN: 10 INJECTION, EMULSION INTRAVENOUS at 09:06

## 2020-01-01 RX ADMIN — IOPAMIDOL 112 ML: 755 INJECTION, SOLUTION INTRAVASCULAR at 09:53

## 2020-01-01 RX ADMIN — SODIUM CHLORIDE, SODIUM LACTATE, POTASSIUM CHLORIDE, CALCIUM CHLORIDE: 600; 310; 30; 20 INJECTION, SOLUTION INTRAVENOUS at 07:58

## 2020-01-01 RX ADMIN — Medication 50 MCG: at 09:23

## 2020-01-01 RX ADMIN — Medication 100 MCG: at 09:32

## 2020-01-01 RX ADMIN — Medication 50 MCG: at 09:16

## 2020-01-01 RX ADMIN — TC 99M MEDRONATE 26.7 MCI.: 20 INJECTION, POWDER, LYOPHILIZED, FOR SOLUTION INTRAVENOUS at 09:22

## 2020-01-01 RX ADMIN — ACETAMINOPHEN 975 MG: 325 TABLET ORAL at 07:45

## 2020-01-01 RX ADMIN — CEFAZOLIN SODIUM 2 G: 2 INJECTION, SOLUTION INTRAVENOUS at 09:00

## 2020-01-01 RX ADMIN — LIDOCAINE HYDROCHLORIDE 40 MG: 20 INJECTION, SOLUTION INFILTRATION; PERINEURAL at 09:03

## 2020-01-01 RX ADMIN — Medication 250 ML: at 11:21

## 2020-01-01 RX ADMIN — FENTANYL CITRATE 25 MCG: 50 INJECTION, SOLUTION INTRAMUSCULAR; INTRAVENOUS at 09:03

## 2020-01-01 RX ADMIN — Medication 5 ML: at 15:51

## 2020-01-01 RX ADMIN — Medication 250 ML: at 10:19

## 2020-01-01 RX ADMIN — ZOLEDRONIC ACID 4 MG: 0.04 INJECTION, SOLUTION INTRAVENOUS at 10:19

## 2020-01-01 RX ADMIN — HEPARIN SODIUM (PORCINE) LOCK FLUSH IV SOLN 100 UNIT/ML 500 UNITS: 100 SOLUTION at 10:37

## 2020-01-01 ASSESSMENT — PAIN SCALES - GENERAL
PAINLEVEL: NO PAIN (0)
PAINLEVEL: SEVERE PAIN (6)
PAINLEVEL: SEVERE PAIN (7)
PAINLEVEL: NO PAIN (0)

## 2020-01-01 ASSESSMENT — MIFFLIN-ST. JEOR
SCORE: 1461.61
SCORE: 1546.99

## 2020-01-01 ASSESSMENT — COPD QUESTIONNAIRES: COPD: 1

## 2020-01-03 NOTE — TELEPHONE ENCOUNTER
[facility]/ Assistance Approved    Medication abiraterone  Amount/ $ 7500  [facility]  Phone # [phone]  Fax # [phone]  Effective Dates 01/02/20-1/2/21  Additional Information -  Patient notified? -

## 2020-01-21 NOTE — TELEPHONE ENCOUNTER
"Prescription approved per [facility] [place] [facility].    Requested Prescriptions   Pending Prescriptions Disp Refills     quinapril (ACCUPRIL) 40 MG tablet [Pharmacy Med Name: QUINAPRIL 40 MG TABLET] 90 tablet 0     Sig: TAKE 1 TABLET (40 MG) BY MOUTH DAILY       ACE Inhibitors (Including Combos) Protocol Failed - 1/20/2020  9:48 AM        Failed - Recent (12 mo) or future (30 days) visit within the authorizing provider's specialty     Patient has had an office visit with the authorizing provider or a provider within the authorizing providers department within the previous 12 mos or has a future within next 30 days. See \"Patient Info\" tab in inbasket, or \"Choose Columns\" in Meds & Orders section of the refill encounter.              Passed - Blood pressure under 140/90 in past 12 months     BP Readings from Last 3 Encounters:   11/07/19 105/66   09/30/19 123/64   09/03/19 109/66                 Passed - Medication is active on med list        Passed - Patient is age 18 or older        Passed - Normal serum creatinine on file in past 12 months     Recent Labs   Lab Test 11/12/19  1025  07/17/17  0925  07/12/16   CR Canceled, Test credited   < >  --    < >  --    CREAT  --   --  0.7  --   --    CRPOC  --   --   --   --  0.8    < > = values in this interval not displayed.             Passed - Normal serum potassium on file in past 12 months     Recent Labs   Lab Test 11/12/19  1025   POTASSIUM Canceled, Test credited             "

## 2020-01-24 NOTE — TELEPHONE ENCOUNTER
"Routing refill request to provider for review/approval because:  Last Rx was prescribed by Nicolas, Rafael Burr MD      Requested Prescriptions   Pending Prescriptions Disp Refills     KLOR-CON 10 MEQ CR tablet [Pharmacy Med Name: KLOR-CON M10 TABLET] 180 tablet 3     Sig: TAKE 1 TABLET (10 MEQ) BY MOUTH 2 TIMES DAILY       Potassium Supplements Protocol Failed - 1/22/2020 12:21 PM        Failed - Recent (12 mo) or future (30 days) visit within the authorizing provider's department     Patient has had an office visit with the authorizing provider or a provider within the authorizing providers department within the previous 12 mos or has a future within next 30 days. See \"Patient Info\" tab in inbasket, or \"Choose Columns\" in Meds & Orders section of the refill encounter.              Failed - Medication is active on med list        Passed - Patient is age 18 or older        Passed - Normal serum potassium in past 12 months     Recent Labs   Lab Test 11/12/19  1025   POTASSIUM Canceled, Test credited                    "

## 2020-02-20 NOTE — TELEPHONE ENCOUNTER
"Received telephone call from patient. He is scheduled to see Dr Valenzuela today with labs and Zometa infusion. Patient reports he is feeling \"cruddy\". Started overnight and this morning. No fever but is feeling achy all over. States he is having back surgery 3/3/2020 and would not be able to reschedule until at least the end of March. Reviewed information with Dr Valenzuela. Dr Valenzuela is fine with patient rescheduling after his surgery recovery end of March and receiving Zometa at that time too. He can continue on Zytiga.   Message sent to scheduling(Nadya) to reschedule patient.  Shyla Waddell  RN, BSN, OCN      "

## 2020-02-24 PROBLEM — J44.9 CHRONIC OBSTRUCTIVE PULMONARY DISEASE, UNSPECIFIED COPD TYPE (H): Status: ACTIVE | Noted: 2020-01-01

## 2020-02-24 PROBLEM — F34.89 OTHER SPECIFIED PERSISTENT MOOD DISORDERS (H): Status: ACTIVE | Noted: 2020-01-01

## 2020-02-24 PROBLEM — E66.01 MORBID OBESITY (H): Status: ACTIVE | Noted: 2020-01-01

## 2020-02-24 PROBLEM — D69.2 SENILE PURPURA (H): Status: ACTIVE | Noted: 2020-01-01

## 2020-02-24 NOTE — LETTER
[facility]  [address] [place] [address] [place] [address]    February 25, 2020    Dimitri Neves  [address] [place] [address]          Dear [facility]:    Enclosed is a copy of your results.  These are all okay for your upcoming surgery.    Results for orders placed or performed in visit on 02/24/20   Comprehensive metabolic panel     Status: Abnormal   Result Value Ref Range    Sodium 140 133 - 144 mmol/L    Potassium 4.3 3.4 - 5.3 mmol/L    Chloride 107 94 - 109 mmol/L    Carbon Dioxide 27 20 - 32 mmol/L    Anion Gap 6 3 - 14 mmol/L    Glucose 111 (H) 70 - 99 mg/dL    Urea Nitrogen 17 7 - 30 mg/dL    Creatinine 0.99 0.66 - 1.25 mg/dL    GFR Estimate 71 >60 mL/min/[1.73_m2]    GFR Estimate If Black 82 >60 mL/min/[1.73_m2]    Calcium 9.0 8.5 - 10.1 mg/dL    Bilirubin Total 1.0 0.2 - 1.3 mg/dL    Albumin 3.2 (L) 3.4 - 5.0 g/dL    Protein Total 6.7 (L) 6.8 - 8.8 g/dL    Alkaline Phosphatase 78 40 - 150 U/L    ALT 15 0 - 70 U/L    AST 18 0 - 45 U/L   CBC with platelets differential     Status: Abnormal   Result Value Ref Range    WBC 7.8 4.0 - 11.0 10e9/L    RBC Count 3.73 (L) 4.4 - 5.9 10e12/L    Hemoglobin 12.3 (L) 13.3 - 17.7 g/dL    Hematocrit 36.6 (L) 40.0 - 53.0 %    MCV 98 78 - 100 fl    MCH 33.0 26.5 - 33.0 pg    MCHC 33.6 31.5 - 36.5 g/dL    RDW 15.1 (H) 10.0 - 15.0 %    Platelet Count 205 150 - 450 10e9/L    Diff Method Automated Method     % Neutrophils 60.4 %    % Lymphocytes 26.2 %    % Monocytes 12.0 %    % Eosinophils 1.0 %    % Basophils 0.4 %    Absolute Neutrophil 4.7 1.6 - 8.3 10e9/L    Absolute Lymphocytes 2.0 0.8 - 5.3 10e9/L    Absolute Monocytes 0.9 0.0 - 1.3 10e9/L    Absolute Eosinophils 0.1 0.0 - 0.7 10e9/L    Absolute Basophils 0.0 0.0 - 0.2 10e9/L     If you have any questions or concerns, please call myself or my nurse at [phone].    Sincerely,        Reginaldo Muir MD/rk

## 2020-02-24 NOTE — PATIENT INSTRUCTIONS
Before Your Surgery      Call your surgeon if there is any change in your health. This includes signs of a cold or flu (such as a sore throat, runny nose, cough, rash or fever).    Do not smoke, drink alcohol or take over the counter medicine (unless your surgeon or primary care doctor tells you to) for the 24 hours before and after surgery.    If you take prescribed drugs: Follow your doctor s orders about which medicines to take and which to stop until after surgery.    Eating and drinking prior to surgery: follow the instructions from your surgeon    Take a shower or bath the night before surgery. Use the soap your surgeon gave you to gently clean your skin. If you do not have soap from your surgeon, use your regular soap. Do not shave or scrub the surgery site.  Wear clean pajamas and have clean sheets on your bed.     Please take all regularly scheduled medications both the night before the surgery and the morning of the surgery except for     1. No furosemide [ Lasix ] no potassium morning of the surgery   2. No quinapril (Accupril) morning of the surgery     We also made a long term use medication adjustment today     From now on you should only be taking a half dose of the quinapril (Accupril) . Once you run out of the current 40 milligram pills, next refill they will only be 20 milligram pills.

## 2020-02-24 NOTE — PROGRESS NOTES
[facility]  [address] [place] [address]  [place] [address]  [phone]  Dept: [phone]    PRE-OP EVALUATION:  Today's date: 2020    Dimitri Neves (: 1935) presents for pre-operative evaluation assessment as requested by Dr. Mullen.  He requires evaluation and anesthesia risk assessment prior to undergoing surgery/procedure for treatment of Removal of hardware .    Proposed Surgery/ Procedure: Removal of hardware in back  Date of Surgery/ Procedure: 2020  Time of Surgery/ Procedure: 6:00am  Hospital/Surgical Facility: [facility]   Fax number for surgical facility:   Primary Physician: Reginaldo Muir  Type of Anesthesia Anticipated: General    Patient has a Health Care Directive or Living Will:  YES     1. NO - Do you have a history of heart attack, stroke, stent, bypass or surgery on an artery in the head, neck, heart or legs?  2. NO - Do you ever have any pain or discomfort in your chest?  3. NO - Do you have a history of  Heart Failure?  4. YES - ARE YOUR TROUBLED BY SHORTNESS OF BREATH WHEN WALKING ON THE LEVEL, UP A SLIGHT HILL OR AT NIGHT? Age related issues    5. NO - Do you currently have a cold, bronchitis or other respiratory infection?  6. NO - Do you have a cough, shortness of breath or wheezing?  7. NO - Do you sometimes get pains in the calves of your legs when you walk?  8. NO - Do you or anyone in your family have previous history of blood clots?  9. NO - Do you or does anyone in your family have a serious bleeding problem such as prolonged bleeding following surgeries or cuts?  10. NO - Have you ever had problems with anemia or been told to take iron pills?  11. NO - Have you had any abnormal blood loss such as black, tarry or bloody stools, or abnormal vaginal bleeding?  12. NO - Have you ever had a blood transfusion?  13. NO - Have you or any of your relatives ever had problems with anesthesia?  14. YES - DO YOU HAVE SLEEP APNEA, EXCESSIVE SNORING OR  "DAYTIME DROWSINESS? Has obstructive sleep apnea   15. NO - Do you have any prosthetic heart valves?  16. YES - DO YOU HAVE PROSTHETIC JOINTS? History of right total knee replacement   17. NO - Is there any chance that you may be pregnant?      HPI:     HPI related to upcoming procedure: \" There is JUNK in my back that they are going to remove\". Patient is experiencing pain from his posterior fusion hardware at L2-3. This hardware was placed more then 20 years so we are assuming all the bone is fully healed by now. This is anticipated to be a quick and easy procedure I am told by patient       See problem list for active medical problems.  Problems all longstanding and stable, except as noted/documented.  See ROS for pertinent symptoms related to these conditions.      MEDICAL HISTORY:        Past Medical History:   Diagnosis Date     Actinic keratosis      AK (actinic keratosis) 7/9/2012     Cataract cortical, senile right eye 4/17/2012     DDD (degenerative disc disease), lumbar 1979    s/p 4 back surgeries, spinal cord stimulator implant      Diverticulosis      ED (erectile dysfunction) 2009     GERD (gastroesophageal reflux disease) ~1980     HTN (hypertension), benign ~2000     Macular degeneration, dry, mild, ou 7/23/2016     Multiple lung nodules     Several basilar pulmonary nodules <5 mm     HUDSON (obstructive sleep apnea) 10/2005    on CPAP     Other abnormal glucose 01/14/2009     PE (pulmonary thromboembolism) (H) 1981    after back surgery      Pure hypercholesterolemia ~2000     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     Past Surgical History:   Procedure Laterality Date     ARTHROSCOPY KNEE RT/LT  ~1995    Right     C LUMBAR SPINE FUSION,ANTER APPRCH  8/2007    four times total, latest 8/07     CATARACT IOL, RT/LT       LASER YAG CAPSULOTOMY      both eyes     ORCHIECTOMY SCROTAL Bilateral 9/27/2017    Procedure: ORCHIECTOMY SCROTAL;  Bilateral orchiectomy scrotal approach;  Surgeon: Claudia " Senthil Sanchez MD;  Location: [facility] OR     PHACOEMULSIFICATION CLEAR CORNEA WITH STANDARD INTRAOCULAR LENS IMPLANT  6-18-12/7-30-12    right/left eye     ROTATOR CUFF REPAIR RT/LT  ~1995    right     Current Outpatient Medications   Medication Sig Dispense Refill     abiraterone (ZYTIGA) 250 MG tablet Take 4 tablets (1,000 mg) by mouth daily for 30 doses Take on empty stomach. 120 tablet 0     albuterol (2.5 MG/3ML) 0.083% neb solution Take 1 vial (2.5 mg) by nebulization every 6 hours as needed for shortness of breath / dyspnea or wheezing 25 vial 3     ASPIRIN PO Take 81 mg by mouth       calcipotriene (DOVONEX) 0.005 % external cream Mix with efudex and apply twice daily to scalp for ten days 60 g 3     Calcium Carbonate (CALCIUM 600 PO)        Cholecalciferol (VITAMIN D) 2000 UNITS tablet Take 1 tablet by mouth daily       CVS VITAMIN B12 1000 MCG tablet TAKE 1 TABLET BY MOUTH EVERY DAY 90 tablet 1     diphenoxylate-atropine (LOMOTIL) 2.5-0.025 MG tablet Take 1 tablet by mouth 4 times daily as needed for diarrhea 40 tablet 1     fluocinonide (LIDEX) 0.05 % cream Apply sparingly to affected area twice daily as needed on legs.  Do not apply to face. 120 g 3     fluorouracil (EFUDEX) 5 % external cream Mix with dovonex apply to scalp for ten days twice a day 40 g 3     furosemide (LASIX) 20 MG tablet TAKE 1 TABLET (20 MG) BY MOUTH DAILY 90 tablet 1     gabapentin (NEURONTIN) 300 MG capsule Take 900 mg by mouth At Bedtime       KLOR-CON 10 MEQ CR tablet TAKE 1 TABLET (10 MEQ) BY MOUTH 2 TIMES DAILY 180 tablet 3     metoprolol succinate ER (TOPROL-XL) 25 MG 24 hr tablet TAKE 1 TABLET BY MOUTH EVERY DAY 90 tablet 0     predniSONE (DELTASONE) 5 MG tablet Take 1 tablet (5 mg) by mouth 2 times daily 60 tablet 0     predniSONE (DELTASONE) 5 MG tablet Take 1 tablet (5 mg) by mouth 2 times daily 60 tablet 0     predniSONE (DELTASONE) 5 MG tablet Take 1 tablet (5 mg) by mouth 2 times daily 60 tablet 0     predniSONE (DELTASONE)  "5 MG tablet Take 1 tablet (5 mg) by mouth 2 times daily 60 tablet 0     predniSONE (DELTASONE) 5 MG tablet Take 1 tablet (5 mg) by mouth 2 times daily 60 tablet 0     quinapril (ACCUPRIL) 40 MG tablet TAKE 1 TABLET (40 MG) BY MOUTH DAILY 90 tablet 0     simvastatin (ZOCOR) 40 MG tablet TAKE 0.5 TABLETS (20 MG) BY MOUTH DAILY 45 tablet 3     ipratropium - albuterol 0.5 mg/2.5 mg/3 mL (DUONEB) 0.5-2.5 (3) MG/3ML neb solution Take 1 vial (3 mLs) by nebulization once for 1 dose 3 mL 0     OTC products: None, except as noted above    Allergies   Allergen Reactions     Morphine Sulfate GI Disturbance     vomiting     Vicodin [Hydrocodone-Acetaminophen] Nausea and Vomiting      Latex Allergy: NO    Social History     Tobacco Use     Smoking status: Former Smoker     Packs/day: 1.00     Years: 25.00     Pack years: 25.00     Types: Cigarettes     Last attempt to quit: 1976     Years since quittin.1     Smokeless tobacco: Never Used     Tobacco comment: lives in smoke free household   Substance Use Topics     Alcohol use: Yes     Alcohol/week: 11.7 standard drinks     Types: 14 Standard drinks or equivalent per week     Comment: 2-3 drinks every night     History   Drug Use No     Comment: tatoos- sterile       REVIEW OF SYSTEMS:   CONSTITUTIONAL: NEGATIVE for fever, chills, change in weight  ENT/MOUTH: NEGATIVE for ear, mouth and throat problems  RESP: NEGATIVE for significant cough or SOB  CV: NEGATIVE for chest pain, palpitations or peripheral edema  MUSCULOSKELETAL: POSITIVE  for back pain   HEME/ALLERGY/IMMUNE: NEGATIVE for bleeding problems  PSYCHIATRIC: NEGATIVE for changes in mood or affect  ROS otherwise negative    EXAM:   /72   Pulse 101   Temp 99.8  F (37.7  C) (Tympanic)   Resp 18   Ht 1.568 m (5' 1.75\")   Wt 88.6 kg (195 lb 6.4 oz)   SpO2 97%   BMI 36.03 kg/m    GENERAL APPEARANCE: healthy, alert and no distress, somewhat chronically ill appearing man  HENT: ear canals and TM's normal and " nose and mouth without ulcers or lesions  RESP: lungs clear to auscultation - no rales, rhonchi or wheezes  CV: regular rate and rhythm, normal S1 S2, no S3 or S4 and no murmur, click or rub   ABDOMEN: soft, nontender, no HSM or masses and bowel sounds normal  NEURO: Normal strength and tone, sensory exam grossly normal, mentation intact and speech normal    DIAGNOSTICS:     Orders Placed This Encounter   Procedures     Comprehensive metabolic panel     CBC with platelets differential     EKG 12-lead complete w/read - Clinics         Recent Labs   Lab Test 11/12/19  1025 11/04/19  0841 08/01/19  0935  09/15/17  1131  08/14/17 08/03/17 07/19/16  0817   HGB  --  12.2* 12.5*   < > 11.6*   < >  --   --   --    < > 14.2   PLT  --  248 228   < > 229   < >  --   --   --    < > 222   INR  --   --   --   --   --   --  1.6*  --  1.3*   < >  --    NA Canceled, Test credited 142 138   < > 138   < >  --   --   --    < > 140   POTASSIUM Canceled, Test credited 4.1 4.5   < > 4.1   < >  --   --   --    < > 4.4   CR Canceled, Test credited 0.91 0.90   < > 0.78   < >  --    < >  --    < > 0.90   A1C  --   --   --   --  5.7  --   --   --   --   --  5.5    < > = values in this interval not displayed.        IMPRESSION:   Reason for surgery/procedure: symptomatic back hardware to be removed  Diagnosis/reason for consult: assess and minimize preoperative risk per consulting surgeon     The proposed surgical procedure is considered INTERMEDIATE risk.    REVISED CARDIAC RISK INDEX  The patient has the following serious cardiovascular risks for perioperative complications such as (MI, PE, VFib and 3  AV Block):  No serious cardiac risks  INTERPRETATION: 0 risks: Class I (very low risk - 0.4% complication rate)    The patient has the following additional risks for perioperative complications:  No identified additional risks      ICD-10-CM    1. Preop general physical exam Z01.818        RECOMMENDATIONS:       --Patient is to take all  scheduled medications on the day of surgery EXCEPT for modifications listed below.      1. No furosemide [ Lasix ] and no potassium morning of the surgery   2. No quinapril (Accupril) morning of the surgery     APPROVAL GIVEN to proceed with proposed procedure, without further diagnostic evaluation       Signed Electronically by: Reginaldo Muir MD    Copy of this evaluation report is provided to requesting physician.    [facility] Preop Guidelines    Revised Cardiac Risk Index

## 2020-03-06 NOTE — LETTER
March 9, 2020      Dimitri Neves  [address] [place] [address]        Dear ,    We are writing to inform you of your test results.  All of these tests are within acceptable limits. Things look ok !     Resulted Orders   CBC with platelets and differential   Result Value Ref Range    WBC 8.5 4.0 - 11.0 10e9/L    RBC Count 3.67 (L) 4.4 - 5.9 10e12/L    Hemoglobin 11.7 (L) 13.3 - 17.7 g/dL    Hematocrit 36.6 (L) 40.0 - 53.0 %     78 - 100 fl    MCH 31.9 26.5 - 33.0 pg    MCHC 32.0 31.5 - 36.5 g/dL    RDW 15.4 (H) 10.0 - 15.0 %    Platelet Count 266 150 - 450 10e9/L    Diff Method Automated Method     % Neutrophils 59.3 %    % Lymphocytes 24.9 %    % Monocytes 14.1 %    % Eosinophils 0.8 %    % Basophils 0.9 %    Absolute Neutrophil 5.0 1.6 - 8.3 10e9/L    Absolute Lymphocytes 2.1 0.8 - 5.3 10e9/L    Absolute Monocytes 1.2 0.0 - 1.3 10e9/L    Absolute Eosinophils 0.1 0.0 - 0.7 10e9/L    Absolute Basophils 0.1 0.0 - 0.2 10e9/L   Basic metabolic panel  (Ca, Cl, CO2, Creat, Gluc, K, Na, BUN)   Result Value Ref Range    Sodium 138 133 - 144 mmol/L    Potassium 4.5 3.4 - 5.3 mmol/L    Chloride 106 94 - 109 mmol/L    Carbon Dioxide 22 20 - 32 mmol/L    Anion Gap 10 3 - 14 mmol/L    Glucose 94 70 - 99 mg/dL      Comment:      Non Fasting    Urea Nitrogen 22 7 - 30 mg/dL    Creatinine 1.24 0.66 - 1.25 mg/dL    GFR Estimate 54 (L) >60 mL/min/[1.73_m2]      Comment:      Non  GFR Calc  Starting 12/18/2018, serum creatinine based estimated GFR (eGFR) will be   calculated using the Chronic Kidney Disease Epidemiology Collaboration   (CKD-EPI) equation.      GFR Estimate If Black 62 >60 mL/min/[1.73_m2]      Comment:       GFR Calc  Starting 12/18/2018, serum creatinine based estimated GFR (eGFR) will be   calculated using the Chronic Kidney Disease Epidemiology Collaboration   (CKD-EPI) equation.      Calcium 9.2 8.5 - 10.1 mg/dL   Ferritin   Result Value Ref Range     Ferritin 560 (H) 26 - 388 ng/mL       If you have any questions or concerns, please call the clinic at the number listed above.       Sincerely,        Reginaldo Muir MD

## 2020-03-06 NOTE — TELEPHONE ENCOUNTER
After reviewing recent history, [facility] would like order for RN SOC added to PT EVAL.  Patient is in agreement.    Please REPLY TO THIS MESSAGE in order to give authorization for orders when needed.  This is considered a verbal order, you will still receive a faxed copy of orders for signature.  Thank you for your timely assistance.    Order for Dimitri Neves; MRN [id number]      Sincerely [facility]  Alis Dixon RN  [phone]

## 2020-03-06 NOTE — PROGRESS NOTES
Subjective     Dimitri Neves is a 84 year old male who presents to clinic today for the following health issues:    HPI     Hospital Follow-up Visit:    Hospital/Nursing Home/IP Rehab Facility: [facility]   Date of Admission: 02/26/2020  Date of Discharge: 02/29/2020  Reason(s) for Admission: Acute respiratory failure with hypoxia (HC) (Primary Dx);   Sepsis due to pneumonia (HC);   Elevated troponin;   Prostate cancer metastatic to multiple sites (HC);   Skin tear of left forearm without complication, initial encounter;   Pneumonia due to infectious organism, unspecified laterality, unspecified part of lung;   Hypertension            Problems taking medications regularly:  None       Medication changes since discharge: See current med list        Problems adhering to non-medication therapy:  None  Summary of hospitalization:  CareEverywhere information obtained and reviewed  Diagnostic Tests/Treatments reviewed.  Follow up needed: none  Other Healthcare Providers Involved in Patient s Care:         Physical Therapy  Update since discharge: improved.     Post Discharge Medication Reconciliation: discharge medications reconciled and changed, per note/orders (see AVS).  Plan of care communicated with patient and family     Coding guidelines for this visit:  Type of Medical   Decision Making Face-to-Face Visit       within 7 Days of discharge Face-to-Face Visit        within 14 days of discharge   Moderate Complexity [id number]   High Complexity [id number]        Today is a post hospital discharge follow up office visit. This is a patient with very serious illnesses , multiple different issues .    Patient was not feeling well for a couple of days, could not get out of bed. He had trouble getting out of bed., and he had to have help from wife to get up ( he was like dead weight per wife). During the process of standing up, he fell and hit his head on the dresser. He was then transported to ED via ambulance. He  was initially unresponsive with EMS arrived and does not remember the situation at all, total duration of loss of consciousness of uncertain time period. Since being discharged, the patient is breathing well with no issues. No fever, cough, or shortness of breath noted. He has finished antibiotic treatment and has returned to normal functions. He denies headaches, light sensitivity, or other concussion symptoms. Since hospital he has pain in lower legs that onset yesterday that is brought on with activity. His diagnoses including sepsis. He also now has significant physical deconditioning and is requesting home health care for physical therapy. Here with spouse and daughter.    Patient has long-standing hypertension. Patient is currently on metoprolol 25 mg, furosemide 20mg, and quinapril 20mg (although this medication was discontinued in hospital and he has not take it since). Patient does not check their blood pressure regularly. He is slightly hypotensive in clinic today but is asymptomatic with no complaints of dizziness or fatigue. They are not following a low salt diet. Patient denies abnormal symptoms like dizziness, headaches, nausea, or palpations.     Patient Active Problem List   Diagnosis     Essential hypertension with goal blood pressure less than 140/90     Narcotic dependence, episodic use (H)     ED (erectile dysfunction)     HUDSON (obstructive sleep apnea)     PE (pulmonary thromboembolism) (H)     HYPERLIPIDEMIA LDL GOAL <130     Advanced directives, counseling/discussion     Abnormal glucose     AK (actinic keratosis)     SNHL (sensorineural hearing loss)     Endolymphatic hydrops     History of squamous cell carcinoma     Pseudophakia, Yag Caps, ou     Venous (peripheral) insufficiency     Malignant neoplasm of prostate (H)     Dyspnea on exertion     Posterior vitreous detachment, bilateral     Iris nevus, ou     Dry eye syndrome, bilateral     Bone metastasis (H)     Early dry stage nonexudative  age-related macular degeneration of both eyes     Morbid obesity (H)     Other specified persistent mood disorders (H)     Chronic obstructive pulmonary disease, unspecified COPD type (H)     Senile purpura (H)     Past Surgical History:   Procedure Laterality Date     ARTHROSCOPY KNEE RT/LT  ~    Right     C LUMBAR SPINE FUSION,ANTER APPRCH  2007    four times total, latest      CATARACT IOL, RT/LT       LASER YAG CAPSULOTOMY      both eyes     ORCHIECTOMY SCROTAL Bilateral 2017    Procedure: ORCHIECTOMY SCROTAL;  Bilateral orchiectomy scrotal approach;  Surgeon: Senthil Taylor MD;  Location: [facility] OR     PHACOEMULSIFICATION CLEAR CORNEA WITH STANDARD INTRAOCULAR LENS IMPLANT  12/12    right/left eye     ROTATOR CUFF REPAIR RT/LT  ~    right       Social History     Tobacco Use     Smoking status: Former Smoker     Packs/day: 1.00     Years: 25.00     Pack years: 25.00     Types: Cigarettes     Last attempt to quit: 1976     Years since quittin.2     Smokeless tobacco: Never Used     Tobacco comment: lives in smoke free household   Substance Use Topics     Alcohol use: Yes     Alcohol/week: 11.7 standard drinks     Types: 14 Standard drinks or equivalent per week     Comment: 2-3 drinks every night     Family History   Problem Relation Age of Onset     Cancer Father         Lung 64y     Diabetes Brother      Hypertension Brother      Cancer Mother         Liver     Cerebrovascular Disease Mother      Eye Disorder Mother      Glaucoma Mother      Cerebrovascular Disease Maternal Grandmother      C.A.D. No family hx of      Thyroid Disease No family hx of      Macular Degeneration No family hx of      Melanoma No family hx of          Reviewed and updated as needed this visit by Provider         Review of Systems   ROS COMP: Constitutional, HEENT, cardiovascular, pulmonary, gi and gu systems are negative, except as otherwise noted.      Objective    /72   Pulse 73    Temp 98.5  F (36.9  C) (Tympanic)   Resp 18   Wt 89.8 kg (198 lb)   SpO2 97%   BMI 36.51 kg/m    Body mass index is 36.51 kg/m .  Physical Exam   GENERAL: healthy, alert and no distress  EYES: Eyes grossly normal to inspection, PERRL and conjunctivae and sclerae normal  HENT: ear canals and TM's normal, nose and mouth without ulcers or lesions  NECK: no adenopathy, no asymmetry, masses, or scars and thyroid normal to palpation  RESP: lungs clear to auscultation - no rales, rhonchi or wheezes  CV: regular rate and rhythm, normal S1 S2, no S3 or S4, no murmur, click or rub, no peripheral edema and peripheral pulses strong  ABDOMEN: soft, nontender, no hepatosplenomegaly, no masses and bowel sounds normal  MS: no gross musculoskeletal defects noted, 2+ pitting edema in lower legs bilaterally.   SKIN: Skin of lower extremities is hyperpigmented, with stasis dermatitis noted.   NEURO: CN 2-12 intact. Normal strength and tone, mentation intact and speech normal  PSYCH: mentation appears normal, affect normal/bright    Diagnostic Test Results:  Labs reviewed in [facility] including labs and images completed during hospital stay.         Assessment & Plan     1. Acute respiratory failure with hypoxia (H)  (primary encounter diagnosis)  - Patient was hospitalized for 3 days due for respiratory failure with hypoxia secondary to pneumonia. Patient was treated and has finished antibiotics - FQ and metronidazole. Since discharge, he is feeling much improved and has returned to normal baseline in regards to overall health and breathing. Physical exam was WNL. No fever, cough, shortness of breath, or chest pain.  - No follow-up imaging is needed. No further treatment needed. Patient to notify clinic if symptoms onset again.       2. Pneumonia due to infectious organism, unspecified laterality, unspecified part of lung  - See above plan.       3. Peripheral edema  - Patient has had significant edema for some time  now. He is not currently doing anything for swelling, and he notes that it is better in the morning after sleeping flat. Will check kidney function and electrolytes. Patient is not exhibiting any signs of heart failure. Edema likely due to venous insufficiency. Plan is to have patient elevate legs and wear compression stocking each day. Follow-up in 3 months for monitoring.   - Basic Metabolic Panel      4 Muscle weakness (generalized)  - Patient has been weak and sore throughout thigh muscles when doing activities like walking since hospital discharge. He is able to get up and move, but it is harder than before hospital stay. He was in bed immobilized for roughly 7 days including at home and in hospital. Likely deconditioned muscles of lower extremities. Plan is for patient to receive home health care for physical therapy    - home health care ordered      5. Hypokalemia  - Slightly low K+ in hospital that normalized to 3.5 on last day but was receiving IV K+ in hospital. Will recheck today to ensure WNL.   - Potassium      6. Anemia, unspecified type  - No history of anemia. Low hemoglobin with normal MCV in hospital. Unclear based on labs and history what source of anemia is. Rechecking CBC to ensure it has normalized. Somewhat odd history as cause is uncertain . It's plausible this is secondary to dilution as patient was tanked up with a lot of intravenous fluid after being quite dehydrated   - CBC with platelets and differential and further follow up depending on the test results       7. Impaired renal function  - No history of renal dysfunction. In hospital, Cr was normal and GFR was slightly low at 55. Patient does have lower extremity swelling. Will check kidney functions today.   - Basic metabolic panel  (Ca, Cl, CO2, Creat, Gluc, K, Na, BUN)      8. Essential hypertension with goal blood pressure less than 140/90  - Patient is slightly hypotensive even after discontinuing ARB at hospital on 2/29. Patient  is asymptomatic. Plan will be to discontinue ARB and reduce metoprolol to 12.5mg and keep furosemide 20mg once a day.   - Check blood pressure each day for two weeks at home and come into [facility] in two weeks for blood pressure check. If abnormal, they will send to Dr. Muir.   - metoprolol succinate ER (TOPROL-XL) 25 MG 24 hr tablet; Take 0.5 tablets (12.5 mg) by mouth daily  Dispense: 45 tablet;       9. Physical deconditioning  - Patient is able to function but notes his lower extremities are weak and sore. He was in bed for a total of 7 days with limited mobility. Patient is deconditioned and will undergo home care physical therapy.   - HOME CARE NURSING REFERRAL      10. Malignant neoplasm of prostate (H)  - Patient has been diagnosed with prostate cancer and this is stage 4 metastatic disease, following with oncology consultation , for complete details please see oncology office visit notes with Dr. Valenzuela          Return in about 3 months (around 6/6/2020) for Physical Exam, Lab Work.      Patient Instructions:   - Stop Quinapril  - Reduce metoprolol to 12.5mg or 1/2 tablet each day.   - Check blood pressure each day for two weeks at home and come into [facility] in two weeks for blood pressure check. If abnormal, they will send to Dr. Muir.   - Will check laboratory studies today and get back to you if abnormal. If abnormal, clinic will call you.   - Elevate legs as much as possible and buy compression stockings and wear daily, not at night.   - Home care team will call you to set up.       DREW Feliz MD  [facility]

## 2020-03-06 NOTE — PATIENT INSTRUCTIONS
- Stop Quinapril  - Reduce metoprolol to 12.5mg or 1/2 tablet each day.   - Check blood pressure each day for two weeks at home and come into [facility] in two weeks for blood pressure check. If abnormal, they will send to Dr. Muir.   - Will check laboratory studies today and get back to you if abnormal. If abnormal, clinic will call you.   - Elevate legs as much as possible and buy compression stockings and wear daily, not at night.   - Home care team will call you to set up.

## 2020-03-06 NOTE — NURSING NOTE
Dressing change completed on left forearm skin tear per Dr. Muir's orders  Wound cleansed with sterile NS, bacitracin ointment placed, and covered with mepilex dressing  Patient to do wound care at home every few days and monitor for signs and symptoms of infection (fever, increase in swelling, pain, redness, warmth, or any pus drainage)  Patient to be seen if wound fails to heal or if there are any complications  Patient's wife also in the room and will be helping patient with wound care at home    Pablo Cheatham RN

## 2020-03-11 NOTE — TELEPHONE ENCOUNTER
Called patient, Spoke to patient's daughter Amanda who stated that patient needs the following medication refilled due to low O2. Patient's daughter stated that once patient uses his Nebulizer his O2 goes back up         Bindu Cortez MA on 3/11/2020 at 10:42 AM

## 2020-03-11 NOTE — PROGRESS NOTES
[facility] now requests orders and shares plan of care/discharge summaries for some patients through [employer].  Please REPLY TO THIS MESSAGE OR ROUTE BACK TO THE AUTHOR in order to give authorization for orders when needed.  This is considered a verbal order, you will still receive a faxed copy of orders for signature.  Thank you for your assistance in improving collaboration for our patients.    ORDER  1. SN 1 week 1, 2 week 2, 1 week 2, 3 PRN  2. Location: Left forearm, Left elbow skin tear   Change every 3 days and PRN for drainage saturation. Gently remove previous dressing. Hold skin down as you remove the dressing to prevent further skin tearing and/or soak off with NS. Cleanse with wound cleanser and pat dry with soft gauze. Protect and treat- apply Silicone Foam dressing  3. PT 1 day 1  4. OT 1 day 1

## 2020-03-11 NOTE — TELEPHONE ENCOUNTER
Please call patient. Is he using this medication? Was discontinued on 11/7/2019 by Dr. Valenzuela.    Alexa Canada RN

## 2020-03-11 NOTE — TELEPHONE ENCOUNTER
"Routing refill request to provider for review/approval because:  Drug not active on patient's medication list  See message below.     Requested Prescriptions   Pending Prescriptions Disp Refills     albuterol (PROVENTIL) (2.5 MG/3ML) 0.083% neb solution [Pharmacy Med Name: ALBUTEROL SUL 2.5 MG/3 ML SOLN] 75 mL 1     Sig: TAKE 1 VIAL BY NEBULIZATION EVERY 6 HOURS AS NEEDED FOR SHORTNESS OF BREATH / DYSPNEA OR WHEEZING       Asthma Maintenance Inhalers - Anticholinergics Failed - 3/11/2020 10:45 AM        Failed - Medication is active on med list        Passed - Patient is age 12 years or older        Passed - Recent (12 mo) or future (30 days) visit within the authorizing provider's specialty     Patient has had an office visit with the authorizing provider or a provider within the authorizing providers department within the previous 12 mos or has a future within next 30 days. See \"Patient Info\" tab in inbasket, or \"Choose Columns\" in Meds & Orders section of the refill encounter.             Short-Acting Beta Agonist Inhalers Protocol  Failed - 3/11/2020 10:45 AM        Failed - Medication is active on med list        Passed - Patient is age 12 or older        Passed - Recent (12 mo) or future (30 days) visit within the authorizing provider's specialty     Patient has had an office visit with the authorizing provider or a provider within the authorizing providers department within the previous 12 mos or has a future within next 30 days. See \"Patient Info\" tab in inbasket, or \"Choose Columns\" in Meds & Orders section of the refill encounter.                 Alexa Canada RN  "

## 2020-03-12 NOTE — TELEPHONE ENCOUNTER
[facility] now requests orders and shares plan of care/discharge summaries for some patients through [employer].  Please REPLY TO THIS MESSAGE OR ROUTE BACK TO THE AUTHOR in order to give authorization for orders when needed.  This is considered a verbal order, you will still receive a faxed copy of orders for signature.  Thank you for your assistance in improving collaboration for our patients.    ORDER: PT 2w2, 1w2 for strengthening/HEP instruction, transfer training, gait training, neuro re-ed/balance training, and falls prevention.    Thank you,  Anamaria Snyder, PT  [email or web address]  [phone]

## 2020-03-16 NOTE — TELEPHONE ENCOUNTER
Reason for Call:  Other Patient request    Detailed comments: Home health care coverage and recommendations. Home health nurse does come in but the daughter and the patient believes the patient needs extra care.    Phone Number Patient can be reached at: Cell number on file:    Telephone Information:   Mobile [phone]    or Other phone number:  [phone] Boni Cleary    Best Time: any    Can we leave a detailed message on this number? YES    Call taken on 3/16/2020 at 9:36 AM by Vasile Burnham

## 2020-03-16 NOTE — TELEPHONE ENCOUNTER
Spoke to patients daughter and informed her of below message from nurse.  Pooja HUANG CMA ([facility])

## 2020-03-16 NOTE — TELEPHONE ENCOUNTER
Would recommend they talk with the home care nurse and they may also ask the HC nurse to get a SW involved to help with resources  HC will help assess patient's needs at home    Pablo Cheatham RN

## 2020-03-19 NOTE — PROGRESS NOTES
WOC eval for 3/19/20 has been declined per patient. Patient states he has multiple visits for that day. WOC's schedule is full until middle of next week, patient is ok with that and is ok to wait until next week for WOC eval. Will update CHAD RICK, RN on 3/18/2020 at 7:08 PM

## 2020-04-02 NOTE — PROGRESS NOTES
"Dimitri Neves is a 84 year old male who is being evaluated via a billable telephone visit.      The patient has been notified of following:     \"This telephone visit will be conducted via a call between you and your physician/provider. We have found that certain health care needs can be provided without the need for a physical exam.  This service lets us provide the care you need with a short phone conversation.  If a prescription is necessary we can send it directly to your pharmacy.  If lab work is needed we can place an order for that and you can then stop by our lab to have the test done at a later time.    If during the course of the call the physician/provider feels a telephone visit is not appropriate, you will not be charged for this service.\"     Patient has given verbal consent for Telephone visit?  Yes    Dimitri Neves complains of    Chief Complaint   Patient presents with     Oncology Clinic Visit     Follow up/Prior to treatment        I have reviewed and updated the patient's Past Medical History, Social History, Family History and Medication List.    ALLERGIES  Morphine sulfate and Vicodin [hydrocodone-acetaminophen]    April CHON Jarquin on 4/2/2020 at 9:15 AM    [facility]  HEMATOLOGY AND ONCOLOGY    FOLLOW-UP VISIT NOTE    PATIENT NAME: Dimitri Neves MRN # [id number]  DATE OF VISIT: Apr 2, 2020 YOB: 1935    REFERRING PROVIDER: No referring provider defined for this encounter.    CANCER TYPE: Prostate adenocarcinoma  STAGE: IV    TREATMENT SUMMARY:  Dimitri was followed by his PCP on 6/13/13 and was elevated at 32 as noted below. He was referred to Dr. Taylor. He was treated with a 10 day course of ciprofloxacin and followed again in 6 weeks on 8/10. His PSA drawn prior to this visit was unchanged and remained elevated at 32. He had a TRUS biopsy on 8/25/14 which was negative for prostate adenocarcinoma. His PSA was repeated in 3 months and now increased to 67.9. He " had a repeat biopsy of prostate on 12/16/14 which now confirmed prostate adenocarcinoma with eileen 4+4 disease involving 5 of the cores and [place] 3+3 disease involving remainder of cores. He was staged with a CT scan and bone scan done on 12/29/14 which revealed metastatic foci in the upper cervical vertebrae on the left, right posterior 3rd rib and right ischium, focal uptake in the posterior left 11th rib with corresponding lytic expansile appearance on CT, suspicious for metastasis.          4/5/2011 08:46 6/13/2014 08:03 7/25/2014 09:47 12/5/2014 09:00 4/7/2015 09:14   PSA 2.77 32.70 (H) 32.00 (H) 67.88 (H) 1.92      He was started on androgen deprivation therapy in Jan 2015 with a good initial response. His PSA has been increasing recently and he was started on bicalutamide in February with no response. He has continued to have rising PSA and was prescribed abiraterone with prednisone. He had a huge copay with this and has been referred to Medical Oncology to review other options.     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  I spoke to patient over the phone although he was in the clinic for his lab tests and Zometa infusion.  He is tolerating his abiraterone without any difficulty.     Ascencion denies any new symptoms since last clinic visit.  He does have hot flushes, fatigue, mood swings on androgen deprivation therapy. He has increased bruising.     He did have bilateral orchiectomies.         PAST MEDICAL HISTORY     Past Medical History:   Diagnosis Date     Actinic keratosis      AK (actinic keratosis) 7/9/2012     Cataract cortical,  senile right eye 4/17/2012     DDD (degenerative disc disease), lumbar 1979    s/p 4 back surgeries, spinal cord stimulator implant      Diverticulosis      ED (erectile dysfunction) 2009     GERD (gastroesophageal reflux disease) ~1980     HTN (hypertension), benign ~2000     Macular degeneration, dry, mild, ou 7/23/2016     Multiple lung nodules     Several basilar pulmonary nodules <5 mm     HUDSON (obstructive sleep apnea) 10/2005    on CPAP     Other abnormal glucose 01/14/2009     PE (pulmonary thromboembolism) (H) 1981    after back surgery      Pure hypercholesterolemia ~2000     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         CURRENT OUTPATIENT MEDICATIONS     Current Outpatient Medications   Medication Sig     abiraterone (ZYTIGA) 250 MG tablet Take 4 tablets (1,000 mg) by mouth daily for 30 doses Take on empty stomach.     albuterol (PROVENTIL) (2.5 MG/3ML) 0.083% neb solution TAKE 1 VIAL BY NEBULIZATION EVERY 6 HOURS AS NEEDED FOR SHORTNESS OF BREATH / DYSPNEA OR WHEEZING     ASPIRIN PO Take 81 mg by mouth     Calcium Carbonate (CALCIUM 600 PO)      Cholecalciferol (VITAMIN D) 2000 UNITS tablet Take 1 tablet by mouth daily     [employer] VITAMIN B12 1000 MCG tablet TAKE 1 TABLET BY MOUTH EVERY DAY     furosemide (LASIX) 20 MG tablet TAKE 1 TABLET (20 MG) BY MOUTH DAILY     gabapentin (NEURONTIN) 300 MG capsule Take 900 mg by mouth At Bedtime     KLOR-CON 10 MEQ CR tablet TAKE 1 TABLET (10 MEQ) BY MOUTH 2 TIMES DAILY     metoprolol succinate ER (TOPROL-XL) 25 MG 24 hr tablet Take 0.5 tablets (12.5 mg) by mouth daily     metoprolol succinate ER (TOPROL-XL) 25 MG 24 hr tablet TAKE 1 TABLET BY MOUTH EVERY DAY     predniSONE (DELTASONE) 5 MG tablet Take 1 tablet (5 mg) by mouth 2 times daily     predniSONE (DELTASONE) 5 MG tablet Take 1 tablet (5 mg) by mouth 2 times daily     quinapril (ACCUPRIL) 20 MG tablet Take 1 tablet (20 mg) by mouth At Bedtime     simvastatin (ZOCOR) 40 MG tablet TAKE 0.5 TABLETS (20  MG) BY MOUTH DAILY     calcipotriene (DOVONEX) 0.005 % external cream Mix with efudex and apply twice daily to scalp for ten days (Patient not taking: Reported on 4/2/2020)     fluocinonide (LIDEX) 0.05 % cream Apply sparingly to affected area twice daily as needed on legs.  Do not apply to face. (Patient not taking: Reported on 4/2/2020)     fluorouracil (EFUDEX) 5 % external cream Mix with dovonex apply to scalp for ten days twice a day (Patient not taking: Reported on 4/2/2020)     ipratropium - albuterol 0.5 mg/2.5 mg/3 mL (DUONEB) 0.5-2.5 (3) MG/3ML neb solution Take 1 vial (3 mLs) by nebulization once for 1 dose     No current facility-administered medications for this visit.         ALLERGIES      Allergies   Allergen Reactions     Morphine Sulfate GI Disturbance     vomiting     Vicodin [Hydrocodone-Acetaminophen] Nausea and Vomiting        REVIEW OF SYSTEMS   As above in the HPI, o/w complete 12-point ROS was negative.     PHYSICAL EXAM   BP (!) 146/87 (BP Location: Right arm)   Pulse 68   Temp 97.8  F (36.6  C) (Oral)   Resp 16   Wt 86.8 kg (191 lb 6.4 oz)   BMI 35.29 kg/m    GEN: NAD  HEENT: PERRL, EOMI, no icterus, injection or pallor. Oropharynx is clear.  NECK: no cervical or supraclavicular lymphadenopathy  LUNGS: clear bilaterally  CV: regular, no murmurs, rubs, or gallops  ABDOMEN: soft, non-tender, non-distended, normal bowel sounds, no hepatosplenomegaly by percussion or palpation  EXT: warm, well perfused, +++ edema  NEURO: alert  SKIN: Extensive ecchymoses in all the extremities     LABORATORY AND IMAGING STUDIES     Labs remain stable. Calcium is within the normal range    Mild normocytic anemia   Recent Labs   Lab Test 04/02/20  0850 03/06/20  1217 02/24/20  1118 11/04/19  0841    138 140 142   POTASSIUM 4.1 4.5 4.3 4.1   CHLORIDE 107 106 107 108   CO2 30 22 27 30   ANIONGAP 4 10 6 4   BUN 19 22 17 11   CR 0.95 1.24 0.99 0.91   * 94 111* 105*   ALETHA 9.3 9.2 9.0 9.0     No  results for input(s): MAG, PHOS in the last 72410 hours.  Lab Test 04/02/20  0850 03/06/20  1217 02/24/20  1118 11/04/19  0841   WBC 7.0 8.5 7.8 8.3   HGB 12.0* 11.7* 12.3* 12.2*    266 205 248   MCV 98 100 98 100   NEUTROPHIL 73.6 59.3 60.4 76.4     Lab Test 04/02/20  0850 02/24/20  1118   BILITOTAL 0.6 1.0   ALKPHOS 76 78   ALT 15 15   AST 16 18   ALBUMIN 3.2* 3.2*     Results for orders placed or performed in visit on 11/04/19   CT Chest/Abdomen/Pelvis w Contrast    Narrative    EXAMINATION: CT CHEST/ABDOMEN/PELVIS W CONTRAST, 11/4/2019 9:57 AM    TECHNIQUE:  Helical CT images from the lung apices through the  symphysis pubis were obtained with contrast.  Coronal and sagittal  reformatted images were generated at a workstation for further  assessment.    CONTRAST:  120 ml Isovue 370.    COMPARISON: CT 5/22/2018 and bone scan 5/22/2018.    HISTORY: Prostate cancer - rising PSA; Malignant neoplasm of prostate  (H); Bone metastasis (H)    ADDITIONAL HISTORY FROM THE EMR: Under treatment with abiraterone.  Today prostate-specific antigen is 35, previously 27 August 2019.    FINDINGS:    Chest: The visualized thyroid is unremarkable. Heart size is normal.  Mild atherosclerotic calcification of the course of LAD. Origins of  the great neck vessels are patent. There is no pericardial effusion.  No mediastinal, hilar, or axillary lymphadenopathy.  The central  tracheobronchial tree is patent. There is no focal airspace  consolidation, pleural effusion, or pneumothorax. Minimal apical  predominant emphysematous changes and scarring, unchanged compared to  prior. Scattered granulomatous bilaterally, unchanged. Plaque-like  pleural calcification in the right ventral pleura unchanged. Scattered  atelectatic and fibrotic changes in the bilateral lower segments. No  worrisome lung nodule.    Abdomen/pelvis:  Bilateral, predominantly exophytic renal cortical cysts are unchanged  from prior studies. There are also  subcentimeter cortical  hypodensities in both kidneys, stable, too small to characterize. No  hydronephrosis. Pelvic phleboliths. Symmetric seminal vesicles.  Prostate calcifications.     Focal hypodensity in the left hepatic lobe is stable and too small to  accurately characterize on CT. The liver is otherwise unremarkable.  The gallbladder is surgically absent. Mild to moderate diffuse  pancreatic atrophy.  Right adrenal gland thickening dates back to 2017  without significant change.    There are no dilated loops of large or small bowel. No focal bowel  wall thickening or mucosal hyperenhancement. The appendix is normal.  Sigmoid colonic diverticulosis without CT evidence of active  diverticulitis.     The major intra-abdominal vasculature is patent and within normal  limits for caliber. Atherosclerotic calcifications of the abdominal  aorta and its major branches. Minimally increased narrowing in the  origin of the celiac trunk with poststenotic ectasia of the proximal  celiac trunk, nonspecific and without substantial change since 2017.  No retroperitoneal, pelvic or inguinal lymphadenopathy. No new or  enlarging intra-abdominal lymphadenopathy.    Bones/soft tissues: New ill-defined sclerosis in the lateral left  seventh rib since CT from 5/22/2018 (series 3, image 200) and anterior  left sixth rib (series 3, image 254).    Unchanged sclerotic metastatic focus in the right ischial bone and  right inferior pubic ramus.    Multilevel anterior and posterior bridging osteophytes as well as  fusion of the spinous processes. Appearance of the spine resembles  diffuse idiopathic skeletal hyperostosis. Osteoporosis of the spine.  Posterior fusion hardware at L2-3. Laminectomy defect at L2-3.  Interdiscal fusion at this L3-4. Unchanged posterior bulging  (osteophyte at T11-T12 with resultant mild to moderate spinal canal  stenosis. Spinal stimulator device with leads terminating posterior to  the spinal cord at the  T7-T8 level. Rotator cuff repair on the right.  Degenerative changes of bilateral SI joints with partial fusion.  Degenerative changes of both hips.  Healed prior left posterior 11th  and 12th rib fractures.      Impression    Impression: In this patient with metastatic prostate cancer with  rising PSA:  1. Indeterminate sclerotic focus in the left lateral seventh rib and  anterior left sixth rib, new since 2018.  Attention on follow-up.  2. Stable sclerotic metastatic focus in the right ischial bone and  right inferior pubic ramus.  3. No metastatic lymphadenopathy. No solid organ or lung metastasis.  4. Please refer to same date bone scan for additional findings.    I have personally reviewed the examination and initial interpretation  and I agree with the findings.    KT TAMEZ MD     Lab Test 04/02/20  0850 11/04/19  0841 08/01/19  0935 04/18/19  0831 01/07/19  0855   PSA 46.90* 34.50* 27.00* 17.80* 13.20*   TESTOSTTOTAL  --   --   --   --   --       ASSESSMENT AND PLAN   1. 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 No response to addition of bicalutamide  3. PE diagnosed few weeks after initiation of megestrol acetate for hot flashes on GnRH agonist  4. No significant medical comorbidities    He is tolerating his abiraterone well and has no complains. His PSA did respond nicely as expected initially and has been rising since naidr value of 13.2 ng/ml in Jan of 2019. His PSA at this visit is not much higher at 46.9 ng/mL which is up from 34.5 nanograms per male in November which was in turn up from 27 in August and 17.8 ng/ml in April. It has not yet doubled since August in the last 6 months.     He was little bothered initially by the higher PSA number but again I explained to him that the PSA rise is exponential and PSA increases at a fixed rate rather than if examined.  And every follow-up visit the PSA will appear to be  substantially higher than in the past.  However as noted above his rate of rise does not appear to be increasing.    We did get next generation sequencing done for him and he does not have any of the actionable mutations.  He does not have the BRCA mutations or other genes involved with homologous DNA repair deficiency.  This leaves us with chemotherapy-docetaxel as the next treatment option.  He is not very excited about it.  I explained to him that docetaxel would be my preferred next choice for him.  Over time the cancer cells develop resistance to hormonal therapy and continued hormonal therapy is not very effective beyond that.  Chemotherapy will have a very different sensitivity and will wipe out the current crop of castration resistant cancer cells.  After a good response to chemotherapy it is possible that he might have a different bunch of cancer cells which could now have some sensitivity to androgen deprivation therapy.    I reviewed the option of enzalutamide after completing chemotherapy and not before it.  I would not choose to do chemotherapy at this time due to the ongoing COVID 19 pandemic.  In either case Ascencion is not looking forward to it.  I would like to see him in about 3 months time with restaging labs and scans.  We would consider chemotherapy if he has radiographic progression and not merely progressive rise in his PSA.    He did get bilateral orchiectomies done last month 9/27/17. He would not need any more lupron or other GnRH directed therapies.     His labs are stable except for mild normocytic anemia - possibly owing to ongoing therapy for cancer. He also has mild hypoalbuminemia which is unchanged.     He has extensive ecchymoses likely secondary to prednisone use.     He has hot flashes, mood swings, fatigue - from androgen deprivation. These are no different between either orchiectomy or GnRH therapy as they come with low levels of testosterone.      We would continue with zometa every  12 weeks. I could see him in 3 months with labs and scans done prior to his visit with me.     Phone call duration:25 minutes    Rafael Valenzuela    Hematologist and Medical Oncologist  [facility]

## 2020-04-02 NOTE — PROGRESS NOTES
Infusion Nursing Note:  Dimitri Neves presents today for Zometa infusion.    Patient seen by provider today: Yes: Telephone visit with Dr Valenzuela   present during visit today: Not Applicable.    Note:   Patient is taking Calcium and Vitamin D.    Denies dental issues.  Reminded to drink lots of water today and tomorrow.    Intravenous Access:  Peripheral IV placed.    Treatment Conditions:  Lab Results   Component Value Date     04/02/2020                   Lab Results   Component Value Date    POTASSIUM 4.1 04/02/2020           No results found for: MAG         Lab Results   Component Value Date    CR 0.95 04/02/2020                   Lab Results   Component Value Date    ALETHA 9.3 04/02/2020                Lab Results   Component Value Date    BILITOTAL 0.6 04/02/2020           Lab Results   Component Value Date    ALBUMIN 3.2 04/02/2020                    Lab Results   Component Value Date    ALT 15 04/02/2020           Lab Results   Component Value Date    AST 16 04/02/2020       Results reviewed, labs MET treatment parameters, ok to proceed with treatment.      Post Infusion Assessment:  Patient tolerated infusion without incident.  Blood return noted pre and post infusion.  Site patent and intact, free from redness, edema or discomfort.  No evidence of extravasations.  Access discontinued per protocol.       Discharge Plan:   Patient will return 7/16/2020 for next appointment.   Patient discharged in stable condition accompanied by: self. Wife will  patient.  Departure Mode: Ambulatory.    Ida Shaw RN

## 2020-04-13 NOTE — TELEPHONE ENCOUNTER
Medication not received   quinapril (ACCUPRIL) 20 MG tablet  90 tablet  1  2/24/2020   --    Sig - Route: Take 1 tablet (20 mg) by mouth At Bedtime - Oral    Class: No Print Out    Order: [id number]      Nishi Francois MA

## 2020-04-13 NOTE — TELEPHONE ENCOUNTER
"Routing refill request to provider for review/approval because:  Labs out of range:  BP    Requested Prescriptions   Pending Prescriptions Disp Refills     metoprolol succinate ER (TOPROL-XL) 25 MG 24 hr tablet [Pharmacy Med Name: METOPROLOL SUCC ER 25 MG TAB] 90 tablet 0     Sig: TAKE 1 TABLET BY MOUTH EVERY DAY       Beta-Blockers Protocol Failed - 4/13/2020  8:53 AM        Failed - Blood pressure under 140/90 in past 12 months     BP Readings from Last 3 Encounters:   04/02/20 (!) 146/87   03/06/20 110/72   02/24/20 114/72                 Passed - Patient is age 6 or older        Passed - Recent (12 mo) or future (30 days) visit within the authorizing provider's specialty     Patient has had an office visit with the authorizing provider or a provider within the authorizing providers department within the previous 12 mos or has a future within next 30 days. See \"Patient Info\" tab in inbasket, or \"Choose Columns\" in Meds & Orders section of the refill encounter.              Passed - Medication is active on med list           Alexa Canada RN  "

## 2020-04-13 NOTE — TELEPHONE ENCOUNTER
"Routing refill request to provider for review/approval because:  BP/Rx historical    Requested Prescriptions   Pending Prescriptions Disp Refills     quinapril (ACCUPRIL) 40 MG tablet [Pharmacy Med Name: QUINAPRIL 40 MG TABLET] 90 tablet 0     Sig: TAKE 1 TABLET BY MOUTH EVERY DAY       ACE Inhibitors (Including Combos) Protocol Failed - 4/13/2020  2:29 PM        Failed - Blood pressure under 140/90 in past 12 months     BP Readings from Last 3 Encounters:   04/02/20 (!) 146/87   03/06/20 110/72   02/24/20 114/72                 Passed - Recent (12 mo) or future (30 days) visit within the authorizing provider's specialty     Patient has had an office visit with the authorizing provider or a provider within the authorizing providers department within the previous 12 mos or has a future within next 30 days. See \"Patient Info\" tab in inbasket, or \"Choose Columns\" in Meds & Orders section of the refill encounter.              Passed - Medication is active on med list        Passed - Patient is age 18 or older        Passed - Normal serum creatinine on file in past 12 months     Recent Labs   Lab Test 04/02/20  0850  07/17/17  0925  07/12/16   CR 0.95   < >  --    < >  --    CREAT  --   --  0.7  --   --    CRPOC  --   --   --   --  0.8    < > = values in this interval not displayed.       Ok to refill medication if creatinine is low          Passed - Normal serum potassium on file in past 12 months     Recent Labs   Lab Test 04/02/20  0850   POTASSIUM 4.1                " .

## 2020-06-19 NOTE — PATIENT INSTRUCTIONS
Patient Education     Upper Extremity Contusion  You have a contusion (bruise) of an upper extremity (arm, wrist, hand, or fingers). Symptoms include pain, swelling, and skin discoloration. No bones are broken. This injury may take from a few days to a few weeks to heal.  During that time, the bruise may change from reddish in color, to purple-blue, to green-yellow, to yellow-brown.  Home care    Unless another medicine was prescribed, you can take acetaminophen, ibuprofen, or naproxen to control pain. (If you have chronic liver or kidney disease or ever had a stomach ulcer or gastrointestinal bleeding, talk with your doctor before using these medicines.)    Elevate the injured area to reduce pain and swelling. As much as possible, sit or lie down with the injured area raised about the level of your heart. This is especially important during the first 48 hours.    Ice the injured area to help reduce pain and swelling. Wrap a cold source (ice pack or ice cubes in a plastic bag) in a thin towel. Apply to the bruised area for 20 minutes every 1 to 2 hours the first day. Continue this 3 to 4 times a day until the pain and swelling goes away.    If a sling was provided, you may remove it to shower or bathe. To prevent joint stiffness, do not wear it for more than 1 week.  Follow-up care  Follow up with your healthcare provide, or as advised. Call if you are not improving within the next 1 to 2 weeks.  When to seek medical advice   Call your healthcare provider right away if any of these occur:    Increased pain or swelling    Hand or fingers become cold, blue, numb or tingly    Signs of infection: Warmth, drainage, or increased redness or pain around the injury    Inability to move the injured body part     Frequent bruising for unknown reasons  Date Last Reviewed: 2/1/2017 2000-2019 [employer] [address] [facility], [place] [address]. All rights reserved. This information is not intended as a  substitute for professional medical care. Always follow your healthcare professional's instructions.

## 2020-06-19 NOTE — PROGRESS NOTES
Prior to immunization administration, verified patients identity using patient s name and date of birth. Please see Immunization Activity for additional information.     Screening Questionnaire for Adult Immunization    Are you sick today?   No   Do you have allergies to medications, food, a vaccine component or latex?   No   Have you ever had a serious reaction after receiving a vaccination?   No   Do you have a long-term health problem with heart, lung, kidney, or metabolic disease (e.g., diabetes), asthma, a blood disorder, no spleen, complement component deficiency, a cochlear implant, or a spinal fluid leak?  Are you on long-term aspirin therapy?   No   Do you have cancer, leukemia, HIV/AIDS, or any other immune system problem?   No   Do you have a parent, brother, or sister with an immune system problem?   No   In the past 3 months, have you taken medications that affect  your immune system, such as prednisone, other steroids, or anticancer drugs; drugs for the treatment of rheumatoid arthritis, Crohn s disease, or psoriasis; or have you had radiation treatments?   No   Have you had a seizure, or a brain or other nervous system problem?   No   During the past year, have you received a transfusion of blood or blood    products, or been given immune (gamma) globulin or antiviral drug?   No   For women: Are you pregnant or is there a chance you could become       pregnant during the next month?   No   Have you received any vaccinations in the past 4 weeks?   No     Immunization questionnaire answers were all negative.        Per orders of Dr. Garcia, injection of Td given by Pooja Lawson. Patient instructed to remain in clinic for 15 minutes afterwards, and to report any adverse reaction to me immediately.       Screening performed by Pooja Lawson on 6/19/2020 at 12:49 PM.

## 2020-06-19 NOTE — PROGRESS NOTES
Subjective     Dimitri Neves is a 84 year old male who presents to clinic today for the following health issues:    HPI   Fell on right arm last night, has skin denudation on lateral arm and elbow   He fell last night at about 10.30 PM  Tripped over some weigfhts which were on the way  He did not hit his head; his grandson helped him up with a lifting belt.  Started hurting and bleed quite abit. His grandson dressed him and bleeding stopped.  He son dressed bruises   Takes baby ASA  Denies any dizziness or light headedness,    Review of Systems   HEENT, cardiovascular, pulmonary, gi and gu systems are negative, except as otherwise noted.      Objective    /76   Pulse 92   Temp 98.1  F (36.7  C) (Oral)   Resp 15   Wt 85.3 kg (188 lb)   SpO2 98%   BMI 34.66 kg/m    Body mass index is 34.66 kg/m .  Physical Exam   GENERAL: healthy, alert and no distress  CRANIAL NERVES: Intact  RESP: lungs clear to auscultation - no rales, rhonchi or wheezes  CV: regular rate and rhythm, no murmur, click or rub, no peripheral edema   MS: Rt Upper Extremity         Huge bruising with skin shearing on the lateral forearm and elbow.         A little oozing after removing dressing    Assessment & Plan     Dimitri was seen today for wound care.    Diagnoses and all orders for this visit:    Fall, initial encounter    Arm bruise, left, initial encounter  -     Miscellaneous Order for DME - ONLY FOR DME    Other orders  -     TD (ADULT, 7+) PRESERVE FREE  -     Pressure dressing      Return in about 1 week (around 6/26/2020) for Follow up for symptoms recheck.    Mesfin Garcia MD  [facility]

## 2020-06-24 NOTE — PROGRESS NOTES
"Subjective     Dimitri Neves is a 84 year old male who presents to clinic today for the following health issues:    HPI   Chief Complaint   Patient presents with     Wound Check     Arm 1 week f/u       {additonal problems for provider to add (Optional):096589}    {HIST REVIEW/ LINKS 2 (Optional):642720}    {Additional problems for the provider to add (optional):225537}  Reviewed and updated as needed this visit by Provider         Review of Systems   {ROS COMP (Optional):995375}      Objective    There were no vitals taken for this visit.  There is no height or weight on file to calculate BMI.  Physical Exam   {Exam List (Optional):769165}    {Diagnostic Test Results (Optional):584556::\"Diagnostic Test Results:\",\"Labs reviewed in Epic\"}        {PROVIDER CHARTING PREFERENCE:155512}        "

## 2020-06-26 NOTE — PROGRESS NOTES
Wound location R arm; wound on upper arm near elbow, large wound on forearm and wound near wrist on lower forearm.    Wound Base: Granulation    Wound Base Color: Red. Wrist wound had some slight yellow discoloration.    Surrounding Tissue: Intact  Exudate: Small  Exudate Color: Bloody  Odor: No    Pain:  Yes - minimal. Patient tolerated procedure well.    Dressing Change: Yes. Area cleansed with sterile water. Area dried. Bacitracin applied and covered with ABD pad. Wrapped with gauze bandage and ACE wrap over top to protect.    Patient advised to start oral abx and return on Monday for dressing change. Appointment scheduled.     Alexa Canada RN

## 2020-06-26 NOTE — NURSING NOTE
Late entry. Wound care was performed on pt's right arm per verbal order from Dr. Garcia on 6/19/20.     Wound location R Arm    Wound Base: Granulation    Wound Base Color: Red    Surrounding Tissue: Denuded    Exudate: Large    Exudate Color: Bloody, some blood clots coming down from elbow.    Odor: No    Pain:  Yes - describe Pain when gauze was removed and caused wound to reopen and bleed    Dressing Change: Yes - describe skin was cleansed with sterile water. Non stick dressing applied to skin tears on right arm, then wrapped patient's arm with coban.    Pooja Gordon RN  [facility]

## 2020-06-30 NOTE — PROGRESS NOTES
Subjective         Dimitri Neves is a 84 year old male who presents to clinic today for the following health issues:     HPI        Chief Complaint   Patient presents with     Wound Check       Arm 1 week f/u        Patient had a fall.and sustained significant bruising on the Rt arm   Had wound cleaned and dressed returning for recheck   Has some swelling in forearm  Not in much pain.   Exhausting trying to hold his      Review of Systems   Constitutional, HEENT, cardiovascular, pulmonary, gi and gu systems are negative, except as otherwise noted.           Objective       There were no vitals taken for this visit.  There is no height or weight on file to calculate BMI.  Physical Exam   GENERAL: healthy, alert and no distress  RESP: lungs clear to auscultation - no rales, rhonchi or wheezes  CV: regular rate and rhythm, no murmur, click or rub, no peripheral edema   MS: no gross musculoskeletal defects noted, no edema     Diagnostic Test Results:      Assessment & Plan     (L03.113) Cellulitis of right upper extremity  (primary encounter diagnosis)  Comment: Has early signs of cellulitis; dressing, antibiotic and armsling  Plan: sulfamethoxazole-trimethoprim (BACTRIM DS)         800-160 MG tablet          (S49.91XD) Injury of right upper extremity, subsequent encounter  Comment: cellulitis  Plan: sulfamethoxazole-trimethoprim (BACTRIM DS)         800-160 MG tablet    Return in about 3 days (around 6/29/2020).    Mesfin Garcia MD  [facility]

## 2020-07-01 NOTE — PROGRESS NOTES
Wound location Right arm     Size:   Lower (above wrist)  Width 4.2 cm  Length 2 cm     Middle (forarm)  Width 3 cm including area covered by skin flap  Length 13.7 cm     Elbow  Width: 3.5 cm  Length:  4 cm    Wound Base: Granulation     Wound Base Color: Red  Pink     Surrounding Tissue: Intact     Exudate: Moderate     Exudate Color: Sero-Sang.     Odor: No     Pain:  No     Dressing Change: Yes - describe wounds cleansed with sterile water. Bacitracin applied then covered with non stick dressing. Forearm wound covered with additional ABD dressing due to drainage. Dressings secured with kerlix then ace bandage    Patient tolerated dressing change well  He will return on 7/3/2020 for another dressing change    Pablo Cheatham RN

## 2020-07-03 NOTE — PROGRESS NOTES
Wound location Right arm     Size:   Lower (above wrist)  Width 4 cm  Length 3.5 cm     Middle (forarm)  Width 4 cm including area covered by skin flap  Length 14 cm     Elbow  Width: 1.5 cm  Length:  3.5 cm     Wound Base: Granulation     Wound Base Color: Red  Pink     Surrounding Tissue: Intact     Exudate: Mild     Exudate Color: Sero-Sang.     Odor: No     Pain:  No, reports some burning at elbow wound     Dressing Change: Yes - wounds were cleansed with sterile water. Bacitracin applied then covered with non stick dressing. Forearm wound covered with additional ABD dressing due to drainage. Dressings secured with kerlix then ace bandage     Patient tolerated dressing change well  Next dressing change on 7/6/2020.    Gillian Potter RN

## 2020-07-06 NOTE — NURSING NOTE
Wound location Right arm     Size:   Lower (above wrist)  Width 4 cm  Length 3.5 cm     Middle (forearm)  Width 4 cm including area covered by skin flap  Length 13 cm     Elbow  Width: 1.5 cm  Length:  4.5 cm     Wound Base: Granulation     Wound Base Color: Red  Pink     Surrounding Tissue: Intact     Exudate: Mild     Exudate Color: Sero-Sang.     Odor: No     Pain:  No     Dressing Change: Yes - wounds were cleansed with sterile water. Bacitracin applied then covered with non stick dressing. Dressings secured with kerlix then ace bandage     Patient tolerated dressing change well  Next dressing change on 7/10/2020.     Pooja Gordon RN

## 2020-07-10 NOTE — PROGRESS NOTES
Pt presented for dressing change. He states that he had another fall. Fell on his right arm again and hit his left knee. States that he does not have any new pain due to fall, hit side of face but not head. Fall occurred while taking out the garbage. Pt had scabbing and bruising to side of right check and eyebrow. Both areas are scabbed with no signs of drainage or infection.    Wound locations Right arm, right thumb, left knee     Size:   Right arm (forearm)  Size 1.8 x 3.6 cm    Right hand thumb  1 x 0.2 cm    Left knee  6 x 1.1 cm     Wound Base: Granulation  Wound Base Color: Red (knee) Pink (forearm)     Surrounding Tissue: Intact     Exudate: Mild  Exudate Color: Sero-Sang.     Odor: No     Pain:  No, only slight burning on knee wound.     Dressing Change: Yes - wounds were cleansed with sterile water. Bacitracin applied to all wounds. Forearm covered with non stick dressing, then secured with kerlix and ace. Thumb and knee covered with bandaids.  Elbow and lower wrist healed (no open wounds), covered only to prevent scab from tearing. Likely will not need to be covered at next change.  Patient tolerated dressing change well  Next dressing change on 7/14/2020.    Gillian Potter RN

## 2020-07-14 NOTE — PROGRESS NOTES
Wound location: R forearm, R thigh    Size:   Right forearm  Size: 1.0 cm x 4.5 cm    Right thigh  Size: 1.8 cm x 2.8 cm    Wound Base: Granulation    Wound Base Color: Pink/red (forearm, red (thigh)    Surrounding Tissue: Intact    Exudate: Scant    Exudate Color: Sero-Sang.    Odor: No    Pain:  No, denies any pain and tolerated procedure well.    Dressing Change: Yes - describe forearm was cleaned with sterile water, dried and bacitracin applied. Non-stick dressing applied and arm wrapped with Kerlix and ace-wrap. New band-aid applied to right thigh. Patient did bump left forearm getting into car today and has a small wound. Patient covered wound at home and declined assessment/treatment on that wound.    Appointment scheduled for Friday.     Alexa Canada RN

## 2020-07-16 PROBLEM — D70.1 CHEMOTHERAPY-INDUCED NEUTROPENIA (H): Status: ACTIVE | Noted: 2020-01-01

## 2020-07-16 PROBLEM — T45.1X5A CHEMOTHERAPY-INDUCED NEUTROPENIA (H): Status: ACTIVE | Noted: 2020-01-01

## 2020-07-16 NOTE — PROGRESS NOTES
[facility]  HEMATOLOGY AND ONCOLOGY    FOLLOW-UP VISIT NOTE    PATIENT NAME: Dimitri Neves MRN # [id number]  DATE OF VISIT: Jul 16, 2020 YOB: 1935    REFERRING PROVIDER: No referring provider defined for this encounter.    CANCER TYPE: Prostate adenocarcinoma  STAGE: IV    TREATMENT SUMMARY:  Dimitri was followed by his PCP on 6/13/13 and was elevated at 32 as noted below. He was referred to Dr. Taylor. He was treated with a 10 day course of ciprofloxacin and followed again in 6 weeks on 8/10. His PSA drawn prior to this visit was unchanged and remained elevated at 32. He had a TRUS biopsy on 8/25/14 which was negative for prostate adenocarcinoma. His PSA was repeated in 3 months and now increased to 67.9. He had a repeat biopsy of prostate on 12/16/14 which now confirmed prostate adenocarcinoma with eileen 4+4 disease involving 5 of the cores and [place] 3+3 disease involving remainder of cores. He was staged with a CT scan and bone scan done on 12/29/14 which revealed metastatic foci in the upper cervical vertebrae on the left, right posterior 3rd rib and right ischium, focal uptake in the posterior left 11th rib with corresponding lytic expansile appearance on CT, suspicious for metastasis.          4/5/2011 08:46 6/13/2014 08:03 7/25/2014 09:47 12/5/2014 09:00 4/7/2015 09:14   PSA 2.77 32.70 (H) 32.00 (H) 67.88 (H) 1.92      He was started on androgen deprivation therapy in Jan 2015 with a good initial response. His PSA has been increasing recently and he was started on bicalutamide in February with no response. He has continued to have rising PSA and was prescribed abiraterone with prednisone. He had a huge copay with this and has been referred to Medical Oncology to review other options.     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  He is tolerating his abiraterone without any difficulty.     Ascencion denies any new symptoms since last clinic visit.  He does have hot flushes, fatigue, mood swings on androgen deprivation therapy. He has increased bruising.     He did have bilateral orchiectomies.     Last month he fell after tripping on weights on the floor which belonged to his wife while getting up for water at night. She notes that he took a alternate/wrong path for water.         PAST MEDICAL HISTORY     Past Medical History:   Diagnosis Date     Actinic keratosis      AK (actinic keratosis) 7/9/2012     Cataract cortical, senile right eye 4/17/2012     DDD (degenerative disc disease), lumbar 1979    s/p 4 back surgeries, spinal cord stimulator implant      Diverticulosis      ED (erectile dysfunction) 2009     GERD (gastroesophageal reflux disease) ~1980     HTN (hypertension), benign ~2000     Macular degeneration, dry, mild, ou 7/23/2016     Multiple lung nodules     Several basilar pulmonary nodules <5 mm     HUDSON (obstructive sleep apnea) 10/2005    on CPAP     Other abnormal glucose 01/14/2009     PE (pulmonary thromboembolism) (H) 1981    after back surgery      Pure hypercholesterolemia ~2000     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         CURRENT OUTPATIENT MEDICATIONS     Current Outpatient Medications   Medication Sig     albuterol (PROVENTIL) (2.5 MG/3ML) 0.083% neb solution TAKE 1 VIAL BY NEBULIZATION EVERY 6 HOURS AS NEEDED FOR SHORTNESS OF BREATH / DYSPNEA OR WHEEZING     ASPIRIN PO Take 81 mg by mouth     calcipotriene (DOVONEX) 0.005 % external cream Mix with efudex and apply twice daily to scalp for ten days     Calcium Carbonate (CALCIUM 600 PO)      Cholecalciferol  "(VITAMIN D) 2000 UNITS tablet Take 1 tablet by mouth daily     [employer] VITAMIN B12 1000 MCG tablet TAKE 1 TABLET BY MOUTH EVERY DAY     fluocinonide (LIDEX) 0.05 % cream Apply sparingly to affected area twice daily as needed on legs.  Do not apply to face.     fluorouracil (EFUDEX) 5 % external cream Mix with dovonex apply to scalp for ten days twice a day     furosemide (LASIX) 20 MG tablet TAKE 1 TABLET (20 MG) BY MOUTH DAILY     gabapentin (NEURONTIN) 300 MG capsule Take 900 mg by mouth At Bedtime     KLOR-CON 10 MEQ CR tablet TAKE 1 TABLET (10 MEQ) BY MOUTH 2 TIMES DAILY     metoprolol succinate ER (TOPROL-XL) 25 MG 24 hr tablet TAKE 1 TABLET BY MOUTH EVERY DAY     metoprolol succinate ER (TOPROL-XL) 25 MG 24 hr tablet Take 0.5 tablets (12.5 mg) by mouth daily     quinapril (ACCUPRIL) 20 MG tablet Take 1 tablet (20 mg) by mouth At Bedtime     quinapril (ACCUPRIL) 40 MG tablet TAKE 1 TABLET BY MOUTH EVERY DAY     simvastatin (ZOCOR) 40 MG tablet TAKE 0.5 TABLETS (20 MG) BY MOUTH DAILY     ipratropium - albuterol 0.5 mg/2.5 mg/3 mL (DUONEB) 0.5-2.5 (3) MG/3ML neb solution Take 1 vial (3 mLs) by nebulization once for 1 dose     No current facility-administered medications for this visit.         ALLERGIES      Allergies   Allergen Reactions     Morphine Sulfate GI Disturbance     vomiting     Vicodin [Hydrocodone-Acetaminophen] Nausea and Vomiting        REVIEW OF SYSTEMS   As above in the HPI, o/w complete 12-point ROS was negative.     PHYSICAL EXAM   /75 (BP Location: Left arm, Patient Position: Chair, Cuff Size: Adult Regular)   Pulse 60   Temp 97.3  F (36.3  C)   Ht 1.816 m (5' 11.5\")   Wt 81.7 kg (180 lb 1.6 oz)   SpO2 98%   BMI 24.77 kg/m    GEN: NAD  HEENT: PERRL, EOMI, no icterus, injection or pallor. Oropharynx is clear.  NECK: no cervical or supraclavicular lymphadenopathy  LUNGS: clear bilaterally  CV: regular, no murmurs, rubs, or gallops  ABDOMEN: soft, non-tender, non-distended, normal bowel " sounds, no hepatosplenomegaly by percussion or palpation  EXT: warm, well perfused, +++ edema  NEURO: alert  SKIN: Extensive ecchymoses in all the extremities     LABORATORY AND IMAGING STUDIES     Labs remain stable. Calcium is within the normal range    Stable mild normocytic anemia and mild hypoalbuminemia   Lab Test 07/13/20  0848 04/02/20  0850 03/06/20  1217 02/24/20  1118    141 138 140   POTASSIUM 3.8 4.1 4.5 4.3   CHLORIDE 109 107 106 107   CO2 26 30 22 27   ANIONGAP 5 4 10 6   BUN 18 19 22 17   CR 0.82 0.95 1.24 0.99   * 112* 94 111*   ALETHA 9.1 9.3 9.2 9.0     No results for input(s): MAG, PHOS in the last 17750 hours.  Recent Labs   Lab Test 07/13/20  0848 04/02/20  0850 03/06/20  1217 02/24/20  1118 11/04/19  0841   WBC 10.5 7.0 8.5 7.8 8.3   HGB 12.5* 12.0* 11.7* 12.3* 12.2*    190 266 205 248   MCV 97 98 100 98 100   NEUTROPHIL 81.2 73.6 59.3 60.4 76.4     Recent Labs   Lab Test 07/13/20  0848 04/02/20  0850 02/24/20  1118   BILITOTAL 0.9 0.6 1.0   ALKPHOS 79 76 78   ALT 17 15 15   AST 17 16 18   ALBUMIN 3.3* 3.2* 3.2*     --   --      TSH   Date Value Ref Range Status   06/13/2016 1.60 0.40 - 4.00 mU/L Final   04/05/2011 3.73 0.4 - 5.0 mU/L Final     No results for input(s): CEA in the last 18240 hours.  Results for orders placed or performed in visit on 07/13/20   CT Chest/Abdomen/Pelvis w Contrast    Narrative    EXAMINATION: CT CHEST/ABDOMEN/PELVIS W CONTRAST, 7/13/2020 10:00 AM    TECHNIQUE:  Helical CT images from the thoracic inlet through the  symphysis pubis were obtained with contrast. Contrast dose: 112 ml  isovue 370    COMPARISON: 11/4/2019    HISTORY: Restaging prostate cancer with rising PSA on therapy;  Malignant neoplasm of prostate (H); Bone metastasis (H)    FINDINGS:    Chest:   Mediastinum: Thyroid gland appears unremarkable. Heart within normal  limits size. Mild atherosclerotic changes of the coronary arteries, in  particular the LAD. Normal branching  pattern of the great vessels. No  pericardial effusion. No axillary, mediastinal or hilar adenopathy  identified.  Normal-appearing esophagus. Aorta and central pulmonary  artery have normal caliber.    Central tracheobronchial tree is patent. Subpleural cysts and fibrotic  changes. Centrilobular emphysematous changes, predominantly in the  upper lobes. Calcified granuloma. Fibrotic changes right middle lobe.  No new nodular densities, enlarging nodules or airspace opacities  seen. Calcified ossified pleural plaque anteriorly on the right.    Abdomen and pelvis: The low dense cyst in the left hepatic lobe, Liver  otherwise homogeneous. Cholecystectomy clips. No intra or extrahepatic  ductal dilatation identified. Spleen is homogeneous. Pancreas  unremarkable. Adrenal glands appear normal.    Bilateral renal cysts. Largest in the upper pole of the right kidney.  No abnormal adenopathy identified in the upper abdomen and  retroperitoneum or pelvis.    Appendix in the right lower quadrant appears normal. Extensive colonic  diverticula. No diverticulitis. Multiple nodules are identified within  the mesentery. Largest measures 2.0 x 1.2 cm seen best on coronal  image 32. In a slightly different orientation but similar sized nodule  seen on this image 37 on the prior study. Multiple other subcentimeter  nodules are also unchanged.    Bones and soft tissues: Unchanged sclerotic area right ischium, seen  best series 2 image 129. Potential a slight increased sclerosis in the  inferior right pubic ramus. Degenerative changes throughout the spine  with spinal fusion hardware in the lower lumbar spine. Rotator cuff  repair right shoulder. Partial fusion of the bilateral SI joints.  Previously described sclerotic focus in the left lateral seventh and  anterior left sixth rib have a similar appearance.      Impression    IMPRESSION: Patient with known metastatic prostate cancer  1. Stable size of the sclerotic focus in the right  ischium. May be  slight advancement in the area of sclerosis of the inferior right  pubic ramus.  2. Sclerotic areas in the left sixth and seventh ribs are similar.  3. No additional areas of concern for metastatic disease in the chest  abdomen and pelvis.  4. Prominent mesenteric nodule, unchanged. Attention on follow-up.    JAZZY MOREIRA MD     EXAMINATION: NM BONE SCAN WHOLE BODY  Whole-body bone scan, 7/13/2020 12:54 PM      HISTORY: Metastatic prostate cancer      ADDITIONAL INFORMATION: none     COMPARISON: Bone scan 11/4/2019, CT CAP 7/13/2020     TECHNIQUE: The patient received 26.70 mCi of Tc-99m MDP intravenously.  Whole body bone images were obtained at 3 hours.     FINDINGS: Whole body planar images demonstrate increased radiotracer  uptake to the the right inferior pubic ramus. No new suspicious  uptake. Unchanged uptake to the anterior left 6th rib at the  costochondral junction, favor degenerative. Additional likely  degenerative uptake seen in the major joints including shoulders and  knees, most significant in the right knee. Normal renal excretion of  radiotracer.                                                                      IMPRESSION:   1. Increased uptake to the metastatic lesion in the right pubic ramus.  2. No new suspicious uptake demonstrated.      I have personally reviewed the examination and initial interpretation  and I agree with the findings.     CAESAR CALDWELL MD     11/4/19 7/13/20      Recent Labs   Lab Test 07/13/20  0848 04/02/20  0850 11/04/19  0841 08/01/19  0935 04/18/19  0831 02/09/17  0823   PSA 74.00* 46.90* 34.50* 27.00* 17.80*  --    TESTOSTTOTAL  --   --   --   --   --  9*    < > = values in this interval not displayed.     Recent Labs   Lab Test 07/13/20  0848 04/02/20  0850 02/24/20  1118 11/04/19  0841 08/01/19  0935 04/18/19  0831   PSA 74.00* 46.90*  --  34.50* 27.00* 17.80*   ALKPHOS 79 76 78 77 86 83     --   --   --   --   --         6/13/16         4/7/17 1/30/18 11/25/18 9/19/19 7/13/20   ASSESSMENT AND PLAN   1. 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 No response to addition of bicalutamide  3. PE diagnosed few weeks after initiation of megestrol acetate for hot flashes on GnRH agonist  4. No significant medical comorbidities    He is tolerating his abiraterone well and has no complains. His PSA did respond nicely as expected initially and has been rising since naidr value of 13.2 ng/ml in Jan of 2019. His PSA at this visit is much higher at 74 up from 46.9 ng/mL in April and 34.5 ng/ml in November.     I have reviewed actual images from his restaging scans with him. I showed him the pictures. He seems to have relatively stable disease. There is a mention of increased uptake in the right pubic ramus which is not apparent to me. There is no new disease on CT or bone scan. This is good news. Despite this due to his continued rise in PSA and much steeper rise lately, I don't feel that abiraterone is helping much.     We did get next generation sequencing done for him and he does not have any of the actionable mutations.  He does not have the BRCA mutations or other genes involved with homologous DNA repair deficiency.  This leaves us with chemotherapy-docetaxel as the next treatment option.  He is not very excited about it.  I explained to him that docetaxel would be my preferred next choice for him.  Over time the cancer cells develop resistance to hormonal therapy and continued hormonal therapy is not very effective beyond that.  Chemotherapy will have a very different sensitivity and will wipe out the current crop of castration resistant cancer cells.  After a good response to chemotherapy it is possible that he might have a different bunch of cancer cells which could now have some sensitivity to androgen deprivation therapy.    I reviewed chemotherapy with  docetaxel. I extensively reviewed the side effects from this chemotherapy.  We reviewed the peripheral neuropathy, alopecia, neutropenic fevers, myelosuppression, mucositis, diarrhea, allergies, pedal edema and rash.  Of these, alopecia and nail changes have more cosmetic bearings.  Neutropenic fevers are something to be quite cognizant about. He should take every fever seriously because if his counts are low, then he would not have the defenses and he should give us a call immediately.  He should not even wait for the next morning.  He should be seen either in the clinic or in the ER the same day.  We would get basic blood tests including the CBC and blood cultures.  We would do a fever workup for him.  If the counts were adequate, we could discharge him home on antibiotics if he looked stable.  If his counts were low, even if he was feeling fine, we would have to admit him to ensure that he was safe and stable. We reviewed the palliative intent, side effects, benefits, rationale, alternatives and outpatient regimen with him.      He has a poor venous access and I will get a port placed for him.     He did get bilateral orchiectomies done last month 9/27/17. He would not need any more lupron or other GnRH directed therapies.     His labs are stable except for mild normocytic anemia - possibly owing to ongoing therapy for cancer. He also has mild hypoalbuminemia which is unchanged.     He has extensive ecchymoses likely secondary to prednisone use.     He has hot flashes, mood swings, fatigue - from androgen deprivation. These are no different between either orchiectomy or GnRH therapy as they come with low levels of testosterone.      We would continue with zometa every 12 weeks.  He will see Leatha Loredo prior to his chemotherapy infusions. He is trying to figure out who would help him drive during his chemotherapy and plans to start in 3 months. He could get his Zometa at his first chemotherapy session. I  will see him in roughly 2 - 3 months with labs prior to visit.     Over 45 min of direct face to face time spent with patient with more than 50% time spent in counseling and coordinating care.        Rafael Valenzuela    Hematologist and Medical Oncologist  [facility]

## 2020-07-16 NOTE — PROGRESS NOTES
"[facility]: Chemotherapy Education Notes    RN met with patient and spouse in clinic for chemotherapy education on Taxote[employer]. Lucille handouts dated 7/16/2020 were discussed and given to patient to take home.  Reviewed the following with the patient and their support person:     Treatment Goal/Regimen/Duration: rationale for strict adherence, specific medication names and medication delivery methods; possible chemotherapy side effects and management of side effects, including: skin changes/nail changes, anemia, neutropenia, thrombocytopenia, diarrhea/constipation, nausea/vomiting, hair loss, memory changes, mouth sores, taste changes, appetite changes, peripheral neuropathy, fatigue, infertility, myelosuppression.  Infection prevention, and monitoring of lab values, what lab tests and what changes of these values meant, along with the possibility of IV hydration or blood product transfusion, or the need to defer or hold treatment.  Also reviewed signs/symptoms that should be reported to care team or on-call provider immediately, including: temperature of 100.4 degrees or higher, shortness of breath, chest pain, unusual bruising, bleeding symptoms, extreme fatigue, >4-6 episodes of diarrhea in 24 hours, uncontrolled nausea/vomiting, symptoms of dehydration (severe dry mouth/severe thirst, rapid heart beat, dizziness, dark or decreased urination, and lethargy), signs of an allergic reaction, or symptoms of DVT (sudden onset redness, swelling, tenderness, and warmth of extremity).      General Chemotherapy Information, including: ways it is excreted from the body and cleaning and containment of vomitus or other bodily fluid, use of the bathroom, sexual health and intimacy, what to do if needing to miss a treatment, and when to call the provider.  Importance of Central line care (port-a-cath) or IV site care.  Reviewed Port book, provided [employer] handout \"Vascular Access Port Implantation,\" discussed port placement " procedure and rationale for port placement. Patient would like a port at this time, order placed and scheduling made aware.    No barriers to learning identified. Patient and spouse verbalized understanding of all written and verbal information. All questions answered patient s satisfaction.  Learning barriers and method preference are documented in the patient education flowsheet.     Patient instructed to call with further questions or concerns.  Patient states understanding and is in agreement with this plan.  Copy of future appointments, and after visit summary (AVS) given to patient. Patient discharged in stable condition.    Naila Pedraza RN-BSN, PHN, OCN  [facility]

## 2020-07-16 NOTE — LETTER
7/16/2020         RE: Dimitri Neves  [address] [place] [address]        Dear Colleague,    Thank you for referring your patient, Dimitri Neves, to the [facility]. Please see a copy of my visit note below.    [facility]  HEMATOLOGY AND ONCOLOGY    FOLLOW-UP VISIT NOTE    PATIENT NAME: Dimitri Neves MRN # [id number]  DATE OF VISIT: Jul 16, 2020 YOB: 1935    REFERRING PROVIDER: No referring provider defined for this encounter.    CANCER TYPE: Prostate adenocarcinoma  STAGE: IV    TREATMENT SUMMARY:  Dimitri was followed by his PCP on 6/13/13 and was elevated at 32 as noted below. He was referred to Dr. Taylor. He was treated with a 10 day course of ciprofloxacin and followed again in 6 weeks on 8/10. His PSA drawn prior to this visit was unchanged and remained elevated at 32. He had a TRUS biopsy on 8/25/14 which was negative for prostate adenocarcinoma. His PSA was repeated in 3 months and now increased to 67.9. He had a repeat biopsy of prostate on 12/16/14 which now confirmed prostate adenocarcinoma with eileen 4+4 disease involving 5 of the cores and [place] 3+3 disease involving remainder of cores. He was staged with a CT scan and bone scan done on 12/29/14 which revealed metastatic foci in the upper cervical vertebrae on the left, right posterior 3rd rib and right ischium, focal uptake in the posterior left 11th rib with corresponding lytic expansile appearance on CT, suspicious for metastasis.          4/5/2011 08:46 6/13/2014 08:03 7/25/2014 09:47 12/5/2014 09:00 4/7/2015 09:14   PSA 2.77 32.70 (H) 32.00 (H) 67.88 (H) 1.92      He was started on androgen deprivation therapy in Jan 2015 with a good initial response. His PSA has been increasing recently and he was started on bicalutamide in February with no response. He has continued to have rising PSA and was prescribed abiraterone with prednisone. He had a huge copay with this and has been referred  to Medical Oncology to review other options.     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  He is tolerating his abiraterone without any difficulty.     Ascencion denies any new symptoms since last clinic visit.  He does have hot flushes, fatigue, mood swings on androgen deprivation therapy. He has increased bruising.     He did have bilateral orchiectomies.     Last month he fell after tripping on weights on the floor which belonged to his wife while getting up for water at night. She notes that he took a alternate/wrong path for water.         PAST MEDICAL HISTORY     Past Medical History:   Diagnosis Date     Actinic keratosis      AK (actinic keratosis) 7/9/2012     Cataract cortical, senile right eye 4/17/2012     DDD (degenerative disc disease), lumbar 1979    s/p 4 back surgeries, spinal cord stimulator implant      Diverticulosis      ED (erectile dysfunction) 2009     GERD (gastroesophageal reflux disease) ~1980     HTN (hypertension), benign ~2000     Macular degeneration, dry, mild, ou 7/23/2016     Multiple lung nodules     Several basilar pulmonary nodules <5 mm     HUDSON (obstructive sleep apnea) 10/2005    on CPAP     Other abnormal glucose 01/14/2009     PE (pulmonary thromboembolism) (H) 1981    after back surgery      Pure hypercholesterolemia ~2000     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         CURRENT OUTPATIENT MEDICATIONS     Current Outpatient Medications   Medication Sig     albuterol (PROVENTIL) (2.5 MG/3ML) 0.083% neb solution TAKE 1 VIAL BY NEBULIZATION EVERY 6 HOURS AS NEEDED FOR SHORTNESS OF  "BREATH / DYSPNEA OR WHEEZING     ASPIRIN PO Take 81 mg by mouth     calcipotriene (DOVONEX) 0.005 % external cream Mix with efudex and apply twice daily to scalp for ten days     Calcium Carbonate (CALCIUM 600 PO)      Cholecalciferol (VITAMIN D) 2000 UNITS tablet Take 1 tablet by mouth daily     [employer]S VITAMIN B12 1000 MCG tablet TAKE 1 TABLET BY MOUTH EVERY DAY     fluocinonide (LIDEX) 0.05 % cream Apply sparingly to affected area twice daily as needed on legs.  Do not apply to face.     fluorouracil (EFUDEX) 5 % external cream Mix with dovonex apply to scalp for ten days twice a day     furosemide (LASIX) 20 MG tablet TAKE 1 TABLET (20 MG) BY MOUTH DAILY     gabapentin (NEURONTIN) 300 MG capsule Take 900 mg by mouth At Bedtime     KLOR-CON 10 MEQ CR tablet TAKE 1 TABLET (10 MEQ) BY MOUTH 2 TIMES DAILY     metoprolol succinate ER (TOPROL-XL) 25 MG 24 hr tablet TAKE 1 TABLET BY MOUTH EVERY DAY     metoprolol succinate ER (TOPROL-XL) 25 MG 24 hr tablet Take 0.5 tablets (12.5 mg) by mouth daily     quinapril (ACCUPRIL) 20 MG tablet Take 1 tablet (20 mg) by mouth At Bedtime     quinapril (ACCUPRIL) 40 MG tablet TAKE 1 TABLET BY MOUTH EVERY DAY     simvastatin (ZOCOR) 40 MG tablet TAKE 0.5 TABLETS (20 MG) BY MOUTH DAILY     ipratropium - albuterol 0.5 mg/2.5 mg/3 mL (DUONEB) 0.5-2.5 (3) MG/3ML neb solution Take 1 vial (3 mLs) by nebulization once for 1 dose     No current facility-administered medications for this visit.         ALLERGIES      Allergies   Allergen Reactions     Morphine Sulfate GI Disturbance     vomiting     Vicodin [Hydrocodone-Acetaminophen] Nausea and Vomiting        REVIEW OF SYSTEMS   As above in the HPI, o/w complete 12-point ROS was negative.     PHYSICAL EXAM   /75 (BP Location: Left arm, Patient Position: Chair, Cuff Size: Adult Regular)   Pulse 60   Temp 97.3  F (36.3  C)   Ht 1.816 m (5' 11.5\")   Wt 81.7 kg (180 lb 1.6 oz)   SpO2 98%   BMI 24.77 kg/m    GEN: NAD  HEENT: VAN, " EOMI, no icterus, injection or pallor. Oropharynx is clear.  NECK: no cervical or supraclavicular lymphadenopathy  LUNGS: clear bilaterally  CV: regular, no murmurs, rubs, or gallops  ABDOMEN: soft, non-tender, non-distended, normal bowel sounds, no hepatosplenomegaly by percussion or palpation  EXT: warm, well perfused, +++ edema  NEURO: alert  SKIN: Extensive ecchymoses in all the extremities     LABORATORY AND IMAGING STUDIES     Labs remain stable. Calcium is within the normal range    Stable mild normocytic anemia and mild hypoalbuminemia   Lab Test 07/13/20  0848 04/02/20  0850 03/06/20  1217 02/24/20  1118    141 138 140   POTASSIUM 3.8 4.1 4.5 4.3   CHLORIDE 109 107 106 107   CO2 26 30 22 27   ANIONGAP 5 4 10 6   BUN 18 19 22 17   CR 0.82 0.95 1.24 0.99   * 112* 94 111*   ALETHA 9.1 9.3 9.2 9.0     No results for input(s): MAG, PHOS in the last 38956 hours.  Recent Labs   Lab Test 07/13/20  0848 04/02/20  0850 03/06/20  1217 02/24/20  1118 11/04/19  0841   WBC 10.5 7.0 8.5 7.8 8.3   HGB 12.5* 12.0* 11.7* 12.3* 12.2*    190 266 205 248   MCV 97 98 100 98 100   NEUTROPHIL 81.2 73.6 59.3 60.4 76.4     Recent Labs   Lab Test 07/13/20  0848 04/02/20  0850 02/24/20  1118   BILITOTAL 0.9 0.6 1.0   ALKPHOS 79 76 78   ALT 17 15 15   AST 17 16 18   ALBUMIN 3.3* 3.2* 3.2*     --   --      TSH   Date Value Ref Range Status   06/13/2016 1.60 0.40 - 4.00 mU/L Final   04/05/2011 3.73 0.4 - 5.0 mU/L Final     No results for input(s): CEA in the last 04328 hours.  Results for orders placed or performed in visit on 07/13/20   CT Chest/Abdomen/Pelvis w Contrast    Narrative    EXAMINATION: CT CHEST/ABDOMEN/PELVIS W CONTRAST, 7/13/2020 10:00 AM    TECHNIQUE:  Helical CT images from the thoracic inlet through the  symphysis pubis were obtained with contrast. Contrast dose: 112 ml  isovue 370    COMPARISON: 11/4/2019    HISTORY: Restaging prostate cancer with rising PSA on therapy;  Malignant neoplasm of  prostate (H); Bone metastasis (H)    FINDINGS:    Chest:   Mediastinum: Thyroid gland appears unremarkable. Heart within normal  limits size. Mild atherosclerotic changes of the coronary arteries, in  particular the LAD. Normal branching pattern of the great vessels. No  pericardial effusion. No axillary, mediastinal or hilar adenopathy  identified.  Normal-appearing esophagus. Aorta and central pulmonary  artery have normal caliber.    Central tracheobronchial tree is patent. Subpleural cysts and fibrotic  changes. Centrilobular emphysematous changes, predominantly in the  upper lobes. Calcified granuloma. Fibrotic changes right middle lobe.  No new nodular densities, enlarging nodules or airspace opacities  seen. Calcified ossified pleural plaque anteriorly on the right.    Abdomen and pelvis: The low dense cyst in the left hepatic lobe, Liver  otherwise homogeneous. Cholecystectomy clips. No intra or extrahepatic  ductal dilatation identified. Spleen is homogeneous. Pancreas  unremarkable. Adrenal glands appear normal.    Bilateral renal cysts. Largest in the upper pole of the right kidney.  No abnormal adenopathy identified in the upper abdomen and  retroperitoneum or pelvis.    Appendix in the right lower quadrant appears normal. Extensive colonic  diverticula. No diverticulitis. Multiple nodules are identified within  the mesentery. Largest measures 2.0 x 1.2 cm seen best on coronal  image 32. In a slightly different orientation but similar sized nodule  seen on this image 37 on the prior study. Multiple other subcentimeter  nodules are also unchanged.    Bones and soft tissues: Unchanged sclerotic area right ischium, seen  best series 2 image 129. Potential a slight increased sclerosis in the  inferior right pubic ramus. Degenerative changes throughout the spine  with spinal fusion hardware in the lower lumbar spine. Rotator cuff  repair right shoulder. Partial fusion of the bilateral SI joints.  Previously  described sclerotic focus in the left lateral seventh and  anterior left sixth rib have a similar appearance.      Impression    IMPRESSION: Patient with known metastatic prostate cancer  1. Stable size of the sclerotic focus in the right ischium. May be  slight advancement in the area of sclerosis of the inferior right  pubic ramus.  2. Sclerotic areas in the left sixth and seventh ribs are similar.  3. No additional areas of concern for metastatic disease in the chest  abdomen and pelvis.  4. Prominent mesenteric nodule, unchanged. Attention on follow-up.    JAZZY MOREIRA MD     EXAMINATION: NM BONE SCAN WHOLE BODY  Whole-body bone scan, 7/13/2020 12:54 PM      HISTORY: Metastatic prostate cancer      ADDITIONAL INFORMATION: none     COMPARISON: Bone scan 11/4/2019, CT CAP 7/13/2020     TECHNIQUE: The patient received 26.70 mCi of Tc-99m MDP intravenously.  Whole body bone images were obtained at 3 hours.     FINDINGS: Whole body planar images demonstrate increased radiotracer  uptake to the the right inferior pubic ramus. No new suspicious  uptake. Unchanged uptake to the anterior left 6th rib at the  costochondral junction, favor degenerative. Additional likely  degenerative uptake seen in the major joints including shoulders and  knees, most significant in the right knee. Normal renal excretion of  radiotracer.                                                                      IMPRESSION:   1. Increased uptake to the metastatic lesion in the right pubic ramus.  2. No new suspicious uptake demonstrated.      I have personally reviewed the examination and initial interpretation  and I agree with the findings.     CAESAR CALDWELL MD     11/4/19 7/13/20      Recent Labs   Lab Test 07/13/20  0848 04/02/20  0850 11/04/19  0841 08/01/19  0935 04/18/19  0831 02/09/17  0823   PSA 74.00* 46.90* 34.50* 27.00* 17.80*  --    TESTOSTTOTAL  --   --   --   --   --  9*    < > = values in this interval not displayed.      Recent Labs   Lab Test 07/13/20  0848 04/02/20  0850 02/24/20  1118 11/04/19  0841 08/01/19  0935 04/18/19  0831   PSA 74.00* 46.90*  --  34.50* 27.00* 17.80*   ALKPHOS 79 76 78 77 86 83     --   --   --   --   --         6/13/16 4/7/17 1/30/18 11/25/18 9/19/19 7/13/20   ASSESSMENT AND PLAN   1. 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 No response to addition of bicalutamide  3. PE diagnosed few weeks after initiation of megestrol acetate for hot flashes on GnRH agonist  4. No significant medical comorbidities    He is tolerating his abiraterone well and has no complains. His PSA did respond nicely as expected initially and has been rising since naidr value of 13.2 ng/ml in Jan of 2019. His PSA at this visit is much higher at 74 up from 46.9 ng/mL in April and 34.5 ng/ml in November.     I have reviewed actual images from his restaging scans with him. I showed him the pictures. He seems to have relatively stable disease. There is a mention of increased uptake in the right pubic ramus which is not apparent to me. There is no new disease on CT or bone scan. This is good news. Despite this due to his continued rise in PSA and much steeper rise lately, I don't feel that abiraterone is helping much.     We did get next generation sequencing done for him and he does not have any of the actionable mutations.  He does not have the BRCA mutations or other genes involved with homologous DNA repair deficiency.  This leaves us with chemotherapy-docetaxel as the next treatment option.  He is not very excited about it.  I explained to him that docetaxel would be my preferred next choice for him.  Over time the cancer cells develop resistance to hormonal therapy and continued hormonal therapy is not very effective beyond that.  Chemotherapy will have a very different sensitivity and will wipe out the current crop of  castration resistant cancer cells.  After a good response to chemotherapy it is possible that he might have a different bunch of cancer cells which could now have some sensitivity to androgen deprivation therapy.    I reviewed chemotherapy with docetaxel. I extensively reviewed the side effects from this chemotherapy.  We reviewed the peripheral neuropathy, alopecia, neutropenic fevers, myelosuppression, mucositis, diarrhea, allergies, pedal edema and rash.  Of these, alopecia and nail changes have more cosmetic bearings.  Neutropenic fevers are something to be quite cognizant about. He should take every fever seriously because if his counts are low, then he would not have the defenses and he should give us a call immediately.  He should not even wait for the next morning.  He should be seen either in the clinic or in the ER the same day.  We would get basic blood tests including the CBC and blood cultures.  We would do a fever workup for him.  If the counts were adequate, we could discharge him home on antibiotics if he looked stable.  If his counts were low, even if he was feeling fine, we would have to admit him to ensure that he was safe and stable. We reviewed the palliative intent, side effects, benefits, rationale, alternatives and outpatient regimen with him.      He did get bilateral orchiectomies done last month 9/27/17. He would not need any more lupron or other GnRH directed therapies.     His labs are stable except for mild normocytic anemia - possibly owing to ongoing therapy for cancer. He also has mild hypoalbuminemia which is unchanged.     He has extensive ecchymoses likely secondary to prednisone use.     He has hot flashes, mood swings, fatigue - from androgen deprivation. These are no different between either orchiectomy or GnRH therapy as they come with low levels of testosterone.      We would continue with zometa every 12 weeks.  He will see Leatha Loredo prior to his chemotherapy  infusions. He is trying to figure out who would help him drive during his chemotherapy and plans to start in 3 months. He could get his Zometa at his first chemotherapy session. I will see him in roughly 2 - 3 months with labs prior to visit.     Over 45 min of direct face to face time spent with patient with more than 50% time spent in counseling and coordinating care.        Rafael Valenzuela    Hematologist and Medical Oncologist  [facility]        Again, thank you for allowing me to participate in the care of your patient.        Sincerely,        Rafael Valenzuela MD

## 2020-07-16 NOTE — NURSING NOTE
"Oncology Rooming Note    July 16, 2020 9:31 AM   Dimitri Neves is a 84 year old male who presents for:    Chief Complaint   Patient presents with     Oncology Clinic Visit     follow up - Zometa after     Initial Vitals: /75 (BP Location: Left arm, Patient Position: Chair, Cuff Size: Adult Regular)   Pulse 60   Temp 97.3  F (36.3  C)   Ht 1.816 m (5' 11.5\")   Wt 81.7 kg (180 lb 1.6 oz)   SpO2 98%   BMI 24.77 kg/m   Estimated body mass index is 24.77 kg/m  as calculated from the following:    Height as of this encounter: 1.816 m (5' 11.5\").    Weight as of this encounter: 81.7 kg (180 lb 1.6 oz). Body surface area is 2.03 meters squared.  No Pain (0) Comment: Data Unavailable   No LMP for male patient.  Allergies reviewed: Yes  Medications reviewed: Yes    Medications: Medication refills not needed today.  Pharmacy name entered into [employer] [facility]:    [id number] [place] [place] [facility] [address] [facility] [place], [place] - [address]   [facility] - [facility] ORDER ONLY - [id number], [place] - [address]    Clinical concerns: no Dr. Valenzuela was notified.      April Durham CMA              "

## 2020-07-17 NOTE — PROGRESS NOTES
Wound location: R forearm     Size:   Right forearm  Size: 2.3 x 4.2 cm      Wound Base: Granulation     Wound Base Color: Pink      Surrounding Tissue: Intact     Exudate: Scant     Exudate Color: serous.     Odor: No     Pain:  No, denies any pain and tolerated procedure well.     Dressing Change: Yes - describe forearm was cleaned with sterile water, dried and bacitracin applied. Non-stick dressing applied and arm wrapped with Kerlix and ace-wrap. New band-aid applied to left knee. Left knee and Right thigh scabbed, informed pt that can leave open to air.     Appointment scheduled for Tuesday 07/21.     Gillian Potter RN

## 2020-07-23 NOTE — ANESTHESIA PREPROCEDURE EVALUATION
"Anesthesia Pre-Procedure Evaluation    Patient: Dimitri Neves   MRN:     [id number] Gender:   male   Age:    84 year old :      1935        Preoperative Diagnosis: Cancer of prostate (H) [C61]   Procedure(s):  INSERTION, VASCULAR ACCESS PORT     LABS:  CBC:   Lab Results   Component Value Date    WBC 10.5 2020    WBC 7.0 2020    HGB 12.5 (L) 2020    HGB 12.0 (L) 2020    HCT 38.3 (L) 2020    HCT 36.8 (L) 2020     2020     2020     BMP:   Lab Results   Component Value Date     2020     2020    POTASSIUM 3.8 2020    POTASSIUM 4.1 2020    CHLORIDE 109 2020    CHLORIDE 107 2020    CO2 26 2020    CO2 30 2020    BUN 18 2020    BUN 19 2020    CR 0.82 2020    CR 0.95 2020     (H) 2020     (H) 2020     COAGS:   Lab Results   Component Value Date    PTT 27 06/10/2016    INR 1.6 (A) 2017     POC: No results found for: BGM, HCG, HCGS  OTHER:   Lab Results   Component Value Date    A1C 5.7 09/15/2017    ALETHA 9.1 2020    PHOS 3.1 2011    ALBUMIN 3.3 (L) 2020    PROTTOTAL 6.4 (L) 2020    ALT 17 2020    AST 17 2020    ALKPHOS 79 2020    BILITOTAL 0.9 2020    LIPASE 85 2012    AMYLASE 56 2012    TSH 1.60 2016    CRP 14.1 (H) 2017    SED 47 (H) 2017        Preop Vitals    BP Readings from Last 3 Encounters:   20 135/75   20 110/62   20 136/76    Pulse Readings from Last 3 Encounters:   20 60   20 112   20 92      Resp Readings from Last 3 Encounters:   20 15   20 16   20 18    SpO2 Readings from Last 3 Encounters:   20 98%   20 97%   20 98%      Temp Readings from Last 1 Encounters:   20 36.3  C (97.3  F)    Ht Readings from Last 1 Encounters:   20 1.816 m (5' 11.5\")      Wt Readings from Last " "1 Encounters:   07/16/20 81.7 kg (180 lb 1.6 oz)    Estimated body mass index is 24.77 kg/m  as calculated from the following:    Height as of 7/16/20: 1.816 m (5' 11.5\").    Weight as of 7/16/20: 81.7 kg (180 lb 1.6 oz).     LDA:  Airway - Adult/Peds 5 laryngeal mask airway (Active)   Number of days: 1030       ETT (adult) (Active)   Number of days: 1030        Past Medical History:   Diagnosis Date     Actinic keratosis      AK (actinic keratosis) 7/9/2012     Cataract cortical, senile right eye 4/17/2012     DDD (degenerative disc disease), lumbar 1979    s/p 4 back surgeries, spinal cord stimulator implant      Diverticulosis      ED (erectile dysfunction) 2009     GERD (gastroesophageal reflux disease) ~1980     HTN (hypertension), benign ~2000     Macular degeneration, dry, mild, ou 7/23/2016     Multiple lung nodules     Several basilar pulmonary nodules <5 mm     HUDSON (obstructive sleep apnea) 10/2005    on CPAP     Other abnormal glucose 01/14/2009     PE (pulmonary thromboembolism) (H) 1981    after back surgery      Pure hypercholesterolemia ~2000     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      Past Surgical History:   Procedure Laterality Date     ARTHROSCOPY KNEE RT/LT  ~1995    Right     C LUMBAR SPINE FUSION,ANTER APPRCH  8/2007    four times total, latest 8/07     CATARACT IOL, RT/LT       LASER YAG CAPSULOTOMY      both eyes     ORCHIECTOMY SCROTAL Bilateral 9/27/2017    Procedure: ORCHIECTOMY SCROTAL;  Bilateral orchiectomy scrotal approach;  Surgeon: Senthil Taylor MD;  Location: [facility] OR     PHACOEMULSIFICATION CLEAR CORNEA WITH STANDARD INTRAOCULAR LENS IMPLANT  6-18-12/7-30-12    right/left eye     ROTATOR CUFF REPAIR RT/LT  ~1995    right      Allergies   Allergen Reactions     Morphine Sulfate GI Disturbance     vomiting     Vicodin [Hydrocodone-Acetaminophen] Nausea and Vomiting        Anesthesia Evaluation     .             ROS/MED HX    ENT/Pulmonary:     (+)sleep apnea, COPD, " , . .    Neurologic:  - neg neurologic ROS     Cardiovascular:     (+) Dyslipidemia, hypertension----. : . . MCKEON, . :. .       METS/Exercise Tolerance:  3 - Able to walk 1-2 blocks without stopping   Hematologic:     (+) History of blood clots pt is not anticoagulated, -      Musculoskeletal:   (+) arthritis,  -       GI/Hepatic:     (+) GERD Asymptomatic on medication,       Renal/Genitourinary:  - ROS Renal section negative       Endo:  - neg endo ROS       Psychiatric:         Infectious Disease:  - neg infectious disease ROS       Malignancy:   (+) Malignancy History of Prostate  Prostate CA Active status post,         Other:                         PHYSICAL EXAM:   Mental Status/Neuro: A/A/O   Airway: Facies: Feasible  Mallampati: III  Mouth/Opening: Full  TM distance: > 6 cm  Neck ROM: Full   Respiratory: Auscultation: CTAB     Resp. Rate: Normal     Resp. Effort: Normal      CV: Rhythm: Regular  Rate: Age appropriate  Heart: Normal Sounds  Edema: None   Comments:      Dental: Normal Dentition                Assessment:   ASA SCORE: 3    H&P: History and physical reviewed and following examination; no interval change.   Smoking Status:  Non-Smoker/Unknown   NPO Status: NPO Appropriate     Plan:   Anes. Type:  MAC   Pre-Medication: None   Induction:  IV (Standard)   Airway: Native Airway   Access/Monitoring: PIV   Maintenance: N/a     Postop Plan:   Postop Pain: None  Postop Sedation/Airway: Not planned  Disposition: Outpatient     PONV Management:   Adult Risk Factors:, Non-Smoker     CONSENT: Direct conversation   Plan and risks discussed with: Patient   Blood Products: Consent Deferred (Minimal Blood Loss)       Comments for Plan/Consent:  MAC, sedation, GA as back up, standard ASA monitors  All pertinent results and records reviewed, risks, included but not limited to hypoventilation, hypoxemia, laryngo/bronchospasm, N/V, intraoperative awareness due to sedation only d/w patient, all questions, concerns  addressed                 Piper Liang MD

## 2020-07-24 NOTE — ANESTHESIA CARE TRANSFER NOTE
Patient: Dimitri Neves    Procedure(s):  INSERTION, VASCULAR ACCESS PORT    Diagnosis: Cancer of prostate (H) [C61]  Diagnosis Additional Information: No value filed.    Anesthesia Type:   MAC     Note:  Airway :Room Air  Patient transferred to:Phase II  Comments: Patient awake, alert, and oriented. SpO2 97%.  No apparent anesthesia complications. Handoff Report: Identifed the Patient, Identified the Reponsible Provider, Reviewed the pertinent medical history, Discussed the surgical course, Reviewed Intra-OP anesthesia mangement and issues during anesthesia, Set expectations for post-procedure period and Allowed opportunity for questions and acknowledgement of understanding      Vitals: (Last set prior to Anesthesia Care Transfer)    CRNA VITALS  7/24/2020 0945 - 7/24/2020 1024      7/24/2020             EKG:  NSR                Electronically Signed By: NI Dalton CRNA  July 24, 2020  10:24 AM

## 2020-07-24 NOTE — ANESTHESIA POSTPROCEDURE EVALUATION
Anesthesia POST Procedure Evaluation    Patient: Dimitri Neves   MRN:     [id number] Gender:   male   Age:    84 year old :      1935        Preoperative Diagnosis: Cancer of prostate (H) [C61]   Procedure(s):  INSERTION, VASCULAR ACCESS PORT   Postop Comments: No value filed.     Anesthesia Type: MAC       Disposition: Outpatient   Postop Pain Control: Uneventful            Sign Out: Well controlled pain   PONV: No   Neuro/Psych: Uneventful            Sign Out: Acceptable/Baseline neuro status   Airway/Respiratory: Uneventful            Sign Out: AIRWAY IN SITU/Resp. Support   CV/Hemodynamics: Uneventful            Sign Out: Acceptable CV status   Other NRE: NONE   DID A NON-ROUTINE EVENT OCCUR? No         Last Anesthesia Record Vitals:  CRNA VITALS  2020 0945 - 2020 1045      2020             EKG:  NSR          Last PACU Vitals:  Vitals Value Taken Time   BP     Temp     Pulse     Resp     SpO2     Temp src     NIBP 112/58 2020 10:10 AM   Pulse 59 2020 10:13 AM   SpO2 100 % 2020 10:13 AM   Resp     Temp     Ht Rate     Temp 2           Electronically Signed By: Piper Liang MD, 2020, 12:40 PM

## 2020-07-24 NOTE — BRIEF OP NOTE
Operative Note    Pre-operative diagnosis: Cancer of prostate (H) [C61]   Post-operative diagnosis Same   Procedure: Procedure(s):  INSERTION, VASCULAR ACCESS PORT   Surgeon: Geovany Kumar DO   Assistants(s): None   Anesthesia: Monitor Anesthesia Care    Estimated blood loss: 2 ml    Total IV fluids: (See anesthesia record)   Blood transfusion: No transfusion was given during surgery   Total urine output: (See anesthesia record)   Drains or packing: None   Specimens: None   Implants: Powerport   Findings: Port in good position on C-arm but did not aspirate with ease. Port seemed positional, it was pulled back about 1 cm then aspirated and flushed with ease.    Complications: None   Condition on discharge: Stable       Preoperative diagnosis: This is a  84 year old male requiring a central venous access port for chemotherapy.  Before taking him to the OR we discussed the risks benefits and alternatives to port placement including, but not limited, pneumothorax, bleeding, pain, infection, cosmetic deformity, repeat surgery, and risks of anesthesia.  The patient expressed understanding and desire to proceed with surgery.     Details of operation: The patient was taken to the OR and placed supine on the bed. The neck and chest area were prepped and draped in the normal sterile fashion.  A timeout was performed per surgery center protocol, including a safety checklist.  Patient was placed in Trendelenburg position.  The ultrasound machine was brought close and the probe was draped in a sterile fashion.  The ultrasound was used to find the location of the internal jugular vein.  The ports and rest of the kit was flushed and pieces of the kit were reviewed prior to use.  Skin and subcutaneous tissues over the insertion site was anesthetized.  Using a 16-gauge needle, the vein was punctured, and the guidewire advanced under direct ultrasound visualization.  The guidewire advanced without resistance or difficulty.  A  small skin incision was made at the insertion site using an 11 blade scalpel, and using modified Seldinger technique, a dilator was passed over the wire to dilate the tissue.     The skin overlying the port site on the chest wall was anesthetized.  The tract from the port site to the insertion site was also anesthetized.  At this point an incision was made in the chest wall and a pocket was created for the port.  The catheter was attached to the tunneling device and the tunneling device was taken from the port site to the insertion site.  The catheter was then removed from the tunneling device and placed through the dilator in the usual modified Seldinger technique.  The dilator was peeled away as the catheter was held in place.  The C-arm was then brought in and using fluoroscopy the catheter was adjusted so the tip was in the distal superior vena cava.  The catheter was then cut to length and inserted onto the port and locked in place.  The port was sutured in place using Prolene sutures.  A C-arm image was also used to show good curvature of the catheter.  The skin incision was then closed using interrupted 4-0 Vicryl sutures.  Dermabond was used to approximate the skin and Steri-Strips were placed in the Dermabond.  The port was then accessed and it flushed and aspirated with ease.  Port was flushed and then heparin locked.     The patient was awakened from MAC anesthesia and tolerated the procedure well.  She was taken to recover and received a post procedure chest x-ray that did not show any pneumothorax and a did show catheter in satisfactory position.

## 2020-07-24 NOTE — DISCHARGE INSTRUCTIONS
[facility]  Same-Day Surgery   Adult Discharge Orders & Instructions   For 24 hours after surgery  1. Get plenty of rest.  A responsible adult must stay with you for at least 24 hours after you leave the hospital.   2. Do not drive or use heavy equipment.  If you have weakness or tingling, don't drive or use heavy equipment until this feeling goes away.  3. Do not drink alcohol.  4. Avoid strenuous or risky activities.  Ask for help when climbing stairs.   5. You may feel lightheaded.  IF so, sit for a few minutes before standing.  Have someone help you get up.   6. If you have nausea (feel sick to your stomach): Drink only clear liquids such as apple juice, ginger ale, broth or 7-Up.  Rest may also help.  Be sure to drink enough fluids.  Move to a regular diet as you feel able.  7. You may have a slight fever. Call the doctor if your fever is over 100 F (37.7 C) (taken under the tongue) or lasts longer than 24 hours.  8. You may have a dry mouth, a sore throat, muscle aches or trouble sleeping.  These should go away after 24 hours.  9. Do not make important or legal decisions.   Call your doctor for any of the followin.  Signs of infection (fever, growing tenderness at the surgery site, a large amount of drainage or bleeding, severe pain, foul-smelling drainage, redness, swelling).    2. It has been over 8 to 10 hours since surgery and you are still not able to urinate (pass water).    3.  Headache for over 24 hours.      [facility]   Adult Discharge Orders & Instructions     For 24 hours after surgery    1. Get plenty of rest.  A responsible adult must stay with you for at least 24 hours after you leave the hospital.   2. Do not drive or use heavy equipment.  If you have weakness or tingling, don't drive or use heavy equipment until this feeling goes away.  3. Do not drink alcohol.  4. Avoid strenuous or risky activities.  Ask for help when climbing stairs.   5. You may  feel lightheaded.  IF so, sit for a few minutes before standing.  Have someone help you get up.   6. If you have nausea (feel sick to your stomach): Drink only clear liquids such as apple juice, ginger ale, broth or 7-Up.  Rest may also help.  Be sure to drink enough fluids.  Move to a regular diet as you feel able.  7. You may have a slight fever. Call the doctor if your fever is over 100 F (37.7 C) (taken under the tongue) or lasts longer than 24 hours.  8. You may have a dry mouth, a sore throat, muscle aches or trouble sleeping.  These should go away after 24 hours.  9. Do not make important or legal decisions.     Call your doctor for any of the followin.  Signs of infection (fever, growing tenderness at the surgery site, a large amount of drainage or bleeding, severe pain, foul-smelling drainage, redness, swelling).    2. It has been over 8 to 10 hours since surgery and you are still not able to urinate (pass water).    3.  Headache for over 24 hours.    4.  Numbness, tingling or weakness the day after surgery (if you had spinal anesthesia).                  5. Signs of Covid-19 infection (temperature over 100 degrees, shortness of breath, cough, loss of taste/smell, generalized body aches, persistent headache,                  chills, sore throat, nausea/vomiting/diarrhea).    To contact a doctor, call _[phone]_______________________________________    To contact Dr Kumar call [phone].      Tylenol was given at 7:45 AM

## 2020-07-24 NOTE — PROGRESS NOTES
Pt with right forearm wrapped with ace wrap. Fell about 4-5 weeks ago.  Skin open--has dressing in place-not removed.  Right hand swollen-ace wrap re-wrapped, pt states it is too tight-wife re-wrapped last night.    Bilateral arms with fragile, flaky, ecchymotic areas.    Band aid on lower left arm by wrist--fragile skin    Band aid on right knee    Lower extremities with fragile skin    Crusted areas on scalp.    Skin flaky over torso.    Uses cane for assistance

## 2020-07-28 NOTE — PROGRESS NOTES
Wound location: Right forearm, L knee     Size:   R forearm, 5.5 cm x 2 cm    Wound Base: Granulation    Wound Base Color: Red, Pink    Surrounding Tissue: Intact    Exudate: None    Exudate Color: N/A    Odor: No    Pain:  No    Dressing Change: Yes - old dressing removed, area cleaned with sterile water, dried and bacitracin applied. Non-stick guaze applied and ace wrap applied.     L knee wound has completely scabbed over. Band-aid removed and new one applied to reduce risk of ripping scab off.     Alexa Canada RN

## 2020-07-30 NOTE — PROGRESS NOTES
Wound location right forearm     Size:   Width 2 cm  Length 5 cm    Wound Base: Granulation    Wound Base Color: Red  Pink    Surrounding Tissue: Intact    Exudate: small amount of blood     Exudate Color: Bloody    Odor: No    Pain:  No    Dressing Change: Yes - ace bandage and non stick dressing removed and wound cleansed with water.  Small scabs around wound have healed and were removed.  Small area bleeding at top of wound- stopped after bacitracin was applied.  Wound covered with bacitracin, non stick dressing and ace bandage.  Patient has some swelling in right hand from ace bandage on arm, he did loosen it last night- hand has normal color and temperature. Ace bandage applied lightly.  Bandage on left knee was removed, all scabs were loose and fell off.  Wound on knee is healed, no bandage applied.   Mercedes Watts RN

## 2020-08-04 NOTE — PROGRESS NOTES
Wound location: R forearm    Size: 1.8 cm x 3.5    Wound Base Color: Pink    Surrounding Tissue: Intact    Exudate: None    Exudate Color: N/A    Odor: No    Pain:  No    Dressing Change: Yes - old wrapping removed. Area cleaned with sterile water, bacitracin applied and Band-Aid applied. Advised patient wound looks good and OK to continue with home care Band-Aid changes. Advised patient to clean area, apply bacitracin and apply new Band-Aid every 2-3 days. Advised to notify clinic if area is worsening or does not improve. He and wife agreed.     Alexa Canada RN

## 2020-08-12 NOTE — PROGRESS NOTES
Oncology Follow Up Visit: August 13, 2020     Oncologist: Dr Rafael Valenzuela  PCP: Reginaldo Muir    Diagnosis: Stage IV Adenocarcinoma of the Prostate   Dimitri Neves is an 84 yo male who had an elevation of PSA to 32 noted by PCP in 6/2013. He was seen by Dr Taylor who had biopsy which proved adenocarcinoma of the prostate. Repeat biopsy in 12/2014 proved [place] 4+4 disease in 5 of cores and 3+3 in remainder of cores. Bone scan found metastasis to upper cervical vertebrae, right posterior 3rd rib and right ischium and posterior left 11th rib with corresponding lytic expansile appearance on CT suspicious for metastasis.    Treatment:   1/2015 began androgen deprivation therapy.  2/2017 began bicalutamide  7/19/2017 -7/2020  Abiraterone with prednisone and zometa  9/27/2017 orchiectomies  8/13/2020 will start use of Docetaxel    Interval History: Mr Neves and wife are contacted today for review of new treatment with Docetaxel for his prostates cancer.Pt had port placed 2 weeks previous and states he is having no pain or irritation to the area.  He does continue with long-term back pain which is not changed-using Tylenol or a combination of Tylenol and ibuprofen to treat the pain.  Patient states he is quite nervous about the change to a chemotherapy and has been using Ativan as needed.  He has been otherwise feeling well and eating well and has no changes in bowel or bladder shortness of breath fevers nausea neuropathy or trouble sleeping at this time.  Right forearm wound is being cared for by home care-still healing.  Rest of comprehensive and complete ROS is reviewed and is negative.   Past Medical History:   Diagnosis Date     Actinic keratosis      AK (actinic keratosis) 7/9/2012     Cataract cortical, senile right eye 4/17/2012     DDD (degenerative disc disease), lumbar 1979    s/p 4 back surgeries, spinal cord stimulator implant      Diverticulosis      ED (erectile dysfunction) 2009     GERD (gastroesophageal  reflux disease) ~1980     HTN (hypertension), benign ~2000     Macular degeneration, dry, mild, ou 7/23/2016     Multiple lung nodules     Several basilar pulmonary nodules <5 mm     HUDSON (obstructive sleep apnea) 10/2005    on CPAP     Other abnormal glucose 01/14/2009     PE (pulmonary thromboembolism) (H) 1981    after back surgery      Pure hypercholesterolemia ~2000     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     Current Outpatient Medications   Medication     acetaminophen (TYLENOL) 325 MG tablet     albuterol (PROVENTIL) (2.5 MG/3ML) 0.083% neb solution     ASPIRIN PO     calcipotriene (DOVONEX) 0.005 % external cream     Calcium Carbonate (CALCIUM 600 PO)     Cholecalciferol (VITAMIN D) 2000 UNITS tablet     [employer] VITAMIN B12 1000 MCG tablet     fluocinonide (LIDEX) 0.05 % cream     fluorouracil (EFUDEX) 5 % external cream     furosemide (LASIX) 20 MG tablet     gabapentin (NEURONTIN) 300 MG capsule     ipratropium - albuterol 0.5 mg/2.5 mg/3 mL (DUONEB) 0.5-2.5 (3) MG/3ML neb solution     KLOR-CON 10 MEQ CR tablet     metoprolol succinate ER (TOPROL-XL) 25 MG 24 hr tablet     metoprolol succinate ER (TOPROL-XL) 25 MG 24 hr tablet     quinapril (ACCUPRIL) 20 MG tablet     quinapril (ACCUPRIL) 40 MG tablet     simvastatin (ZOCOR) 40 MG tablet     No current facility-administered medications for this visit.      Allergies   Allergen Reactions     Morphine Sulfate GI Disturbance     vomiting     Vicodin [Hydrocodone-Acetaminophen] Nausea and Vomiting       Physical Exam: There were no vitals taken for this visit.   Oriented and asking good questions  Patient has a rough voice but I feel this is not new-no cough with conversation.    Laboratory Results:   No results found for any visits on 08/13/20.    Assessment and Plan:   Stage IV adenocarcinoma of the Prostate- Pt is now changing treatment to use of Docetaxel. He did receive port placement on 7/24 and is asking questions about his port asking if he  has to port since he has 2-incisions but we did discuss his port and expected treatment of his port-did not yet order any EMLA. We extensively reviewed administration and potential side effects to the plan and answered several questions he had about this plan including effect of steroids.  He will be getting his  oral prednisone picked up and will start taking tonight prior to treatment.  He and wife were asked to diary his symptoms and call with concerns.  He will be seeing Dr. Valenzuela  prior to infusion with cycle 2  Bone metastasis- will receive Zometa tomorrow as well and does confirm that he is taking additional calcium plus vitamin D daily.  Back pain-cancer related- Patient using combination of Tylenol and with some ibuprofen to help with pain and does not wish to move to anything stronger at this time.    Anxiety-patient admits to ongoing anxiety and does use Ativan as needed which has increased as he comes closer to starting chemotherapy and admits he is quite anxious about this.  Right forearm wound-being cared for by home care on a regular basis.  This may slow healing and patient is aware.  Based upon CDC guidance and to observe social distancing in the setting of the COVID-19 pandemic, the patient was seen virtually via phone visit.   Location of pt- home  Location of provider- office at clinic  Start of visit - 9:13am  End of visit-9:35 AM  Total Time 22 min visit  Leatha Loredo CNp

## 2020-08-13 NOTE — PROGRESS NOTES
Received faxed approval letter for Lorazepam from [facility] for dates 7/14/20 until 8/13/2021.  Letter labeled and sent to [facility]  Shyla Waddell  RN, BSN, OCN

## 2020-08-13 NOTE — NURSING NOTE
"SYMPTOM QUESTIONNAIRE    Pain: Ongoing back pain. Patient has had back surgeries.    Nausea/Vomiting: None    Mouth Sores: None    Shortness of Breath: None    Smoking: No    Fever or Chills: None    Hard Stools: None    Soft Stools: None    Weight Loss: None    Weakness: None    Burning, numbness or tingling in hands or feet: None    Problems with skin or swelling: Patient has cut on right arm that he had been seeing nursing staff in [place] for dressing changes    Memory Loss: None    Anxiety or Depression: Anxiety- patient has Ativan as needed    Trouble Sleeping: None    Dimitri Neves is a 85 year old male who is being evaluated via a billable telephone visit.      The patient has been notified of following:     \"This telephone visit will be conducted via a call between you and your physician/provider. We have found that certain health care needs can be provided without the need for a physical exam.  This service lets us provide the care you need with a short phone conversation.  If a prescription is necessary we can send it directly to your pharmacy.  If lab work is needed we can place an order for that and you can then stop by our lab to have the test done at a later time.    Telephone visits are billed at different rates depending on your insurance coverage. During this emergency period, for some insurers they may be billed the same as an in-person visit.  Please reach out to your insurance provider with any questions.    If during the course of the call the physician/provider feels a telephone visit is not appropriate, you will not be charged for this service.\"    Patient has given verbal consent for Telephone visit?  Yes    What phone number would you like to be contacted at? [phone]    How would you like to obtain your AVS? Barney Coates CMA            "

## 2020-08-13 NOTE — LETTER
8/13/2020         RE: Dimitri Neves  [address] [place] [address]        Dear Colleague,    Thank you for referring your patient, Dimitri Neves, to the [facility]. Please see a copy of my visit note below.    Oncology Follow Up Visit: August 13, 2020     Oncologist: Dr Rafael Valenzuela  PCP: Reginaldo Muir    Diagnosis: Stage IV Adenocarcinoma of the Prostate   Dimitri Neves is an 82 yo male who had an elevation of PSA to 32 noted by PCP in 6/2013. He was seen by Dr Taylor who had biopsy which proved adenocarcinoma of the prostate. Repeat biopsy in 12/2014 proved Gena 4+4 disease in 5 of cores and 3+3 in remainder of cores. Bone scan found metastasis to upper cervical vertebrae, right posterior 3rd rib and right ischium and posterior left 11th rib with corresponding lytic expansile appearance on CT suspicious for metastasis.    Treatment:   1/2015 began androgen deprivation therapy.  2/2017 began bicalutamide  7/19/2017 -7/2020  Abiraterone with prednisone and zometa  9/27/2017 orchiectomies  8/13/2020 will start use of Docetaxel    Interval History: Mr Neves and wife are contacted today for review of new treatment with Docetaxel for his prostates cancer.Pt had port placed 2 weeks previous and states he is having no pain or irritation to the area.  He does continue with long-term back pain which is not changed-using Tylenol or a combination of Tylenol and ibuprofen to treat the pain.  Patient states he is quite nervous about the change to a chemotherapy and has been using Ativan as needed.  He has been otherwise feeling well and eating well and has no changes in bowel or bladder shortness of breath fevers nausea neuropathy or trouble sleeping at this time.  Right forearm wound is being cared for by home care-still healing.  Rest of comprehensive and complete ROS is reviewed and is negative.   Past Medical History:   Diagnosis Date     Actinic keratosis      AK (actinic keratosis)  7/9/2012     Cataract cortical, senile right eye 4/17/2012     DDD (degenerative disc disease), lumbar 1979    s/p 4 back surgeries, spinal cord stimulator implant      Diverticulosis      ED (erectile dysfunction) 2009     GERD (gastroesophageal reflux disease) ~1980     HTN (hypertension), benign ~2000     Macular degeneration, dry, mild, ou 7/23/2016     Multiple lung nodules     Several basilar pulmonary nodules <5 mm     HUDSON (obstructive sleep apnea) 10/2005    on CPAP     Other abnormal glucose 01/14/2009     PE (pulmonary thromboembolism) (H) 1981    after back surgery      Pure hypercholesterolemia ~2000     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     Current Outpatient Medications   Medication     acetaminophen (TYLENOL) 325 MG tablet     albuterol (PROVENTIL) (2.5 MG/3ML) 0.083% neb solution     ASPIRIN PO     calcipotriene (DOVONEX) 0.005 % external cream     Calcium Carbonate (CALCIUM 600 PO)     Cholecalciferol (VITAMIN D) 2000 UNITS tablet     [employer] VITAMIN B12 1000 MCG tablet     fluocinonide (LIDEX) 0.05 % cream     fluorouracil (EFUDEX) 5 % external cream     furosemide (LASIX) 20 MG tablet     gabapentin (NEURONTIN) 300 MG capsule     ipratropium - albuterol 0.5 mg/2.5 mg/3 mL (DUONEB) 0.5-2.5 (3) MG/3ML neb solution     KLOR-CON 10 MEQ CR tablet     metoprolol succinate ER (TOPROL-XL) 25 MG 24 hr tablet     metoprolol succinate ER (TOPROL-XL) 25 MG 24 hr tablet     quinapril (ACCUPRIL) 20 MG tablet     quinapril (ACCUPRIL) 40 MG tablet     simvastatin (ZOCOR) 40 MG tablet     No current facility-administered medications for this visit.      Allergies   Allergen Reactions     Morphine Sulfate GI Disturbance     vomiting     Vicodin [Hydrocodone-Acetaminophen] Nausea and Vomiting       Physical Exam: There were no vitals taken for this visit.   Oriented and asking good questions  Patient has a rough voice but I feel this is not new-no cough with conversation.    Laboratory Results:   No  results found for any visits on 08/13/20.    Assessment and Plan:   Stage IV adenocarcinoma of the Prostate- Pt is now changing treatment to use of Docetaxel. He did receive port placement on 7/24 and is asking questions about his port asking if he has to port since he has 2-incisions but we did discuss his port and expected treatment of his port-did not yet order any EMLA. We extensively reviewed administration and potential side effects to the plan and answered several questions he had about this plan including effect of steroids.  He will be getting his  oral prednisone picked up and will start taking tonight prior to treatment.  He and wife were asked to diary his symptoms and call with concerns.  He will be seeing Dr. Valenzuela  prior to infusion with cycle 2  Bone metastasis- will receive Zometa tomorrow as well and does confirm that he is taking additional calcium plus vitamin D daily.  Back pain-cancer related- Patient using combination of Tylenol and with some ibuprofen to help with pain and does not wish to move to anything stronger at this time.    Anxiety-patient admits to ongoing anxiety and does use Ativan as needed which has increased as he comes closer to starting chemotherapy and admits he is quite anxious about this.  Right forearm wound-being cared for by home care on a regular basis.  This may slow healing and patient is aware.  Based upon CDC guidance and to observe social distancing in the setting of the COVID-19 pandemic, the patient was seen virtually via phone visit.   Location of pt- home  Location of provider- office at clinic  Start of visit - 9:13am  End of visit-9:35 AM  Total Time 22 min visit  Leatha Loredo CNp        Again, thank you for allowing me to participate in the care of your patient.        Sincerely,        Leatha Loredo, JOSI, APRN CNP

## 2020-08-14 NOTE — PROGRESS NOTES
Infusion Nursing Note:  Dimitri Neves presents today for C1,D1 of Taxotere - Zometa.  Patient seen by provider today: No - Saw Leatha Vincent 098/13/2020   present during visit today: Not Applicable.    Note: Oriented to Taxotere regimen, pt verbalized understanding - states he has picked up his prescriptions and has already started to take them.  Pt asked appropriate questions - states his son will be driving him home today.    Intravenous Access:  Implanted Port.    Treatment Conditions:  Lab Results   Component Value Date    HGB 12.8 08/14/2020     Lab Results   Component Value Date    WBC 7.0 08/14/2020      Lab Results   Component Value Date    ANEU 6.0 08/14/2020     Lab Results   Component Value Date     08/14/2020      Lab Results   Component Value Date     07/13/2020                   Lab Results   Component Value Date    POTASSIUM 3.8 07/13/2020           No results found for: MAG         Lab Results   Component Value Date    CR 0.80 08/14/2020                   Lab Results   Component Value Date    ALETHA 8.9 08/14/2020                Lab Results   Component Value Date    BILITOTAL 0.5 08/14/2020           Lab Results   Component Value Date    ALBUMIN 3.1 08/14/2020                    Lab Results   Component Value Date    ALT 21 08/14/2020           Lab Results   Component Value Date    AST 16 08/14/2020       Results reviewed, labs MET treatment parameters, ok to proceed with treatment.      Post Infusion Assessment:  Patient tolerated infusion without incident.  Blood return noted pre and post infusion.  Site patent and intact, free from redness, edema or discomfort.  No evidence of extravasations.  Access discontinued per protocol.       Discharge Plan:   Discharge instructions reviewed with: Patient.  Patient and/or family verbalized understanding of discharge instructions and all questions answered.  Copy of AVS reviewed with patient and/or family.  Patient will return  09/03/2020 with Dr Valenzuela and treatment on 09/04/2020 for next appointment.  Patient discharged in stable condition accompanied by: self.  Departure Mode: Ambulatory.    Eileen Strong RN

## 2020-08-14 NOTE — PROGRESS NOTES
Port accessed using 20 G 3/4 inch needle.  Labs collected from port.  Line flushed with NS and Heparin.  Pt tolerated procedure.  Port left accessed for infusion.    Naila Pedraza RN-BSN, PHN, OCN  [facility]

## 2020-08-20 PROBLEM — R63.8 POOR FLUID INTAKE: Status: ACTIVE | Noted: 2020-01-01

## 2020-08-20 PROBLEM — R11.0 NAUSEA: Status: ACTIVE | Noted: 2020-01-01

## 2020-08-20 NOTE — TELEPHONE ENCOUNTER
"Received telephone call from patient. States he just \"feels rotten\". He has no energy or appetite. Drinks about 33oz/day. He is unsure what his urine looks like but has increased urination. Denies burning or urgency. Encouraged patient to push more fluids and try drinking protein drinks.  Sleeping a lot at night and during the day.   C/o being a little lightheaded.   Denies fever/chill, nausea/vomiting, or rash.  Uses walker at home but was using prior to chemo due to back and knee pain.    Shyla Waddell  RN, BSN, OCN    "

## 2020-08-21 NOTE — PROGRESS NOTES
Infusion Nursing Note:  Dimitri Neves presents today for Port labs/IVF.    Patient seen by provider today: No   present during visit today: Not Applicable.    Note: See flowsheet in Epic for assessment. Patient very weak,arrived in W/C, unable to bear weight or walk. Has 6/10 throbbing pain in R leg and low back. VSS.     Intravenous Access:  Lab draw site left chest wall, Needle type noncoring, Gauge 20,3/4in.  Labs drawn without difficulty. BMP drawn per orders.  Implanted Port.    Treatment Conditions:  Not Applicable.      Post Infusion Assessment:  Patient tolerated infusion without incident.  Blood return noted pre and post infusion.  Site patent and intact, free from redness, edema or discomfort.  No evidence of extravasations.  Access discontinued per protocol.       Discharge Plan:   Copy of AVS reviewed with patient and/or family. Lab results given. Patient will return to [facility] for next appointment on 9/4.  Patient discharged in stable condition accompanied by: son.  Departure Mode: Wheelchair. Transferred to car with transfer belt and assist of 2.    Ally Ness RN

## 2021-08-18 NOTE — TELEPHONE ENCOUNTER
Reason for Call:  Other call back    Detailed comments: Patient has been without his CPAP for 1-1/2 weeks. [facility] has faxed info to us but they are waiting for a response to approve new CPAP for patient. Please call patient to advise.    Phone Number Patient can be reached at: Home number on file [phone] (home)    Best Time: any    Can we leave a detailed message on this number? YES    Call taken on 10/15/2018 at 9:32 AM by Sue Milner     Return call from patient. Patient reports bruising on arms and legs starting past Monday. Denies any injury to body since Monday. Reports he dis not know when or how bruising occurred. Denies nose bleeds, gum bleeding, or open cuts. Patient has not had DAPT blood work done since 1/28/2020. Patient stated he did not need urgent care at this time. New order for P2Y12 and Aspirin test today. Lab slip taken to Lab. Patient to stop by this office to have bruising on arms and legs checked. Message sent to provider.

## 2021-09-14 NOTE — NURSING NOTE
"Initial /69  Pulse 84  SpO2 97% Estimated body mass index is 28.45 kg/(m^2) as calculated from the following:    Height as of 11/12/18: 1.803 m (5' 11\").    Weight as of 11/12/18: 92.5 kg (204 lb). .    April Arevalo LPN    "
Surgical Office Location :   [facility]  [address] [place] [address]    
15-Sep-2021 01:27

## 2023-01-03 NOTE — NURSING NOTE
Wound location Right arm    Size:   Lower  Width 4.3 cm  Length 4.5 cm    Middle  Width 1.3cm  Length 13.8cm    Elbow  Width: 4.4cm  Length: 4.5cm  Wound Base: Granulation    Wound Base Color: Red  Pink    Surrounding Tissue: Intact    Exudate: Moderate    Exudate Color: Sero-Sang.    Odor: No    Pain:  No    Dressing Change: Yes - describe Cleansed wounds with sterile water, applied abd dressing to lower and middle wounds, then applied non stick dressing to elbow. Wrapped with gauze, then wrapped with ace wrap. Advised to return tomorrow for dressing change due to middle wound weepage and pt preferred to come in on Wednesday. They have been redressing it in the areas that are needed at home. Advised to monitor for warmth, increased redness, swelling, pain, drainage that is yellow or green in color with odor and fever.    Pooja Gordon RN  [facility]         109

## 2023-04-03 PROBLEM — C79.51 MALIGNANT NEOPLASM METASTATIC TO BONE (H): Status: ACTIVE | Noted: 2017-07-10

## 2023-08-03 NOTE — TELEPHONE ENCOUNTER
B12        Last Written Prescription Date: 01/04/17  Last Fill Quantity: 30,    # refills: 3  Last Office Visit with [facility], [facility] or  Health prescribing provider:  01/12/17   Next 5 appointments (look out 90 days)     Feb 14, 2017  9:00 AM   Return Visit with Senthil Taylor MD   [facility] ([facility]    [address] [place] [address]  [place] [address]   [phone]            Feb 27, 2017 10:30 AM   Office Visit with Reginaldo Muir MD   [facility] ([facility])    [address] [facility] [address]   [phone]                     Quinapril      Last Written Prescription Date: 11/12/16  Last Fill Quantity: 90, # refills: 0  Last Office Visit with [facility], [facility] or  Health prescribing provider: 01/12/17   Next 5 appointments (look out 90 days)     Feb 14, 2017  9:00 AM   Return Visit with Senthil Taylor MD   [facility] ([facility])    [address] [place] [address]   [phone]            Feb 27, 2017 10:30 AM   Office Visit with Reginaldo Muir MD   [facility] ([facility])    [address] [facility] [address]   [phone]                   POTASSIUM   Date Value Ref Range Status   10/04/2016 4.3 3.4 - 5.3 mmol/L Final     CREATININE   Date Value Ref Range Status   10/04/2016 0.84 0.66 - 1.25 mg/dL Final     BP Readings from Last 3 Encounters:   01/12/17 117/68   11/08/16 104/70   10/04/16 128/74     WBC      6.2   12/23/2016  RBC     3.93   12/23/2016  HGB     11.3   12/23/2016  HCT     37.1   12/23/2016  No components found with this name: mct  MCV       94   12/23/2016  MCH     28.8   12/23/2016  MCHC     30.5   12/23/2016  RDW     13.8   12/23/2016  PLT      337   12/23/2016  AST       24   10/4/2016  ALT       25   10/4/2016  CREATININE   Date Value Ref Range Status   10/04/2016 0.84 0.66 - 1.25 mg/dL Final       Metoprolol      Last Written Prescription Date:  Precath Checklist      Allergies: NONE  No Known Allergies      Shellfish/Contrast Allergies?  Yes/No; If Yes, Premedication Ordered: [  ] Yes  [  ] No, (If not ordered document reason).                  8.1    4.19  )-----------( 471      ( 03 Aug 2023 05:30 )             29.2     08-03    136  |  104  |  10  ----------------------------<  143<H>  4.1   |  22  |  0.96    Ca    8.8      03 Aug 2023 05:30  Mg     1.7     08-03    TPro  5.8<L>  /  Alb  3.2<L>  /  TBili  <0.2  /  DBili  <0.2  /  AST  15  /  ALT  12  /  AlkPhos  56  08-02      Prothrombin Time, Plasma: 10.6 sec [9.5 - 13.0] (08-02-23 @ 05:30)  INR: 0.93 [0.85 - 1.18] (08-02-23 @ 05:30)  Activated Partial Thromboplastin Time: 30.7 sec [24.5 - 35.6] (08-02-23 @ 05:30)      HCG Quantitative, Serum: <0 mIU/mL (08-01-23 @ 20:12)    EKG: PENDING     ASA: III				Mallampati class: II            Anginal Class: IV      Sedation Plan:   [  ] None   [ x ] Moderate   [  ]  Deep    [  ]  General Anesthesia   Patient Is Suitable Candidate For Sedation?     [ x ] Yes   [  ] No   [  ] Not Applicable   Cath Order Placed: [ x ] Yes    CARDIAC CATH: Risks & benefits of procedure and sedation and risks and benefits for the alternative therapy have been explained to the patient and/or HCP in layman’s terms including but not limited to: allergic reaction, bleeding, infection, arrhythmia, respiratory compromise, renal and vascular compromise, limb damage, MI, CVA, emergent CABG/Vascular Surgery and death. Informed consent obtained and in chart.    FLUIDS: EF preserved with Grade I Diastolic dysfunction: Euvolemic on exam.  bolus x one ordered, followed by NS @ 75 cc/hour x two hours  LABS: Patient noted to be anemic and reports she has a known Iron Deficiency Anemia secondary to history of Uterine Fibroids. LMP approximately one month prior. Reports taking Iron tablets intermittently but is not compliant. H/H trend reviewed with Dr. Rivers and Dr. Soto. Patient declined guaiac at this time and denies any melena with exception with scant blood when constipated (last episode >1 month ago). To be loaded with ASA 81 mg PO x one dose and Plavix 300 mg x one dose.   Discussed compliance need with DAPT if patient receives stents. Teach back method applied. In addition, patient reports she is going to abstain from ETOH use and work on smoking cessation.  11/12/16  Last Fill Quantity: 90, # refills: 0    Last Office Visit with [facility], [facility] or [facility] prescribing provider:  01/12/17   Future Office Visit:    Next 5 appointments (look out 90 days)     Feb 14, 2017  9:00 AM   Return Visit with Senthil Taylor MD   [facility]    [address] [facility] [facility]  [place] [address]   [phone]            Feb 27, 2017 10:30 AM   Office Visit with Reginaldo [facility] MD   [facility]    [address] [address]   [phone]                    BP Readings from Last 3 Encounters:   01/12/17 117/68   11/08/16 104/70   10/04/16 128/74          Precath Checklist      Allergies: NONE  No Known Allergies      Shellfish/Contrast Allergies? No                 8.1    4.19  )-----------( 471      ( 03 Aug 2023 05:30 )             29.2     08-03    136  |  104  |  10  ----------------------------<  143<H>  4.1   |  22  |  0.96    Ca    8.8      03 Aug 2023 05:30  Mg     1.7     08-03    TPro  5.8<L>  /  Alb  3.2<L>  /  TBili  <0.2  /  DBili  <0.2  /  AST  15  /  ALT  12  /  AlkPhos  56  08-02      Prothrombin Time, Plasma: 10.6 sec [9.5 - 13.0] (08-02-23 @ 05:30)  INR: 0.93 [0.85 - 1.18] (08-02-23 @ 05:30)  Activated Partial Thromboplastin Time: 30.7 sec [24.5 - 35.6] (08-02-23 @ 05:30)      HCG Quantitative, Serum: <0 mIU/mL (08-01-23 @ 20:12)    EKG: PENDING     ASA: III				Mallampati class: II            Anginal Class: IV      Sedation Plan:   [  ] None   [ x ] Moderate   [  ]  Deep    [  ]  General Anesthesia   Patient Is Suitable Candidate For Sedation?     [ x ] Yes   [  ] No   [  ] Not Applicable   Cath Order Placed: [ x ] Yes    CARDIAC CATH: Risks & benefits of procedure and sedation and risks and benefits for the alternative therapy have been explained to the patient and/or HCP in layman’s terms including but not limited to: allergic reaction, bleeding, infection, arrhythmia, respiratory compromise, renal and vascular compromise, limb damage, MI, CVA, emergent CABG/Vascular Surgery and death. Informed consent obtained and in chart.    FLUIDS: EF preserved with Grade I Diastolic dysfunction: Euvolemic on exam.  bolus x one ordered, followed by NS @ 75 cc/hour x two hours  LABS: Patient noted to be anemic and reports she has a known Iron Deficiency Anemia secondary to history of Uterine Fibroids. LMP approximately one month prior. Reports taking Iron tablets intermittently but is not compliant. H/H trend reviewed with Dr. Rivers and Dr. Soto. Patient declined guaiac at this time and denies any melena with exception with scant blood when constipated (last episode >1 month ago). To be loaded with ASA 81 mg PO x one dose and Plavix 300 mg x one dose.   Discussed compliance need with DAPT if patient receives stents. Teach back method applied. In addition, patient reports she is going to abstain from ETOH use and work on smoking cessation.  Interventional Cardiology PA Adult Progress Note    Subjective Assessment: Patient seen and examined at bedside. No acute overnight events. Pt upset this AM as felt unclear on plan of care, tried to leave as she wanted to go get breakfast.  All of pt's questions and concerns answered and pt expressed gratitude and decided to stay in hospital. Pt denies CP/SOB.  	  MEDICATIONS:  losartan 100 milliGRAM(s) Oral daily    acetaminophen     Tablet .. 650 milliGRAM(s) Oral every 6 hours PRN  buPROPion XL (24-Hour) 150 milliGRAM(s) Oral daily    metoclopramide 5 milliGRAM(s) Oral three times a day  pantoprazole    Tablet 40 milliGRAM(s) Oral before breakfast    atorvastatin 40 milliGRAM(s) Oral at bedtime  dextrose 50% Injectable 25 Gram(s) IV Push once  dextrose 50% Injectable 12.5 Gram(s) IV Push once  dextrose 50% Injectable 25 Gram(s) IV Push once  dextrose Oral Gel 15 Gram(s) Oral once PRN  glucagon  Injectable 1 milliGRAM(s) IntraMuscular once  insulin lispro (ADMELOG) corrective regimen sliding scale   SubCutaneous Before meals and at bedtime    aspirin enteric coated 81 milliGRAM(s) Oral daily  dextrose 5%. 1000 milliLiter(s) IV Continuous <Continuous>  dextrose 5%. 1000 milliLiter(s) IV Continuous <Continuous>  ferrous    sulfate 325 milliGRAM(s) Oral daily  folic acid 1 milliGRAM(s) Oral daily  multivitamin 1 Tablet(s) Oral daily  sodium chloride 0.9%. 500 milliLiter(s) IV Continuous <Continuous>  thiamine 100 milliGRAM(s) Oral daily        [PHYSICAL EXAM:  TELEMETRY:  T(C): 36.6 (08-03-23 @ 08:36), Max: 36.7 (08-02-23 @ 19:57)  HR: 73 (08-03-23 @ 12:55) (73 - 86)  BP: 168/99 (08-03-23 @ 12:55) (146/82 - 176/77)  RR: 19 (08-03-23 @ 12:55) (17 - 20)  SpO2: 96% (08-03-23 @ 12:55) (96% - 99%)  Wt(kg): --  I&O's Summary    02 Aug 2023 07:01  -  03 Aug 2023 07:00  --------------------------------------------------------  IN: 0 mL / OUT: 0 mL / NET: 0 mL    03 Aug 2023 07:01  -  03 Aug 2023 18:12  --------------------------------------------------------  IN: 420 mL / OUT: 0 mL / NET: 420 mL        Ribeiro:  Central/PICC/Mid Line:                                         Appearance: Pt seen in bed in NAD 	  HEENT:   Normal oral mucosa, PERRL, EOMI	  Neck: Supple  Cardiovascular: Normal S1 S2, No JVD, No murmurs  Respiratory: Lungs clear to auscultation, No Rales, Rhonchi, Wheezing	  Gastrointestinal:  Soft, Non-tender, + BS	  Skin: No rashes, No ecchymoses, No cyanosis  Extremities: Normal range of motion, No clubbing, cyanosis or edema  Vascular: Peripheral pulses palpable 2+ bilaterally  Neurologic: Non-focal. Ambulating well, steady gait  Psychiatry: A & O x 3, Mood & affect appropriate- upset as noted above, then pleasant once concerns addressed  	    LABS:	 	  CARDIAC MARKERS:                          8.1    4.19  )-----------( 471      ( 03 Aug 2023 05:30 )             29.2     08-03    136  |  104  |  10  ----------------------------<  143<H>  4.1   |  22  |  0.96    Ca    8.8      03 Aug 2023 05:30  Mg     1.7     08-03    TPro  5.8<L>  /  Alb  3.2<L>  /  TBili  <0.2  /  DBili  <0.2  /  AST  15  /  ALT  12  /  AlkPhos  56  08-02    proBNP:   Lipid Profile:   HgA1c:   TSH:   PT/INR - ( 02 Aug 2023 05:30 )   PT: 10.6 sec;   INR: 0.93          PTT - ( 02 Aug 2023 05:30 )  PTT:30.7 sec

## 2025-04-28 NOTE — TELEPHONE ENCOUNTER
"Routing refill request to provider for review/approval because:  Labs not current:  LDL    Requested Prescriptions   Pending Prescriptions Disp Refills     simvastatin (ZOCOR) 40 MG tablet [Pharmacy Med Name: SIMVASTATIN 40 MG TABLET] 45 tablet 3     Sig: TAKE 0.5 TABLETS (20 MG) BY MOUTH DAILY       Statins Protocol Failed - 10/28/2019  7:39 AM        Failed - LDL on file in past 12 months     Recent Labs   Lab Test 10/01/18  0926   LDL 60             Failed - Recent (12 mo) or future (30 days) visit within the authorizing provider's specialty     Patient has had an office visit with the authorizing provider or a provider within the authorizing providers department within the previous 12 mos or has a future within next 30 days. See \"Patient Info\" tab in inbasket, or \"Choose Columns\" in Meds & Orders section of the refill encounter.              Passed - No abnormal creatine kinase in past 12 months     No lab results found.             Passed - Medication is active on med list        Passed - Patient is age 18 or older      Signed Prescriptions Disp Refills    metoprolol succinate ER (TOPROL-XL) 25 MG 24 hr tablet 90 tablet 0     Sig: TAKE 1 TABLET BY MOUTH EVERY DAY       Beta-Blockers Protocol Failed - 10/28/2019  7:39 AM        Failed - Recent (12 mo) or future (30 days) visit within the authorizing provider's specialty     Patient has had an office visit with the authorizing provider or a provider within the authorizing providers department within the previous 12 mos or has a future within next 30 days. See \"Patient Info\" tab in inbasket, or \"Choose Columns\" in Meds & Orders section of the refill encounter.              Passed - Blood pressure under 140/90 in past 12 months     BP Readings from Last 3 Encounters:   09/30/19 123/64   09/03/19 109/66   08/01/19 132/74                 Passed - Patient is age 6 or older        Passed - Medication is active on med list        Alexa Canada RN  " - cw  Hydral 20mg tid  - c/w isordil 10mg tid

## 2025-05-31 NOTE — LETTER
11/7/2019         RE: Dimitri Neves  [id number] [place] [address]        Dear Colleague,    Thank you for referring your patient, Dimitri Neves, to the [facility]. Please see a copy of my visit note below.    [facility]  HEMATOLOGY AND ONCOLOGY    FOLLOW-UP VISIT NOTE    PATIENT NAME: Dimitri Neves MRN # [id number]  DATE OF VISIT: Nov 7, 2019 YOB: 1935    REFERRING PROVIDER: No referring provider defined for this encounter.    CANCER TYPE: Prostate adenocarcinoma  STAGE: IV    TREATMENT SUMMARY:  Dimitri was followed by his PCP on 6/13/13 and was elevated at 32 as noted below. He was referred to Dr. Taylor. He was treated with a 10 day course of ciprofloxacin and followed again in 6 weeks on 8/10. His PSA drawn prior to this visit was unchanged and remained elevated at 32. He had a TRUS biopsy on 8/25/14 which was negative for prostate adenocarcinoma. His PSA was repeated in 3 months and now increased to 67.9. He had a repeat biopsy of prostate on 12/16/14 which now confirmed prostate adenocarcinoma with eileen 4+4 disease involving 5 of the cores and [place] 3+3 disease involving remainder of cores. He was staged with a CT scan and bone scan done on 12/29/14 which revealed metastatic foci in the upper cervical vertebrae on the left, right posterior 3rd rib and right ischium, focal uptake in the posterior left 11th rib with corresponding lytic expansile appearance on CT, suspicious for metastasis.          4/5/2011 08:46 6/13/2014 08:03 7/25/2014 09:47 12/5/2014 09:00 4/7/2015 09:14   PSA 2.77 32.70 (H) 32.00 (H) 67.88 (H) 1.92      He was started on androgen deprivation therapy in Jan 2015 with a good initial response. His PSA has been increasing recently and he was started on bicalutamide in February with no response. He has continued to have rising PSA and was prescribed abiraterone with prednisone. He had a huge copay with this and has been referred  to Medical Oncology to review other options.     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 He is tolerating his abiraterone without any difficulty.     He does have hot flushes, fatigue, mood swings on androgen deprivation therapy. He has increased bruising.     He did have bilateral orchiectomies.         PAST MEDICAL HISTORY     Past Medical History:   Diagnosis Date     Actinic keratosis      AK (actinic keratosis) 7/9/2012     Cataract cortical, senile right eye 4/17/2012     DDD (degenerative disc disease), lumbar 1979    s/p 4 back surgeries, spinal cord stimulator implant      Diverticulosis      ED (erectile dysfunction) 2009     GERD (gastroesophageal reflux disease) ~1980     HTN (hypertension), benign ~2000     Macular degeneration, dry, mild, ou 7/23/2016     Multiple lung nodules     Several basilar pulmonary nodules <5 mm     HUDSON (obstructive sleep apnea) 10/2005    on CPAP     Other abnormal glucose 01/14/2009     PE (pulmonary thromboembolism) (H) 1981    after back surgery      Pure hypercholesterolemia ~2000     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         CURRENT OUTPATIENT MEDICATIONS     Current Outpatient Medications   Medication Sig     abiraterone (ZYTIGA) 250 MG tablet Take 4 tablets (1,000 mg) by mouth daily for 30 doses Take on empty stomach.     albuterol (2.5 MG/3ML) 0.083% neb solution Take 1 vial (2.5 mg) by nebulization every 6 hours as needed for shortness of breath / dyspnea or wheezing     ASPIRIN PO Take 81 mg by mouth     calcipotriene (DOVONEX) 0.005 % external cream Mix with efudex and apply twice daily to scalp for ten days     Calcium Carbonate (CALCIUM 600 PO)       "Cholecalciferol (VITAMIN D) 2000 UNITS tablet Take 1 tablet by mouth daily     [employer] VITAMIN B12 1000 MCG tablet TAKE 1 TABLET BY MOUTH EVERY DAY     diphenoxylate-atropine (LOMOTIL) 2.5-0.025 MG tablet Take 1 tablet by mouth 4 times daily as needed for diarrhea     fluocinonide (LIDEX) 0.05 % cream Apply sparingly to affected area twice daily as needed on legs.  Do not apply to face.     fluorouracil (EFUDEX) 5 % external cream Mix with dovonex apply to scalp for ten days twice a day     furosemide (LASIX) 20 MG tablet TAKE 1 TABLET (20 MG) BY MOUTH DAILY     gabapentin (NEURONTIN) 300 MG capsule Take 900 mg by mouth At Bedtime     metoprolol succinate ER (TOPROL-XL) 25 MG 24 hr tablet TAKE 1 TABLET BY MOUTH EVERY DAY     potassium chloride ER (K-DUR/KLOR-CON M) 10 MEQ CR tablet Take 1 tablet (10 mEq) by mouth 2 times daily     predniSONE (DELTASONE) 5 MG tablet Take 1 tablet (5 mg) by mouth 2 times daily     quinapril (ACCUPRIL) 40 MG tablet TAKE 1 TABLET (40 MG) BY MOUTH DAILY     simvastatin (ZOCOR) 40 MG tablet TAKE 0.5 TABLETS (20 MG) BY MOUTH DAILY     ipratropium - albuterol 0.5 mg/2.5 mg/3 mL (DUONEB) 0.5-2.5 (3) MG/3ML neb solution Take 1 vial (3 mLs) by nebulization once for 1 dose     No current facility-administered medications for this visit.         ALLERGIES      Allergies   Allergen Reactions     Morphine Sulfate GI Disturbance     vomiting     Vicodin [Hydrocodone-Acetaminophen] Nausea and Vomiting        REVIEW OF SYSTEMS   As above in the HPI, o/w complete 12-point ROS was negative.     PHYSICAL EXAM   /66 (BP Location: Left arm)   Pulse 64   Temp 97.7  F (36.5  C) (Oral)   Resp 16   Ht 1.803 m (5' 10.98\")   Wt 88.6 kg (195 lb 6.4 oz)   SpO2 96%   BMI 27.27 kg/m     GEN: NAD  HEENT: PERRL, EOMI, no icterus, injection or pallor. Oropharynx is clear.  NECK: no cervical or supraclavicular lymphadenopathy  LUNGS: clear bilaterally  CV: regular, no murmurs, rubs, or gallops  ABDOMEN: soft, " non-tender, non-distended, normal bowel sounds, no hepatosplenomegaly by percussion or palpation  EXT: warm, well perfused, +++ edema  NEURO: alert  SKIN: Extensive ecchymoses in all the extremities     LABORATORY AND IMAGING STUDIES     Labs remain stable. Calcium is within the normal range    Mild normocytic anemia     Recent Labs   Lab Test 11/04/19  0841 08/01/19  0935 04/18/19  0831 01/07/19  0855 11/12/18  0806    138 140 141 141   POTASSIUM 4.1 4.5 4.1 3.3* 3.8   CHLORIDE 108 104 105 104 104   CO2 30 30 31 28 30   ANIONGAP 4 4 4 9 7   BUN 11 18 27 18 20   CR 0.91 0.90 1.03 1.02 0.93   * 115* 105* 117* 127*   ALETHA 9.0 9.4 9.3 9.5 9.2     Recent Labs   Lab Test 12/28/11  0857   PHOS 3.1     Recent Labs   Lab Test 11/04/19  0841 08/01/19  0935 04/18/19  0831 01/07/19  0855 11/12/18  0806   WBC 8.3 9.2 8.2 7.6 7.5   HGB 12.2* 12.5* 12.4* 12.1* 11.4*    228 208 211 163    99 100 97 99   NEUTROPHIL 76.4 76.1 73.6 69.5 80.1     Recent Labs   Lab Test 11/04/19  0841 08/01/19  0935 04/18/19  0831   BILITOTAL 0.6 0.7 1.0   ALKPHOS 77 86 83   ALT 16 20 18   AST 15 16 16   ALBUMIN 3.2* 3.4 3.3*     TSH   Date Value Ref Range Status   06/13/2016 1.60 0.40 - 4.00 mU/L Final   04/05/2011 3.73 0.4 - 5.0 mU/L Final     No results for input(s): CEA in the last 97183 hours.  Results for orders placed or performed in visit on 11/04/19   CT Chest/Abdomen/Pelvis w Contrast    Narrative    EXAMINATION: CT CHEST/ABDOMEN/PELVIS W CONTRAST, 11/4/2019 9:57 AM    TECHNIQUE:  Helical CT images from the lung apices through the  symphysis pubis were obtained with contrast.  Coronal and sagittal  reformatted images were generated at a workstation for further  assessment.    CONTRAST:  120 ml Isovue 370.    COMPARISON: CT 5/22/2018 and bone scan 5/22/2018.    HISTORY: Prostate cancer - rising PSA; Malignant neoplasm of prostate  (H); Bone metastasis (H)    ADDITIONAL HISTORY FROM THE EMR: Under treatment with  abiraterone.  Today prostate-specific antigen is 35, previously 27 August 2019.    FINDINGS:    Chest: The visualized thyroid is unremarkable. Heart size is normal.  Mild atherosclerotic calcification of the course of LAD. Origins of  the great neck vessels are patent. There is no pericardial effusion.  No mediastinal, hilar, or axillary lymphadenopathy.  The central  tracheobronchial tree is patent. There is no focal airspace  consolidation, pleural effusion, or pneumothorax. Minimal apical  predominant emphysematous changes and scarring, unchanged compared to  prior. Scattered granulomatous bilaterally, unchanged. Plaque-like  pleural calcification in the right ventral pleura unchanged. Scattered  atelectatic and fibrotic changes in the bilateral lower segments. No  worrisome lung nodule.    Abdomen/pelvis:  Bilateral, predominantly exophytic renal cortical cysts are unchanged  from prior studies. There are also subcentimeter cortical  hypodensities in both kidneys, stable, too small to characterize. No  hydronephrosis. Pelvic phleboliths. Symmetric seminal vesicles.  Prostate calcifications.     Focal hypodensity in the left hepatic lobe is stable and too small to  accurately characterize on CT. The liver is otherwise unremarkable.  The gallbladder is surgically absent. Mild to moderate diffuse  pancreatic atrophy.  Right adrenal gland thickening dates back to 2017  without significant change.    There are no dilated loops of large or small bowel. No focal bowel  wall thickening or mucosal hyperenhancement. The appendix is normal.  Sigmoid colonic diverticulosis without CT evidence of active  diverticulitis.     The major intra-abdominal vasculature is patent and within normal  limits for caliber. Atherosclerotic calcifications of the abdominal  aorta and its major branches. Minimally increased narrowing in the  origin of the celiac trunk with poststenotic ectasia of the proximal  celiac trunk, nonspecific and  without substantial change since 2017.  No retroperitoneal, pelvic or inguinal lymphadenopathy. No new or  enlarging intra-abdominal lymphadenopathy.    Bones/soft tissues: New ill-defined sclerosis in the lateral left  seventh rib since CT from 5/22/2018 (series 3, image 200) and anterior  left sixth rib (series 3, image 254).    Unchanged sclerotic metastatic focus in the right ischial bone and  right inferior pubic ramus.    Multilevel anterior and posterior bridging osteophytes as well as  fusion of the spinous processes. Appearance of the spine resembles  diffuse idiopathic skeletal hyperostosis. Osteoporosis of the spine.  Posterior fusion hardware at L2-3. Laminectomy defect at L2-3.  Interdiscal fusion at this L3-4. Unchanged posterior bulging  (osteophyte at T11-T12 with resultant mild to moderate spinal canal  stenosis. Spinal stimulator device with leads terminating posterior to  the spinal cord at the T7-T8 level. Rotator cuff repair on the right.  Degenerative changes of bilateral SI joints with partial fusion.  Degenerative changes of both hips.  Healed prior left posterior 11th  and 12th rib fractures.      Impression    Impression: In this patient with metastatic prostate cancer with  rising PSA:  1. Indeterminate sclerotic focus in the left lateral seventh rib and  anterior left sixth rib, new since 2018.  Attention on follow-up.  2. Stable sclerotic metastatic focus in the right ischial bone and  right inferior pubic ramus.  3. No metastatic lymphadenopathy. No solid organ or lung metastasis.  4. Please refer to same date bone scan for additional findings.    I have personally reviewed the examination and initial interpretation  and I agree with the findings.    KT TAMEZ MD     EXAMINATION: NM BONE SCAN WHOLE BODY  Whole-body bone scan, 11/4/2019 12:33 PM      HISTORY: Restaging - bony metastasis; Malignant neoplasm of prostate  (H); Bone metastasis (H)      ADDITIONAL INFORMATION:  none     COMPARISON: CT cap 11/4/2019, 7/17/2017, nuclear medicine bone scan  5/22/2018, 7/17/2017     TECHNIQUE: The patient received 25.4 mCi of Tc-99m MDP intravenously.  Whole body bone images were obtained at 3 hours.     FINDINGS:      Focal radiotracer uptake in the right posterior inferior pubic ramus,  slightly larger. This corresponds to known osteoblastic metastasis. No  new metastatic lesions.  New focal radiotracer uptake in the anterior left sixth rib. No  definite corresponding abnormality on CT. This is likely degenerative.  Irregular uptake throughout the spine, degenerative   Periarticular uptake adjacent to the patient's right total knee  arthroplasty.                                                                      IMPRESSION:   1. Slightly progressed osseous metastasis in the right inferior pubic  ramus lesion.   2. New focal radiotracer uptake at the anterior left sixth rib, which  is most likely degenerative change at the costochondral junction.     I have personally reviewed the examination and initial interpretation  and I agree with the findings.     CAESAR CALDWELL MD             Recent Labs   Lab Test 11/04/19  0841 08/01/19  0935 04/18/19  0831 01/07/19  0855 11/12/18  0806  02/09/17  0823   PSA 34.50* 27.00* 17.80* 13.20* 16.80*   < >  --    TESTOSTTOTAL  --   --   --   --   --   --  9*    < > = values in this interval not displayed.        ASSESSMENT AND PLAN   1. 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 No response to addition of bicalutamide  3. PE diagnosed few weeks after initiation of megestrol acetate for hot flashes on GnRH agonist  4. No significant medical comorbidities    He is tolerating his abiraterone well and has no complains. His PSA did respond nicely as expected initially and has been rising since naidr value of 13.2 ng/ml in Jan of 2019. His PSA at this visit is not much higher at  "34.5 up from 27 in August and 17.8 ng/ml in April. It has doubled since April in the last 6 months.     I reviewed actual images from his restaging scans. He has a small area of uptake in the right inferior pubic ramus and in left 7th rib. The rib lesion is suspicious for degenerative disease. However the right pubic ramus lesion has been present in the past and is more prominent. There is no other evidence of metastatic disease. His CT scan also reveals corresponding sclerotic lesion for the right inferior pubic rami.      We would plan to continue with abiraterone as current for now as we have no immediate concerns. However given the progressive disease, I would recommend getting screened for 'Triton 3' study. If he does not have the mutation, I would recommend chemotherapy with docetaxel as the next step. I reviewed chemotherapy with docetaxel. I extensively reviewed the side effects from this chemotherapy.  We reviewed the peripheral neuropathy, alopecia, neutropenic fevers, myelosuppression, mucositis, diarrhea, allergies, pedal edema and rash. We reviewed the palliative intent, side effects, benefits, rationale, alternatives and outpatient regimen with him. He wondered if the chemotherapy could be done locally in [place]. I do not think that we could have the chemotherapy done in [place]. We are not there as yet.     I reviewed the 'Triton 3\" clinical trial in great details with him. In a study of similar class of agent - olaparib published in NEJ (N Engl J Med 2015; 373:7112-3220) about 33% of similar patients did have one of the mutations involving homologous DNA repair enzyme. 14 of the 16 patients with one of the mutations had a treatment response. Anemia (in 10 of the 50 patients [20%]) and fatigue (in 6 [12%]) were the most common grade 3 or 4 adverse events, findings that are consistent with previous studies of this class of agents. The current Triton 3 study selects patients with one of these mutations " which is checked at screening (BRCA1/2, NAFISA, CHK2, RAD51 and others). This can be done from a fresh biopsy or from next generation sequencing of circulating tumor cells. He does not have a good site for biopsy as the only site for disease is right inferior pubic rami. We could try checking for mutations in circulating tumor cells.     He did get bilateral orchiectomies done last month 9/27/17. He would not need any more lupron or other GnRH directed therapies.     His labs are stable except for mild normocytic anemia - possibly owing to ongoing therapy for cancer. He also has mild hypoalbuminemia which is unchanged.     He has extensive ecchymoses likely secondary to prednisone use.     He has hot flashes, mood swings, fatigue - from androgen deprivation. These are no different between either orchiectomy or GnRH therapy as they come with low levels of testosterone.      We would continue with zometa every 12 weeks. I could see him in 8 weeks with labs done prior to his visit with me. He will see our research nurse today and try to get tested for the DNA homologous repair enzyme deficiency with plans to possibly to start clinical trial if he has the mutation or move to chemotherapy for progressive disease without the mutation.     Over 45 min of direct face to face time spent with patient with more than 50% time spent in counseling and coordinating care.        Again, thank you for allowing me to participate in the care of your patient.        Sincerely,        Rafael Valenzuela MD     13-May-2025 22:24

## (undated) DEVICE — SU SILK 0 TIE 6X30" A306H

## (undated) DEVICE — SU SILK 2-0 SH 30" K833H

## (undated) DEVICE — BLADE KNIFE SURG 11 371111

## (undated) DEVICE — SOL NACL 0.9% IRRIG 1000ML BOTTLE 07138-09

## (undated) DEVICE — SOL WATER IRRIG 1000ML BOTTLE 07139-09

## (undated) DEVICE — SU MONOCRYL 4-0 PS-2 18" UND Y496G

## (undated) DEVICE — GLOVE PROTEXIS W/NEU-THERA 8.0  2D73TE80

## (undated) DEVICE — NDL 19GA 1.5"

## (undated) DEVICE — SU CHROMIC 3-0 SH 27" G122H

## (undated) DEVICE — PREP SKIN SCRUB TRAY 4461A

## (undated) DEVICE — ESU GROUND PAD ADULT W/CORD E7507

## (undated) DEVICE — SU PROLENE 2-0 CT-2 30" 8411H

## (undated) DEVICE — PACK MINOR SBA15MIFSE

## (undated) DEVICE — DRSG STERI STRIP 1/4X3" R1541

## (undated) DEVICE — DRAPE LAP W/ARMBOARD 29410

## (undated) DEVICE — DRSG KERLIX FLUFFS X5

## (undated) DEVICE — COVER ULTRASOUND PROBE W/GEL FLEXI-FEEL 6"X58" LF  25-FF658

## (undated) DEVICE — SU DERMABOND DHV12

## (undated) DEVICE — GLOVE PROTEXIS BLUE W/NEU-THERA 8.0  2D73EB80

## (undated) DEVICE — NDL SPINAL 18GA 3.5" 405184

## (undated) DEVICE — DRAPE BREAST/CHEST 29420

## (undated) DEVICE — SU VICRYL 3-0 SH 27" J316H

## (undated) DEVICE — PREP CHLORAPREP 26ML TINTED ORANGE  260815

## (undated) DEVICE — ESU ELEC NDL 1" COATED/INSULATED E1465

## (undated) DEVICE — GLOVE PROTEXIS W/NEU-THERA 7.0  2D73TE70

## (undated) RX ORDER — FENTANYL CITRATE 50 UG/ML
INJECTION, SOLUTION INTRAMUSCULAR; INTRAVENOUS
Status: DISPENSED
Start: 2020-01-01

## (undated) RX ORDER — GABAPENTIN 300 MG/1
CAPSULE ORAL
Status: DISPENSED
Start: 2017-09-27

## (undated) RX ORDER — HEPARIN SODIUM 1000 [USP'U]/ML
INJECTION, SOLUTION INTRAVENOUS; SUBCUTANEOUS
Status: DISPENSED
Start: 2020-01-01

## (undated) RX ORDER — CEFAZOLIN SODIUM 2 G/100ML
INJECTION, SOLUTION INTRAVENOUS
Status: DISPENSED
Start: 2020-01-01

## (undated) RX ORDER — BUPIVACAINE HYDROCHLORIDE AND EPINEPHRINE 2.5; 5 MG/ML; UG/ML
INJECTION, SOLUTION INFILTRATION; PERINEURAL
Status: DISPENSED
Start: 2020-01-01

## (undated) RX ORDER — ONDANSETRON 2 MG/ML
INJECTION INTRAMUSCULAR; INTRAVENOUS
Status: DISPENSED
Start: 2020-01-01

## (undated) RX ORDER — ACETAMINOPHEN 325 MG/1
TABLET ORAL
Status: DISPENSED
Start: 2020-01-01

## (undated) RX ORDER — LIDOCAINE HYDROCHLORIDE 20 MG/ML
INJECTION, SOLUTION INFILTRATION; PERINEURAL
Status: DISPENSED
Start: 2020-01-01

## (undated) RX ORDER — PROPOFOL 10 MG/ML
INJECTION, EMULSION INTRAVENOUS
Status: DISPENSED
Start: 2020-01-01

## (undated) RX ORDER — FENTANYL CITRATE-0.9 % NACL/PF 10 MCG/ML
PLASTIC BAG, INJECTION (ML) INTRAVENOUS
Status: DISPENSED
Start: 2020-01-01

## (undated) RX ORDER — ACETAMINOPHEN 325 MG/1
TABLET ORAL
Status: DISPENSED
Start: 2017-09-27

## (undated) RX ORDER — LIDOCAINE HYDROCHLORIDE 10 MG/ML
INJECTION, SOLUTION EPIDURAL; INFILTRATION; INTRACAUDAL; PERINEURAL
Status: DISPENSED
Start: 2020-01-01